# Patient Record
Sex: FEMALE | Race: WHITE | NOT HISPANIC OR LATINO | Employment: OTHER | URBAN - METROPOLITAN AREA
[De-identification: names, ages, dates, MRNs, and addresses within clinical notes are randomized per-mention and may not be internally consistent; named-entity substitution may affect disease eponyms.]

---

## 2018-02-05 ENCOUNTER — OFFICE VISIT (OUTPATIENT)
Dept: INTERNAL MEDICINE | Facility: CLINIC | Age: 68
End: 2018-02-05

## 2018-02-05 VITALS
OXYGEN SATURATION: 98 % | BODY MASS INDEX: 29.7 KG/M2 | RESPIRATION RATE: 16 BRPM | HEIGHT: 67 IN | WEIGHT: 189.2 LBS | SYSTOLIC BLOOD PRESSURE: 122 MMHG | TEMPERATURE: 97.7 F | HEART RATE: 68 BPM | DIASTOLIC BLOOD PRESSURE: 68 MMHG

## 2018-02-05 DIAGNOSIS — E03.8 HYPOTHYROIDISM DUE TO HASHIMOTO'S THYROIDITIS: ICD-10-CM

## 2018-02-05 DIAGNOSIS — Z71.3 WEIGHT LOSS COUNSELING, ENCOUNTER FOR: ICD-10-CM

## 2018-02-05 DIAGNOSIS — Z78.0 POST-MENOPAUSAL: ICD-10-CM

## 2018-02-05 DIAGNOSIS — Z12.11 COLON CANCER SCREENING: ICD-10-CM

## 2018-02-05 DIAGNOSIS — Z13.220 SCREENING FOR HYPERLIPIDEMIA: ICD-10-CM

## 2018-02-05 DIAGNOSIS — F32.A DEPRESSION, UNSPECIFIED DEPRESSION TYPE: ICD-10-CM

## 2018-02-05 DIAGNOSIS — Z12.31 ENCOUNTER FOR SCREENING MAMMOGRAM FOR MALIGNANT NEOPLASM OF BREAST: ICD-10-CM

## 2018-02-05 DIAGNOSIS — F41.9 ANXIETY: Primary | ICD-10-CM

## 2018-02-05 DIAGNOSIS — E66.3 OVERWEIGHT (BMI 25.0-29.9): ICD-10-CM

## 2018-02-05 DIAGNOSIS — C90.00 MULTIPLE MYELOMA, REMISSION STATUS UNSPECIFIED (HCC): ICD-10-CM

## 2018-02-05 DIAGNOSIS — E06.3 HYPOTHYROIDISM DUE TO HASHIMOTO'S THYROIDITIS: ICD-10-CM

## 2018-02-05 DIAGNOSIS — E55.9 VITAMIN D DEFICIENCY: ICD-10-CM

## 2018-02-05 DIAGNOSIS — D75.89 MACROCYTOSIS WITHOUT ANEMIA: ICD-10-CM

## 2018-02-05 PROCEDURE — 99215 OFFICE O/P EST HI 40 MIN: CPT | Performed by: INTERNAL MEDICINE

## 2018-02-05 RX ORDER — GABAPENTIN 100 MG/1
100 CAPSULE ORAL DAILY PRN
COMMUNITY
End: 2021-04-06 | Stop reason: SDUPTHER

## 2018-02-05 RX ORDER — ALPRAZOLAM 0.25 MG/1
0.25 TABLET ORAL 2 TIMES DAILY PRN
Refills: 2 | COMMUNITY
Start: 2018-01-19 | End: 2020-06-15 | Stop reason: SDUPTHER

## 2018-02-05 RX ORDER — OMEGA-3-ACID ETHYL ESTERS 1 G/1
2 CAPSULE, LIQUID FILLED ORAL 2 TIMES DAILY
COMMUNITY
End: 2019-09-19

## 2018-02-05 RX ORDER — OLANZAPINE 2.5 MG/1
2.5 TABLET ORAL NIGHTLY
COMMUNITY
End: 2018-02-05

## 2018-02-05 RX ORDER — FOLIC ACID 1 MG/1
1 TABLET ORAL DAILY
COMMUNITY
End: 2018-03-12

## 2018-02-05 RX ORDER — ACYCLOVIR 400 MG/1
400 TABLET ORAL 2 TIMES DAILY
COMMUNITY
Start: 2018-01-03 | End: 2021-04-06 | Stop reason: SDUPTHER

## 2018-02-05 RX ORDER — VITAMIN B COMPLEX
2 CAPSULE ORAL DAILY
COMMUNITY

## 2018-02-05 RX ORDER — BORTEZOMIB 3.5 MG/1
INJECTION, POWDER, LYOPHILIZED, FOR SOLUTION INTRAVENOUS; SUBCUTANEOUS ONCE
COMMUNITY
End: 2019-09-19

## 2018-02-05 RX ORDER — FLUTICASONE PROPIONATE 50 MCG
2 SPRAY, SUSPENSION (ML) NASAL DAILY
COMMUNITY

## 2018-02-05 RX ORDER — LENALIDOMIDE 10 MG/1
10 CAPSULE ORAL DAILY
COMMUNITY
End: 2018-03-12

## 2018-02-05 RX ORDER — CYCLOSPORINE 0.5 MG/ML
1 EMULSION OPHTHALMIC 2 TIMES DAILY
COMMUNITY

## 2018-02-05 RX ORDER — ERGOCALCIFEROL 1.25 MG/1
50000 CAPSULE ORAL WEEKLY
COMMUNITY

## 2018-02-05 RX ORDER — DULOXETIN HYDROCHLORIDE 60 MG/1
60 CAPSULE, DELAYED RELEASE ORAL DAILY
COMMUNITY
Start: 2017-12-28 | End: 2020-05-13 | Stop reason: SDUPTHER

## 2018-02-05 NOTE — PROGRESS NOTES
"Subjective     Jennifer Plascencia is a 67 y.o. female, who presents with a chief complaint of   Chief Complaint   Patient presents with   • Establish Care   • Anxiety   • Weight Gain   • Multiple Myeloma       HPI Comments: 68 yo F here to establish care and all of her problems are new to me. She is followed by Dr. Hewitt as well as Anny Bach in [place]. She is not in \"remission\" per her. She is not happy with her oncology provider's office because she is sometimes late getting her medication/infusions. She states that [facility] are the \"best\" and \"no one else can take care of multiple myeloma patients like them\". She was diagnosed with this in 2015 and is s/p SCT and currently getting infusions every 2 weeks in [place] and sees them every 3 months. She feels that her cancer is rare and \"many doctors don't know about this\".     She takes Cymbalta for neuropathy as well  acetyl carnitine, and Lovaza since 2016. 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 She does well on these now. Dr. Hewitt added Zyprexa for her sleep about one year ago and she isn't sure that it is helping.     She takes Flonase for allergies that are chronic and stable.       She has been on synthroid since she was 25 years. Dr. Todd Randall manages for her. She has been on 112mcg since her SCT. She feels well on this and doesn't have constipation, hair loss or dry skin.     She had a mammogram two years ago at [facility] that was normal. She has not had a bone scan in many years. She is unsure when her last c-scope was done and she doesn't want to have another . Prefers stool test.     She is not exercising regularly and worried about her weight. It \"just won't seem to come off since her SCT\".        The following portions of the patient's history were reviewed and updated as appropriate: allergies, current medications, past family history, past medical history, past social history, past surgical history and problem " list.    Allergies: Iodine    Current Outpatient Prescriptions:   •  acyclovir (ZOVIRAX) 400 MG tablet, Take 400 mg by mouth 3 (Three) Times a Day., Disp: , Rfl:   •  ALPRAZolam (XANAX) 0.25 MG tablet, Take 0.25 mg by mouth 2 (Two) Times a Day., Disp: , Rfl: 2  •  aspirin 81 MG chewable tablet, Chew 81 mg Daily., Disp: , Rfl:   •  B Complex Vitamins (VITAMIN B COMPLEX) capsule capsule, Take  by mouth Daily., Disp: , Rfl:   •  bortezomib (VELCADE) 3.5 MG chemo syringe, Infuse  into a venous catheter 1 (One) Time., Disp: , Rfl:   •  calcium carbonate-vitamin d 600-400 MG-UNIT per tablet, Take 1 tablet by mouth Daily., Disp: , Rfl:   •  cycloSPORINE (RESTASIS MULTIDOSE) 0.05 % ophthalmic emulsion, 1 drop 2 (Two) Times a Day., Disp: , Rfl:   •  DULoxetine (CYMBALTA) 60 MG capsule, Take 60 mg by mouth Daily., Disp: , Rfl:   •  estrogen, conjugated,-medroxyprogesterone (PREMPRO) 0.3-1.5 MG per tablet, Take 1 tablet by mouth Daily., Disp: , Rfl:   •  fluticasone (FLONASE) 50 MCG/ACT nasal spray, 2 sprays into each nostril Daily., Disp: , Rfl:   •  folic acid (FOLVITE) 1 MG tablet, Take 1 mg by mouth Daily., Disp: , Rfl:   •  gabapentin (NEURONTIN) 100 MG capsule, Take 100 mg by mouth 3 (Three) Times a Day., Disp: , Rfl:   •  HYDROcodone-acetaminophen (NORCO) 5-325 MG per tablet, Take 1 tablet by mouth Every 6 (Six) Hours As Needed., Disp: , Rfl:   •  lenalidomide (REVLIMID) 10 MG capsule, Take 10 mg by mouth Daily., Disp: , Rfl:   •  levothyroxine (SYNTHROID, LEVOTHROID) 100 MCG tablet, Take 100 mcg by mouth Daily., Disp: , Rfl:   •  omega-3 acid ethyl esters (LOVAZA) 1 g capsule, Take 2 g by mouth 2 (Two) Times a Day., Disp: , Rfl:   •  vitamin D (ERGOCALCIFEROL) 20980 units capsule capsule, Take 50,000 Units by mouth 1 (One) Time Per Week., Disp: , Rfl:   Medications Discontinued During This Encounter   Medication Reason   • acetaminophen (TYLENOL) 100 MG/ML solution *Therapy completed   • acyclovir (ZOVIRAX) 200 MG  "capsule Duplicate order   • azithromycin (ZITHROMAX Z-NO) 250 MG tablet *Therapy completed   • DULoxetine (CYMBALTA) 20 MG capsule *Therapy completed   • guaiFENesin-codeine (ROMILAR-AC) 100-10 MG/5ML syrup *Therapy completed   • albuterol (PROVENTIL HFA;VENTOLIN HFA) 108 (90 Base) MCG/ACT inhaler *Therapy completed   • Chlorcyclizine-Pseudoephed (STAHIST AD) 25-60 MG tablet *Therapy completed   • fluticasone (VERAMYST) 27.5 MCG/SPRAY nasal spray *Therapy completed   • OLANZapine (zyPREXA) 2.5 MG tablet *Therapy completed       Review of Systems   Constitutional: Positive for fatigue and unexpected weight change. Negative for chills and fever.   HENT: Negative for congestion.    Respiratory: Negative for cough and shortness of breath.    Cardiovascular: Negative for chest pain and palpitations.   Gastrointestinal: Negative for diarrhea and nausea.   Musculoskeletal: Negative for arthralgias.   Skin: Negative for rash.   Psychiatric/Behavioral: Positive for sleep disturbance. Negative for decreased concentration and dysphoric mood.       Objective     /68 (BP Location: Right arm, Patient Position: Sitting, Cuff Size: Adult)  Pulse 68  Temp 97.7 °F (36.5 °C) (Oral)   Resp 16  Ht 170.2 cm (67\")  Wt 85.8 kg (189 lb 3.2 oz)  SpO2 98%  BMI 29.63 kg/m2      Physical Exam   Constitutional: She is oriented to person, place, and time. She appears well-developed and well-nourished. No distress.   HENT:   Head: Normocephalic and atraumatic.   Right Ear: External ear normal.   Left Ear: External ear normal.   Mouth/Throat: Oropharynx is clear and moist. No oropharyngeal exudate.   Eyes: Conjunctivae are normal. Right eye exhibits no discharge. Left eye exhibits no discharge. No scleral icterus.   Neck: Neck supple.   Cardiovascular: Normal rate, regular rhythm and normal heart sounds.  Exam reveals no gallop and no friction rub.    No murmur heard.  Pulmonary/Chest: Effort normal and breath sounds normal. No " respiratory distress. She has no wheezes. She has no rales.   Abdominal: Soft. Bowel sounds are normal. She exhibits no distension and no mass. There is no tenderness. There is no guarding.   Lymphadenopathy:     She has no cervical adenopathy.   Neurological: She is alert and oriented to person, place, and time.   Skin: Skin is warm. No rash noted.   Psychiatric: She has a normal mood and affect. Her behavior is normal.   Nursing note and vitals reviewed.        No results found for this or any previous visit.    Assessment/Plan   Jennifer was seen today for establish care, anxiety, weight gain and multiple myeloma.    Diagnoses and all orders for this visit:    Anxiety    Depression, unspecified depression type    Encounter for screening mammogram for malignant neoplasm of breast  -     Mammo Screening Digital Tomosynthesis Bilateral With CAD    Colon cancer screening  -     Cologuard - Stool, Per Rectum    Post-menopausal  -     DEXA Bone Density Axial    Multiple myeloma, remission status unspecified    Vitamin D deficiency    Overweight (BMI 25.0-29.9)    Weight loss counseling, encounter for    Macrocytosis without anemia    Hypothyroidism due to Hashimoto's thyroiditis    She is doing well, but needs to get up to date on her health maintenance. I have ordered mammogram, DEXA as well as colo guard. She needs labs as well and we will order those. I reviewed her blood work in [facility] from Dr. Hewitt office and recent CMP was normal. CBC shows leukopenia with macrocytosis without anemia.     Will continue Cymbalta and supplements for her mood and neuropathy. Will have her wean off of Zyprexa as this is probably causing weight gain issues as well as some changes in her lipid panel and fasting BG's. Unless she absolutely needs this, I think we should stop and she is going to wean off over the next 3-4 weeks. We will see her back afterwards to see how she does.     Dr. Randall managing thyroid levels. She wants  him to continue to take care of this even though I discussed with her that I can.     She needs to get into a regular exercise routine. Encouraged her to get at least 150 minutes of cardiovascular exercise per week. Spent greater than 50% of 40 minutes in counseling regarding weigh loss, depression/anxiety, sleep and health maintenance.       Return in about 4 weeks (around 3/5/2018) for Medicare Wellness, Recheck.    Jade Carvajal MD  02/05/2018

## 2018-02-07 PROBLEM — C90.00 NEUROPATHY ASSOCIATED WITH MULTIPLE MYELOMA (HCC): Status: ACTIVE | Noted: 2018-02-07

## 2018-02-07 PROBLEM — G63 NEUROPATHY ASSOCIATED WITH MULTIPLE MYELOMA: Status: ACTIVE | Noted: 2018-02-07

## 2018-02-23 DIAGNOSIS — E55.9 VITAMIN D DEFICIENCY: Primary | ICD-10-CM

## 2018-02-23 DIAGNOSIS — C90.00 MULTIPLE MYELOMA, REMISSION STATUS UNSPECIFIED (HCC): ICD-10-CM

## 2018-02-23 DIAGNOSIS — E03.8 HYPOTHYROIDISM DUE TO HASHIMOTO'S THYROIDITIS: ICD-10-CM

## 2018-02-23 DIAGNOSIS — E06.3 HYPOTHYROIDISM DUE TO HASHIMOTO'S THYROIDITIS: ICD-10-CM

## 2018-02-23 DIAGNOSIS — Z13.220 SCREENING FOR HYPERLIPIDEMIA: ICD-10-CM

## 2018-02-28 ENCOUNTER — APPOINTMENT (OUTPATIENT)
Dept: LAB | Facility: HOSPITAL | Age: 68
End: 2018-02-28

## 2018-02-28 LAB
25(OH)D3 SERPL-MCNC: 34.2 NG/ML (ref 30–100)
ALBUMIN SERPL-MCNC: 4.3 G/DL (ref 3.5–5.2)
ALBUMIN/GLOB SERPL: 1.7 G/DL
ALP SERPL-CCNC: 55 U/L (ref 39–117)
ALT SERPL W P-5'-P-CCNC: 13 U/L (ref 1–33)
ANION GAP SERPL CALCULATED.3IONS-SCNC: 12.5 MMOL/L
AST SERPL-CCNC: 16 U/L (ref 1–32)
BASOPHILS # BLD AUTO: 0.03 10*3/MM3 (ref 0–0.2)
BASOPHILS NFR BLD AUTO: 0.9 % (ref 0–1.5)
BILIRUB SERPL-MCNC: 0.3 MG/DL (ref 0.1–1.2)
BUN BLD-MCNC: 6 MG/DL (ref 8–23)
BUN/CREAT SERPL: 8 (ref 7–25)
CALCIUM SPEC-SCNC: 8.8 MG/DL (ref 8.6–10.5)
CHLORIDE SERPL-SCNC: 98 MMOL/L (ref 98–107)
CHOLEST SERPL-MCNC: 267 MG/DL (ref 0–200)
CO2 SERPL-SCNC: 27.5 MMOL/L (ref 22–29)
CREAT BLD-MCNC: 0.75 MG/DL (ref 0.57–1)
DEPRECATED RDW RBC AUTO: 54.3 FL (ref 37–54)
EOSINOPHIL # BLD AUTO: 0.08 10*3/MM3 (ref 0–0.7)
EOSINOPHIL NFR BLD AUTO: 2.3 % (ref 0.3–6.2)
ERYTHROCYTE [DISTWIDTH] IN BLOOD BY AUTOMATED COUNT: 14.3 % (ref 11.7–13)
FOLATE SERPL-MCNC: >20 NG/ML (ref 4.78–24.2)
GFR SERPL CREATININE-BSD FRML MDRD: 77 ML/MIN/1.73
GLOBULIN UR ELPH-MCNC: 2.5 GM/DL
GLUCOSE BLD-MCNC: 111 MG/DL (ref 65–99)
HCT VFR BLD AUTO: 37.6 % (ref 35.6–45.5)
HDLC SERPL-MCNC: 76 MG/DL (ref 40–60)
HGB BLD-MCNC: 13.1 G/DL (ref 11.9–15.5)
IMM GRANULOCYTES # BLD: 0 10*3/MM3 (ref 0–0.03)
IMM GRANULOCYTES NFR BLD: 0 % (ref 0–0.5)
LDLC SERPL CALC-MCNC: 142 MG/DL (ref 0–100)
LDLC/HDLC SERPL: 1.87 {RATIO}
LYMPHOCYTES # BLD AUTO: 1.36 10*3/MM3 (ref 0.9–4.8)
LYMPHOCYTES NFR BLD AUTO: 39.8 % (ref 19.6–45.3)
MCH RBC QN AUTO: 36.2 PG (ref 26.9–32)
MCHC RBC AUTO-ENTMCNC: 34.8 G/DL (ref 32.4–36.3)
MCV RBC AUTO: 103.9 FL (ref 80.5–98.2)
MONOCYTES # BLD AUTO: 0.33 10*3/MM3 (ref 0.2–1.2)
MONOCYTES NFR BLD AUTO: 9.6 % (ref 5–12)
NEUTROPHILS # BLD AUTO: 1.62 10*3/MM3 (ref 1.9–8.1)
NEUTROPHILS NFR BLD AUTO: 47.4 % (ref 42.7–76)
NRBC BLD MANUAL-RTO: 0 /100 WBC (ref 0–0)
PLATELET # BLD AUTO: 192 10*3/MM3 (ref 140–500)
PMV BLD AUTO: 10.1 FL (ref 6–12)
POTASSIUM BLD-SCNC: 3.8 MMOL/L (ref 3.5–5.2)
PROT SERPL-MCNC: 6.8 G/DL (ref 6–8.5)
RBC # BLD AUTO: 3.62 10*6/MM3 (ref 3.9–5.2)
SODIUM BLD-SCNC: 138 MMOL/L (ref 136–145)
TRIGL SERPL-MCNC: 243 MG/DL (ref 0–150)
VIT B12 BLD-MCNC: 365 PG/ML (ref 211–946)
VLDLC SERPL-MCNC: 48.6 MG/DL (ref 5–40)
WBC NRBC COR # BLD: 3.42 10*3/MM3 (ref 4.5–10.7)

## 2018-02-28 PROCEDURE — 36415 COLL VENOUS BLD VENIPUNCTURE: CPT

## 2018-02-28 PROCEDURE — 80061 LIPID PANEL: CPT | Performed by: INTERNAL MEDICINE

## 2018-02-28 PROCEDURE — 82746 ASSAY OF FOLIC ACID SERUM: CPT | Performed by: INTERNAL MEDICINE

## 2018-02-28 PROCEDURE — 85025 COMPLETE CBC W/AUTO DIFF WBC: CPT | Performed by: INTERNAL MEDICINE

## 2018-02-28 PROCEDURE — 82306 VITAMIN D 25 HYDROXY: CPT | Performed by: INTERNAL MEDICINE

## 2018-02-28 PROCEDURE — 82607 VITAMIN B-12: CPT | Performed by: INTERNAL MEDICINE

## 2018-02-28 PROCEDURE — 80053 COMPREHEN METABOLIC PANEL: CPT | Performed by: INTERNAL MEDICINE

## 2018-03-07 ENCOUNTER — TELEPHONE (OUTPATIENT)
Dept: INTERNAL MEDICINE | Facility: CLINIC | Age: 68
End: 2018-03-07

## 2018-03-07 NOTE — TELEPHONE ENCOUNTER
Patient is aware    ----- Message from Jade Carvajal MD sent at 3/6/2018  2:05 PM EST -----  Mammogram was negative. Repeat in one year.

## 2018-03-07 NOTE — TELEPHONE ENCOUNTER
TRACYM on 3/1 to call back will review with dr. Carvajal in clinic.    ----- Message from Jade Carvajal MD sent at 3/1/2018  8:24 AM EST -----  Labs shows high cholesterol and slightly low B12. I want her to double on her B complex vitamin and we will recheck CBC in 3-4 months. If still no change in the size of her red blood cells, will consider B12 shots. Follow up as scheduled and we will discuss all results in detail in clinic.

## 2018-03-07 NOTE — TELEPHONE ENCOUNTER
Patient is aware      ----- Message from Jade Carvajal MD sent at 3/6/2018  2:07 PM EST -----  Bone density was normal. Repeat in 2-3 years.

## 2018-03-12 ENCOUNTER — OFFICE VISIT (OUTPATIENT)
Dept: INTERNAL MEDICINE | Facility: CLINIC | Age: 68
End: 2018-03-12

## 2018-03-12 VITALS
OXYGEN SATURATION: 98 % | BODY MASS INDEX: 28.72 KG/M2 | RESPIRATION RATE: 16 BRPM | HEART RATE: 81 BPM | HEIGHT: 67 IN | DIASTOLIC BLOOD PRESSURE: 78 MMHG | TEMPERATURE: 98.2 F | SYSTOLIC BLOOD PRESSURE: 132 MMHG | WEIGHT: 183 LBS

## 2018-03-12 DIAGNOSIS — R09.82 PND (POST-NASAL DRIP): ICD-10-CM

## 2018-03-12 DIAGNOSIS — Z00.00 MEDICARE ANNUAL WELLNESS VISIT, SUBSEQUENT: Primary | ICD-10-CM

## 2018-03-12 DIAGNOSIS — R73.01 IFG (IMPAIRED FASTING GLUCOSE): ICD-10-CM

## 2018-03-12 DIAGNOSIS — E78.2 MIXED HYPERLIPIDEMIA: ICD-10-CM

## 2018-03-12 DIAGNOSIS — F41.9 ANXIETY: ICD-10-CM

## 2018-03-12 DIAGNOSIS — F32.A DEPRESSION, UNSPECIFIED DEPRESSION TYPE: ICD-10-CM

## 2018-03-12 DIAGNOSIS — E53.8 B12 DEFICIENCY: ICD-10-CM

## 2018-03-12 DIAGNOSIS — E55.9 VITAMIN D DEFICIENCY: ICD-10-CM

## 2018-03-12 DIAGNOSIS — Z79.890 HORMONE REPLACEMENT THERAPY (HRT): ICD-10-CM

## 2018-03-12 DIAGNOSIS — H69.83 EUSTACHIAN TUBE DYSFUNCTION, BILATERAL: ICD-10-CM

## 2018-03-12 PROCEDURE — 99214 OFFICE O/P EST MOD 30 MIN: CPT | Performed by: INTERNAL MEDICINE

## 2018-03-12 PROCEDURE — G0439 PPPS, SUBSEQ VISIT: HCPCS | Performed by: INTERNAL MEDICINE

## 2018-03-12 RX ORDER — MULTIVITAMIN WITH FOLIC ACID 400 MCG
1 TABLET ORAL DAILY
Refills: 3 | COMMUNITY
Start: 2018-01-17 | End: 2019-03-21 | Stop reason: SDUPTHER

## 2018-03-12 RX ORDER — MEDROXYPROGESTERONE ACETATE 2.5 MG/1
TABLET ORAL
COMMUNITY
Start: 2018-01-02 | End: 2018-03-12

## 2018-03-12 RX ORDER — LEVOTHYROXINE SODIUM 112 MCG
TABLET ORAL
COMMUNITY
Start: 2018-02-22 | End: 2021-01-13 | Stop reason: ALTCHOICE

## 2018-03-12 RX ORDER — DESONIDE 0.5 MG/G
OINTMENT TOPICAL
COMMUNITY
Start: 2018-02-12 | End: 2018-09-18

## 2018-03-12 RX ORDER — LENALIDOMIDE 10 MG/1
10 CAPSULE ORAL
COMMUNITY
Start: 2018-03-05 | End: 2018-03-16 | Stop reason: SDUPTHER

## 2018-03-12 RX ORDER — CETIRIZINE HYDROCHLORIDE 10 MG/1
10 TABLET ORAL AS NEEDED
COMMUNITY
End: 2019-09-19

## 2018-03-12 NOTE — ASSESSMENT & PLAN NOTE
Lipid abnormalities are newly identified.  Nutritional counseling was provided.  Lipids will be reassessed in 6 months.

## 2018-03-12 NOTE — PATIENT INSTRUCTIONS
Medicare Wellness  Personal Prevention Plan of Service     Date of Office Visit:  2018  Encounter Provider:  Jade Carvajal MD  Place of Service:  [facility]  Patient Name: Jennifer Plascencia  :  1950    As part of the Medicare Wellness portion of your visit today, we are providing you with this personalized preventive plan of services (PPPS). This plan is based upon recommendations of the [place] Preventive Services Task Force (USPSTF) and the Advisory Committee on Immunization Practices (ACIP).    This lists the preventive care services that should be considered, and provides dates of when you are due. Items listed as completed are up-to-date and do not require any further intervention.    Health Maintenance   Topic Date Due   • COLONOSCOPY  2017   • ZOSTER VACCINE  2018   • TDAP/TD VACCINES (1 - Tdap) 2019 (Originally 1969)   • PAP SMEAR  2018   • PNEUMOCOCCAL VACCINES (65+ LOW/MEDIUM RISK) (2 of 2 - PPSV23) 10/03/2018   • LIPID PANEL  2019   • MEDICARE ANNUAL WELLNESS  2019   • MAMMOGRAM  2020   • DXA SCAN  2020   • HEPATITIS C SCREENING  Completed   • INFLUENZA VACCINE  Completed       Orders Placed This Encounter   Procedures   • Comprehensive Metabolic Panel     Standing Status:   Future   • Lipid Panel With LDL / HDL Ratio     Standing Status:   Future   • Hemoglobin A1c     Standing Status:   Future   • Vitamin B12     Standing Status:   Future       Return in about 6 months (around 2018) for Recheck.      Food Choices to Lower Your Triglycerides  Triglycerides are a type of fat in your blood. High levels of triglycerides can increase the risk of heart disease and stroke. If your triglyceride levels are high, the foods you eat and your eating habits are very important. Choosing the right foods can help lower your triglycerides.  What general guidelines do I need to follow?  · Lose weight if  "you are overweight.  · Limit or avoid alcohol.  · Fill one half of your plate with vegetables and green salads.  · Limit fruit to two servings a day. Choose fruit instead of juice.  · Make one fourth of your plate whole grains. Look for the word \"whole\" as the first word in the ingredient list.  · Fill one fourth of your plate with lean protein foods.  · Enjoy fatty fish (such as salmon, mackerel, sardines, and tuna) three times a week.  · Choose healthy fats.  · Limit foods high in starch and sugar.  · Eat more home-cooked food and less restaurant, buffet, and fast food.  · Limit fried foods.  · Cook foods using methods other than frying.  · Limit saturated fats.  · Check ingredient lists to avoid foods with partially hydrogenated oils (trans fats) in them.  What foods can I eat?  Grains   Whole grains, such as whole wheat or whole grain breads, crackers, cereals, and pasta. Unsweetened oatmeal, bulgur, barley, quinoa, or brown rice. Corn or whole wheat flour tortillas.  Vegetables   Fresh or frozen vegetables (raw, steamed, roasted, or grilled). Green salads.  Fruits   All fresh, canned (in natural juice), or frozen fruits.  Meat and Other Protein Products   Ground beef (85% or leaner), grass-fed beef, or beef trimmed of fat. Skinless chicken or turkey. Ground chicken or turkey. Pork trimmed of fat. All fish and seafood. Eggs. Dried beans, peas, or lentils. Unsalted nuts or seeds. Unsalted canned or dry beans.  Dairy   Low-fat dairy products, such as skim or 1% milk, 2% or reduced-fat cheeses, low-fat ricotta or cottage cheese, or plain low-fat yogurt.  Fats and Oils   Tub margarines without trans fats. Light or reduced-fat mayonnaise and salad dressings. Avocado. Safflower, olive, or canola oils. Natural peanut or almond butter.  The items listed above may not be a complete list of recommended foods or beverages. Contact your dietitian for more options.   What foods are not recommended?  Grains   White bread. " White pasta. White rice. Cornbread. Bagels, pastries, and croissants. Crackers that contain trans fat.  Vegetables   White potatoes. Corn. Creamed or fried vegetables. Vegetables in a cheese sauce.  Fruits   Dried fruits. Canned fruit in light or heavy syrup. Fruit juice.  Meat and Other Protein Products   Fatty cuts of meat. Ribs, chicken wings, neal, sausage, bologna, salami, chitterlings, fatback, hot dogs, bratwurst, and packaged luncheon meats.  Dairy   Whole or 2% milk, cream, half-and-half, and cream cheese. Whole-fat or sweetened yogurt. Full-fat cheeses. Nondairy creamers and whipped toppings. Processed cheese, cheese spreads, or cheese curds.  Sweets and Desserts   Corn syrup, sugars, honey, and molasses. Candy. Jam and jelly. Syrup. Sweetened cereals. Cookies, pies, cakes, donuts, muffins, and ice cream.  Fats and Oils   Butter, stick margarine, lard, shortening, ghee, or neal fat. Coconut, palm kernel, or palm oils.  Beverages   Alcohol. Sweetened drinks (such as sodas, lemonade, and fruit drinks or punches).  The items listed above may not be a complete list of foods and beverages to avoid. Contact your dietitian for more information.   This information is not intended to replace advice given to you by your health care provider. Make sure you discuss any questions you have with your health care provider.  Document Released: 10/05/2005 Document Revised: 05/25/2017 Document Reviewed: 10/22/2014  [employer] Interactive Patient Education © 2017 [employer]    Cholesterol  Cholesterol is a fat. Your body needs a small amount of cholesterol. Cholesterol (plaque) may build up in your blood vessels (arteries). That makes you more likely to have a heart attack or stroke.  You cannot feel your cholesterol level. Having a blood test is the only way to find out if your level is high. Keep your test results. Work with your doctor to keep your cholesterol at a good level.  What do the results mean?  · Total  cholesterol is how much cholesterol is in your blood.  · LDL is bad cholesterol. This is the type that can build up. Try to have low LDL.  · HDL is good cholesterol. It cleans your blood vessels and carries LDL away. Try to have high HDL.  · Triglycerides are fat that the body can store or burn for energy.  What are good levels of cholesterol?  · Total cholesterol below 200.  · LDL below 100 is good for people who have health risks. LDL below 70 is good for people who have very high risks.  · HDL above 40 is good. It is best to have HDL of 60 or higher.  · Triglycerides below 150.  How can I lower my cholesterol?  Diet   Follow your diet program as told by your doctor.  · Choose fish, white meat chicken, or turkey that is roasted or baked. Try not to eat red meat, fried foods, sausage, or lunch meats.  · Eat lots of fresh fruits and vegetables.  · Choose whole grains, beans, pasta, potatoes, and cereals.  · Choose olive oil, corn oil, or canola oil. Only use small amounts.  · Try not to eat butter, mayonnaise, shortening, or palm kernel oils.  · Try not to eat foods with trans fats.  · Choose low-fat or nonfat dairy foods.  ¨ Drink skim or nonfat milk.  ¨ Eat low-fat or nonfat yogurt and cheeses.  ¨ Try not to drink whole milk or cream.  ¨ Try not to eat ice cream, egg yolks, or full-fat cheeses.  · Healthy desserts include mari food cake, sharyn snaps, animal crackers, hard candy, popsicles, and low-fat or nonfat frozen yogurt. Try not to eat pastries, cakes, pies, and cookies.  Exercise   Follow your exercise program as told by your doctor.  · Be more active. Try gardening, walking, and taking the stairs.  · Ask your doctor about ways that you can be more active.  Medicine  · Take over-the-counter and prescription medicines only as told by your doctor.  This information is not intended to replace advice given to you by your health care provider. Make sure you discuss any questions you have with your health care  provider.  Document Released: 03/16/2010 Document Revised: 07/19/2017 Document Reviewed: 06/29/2017  [employer] Interactive Patient Education © 2017 [employer]

## 2018-03-12 NOTE — PROGRESS NOTES
QUICK REFERENCE INFORMATION:  The ABCs of the Annual Wellness Visit    Subsequent Medicare Wellness Visit    HEALTH RISK ASSESSMENT    1950    Recent Hospitalizations:  No hospitalization(s) within the last year..        Current Medical Providers:  Patient Care Team:  Jade Carvajal MD as PCP - General (Internal Medicine)  Arturo Joe MD as Consulting Physician (Ophthalmology)  Todd Randall MD as Consulting Physician (Endocrinology)        Smoking Status:  History   Smoking Status   • Former Smoker   Smokeless Tobacco   • Never Used       Alcohol Consumption:  History   Alcohol Use   • Yes     Comment: 2 per month        Depression Screen:   PHQ-2/PHQ-9 Depression Screening 3/12/2018   Little interest or pleasure in doing things 1   Feeling down, depressed, or hopeless 1   Trouble falling or staying asleep, or sleeping too much 0   Feeling tired or having little energy 1   Poor appetite or overeating 0   Feeling bad about yourself - or that you are a failure or have let yourself or your family down 0   Trouble concentrating on things, such as reading the newspaper or watching television 1   Moving or speaking so slowly that other people could have noticed. Or the opposite - being so fidgety or restless that you have been moving around a lot more than usual 0   Thoughts that you would be better off dead, or of hurting yourself in some way 0   Total Score 4   If you checked off any problems, how difficult have these problems made it for you to do your work, take care of things at home, or get along with other people? Not difficult at all       Health Habits and Functional and Cognitive Screening:  Functional & Cognitive Status 3/12/2018   Do you have difficulty preparing food and eating? No   Do you have difficulty bathing yourself, getting dressed or grooming yourself? No   Do you have difficulty using the toilet? No   Do you have difficulty moving around from place to place? No   Do you have trouble  with steps or getting out of a bed or a chair? No   In the past year have you fallen or experienced a near fall? No   Current Diet Well Balanced Diet   Dental Exam Up to date   Eye Exam Up to date   Exercise (times per week) 3 times per week   Current Exercise Activities Include Walking   Do you need help using the phone?  No   Are you deaf or do you have serious difficulty hearing?  No   Do you need help with transportation? No   Do you need help shopping? No   Do you need help preparing meals?  No   Do you need help with housework?  No   Do you need help with laundry? No   Do you need help taking your medications? No   Do you need help managing money? No   Have you felt unusual stress, anger or loneliness in the last month? No   Who do you live with? Alone   If you need help, do you have trouble finding someone available to you? No   Have you been bothered in the last four weeks by sexual problems? No   Do you have difficulty concentrating, remembering or making decisions? No           Does the patient have evidence of cognitive impairment? No    Aspirin use counseling: currently on ASA       Recent Lab Results:  CMP:  Lab Results   Component Value Date    BUN 6 (L) 02/28/2018    CREATININE 0.75 02/28/2018    EGFRIFNONA 77 02/28/2018    BCR 8.0 02/28/2018     02/28/2018    K 3.8 02/28/2018    CO2 27.5 02/28/2018    CALCIUM 8.8 02/28/2018    ALBUMIN 4.30 02/28/2018    LABIL2 1.7 02/28/2018    BILITOT 0.3 02/28/2018    ALKPHOS 55 02/28/2018    AST 16 02/28/2018    ALT 13 02/28/2018     Lipid Panel:  Lab Results   Component Value Date    CHOL 267 (H) 02/28/2018    TRIG 243 (H) 02/28/2018    HDL 76 (H) 02/28/2018    VLDL 48.6 (H) 02/28/2018    LDLHDL 1.87 02/28/2018     HbA1c:       Visual Acuity:  No exam data present    Age-appropriate Screening Schedule:  Refer to the list below for future screening recommendations based on patient's age, sex and/or medical conditions. Orders for these recommended tests are  "listed in the plan section. The patient has been provided with a written plan.    Health Maintenance   Topic Date Due   • COLONOSCOPY  11/12/2017   • ZOSTER VACCINE  02/05/2018   • TDAP/TD VACCINES (1 - Tdap) 02/01/2019 (Originally 2/19/1969)   • PAP SMEAR  07/01/2018   • PNEUMOCOCCAL VACCINES (65+ LOW/MEDIUM RISK) (2 of 2 - PPSV23) 10/03/2018   • LIPID PANEL  02/28/2019   • MAMMOGRAM  03/06/2020   • DXA SCAN  03/06/2020   • INFLUENZA VACCINE  Completed        Subjective   History of Present Illness    Jennifer Plascencia is a 68 y.o. female who presents for an Subsequent Wellness Visit.      She just went to see her doctor at [facility] for her MM. She is doing well and currently getting infusions here in [place]. That physician recommended that she stop her Zyprexa as well as her Folate and her HRT. He wants her to continue her B12 vitamin,      Yesterday morning she noted that her glands started getting swollen and feels that her throat is sore. Both sides of her throat are sore. Ears are \"stinging\" and feel full. She took Stahist that did not help. She does not have fever, but has had chills.     She has  IFG and HLD on her labs. I reviewed these with her and she is only taking Omega 3. She is obese and not eating well all the time. She is not exercising besides walking.     She is doing well off of her Zyprexa. Started weaning since last time that I saw her and she had a few issues, but that has all resolved. She feels well off of it and is sleeping well. She is taking Cymbalta daily for her neuropathy as well as mood and is doing well on this. She takes Xanax once per a month at the most.     The following portions of the patient's history were reviewed and updated as appropriate: allergies, current medications, past family history, past medical history, past social history, past surgical history and problem list.    Outpatient Medications Prior to Visit   Medication Sig Dispense Refill   • acyclovir (ZOVIRAX) " 400 MG tablet Take 400 mg by mouth 2 (Two) Times a Day.     • ALPRAZolam (XANAX) 0.25 MG tablet Take 0.25 mg by mouth 2 (Two) Times a Day As Needed.  2   • aspirin 81 MG chewable tablet Chew 81 mg Daily.     • B Complex Vitamins (VITAMIN B COMPLEX) capsule capsule Take 2 tablets by mouth Daily.     • bortezomib (VELCADE) 3.5 MG chemo syringe Infuse  into a venous catheter 1 (One) Time.     • calcium carbonate-vitamin d 600-400 MG-UNIT per tablet Take 1 tablet by mouth Daily.     • cycloSPORINE (RESTASIS MULTIDOSE) 0.05 % ophthalmic emulsion 1 drop 2 (Two) Times a Day.     • DULoxetine (CYMBALTA) 60 MG capsule Take 60 mg by mouth Daily.     • fluticasone (FLONASE) 50 MCG/ACT nasal spray 2 sprays into each nostril Daily.     • gabapentin (NEURONTIN) 100 MG capsule Take 100 mg by mouth 3 (Three) Times a Day As Needed.     • HYDROcodone-acetaminophen (NORCO) 5-325 MG per tablet Take 1 tablet by mouth Every 6 (Six) Hours As Needed.     • omega-3 acid ethyl esters (LOVAZA) 1 g capsule Take 2 g by mouth 2 (Two) Times a Day.     • vitamin D (ERGOCALCIFEROL) 69835 units capsule capsule Take 50,000 Units by mouth 1 (One) Time Per Week.     • ciprofloxacin (CIPRO) 500 MG tablet Take 1 tablet by mouth 2 (Two) Times a Day. 14 tablet 0   • estrogen, conjugated,-medroxyprogesterone (PREMPRO) 0.3-1.5 MG per tablet Take 1 tablet by mouth Daily.     • folic acid (FOLVITE) 1 MG tablet Take 1 mg by mouth Daily.     • lenalidomide (REVLIMID) 10 MG capsule Take 10 mg by mouth Daily.     • levothyroxine (SYNTHROID, LEVOTHROID) 100 MCG tablet Take 100 mcg by mouth Daily.       No facility-administered medications prior to visit.        Patient Active Problem List   Diagnosis   • Anxiety   • Depression   • Psychophysiological insomnia   • Family history of breast cancer   • History of auto stem cell transplant   • Hypothyroidism   • IBS (irritable bowel syndrome)   • Myeloma   • Osteopenia   • Stem cell transplant candidate   • Vitamin D  deficiency   • Overweight (BMI 25.0-29.9)   • Neuropathy associated with multiple myeloma   • Mixed hyperlipidemia       Advance Care Planning:  has an advance directive - a copy HAS NOT been provided. Have asked the patient to send this to us to add to record.    Identification of Risk Factors:  Risk factors include: weight , unhealthy diet, cardiovascular risk, depression and polypharmacy.    Review of Systems   Constitutional: Negative for fatigue.   HENT: Positive for ear pain and sore throat. Negative for congestion and ear discharge.    Respiratory: Negative for cough and shortness of breath.    Cardiovascular: Negative for chest pain and palpitations.   Gastrointestinal: Negative for diarrhea and nausea.   Genitourinary: Negative for difficulty urinating.   Skin: Negative for rash.   Neurological: Positive for headaches.   Hematological: Negative for adenopathy.       Compared to one year ago, the patient feels her physical health is the same.  Compared to one year ago, the patient feels her mental health is the same.    Objective     Physical Exam   Constitutional: She is oriented to person, place, and time. She appears well-developed and well-nourished. No distress.   HENT:   Head: Normocephalic and atraumatic.   Right Ear: External ear normal.   Left Ear: External ear normal.   Mouth/Throat: Oropharynx is clear and moist. No oropharyngeal exudate.   Eyes: Conjunctivae and EOM are normal. Right eye exhibits no discharge. Left eye exhibits no discharge. No scleral icterus.   Neck: Neck supple.   Cardiovascular: Normal rate, regular rhythm and normal heart sounds.  Exam reveals no gallop and no friction rub.    No murmur heard.  Pulmonary/Chest: Effort normal and breath sounds normal. No respiratory distress. She has no wheezes. She has no rales.   Abdominal: Soft. Bowel sounds are normal. She exhibits no distension and no mass. There is no tenderness. There is no guarding.   Lymphadenopathy:     She has no  "cervical adenopathy.   Neurological: She is alert and oriented to person, place, and time.   Skin: Skin is warm. No rash noted.   Psychiatric: She has a normal mood and affect. Her behavior is normal.   Nursing note and vitals reviewed.      Vitals:    03/12/18 1425   BP: 132/78   BP Location: Left arm   Patient Position: Sitting   Cuff Size: Adult   Pulse: 81   Resp: 16   Temp: 98.2 °F (36.8 °C)   TempSrc: Oral   SpO2: 98%   Weight: 83 kg (183 lb)   Height: 170.2 cm (67.01\")   PainSc:   5   PainLoc: Ear  Comment: Earache, sore throat       Body mass index is 28.66 kg/m².  Discussed the patient's BMI with her. BMI is above normal parameters. Follow-up plan includes:  exercise counseling and nutrition counseling.    Assessment/Plan   Patient Self-Management and Personalized Health Advice  The patient has been provided with information about: diet, exercise, weight management, prevention of cardiac or vascular disease, fall prevention and mental health concerns and preventive services including:   · Bone densitometry screening, Colorectal cancer screening, cologuard test ordered, Counseling for cardiovascular disease risk reduction, Diabetes screening, see lab orders, Exercise counseling provided, Fall Risk assessment done, Fall Risk plan of care done, Nutrition counseling provided.    Visit Diagnoses:    ICD-10-CM ICD-9-CM   1. Medicare annual wellness visit, subsequent Z00.00 V70.0   2. Vitamin D deficiency E55.9 268.9   3. Depression, unspecified depression type F32.9 311   4. Anxiety F41.9 300.00   5. Mixed hyperlipidemia E78.2 272.2   6. B12 deficiency E53.8 266.2   7. PND (post-nasal drip) R09.82 784.91   8. Eustachian tube dysfunction, bilateral H69.83 381.81   9. IFG (impaired fasting glucose) R73.01 790.21   10. Hormone replacement therapy (HRT) Z79.890 V07.4     Reviewed mammogram and DEXA that were ordered at [facility]. DEXA shows osteopenia and she is going to continue to work on diet and exercise and taking " her supplements    Will start to wean her estrogen and progesterone over the next 4 weeks. Knows to call me if she cannot tolerate.      Will continue Cymbalta and supplements for her mood and neuropathy.     Will work on diet and exercise for her IFG and HLD. Spent 50% of 25 minutes in counseling her on reducing TG's, fried foods, carbohydrates and saturated fats.  Follow up in 6 months with levels.      Dr. Randall managing thyroid levels. She wants him to continue to take care of this even though I discussed with her that I can.      She needs to get into a regular exercise routine. Encouraged her to get at least 150 minutes of cardiovascular exercise per week.     Flonase and Zyrtec for ETD. Call if not getting better with time.     Orders Placed This Encounter   Procedures   • Comprehensive Metabolic Panel     Standing Status:   Future   • Lipid Panel With LDL / HDL Ratio     Standing Status:   Future   • Hemoglobin A1c     Standing Status:   Future   • Vitamin B12     Standing Status:   Future       Outpatient Encounter Prescriptions as of 3/12/2018   Medication Sig Dispense Refill   • acyclovir (ZOVIRAX) 400 MG tablet Take 400 mg by mouth 2 (Two) Times a Day.     • ALPRAZolam (XANAX) 0.25 MG tablet Take 0.25 mg by mouth 2 (Two) Times a Day As Needed.  2   • aspirin 81 MG chewable tablet Chew 81 mg Daily.     • B Complex Vitamins (VITAMIN B COMPLEX) capsule capsule Take 2 tablets by mouth Daily.     • bortezomib (VELCADE) 3.5 MG chemo syringe Infuse  into a venous catheter 1 (One) Time.     • calcium carbonate-vitamin d 600-400 MG-UNIT per tablet Take 1 tablet by mouth Daily.     • cetirizine (zyrTEC) 10 MG tablet Take 10 mg by mouth Daily.     • cycloSPORINE (RESTASIS MULTIDOSE) 0.05 % ophthalmic emulsion 1 drop 2 (Two) Times a Day.     • desonide (DESOWEN) 0.05 % ointment      • DULoxetine (CYMBALTA) 60 MG capsule Take 60 mg by mouth Daily.     • fluticasone (FLONASE) 50 MCG/ACT nasal spray 2 sprays into  each nostril Daily.     • gabapentin (NEURONTIN) 100 MG capsule Take 100 mg by mouth 3 (Three) Times a Day As Needed.     • HYDROcodone-acetaminophen (NORCO) 5-325 MG per tablet Take 1 tablet by mouth Every 6 (Six) Hours As Needed.     • lenalidomide (REVLIMID) 10 MG capsule Take 10 mg by mouth.     • multivitamin (DAILY JONI) tablet tablet Take 1 tablet by mouth Daily.  3   • omega-3 acid ethyl esters (LOVAZA) 1 g capsule Take 2 g by mouth 2 (Two) Times a Day.     • SYNTHROID 112 MCG tablet      • vitamin D (ERGOCALCIFEROL) 63977 units capsule capsule Take 50,000 Units by mouth 1 (One) Time Per Week.     • [DISCONTINUED] medroxyPROGESTERone (PROVERA) 2.5 MG tablet      • [DISCONTINUED] ciprofloxacin (CIPRO) 500 MG tablet Take 1 tablet by mouth 2 (Two) Times a Day. 14 tablet 0   • [DISCONTINUED] estrogen, conjugated,-medroxyprogesterone (PREMPRO) 0.3-1.5 MG per tablet Take 1 tablet by mouth Daily.     • [DISCONTINUED] folic acid (FOLVITE) 1 MG tablet Take 1 mg by mouth Daily.     • [DISCONTINUED] lenalidomide (REVLIMID) 10 MG capsule Take 10 mg by mouth Daily.     • [DISCONTINUED] levothyroxine (SYNTHROID, LEVOTHROID) 100 MCG tablet Take 100 mcg by mouth Daily.       No facility-administered encounter medications on file as of 3/12/2018.        Reviewed use of high risk medication in the elderly: yes  Reviewed for potential of harmful drug interactions in the elderly: yes    Follow Up:  Return in about 6 months (around 9/12/2018) for Recheck.     An After Visit Summary and PPPS with all of these plans were given to the patient.

## 2018-03-16 ENCOUNTER — OFFICE VISIT (OUTPATIENT)
Dept: INTERNAL MEDICINE | Facility: CLINIC | Age: 68
End: 2018-03-16

## 2018-03-16 VITALS
BODY MASS INDEX: 28.56 KG/M2 | DIASTOLIC BLOOD PRESSURE: 74 MMHG | RESPIRATION RATE: 12 BRPM | HEART RATE: 73 BPM | OXYGEN SATURATION: 98 % | TEMPERATURE: 98.4 F | SYSTOLIC BLOOD PRESSURE: 122 MMHG | WEIGHT: 182 LBS | HEIGHT: 67 IN

## 2018-03-16 DIAGNOSIS — J02.9 SORE THROAT: ICD-10-CM

## 2018-03-16 DIAGNOSIS — J20.9 ACUTE BRONCHITIS, UNSPECIFIED ORGANISM: Primary | ICD-10-CM

## 2018-03-16 PROCEDURE — 99213 OFFICE O/P EST LOW 20 MIN: CPT | Performed by: INTERNAL MEDICINE

## 2018-03-16 RX ORDER — LENALIDOMIDE 10 MG/1
10 CAPSULE ORAL
COMMUNITY
Start: 2018-03-13 | End: 2020-04-01 | Stop reason: SDUPTHER

## 2018-03-16 RX ORDER — FLUCONAZOLE 200 MG/1
TABLET ORAL WEEKLY
COMMUNITY
Start: 2018-03-14 | End: 2019-09-19

## 2018-03-16 RX ORDER — DEXAMETHASONE 0.5 MG/5ML
ELIXIR ORAL
COMMUNITY
Start: 2018-03-14 | End: 2018-09-18

## 2018-03-16 RX ORDER — AZITHROMYCIN 250 MG/1
TABLET, FILM COATED ORAL
Qty: 6 TABLET | Refills: 0 | Status: SHIPPED | OUTPATIENT
Start: 2018-03-16 | End: 2018-09-18

## 2018-03-16 NOTE — PROGRESS NOTES
Jennifer Plascencia is a 68 y.o. female, who presents with a chief complaint of   Chief Complaint   Patient presents with   • Earache       67 yo F with MM here with 1.5 weeks of ear ache, sore throat, dry cough, and sinus pressure in the frontal. She has taken Zyrtec and Flonase and Ibuprofen that did not help. She was at  home  and had sick contacts there.          The following portions of the patient's history were reviewed and updated as appropriate: allergies, current medications, past family history, past medical history, past social history, past surgical history and problem list.    Allergies: Iodine    Current Outpatient Prescriptions:   •  acyclovir (ZOVIRAX) 400 MG tablet, Take 400 mg by mouth 2 (Two) Times a Day., Disp: , Rfl:   •  ALPRAZolam (XANAX) 0.25 MG tablet, Take 0.25 mg by mouth 2 (Two) Times a Day As Needed., Disp: , Rfl: 2  •  aspirin 81 MG chewable tablet, Chew 81 mg Daily., Disp: , Rfl:   •  B Complex Vitamins (VITAMIN B COMPLEX) capsule capsule, Take 2 tablets by mouth Daily., Disp: , Rfl:   •  bortezomib (VELCADE) 3.5 MG chemo syringe, Infuse  into a venous catheter 1 (One) Time., Disp: , Rfl:   •  calcium carbonate-vitamin d 600-400 MG-UNIT per tablet, Take 1 tablet by mouth Daily., Disp: , Rfl:   •  cetirizine (zyrTEC) 10 MG tablet, Take 10 mg by mouth Daily., Disp: , Rfl:   •  cycloSPORINE (RESTASIS MULTIDOSE) 0.05 % ophthalmic emulsion, 1 drop 2 (Two) Times a Day., Disp: , Rfl:   •  desonide (DESOWEN) 0.05 % ointment, , Disp: , Rfl:   •  dexamethasone 0.5 MG/5ML elixir, , Disp: , Rfl:   •  DULoxetine (CYMBALTA) 60 MG capsule, Take 60 mg by mouth Daily., Disp: , Rfl:   •  fluconazole (DIFLUCAN) 200 MG tablet, 1 (One) Time Per Week., Disp: , Rfl:   •  fluticasone (FLONASE) 50 MCG/ACT nasal spray, 2 sprays into each nostril Daily., Disp: , Rfl:   •  gabapentin (NEURONTIN) 100 MG capsule, Take 100 mg by mouth 3 (Three) Times a Day As Needed., Disp: , Rfl:   •   "HYDROcodone-acetaminophen (NORCO) 5-325 MG per tablet, Take 1 tablet by mouth Every 6 (Six) Hours As Needed., Disp: , Rfl:   •  lenalidomide (REVLIMID) 10 MG capsule, Take 10 mg by mouth., Disp: , Rfl:   •  multivitamin (DAILY JONI) tablet tablet, Take 1 tablet by mouth Daily., Disp: , Rfl: 3  •  omega-3 acid ethyl esters (LOVAZA) 1 g capsule, Take 2 g by mouth 2 (Two) Times a Day., Disp: , Rfl:   •  SYNTHROID 112 MCG tablet, , Disp: , Rfl:   •  vitamin D (ERGOCALCIFEROL) 87882 units capsule capsule, Take 50,000 Units by mouth 1 (One) Time Per Week., Disp: , Rfl:   •  azithromycin (ZITHROMAX Z-NO) 250 MG tablet, Take 2 tablets the first day, then 1 tablet daily for 4 days., Disp: 6 tablet, Rfl: 0  Medications Discontinued During This Encounter   Medication Reason   • lenalidomide (REVLIMID) 10 MG capsule Duplicate order       Review of Systems   Constitutional: Positive for chills and fatigue. Negative for fever.   HENT: Positive for sneezing and sore throat. Negative for congestion.    Respiratory: Positive for cough. Negative for shortness of breath and wheezing.    Cardiovascular: Negative for chest pain and palpitations.   Neurological: Positive for headaches.             /74 (BP Location: Left arm, Patient Position: Sitting, Cuff Size: Adult)   Pulse 73   Temp 98.4 °F (36.9 °C) (Oral)   Resp 12   Ht 170.2 cm (67.01\")   Wt 82.6 kg (182 lb)   SpO2 98%   BMI 28.50 kg/m²       Physical Exam   Constitutional: She is oriented to person, place, and time. She appears well-developed and well-nourished. No distress.   HENT:   Head: Normocephalic and atraumatic.   Right Ear: External ear normal. A middle ear effusion is present.   Left Ear: Hearing and external ear normal. Tympanic membrane is scarred.   Mouth/Throat: Posterior oropharyngeal edema and posterior oropharyngeal erythema present. No oropharyngeal exudate. Tonsils are 0 on the right. Tonsils are 0 on the left. No tonsillar exudate.   Eyes: " Conjunctivae are normal. Right eye exhibits no discharge. Left eye exhibits no discharge. No scleral icterus.   Neck: Neck supple.   Cardiovascular: Normal rate, regular rhythm and normal heart sounds.  Exam reveals no gallop and no friction rub.    No murmur heard.  Pulmonary/Chest: Effort normal and breath sounds normal. No respiratory distress. She has no wheezes. She has no rales.   Lymphadenopathy:     She has no cervical adenopathy.   Neurological: She is alert and oriented to person, place, and time.   Skin: Skin is warm. No rash noted.   Psychiatric: She has a normal mood and affect. Her behavior is normal.   Nursing note and vitals reviewed.        Results for orders placed or performed in visit on 02/23/18   Comprehensive metabolic panel   Result Value Ref Range    Glucose 111 (H) 65 - 99 mg/dL    BUN 6 (L) 8 - 23 mg/dL    Creatinine 0.75 0.57 - 1.00 mg/dL    Sodium 138 136 - 145 mmol/L    Potassium 3.8 3.5 - 5.2 mmol/L    Chloride 98 98 - 107 mmol/L    CO2 27.5 22.0 - 29.0 mmol/L    Calcium 8.8 8.6 - 10.5 mg/dL    Total Protein 6.8 6.0 - 8.5 g/dL    Albumin 4.30 3.50 - 5.20 g/dL    ALT (SGPT) 13 1 - 33 U/L    AST (SGOT) 16 1 - 32 U/L    Alkaline Phosphatase 55 39 - 117 U/L    Total Bilirubin 0.3 0.1 - 1.2 mg/dL    eGFR Non African Amer 77 >60 mL/min/1.73    Globulin 2.5 gm/dL    A/G Ratio 1.7 g/dL    BUN/Creatinine Ratio 8.0 7.0 - 25.0    Anion Gap 12.5 mmol/L   Vitamin D 25 hydroxy   Result Value Ref Range    25 Hydroxy, Vitamin D 34.2 30.0 - 100.0 ng/ml   Vitamin B12   Result Value Ref Range    Vitamin B-12 365 211 - 946 pg/mL   Folate   Result Value Ref Range    Folate >20.00 4.78 - 24.20 ng/mL   Lipid Panel With LDL / HDL Ratio   Result Value Ref Range    Total Cholesterol 267 (H) 0 - 200 mg/dL    Triglycerides 243 (H) 0 - 150 mg/dL    HDL Cholesterol 76 (H) 40 - 60 mg/dL    LDL Cholesterol  142 (H) 0 - 100 mg/dL    VLDL Cholesterol 48.6 (H) 5 - 40 mg/dL    LDL/HDL Ratio 1.87    CBC Auto Differential    Result Value Ref Range    WBC 3.42 (L) 4.50 - 10.70 10*3/mm3    RBC 3.62 (L) 3.90 - 5.20 10*6/mm3    Hemoglobin 13.1 11.9 - 15.5 g/dL    Hematocrit 37.6 35.6 - 45.5 %    .9 (H) 80.5 - 98.2 fL    MCH 36.2 (H) 26.9 - 32.0 pg    MCHC 34.8 32.4 - 36.3 g/dL    RDW 14.3 (H) 11.7 - 13.0 %    RDW-SD 54.3 (H) 37.0 - 54.0 fl    MPV 10.1 6.0 - 12.0 fL    Platelets 192 140 - 500 10*3/mm3    Neutrophil % 47.4 42.7 - 76.0 %    Lymphocyte % 39.8 19.6 - 45.3 %    Monocyte % 9.6 5.0 - 12.0 %    Eosinophil % 2.3 0.3 - 6.2 %    Basophil % 0.9 0.0 - 1.5 %    Immature Grans % 0.0 0.0 - 0.5 %    Neutrophils, Absolute 1.62 (L) 1.90 - 8.10 10*3/mm3    Lymphocytes, Absolute 1.36 0.90 - 4.80 10*3/mm3    Monocytes, Absolute 0.33 0.20 - 1.20 10*3/mm3    Eosinophils, Absolute 0.08 0.00 - 0.70 10*3/mm3    Basophils, Absolute 0.03 0.00 - 0.20 10*3/mm3    Immature Grans, Absolute 0.00 0.00 - 0.03 10*3/mm3    nRBC 0.0 0.0 - 0.0 /100 WBC           Jennifer was seen today for earache.    Diagnoses and all orders for this visit:    Acute bronchitis, unspecified organism    Sore throat    Other orders  -     azithromycin (ZITHROMAX Z-NO) 250 MG tablet; Take 2 tablets the first day, then 1 tablet daily for 4 days.      Treat with Z-pack and continue Zyrtec and Flonase for eustachian tube dysfunction   Magic mouthwash for her sore throat   Return if not better    Return if symptoms worsen or fail to improve.    Jade Carvajal MD  03/16/2018

## 2018-06-04 PROBLEM — Z94.84 IMMUNOCOMPROMISED STATE ASSOCIATED WITH STEM CELL TRANSPLANT: Status: ACTIVE | Noted: 2018-05-30

## 2018-06-04 PROBLEM — D84.822 IMMUNOCOMPROMISED STATE ASSOCIATED WITH STEM CELL TRANSPLANT: Status: ACTIVE | Noted: 2018-05-30

## 2018-08-12 ENCOUNTER — RESULTS ENCOUNTER (OUTPATIENT)
Dept: INTERNAL MEDICINE | Facility: CLINIC | Age: 68
End: 2018-08-12

## 2018-08-12 DIAGNOSIS — R73.01 IFG (IMPAIRED FASTING GLUCOSE): ICD-10-CM

## 2018-08-12 DIAGNOSIS — E53.8 B12 DEFICIENCY: ICD-10-CM

## 2018-08-12 DIAGNOSIS — E78.2 MIXED HYPERLIPIDEMIA: ICD-10-CM

## 2018-09-18 ENCOUNTER — OFFICE VISIT (OUTPATIENT)
Dept: INTERNAL MEDICINE | Facility: CLINIC | Age: 68
End: 2018-09-18

## 2018-09-18 VITALS
BODY MASS INDEX: 28 KG/M2 | RESPIRATION RATE: 14 BRPM | WEIGHT: 178.4 LBS | OXYGEN SATURATION: 98 % | SYSTOLIC BLOOD PRESSURE: 122 MMHG | DIASTOLIC BLOOD PRESSURE: 68 MMHG | HEART RATE: 88 BPM | TEMPERATURE: 98.9 F | HEIGHT: 67 IN

## 2018-09-18 DIAGNOSIS — Z12.11 COLON CANCER SCREENING: Primary | ICD-10-CM

## 2018-09-18 DIAGNOSIS — D53.9 ANEMIA, MACROCYTIC: ICD-10-CM

## 2018-09-18 DIAGNOSIS — E03.8 HYPOTHYROIDISM DUE TO HASHIMOTO'S THYROIDITIS: ICD-10-CM

## 2018-09-18 DIAGNOSIS — F41.9 ANXIETY: ICD-10-CM

## 2018-09-18 DIAGNOSIS — E06.3 HYPOTHYROIDISM DUE TO HASHIMOTO'S THYROIDITIS: ICD-10-CM

## 2018-09-18 DIAGNOSIS — E66.3 OVERWEIGHT (BMI 25.0-29.9): ICD-10-CM

## 2018-09-18 DIAGNOSIS — F32.A DEPRESSION, UNSPECIFIED DEPRESSION TYPE: ICD-10-CM

## 2018-09-18 DIAGNOSIS — E78.2 MIXED HYPERLIPIDEMIA: ICD-10-CM

## 2018-09-18 DIAGNOSIS — E55.9 VITAMIN D DEFICIENCY: ICD-10-CM

## 2018-09-18 PROCEDURE — 99214 OFFICE O/P EST MOD 30 MIN: CPT | Performed by: INTERNAL MEDICINE

## 2018-09-18 RX ORDER — MONTELUKAST SODIUM 4 MG/1
TABLET, CHEWABLE ORAL
COMMUNITY
Start: 2018-07-17 | End: 2018-09-18

## 2018-09-18 RX ORDER — DIPHENOXYLATE HYDROCHLORIDE AND ATROPINE SULFATE 2.5; .025 MG/1; MG/1
TABLET ORAL
COMMUNITY
Start: 2018-07-11 | End: 2018-09-18

## 2018-09-18 RX ORDER — MONTELUKAST SODIUM 10 MG/1
TABLET ORAL AS NEEDED
COMMUNITY
Start: 2018-07-14 | End: 2019-09-19

## 2018-09-18 RX ORDER — COLESEVELAM 180 1/1
TABLET ORAL AS NEEDED
COMMUNITY
Start: 2018-09-04 | End: 2021-04-06 | Stop reason: SDUPTHER

## 2018-09-18 NOTE — PROGRESS NOTES
Jennifer Plascencia is a 68 y.o. female, who presents with a chief complaint of   Chief Complaint   Patient presents with   • Follow-up     6 month f/u    • Hypothyroidism       69 yo F here for follow. She has been having some ear pain in the left since I saw her last in 3/2018. Saw ENT and dentist who did not feel that there was anything that they can do. She has had tube in that ear before and has scar tissue.     She has MM and is still having have to do infusions. Followed by [place]  as well as [facility]. She has macrocytosis with mild anemia on her last CBC on 8/22/2018. No blood in her stool, but has been having looser stools as well as neuropathy.     Her last pap was at 66 years old. Never had an abnormal. Dr. Joseph has been following her and she doesn't want to have another. Mammogram and DEXA are up to date.     She has chronic hypothyroidism and takes synthroid. Feels well on medication. Has not had TSH checked since 2/2018.     She has chronic HLD and is on Wellchol. Tolerates well. Has lost a little bit of weight since I saw her last.          The following portions of the patient's history were reviewed and updated as appropriate: allergies, current medications, past family history, past medical history, past social history, past surgical history and problem list.    Allergies: Iodine    Current Outpatient Prescriptions:   •  acyclovir (ZOVIRAX) 400 MG tablet, Take 400 mg by mouth 2 (Two) Times a Day., Disp: , Rfl:   •  ALPRAZolam (XANAX) 0.25 MG tablet, Take 0.25 mg by mouth 2 (Two) Times a Day As Needed., Disp: , Rfl: 2  •  aspirin 81 MG chewable tablet, Chew 81 mg Daily., Disp: , Rfl:   •  B Complex Vitamins (VITAMIN B COMPLEX) capsule capsule, Take 2 tablets by mouth Daily., Disp: , Rfl:   •  bortezomib (VELCADE) 3.5 MG chemo syringe, Infuse  into a venous catheter 1 (One) Time., Disp: , Rfl:   •  calcium carbonate-vitamin d 600-400 MG-UNIT per tablet, Take 1 tablet by mouth Daily., Disp: ,  Rfl:   •  cetirizine (zyrTEC) 10 MG tablet, Take 10 mg by mouth As Needed., Disp: , Rfl:   •  colesevelam (WELCHOL) 625 MG tablet, Take  by mouth As Needed., Disp: , Rfl:   •  cycloSPORINE (RESTASIS MULTIDOSE) 0.05 % ophthalmic emulsion, 1 drop 2 (Two) Times a Day., Disp: , Rfl:   •  DULoxetine (CYMBALTA) 60 MG capsule, Take 60 mg by mouth Daily., Disp: , Rfl:   •  fluticasone (FLONASE) 50 MCG/ACT nasal spray, 2 sprays into each nostril Daily., Disp: , Rfl:   •  gabapentin (NEURONTIN) 100 MG capsule, Take 100 mg by mouth 3 (Three) Times a Day As Needed., Disp: , Rfl:   •  HYDROcodone-acetaminophen (NORCO) 5-325 MG per tablet, Take 1 tablet by mouth Every 6 (Six) Hours As Needed., Disp: , Rfl:   •  lenalidomide (REVLIMID) 10 MG capsule, Take 10 mg by mouth., Disp: , Rfl:   •  montelukast (SINGULAIR) 10 MG tablet, As Needed., Disp: , Rfl:   •  multivitamin (DAILY JONI) tablet tablet, Take 1 tablet by mouth Daily., Disp: , Rfl: 3  •  omega-3 acid ethyl esters (LOVAZA) 1 g capsule, Take 2 g by mouth 2 (Two) Times a Day., Disp: , Rfl:   •  SYNTHROID 112 MCG tablet, , Disp: , Rfl:   •  vitamin D (ERGOCALCIFEROL) 40311 units capsule capsule, Take 50,000 Units by mouth 1 (One) Time Per Week., Disp: , Rfl:   •  fluconazole (DIFLUCAN) 200 MG tablet, 1 (One) Time Per Week., Disp: , Rfl:   Medications Discontinued During This Encounter   Medication Reason   • azithromycin (ZITHROMAX Z-NO) 250 MG tablet *Therapy completed   • budesonide (RINOCORT AQUA) 32 MCG/ACT nasal spray *Therapy completed   • colestipol (COLESTID) 1 g tablet *Therapy completed   • desonide (DESOWEN) 0.05 % ointment *Therapy completed   • dexamethasone 0.5 MG/5ML elixir *Therapy completed   • diphenoxylate-atropine (LOMOTIL) 2.5-0.025 MG per tablet *Therapy completed   • MethylPREDNISolone (MEDROL) 4 MG tablet *Therapy completed       Review of Systems   Constitutional: Positive for fatigue. Negative for appetite change, chills and fever.   HENT: Positive  "for ear pain. Negative for ear discharge.    Respiratory: Negative for cough and shortness of breath.    Cardiovascular: Negative for chest pain.   Gastrointestinal: Positive for diarrhea (bowel absorption problems due to infusion for MM ). Negative for abdominal pain.   Genitourinary: Negative for difficulty urinating and dysuria.   Skin: Negative for rash.   Neurological: Positive for numbness.             /68 (BP Location: Right arm, Patient Position: Sitting, Cuff Size: Adult)   Pulse 88   Temp 98.9 °F (37.2 °C) (Oral)   Resp 14   Ht 170.2 cm (67.01\")   Wt 80.9 kg (178 lb 6.4 oz)   SpO2 98%   BMI 27.93 kg/m²       Physical Exam   Constitutional: She is oriented to person, place, and time. She appears well-developed and well-nourished. No distress.   HENT:   Head: Normocephalic and atraumatic.   Right Ear: Hearing, tympanic membrane, external ear and ear canal normal.   Left Ear: Hearing, external ear and ear canal normal. Tympanic membrane is scarred. A middle ear effusion is present.   Mouth/Throat: Oropharynx is clear and moist. No oropharyngeal exudate.   Eyes: Conjunctivae are normal. Right eye exhibits no discharge. Left eye exhibits no discharge. No scleral icterus.   Neck: Neck supple.   Cardiovascular: Normal rate, regular rhythm and normal heart sounds.  Exam reveals no gallop and no friction rub.    No murmur heard.  Pulmonary/Chest: Effort normal and breath sounds normal. No respiratory distress. She has no wheezes. She has no rales.   Abdominal: Soft. Bowel sounds are normal. She exhibits no distension and no mass. There is no tenderness. There is no guarding.   Lymphadenopathy:     She has no cervical adenopathy.   Neurological: She is alert and oriented to person, place, and time.   Skin: Skin is warm. Capillary refill takes less than 2 seconds. No rash noted.   Psychiatric: She has a normal mood and affect. Her behavior is normal.   Nursing note and vitals reviewed.        Results for " orders placed or performed in visit on 02/23/18   Comprehensive metabolic panel   Result Value Ref Range    Glucose 111 (H) 65 - 99 mg/dL    BUN 6 (L) 8 - 23 mg/dL    Creatinine 0.75 0.57 - 1.00 mg/dL    Sodium 138 136 - 145 mmol/L    Potassium 3.8 3.5 - 5.2 mmol/L    Chloride 98 98 - 107 mmol/L    CO2 27.5 22.0 - 29.0 mmol/L    Calcium 8.8 8.6 - 10.5 mg/dL    Total Protein 6.8 6.0 - 8.5 g/dL    Albumin 4.30 3.50 - 5.20 g/dL    ALT (SGPT) 13 1 - 33 U/L    AST (SGOT) 16 1 - 32 U/L    Alkaline Phosphatase 55 39 - 117 U/L    Total Bilirubin 0.3 0.1 - 1.2 mg/dL    eGFR Non African Amer 77 >60 mL/min/1.73    Globulin 2.5 gm/dL    A/G Ratio 1.7 g/dL    BUN/Creatinine Ratio 8.0 7.0 - 25.0    Anion Gap 12.5 mmol/L   Vitamin D 25 hydroxy   Result Value Ref Range    25 Hydroxy, Vitamin D 34.2 30.0 - 100.0 ng/ml   Vitamin B12   Result Value Ref Range    Vitamin B-12 365 211 - 946 pg/mL   Folate   Result Value Ref Range    Folate >20.00 4.78 - 24.20 ng/mL   Lipid Panel With LDL / HDL Ratio   Result Value Ref Range    Total Cholesterol 267 (H) 0 - 200 mg/dL    Triglycerides 243 (H) 0 - 150 mg/dL    HDL Cholesterol 76 (H) 40 - 60 mg/dL    LDL Cholesterol  142 (H) 0 - 100 mg/dL    VLDL Cholesterol 48.6 (H) 5 - 40 mg/dL    LDL/HDL Ratio 1.87    CBC Auto Differential   Result Value Ref Range    WBC 3.42 (L) 4.50 - 10.70 10*3/mm3    RBC 3.62 (L) 3.90 - 5.20 10*6/mm3    Hemoglobin 13.1 11.9 - 15.5 g/dL    Hematocrit 37.6 35.6 - 45.5 %    .9 (H) 80.5 - 98.2 fL    MCH 36.2 (H) 26.9 - 32.0 pg    MCHC 34.8 32.4 - 36.3 g/dL    RDW 14.3 (H) 11.7 - 13.0 %    RDW-SD 54.3 (H) 37.0 - 54.0 fl    MPV 10.1 6.0 - 12.0 fL    Platelets 192 140 - 500 10*3/mm3    Neutrophil % 47.4 42.7 - 76.0 %    Lymphocyte % 39.8 19.6 - 45.3 %    Monocyte % 9.6 5.0 - 12.0 %    Eosinophil % 2.3 0.3 - 6.2 %    Basophil % 0.9 0.0 - 1.5 %    Immature Grans % 0.0 0.0 - 0.5 %    Neutrophils, Absolute 1.62 (L) 1.90 - 8.10 10*3/mm3    Lymphocytes, Absolute 1.36 0.90 -  4.80 10*3/mm3    Monocytes, Absolute 0.33 0.20 - 1.20 10*3/mm3    Eosinophils, Absolute 0.08 0.00 - 0.70 10*3/mm3    Basophils, Absolute 0.03 0.00 - 0.20 10*3/mm3    Immature Grans, Absolute 0.00 0.00 - 0.03 10*3/mm3    nRBC 0.0 0.0 - 0.0 /100 WBC           Jennifer was seen today for follow-up and hypothyroidism.    Diagnoses and all orders for this visit:    Colon cancer screening  -     Cologuard - Stool, Per Rectum    Mixed hyperlipidemia  -     Comprehensive Metabolic Panel  -     CBC & Differential  -     Lipid Panel With LDL / HDL Ratio    Vitamin D deficiency  -     Vitamin D 25 Hydroxy    Hypothyroidism due to Hashimoto's thyroiditis  -     TSH  -     T4, Free    Anxiety    Depression, unspecified depression type    Anemia, macrocytic  -     CBC & Differential  -     Urinalysis With Culture If Indicated - Urine, Clean Catch  -     Vitamin B12  -     Folate  -     Ferritin  -     Iron Profile    Overweight (BMI 25.0-29.9)      Cologuard was never received by company per them. Will resend and have Santa call in 72 hours to make sure that it was received. I have asked patient to call us if she has not received kit in 2 weeks.     Patient's cholesterol is not under good control. Will continue current regimen of Welchol and Omega 3. No side effects from medications reported. Will follow up in 6 months to monitor levels.    Check her vitamin levels since she has a new macrocytosis with minimal anemia (11.8) on last check.     Recheck her TSH and FT4. Continue synthroid 112mcg for now.     Her depression and anxiety are stable for now. Continue her Cymbalta.     No long wanting to get pap smears. Up to date on her DEXA and mammogram. Will get Shingrix and ask cancer doctor about getting flu shot which I think she should have.   Return for Recheck.    Jade Carvajal MD  09/18/2018

## 2018-09-19 LAB
25(OH)D3+25(OH)D2 SERPL-MCNC: 29 NG/ML
ALBUMIN SERPL-MCNC: 4 G/DL (ref 3.5–5.2)
ALBUMIN/GLOB SERPL: 1.8 G/DL
ALP SERPL-CCNC: 54 U/L (ref 40–129)
ALT SERPL-CCNC: 14 U/L (ref 5–33)
APPEARANCE UR: CLEAR
AST SERPL-CCNC: 14 U/L (ref 5–32)
BACTERIA #/AREA URNS HPF: ABNORMAL /HPF
BASOPHILS # BLD AUTO: 0.02 10*3/MM3 (ref 0–0.2)
BASOPHILS NFR BLD AUTO: 0.5 % (ref 0–2)
BILIRUB SERPL-MCNC: 0.2 MG/DL (ref 0.2–1.2)
BILIRUB UR QL STRIP: NEGATIVE
BUN SERPL-MCNC: 9 MG/DL (ref 8–23)
BUN/CREAT SERPL: 13.6 (ref 7–25)
CALCIUM SERPL-MCNC: 8.2 MG/DL (ref 8.8–10.5)
CASTS URNS MICRO: ABNORMAL
CASTS URNS QL MICRO: PRESENT /LPF
CHLORIDE SERPL-SCNC: 103 MMOL/L (ref 98–107)
CHOLEST SERPL-MCNC: 184 MG/DL (ref 0–200)
CO2 SERPL-SCNC: 28.1 MMOL/L (ref 22–29)
COLOR UR: YELLOW
CREAT SERPL-MCNC: 0.66 MG/DL (ref 0.57–1)
DIFFERENTIAL COMMENT: NORMAL
EOSINOPHIL # BLD AUTO: 0.13 10*3/MM3 (ref 0.1–0.3)
EOSINOPHIL NFR BLD AUTO: 3.2 % (ref 0–4)
EPI CELLS #/AREA URNS HPF: ABNORMAL /HPF
ERYTHROCYTE [DISTWIDTH] IN BLOOD BY AUTOMATED COUNT: 13.8 % (ref 11.5–14.5)
FERRITIN SERPL-MCNC: 51 NG/ML (ref 13–150)
FOLATE SERPL-MCNC: >20 NG/ML (ref 4.78–20)
GLOBULIN SER CALC-MCNC: 2.2 GM/DL
GLUCOSE SERPL-MCNC: 91 MG/DL (ref 65–99)
GLUCOSE UR QL: NEGATIVE
HCT VFR BLD AUTO: 35.6 % (ref 37–47)
HDLC SERPL-MCNC: 68 MG/DL (ref 40–60)
HGB BLD-MCNC: 12 G/DL (ref 12–16)
HGB UR QL STRIP: NEGATIVE
IMM GRANULOCYTES # BLD: 0.01 10*3/MM3 (ref 0–0.03)
IMM GRANULOCYTES NFR BLD: 0.2 % (ref 0–0.5)
IRON SATN MFR SERPL: 19 %
IRON SERPL-MCNC: 57 MCG/DL (ref 37–145)
KETONES UR QL STRIP: NEGATIVE
LDLC SERPL CALC-MCNC: 94 MG/DL (ref 0–100)
LDLC/HDLC SERPL: 1.38 {RATIO}
LEUKOCYTE ESTERASE UR QL STRIP: NEGATIVE
LYMPHOCYTES # BLD AUTO: 1.27 10*3/MM3 (ref 0.6–4.8)
LYMPHOCYTES NFR BLD AUTO: 31 % (ref 20–45)
MCH RBC QN AUTO: 35.7 PG (ref 27–31)
MCHC RBC AUTO-ENTMCNC: 33.7 G/DL (ref 31–37)
MCV RBC AUTO: 106 FL (ref 81–99)
MICRO URNS: NORMAL
MICRO URNS: NORMAL
MONOCYTES # BLD AUTO: 0.68 10*3/MM3 (ref 0–1)
MONOCYTES NFR BLD AUTO: 16.6 % (ref 3–8)
MUCOUS THREADS URNS QL MICRO: PRESENT /HPF
NEUTROPHILS # BLD AUTO: 1.99 10*3/MM3 (ref 1.5–8.3)
NEUTROPHILS NFR BLD AUTO: 48.5 % (ref 45–70)
NITRITE UR QL STRIP: NEGATIVE
NRBC BLD AUTO-RTO: 0 /100 WBC (ref 0–0)
PH UR STRIP: 5 [PH] (ref 5–7.5)
PLATELET # BLD AUTO: 184 10*3/MM3 (ref 140–500)
PLATELET BLD QL SMEAR: NORMAL
POTASSIUM SERPL-SCNC: 3.9 MMOL/L (ref 3.5–5.2)
PROT SERPL-MCNC: 6.2 G/DL (ref 6–8.5)
PROT UR QL STRIP: NEGATIVE
RBC # BLD AUTO: 3.36 10*6/MM3 (ref 4.2–5.4)
RBC #/AREA URNS HPF: ABNORMAL /HPF
RBC MORPH BLD: NORMAL
SODIUM SERPL-SCNC: 140 MMOL/L (ref 136–145)
SP GR UR: 1.02 (ref 1–1.03)
T4 FREE SERPL-MCNC: 1.58 NG/DL (ref 0.93–1.7)
TIBC SERPL-MCNC: 303 MCG/DL (ref 261–478)
TRIGL SERPL-MCNC: 111 MG/DL (ref 0–150)
TSH SERPL DL<=0.005 MIU/L-ACNC: 0.4 MIU/ML (ref 0.27–4.2)
UIBC SERPL-MCNC: 246 MCG/DL (ref 112–346)
URINALYSIS REFLEX: NORMAL
UROBILINOGEN UR STRIP-MCNC: 0.2 MG/DL (ref 0.2–1)
VIT B12 SERPL-MCNC: 416 PG/ML
VLDLC SERPL CALC-MCNC: 22.2 MG/DL (ref 7–27)
WBC # BLD AUTO: 4.1 10*3/MM3 (ref 4.8–10.8)
WBC #/AREA URNS HPF: ABNORMAL /HPF

## 2018-09-21 ENCOUNTER — TELEPHONE (OUTPATIENT)
Dept: INTERNAL MEDICINE | Facility: CLINIC | Age: 68
End: 2018-09-21

## 2018-09-21 NOTE — PROGRESS NOTES
Labs look very good with no major concerns. Her vitamin levels are all within normal limits. Her cholesterol is much better compared to 6 months ago.Slightly low calcium, encouraged her to get plenty of dairy in her diet like yogurt, cheese and milk. Return as scheduled. Continue all vitamin supplements as she already takes them.

## 2018-09-21 NOTE — TELEPHONE ENCOUNTER
Patient has been advised and voiced understanding.     ----- Message from Jade Carvajal MD sent at 9/21/2018  2:53 PM EDT -----  Labs look very good with no major concerns. Her vitamin levels are all within normal limits. Her cholesterol is much better compared to 6 months ago.Slightly low calcium, encouraged her to get plenty of dairy in her diet like yogurt, cheese and milk. Return as scheduled. Continue all vitamin supplements as she already takes them.

## 2019-03-21 ENCOUNTER — OFFICE VISIT (OUTPATIENT)
Dept: INTERNAL MEDICINE | Facility: CLINIC | Age: 69
End: 2019-03-21

## 2019-03-21 VITALS
DIASTOLIC BLOOD PRESSURE: 78 MMHG | HEART RATE: 68 BPM | OXYGEN SATURATION: 98 % | RESPIRATION RATE: 16 BRPM | WEIGHT: 170 LBS | BODY MASS INDEX: 26.68 KG/M2 | HEIGHT: 67 IN | TEMPERATURE: 98.6 F | SYSTOLIC BLOOD PRESSURE: 132 MMHG

## 2019-03-21 DIAGNOSIS — F41.9 ANXIETY: ICD-10-CM

## 2019-03-21 DIAGNOSIS — Z91.09 ENVIRONMENTAL ALLERGIES: ICD-10-CM

## 2019-03-21 DIAGNOSIS — E55.9 VITAMIN D DEFICIENCY: ICD-10-CM

## 2019-03-21 DIAGNOSIS — E06.3 HYPOTHYROIDISM DUE TO HASHIMOTO'S THYROIDITIS: ICD-10-CM

## 2019-03-21 DIAGNOSIS — G89.29 CHRONIC NONINTRACTABLE HEADACHE, UNSPECIFIED HEADACHE TYPE: ICD-10-CM

## 2019-03-21 DIAGNOSIS — Z12.31 ENCOUNTER FOR SCREENING MAMMOGRAM FOR MALIGNANT NEOPLASM OF BREAST: Primary | ICD-10-CM

## 2019-03-21 DIAGNOSIS — M85.80 OSTEOPENIA, UNSPECIFIED LOCATION: ICD-10-CM

## 2019-03-21 DIAGNOSIS — D75.89 MACROCYTOSIS WITHOUT ANEMIA: ICD-10-CM

## 2019-03-21 DIAGNOSIS — E78.2 MIXED HYPERLIPIDEMIA: Primary | ICD-10-CM

## 2019-03-21 DIAGNOSIS — E03.8 HYPOTHYROIDISM DUE TO HASHIMOTO'S THYROIDITIS: ICD-10-CM

## 2019-03-21 DIAGNOSIS — F32.A DEPRESSION, UNSPECIFIED DEPRESSION TYPE: ICD-10-CM

## 2019-03-21 DIAGNOSIS — R51.9 CHRONIC NONINTRACTABLE HEADACHE, UNSPECIFIED HEADACHE TYPE: ICD-10-CM

## 2019-03-21 DIAGNOSIS — E66.3 OVERWEIGHT (BMI 25.0-29.9): ICD-10-CM

## 2019-03-21 LAB
CHOLEST SERPL-MCNC: 210 MG/DL (ref 0–200)
HDLC SERPL-MCNC: 69 MG/DL (ref 40–60)
LDLC SERPL CALC-MCNC: 101 MG/DL (ref 0–100)
LDLC/HDLC SERPL: 1.47 {RATIO}
TRIGL SERPL-MCNC: 198 MG/DL (ref 0–150)
VLDLC SERPL CALC-MCNC: 39.6 MG/DL (ref 5–40)

## 2019-03-21 PROCEDURE — 99214 OFFICE O/P EST MOD 30 MIN: CPT | Performed by: INTERNAL MEDICINE

## 2019-03-21 RX ORDER — MULTIVITAMIN WITH IRON
1 TABLET ORAL DAILY
Refills: 2 | COMMUNITY
Start: 2019-02-05

## 2019-03-21 RX ORDER — FOLIC ACID 1 MG/1
1000 TABLET ORAL DAILY
Refills: 2 | COMMUNITY
Start: 2019-02-05

## 2019-03-21 RX ORDER — CEFDINIR 300 MG/1
300 CAPSULE ORAL 2 TIMES DAILY
Qty: 14 CAPSULE | Refills: 0 | Status: SHIPPED | OUTPATIENT
Start: 2019-03-21 | End: 2019-03-28

## 2019-03-21 RX ORDER — PROCHLORPERAZINE MALEATE 5 MG/1
5 TABLET ORAL
COMMUNITY
Start: 2018-10-04 | End: 2019-09-19

## 2019-03-21 NOTE — PROGRESS NOTES
Jennifer Plascencia is a 69 y.o. female, who presents with a chief complaint of   Chief Complaint   Patient presents with   • Follow-up     6 month f/u   • Hyperlipidemia   • Headache       68 yo F with MM here for follow up     She has had headache for about 6 weeks that started at the top of the head that would radiate to her head. She has TMJ as well that her PT is working with her on. He worked on her neck as well. Exercises have helped some, but now coming back. Flonase has helped as well when she started using it yesterday. The pain is worse throughout the day. Has taken Ibuprofen and Hydrocodone that has helped some. Pain is now in her eyes now. Headache is daily and can last all day. No n/v or light sensitivities. Her head feels heavy.     She has lost weight and her HLD is chronic. She tolerates Lovazo. On Wllchol for diarrhea, but is also helping her cholesterol.     She has not turned in your colorguard test yet for screening.         The following portions of the patient's history were reviewed and updated as appropriate: allergies, current medications, past family history, past medical history, past social history, past surgical history and problem list.    Allergies: Iodine    Current Outpatient Medications:   •  acyclovir (ZOVIRAX) 400 MG tablet, Take 400 mg by mouth 2 (Two) Times a Day., Disp: , Rfl:   •  ALPRAZolam (XANAX) 0.25 MG tablet, Take 0.25 mg by mouth 2 (Two) Times a Day As Needed., Disp: , Rfl: 2  •  aspirin 81 MG chewable tablet, Chew 81 mg Daily., Disp: , Rfl:   •  B Complex Vitamins (VITAMIN B COMPLEX) capsule capsule, Take 2 tablets by mouth Daily., Disp: , Rfl:   •  bortezomib (VELCADE) 3.5 MG chemo syringe, Infuse  into a venous catheter 1 (One) Time., Disp: , Rfl:   •  calcium carbonate-vitamin d 600-400 MG-UNIT per tablet, Take 1 tablet by mouth Daily., Disp: , Rfl:   •  cetirizine (zyrTEC) 10 MG tablet, Take 10 mg by mouth As Needed., Disp: , Rfl:   •  colesevelam (WELCHOL) 625 MG  tablet, Take  by mouth As Needed., Disp: , Rfl:   •  cycloSPORINE (RESTASIS MULTIDOSE) 0.05 % ophthalmic emulsion, 1 drop 2 (Two) Times a Day., Disp: , Rfl:   •  DULoxetine (CYMBALTA) 60 MG capsule, Take 60 mg by mouth Daily., Disp: , Rfl:   •  Ergocalciferol 400 units tablet, Take 800 Units by mouth Daily., Disp: , Rfl:   •  fluconazole (DIFLUCAN) 200 MG tablet, 1 (One) Time Per Week., Disp: , Rfl:   •  fluticasone (FLONASE) 50 MCG/ACT nasal spray, 2 sprays into each nostril Daily., Disp: , Rfl:   •  folic acid (FOLVITE) 1 MG tablet, Take 1,000 mcg by mouth Daily., Disp: , Rfl: 2  •  gabapentin (NEURONTIN) 100 MG capsule, Take 100 mg by mouth 3 (Three) Times a Day As Needed., Disp: , Rfl:   •  HYDROcodone-acetaminophen (NORCO) 5-325 MG per tablet, Take 1 tablet by mouth Every 6 (Six) Hours As Needed., Disp: , Rfl:   •  lenalidomide (REVLIMID) 10 MG capsule, Take 10 mg by mouth., Disp: , Rfl:   •  montelukast (SINGULAIR) 10 MG tablet, As Needed., Disp: , Rfl:   •  Multiple Vitamins-Iron (DAILY-JONI/IRON/BETA-CAROTENE) tablet, Take 1 tablet by mouth Daily., Disp: , Rfl: 2  •  omega-3 acid ethyl esters (LOVAZA) 1 g capsule, Take 2 g by mouth 2 (Two) Times a Day., Disp: , Rfl:   •  prochlorperazine (COMPAZINE) 5 MG tablet, Take 5 mg by mouth., Disp: , Rfl:   •  SYNTHROID 112 MCG tablet, , Disp: , Rfl:   •  vitamin D (ERGOCALCIFEROL) 24918 units capsule capsule, Take 50,000 Units by mouth 1 (One) Time Per Week., Disp: , Rfl:   •  cefdinir (OMNICEF) 300 MG capsule, Take 1 capsule by mouth 2 (Two) Times a Day for 7 days., Disp: 14 capsule, Rfl: 0  Medications Discontinued During This Encounter   Medication Reason   • multivitamin (DAILY JONI) tablet tablet Duplicate order       Review of Systems   Constitutional: Negative for chills, fatigue and fever.   HENT: Positive for postnasal drip, sinus pressure and sinus pain. Negative for congestion, rhinorrhea and sneezing.    Respiratory: Negative for cough and shortness of  "breath.    Genitourinary: Negative for difficulty urinating and dysuria.   Musculoskeletal: Positive for neck pain (TMJ pain ).   Allergic/Immunologic: Positive for environmental allergies.   Neurological: Positive for headaches. Negative for numbness.             /78 (BP Location: Right arm, Patient Position: Sitting, Cuff Size: Adult)   Pulse 68   Temp 98.6 °F (37 °C) (Oral)   Resp 16   Ht 170.2 cm (67\")   Wt 77.1 kg (170 lb)   SpO2 98%   BMI 26.63 kg/m²       Physical Exam   Constitutional: She is oriented to person, place, and time. She appears well-developed and well-nourished. No distress.   HENT:   Head: Normocephalic and atraumatic.   Right Ear: Hearing, tympanic membrane and external ear normal.   Left Ear: Hearing, tympanic membrane and external ear normal.   Nose: Nose normal.   Mouth/Throat: Mucous membranes are normal. Posterior oropharyngeal edema and posterior oropharyngeal erythema (cobblestoning in back of throat ) present. No oropharyngeal exudate. Tonsils are 0 on the right. Tonsils are 0 on the left. No tonsillar exudate.   Eyes: Conjunctivae are normal. Right eye exhibits no discharge. Left eye exhibits no discharge. No scleral icterus.   Neck: Neck supple. No thyroid mass and no thyromegaly present.   Cardiovascular: Normal rate, regular rhythm and normal heart sounds. Exam reveals no gallop and no friction rub.   No murmur heard.  Pulmonary/Chest: Effort normal and breath sounds normal. No respiratory distress. She has no wheezes. She has no rales.   Musculoskeletal:        Cervical back: She exhibits spasm (upper trapezius muscles ). She exhibits no tenderness.   Lymphadenopathy:     She has no cervical adenopathy.   Neurological: She is alert and oriented to person, place, and time. She has normal strength. No cranial nerve deficit or sensory deficit.   Skin: Skin is warm. No rash noted.   Psychiatric: She has a normal mood and affect. Her behavior is normal.   Nursing note and " vitals reviewed.        Results for orders placed or performed in visit on 09/18/18   Comprehensive Metabolic Panel   Result Value Ref Range    Glucose 91 65 - 99 mg/dL    BUN 9 8 - 23 mg/dL    Creatinine 0.66 0.57 - 1.00 mg/dL    eGFR Non African Am 89 >60 mL/min/1.73    eGFR African Am 108 >60 mL/min/1.73    BUN/Creatinine Ratio 13.6 7.0 - 25.0    Sodium 140 136 - 145 mmol/L    Potassium 3.9 3.5 - 5.2 mmol/L    Chloride 103 98 - 107 mmol/L    Total CO2 28.1 22.0 - 29.0 mmol/L    Calcium 8.2 (L) 8.8 - 10.5 mg/dL    Total Protein 6.2 6.0 - 8.5 g/dL    Albumin 4.00 3.50 - 5.20 g/dL    Globulin 2.2 gm/dL    A/G Ratio 1.8 g/dL    Total Bilirubin 0.2 0.2 - 1.2 mg/dL    Alkaline Phosphatase 54 40 - 129 U/L    AST (SGOT) 14 5 - 32 U/L    ALT (SGPT) 14 5 - 33 U/L   Lipid Panel With LDL / HDL Ratio   Result Value Ref Range    Total Cholesterol 184 0 - 200 mg/dL    Triglycerides 111 0 - 150 mg/dL    HDL Cholesterol 68 (H) 40 - 60 mg/dL    VLDL Cholesterol 22.2 7 - 27 mg/dL    LDL Cholesterol  94 0 - 100 mg/dL    LDL/HDL Ratio 1.38    Urinalysis With Culture If Indicated - Urine, Clean Catch   Result Value Ref Range    Specific Gravity, UA 1.018 1.005 - 1.030    pH, UA 5.0 5.0 - 7.5    Color, UA Yellow Yellow    Appearance, UA Clear Clear    Leukocytes, UA Negative Negative    Protein Negative Negative/Trace    Glucose, UA Negative Negative    Ketones Negative Negative    Blood, UA Negative Negative    Bilirubin, UA Negative Negative    Urobilinogen, UA 0.2 0.2 - 1.0 mg/dL    Nitrite, UA Negative Negative    Microscopic Examination Comment     Microscopic Examination See below:     Urinalysis Reflex Comment    Vitamin D 25 Hydroxy   Result Value Ref Range    25 Hydroxy, Vitamin D 29.0 ng/mL   Vitamin B12   Result Value Ref Range    Vitamin B-12 416 232-1,245 pg/mL   Folate   Result Value Ref Range    Folate >20.00 (H) 4.78 - 20.00 ng/mL   Ferritin   Result Value Ref Range    Ferritin 51.00 13.00 - 150.00 ng/mL   Iron Profile    Result Value Ref Range    TIBC 303 261 - 478 mcg/dL    UIBC 246 112 - 346 mcg/dL    Iron 57 37 - 145 mcg/dL    Iron Saturation 19 %   TSH   Result Value Ref Range    TSH 0.398 0.270 - 4.200 mIU/mL   T4, Free   Result Value Ref Range    Free T4 1.58 0.93 - 1.70 ng/dL   Microscopic Examination   Result Value Ref Range    WBC, UA 0-5 0 - 5 /hpf    RBC, UA 0-2 0 - 2 /hpf    Epithelial Cells (non renal) 0-10 0 - 10 /hpf    Casts Present (A) None seen /lpf    Cast Type Hyaline casts N/A    Mucus, UA Present Not Estab. /hpf    Bacteria, UA Few None seen/Few /hpf   CBC & Differential   Result Value Ref Range    WBC 4.10 (L) 4.80 - 10.80 10*3/mm3    RBC 3.36 (L) 4.20 - 5.40 10*6/mm3    Hemoglobin 12.0 12.0 - 16.0 g/dL    Hematocrit 35.6 (L) 37.0 - 47.0 %    .0 (H) 81.0 - 99.0 fL    MCH 35.7 (H) 27.0 - 31.0 pg    MCHC 33.7 31.0 - 37.0 g/dL    RDW 13.8 11.5 - 14.5 %    Platelets 184 140 - 500 10*3/mm3    Neutrophil Rel % 48.5 45.0 - 70.0 %    Lymphocyte Rel % 31.0 20.0 - 45.0 %    Monocyte Rel % 16.6 (H) 3.0 - 8.0 %    Eosinophil Rel % 3.2 0.0 - 4.0 %    Basophil Rel % 0.5 0.0 - 2.0 %    Neutrophils Absolute 1.99 1.50 - 8.30 10*3/mm3    Lymphocytes Absolute 1.27 0.60 - 4.80 10*3/mm3    Monocytes Absolute 0.68 0.00 - 1.00 10*3/mm3    Eosinophils Absolute 0.13 0.10 - 0.30 10*3/mm3    Basophils Absolute 0.02 0.00 - 0.20 10*3/mm3    Immature Granulocyte Rel % 0.2 0.0 - 0.5 %    Immature Grans Absolute 0.01 0.00 - 0.03 10*3/mm3    nRBC 0.0 0.0 - 0.0 /100 WBC   Manual Differential   Result Value Ref Range    Differential Comment Comment     Comment Comment     Plt Comment Comment            Jennifer was seen today for follow-up, hyperlipidemia and headache.    Diagnoses and all orders for this visit:    Mixed hyperlipidemia  -     Lipid Panel With LDL / HDL Ratio    Vitamin D deficiency    Hypothyroidism due to Hashimoto's thyroiditis    Osteopenia, unspecified location    Anxiety    Depression, unspecified depression  type    Overweight (BMI 25.0-29.9)    Macrocytosis without anemia    Chronic nonintractable headache, unspecified headache type    Environmental allergies    Other orders  -     cefdinir (OMNICEF) 300 MG capsule; Take 1 capsule by mouth 2 (Two) Times a Day for 7 days.      Will check cholesterol today. Running better on last check and will continue her Wellchol (although for diarrhea) as well as Omega 3.     I think that her headache is multifactorial including her TMJ, neck tension as well as possible allergies. Will restart her Flonase and Singulair and be more consistent with taking this. Treat with Cefdinir for sinus infection and will call in 7-10 days if not better. Will check CT scan of her head due to MM to rule out lytic lesions or other concern because it will have been going on for about 8 weeks at that point.     Starting on B12 at Dr. Hewitt office for B12 deficiency.     Increase vitamin D today to daily dose and will recheck in 6 months.     Return in about 6 months (around 9/21/2019) for Recheck, Medicare Wellness.    Jade Carvajal MD  03/21/2019

## 2019-03-26 NOTE — PROGRESS NOTES
Cholesterol is higher. Keep working on eating better reducing her carbs, sweets, potatoes and corn which will mainly help with her TG's that are up to almost 200. Repeat in 6 months.

## 2019-03-27 ENCOUNTER — TELEPHONE (OUTPATIENT)
Dept: INTERNAL MEDICINE | Facility: CLINIC | Age: 69
End: 2019-03-27

## 2019-03-27 NOTE — TELEPHONE ENCOUNTER
Patient has been advised and voiced understanding.       ----- Message from Jade Carvajal MD sent at 3/26/2019  2:22 PM EDT -----  Cholesterol is higher. Keep working on eating better reducing her carbs, sweets, potatoes and corn which will mainly help with her TG's that are up to almost 200. Repeat in 6 months.

## 2019-03-27 NOTE — TELEPHONE ENCOUNTER
Faxed.     ----- Message from Alondra Payan MA sent at 3/21/2019 10:07 AM EDT -----  Regarding: FW: MAMMO ORDER  Contact: [phone]      ----- Message -----  From: Jessica Morris  Sent: 3/21/2019   9:51 AM  To: [facility]  Subject: MAMMO ORDER                                      : 48  [facility] PT.    PATIENT ASKED THAT DR. CHRISTY SEND AN ORDER TO [facility] ON [employer] [address] FOR A MAMMOGRAM.

## 2019-09-19 ENCOUNTER — OFFICE VISIT (OUTPATIENT)
Dept: INTERNAL MEDICINE | Facility: CLINIC | Age: 69
End: 2019-09-19

## 2019-09-19 VITALS
BODY MASS INDEX: 26.68 KG/M2 | HEIGHT: 67 IN | DIASTOLIC BLOOD PRESSURE: 64 MMHG | HEART RATE: 69 BPM | TEMPERATURE: 98.8 F | RESPIRATION RATE: 14 BRPM | WEIGHT: 170 LBS | OXYGEN SATURATION: 98 % | SYSTOLIC BLOOD PRESSURE: 108 MMHG

## 2019-09-19 DIAGNOSIS — E53.8 B12 DEFICIENCY: ICD-10-CM

## 2019-09-19 DIAGNOSIS — Z00.00 MEDICARE ANNUAL WELLNESS VISIT, SUBSEQUENT: Primary | ICD-10-CM

## 2019-09-19 DIAGNOSIS — E55.9 VITAMIN D DEFICIENCY: ICD-10-CM

## 2019-09-19 DIAGNOSIS — E06.3 HYPOTHYROIDISM DUE TO HASHIMOTO'S THYROIDITIS: ICD-10-CM

## 2019-09-19 DIAGNOSIS — E78.2 MIXED HYPERLIPIDEMIA: ICD-10-CM

## 2019-09-19 DIAGNOSIS — E03.8 HYPOTHYROIDISM DUE TO HASHIMOTO'S THYROIDITIS: ICD-10-CM

## 2019-09-19 DIAGNOSIS — F32.A DEPRESSION, UNSPECIFIED DEPRESSION TYPE: ICD-10-CM

## 2019-09-19 DIAGNOSIS — M50.30 DDD (DEGENERATIVE DISC DISEASE), CERVICAL: ICD-10-CM

## 2019-09-19 DIAGNOSIS — G44.229 CHRONIC TENSION-TYPE HEADACHE, NOT INTRACTABLE: ICD-10-CM

## 2019-09-19 LAB
25(OH)D3+25(OH)D2 SERPL-MCNC: 23.6 NG/ML (ref 30–100)
CHOLEST SERPL-MCNC: 212 MG/DL (ref 0–200)
HDLC SERPL-MCNC: 78 MG/DL (ref 40–60)
LDLC SERPL CALC-MCNC: 103 MG/DL (ref 0–100)
LDLC/HDLC SERPL: 1.32 {RATIO}
TRIGL SERPL-MCNC: 155 MG/DL (ref 0–150)
VIT B12 SERPL-MCNC: 595 PG/ML (ref 211–946)
VLDLC SERPL CALC-MCNC: 31 MG/DL

## 2019-09-19 PROCEDURE — 99214 OFFICE O/P EST MOD 30 MIN: CPT | Performed by: INTERNAL MEDICINE

## 2019-09-19 PROCEDURE — G0439 PPPS, SUBSEQ VISIT: HCPCS | Performed by: INTERNAL MEDICINE

## 2019-09-19 RX ORDER — BACLOFEN 10 MG/1
10 TABLET ORAL 3 TIMES DAILY
Refills: 5 | COMMUNITY
Start: 2019-08-28

## 2019-09-19 RX ORDER — DULOXETIN HYDROCHLORIDE 30 MG/1
30 CAPSULE, DELAYED RELEASE ORAL DAILY
COMMUNITY
Start: 2019-05-02

## 2019-09-19 NOTE — PROGRESS NOTES
The ABCs of the Annual Wellness Visit  Subsequent Medicare Wellness Visit    Chief Complaint   Patient presents with   • Medicare Wellness-subsequent     AWV       Subjective   History of Present Illness:  Jennifer Plascencia is a 69 y.o. female who presents for a Subsequent Medicare Wellness Visit.    HEALTH RISK ASSESSMENT    Recent Hospitalizations:  No hospitalization(s) within the last year.    Current Medical Providers:  Patient Care Team:  Jade Carvajal MD as PCP - General (Internal Medicine)  Arturo Joe MD as Consulting Physician (Ophthalmology)  Todd Randall MD as Consulting Physician (Endocrinology)    Smoking Status:  Social History     Tobacco Use   Smoking Status Former Smoker   Smokeless Tobacco Never Used       Alcohol Consumption:  Social History     Substance and Sexual Activity   Alcohol Use Yes    Comment: 2 per month        Depression Screen:   PHQ-2/PHQ-9 Depression Screening 9/19/2019   Little interest or pleasure in doing things 0   Feeling down, depressed, or hopeless 1   Trouble falling or staying asleep, or sleeping too much -   Feeling tired or having little energy -   Poor appetite or overeating -   Feeling bad about yourself - or that you are a failure or have let yourself or your family down -   Trouble concentrating on things, such as reading the newspaper or watching television -   Moving or speaking so slowly that other people could have noticed. Or the opposite - being so fidgety or restless that you have been moving around a lot more than usual -   Thoughts that you would be better off dead, or of hurting yourself in some way -   Total Score 1   If you checked off any problems, how difficult have these problems made it for you to do your work, take care of things at home, or get along with other people? -       Fall Risk Screen:  STEADI Fall Risk Assessment has not been completed.    Health Habits and Functional and Cognitive Screening:  Functional & Cognitive Status  9/19/2019   Do you have difficulty preparing food and eating? No   Do you have difficulty bathing yourself, getting dressed or grooming yourself? No   Do you have difficulty using the toilet? No   Do you have difficulty moving around from place to place? No   Do you have trouble with steps or getting out of a bed or a chair? No   Current Diet Well Balanced Diet   Dental Exam Up to date   Eye Exam Up to date   Exercise (times per week) 4 times per week   Current Exercise Activities Include Walking    Do you need help using the phone?  No   Are you deaf or do you have serious difficulty hearing?  No   Do you need help with transportation? No   Do you need help shopping? No   Do you need help preparing meals?  No   Do you need help with housework?  No   Do you need help with laundry? No   Do you need help taking your medications? No   Do you need help managing money? No   Do you ever drive or ride in a car without wearing a seat belt? No   Have you felt unusual stress, anger or loneliness in the last month? Yes   Who do you live with? Alone   If you need help, do you have trouble finding someone available to you? No   Have you been bothered in the last four weeks by sexual problems? No   Do you have difficulty concentrating, remembering or making decisions? Yes         Does the patient have evidence of cognitive impairment? No    Asprin use counseling:Does not need ASA (and currently is not on it)    Age-appropriate Screening Schedule:  Refer to the list below for future screening recommendations based on patient's age, sex and/or medical conditions. Orders for these recommended tests are listed in the plan section. The patient has been provided with a written plan.    Health Maintenance   Topic Date Due   • TDAP/TD VACCINES (1 - Tdap) 02/19/1968   • COLONOSCOPY  02/05/2018   • INFLUENZA VACCINE  11/29/2019 (Originally 8/1/2019)   • MAMMOGRAM  03/06/2020   • DXA SCAN  03/06/2020   • LIPID PANEL  09/19/2020   •  PNEUMOCOCCAL VACCINES (65+ LOW/MEDIUM RISK)  Completed   • ZOSTER VACCINE  Completed   • PAP SMEAR  Discontinued            70 yo F with MM seeing [facility]. She has been doing well other than headaches. She was seen by Dr. Doll and increased her Cymbalta. Found to have canal stenosis of the C6-C7 area. She has seen Dr. Cortes Lacy who is a neurosurgeon and wants to order an MRI of her neck . Baclofen did not help her. PT has helped for few days after she does it.      She got a cologuard in the mail and is getting ready to send it in. Her mammogram is up to date.     She is feeling well otherwise. Taking Gabapentin at night for pain.     She has chronic HLD and is doing well Wellchol. Taking daily with no myalgia. Eating well and walking on most days.     The following portions of the patient's history were reviewed and updated as appropriate: allergies, current medications, past family history, past medical history, past social history, past surgical history and problem list.    Outpatient Medications Prior to Visit   Medication Sig Dispense Refill   • acyclovir (ZOVIRAX) 400 MG tablet Take 400 mg by mouth 2 (Two) Times a Day.     • ALPRAZolam (XANAX) 0.25 MG tablet Take 0.25 mg by mouth 2 (Two) Times a Day As Needed.  2   • aspirin 81 MG chewable tablet Chew 81 mg Daily.     • B Complex Vitamins (VITAMIN B COMPLEX) capsule capsule Take 2 tablets by mouth Daily.     • baclofen (LIORESAL) 10 MG tablet Take 10 mg by mouth 3 (Three) Times a Day.  5   • calcium carbonate-vitamin d 600-400 MG-UNIT per tablet Take 1 tablet by mouth Daily.     • colesevelam (WELCHOL) 625 MG tablet Take  by mouth As Needed.     • cycloSPORINE (RESTASIS MULTIDOSE) 0.05 % ophthalmic emulsion 1 drop 2 (Two) Times a Day.     • DULoxetine (CYMBALTA) 30 MG capsule Take 30 mg by mouth Daily.     • DULoxetine (CYMBALTA) 60 MG capsule Take 60 mg by mouth Daily.     • Ergocalciferol 400 units tablet Take 800 Units by mouth Daily.     •  fluticasone (FLONASE) 50 MCG/ACT nasal spray 2 sprays into each nostril Daily.     • folic acid (FOLVITE) 1 MG tablet Take 1,000 mcg by mouth Daily.  2   • gabapentin (NEURONTIN) 100 MG capsule Take 100 mg by mouth Daily As Needed.     • HYDROcodone-acetaminophen (NORCO) 5-325 MG per tablet Take 1 tablet by mouth Every 6 (Six) Hours As Needed (pt states she only takes this once a month).     • lenalidomide (REVLIMID) 10 MG capsule Take 10 mg by mouth.     • Multiple Vitamins-Iron (DAILY-JONI/IRON/BETA-CAROTENE) tablet Take 1 tablet by mouth Daily.  2   • SYNTHROID 112 MCG tablet      • vitamin D (ERGOCALCIFEROL) 88454 units capsule capsule Take 50,000 Units by mouth 1 (One) Time Per Week.     • bortezomib (VELCADE) 3.5 MG chemo syringe Infuse  into a venous catheter 1 (One) Time.     • cetirizine (zyrTEC) 10 MG tablet Take 10 mg by mouth As Needed.     • fluconazole (DIFLUCAN) 200 MG tablet 1 (One) Time Per Week.     • montelukast (SINGULAIR) 10 MG tablet As Needed.     • omega-3 acid ethyl esters (LOVAZA) 1 g capsule Take 2 g by mouth 2 (Two) Times a Day.     • prochlorperazine (COMPAZINE) 5 MG tablet Take 5 mg by mouth.       No facility-administered medications prior to visit.        Patient Active Problem List   Diagnosis   • Anxiety   • Depression   • Psychophysiological insomnia   • Family history of breast cancer   • History of auto stem cell transplant (CMS/HCC)   • Hypothyroidism   • IBS (irritable bowel syndrome)   • Multiple myeloma not having achieved remission (CMS/HCC)   • Osteopenia   • Vitamin D deficiency   • Overweight (BMI 25.0-29.9)   • Neuropathy associated with multiple myeloma (CMS/HCC)   • Mixed hyperlipidemia   • Immunocompromised state associated with stem cell transplant (CMS/HCC)   • Macrocytosis without anemia   • Chronic tension-type headache, not intractable   • DDD (degenerative disc disease), cervical       Advanced Care Planning:  Patient has an advance directive - a copy has not  "been provided. Have asked the patient to send this to us to add to record    Review of Systems   Constitutional: Negative for chills and fatigue.   HENT: Positive for ear pain (left).    Respiratory: Negative for cough and shortness of breath.    Gastrointestinal: Negative for abdominal pain, constipation and diarrhea.   Neurological: Positive for headaches.       Compared to one year ago, the patient feels her physical health is the same.  Compared to one year ago, the patient feels her mental health is the same.    Reviewed chart for potential of high risk medication in the elderly: yes  Reviewed chart for potential of harmful drug interactions in the elderly:yes    Objective         Vitals:    09/19/19 0848   BP: 108/64   BP Location: Right arm   Patient Position: Sitting   Cuff Size: Adult   Pulse: 69   Resp: 14   Temp: 98.8 °F (37.1 °C)   TempSrc: Oral   SpO2: 98%   Weight: 77.1 kg (170 lb)   Height: 170.2 cm (67\")   PainSc:   5   PainLoc: Generalized       Body mass index is 26.63 kg/m².  Discussed the patient's BMI with her. The BMI is above average; BMI management plan is completed.    Physical Exam   Constitutional: She is oriented to person, place, and time. She appears well-developed and well-nourished. No distress.   HENT:   Head: Normocephalic and atraumatic.   Right Ear: Hearing, tympanic membrane, external ear and ear canal normal.   Left Ear: Hearing, tympanic membrane, external ear and ear canal normal.   Nose: Nose normal.   Mouth/Throat: Uvula is midline, oropharynx is clear and moist and mucous membranes are normal. No oropharyngeal exudate, posterior oropharyngeal edema or posterior oropharyngeal erythema. Tonsils are 0 on the right. Tonsils are 0 on the left. No tonsillar exudate.   Eyes: Conjunctivae are normal. Right eye exhibits no discharge. Left eye exhibits no discharge. No scleral icterus.   Neck: Neck supple.   Cardiovascular: Normal rate, regular rhythm and normal heart sounds. Exam " reveals no gallop and no friction rub.   No murmur heard.  Pulmonary/Chest: Effort normal and breath sounds normal. No respiratory distress. She has no wheezes. She has no rales.   Abdominal: Soft. Bowel sounds are normal. She exhibits no distension and no mass. There is no tenderness. There is no guarding.   Lymphadenopathy:     She has no cervical adenopathy.   Neurological: She is alert and oriented to person, place, and time.   Skin: Skin is warm. No rash noted.   Psychiatric: She has a normal mood and affect. Her behavior is normal.   Nursing note and vitals reviewed.      Lab Results   Component Value Date    GLU 90 08/27/2019    CHLPL 212 (H) 09/19/2019    TRIG 155 (H) 09/19/2019    HDL 78 (H) 09/19/2019     (H) 09/19/2019    VLDL 31 09/19/2019        Assessment/Plan   Medicare Risks and Personalized Health Plan  CMS Preventative Services Quick Reference  Advance Directive Discussion  Breast Cancer/Mammogram Screening  Cardiovascular risk  Chronic Pain   Diabetic Lab Screening   Obesity/Overweight     The above risks/problems have been discussed with the patient.  Pertinent information has been shared with the patient in the After Visit Summary.  Follow up plans and orders are seen below in the Assessment/Plan Section.    Diagnoses and all orders for this visit:    1. Medicare annual wellness visit, subsequent (Primary)    2. Mixed hyperlipidemia  -     Lipid Panel With LDL / HDL Ratio    3. Hypothyroidism due to Hashimoto's thyroiditis    4. Depression, unspecified depression type    5. Chronic tension-type headache, not intractable    6. DDD (degenerative disc disease), cervical    7. Vitamin D deficiency  -     Vitamin D 25 Hydroxy    8. B12 deficiency  -     Vitamin B12      Patient seems to be having headache related to some neck arthritis as well as some muscle spasms causing tension headache. I want her to continue PT and will increase her Gabapentin to 200mg for a week and then 300mg if she  tolerates increase.     Will check lipid, B12 and vitamin D and reviewed her blood work done at [place] recently.     Continue on current dose of synthroid.     Continue current dose of Cymbalta. If she wants to go back to 60mg, I would advise this increase did not help pain.     Discuss with Anny Bach about Tdap.   Follow Up:  Return in about 6 months (around 3/19/2020) for Recheck.     An After Visit Summary and PPPS were given to the patient.

## 2019-09-19 NOTE — PATIENT INSTRUCTIONS
Medicare Wellness  Personal Prevention Plan of Service     Date of Office Visit:  2019  Encounter Provider:  Jade Carvajal MD  Place of Service:  [facility]  Patient Name: Jennifer Plascencia  :  1950    As part of the Medicare Wellness portion of your visit today, we are providing you with this personalized preventive plan of services (PPPS). This plan is based upon recommendations of the [place] Preventive Services Task Force (USPSTF) and the Advisory Committee on Immunization Practices (ACIP).    This lists the preventive care services that should be considered, and provides dates of when you are due. Items listed as completed are up-to-date and do not require any further intervention.    Health Maintenance   Topic Date Due   • TDAP/TD VACCINES (1 - Tdap) 1968   • COLONOSCOPY  2018   • INFLUENZA VACCINE  2019 (Originally 2019)   • MAMMOGRAM  2020   • DXA SCAN  2020   • LIPID PANEL  2020   • MEDICARE ANNUAL WELLNESS  2020   • HEPATITIS C SCREENING  Completed   • PNEUMOCOCCAL VACCINES (65+ LOW/MEDIUM RISK)  Completed   • ZOSTER VACCINE  Completed   • PAP SMEAR  Discontinued       Orders Placed This Encounter   Procedures   • Lipid Panel With LDL / HDL Ratio   • Vitamin B12   • Vitamin D 25 Hydroxy       No Follow-up on file.

## 2019-09-25 ENCOUNTER — TELEPHONE (OUTPATIENT)
Dept: INTERNAL MEDICINE | Facility: CLINIC | Age: 69
End: 2019-09-25

## 2019-09-30 ENCOUNTER — RESULTS ENCOUNTER (OUTPATIENT)
Dept: INTERNAL MEDICINE | Facility: CLINIC | Age: 69
End: 2019-09-30

## 2019-09-30 DIAGNOSIS — Z12.11 COLON CANCER SCREENING: Primary | ICD-10-CM

## 2019-09-30 DIAGNOSIS — Z12.11 COLON CANCER SCREENING: ICD-10-CM

## 2020-03-13 ENCOUNTER — TELEPHONE (OUTPATIENT)
Dept: ONCOLOGY | Facility: CLINIC | Age: 70
End: 2020-03-13

## 2020-03-13 DIAGNOSIS — C90.00 MULTIPLE MYELOMA NOT HAVING ACHIEVED REMISSION (HCC): Primary | ICD-10-CM

## 2020-03-23 NOTE — PROGRESS NOTES
Subjective     REASON FOR CONSULTATION: Transfer of care for IgA kappa multiple myeloma post stem cell transplant in March 2016 at [facility] currently on maintenance Revlimid 10 mg 3 out of 4 weeks    Provide an opinion on any further workup or treatment                             REQUESTING PHYSICIAN: MD Jessica Turpin MD  RECORDS OBTAINED:  Records of the patients history including those obtained from the referring provider were reviewed and summarized in detail.    HISTORY OF PRESENT ILLNESS:  The patient is a 70 y.o. year old female who is here for an opinion about the above issue.    History of Present Illness patient is a 70-year-old female with IgA kappa multiple myeloma diagnosed in mid 2015-high risk because of gain of 1 q. treatment with rev Dex Velcade initiated in 11/15-went on to stem cell transplant at [facility] in March 2016?  Melphalan.  She started post transplant therapy in May 2016 with Velcade rev Dex.  In September after 4 cycles Velcade was decreased to every other week with plans to continue rev Dex Velcade till progression because of high risk status.  In May 2019 her Velcade was discontinued because of worsening neuropathy.  Patient has remained on Revlimid 10 mg 21 out of 21 days since then with stable disease until November 2019 when a small IgG kappa paraprotein was noted on her blood tests which is distinct from her usual IgA kappa myeloma.  Restaging with PET scan was normal and since then the paraprotein as of 3/10/2020 has resolved    Patient has significant issues with anxiety and states that she had trouble getting her Revlimid in a timely fashion was very frustrated with this process at the [facility]-she states she was taken back when Dr. Hewitt suggested she transfer her care but is now happy to make a change    She is currently 2 days into her treatment cycle with Revlimid .she has no pain currently except  intermittently in her neck where she has had some degenerative disease.  She does have neuropathy for which she is on fairly high doses of Cymbalta.  She is not taking her gabapentin.    Continues on acyclovir for prevention and baby aspirin because of the Revlimid  She was having trouble with diarrhea from the Revlimid but this is controlled with WelChol.    She is  and lives by herself has no family nearby but good friends.  She does not smoke or drink.  She is up-to-date with mammography and does Cologuard instead of colonoscopy    Past Medical History:   Diagnosis Date   • Anxiety    • Depression    • Disease of thyroid gland    • Hashimoto's disease    • History of vitamin D deficiency    • Hypothyroidism    • IBS (irritable bowel syndrome)    • Mixed hyperlipidemia 3/12/2018   • Multiple myeloma (CMS/HCC)    • Myeloma (CMS/HCC)    • Osteopenia    • Overweight (BMI 25.0-29.9) 2/5/2018        Past Surgical History:   Procedure Laterality Date   • BREAST BIOPSY     • CATARACT EXTRACTION     • COLONOSCOPY     • TONSILLECTOMY          Current Outpatient Medications on File Prior to Visit   Medication Sig Dispense Refill   • Acetylcarn-Alpha Lipoic Acid 400-200 MG capsule Take  by mouth.     • acyclovir (ZOVIRAX) 400 MG tablet Take 400 mg by mouth 2 (Two) Times a Day.     • ALPRAZolam (XANAX) 0.25 MG tablet Take 0.25 mg by mouth 2 (Two) Times a Day As Needed.  2   • aspirin 81 MG chewable tablet Chew 81 mg Daily.     • B Complex Vitamins (VITAMIN B COMPLEX) capsule capsule Take 2 tablets by mouth Daily.     • baclofen (LIORESAL) 10 MG tablet Take 10 mg by mouth 3 (Three) Times a Day.  5   • calcium carbonate-vitamin d 600-400 MG-UNIT per tablet Take 1 tablet by mouth Daily.     • colesevelam (WELCHOL) 625 MG tablet Take  by mouth As Needed.     • cycloSPORINE (RESTASIS MULTIDOSE) 0.05 % ophthalmic emulsion 1 drop 2 (Two) Times a Day.     • diphenoxylate-atropine (LOMOTIL) 2.5-0.025 MG per tablet Take 1  tablet by mouth 4 (Four) Times a Day As Needed for Diarrhea.     • DULoxetine (CYMBALTA) 30 MG capsule Take 30 mg by mouth Daily.     • DULoxetine (CYMBALTA) 60 MG capsule Take 60 mg by mouth Daily.     • fluticasone (FLONASE) 50 MCG/ACT nasal spray 2 sprays into each nostril Daily.     • folic acid (FOLVITE) 1 MG tablet Take 1,000 mcg by mouth Daily.  2   • gabapentin (NEURONTIN) 100 MG capsule Take 100 mg by mouth Daily As Needed.     • lenalidomide (REVLIMID) 10 MG capsule Take 10 mg by mouth.     • Multiple Vitamins-Iron (DAILY-JONI/IRON/BETA-CAROTENE) tablet Take 1 tablet by mouth Daily.  2   • omega-3 acid ethyl esters (LOVAZA) 1 g capsule Take 2 g by mouth.     • ondansetron (ZOFRAN) 8 MG tablet Take 8 mg by mouth.     • promethazine (PHENERGAN) 25 MG tablet TAKE 0.5-1 TABLETS BY MOUTH EVERY 6 (SIX) HOURS AS NEEDED FOR NAUSEA.     • SYNTHROID 112 MCG tablet      • vitamin D (ERGOCALCIFEROL) 26019 units capsule capsule Take 50,000 Units by mouth 1 (One) Time Per Week.     • HYDROcodone-acetaminophen (NORCO) 5-325 MG per tablet Take 1 tablet by mouth Every 6 (Six) Hours As Needed (pt states she only takes this once a month).     • [DISCONTINUED] ciprofloxacin (CIPRO) 500 MG tablet 1 po q12 10 tablet 0   • [DISCONTINUED] Ergocalciferol 400 units tablet Take 800 Units by mouth Daily.       No current facility-administered medications on file prior to visit.         ALLERGIES:    No Known Allergies     Social History     Socioeconomic History   • Marital status:      Spouse name: Not on file   • Number of children: Not on file   • Years of education: Not on file   • Highest education level: Not on file   Occupational History     Employer: RETIRED   Tobacco Use   • Smoking status: Former Smoker     Types: Cigarettes   • Smokeless tobacco: Never Used   Substance and Sexual Activity   • Alcohol use: Yes     Comment: 2 per month    • Sexual activity: Defer        Family History   Problem Relation Age of Onset  "  • Cancer Mother    • Breast cancer Father    • Kidney cancer Paternal Grandmother         Review of Systems   Constitutional: Positive for fatigue.   HENT: Positive for mouth sores (dry mouth), nosebleeds (little blood when blowing nose during cold weather) and tinnitus.    Eyes: Positive for itching.   Respiratory: Negative for cough, choking, chest tightness and shortness of breath.    Cardiovascular: Negative for chest pain.   Gastrointestinal: Positive for diarrhea (under control 3/24/2020). Negative for abdominal pain, constipation, nausea and vomiting.   Musculoskeletal: Positive for arthralgias (3/24/2020).   Neurological: Positive for headaches (3/24/2020 rupture disc).   Psychiatric/Behavioral: Negative for dysphoric mood. The patient is nervous/anxious (3/24/2020).         Objective     Vitals:    03/24/20 1027   BP: 124/71   Pulse: 93   Resp: 16   Temp: 98.4 °F (36.9 °C)   SpO2: 95%   Weight: 75.8 kg (167 lb)   Height: 169.8 cm (66.85\")  Comment: new ht w/shoes   PainSc:   4   PainLoc: Generalized  Comment: bones     Current Status 3/24/2020   ECOG score 0       Physical Exam not done on this visit because of the covid 19 pandemic 2 decrease exposure to healthcare professionals    RECENT LABS:  Hematology WBC   Date Value Ref Range Status   03/24/2020 4.05 3.40 - 10.80 10*3/mm3 Final   03/10/2020 4.27 (L) 4.5 - 11.0 10*3/uL Final     RBC   Date Value Ref Range Status   03/24/2020 4.02 3.77 - 5.28 10*6/mm3 Final   03/10/2020 3.80 (L) 4.0 - 5.2 10*6/uL Final     Hemoglobin   Date Value Ref Range Status   03/24/2020 13.7 12.0 - 15.9 g/dL Final   03/10/2020 12.9 12.0 - 16.0 g/dL Final     Hematocrit   Date Value Ref Range Status   03/24/2020 40.0 34.0 - 46.6 % Final   03/10/2020 38.5 36.0 - 46.0 % Final     Platelets   Date Value Ref Range Status   03/24/2020 228 140 - 450 10*3/mm3 Final   03/10/2020 181 140 - 440 10*3/uL Final                  Assessment/Plan   1.  IgA kappa multiple myeloma diagnosed in " 2015 treated with RVD  · Stem cell transplant at [facility] in 3/2016  · Maintenance treatment with Velcade rev Dex started in 5/16  · Velcade discontinued because of neurotoxicity in 5/19  · Small IgG kappa paraprotein seen on blood work in 10/19-PET scan negative protein disappeared by 3/20  · Zometa given every 6 months last dose in October  · Acyclovir and aspirin prophylaxis    2.  Anxiety  3.  Hypothyroidism on treatment  4.  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  Diarrhea due to treatment improved with WelChol    Plan  1.  Lab appointment 2 weeks to recheck her paraprotein and beta-2 microglobulin  2.  See me in 6 weeks at which time we will do a complete physical and reevaluate the protein and decide about her Zometa  3.  I have asked Sujey Randall call the patient to coordinate the Revlimid mailing order which seems of been a major cause of her unhappiness and anxiety at her previous oncologist office.    I have reviewed her labs in detail but cannot find any documentation of her transplant conditioning regimen (I assume it was melphalan)   or the depth of her response prior to transplant .  She states that the doctors at [facility] recommended to check her labs every month but I suspect that this is excessive as she has been clinically BOZENA for 4 years and I suggested every other month testing and we will discuss this further at her next visit

## 2020-03-24 ENCOUNTER — DOCUMENTATION (OUTPATIENT)
Dept: ONCOLOGY | Facility: CLINIC | Age: 70
End: 2020-03-24

## 2020-03-24 ENCOUNTER — CONSULT (OUTPATIENT)
Dept: ONCOLOGY | Facility: CLINIC | Age: 70
End: 2020-03-24

## 2020-03-24 ENCOUNTER — LAB (OUTPATIENT)
Dept: LAB | Facility: HOSPITAL | Age: 70
End: 2020-03-24

## 2020-03-24 VITALS
TEMPERATURE: 98.4 F | HEART RATE: 93 BPM | BODY MASS INDEX: 26.21 KG/M2 | HEIGHT: 67 IN | SYSTOLIC BLOOD PRESSURE: 124 MMHG | WEIGHT: 167 LBS | RESPIRATION RATE: 16 BRPM | OXYGEN SATURATION: 95 % | DIASTOLIC BLOOD PRESSURE: 71 MMHG

## 2020-03-24 DIAGNOSIS — G63 NEUROPATHY ASSOCIATED WITH MULTIPLE MYELOMA (HCC): Primary | ICD-10-CM

## 2020-03-24 DIAGNOSIS — C90.00 MULTIPLE MYELOMA NOT HAVING ACHIEVED REMISSION (HCC): Primary | ICD-10-CM

## 2020-03-24 DIAGNOSIS — C90.00 NEUROPATHY ASSOCIATED WITH MULTIPLE MYELOMA (HCC): Primary | ICD-10-CM

## 2020-03-24 LAB
BASOPHILS # BLD AUTO: 0.05 10*3/MM3 (ref 0–0.2)
BASOPHILS NFR BLD AUTO: 1.2 % (ref 0–1.5)
DEPRECATED RDW RBC AUTO: 50.3 FL (ref 37–54)
EOSINOPHIL # BLD AUTO: 0.13 10*3/MM3 (ref 0–0.4)
EOSINOPHIL NFR BLD AUTO: 3.2 % (ref 0.3–6.2)
ERYTHROCYTE [DISTWIDTH] IN BLOOD BY AUTOMATED COUNT: 13.7 % (ref 12.3–15.4)
HCT VFR BLD AUTO: 40 % (ref 34–46.6)
HGB BLD-MCNC: 13.7 G/DL (ref 12–15.9)
IMM GRANULOCYTES # BLD AUTO: 0.03 10*3/MM3 (ref 0–0.05)
IMM GRANULOCYTES NFR BLD AUTO: 0.7 % (ref 0–0.5)
LYMPHOCYTES # BLD AUTO: 1.66 10*3/MM3 (ref 0.7–3.1)
LYMPHOCYTES NFR BLD AUTO: 41 % (ref 19.6–45.3)
MCH RBC QN AUTO: 34.1 PG (ref 26.6–33)
MCHC RBC AUTO-ENTMCNC: 34.3 G/DL (ref 31.5–35.7)
MCV RBC AUTO: 99.5 FL (ref 79–97)
MONOCYTES # BLD AUTO: 0.48 10*3/MM3 (ref 0.1–0.9)
MONOCYTES NFR BLD AUTO: 11.9 % (ref 5–12)
NEUTROPHILS # BLD AUTO: 1.7 10*3/MM3 (ref 1.7–7)
NEUTROPHILS NFR BLD AUTO: 42 % (ref 42.7–76)
NRBC BLD AUTO-RTO: 0 /100 WBC (ref 0–0.2)
PLATELET # BLD AUTO: 228 10*3/MM3 (ref 140–450)
PMV BLD AUTO: 9.9 FL (ref 6–12)
RBC # BLD AUTO: 4.02 10*6/MM3 (ref 3.77–5.28)
WBC NRBC COR # BLD: 4.05 10*3/MM3 (ref 3.4–10.8)

## 2020-03-24 PROCEDURE — 99215 OFFICE O/P EST HI 40 MIN: CPT | Performed by: INTERNAL MEDICINE

## 2020-03-24 PROCEDURE — 85025 COMPLETE CBC W/AUTO DIFF WBC: CPT

## 2020-03-24 PROCEDURE — 36415 COLL VENOUS BLD VENIPUNCTURE: CPT

## 2020-03-24 RX ORDER — DIPHENOXYLATE HYDROCHLORIDE AND ATROPINE SULFATE 2.5; .025 MG/1; MG/1
1 TABLET ORAL 4 TIMES DAILY PRN
COMMUNITY
End: 2020-06-15 | Stop reason: SDUPTHER

## 2020-03-24 RX ORDER — OMEGA-3-ACID ETHYL ESTERS 1 G/1
2 CAPSULE, LIQUID FILLED ORAL
COMMUNITY
Start: 2019-12-11

## 2020-03-24 RX ORDER — ACETYLCARNITIN/ALPHA LIPOIC AC 400-200 MG
CAPSULE ORAL
COMMUNITY

## 2020-03-24 RX ORDER — PROMETHAZINE HYDROCHLORIDE 25 MG/1
TABLET ORAL
COMMUNITY
Start: 2019-12-18 | End: 2022-04-05 | Stop reason: SDDI

## 2020-03-24 RX ORDER — ONDANSETRON HYDROCHLORIDE 8 MG/1
8 TABLET, FILM COATED ORAL
COMMUNITY
Start: 2020-02-11 | End: 2022-04-05 | Stop reason: SDDI

## 2020-03-24 NOTE — PROGRESS NOTES
Staff message rec from Dr Garcia about contacting pt for Revlimid. See below.    Glendy Garcia MD sent to Sujey Randall call pt to order her revlimid 10mg 21/28      I have contacted [facility] [phone] and had the [facility] enrollment changed to Dr Garcia as provider for pts Revlimid. Mark Bridges-pt had a dispense on 3/11/2020 for 10 mg Qty of 21. She states we can obtain a new rx on 4/2/2020. I will contact pt to verify if this is being dispensed through [facility] as indicated in her dispense history in Epic.

## 2020-03-30 ENCOUNTER — DOCUMENTATION (OUTPATIENT)
Dept: ONCOLOGY | Facility: CLINIC | Age: 70
End: 2020-03-30

## 2020-03-30 ENCOUNTER — PATIENT MESSAGE (OUTPATIENT)
Dept: ONCOLOGY | Facility: CLINIC | Age: 70
End: 2020-03-30

## 2020-03-30 NOTE — PROGRESS NOTES
I have spoken with pt about the process for her Revlimid. I provided [facility]ugio direct phone number and she will contact me when she is ready to order. She will be ready for a new rx to be sent to [facility] tomorrow or Wednesday. I have informed her that I will get this taken care of.

## 2020-04-01 RX ORDER — LENALIDOMIDE 10 MG/1
CAPSULE ORAL
Qty: 1 CAPSULE | Refills: 0 | Status: SHIPPED | OUTPATIENT
Start: 2020-04-01 | End: 2020-04-30 | Stop reason: SDUPTHER

## 2020-04-07 ENCOUNTER — LAB (OUTPATIENT)
Dept: LAB | Facility: HOSPITAL | Age: 70
End: 2020-04-07

## 2020-04-07 DIAGNOSIS — C90.00 MULTIPLE MYELOMA NOT HAVING ACHIEVED REMISSION (HCC): ICD-10-CM

## 2020-04-07 LAB
BASOPHILS # BLD AUTO: 0.02 10*3/MM3 (ref 0–0.2)
BASOPHILS NFR BLD AUTO: 0.6 % (ref 0–1.5)
DEPRECATED RDW RBC AUTO: 48.1 FL (ref 37–54)
EOSINOPHIL # BLD AUTO: 0.35 10*3/MM3 (ref 0–0.4)
EOSINOPHIL NFR BLD AUTO: 11.1 % (ref 0.3–6.2)
ERYTHROCYTE [DISTWIDTH] IN BLOOD BY AUTOMATED COUNT: 13.3 % (ref 12.3–15.4)
HCT VFR BLD AUTO: 37.2 % (ref 34–46.6)
HGB BLD-MCNC: 13 G/DL (ref 12–15.9)
IMM GRANULOCYTES # BLD AUTO: 0.05 10*3/MM3 (ref 0–0.05)
IMM GRANULOCYTES NFR BLD AUTO: 1.6 % (ref 0–0.5)
LYMPHOCYTES # BLD AUTO: 1.4 10*3/MM3 (ref 0.7–3.1)
LYMPHOCYTES NFR BLD AUTO: 44.4 % (ref 19.6–45.3)
MCH RBC QN AUTO: 34.7 PG (ref 26.6–33)
MCHC RBC AUTO-ENTMCNC: 34.9 G/DL (ref 31.5–35.7)
MCV RBC AUTO: 99.2 FL (ref 79–97)
MONOCYTES # BLD AUTO: 0.44 10*3/MM3 (ref 0.1–0.9)
MONOCYTES NFR BLD AUTO: 14 % (ref 5–12)
NEUTROPHILS # BLD AUTO: 0.89 10*3/MM3 (ref 1.7–7)
NEUTROPHILS NFR BLD AUTO: 28.3 % (ref 42.7–76)
NRBC BLD AUTO-RTO: 0 /100 WBC (ref 0–0.2)
PLATELET # BLD AUTO: 174 10*3/MM3 (ref 140–450)
PMV BLD AUTO: 9.9 FL (ref 6–12)
RBC # BLD AUTO: 3.75 10*6/MM3 (ref 3.77–5.28)
WBC NRBC COR # BLD: 3.15 10*3/MM3 (ref 3.4–10.8)

## 2020-04-07 PROCEDURE — 85025 COMPLETE CBC W/AUTO DIFF WBC: CPT

## 2020-04-07 PROCEDURE — 36415 COLL VENOUS BLD VENIPUNCTURE: CPT

## 2020-04-07 NOTE — PROGRESS NOTES
Left message because the patient did not  her phone today regarding ANC of 900 on Revlimid.  I am concerned that she does not get to neutropenic during the coronavirus epidemic and I am inclined to hold her treatment until her counts resolve and I have left her message to call me tomorrow especially since she is very stable on maintenance treatment

## 2020-04-09 ENCOUNTER — DOCUMENTATION (OUTPATIENT)
Dept: ONCOLOGY | Facility: CLINIC | Age: 70
End: 2020-04-09

## 2020-04-09 ENCOUNTER — TELEPHONE (OUTPATIENT)
Dept: ONCOLOGY | Facility: CLINIC | Age: 70
End: 2020-04-09

## 2020-04-09 NOTE — TELEPHONE ENCOUNTER
PT CALLING BACK AFTER MISSING CALL FROM DR. BURTON THIS PAST TUES. PT IS SUPPOSED TO BE DONE W/ HER REVLIMID CYCLE THIS COMING TUES NEXT WEK. THEN SHE WILL BE OFF FOR 1 WK. DR. BURTON'S NOTE MENTIONS PT STOPPING MED D/T LOW ANC. PT STATES THAT SHE IS GOING TO STOP MED NOW. MESSAGED DR. BURTON TO MAKE HER AWARE AND MAKE SURE THAT WAS RIGHT FOR PT TO DO.

## 2020-04-10 ENCOUNTER — DOCUMENTATION (OUTPATIENT)
Dept: ONCOLOGY | Facility: HOSPITAL | Age: 70
End: 2020-04-10

## 2020-04-10 ENCOUNTER — TELEPHONE (OUTPATIENT)
Dept: ONCOLOGY | Facility: CLINIC | Age: 70
End: 2020-04-10

## 2020-04-10 NOTE — PROGRESS NOTES
SEE PREVIOUS NOTE. PER DR. BURTON PT TO COME IN NEXT WK FOR CBC AND IT WAS OK FOR PT TO STOP REVLIMID YESTERDAY. APPT DESK MESSAGED TO SET UP APPT FOR NEXT WK.

## 2020-04-10 NOTE — TELEPHONE ENCOUNTER
----- Message from Mimi Paige RN sent at 4/10/2020 11:51 AM EDT -----  Regarding: APPT NEEDED NEXT WK   PER DR. GONSALEZ PT NEEDS APPT NEXT WK SOMETIME FOR CBC AND RN REV (RN CAN CALL PT W/ RESULTS). PT IS NOT AWARE OF THIS SO WHEN MAKING APPT PLEASE LET HER KNOW THAT DR. GONSALEZ ORDERED THIS BEC HER COUNTS HAVE BEEN LOW. THANKS!    ----- Message -----  From: Glendy Garcia MD  Sent: 4/10/2020  11:47 AM EDT  To: Mimi Paige RN  Subject: RE: PT STOPPING REVLIMID NOW                     Yes and please check her CBC next week to see if she is recovering  ----- Message -----  From: Mimi Paige RN  Sent: 4/9/2020   4:34 PM EDT  To: Glendy Garcia MD  Subject: PT STOPPING REVLIMID NOW                         PT IS DUE TO STOP REVLIMID NEXT TUES. SHE WILL GO AHEAD AND STOP NOW D/T LOW ANC. IF THIS IS NOT RIGHT LET ME KNOW.

## 2020-04-14 ENCOUNTER — CLINICAL SUPPORT (OUTPATIENT)
Dept: ONCOLOGY | Facility: HOSPITAL | Age: 70
End: 2020-04-14

## 2020-04-14 ENCOUNTER — LAB (OUTPATIENT)
Dept: LAB | Facility: HOSPITAL | Age: 70
End: 2020-04-14

## 2020-04-14 DIAGNOSIS — C90.00 MULTIPLE MYELOMA NOT HAVING ACHIEVED REMISSION (HCC): ICD-10-CM

## 2020-04-14 LAB
BASOPHILS # BLD AUTO: 0.05 10*3/MM3 (ref 0–0.2)
BASOPHILS NFR BLD AUTO: 1.4 % (ref 0–1.5)
DEPRECATED RDW RBC AUTO: 48.4 FL (ref 37–54)
EOSINOPHIL # BLD AUTO: 0.18 10*3/MM3 (ref 0–0.4)
EOSINOPHIL NFR BLD AUTO: 5.1 % (ref 0.3–6.2)
ERYTHROCYTE [DISTWIDTH] IN BLOOD BY AUTOMATED COUNT: 13.3 % (ref 12.3–15.4)
HCT VFR BLD AUTO: 37.2 % (ref 34–46.6)
HGB BLD-MCNC: 12.9 G/DL (ref 12–15.9)
IMM GRANULOCYTES # BLD AUTO: 0.01 10*3/MM3 (ref 0–0.05)
IMM GRANULOCYTES NFR BLD AUTO: 0.3 % (ref 0–0.5)
LYMPHOCYTES # BLD AUTO: 1.44 10*3/MM3 (ref 0.7–3.1)
LYMPHOCYTES NFR BLD AUTO: 40.9 % (ref 19.6–45.3)
MCH RBC QN AUTO: 34.4 PG (ref 26.6–33)
MCHC RBC AUTO-ENTMCNC: 34.7 G/DL (ref 31.5–35.7)
MCV RBC AUTO: 99.2 FL (ref 79–97)
MONOCYTES # BLD AUTO: 0.53 10*3/MM3 (ref 0.1–0.9)
MONOCYTES NFR BLD AUTO: 15.1 % (ref 5–12)
NEUTROPHILS # BLD AUTO: 1.31 10*3/MM3 (ref 1.7–7)
NEUTROPHILS NFR BLD AUTO: 37.2 % (ref 42.7–76)
NRBC BLD AUTO-RTO: 0 /100 WBC (ref 0–0.2)
PLATELET # BLD AUTO: 213 10*3/MM3 (ref 140–450)
PMV BLD AUTO: 9.8 FL (ref 6–12)
RBC # BLD AUTO: 3.75 10*6/MM3 (ref 3.77–5.28)
WBC NRBC COR # BLD: 3.52 10*3/MM3 (ref 3.4–10.8)

## 2020-04-14 PROCEDURE — 36415 COLL VENOUS BLD VENIPUNCTURE: CPT

## 2020-04-14 PROCEDURE — 85025 COMPLETE CBC W/AUTO DIFF WBC: CPT

## 2020-04-14 NOTE — PROGRESS NOTES
Pt here for CBC review. Pt called with results. ANC was 0.89 and is now 1.31 since stopping Revlimid. Per Dr. Gacria d/t covid-19 pt should not continue taking Revlimid until next appt in 3 weeks on 5/5.   Pt has no complaints at this time. Pt v/u and informed to call with any concerns.    Lab Results   Component Value Date    WBC 3.52 04/14/2020    HGB 12.9 04/14/2020    HCT 37.2 04/14/2020    MCV 99.2 (H) 04/14/2020     04/14/2020

## 2020-04-30 RX ORDER — LENALIDOMIDE 10 MG/1
CAPSULE ORAL
Qty: 1 CAPSULE | Refills: 0 | Status: SHIPPED | OUTPATIENT
Start: 2020-04-30 | End: 2020-05-26 | Stop reason: SDUPTHER

## 2020-04-30 NOTE — TELEPHONE ENCOUNTER
VM rec from pt asking me to place her next order of Revlimid. Per last note-pt is to restart on 5/5/2020. I have escribed a new rx to [facility]

## 2020-05-01 ENCOUNTER — TELEPHONE (OUTPATIENT)
Dept: ONCOLOGY | Facility: CLINIC | Age: 70
End: 2020-05-01

## 2020-05-01 ENCOUNTER — DOCUMENTATION (OUTPATIENT)
Dept: ONCOLOGY | Facility: CLINIC | Age: 70
End: 2020-05-01

## 2020-05-01 NOTE — TELEPHONE ENCOUNTER
Shekhar from [facility] called to clarify the Script for  lenalidomide (REVLIMID) 10 MG capsule. Please call back at your earliest convenience.       Best call back number: [phone]    REF#: [id number]

## 2020-05-01 NOTE — PROGRESS NOTES
Staff message rec from Kerry Theodore at [facility] needing to clarify the Revlimid rx.     I returned the call and clarified the rx. The Qty that came across to them was 1. It should be 21

## 2020-05-04 ENCOUNTER — DOCUMENTATION (OUTPATIENT)
Dept: ONCOLOGY | Facility: CLINIC | Age: 70
End: 2020-05-04

## 2020-05-04 NOTE — PROGRESS NOTES
Subjective     REASON FOR CONSULTATION: Transfer of care for IgA kappa multiple myeloma post stem cell transplant in March 2016 at [facility] currently on maintenance Revlimid 10 mg 3 out of 4 weeks                               REQUESTING PHYSICIAN: MD Brett Turpin MD    History of Present Illness patient is a 70-year-old female with an IgA kappa myeloma post stem cell transplant on maintenance Revlimid 10 mg 3 or 4 weeks    Comes in for follow-up after initial visit.  At her last blood count she was neutropenic and because of the coronavirus pandemic we asked her to hold her Revlimid until this visit to make sure she does not have a higher risk for infection.    She is back today feeling well she has her usual aches and pains.  She has a little jaw pain related to TMJ as she has been unable to wear her mouth guard because it is being redone    She has had no infections or issues and is self isolating at home and practicing social distancing    She is scheduled to have a video visit with [facility] because of the coronavirus pandemic    Labs have been drawn and are pending currently but her ANC today is 1.57 I told her she can resume her Revlimid with a blood count in 2 weeks to see how low it drops    She is also due for her 3-month Zometa today and her monthly B12 and we will schedule this for her      Past Medical History:   Diagnosis Date   • Anxiety    • Depression    • Disease of thyroid gland    • Hashimoto's disease    • History of vitamin D deficiency    • Hypothyroidism    • IBS (irritable bowel syndrome)    • Mixed hyperlipidemia 3/12/2018   • Multiple myeloma (CMS/HCC)    • Myeloma (CMS/HCC)    • Osteopenia    • Overweight (BMI 25.0-29.9) 2/5/2018        Past Surgical History:   Procedure Laterality Date   • BREAST BIOPSY     • CATARACT EXTRACTION     • COLONOSCOPY     • TONSILLECTOMY  1956   • VEIN SURGERY  1991      patient is a  70-year-old female with IgA kappa multiple myeloma diagnosed in mid 2015-high risk because of gain of 1 q- treatment with rev Dex Velcade initiated in 11/15-went on to stem cell transplant at [facility] in March 2016?  Melphalan.  She started post transplant therapy in May 2016 with Velcade rev Dex.  In September after 4 cycles Velcade was decreased to every other week with plans to continue rev Dex Velcade till progression because of high risk status.  In May 2019 her Velcade was discontinued because of worsening neuropathy.  Patient has remained on Revlimid 10 mg 21 out of 28 days since then with stable disease until November 2019 when a small IgG kappa paraprotein was noted on her blood tests which is distinct from her usual IgA kappa myeloma.  Restaging with PET scan was normal and since then the paraprotein as of 3/10/2020 has resolved    Patient has significant issues with anxiety and states that she had trouble getting her Revlimid in a timely fashion was very frustrated with this process at the [facility]-she states she was taken back when Dr. Hewitt suggested she transfer her care but is now happy to make a change    She is currently 2 days into her treatment cycle with Revlimid .she has no pain currently except intermittently in her neck where she has had some degenerative disease.  She does have neuropathy for which she is on fairly high doses of Cymbalta.  She is not taking her gabapentin.    Continues on acyclovir for prevention and baby aspirin because of the Revlimid  She was having trouble with diarrhea from the Revlimid but this is controlled with WelChol.    She is  and lives by herself has no family nearby but good friends.  She does not smoke or drink.  She is up-to-date with mammography and does Cologuard instead of colonoscopy     have reviewed her labs in detail but cannot find any documentation of her transplant conditioning regimen (I assume it was melphalan)  or the depth of  her response prior to transplant .  She states that the doctors at [facility] recommended to check her labs every month - she has been clinically BOZENA for 4 years and I suggested every other month testing but she is very adamant that she wants monthly testing      Current Outpatient Medications on File Prior to Visit   Medication Sig Dispense Refill   • Acetylcarn-Alpha Lipoic Acid 400-200 MG capsule Take  by mouth.     • acyclovir (ZOVIRAX) 400 MG tablet Take 400 mg by mouth 2 (Two) Times a Day.     • aspirin 81 MG chewable tablet Chew 81 mg Daily.     • B Complex Vitamins (VITAMIN B COMPLEX) capsule capsule Take 2 tablets by mouth Daily.     • baclofen (LIORESAL) 10 MG tablet Take 10 mg by mouth 3 (Three) Times a Day.  5   • calcium carbonate-vitamin d 600-400 MG-UNIT per tablet Take 1 tablet by mouth Daily.     • colesevelam (WELCHOL) 625 MG tablet Take  by mouth As Needed.     • cycloSPORINE (RESTASIS MULTIDOSE) 0.05 % ophthalmic emulsion 1 drop 2 (Two) Times a Day.     • diphenoxylate-atropine (LOMOTIL) 2.5-0.025 MG per tablet Take 1 tablet by mouth 4 (Four) Times a Day As Needed for Diarrhea.     • DULoxetine (CYMBALTA) 30 MG capsule Take 30 mg by mouth Daily.     • DULoxetine (CYMBALTA) 60 MG capsule Take 60 mg by mouth Daily.     • fluticasone (FLONASE) 50 MCG/ACT nasal spray 2 sprays into each nostril Daily.     • folic acid (FOLVITE) 1 MG tablet Take 1,000 mcg by mouth Daily.  2   • lenalidomide (REVLIMID) 10 MG capsule Take 1 cap po daily or 21 days on then 7 days off. 1 capsule 0   • Multiple Vitamins-Iron (DAILY-JONI/IRON/BETA-CAROTENE) tablet Take 1 tablet by mouth Daily.  2   • omega-3 acid ethyl esters (LOVAZA) 1 g capsule Take 2 g by mouth.     • ondansetron (ZOFRAN) 8 MG tablet Take 8 mg by mouth.     • promethazine (PHENERGAN) 25 MG tablet TAKE 0.5-1 TABLETS BY MOUTH EVERY 6 (SIX) HOURS AS NEEDED FOR NAUSEA.     • SYNTHROID 112 MCG tablet      • vitamin D (ERGOCALCIFEROL) 82824 units capsule  capsule Take 50,000 Units by mouth 1 (One) Time Per Week.     • ALPRAZolam (XANAX) 0.25 MG tablet Take 0.25 mg by mouth 2 (Two) Times a Day As Needed.  2   • gabapentin (NEURONTIN) 100 MG capsule Take 100 mg by mouth Daily As Needed.     • HYDROcodone-acetaminophen (NORCO) 5-325 MG per tablet Take 1 tablet by mouth Every 6 (Six) Hours As Needed (pt states she only takes this once a month).       No current facility-administered medications on file prior to visit.         ALLERGIES:    No Known Allergies     Social History     Socioeconomic History   • Marital status:      Spouse name: Not on file   • Number of children: 2   • Years of education: Not on file   • Highest education level: Not on file   Occupational History     Employer: RETIRED   Tobacco Use   • Smoking status: Former Smoker     Types: Cigarettes   • Smokeless tobacco: Never Used   Substance and Sexual Activity   • Alcohol use: Yes     Comment: 2 per month    • Drug use: Never   • Sexual activity: Defer        Family History   Problem Relation Age of Onset   • Breast cancer Mother    • Lymphoma Father    • Kidney cancer Paternal Grandmother         Review of Systems   Constitutional: Positive for fatigue (little better 5/5/2020).   HENT: Positive for mouth sores (dry mouth better 5/5/2020) and tinnitus (better 5/5/2020). Negative for nosebleeds (stable 5/5/2020).    Eyes: Positive for itching (same 5/5/2020).   Respiratory: Negative for cough, choking, chest tightness and shortness of breath.    Cardiovascular: Negative for chest pain.   Gastrointestinal: Positive for diarrhea (under control 5/5/2020). Negative for abdominal pain, constipation, nausea and vomiting.   Musculoskeletal: Positive for arthralgias (5/5/2020 jaw pain 5/5/2020) and neck pain (5/5/2020).   Neurological: Positive for headaches (same 5-5-2020 rupture disc).   Psychiatric/Behavioral: Negative for dysphoric mood. The patient is nervous/anxious (same 5/5/2020).      "    Objective     Vitals:    05/05/20 0825   BP: 116/73   Pulse: 79   Resp: 16   Temp: 98 °F (36.7 °C)   SpO2: 98%   Weight: 76.3 kg (168 lb 4.8 oz)   Height: 169.8 cm (66.85\")   PainSc:   3   PainLoc: Generalized  Comment: jaws from rutured disc in neck     Current Status 5/5/2020   ECOG score 0       Physical Exam     CONSTITUTIONAL:  Vital signs reviewed.  No distress, looks comfortable.  EYES:  Conjunctiva and lids unremarkable.  PERRLA  EARS,NOSE,MOUTH,THROAT:  Ears and nose appear unremarkable.  Lips, teeth, gums appear unremarkable.  RESPIRATORY:  Normal respiratory effort.  Lungs clear to auscultation bilaterally.  CARDIOVASCULAR:  Normal S1, S2.  No murmurs rubs or gallops.  No significant lower extremity edema.  GASTROINTESTINAL: Abdomen appears unremarkable.  Nontender.  No hepatomegaly.  No splenomegaly.  LYMPHATIC:  No cervical, supraclavicular, axillary lymphadenopathy.  SKIN:  Warm.  No rashes.  PSYCHIATRIC:  Normal judgment and insight.  Normal mood and affect.      RECENT LABS:  Hematology WBC   Date Value Ref Range Status   05/05/2020 4.25 3.40 - 10.80 10*3/mm3 Final   03/10/2020 4.27 (L) 4.5 - 11.0 10*3/uL Final     RBC   Date Value Ref Range Status   05/05/2020 3.91 3.77 - 5.28 10*6/mm3 Final   03/10/2020 3.80 (L) 4.0 - 5.2 10*6/uL Final     Hemoglobin   Date Value Ref Range Status   05/05/2020 13.0 12.0 - 15.9 g/dL Final   03/10/2020 12.9 12.0 - 16.0 g/dL Final     Hematocrit   Date Value Ref Range Status   05/05/2020 39.7 34.0 - 46.6 % Final   03/10/2020 38.5 36.0 - 46.0 % Final     Platelets   Date Value Ref Range Status   05/05/2020 204 140 - 450 10*3/mm3 Final   03/10/2020 181 140 - 440 10*3/uL Final                  Assessment/Plan   1.  IgA kappa multiple myeloma diagnosed in 2015 treated with RVD  · Stem cell transplant at [facility] in 3/2016  · Maintenance treatment with Velcade rev Dex started in 5/16  · Velcade discontinued because of neurotoxicity in 5/19  · Small IgG kappa " paraprotein seen on blood work in 10/19-PET scan negative protein disappeared by 3/20  · Zometa given every 3 months last dose in October  · Acyclovir and aspirin prophylaxis    2.  Anxiety  3.  Hypothyroidism on treatment  4.  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  Diarrhea due to treatment improved with WelChol    Plan  1.  Resume 10 mg of Revlimid   2.continue monthly B12 and myeloma labs  3.  Zometa every 3 months  4.  See me in 3 months for follow-up .    Standard precautions discussed regarding the Novel Coronavirus (COVID-19)  including patient to limit contact with others outside of home, frequent handwashing and using alcohol gel when unable to use soap and water, as well to monitoring for symptoms and notifying our office via telephone for any symptoms or exposures.

## 2020-05-05 ENCOUNTER — LAB (OUTPATIENT)
Dept: LAB | Facility: HOSPITAL | Age: 70
End: 2020-05-05

## 2020-05-05 ENCOUNTER — INFUSION (OUTPATIENT)
Dept: ONCOLOGY | Facility: HOSPITAL | Age: 70
End: 2020-05-05

## 2020-05-05 ENCOUNTER — OFFICE VISIT (OUTPATIENT)
Dept: ONCOLOGY | Facility: CLINIC | Age: 70
End: 2020-05-05

## 2020-05-05 VITALS
DIASTOLIC BLOOD PRESSURE: 73 MMHG | HEIGHT: 67 IN | WEIGHT: 168.3 LBS | BODY MASS INDEX: 26.42 KG/M2 | HEART RATE: 79 BPM | SYSTOLIC BLOOD PRESSURE: 116 MMHG | TEMPERATURE: 98 F | RESPIRATION RATE: 16 BRPM | OXYGEN SATURATION: 98 %

## 2020-05-05 DIAGNOSIS — G63 NEUROPATHY ASSOCIATED WITH MULTIPLE MYELOMA (HCC): ICD-10-CM

## 2020-05-05 DIAGNOSIS — E55.9 VITAMIN D DEFICIENCY: Primary | ICD-10-CM

## 2020-05-05 DIAGNOSIS — C90.00 NEUROPATHY ASSOCIATED WITH MULTIPLE MYELOMA (HCC): ICD-10-CM

## 2020-05-05 DIAGNOSIS — C90.00 MULTIPLE MYELOMA NOT HAVING ACHIEVED REMISSION (HCC): Primary | ICD-10-CM

## 2020-05-05 LAB
ALBUMIN SERPL-MCNC: 4.2 G/DL (ref 3.5–5.2)
ALBUMIN/GLOB SERPL: 1.5 G/DL (ref 1.1–2.4)
ALP SERPL-CCNC: 54 U/L (ref 38–116)
ALT SERPL W P-5'-P-CCNC: 14 U/L (ref 0–33)
ANION GAP SERPL CALCULATED.3IONS-SCNC: 12 MMOL/L (ref 5–15)
AST SERPL-CCNC: 18 U/L (ref 0–32)
B2 MICROGLOB SERPL-MCNC: 1.9 MG/L (ref 0.8–2.2)
BASOPHILS # BLD AUTO: 0.05 10*3/MM3 (ref 0–0.2)
BASOPHILS NFR BLD AUTO: 1.2 % (ref 0–1.5)
BILIRUB SERPL-MCNC: 0.2 MG/DL (ref 0.2–1.2)
BUN BLD-MCNC: 9 MG/DL (ref 6–20)
BUN/CREAT SERPL: 12.5 (ref 7.3–30)
CALCIUM SPEC-SCNC: 9.2 MG/DL (ref 8.5–10.2)
CHLORIDE SERPL-SCNC: 101 MMOL/L (ref 98–107)
CO2 SERPL-SCNC: 26 MMOL/L (ref 22–29)
CREAT BLD-MCNC: 0.72 MG/DL (ref 0.6–1.1)
DEPRECATED RDW RBC AUTO: 50.3 FL (ref 37–54)
EOSINOPHIL # BLD AUTO: 0.41 10*3/MM3 (ref 0–0.4)
EOSINOPHIL NFR BLD AUTO: 9.6 % (ref 0.3–6.2)
ERYTHROCYTE [DISTWIDTH] IN BLOOD BY AUTOMATED COUNT: 13.5 % (ref 12.3–15.4)
GFR SERPL CREATININE-BSD FRML MDRD: 80 ML/MIN/1.73
GLOBULIN UR ELPH-MCNC: 2.8 GM/DL (ref 1.8–3.5)
GLUCOSE BLD-MCNC: 103 MG/DL (ref 74–124)
HCT VFR BLD AUTO: 39.7 % (ref 34–46.6)
HGB BLD-MCNC: 13 G/DL (ref 12–15.9)
IMM GRANULOCYTES # BLD AUTO: 0.01 10*3/MM3 (ref 0–0.05)
IMM GRANULOCYTES NFR BLD AUTO: 0.2 % (ref 0–0.5)
LYMPHOCYTES # BLD AUTO: 1.52 10*3/MM3 (ref 0.7–3.1)
LYMPHOCYTES NFR BLD AUTO: 35.8 % (ref 19.6–45.3)
MAGNESIUM SERPL-MCNC: 1.8 MG/DL (ref 1.8–2.5)
MCH RBC QN AUTO: 33.2 PG (ref 26.6–33)
MCHC RBC AUTO-ENTMCNC: 32.7 G/DL (ref 31.5–35.7)
MCV RBC AUTO: 101.5 FL (ref 79–97)
MONOCYTES # BLD AUTO: 0.69 10*3/MM3 (ref 0.1–0.9)
MONOCYTES NFR BLD AUTO: 16.2 % (ref 5–12)
NEUTROPHILS # BLD AUTO: 1.57 10*3/MM3 (ref 1.7–7)
NEUTROPHILS NFR BLD AUTO: 37 % (ref 42.7–76)
NRBC BLD AUTO-RTO: 0 /100 WBC (ref 0–0.2)
PHOSPHATE SERPL-MCNC: 3.5 MG/DL (ref 2.5–4.5)
PLATELET # BLD AUTO: 204 10*3/MM3 (ref 140–450)
PMV BLD AUTO: 9.1 FL (ref 6–12)
POTASSIUM BLD-SCNC: 4.4 MMOL/L (ref 3.5–4.7)
PROT SERPL-MCNC: 7 G/DL (ref 6.3–8)
RBC # BLD AUTO: 3.91 10*6/MM3 (ref 3.77–5.28)
SODIUM BLD-SCNC: 139 MMOL/L (ref 134–145)
WBC NRBC COR # BLD: 4.25 10*3/MM3 (ref 3.4–10.8)

## 2020-05-05 PROCEDURE — 83735 ASSAY OF MAGNESIUM: CPT

## 2020-05-05 PROCEDURE — 96375 TX/PRO/DX INJ NEW DRUG ADDON: CPT

## 2020-05-05 PROCEDURE — 25010000002 CYANOCOBALAMIN PER 1000 MCG: Performed by: INTERNAL MEDICINE

## 2020-05-05 PROCEDURE — 25010000002 ZOLEDRONIC ACID 4 MG/100ML SOLUTION: Performed by: INTERNAL MEDICINE

## 2020-05-05 PROCEDURE — 96372 THER/PROPH/DIAG INJ SC/IM: CPT

## 2020-05-05 PROCEDURE — 85025 COMPLETE CBC W/AUTO DIFF WBC: CPT

## 2020-05-05 PROCEDURE — 80053 COMPREHEN METABOLIC PANEL: CPT

## 2020-05-05 PROCEDURE — 36415 COLL VENOUS BLD VENIPUNCTURE: CPT

## 2020-05-05 PROCEDURE — 99214 OFFICE O/P EST MOD 30 MIN: CPT | Performed by: INTERNAL MEDICINE

## 2020-05-05 PROCEDURE — 84100 ASSAY OF PHOSPHORUS: CPT

## 2020-05-05 PROCEDURE — 82232 ASSAY OF BETA-2 PROTEIN: CPT | Performed by: INTERNAL MEDICINE

## 2020-05-05 PROCEDURE — 96365 THER/PROPH/DIAG IV INF INIT: CPT

## 2020-05-05 RX ORDER — CYANOCOBALAMIN 1000 UG/ML
1000 INJECTION, SOLUTION INTRAMUSCULAR; SUBCUTANEOUS ONCE
Status: COMPLETED | OUTPATIENT
Start: 2020-05-05 | End: 2020-05-05

## 2020-05-05 RX ORDER — SODIUM CHLORIDE 9 MG/ML
250 INJECTION, SOLUTION INTRAVENOUS ONCE
Status: CANCELLED | OUTPATIENT
Start: 2020-05-05

## 2020-05-05 RX ORDER — SODIUM CHLORIDE 9 MG/ML
250 INJECTION, SOLUTION INTRAVENOUS ONCE
Status: COMPLETED | OUTPATIENT
Start: 2020-05-05 | End: 2020-05-05

## 2020-05-05 RX ORDER — ZOLEDRONIC ACID 0.04 MG/ML
4 INJECTION, SOLUTION INTRAVENOUS ONCE
Status: CANCELLED | OUTPATIENT
Start: 2020-05-05

## 2020-05-05 RX ORDER — ZOLEDRONIC ACID 0.04 MG/ML
4 INJECTION, SOLUTION INTRAVENOUS ONCE
Status: COMPLETED | OUTPATIENT
Start: 2020-05-05 | End: 2020-05-05

## 2020-05-05 RX ORDER — CYANOCOBALAMIN 1000 UG/ML
1000 INJECTION, SOLUTION INTRAMUSCULAR; SUBCUTANEOUS ONCE
Status: CANCELLED | OUTPATIENT
Start: 2020-05-05

## 2020-05-05 RX ADMIN — CYANOCOBALAMIN 1000 MCG: 1000 INJECTION, SOLUTION INTRAMUSCULAR at 10:25

## 2020-05-05 RX ADMIN — ZOLEDRONIC ACID 4 MG: 0.04 INJECTION, SOLUTION INTRAVENOUS at 10:22

## 2020-05-05 RX ADMIN — SODIUM CHLORIDE 100 ML: 9 INJECTION, SOLUTION INTRAVENOUS at 10:23

## 2020-05-06 LAB
ALBUMIN SERPL-MCNC: 3.8 G/DL (ref 2.9–4.4)
ALBUMIN/GLOB SERPL: 1.4 {RATIO} (ref 0.7–1.7)
ALPHA1 GLOB FLD ELPH-MCNC: 0.3 G/DL (ref 0–0.4)
ALPHA2 GLOB SERPL ELPH-MCNC: 0.7 G/DL (ref 0.4–1)
B-GLOBULIN SERPL ELPH-MCNC: 1 G/DL (ref 0.7–1.3)
GAMMA GLOB SERPL ELPH-MCNC: 0.8 G/DL (ref 0.4–1.8)
GLOBULIN SER CALC-MCNC: 2.8 G/DL (ref 2.2–3.9)
IGA SERPL-MCNC: 58 MG/DL (ref 64–422)
IGG SERPL-MCNC: 910 MG/DL (ref 586–1602)
IGM SERPL-MCNC: 20 MG/DL (ref 26–217)
INTERPRETATION SERPL IEP-IMP: ABNORMAL
KAPPA LC SERPL-MCNC: 15.8 MG/L (ref 3.3–19.4)
KAPPA LC/LAMBDA SER: 1.11 {RATIO} (ref 0.26–1.65)
LAMBDA LC FREE SERPL-MCNC: 14.2 MG/L (ref 5.7–26.3)
Lab: ABNORMAL
M-SPIKE: ABNORMAL G/DL
PROT SERPL-MCNC: 6.6 G/DL (ref 6–8.5)

## 2020-05-13 RX ORDER — DULOXETIN HYDROCHLORIDE 60 MG/1
60 CAPSULE, DELAYED RELEASE ORAL DAILY
Qty: 90 CAPSULE | Refills: 3 | Status: SHIPPED | OUTPATIENT
Start: 2020-05-13 | End: 2020-05-13 | Stop reason: SDUPTHER

## 2020-05-13 RX ORDER — DULOXETIN HYDROCHLORIDE 60 MG/1
60 CAPSULE, DELAYED RELEASE ORAL DAILY
Qty: 90 CAPSULE | Refills: 3 | Status: SHIPPED | OUTPATIENT
Start: 2020-05-13

## 2020-05-26 RX ORDER — LENALIDOMIDE 10 MG/1
CAPSULE ORAL
Qty: 21 CAPSULE | Refills: 0 | Status: SHIPPED | OUTPATIENT
Start: 2020-05-26 | End: 2020-06-23

## 2020-05-26 NOTE — TELEPHONE ENCOUNTER
Fax rec from [facility] requesting new Revlimid rx. Per last office note from dr Garcia-Pt is to restart. Her last delivery was on 5/2/2020. I have escribed a new rx to [facility]

## 2020-06-02 ENCOUNTER — DOCUMENTATION (OUTPATIENT)
Dept: ONCOLOGY | Facility: CLINIC | Age: 70
End: 2020-06-02

## 2020-06-02 ENCOUNTER — LAB (OUTPATIENT)
Dept: LAB | Facility: HOSPITAL | Age: 70
End: 2020-06-02

## 2020-06-02 ENCOUNTER — INFUSION (OUTPATIENT)
Dept: ONCOLOGY | Facility: HOSPITAL | Age: 70
End: 2020-06-02

## 2020-06-02 DIAGNOSIS — G63 NEUROPATHY ASSOCIATED WITH MULTIPLE MYELOMA (HCC): Primary | ICD-10-CM

## 2020-06-02 DIAGNOSIS — C90.00 NEUROPATHY ASSOCIATED WITH MULTIPLE MYELOMA (HCC): Primary | ICD-10-CM

## 2020-06-02 DIAGNOSIS — C90.00 MULTIPLE MYELOMA NOT HAVING ACHIEVED REMISSION (HCC): ICD-10-CM

## 2020-06-02 LAB
BASOPHILS # BLD AUTO: 0.03 10*3/MM3 (ref 0–0.2)
BASOPHILS NFR BLD AUTO: 0.6 % (ref 0–1.5)
DEPRECATED RDW RBC AUTO: 51.3 FL (ref 37–54)
EOSINOPHIL # BLD AUTO: 0.15 10*3/MM3 (ref 0–0.4)
EOSINOPHIL NFR BLD AUTO: 3.1 % (ref 0.3–6.2)
ERYTHROCYTE [DISTWIDTH] IN BLOOD BY AUTOMATED COUNT: 13.8 % (ref 12.3–15.4)
HCT VFR BLD AUTO: 40.5 % (ref 34–46.6)
HGB BLD-MCNC: 13.6 G/DL (ref 12–15.9)
IMM GRANULOCYTES # BLD AUTO: 0.01 10*3/MM3 (ref 0–0.05)
IMM GRANULOCYTES NFR BLD AUTO: 0.2 % (ref 0–0.5)
LYMPHOCYTES # BLD AUTO: 1.88 10*3/MM3 (ref 0.7–3.1)
LYMPHOCYTES NFR BLD AUTO: 39.4 % (ref 19.6–45.3)
MCH RBC QN AUTO: 33.9 PG (ref 26.6–33)
MCHC RBC AUTO-ENTMCNC: 33.6 G/DL (ref 31.5–35.7)
MCV RBC AUTO: 101 FL (ref 79–97)
MONOCYTES # BLD AUTO: 0.69 10*3/MM3 (ref 0.1–0.9)
MONOCYTES NFR BLD AUTO: 14.5 % (ref 5–12)
NEUTROPHILS # BLD AUTO: 2.01 10*3/MM3 (ref 1.7–7)
NEUTROPHILS NFR BLD AUTO: 42.2 % (ref 42.7–76)
NRBC BLD AUTO-RTO: 0 /100 WBC (ref 0–0.2)
PLATELET # BLD AUTO: 196 10*3/MM3 (ref 140–450)
PMV BLD AUTO: 9.2 FL (ref 6–12)
RBC # BLD AUTO: 4.01 10*6/MM3 (ref 3.77–5.28)
WBC NRBC COR # BLD: 4.77 10*3/MM3 (ref 3.4–10.8)

## 2020-06-02 PROCEDURE — 36415 COLL VENOUS BLD VENIPUNCTURE: CPT

## 2020-06-02 PROCEDURE — 96372 THER/PROPH/DIAG INJ SC/IM: CPT

## 2020-06-02 PROCEDURE — 25010000002 CYANOCOBALAMIN PER 1000 MCG: Performed by: INTERNAL MEDICINE

## 2020-06-02 PROCEDURE — 85025 COMPLETE CBC W/AUTO DIFF WBC: CPT

## 2020-06-02 RX ORDER — CYANOCOBALAMIN 1000 UG/ML
1000 INJECTION, SOLUTION INTRAMUSCULAR; SUBCUTANEOUS ONCE
Status: COMPLETED | OUTPATIENT
Start: 2020-06-02 | End: 2020-06-02

## 2020-06-02 RX ADMIN — CYANOCOBALAMIN 1000 MCG: 1000 INJECTION, SOLUTION INTRAMUSCULAR at 13:07

## 2020-06-03 LAB
ALBUMIN SERPL-MCNC: 3.8 G/DL (ref 2.9–4.4)
ALBUMIN/GLOB SERPL: 1.4 {RATIO} (ref 0.7–1.7)
ALPHA1 GLOB FLD ELPH-MCNC: 0.2 G/DL (ref 0–0.4)
ALPHA2 GLOB SERPL ELPH-MCNC: 0.8 G/DL (ref 0.4–1)
B-GLOBULIN SERPL ELPH-MCNC: 1 G/DL (ref 0.7–1.3)
GAMMA GLOB SERPL ELPH-MCNC: 0.8 G/DL (ref 0.4–1.8)
GLOBULIN SER CALC-MCNC: 2.8 G/DL (ref 2.2–3.9)
IGA SERPL-MCNC: 63 MG/DL (ref 64–422)
IGG SERPL-MCNC: 876 MG/DL (ref 586–1602)
IGM SERPL-MCNC: 21 MG/DL (ref 26–217)
INTERPRETATION SERPL IEP-IMP: ABNORMAL
KAPPA LC SERPL-MCNC: 16.3 MG/L (ref 3.3–19.4)
KAPPA LC/LAMBDA SER: 1.16 {RATIO} (ref 0.26–1.65)
LAMBDA LC FREE SERPL-MCNC: 14.1 MG/L (ref 5.7–26.3)
Lab: ABNORMAL
M-SPIKE: ABNORMAL G/DL
PROT SERPL-MCNC: 6.6 G/DL (ref 6–8.5)

## 2020-06-15 DIAGNOSIS — F41.9 ANXIETY: Primary | ICD-10-CM

## 2020-06-15 DIAGNOSIS — M50.30 DDD (DEGENERATIVE DISC DISEASE), CERVICAL: Primary | ICD-10-CM

## 2020-06-15 DIAGNOSIS — K52.1 DIARRHEA DUE TO DRUG: Primary | ICD-10-CM

## 2020-06-15 RX ORDER — HYDROCODONE BITARTRATE AND ACETAMINOPHEN 5; 325 MG/1; MG/1
1 TABLET ORAL EVERY 6 HOURS PRN
Qty: 60 TABLET | Refills: 0 | Status: SHIPPED | OUTPATIENT
Start: 2020-06-15 | End: 2020-10-26 | Stop reason: SDUPTHER

## 2020-06-15 RX ORDER — ALPRAZOLAM 0.25 MG/1
0.25 TABLET ORAL DAILY PRN
Qty: 30 TABLET | Refills: 2 | Status: SHIPPED | OUTPATIENT
Start: 2020-06-15 | End: 2020-09-28 | Stop reason: SDUPTHER

## 2020-06-15 RX ORDER — DIPHENOXYLATE HYDROCHLORIDE AND ATROPINE SULFATE 2.5; .025 MG/1; MG/1
1 TABLET ORAL 3 TIMES DAILY PRN
Qty: 90 TABLET | Refills: 3 | Status: SHIPPED | OUTPATIENT
Start: 2020-06-15 | End: 2021-04-06 | Stop reason: SDUPTHER

## 2020-06-23 ENCOUNTER — MEDICATION THERAPY MANAGEMENT (OUTPATIENT)
Dept: PHARMACY | Facility: HOSPITAL | Age: 70
End: 2020-06-23

## 2020-06-23 RX ORDER — LENALIDOMIDE 10 MG/1
CAPSULE ORAL
Qty: 21 CAPSULE | Refills: 0 | Status: SHIPPED | OUTPATIENT
Start: 2020-06-23 | End: 2020-07-15 | Stop reason: SDUPTHER

## 2020-06-23 NOTE — PROGRESS NOTES
MTM telephone encounter re adherence and side effects ( revlimid)    Ms Plascencia was introduced to the MTM program.  She stated she has tolerated Revlimid very well without any issues of side effects.  She did state that sometimes delivery of her medication was cut very short and came in on the day it was due to start the next cycle and made her feel anxious.  An in basket message will be sent to Sujey Prakash with this information.  She stated she does experience bone pain from her multiple myeloma, but that she is able to manage.  No other concerns expressed today.

## 2020-06-23 NOTE — TELEPHONE ENCOUNTER
Revlimid refill request rec electronically from [facility]. Per last office note from Dr Garcia-pt is to continue 10 mg 21 out of 28 days. Her last delivery was on 6/2/2020. Request approved.

## 2020-06-25 RX ORDER — LENALIDOMIDE 10 MG/1
CAPSULE ORAL
Refills: 0 | OUTPATIENT
Start: 2020-06-25

## 2020-06-25 NOTE — TELEPHONE ENCOUNTER
Revlimid refill request rec from [facility]. New rx already sent and confirmed with [facility] this was rec.Request denied.

## 2020-06-30 ENCOUNTER — LAB (OUTPATIENT)
Dept: LAB | Facility: HOSPITAL | Age: 70
End: 2020-06-30

## 2020-06-30 ENCOUNTER — INFUSION (OUTPATIENT)
Dept: ONCOLOGY | Facility: HOSPITAL | Age: 70
End: 2020-06-30

## 2020-06-30 DIAGNOSIS — G63 NEUROPATHY ASSOCIATED WITH MULTIPLE MYELOMA (HCC): Primary | ICD-10-CM

## 2020-06-30 DIAGNOSIS — C90.00 NEUROPATHY ASSOCIATED WITH MULTIPLE MYELOMA (HCC): Primary | ICD-10-CM

## 2020-06-30 DIAGNOSIS — C90.00 MULTIPLE MYELOMA NOT HAVING ACHIEVED REMISSION (HCC): ICD-10-CM

## 2020-06-30 LAB
BASOPHILS # BLD AUTO: 0.03 10*3/MM3 (ref 0–0.2)
BASOPHILS NFR BLD AUTO: 0.6 % (ref 0–1.5)
DEPRECATED RDW RBC AUTO: 53.2 FL (ref 37–54)
EOSINOPHIL # BLD AUTO: 0.16 10*3/MM3 (ref 0–0.4)
EOSINOPHIL NFR BLD AUTO: 3.3 % (ref 0.3–6.2)
ERYTHROCYTE [DISTWIDTH] IN BLOOD BY AUTOMATED COUNT: 14.4 % (ref 12.3–15.4)
HCT VFR BLD AUTO: 38.4 % (ref 34–46.6)
HGB BLD-MCNC: 12.8 G/DL (ref 12–15.9)
IMM GRANULOCYTES # BLD AUTO: 0.01 10*3/MM3 (ref 0–0.05)
IMM GRANULOCYTES NFR BLD AUTO: 0.2 % (ref 0–0.5)
LYMPHOCYTES # BLD AUTO: 1.6 10*3/MM3 (ref 0.7–3.1)
LYMPHOCYTES NFR BLD AUTO: 32.8 % (ref 19.6–45.3)
MCH RBC QN AUTO: 33.4 PG (ref 26.6–33)
MCHC RBC AUTO-ENTMCNC: 33.3 G/DL (ref 31.5–35.7)
MCV RBC AUTO: 100.3 FL (ref 79–97)
MONOCYTES # BLD AUTO: 0.7 10*3/MM3 (ref 0.1–0.9)
MONOCYTES NFR BLD AUTO: 14.3 % (ref 5–12)
NEUTROPHILS NFR BLD AUTO: 2.38 10*3/MM3 (ref 1.7–7)
NEUTROPHILS NFR BLD AUTO: 48.8 % (ref 42.7–76)
NRBC BLD AUTO-RTO: 0 /100 WBC (ref 0–0.2)
PLATELET # BLD AUTO: 185 10*3/MM3 (ref 140–450)
PMV BLD AUTO: 9 FL (ref 6–12)
RBC # BLD AUTO: 3.83 10*6/MM3 (ref 3.77–5.28)
WBC # BLD AUTO: 4.88 10*3/MM3 (ref 3.4–10.8)

## 2020-06-30 PROCEDURE — 25010000002 CYANOCOBALAMIN PER 1000 MCG: Performed by: INTERNAL MEDICINE

## 2020-06-30 PROCEDURE — 85025 COMPLETE CBC W/AUTO DIFF WBC: CPT

## 2020-06-30 PROCEDURE — 96372 THER/PROPH/DIAG INJ SC/IM: CPT

## 2020-06-30 PROCEDURE — 36415 COLL VENOUS BLD VENIPUNCTURE: CPT

## 2020-06-30 RX ORDER — CYANOCOBALAMIN 1000 UG/ML
1000 INJECTION, SOLUTION INTRAMUSCULAR; SUBCUTANEOUS ONCE
Status: COMPLETED | OUTPATIENT
Start: 2020-06-30 | End: 2020-06-30

## 2020-06-30 RX ADMIN — CYANOCOBALAMIN 1000 MCG: 1000 INJECTION, SOLUTION INTRAMUSCULAR at 13:04

## 2020-07-01 ENCOUNTER — MEDICATION THERAPY MANAGEMENT (OUTPATIENT)
Dept: PHARMACY | Facility: HOSPITAL | Age: 70
End: 2020-07-01

## 2020-07-01 LAB
ALBUMIN SERPL-MCNC: 3.6 G/DL (ref 2.9–4.4)
ALBUMIN/GLOB SERPL: 1.3 {RATIO} (ref 0.7–1.7)
ALPHA1 GLOB FLD ELPH-MCNC: 0.2 G/DL (ref 0–0.4)
ALPHA2 GLOB SERPL ELPH-MCNC: 0.8 G/DL (ref 0.4–1)
B-GLOBULIN SERPL ELPH-MCNC: 1 G/DL (ref 0.7–1.3)
GAMMA GLOB SERPL ELPH-MCNC: 0.8 G/DL (ref 0.4–1.8)
GLOBULIN SER CALC-MCNC: 2.9 G/DL (ref 2.2–3.9)
IGA SERPL-MCNC: 58 MG/DL (ref 64–422)
IGG SERPL-MCNC: 888 MG/DL (ref 586–1602)
IGM SERPL-MCNC: 19 MG/DL (ref 26–217)
INTERPRETATION SERPL IEP-IMP: ABNORMAL
KAPPA LC SERPL-MCNC: 18.4 MG/L (ref 3.3–19.4)
KAPPA LC/LAMBDA SER: 1.25 {RATIO} (ref 0.26–1.65)
LAMBDA LC FREE SERPL-MCNC: 14.7 MG/L (ref 5.7–26.3)
Lab: ABNORMAL
M-SPIKE: ABNORMAL G/DL
PROT SERPL-MCNC: 6.5 G/DL (ref 6–8.5)

## 2020-07-01 NOTE — PROGRESS NOTES
[facility] Lab Review- Revlimid      6/30/2020   WBC 3.40 - 10.80 10*3/mm3 4.88   Neutrophils Absolute 1.70 - 7.00 10*3/mm3 2.38   Hemoglobin 12.0 - 15.9 g/dL 12.8   Hematocrit 34.0 - 46.6 % 38.4   Platelets 140 - 450 10*3/mm3 185

## 2020-07-15 RX ORDER — LENALIDOMIDE 10 MG/1
CAPSULE ORAL
Qty: 21 CAPSULE | Refills: 0 | Status: SHIPPED | OUTPATIENT
Start: 2020-07-15 | End: 2020-08-04 | Stop reason: SDUPTHER

## 2020-07-15 NOTE — TELEPHONE ENCOUNTER
Pt on my calendar to refill Revlimid. Per last office note from DR Garcia-Pt is to continue. She has been taking 10 mg 21 out of 28 days. Her last delivery was on 6/30/2020. I have escribed a new rx to [facility]

## 2020-07-27 NOTE — PROGRESS NOTES
Subjective     REASON FOR CONSULTATION: Transfer of care for IgA kappa multiple myeloma post stem cell transplant in March 2016 at [facility] currently on maintenance Revlimid 10 mg 3 out of 4 weeks                               REQUESTING PHYSICIAN: MD Brett Turpin MD    History of Present Illness patient is a 70-year-old female with an IgA kappa high risk myeloma post stem cell transplant on maintenance Revlimid 10 mg 3 or 4 weeks alone after toxicity from Velcade and dexamethasone    Comes in for follow-up on her 3-month routine.  Her blood counts have been doing good on her current dose of Revlimid and she is at the completion of the third week with an adequate ANC    She is back today feeling well she has her usual aches and pains.  She has a little jaw pain related to TMJ as she has been unable to wear her mouth guard because it is being redone.  She has tried gabapentin takes it as needed which is not a good way to take gabapentin with that is what she wants to do    She has had no infections or issues and is self isolating at home and practicing social distancing    She is scheduled to have a video visit with [facility] because of the coronavirus pandemic    Labs have been drawn and are normal with no paraprotein detected on her immunofixation over the last 3 months    She is also due for her 3-month Zometa today and her monthly B12 and we will schedule this for her      Past Medical History:   Diagnosis Date   • Anxiety    • Depression    • Disease of thyroid gland    • Hashimoto's disease    • History of vitamin D deficiency    • Hypothyroidism    • IBS (irritable bowel syndrome)    • Mixed hyperlipidemia 3/12/2018   • Multiple myeloma (CMS/HCC)    • Myeloma (CMS/HCC)    • Osteopenia    • Overweight (BMI 25.0-29.9) 2/5/2018        Past Surgical History:   Procedure Laterality Date   • BREAST BIOPSY     • CATARACT EXTRACTION     • COLONOSCOPY      • TONSILLECTOMY  1956   • VEIN SURGERY  1991      patient is a 70-year-old female with IgA kappa multiple myeloma diagnosed in mid 2015-high risk because of gain of 1 q- treatment with rev Dex Velcade initiated in 11/15-went on to stem cell transplant at [facility] in March 2016?  Melphalan.  She started post transplant therapy in May 2016 with Velcade rev Dex.  In September after 4 cycles Velcade was decreased to every other week with plans to continue rev Dex Velcade till progression because of high risk status.  In May 2019 her Velcade was discontinued because of worsening neuropathy.  Patient has remained on Revlimid 10 mg 21 out of 28 days since then with stable disease until November 2019 when a small IgG kappa paraprotein was noted on her blood tests which is distinct from her usual IgA kappa myeloma.  Restaging with PET scan was normal and since then the paraprotein as of 3/10/2020 has resolved    Patient has significant issues with anxiety and states that she had trouble getting her Revlimid in a timely fashion was very frustrated with this process at the [facility]-she states she was taken back when Dr. Hewitt suggested she transfer her care but is now happy to make a change    She is currently 2 days into her treatment cycle with Revlimid .she has no pain currently except intermittently in her neck where she has had some degenerative disease.  She does have neuropathy for which she is on fairly high doses of Cymbalta.  She is not taking her gabapentin.    Continues on acyclovir for prevention and baby aspirin because of the Revlimid  She was having trouble with diarrhea from the Revlimid but this is controlled with WelChol.    She is  and lives by herself has no family nearby but good friends.  She does not smoke or drink.  She is up-to-date with mammography and does Cologuard instead of colonoscopy     have reviewed her labs in detail but cannot find any documentation of her transplant  conditioning regimen (I assume it was melphalan)  or the depth of her response prior to transplant .  She states that the doctors at [facility] recommended to check her labs every month - she has been clinically BOZENA for 4 years and I suggested every other month testing but she is very adamant that she wants monthly testing      Current Outpatient Medications on File Prior to Visit   Medication Sig Dispense Refill   • Acetylcarn-Alpha Lipoic Acid 400-200 MG capsule Take  by mouth.     • acyclovir (ZOVIRAX) 400 MG tablet Take 400 mg by mouth 2 (Two) Times a Day.     • ALPRAZolam (XANAX) 0.25 MG tablet Take 1 tablet by mouth Daily As Needed for Anxiety. 30 tablet 2   • aspirin 81 MG chewable tablet Chew 81 mg Daily.     • B Complex Vitamins (VITAMIN B COMPLEX) capsule capsule Take 2 tablets by mouth Daily.     • baclofen (LIORESAL) 10 MG tablet Take 10 mg by mouth 3 (Three) Times a Day.  5   • calcium carbonate-vitamin d 600-400 MG-UNIT per tablet Take 1 tablet by mouth Daily.     • colesevelam (WELCHOL) 625 MG tablet Take  by mouth As Needed.     • cycloSPORINE (RESTASIS MULTIDOSE) 0.05 % ophthalmic emulsion 1 drop 2 (Two) Times a Day.     • diphenoxylate-atropine (LOMOTIL) 2.5-0.025 MG per tablet Take 1 tablet by mouth 3 (Three) Times a Day As Needed for Diarrhea. 90 tablet 3   • DULoxetine (CYMBALTA) 30 MG capsule Take 30 mg by mouth Daily.     • DULoxetine (CYMBALTA) 60 MG capsule Take 1 capsule by mouth Daily. 90 capsule 3   • fluticasone (FLONASE) 50 MCG/ACT nasal spray 2 sprays into each nostril Daily.     • folic acid (FOLVITE) 1 MG tablet Take 1,000 mcg by mouth Daily.  2   • gabapentin (NEURONTIN) 100 MG capsule Take 100 mg by mouth Daily As Needed.     • HYDROcodone-acetaminophen (NORCO) 5-325 MG per tablet Take 1 tablet by mouth Every 6 (Six) Hours As Needed for Moderate Pain  or Severe Pain . 60 tablet 0   • lenalidomide (Revlimid) 10 MG capsule TAKE 1 CAPSULE DAILY FOR 21 DAYS ON THEN 7 DAYS OFF 21  capsule 0   • Multiple Vitamins-Iron (DAILY-JONI/IRON/BETA-CAROTENE) tablet Take 1 tablet by mouth Daily.  2   • omega-3 acid ethyl esters (LOVAZA) 1 g capsule Take 2 g by mouth.     • ondansetron (ZOFRAN) 8 MG tablet Take 8 mg by mouth.     • promethazine (PHENERGAN) 25 MG tablet TAKE 0.5-1 TABLETS BY MOUTH EVERY 6 (SIX) HOURS AS NEEDED FOR NAUSEA.     • SYNTHROID 112 MCG tablet      • vitamin D (ERGOCALCIFEROL) 07280 units capsule capsule Take 50,000 Units by mouth 1 (One) Time Per Week.       No current facility-administered medications on file prior to visit.         ALLERGIES:    No Known Allergies     Social History     Socioeconomic History   • Marital status:      Spouse name: Not on file   • Number of children: 2   • Years of education: Not on file   • Highest education level: Not on file   Occupational History     Employer: RETIRED   Tobacco Use   • Smoking status: Former Smoker     Types: Cigarettes   • Smokeless tobacco: Never Used   Substance and Sexual Activity   • Alcohol use: Yes     Comment: 2 per month    • Drug use: Never   • Sexual activity: Defer        Family History   Problem Relation Age of Onset   • Breast cancer Mother    • Lymphoma Father    • Kidney cancer Paternal Grandmother         Review of Systems   Constitutional: Positive for fatigue (little better 5/5/2020).   HENT: Positive for mouth sores (dry mouth better 5/5/2020) and tinnitus (better 5/5/2020). Negative for nosebleeds (stable 5/5/2020).    Eyes: Positive for itching (same 5/5/2020).   Respiratory: Negative for cough, choking, chest tightness and shortness of breath.    Cardiovascular: Negative for chest pain.   Gastrointestinal: Positive for diarrhea (under control 5/5/2020). Negative for abdominal pain, constipation, nausea and vomiting.   Musculoskeletal: Positive for arthralgias (5/5/2020 jaw pain 5/5/2020) and neck pain (5/5/2020).   Neurological: Positive for headaches (same 5-5-2020 rupture disc).  "  Psychiatric/Behavioral: Negative for dysphoric mood. The patient is nervous/anxious (same 5/5/2020).         Objective     Vitals:    07/28/20 0934   BP: 134/80   Pulse: 91   Resp: 18   Temp: 97.7 °F (36.5 °C)   SpO2: 97%   Weight: 78 kg (171 lb 14.4 oz)   Height: 169.8 cm (66.85\")   PainSc:   6   PainLoc: Generalized     Current Status 7/28/2020   ECOG score 0       Physical Exam     CONSTITUTIONAL:  Vital signs reviewed.  No distress, looks comfortable.  EYES:  Conjunctiva and lids unremarkable.  PERRLA  EARS,NOSE,MOUTH,THROAT:  Ears and nose appear unremarkable.  Lips, teeth, gums appear unremarkable.  RESPIRATORY:  Normal respiratory effort.  Lungs clear to auscultation bilaterally.  CARDIOVASCULAR:  Normal S1, S2.  No murmurs rubs or gallops.  No significant lower extremity edema.  GASTROINTESTINAL: Abdomen appears unremarkable.  Nontender.  No hepatomegaly.  No splenomegaly.  LYMPHATIC:  No cervical, supraclavicular, axillary lymphadenopathy.  SKIN:  Warm.  No rashes.  PSYCHIATRIC:  Normal judgment and insight.  Normal mood and affect.      RECENT LABS:  Hematology WBC   Date Value Ref Range Status   07/28/2020 5.73 3.40 - 10.80 10*3/mm3 Final   03/10/2020 4.27 (L) 4.5 - 11.0 10*3/uL Final     RBC   Date Value Ref Range Status   07/28/2020 3.85 3.77 - 5.28 10*6/mm3 Final   03/10/2020 3.80 (L) 4.0 - 5.2 10*6/uL Final     Hemoglobin   Date Value Ref Range Status   07/28/2020 12.9 12.0 - 15.9 g/dL Final   03/10/2020 12.9 12.0 - 16.0 g/dL Final     Hematocrit   Date Value Ref Range Status   07/28/2020 39.9 34.0 - 46.6 % Final   03/10/2020 38.5 36.0 - 46.0 % Final     Platelets   Date Value Ref Range Status   07/28/2020 186 140 - 450 10*3/mm3 Final   03/10/2020 181 140 - 440 10*3/uL Final                  Assessment/Plan   1.  IgA kappa multiple myeloma diagnosed in 2015 treated with RVD  · Stem cell transplant at [facility] in 3/2016  · Maintenance treatment with Velcade rev Dex started in 5/16  · Velcade " discontinued because of neurotoxicity in 5/19  · Small IgG kappa paraprotein seen on blood work in 10/19-PET scan negative protein disappeared by 3/20  · Zometa given every 3 months   · Acyclovir and aspirin prophylaxis    2.  Anxiety  3.  Hypothyroidism on treatment  4.  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  Diarrhea due to treatment improved with WelChol    Plan  1.  Continue 10 mg of Revlimid 3 out of 4 weeks  2.continue monthly B12 and myeloma labs  3.  Zometa every 3 months  4.  See me in 3 months for follow-up .    Standard precautions discussed regarding the Novel Coronavirus (COVID-19)  including patient to limit contact with others outside of home, frequent handwashing and using alcohol gel when unable to use soap and water, as well to monitoring for symptoms and notifying our office via telephone for any symptoms or exposures.

## 2020-07-28 ENCOUNTER — INFUSION (OUTPATIENT)
Dept: ONCOLOGY | Facility: HOSPITAL | Age: 70
End: 2020-07-28

## 2020-07-28 ENCOUNTER — OFFICE VISIT (OUTPATIENT)
Dept: ONCOLOGY | Facility: CLINIC | Age: 70
End: 2020-07-28

## 2020-07-28 ENCOUNTER — APPOINTMENT (OUTPATIENT)
Dept: LAB | Facility: HOSPITAL | Age: 70
End: 2020-07-28

## 2020-07-28 VITALS
SYSTOLIC BLOOD PRESSURE: 134 MMHG | DIASTOLIC BLOOD PRESSURE: 80 MMHG | TEMPERATURE: 97.7 F | HEIGHT: 67 IN | OXYGEN SATURATION: 97 % | RESPIRATION RATE: 18 BRPM | HEART RATE: 91 BPM | WEIGHT: 171.9 LBS | BODY MASS INDEX: 26.98 KG/M2

## 2020-07-28 DIAGNOSIS — C90.00 NEUROPATHY ASSOCIATED WITH MULTIPLE MYELOMA (HCC): ICD-10-CM

## 2020-07-28 DIAGNOSIS — C90.00 MULTIPLE MYELOMA NOT HAVING ACHIEVED REMISSION (HCC): Primary | ICD-10-CM

## 2020-07-28 DIAGNOSIS — G63 NEUROPATHY ASSOCIATED WITH MULTIPLE MYELOMA (HCC): ICD-10-CM

## 2020-07-28 DIAGNOSIS — C90.00 MULTIPLE MYELOMA NOT HAVING ACHIEVED REMISSION (HCC): ICD-10-CM

## 2020-07-28 LAB
ALBUMIN SERPL-MCNC: 4.2 G/DL (ref 3.5–5.2)
ALBUMIN/GLOB SERPL: 1.6 G/DL (ref 1.1–2.4)
ALP SERPL-CCNC: 56 U/L (ref 38–116)
ALT SERPL W P-5'-P-CCNC: 11 U/L (ref 0–33)
ANION GAP SERPL CALCULATED.3IONS-SCNC: 11.2 MMOL/L (ref 5–15)
AST SERPL-CCNC: 18 U/L (ref 0–32)
BASOPHILS # BLD AUTO: 0.03 10*3/MM3 (ref 0–0.2)
BASOPHILS NFR BLD AUTO: 0.5 % (ref 0–1.5)
BILIRUB SERPL-MCNC: 0.2 MG/DL (ref 0.2–1.2)
BUN SERPL-MCNC: 8 MG/DL (ref 6–20)
BUN/CREAT SERPL: 10.1 (ref 7.3–30)
CALCIUM SPEC-SCNC: 9.1 MG/DL (ref 8.5–10.2)
CHLORIDE SERPL-SCNC: 100 MMOL/L (ref 98–107)
CO2 SERPL-SCNC: 25.8 MMOL/L (ref 22–29)
CREAT SERPL-MCNC: 0.79 MG/DL (ref 0.6–1.1)
DEPRECATED RDW RBC AUTO: 54.7 FL (ref 37–54)
EOSINOPHIL # BLD AUTO: 0.19 10*3/MM3 (ref 0–0.4)
EOSINOPHIL NFR BLD AUTO: 3.3 % (ref 0.3–6.2)
ERYTHROCYTE [DISTWIDTH] IN BLOOD BY AUTOMATED COUNT: 14.6 % (ref 12.3–15.4)
GFR SERPL CREATININE-BSD FRML MDRD: 72 ML/MIN/1.73
GLOBULIN UR ELPH-MCNC: 2.7 GM/DL (ref 1.8–3.5)
GLUCOSE SERPL-MCNC: 90 MG/DL (ref 74–124)
HCT VFR BLD AUTO: 39.9 % (ref 34–46.6)
HGB BLD-MCNC: 12.9 G/DL (ref 12–15.9)
IMM GRANULOCYTES # BLD AUTO: 0.01 10*3/MM3 (ref 0–0.05)
IMM GRANULOCYTES NFR BLD AUTO: 0.2 % (ref 0–0.5)
LYMPHOCYTES # BLD AUTO: 1.53 10*3/MM3 (ref 0.7–3.1)
LYMPHOCYTES NFR BLD AUTO: 26.7 % (ref 19.6–45.3)
MAGNESIUM SERPL-MCNC: 1.8 MG/DL (ref 1.8–2.5)
MCH RBC QN AUTO: 33.5 PG (ref 26.6–33)
MCHC RBC AUTO-ENTMCNC: 32.3 G/DL (ref 31.5–35.7)
MCV RBC AUTO: 103.6 FL (ref 79–97)
MONOCYTES # BLD AUTO: 0.75 10*3/MM3 (ref 0.1–0.9)
MONOCYTES NFR BLD AUTO: 13.1 % (ref 5–12)
NEUTROPHILS NFR BLD AUTO: 3.22 10*3/MM3 (ref 1.7–7)
NEUTROPHILS NFR BLD AUTO: 56.2 % (ref 42.7–76)
NRBC BLD AUTO-RTO: 0 /100 WBC (ref 0–0.2)
PHOSPHATE SERPL-MCNC: 3.6 MG/DL (ref 2.5–4.5)
PLATELET # BLD AUTO: 186 10*3/MM3 (ref 140–450)
PMV BLD AUTO: 9.8 FL (ref 6–12)
POTASSIUM SERPL-SCNC: 4.3 MMOL/L (ref 3.5–4.7)
PROT SERPL-MCNC: 6.9 G/DL (ref 6.3–8)
RBC # BLD AUTO: 3.85 10*6/MM3 (ref 3.77–5.28)
SODIUM SERPL-SCNC: 137 MMOL/L (ref 134–145)
WBC # BLD AUTO: 5.73 10*3/MM3 (ref 3.4–10.8)

## 2020-07-28 PROCEDURE — 99214 OFFICE O/P EST MOD 30 MIN: CPT | Performed by: INTERNAL MEDICINE

## 2020-07-28 PROCEDURE — 80053 COMPREHEN METABOLIC PANEL: CPT

## 2020-07-28 PROCEDURE — 96372 THER/PROPH/DIAG INJ SC/IM: CPT

## 2020-07-28 PROCEDURE — 25010000002 CYANOCOBALAMIN PER 1000 MCG: Performed by: INTERNAL MEDICINE

## 2020-07-28 PROCEDURE — 96374 THER/PROPH/DIAG INJ IV PUSH: CPT

## 2020-07-28 PROCEDURE — 83735 ASSAY OF MAGNESIUM: CPT

## 2020-07-28 PROCEDURE — 25010000002 ZOLEDRONIC ACID 4 MG/100ML SOLUTION: Performed by: INTERNAL MEDICINE

## 2020-07-28 PROCEDURE — 84100 ASSAY OF PHOSPHORUS: CPT

## 2020-07-28 PROCEDURE — 85025 COMPLETE CBC W/AUTO DIFF WBC: CPT

## 2020-07-28 RX ORDER — ONDANSETRON HYDROCHLORIDE 8 MG/1
8 TABLET, FILM COATED ORAL 3 TIMES DAILY PRN
Qty: 30 TABLET | Refills: 5 | Status: CANCELLED | OUTPATIENT
Start: 2020-07-28

## 2020-07-28 RX ORDER — CYANOCOBALAMIN 1000 UG/ML
1000 INJECTION, SOLUTION INTRAMUSCULAR; SUBCUTANEOUS ONCE
Status: CANCELLED | OUTPATIENT
Start: 2020-07-28

## 2020-07-28 RX ORDER — CYANOCOBALAMIN 1000 UG/ML
1000 INJECTION, SOLUTION INTRAMUSCULAR; SUBCUTANEOUS ONCE
Status: COMPLETED | OUTPATIENT
Start: 2020-07-28 | End: 2020-07-28

## 2020-07-28 RX ORDER — SODIUM CHLORIDE 9 MG/ML
250 INJECTION, SOLUTION INTRAVENOUS ONCE
Status: COMPLETED | OUTPATIENT
Start: 2020-07-28 | End: 2020-07-28

## 2020-07-28 RX ORDER — ZOLEDRONIC ACID 0.04 MG/ML
4 INJECTION, SOLUTION INTRAVENOUS ONCE
Status: CANCELLED | OUTPATIENT
Start: 2020-07-28

## 2020-07-28 RX ORDER — ZOLEDRONIC ACID 0.04 MG/ML
4 INJECTION, SOLUTION INTRAVENOUS ONCE
Status: COMPLETED | OUTPATIENT
Start: 2020-07-28 | End: 2020-07-28

## 2020-07-28 RX ORDER — SODIUM CHLORIDE 9 MG/ML
250 INJECTION, SOLUTION INTRAVENOUS ONCE
Status: CANCELLED | OUTPATIENT
Start: 2020-07-28

## 2020-07-28 RX ADMIN — CYANOCOBALAMIN 1000 MCG: 1000 INJECTION, SOLUTION INTRAMUSCULAR at 10:54

## 2020-07-28 RX ADMIN — SODIUM CHLORIDE 250 ML: 9 INJECTION, SOLUTION INTRAVENOUS at 10:53

## 2020-07-28 RX ADMIN — ZOLEDRONIC ACID 4 MG: 0.04 INJECTION, SOLUTION INTRAVENOUS at 10:54

## 2020-08-04 RX ORDER — LENALIDOMIDE 10 MG/1
CAPSULE ORAL
Qty: 21 CAPSULE | Refills: 0 | Status: SHIPPED | OUTPATIENT
Start: 2020-08-04 | End: 2020-08-26 | Stop reason: SDUPTHER

## 2020-08-04 NOTE — TELEPHONE ENCOUNTER
Pt on my calendar to refill Revlimid. Per last office note from DR Garcia-Pt is to continue. She has been taking 10 mg 21 out of 28 days. Her last delivery was on 7/21/2020. I have escribed a new rx to [facility]

## 2020-08-25 ENCOUNTER — INFUSION (OUTPATIENT)
Dept: ONCOLOGY | Facility: HOSPITAL | Age: 70
End: 2020-08-25

## 2020-08-25 ENCOUNTER — LAB (OUTPATIENT)
Dept: LAB | Facility: HOSPITAL | Age: 70
End: 2020-08-25

## 2020-08-25 DIAGNOSIS — G63 NEUROPATHY ASSOCIATED WITH MULTIPLE MYELOMA (HCC): Primary | ICD-10-CM

## 2020-08-25 DIAGNOSIS — C90.00 NEUROPATHY ASSOCIATED WITH MULTIPLE MYELOMA (HCC): Primary | ICD-10-CM

## 2020-08-25 DIAGNOSIS — C90.00 MULTIPLE MYELOMA NOT HAVING ACHIEVED REMISSION (HCC): ICD-10-CM

## 2020-08-25 LAB
ALBUMIN SERPL-MCNC: 4.2 G/DL (ref 3.5–5.2)
ALBUMIN/GLOB SERPL: 1.8 G/DL (ref 1.1–2.4)
ALP SERPL-CCNC: 58 U/L (ref 38–116)
ALT SERPL W P-5'-P-CCNC: 13 U/L (ref 0–33)
ANION GAP SERPL CALCULATED.3IONS-SCNC: 13.1 MMOL/L (ref 5–15)
AST SERPL-CCNC: 16 U/L (ref 0–32)
BASOPHILS # BLD AUTO: 0.03 10*3/MM3 (ref 0–0.2)
BASOPHILS NFR BLD AUTO: 0.9 % (ref 0–1.5)
BILIRUB SERPL-MCNC: 0.4 MG/DL (ref 0.2–1.2)
BUN SERPL-MCNC: 12 MG/DL (ref 6–20)
BUN/CREAT SERPL: 14 (ref 7.3–30)
CALCIUM SPEC-SCNC: 8.9 MG/DL (ref 8.5–10.2)
CHLORIDE SERPL-SCNC: 99 MMOL/L (ref 98–107)
CO2 SERPL-SCNC: 26.9 MMOL/L (ref 22–29)
CREAT SERPL-MCNC: 0.86 MG/DL (ref 0.6–1.1)
DEPRECATED RDW RBC AUTO: 52.2 FL (ref 37–54)
EOSINOPHIL # BLD AUTO: 0.17 10*3/MM3 (ref 0–0.4)
EOSINOPHIL NFR BLD AUTO: 5.1 % (ref 0.3–6.2)
ERYTHROCYTE [DISTWIDTH] IN BLOOD BY AUTOMATED COUNT: 14 % (ref 12.3–15.4)
GFR SERPL CREATININE-BSD FRML MDRD: 65 ML/MIN/1.73
GLOBULIN UR ELPH-MCNC: 2.3 GM/DL (ref 1.8–3.5)
GLUCOSE SERPL-MCNC: 106 MG/DL (ref 74–124)
HCT VFR BLD AUTO: 39.9 % (ref 34–46.6)
HGB BLD-MCNC: 13.4 G/DL (ref 12–15.9)
IMM GRANULOCYTES # BLD AUTO: 0.01 10*3/MM3 (ref 0–0.05)
IMM GRANULOCYTES NFR BLD AUTO: 0.3 % (ref 0–0.5)
LYMPHOCYTES # BLD AUTO: 1.45 10*3/MM3 (ref 0.7–3.1)
LYMPHOCYTES NFR BLD AUTO: 43.5 % (ref 19.6–45.3)
MCH RBC QN AUTO: 33.8 PG (ref 26.6–33)
MCHC RBC AUTO-ENTMCNC: 33.6 G/DL (ref 31.5–35.7)
MCV RBC AUTO: 100.8 FL (ref 79–97)
MONOCYTES # BLD AUTO: 0.42 10*3/MM3 (ref 0.1–0.9)
MONOCYTES NFR BLD AUTO: 12.6 % (ref 5–12)
NEUTROPHILS NFR BLD AUTO: 1.25 10*3/MM3 (ref 1.7–7)
NEUTROPHILS NFR BLD AUTO: 37.6 % (ref 42.7–76)
NRBC BLD AUTO-RTO: 0 /100 WBC (ref 0–0.2)
PLATELET # BLD AUTO: 181 10*3/MM3 (ref 140–450)
PMV BLD AUTO: 9.5 FL (ref 6–12)
POTASSIUM SERPL-SCNC: 4.5 MMOL/L (ref 3.5–4.7)
PROT SERPL-MCNC: 6.5 G/DL (ref 6.3–8)
RBC # BLD AUTO: 3.96 10*6/MM3 (ref 3.77–5.28)
SODIUM SERPL-SCNC: 139 MMOL/L (ref 134–145)
WBC # BLD AUTO: 3.33 10*3/MM3 (ref 3.4–10.8)

## 2020-08-25 PROCEDURE — 85025 COMPLETE CBC W/AUTO DIFF WBC: CPT

## 2020-08-25 PROCEDURE — 25010000002 CYANOCOBALAMIN PER 1000 MCG: Performed by: INTERNAL MEDICINE

## 2020-08-25 PROCEDURE — 80053 COMPREHEN METABOLIC PANEL: CPT

## 2020-08-25 PROCEDURE — 36415 COLL VENOUS BLD VENIPUNCTURE: CPT

## 2020-08-25 PROCEDURE — 96372 THER/PROPH/DIAG INJ SC/IM: CPT

## 2020-08-25 RX ORDER — CYANOCOBALAMIN 1000 UG/ML
1000 INJECTION, SOLUTION INTRAMUSCULAR; SUBCUTANEOUS ONCE
Status: COMPLETED | OUTPATIENT
Start: 2020-08-25 | End: 2020-08-25

## 2020-08-25 RX ADMIN — CYANOCOBALAMIN 1000 MCG: 1000 INJECTION, SOLUTION INTRAMUSCULAR at 10:43

## 2020-08-26 LAB
ALBUMIN SERPL-MCNC: 3.6 G/DL (ref 2.9–4.4)
ALBUMIN/GLOB SERPL: 1.3 {RATIO} (ref 0.7–1.7)
ALPHA1 GLOB FLD ELPH-MCNC: 0.2 G/DL (ref 0–0.4)
ALPHA2 GLOB SERPL ELPH-MCNC: 0.8 G/DL (ref 0.4–1)
B-GLOBULIN SERPL ELPH-MCNC: 1.1 G/DL (ref 0.7–1.3)
GAMMA GLOB SERPL ELPH-MCNC: 0.9 G/DL (ref 0.4–1.8)
GLOBULIN SER CALC-MCNC: 3 G/DL (ref 2.2–3.9)
IGA SERPL-MCNC: 65 MG/DL (ref 64–422)
IGG SERPL-MCNC: 838 MG/DL (ref 586–1602)
IGM SERPL-MCNC: 18 MG/DL (ref 26–217)
INTERPRETATION SERPL IEP-IMP: ABNORMAL
KAPPA LC SERPL-MCNC: 24.2 MG/L (ref 3.3–19.4)
KAPPA LC/LAMBDA SER: 1.23 {RATIO} (ref 0.26–1.65)
LAMBDA LC FREE SERPL-MCNC: 19.7 MG/L (ref 5.7–26.3)
Lab: ABNORMAL
M-SPIKE: ABNORMAL G/DL
PROT SERPL-MCNC: 6.6 G/DL (ref 6–8.5)

## 2020-08-26 RX ORDER — LENALIDOMIDE 10 MG/1
CAPSULE ORAL
Qty: 21 CAPSULE | Refills: 0 | Status: SHIPPED | OUTPATIENT
Start: 2020-08-26 | End: 2020-09-15

## 2020-08-26 NOTE — TELEPHONE ENCOUNTER
Pt on my calendar to refill Revlimid. Per last office note from DR Garcia-Pt is to continue. She has been taking 10 mg 21 out of 28 days. Her last delivery was on 8/8/2020. I have escribed a new rx to [facility]

## 2020-09-01 RX ORDER — LENALIDOMIDE 10 MG/1
CAPSULE ORAL
Refills: 0 | OUTPATIENT
Start: 2020-09-01

## 2020-09-01 NOTE — TELEPHONE ENCOUNTER
Revlimid refill request rec electronically from [facility]. I sent them an rx last week. I have confirmed with Cristóbal at [facility] that this was rec and is currently under pharmacy review.    Request denied-responded to by other means.

## 2020-09-15 RX ORDER — LENALIDOMIDE 10 MG/1
CAPSULE ORAL
Qty: 21 CAPSULE | Refills: 0 | Status: SHIPPED | OUTPATIENT
Start: 2020-09-15 | End: 2020-09-30 | Stop reason: SDUPTHER

## 2020-09-15 NOTE — PROGRESS NOTES
"Email rec from Cristóbal at [facility]. Apparently-pt did not rec her her delivery of Revlimid due to carrier error and the package was sent back to [facility].    They are requesting a new rx. I have obtained a new [employer] auth # and sent a new rx.    More information requested from [facility] about the \"error\".    Good Morning Sujey,    Additional information: Ms. GRANT never received her REVLIMID due to a carrier error - the medication was returned to [facility]    In need of a new Rx and [employer] Auth at your earliest convenience    Pharmacy team can be reached at [phone]    Cristóbal Gleason   Analyst  - [facility]  [facility]    [phone]    [place], [id number]    "

## 2020-09-22 ENCOUNTER — INFUSION (OUTPATIENT)
Dept: ONCOLOGY | Facility: HOSPITAL | Age: 70
End: 2020-09-22

## 2020-09-22 ENCOUNTER — LAB (OUTPATIENT)
Dept: LAB | Facility: HOSPITAL | Age: 70
End: 2020-09-22

## 2020-09-22 DIAGNOSIS — G63 NEUROPATHY ASSOCIATED WITH MULTIPLE MYELOMA (HCC): Primary | ICD-10-CM

## 2020-09-22 DIAGNOSIS — C90.00 NEUROPATHY ASSOCIATED WITH MULTIPLE MYELOMA (HCC): Primary | ICD-10-CM

## 2020-09-22 DIAGNOSIS — C90.00 MULTIPLE MYELOMA NOT HAVING ACHIEVED REMISSION (HCC): ICD-10-CM

## 2020-09-22 LAB
ALBUMIN SERPL-MCNC: 4.2 G/DL (ref 3.5–5.2)
ALBUMIN/GLOB SERPL: 1.8 G/DL (ref 1.1–2.4)
ALP SERPL-CCNC: 50 U/L (ref 38–116)
ALT SERPL W P-5'-P-CCNC: 12 U/L (ref 0–33)
ANION GAP SERPL CALCULATED.3IONS-SCNC: 9.1 MMOL/L (ref 5–15)
AST SERPL-CCNC: 14 U/L (ref 0–32)
BASOPHILS # BLD AUTO: 0.05 10*3/MM3 (ref 0–0.2)
BASOPHILS NFR BLD AUTO: 1.4 % (ref 0–1.5)
BILIRUB SERPL-MCNC: 0.3 MG/DL (ref 0.2–1.2)
BUN SERPL-MCNC: 10 MG/DL (ref 6–20)
BUN/CREAT SERPL: 12.8 (ref 7.3–30)
CALCIUM SPEC-SCNC: 8.7 MG/DL (ref 8.5–10.2)
CHLORIDE SERPL-SCNC: 103 MMOL/L (ref 98–107)
CO2 SERPL-SCNC: 26.9 MMOL/L (ref 22–29)
CREAT SERPL-MCNC: 0.78 MG/DL (ref 0.6–1.1)
DEPRECATED RDW RBC AUTO: 52.6 FL (ref 37–54)
EOSINOPHIL # BLD AUTO: 0.24 10*3/MM3 (ref 0–0.4)
EOSINOPHIL NFR BLD AUTO: 6.7 % (ref 0.3–6.2)
ERYTHROCYTE [DISTWIDTH] IN BLOOD BY AUTOMATED COUNT: 14 % (ref 12.3–15.4)
GFR SERPL CREATININE-BSD FRML MDRD: 73 ML/MIN/1.73
GLOBULIN UR ELPH-MCNC: 2.4 GM/DL (ref 1.8–3.5)
GLUCOSE SERPL-MCNC: 97 MG/DL (ref 74–124)
HCT VFR BLD AUTO: 40.1 % (ref 34–46.6)
HGB BLD-MCNC: 13.2 G/DL (ref 12–15.9)
IMM GRANULOCYTES # BLD AUTO: 0.01 10*3/MM3 (ref 0–0.05)
IMM GRANULOCYTES NFR BLD AUTO: 0.3 % (ref 0–0.5)
LYMPHOCYTES # BLD AUTO: 1.44 10*3/MM3 (ref 0.7–3.1)
LYMPHOCYTES NFR BLD AUTO: 40.4 % (ref 19.6–45.3)
MCH RBC QN AUTO: 33.6 PG (ref 26.6–33)
MCHC RBC AUTO-ENTMCNC: 32.9 G/DL (ref 31.5–35.7)
MCV RBC AUTO: 102 FL (ref 79–97)
MONOCYTES # BLD AUTO: 0.46 10*3/MM3 (ref 0.1–0.9)
MONOCYTES NFR BLD AUTO: 12.9 % (ref 5–12)
NEUTROPHILS NFR BLD AUTO: 1.36 10*3/MM3 (ref 1.7–7)
NEUTROPHILS NFR BLD AUTO: 38.3 % (ref 42.7–76)
NRBC BLD AUTO-RTO: 0 /100 WBC (ref 0–0.2)
PLATELET # BLD AUTO: 175 10*3/MM3 (ref 140–450)
PMV BLD AUTO: 9.3 FL (ref 6–12)
POTASSIUM SERPL-SCNC: 4.3 MMOL/L (ref 3.5–4.7)
PROT SERPL-MCNC: 6.6 G/DL (ref 6.3–8)
RBC # BLD AUTO: 3.93 10*6/MM3 (ref 3.77–5.28)
SODIUM SERPL-SCNC: 139 MMOL/L (ref 134–145)
WBC # BLD AUTO: 3.56 10*3/MM3 (ref 3.4–10.8)

## 2020-09-22 PROCEDURE — 96372 THER/PROPH/DIAG INJ SC/IM: CPT

## 2020-09-22 PROCEDURE — 80053 COMPREHEN METABOLIC PANEL: CPT

## 2020-09-22 PROCEDURE — 25010000002 CYANOCOBALAMIN PER 1000 MCG: Performed by: INTERNAL MEDICINE

## 2020-09-22 PROCEDURE — 85025 COMPLETE CBC W/AUTO DIFF WBC: CPT

## 2020-09-22 PROCEDURE — 36415 COLL VENOUS BLD VENIPUNCTURE: CPT

## 2020-09-22 RX ORDER — CYANOCOBALAMIN 1000 UG/ML
1000 INJECTION, SOLUTION INTRAMUSCULAR; SUBCUTANEOUS ONCE
Status: COMPLETED | OUTPATIENT
Start: 2020-09-22 | End: 2020-09-22

## 2020-09-22 RX ADMIN — CYANOCOBALAMIN 1000 MCG: 1000 INJECTION, SOLUTION INTRAMUSCULAR; SUBCUTANEOUS at 09:42

## 2020-09-23 ENCOUNTER — MEDICATION THERAPY MANAGEMENT (OUTPATIENT)
Dept: PHARMACY | Facility: HOSPITAL | Age: 70
End: 2020-09-23

## 2020-09-23 LAB
ALBUMIN SERPL ELPH-MCNC: 3.5 G/DL (ref 2.9–4.4)
ALBUMIN/GLOB SERPL: 1.3 {RATIO} (ref 0.7–1.7)
ALPHA1 GLOB SERPL ELPH-MCNC: 0.2 G/DL (ref 0–0.4)
ALPHA2 GLOB SERPL ELPH-MCNC: 0.9 G/DL (ref 0.4–1)
B-GLOBULIN SERPL ELPH-MCNC: 0.9 G/DL (ref 0.7–1.3)
GAMMA GLOB SERPL ELPH-MCNC: 0.8 G/DL (ref 0.4–1.8)
GLOBULIN SER-MCNC: 2.8 G/DL (ref 2.2–3.9)
IGA SERPL-MCNC: 61 MG/DL (ref 64–422)
IGG SERPL-MCNC: 872 MG/DL (ref 586–1602)
IGM SERPL-MCNC: 17 MG/DL (ref 26–217)
INTERPRETATION SERPL IEP-IMP: ABNORMAL
KAPPA LC FREE SER-MCNC: 21.8 MG/L (ref 3.3–19.4)
KAPPA LC FREE/LAMBDA FREE SER: 1.25 {RATIO} (ref 0.26–1.65)
LABORATORY COMMENT REPORT: ABNORMAL
LAMBDA LC FREE SERPL-MCNC: 17.4 MG/L (ref 5.7–26.3)
M PROTEIN SERPL ELPH-MCNC: ABNORMAL G/DL
PROT SERPL-MCNC: 6.3 G/DL (ref 6–8.5)

## 2020-09-23 NOTE — PROGRESS NOTES
MTM telephone follow up- Revlimid    Jennifer is doing well today. She has no issues or concerns with her Revlimid. Medication administration and adherence seem appropriate; she reports no missed doses. She denies any recent medication changes. Pharmacy will continue to follow.       9/22/2020   WBC 3.40 - 10.80 10*3/mm3 3.56   Neutrophils Absolute 1.70 - 7.00 10*3/mm3 1.36 (A)   Hemoglobin 12.0 - 15.9 g/dL 13.2   Hematocrit 34.0 - 46.6 % 40.1   Platelets 140 - 450 10*3/mm3 175   Creatinine 0.60 - 1.10 mg/dL 0.78   eGFR Non African Am >60 mL/min/1.73 73   BUN 6 - 20 mg/dL 10   Sodium 134 - 145 mmol/L 139   Potassium 3.5 - 4.7 mmol/L 4.3   Glucose 74 - 124 mg/dL 97   Calcium 8.5 - 10.2 mg/dL 8.7   Albumin 3.50 - 5.20 g/dL 4.20   Total Protein 6.3 - 8.0 g/dL 6.6   AST (SGOT) 0 - 32 U/L 14   ALT (SGPT) 0 - 33 U/L 12   Alkaline Phosphatase 38 - 116 U/L 50   Total Bilirubin 0.2 - 1.2 mg/dL 0.3     Thanks,   Sandra Hernandez, PharmD

## 2020-09-28 DIAGNOSIS — F41.9 ANXIETY: ICD-10-CM

## 2020-09-28 RX ORDER — ERGOCALCIFEROL 1.25 MG/1
50000 CAPSULE ORAL WEEKLY
Qty: 12 CAPSULE | Refills: 3 | Status: SHIPPED | OUTPATIENT
Start: 2020-09-28 | End: 2021-04-06

## 2020-09-28 RX ORDER — ALPRAZOLAM 0.25 MG/1
0.25 TABLET ORAL DAILY PRN
Qty: 30 TABLET | Refills: 2 | Status: SHIPPED | OUTPATIENT
Start: 2020-09-28

## 2020-09-30 ENCOUNTER — TRANSCRIBE ORDERS (OUTPATIENT)
Dept: ADMINISTRATIVE | Facility: HOSPITAL | Age: 70
End: 2020-09-30

## 2020-09-30 DIAGNOSIS — H93.12 LEFT-SIDED TINNITUS: Primary | ICD-10-CM

## 2020-09-30 DIAGNOSIS — M26.69 TMJ DERANGEMENT: ICD-10-CM

## 2020-09-30 RX ORDER — LENALIDOMIDE 10 MG/1
CAPSULE ORAL
Qty: 21 CAPSULE | Refills: 0 | Status: SHIPPED | OUTPATIENT
Start: 2020-09-30 | End: 2020-10-19 | Stop reason: SDUPTHER

## 2020-09-30 NOTE — TELEPHONE ENCOUNTER
Pt on my calendar to refill Revlimid. Per last office note from DR Garcia-Pt is to continue. She has been taking 10 mg 21 out of 28 days. Her last delivery was on 9/16/2020. I have escribed a new rx to [facility]

## 2020-10-06 ENCOUNTER — TRANSCRIBE ORDERS (OUTPATIENT)
Dept: ADMINISTRATIVE | Facility: HOSPITAL | Age: 70
End: 2020-10-06

## 2020-10-06 DIAGNOSIS — H93.12 LEFT-SIDED TINNITUS: Primary | ICD-10-CM

## 2020-10-06 DIAGNOSIS — M26.69: ICD-10-CM

## 2020-10-07 ENCOUNTER — APPOINTMENT (OUTPATIENT)
Dept: MRI IMAGING | Facility: HOSPITAL | Age: 70
End: 2020-10-07

## 2020-10-19 RX ORDER — LENALIDOMIDE 10 MG/1
CAPSULE ORAL
Qty: 21 CAPSULE | Refills: 0 | Status: SHIPPED | OUTPATIENT
Start: 2020-10-19 | End: 2020-11-09 | Stop reason: SDUPTHER

## 2020-10-19 NOTE — TELEPHONE ENCOUNTER
Pt on my calendar to refill Revlimid. Per last office note-pt is to continue. Her last delivery was on 10/6/2020.    I have routed the rx to Dr Garcia for signature. Once signed it will be escribed to [facility]    Confirmation rec that Dr Garcia approved and signed the Revlimid rx. This was escribed to [facility]

## 2020-10-19 NOTE — PROGRESS NOTES
Subjective     REASON FOR CONSULTATION: Transfer of care for IgA kappa multiple myeloma post stem cell transplant in March 2016 at [facility] currently on maintenance Revlimid 10 mg 3 out of 4 weeks                               REQUESTING PHYSICIAN: MD Brett Turpin MD    History of Present Illness patient is a 70-year-old female with an IgA kappa high risk myeloma post stem cell transplant on maintenance Revlimid 10 mg 3 or 4 weeks alone after toxicity from Velcade and dexamethasone    Comes in for follow-up on her 3-month routine.  She has just completed 3 weeks of Revlimid and her counts are decent.  Her paraprotein is not detectable beta 2 microglobulin is pending but everything else is normal.  She has had no infections.  She continues with B12 injections monthly  Her blood counts have been doing good on her current dose of Revlimid      She is back today feeling well she has her usual aches and pains.  She has more pain after her Zometa injection for about a week but this is usual for her    She has had no infections or issues and is self isolating at home and practicing social distancing    She is scheduled to have a video visit with [facility] because of the coronavirus pandemic    Labs have been drawn and are normal with no paraprotein detected on her immunofixation over the last 3 months    She is also due for her 3-month Zometa today and her monthly B12 and we will schedule this for her      Past Medical History:   Diagnosis Date   • Anxiety    • Depression    • Disease of thyroid gland    • Hashimoto's disease    • History of vitamin D deficiency    • Hypothyroidism    • IBS (irritable bowel syndrome)    • Mixed hyperlipidemia 3/12/2018   • Multiple myeloma (CMS/HCC)    • Myeloma (CMS/HCC)    • Osteopenia    • Overweight (BMI 25.0-29.9) 2/5/2018        Past Surgical History:   Procedure Laterality Date   • BREAST BIOPSY     • CATARACT  EXTRACTION     • COLONOSCOPY     • TONSILLECTOMY  1956   • VEIN SURGERY  1991      patient is a 70-year-old female with IgA kappa multiple myeloma diagnosed in mid 2015-high risk because of gain of 1 q- treatment with rev Dex Velcade initiated in 11/15-went on to stem cell transplant at [facility] in March 2016?  Melphalan.  She started post transplant therapy in May 2016 with Velcade rev Dex.  In September after 4 cycles Velcade was decreased to every other week with plans to continue rev Dex Velcade till progression because of high risk status.  In May 2019 her Velcade was discontinued because of worsening neuropathy.  Patient has remained on Revlimid 10 mg 21 out of 28 days since then with stable disease until November 2019 when a small IgG kappa paraprotein was noted on her blood tests which is distinct from her usual IgA kappa myeloma.  Restaging with PET scan was normal and since then the paraprotein as of 3/10/2020 has resolved    Patient has significant issues with anxiety and states that she had trouble getting her Revlimid in a timely fashion was very frustrated with this process at the [facility] system-she states she was taken back when Dr. Hewitt suggested she transfer her care but is now happy to make a change    She is currently 2 days into her treatment cycle with Revlimid .she has no pain currently except intermittently in her neck where she has had some degenerative disease.  She does have neuropathy for which she is on fairly high doses of Cymbalta.  She is not taking her gabapentin.    Continues on acyclovir for prevention and baby aspirin because of the Revlimid  She was having trouble with diarrhea from the Revlimid but this is controlled with WelChol.    She is  and lives by herself has no family nearby but good friends.  She does not smoke or drink.  She is up-to-date with mammography and does Cologuard instead of colonoscopy     have reviewed her labs in detail but cannot find any  documentation of her transplant conditioning regimen (I assume it was melphalan)  or the depth of her response prior to transplant .  She states that the doctors at [facility] recommended to check her labs every month - she has been clinically BOZENA for 4 years and I suggested every other month testing but she is very adamant that she wants monthly testing      Current Outpatient Medications on File Prior to Visit   Medication Sig Dispense Refill   • Acetylcarn-Alpha Lipoic Acid 400-200 MG capsule Take  by mouth.     • acyclovir (ZOVIRAX) 400 MG tablet Take 400 mg by mouth 2 (Two) Times a Day.     • ALPRAZolam (XANAX) 0.25 MG tablet Take 1 tablet by mouth Daily As Needed for Anxiety. 30 tablet 2   • aspirin 81 MG chewable tablet Chew 81 mg Daily.     • B Complex Vitamins (VITAMIN B COMPLEX) capsule capsule Take 2 tablets by mouth Daily.     • baclofen (LIORESAL) 10 MG tablet Take 10 mg by mouth 3 (Three) Times a Day.  5   • calcium carbonate-vitamin d 600-400 MG-UNIT per tablet Take 1 tablet by mouth Daily.     • colesevelam (WELCHOL) 625 MG tablet Take  by mouth As Needed.     • cycloSPORINE (RESTASIS MULTIDOSE) 0.05 % ophthalmic emulsion 1 drop 2 (Two) Times a Day.     • diphenoxylate-atropine (LOMOTIL) 2.5-0.025 MG per tablet Take 1 tablet by mouth 3 (Three) Times a Day As Needed for Diarrhea. 90 tablet 3   • DULoxetine (CYMBALTA) 30 MG capsule Take 30 mg by mouth Daily.     • DULoxetine (CYMBALTA) 60 MG capsule Take 1 capsule by mouth Daily. 90 capsule 3   • fluticasone (FLONASE) 50 MCG/ACT nasal spray 2 sprays into each nostril Daily.     • folic acid (FOLVITE) 1 MG tablet Take 1,000 mcg by mouth Daily.  2   • gabapentin (NEURONTIN) 100 MG capsule Take 100 mg by mouth Daily As Needed.     • HYDROcodone-acetaminophen (NORCO) 5-325 MG per tablet Take 1 tablet by mouth Every 6 (Six) Hours As Needed for Moderate Pain  or Severe Pain . 60 tablet 0   • lenalidomide (Revlimid) 10 MG capsule TAKE 1 CAPSULE DAILY FOR 21  DAYS ON THEN 7 DAYS OFF 21 capsule 0   • Multiple Vitamins-Iron (DAILY-JONI/IRON/BETA-CAROTENE) tablet Take 1 tablet by mouth Daily.  2   • omega-3 acid ethyl esters (LOVAZA) 1 g capsule Take 2 g by mouth.     • ondansetron (ZOFRAN) 8 MG tablet Take 8 mg by mouth.     • promethazine (PHENERGAN) 25 MG tablet TAKE 0.5-1 TABLETS BY MOUTH EVERY 6 (SIX) HOURS AS NEEDED FOR NAUSEA.     • SYNTHROID 112 MCG tablet      • vitamin D (ERGOCALCIFEROL) 1.25 MG (28271 UT) capsule capsule Take 1 capsule by mouth 1 (One) Time Per Week. 12 capsule 3   • vitamin D (ERGOCALCIFEROL) 74669 units capsule capsule Take 50,000 Units by mouth 1 (One) Time Per Week.       No current facility-administered medications on file prior to visit.         ALLERGIES:    No Known Allergies     Social History     Socioeconomic History   • Marital status:      Spouse name: Not on file   • Number of children: 2   • Years of education: Not on file   • Highest education level: Not on file   Occupational History     Employer: RETIRED   Tobacco Use   • Smoking status: Former Smoker     Types: Cigarettes   • Smokeless tobacco: Never Used   Substance and Sexual Activity   • Alcohol use: Yes     Comment: 2 per month    • Drug use: Never   • Sexual activity: Defer        Family History   Problem Relation Age of Onset   • Breast cancer Mother    • Lymphoma Father    • Kidney cancer Paternal Grandmother         Review of Systems   Constitutional: Positive for fatigue (little better ).   Respiratory: Negative for cough, choking, chest tightness and shortness of breath.    Cardiovascular: Negative for chest pain.   Gastrointestinal: Negative for abdominal pain, constipation, nausea and vomiting.   Musculoskeletal: Positive for neck pain.   Neurological: Positive for numbness (Den,foot/hand tingling no numbness) and headaches (same 5-5-2020 rupture disc).   Psychiatric/Behavioral: Negative for dysphoric mood. The patient is nervous/anxious (same ).    All  "other systems reviewed and are negative.   I have reviewed and confirmed the accuracy of the ROS as documented by the MA/LPN/RN Yoni Garica MD        Objective     Vitals:    10/20/20 1134   Pulse: 80   Resp: 18   Temp: 97.5 °F (36.4 °C)   TempSrc: Skin   SpO2: 97%   Weight: 78.5 kg (173 lb 1.6 oz)   Height: 169.8 cm (66.85\")   PainSc:   6   PainLoc: Leg  Comment: Den/hips     Current Status 10/20/2020   ECOG score 0       Physical Exam     CONSTITUTIONAL:  Vital signs reviewed.  No distress, looks comfortable.  EYES:  Conjunctiva and lids unremarkable.    RESPIRATORY:  Normal respiratory effort.  Lungs clear to auscultation bilaterally.  CARDIOVASCULAR:  Normal S1, S2.  No murmurs rubs or gallops.  No significant lower extremity edema.  I have reexamined the patient and the results are consistent with the previously documented exam. Yoni Garcia MD         RECENT LABS:  Hematology WBC   Date Value Ref Range Status   10/20/2020 3.54 3.40 - 10.80 10*3/mm3 Final   03/10/2020 4.27 (L) 4.5 - 11.0 10*3/uL Final     RBC   Date Value Ref Range Status   10/20/2020 3.67 (L) 3.77 - 5.28 10*6/mm3 Final   03/10/2020 3.80 (L) 4.0 - 5.2 10*6/uL Final     Hemoglobin   Date Value Ref Range Status   10/20/2020 12.6 12.0 - 15.9 g/dL Final   03/10/2020 12.9 12.0 - 16.0 g/dL Final     Hematocrit   Date Value Ref Range Status   10/20/2020 37.5 34.0 - 46.6 % Final   03/10/2020 38.5 36.0 - 46.0 % Final     Platelets   Date Value Ref Range Status   10/20/2020 179 140 - 450 10*3/mm3 Final   03/10/2020 181 140 - 440 10*3/uL Final                  Assessment/Plan   1.  IgA kappa multiple myeloma diagnosed in 2015 treated with RVD  · Stem cell transplant at [facility] in 3/2016  · Maintenance treatment with Velcade rev Dex started in 5/16  · Velcade discontinued because of neurotoxicity in 5/19  · Small IgG kappa paraprotein seen on blood work in 10/19-PET scan negative protein disappeared by 3/20  · Zometa given every 3 " months   · Acyclovir and aspirin prophylaxis    2.  Anxiety  3.  Hypothyroidism on treatment  4.  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  Diarrhea due to treatment improved with WelChol    Plan  1.  Continue 10 mg of Revlimid 3 out of 4 weeks  2.continue monthly B12 and myeloma labs9 WILLIAM plus beta-2 microglobulin)  3.  Zometa every 3 months dose today  4.  See me in 3 months for follow-up .    She is asking about 8 PET scan and I told her at this point with no apparent detectable paraprotein I do not think this is indicated    Standard precautions discussed regarding the Novel Coronavirus (COVID-19)  including patient to limit contact with others outside of home, frequent handwashing and using alcohol gel when unable to use soap and water, as well to monitoring for symptoms and notifying our office via telephone for any symptoms or exposures.

## 2020-10-20 ENCOUNTER — INFUSION (OUTPATIENT)
Dept: ONCOLOGY | Facility: HOSPITAL | Age: 70
End: 2020-10-20

## 2020-10-20 ENCOUNTER — OFFICE VISIT (OUTPATIENT)
Dept: ONCOLOGY | Facility: CLINIC | Age: 70
End: 2020-10-20

## 2020-10-20 ENCOUNTER — LAB (OUTPATIENT)
Dept: LAB | Facility: HOSPITAL | Age: 70
End: 2020-10-20

## 2020-10-20 VITALS
WEIGHT: 173.1 LBS | DIASTOLIC BLOOD PRESSURE: 82 MMHG | BODY MASS INDEX: 27.17 KG/M2 | OXYGEN SATURATION: 97 % | SYSTOLIC BLOOD PRESSURE: 142 MMHG | RESPIRATION RATE: 18 BRPM | HEART RATE: 80 BPM | TEMPERATURE: 97.5 F | HEIGHT: 67 IN

## 2020-10-20 DIAGNOSIS — G63 NEUROPATHY ASSOCIATED WITH MULTIPLE MYELOMA (HCC): Primary | ICD-10-CM

## 2020-10-20 DIAGNOSIS — C90.00 MULTIPLE MYELOMA NOT HAVING ACHIEVED REMISSION (HCC): ICD-10-CM

## 2020-10-20 DIAGNOSIS — C90.00 MULTIPLE MYELOMA NOT HAVING ACHIEVED REMISSION (HCC): Primary | ICD-10-CM

## 2020-10-20 DIAGNOSIS — C90.00 NEUROPATHY ASSOCIATED WITH MULTIPLE MYELOMA (HCC): ICD-10-CM

## 2020-10-20 DIAGNOSIS — G63 NEUROPATHY ASSOCIATED WITH MULTIPLE MYELOMA (HCC): ICD-10-CM

## 2020-10-20 DIAGNOSIS — C90.00 NEUROPATHY ASSOCIATED WITH MULTIPLE MYELOMA (HCC): Primary | ICD-10-CM

## 2020-10-20 LAB
ALBUMIN SERPL-MCNC: 4 G/DL (ref 3.5–5.2)
ALBUMIN/GLOB SERPL: 1.7 G/DL (ref 1.1–2.4)
ALP SERPL-CCNC: 52 U/L (ref 38–116)
ALT SERPL W P-5'-P-CCNC: 15 U/L (ref 0–33)
ANION GAP SERPL CALCULATED.3IONS-SCNC: 9.1 MMOL/L (ref 5–15)
AST SERPL-CCNC: 17 U/L (ref 0–32)
B2 MICROGLOB SERPL-MCNC: 1.8 MG/L (ref 0.8–2.2)
BASOPHILS # BLD AUTO: 0.04 10*3/MM3 (ref 0–0.2)
BASOPHILS NFR BLD AUTO: 1.1 % (ref 0–1.5)
BILIRUB SERPL-MCNC: 0.3 MG/DL (ref 0.2–1.2)
BUN SERPL-MCNC: 8 MG/DL (ref 6–20)
BUN/CREAT SERPL: 9.5 (ref 7.3–30)
CALCIUM SPEC-SCNC: 8.7 MG/DL (ref 8.5–10.2)
CHLORIDE SERPL-SCNC: 99 MMOL/L (ref 98–107)
CO2 SERPL-SCNC: 27.9 MMOL/L (ref 22–29)
CREAT SERPL-MCNC: 0.84 MG/DL (ref 0.6–1.1)
DEPRECATED RDW RBC AUTO: 52.8 FL (ref 37–54)
EOSINOPHIL # BLD AUTO: 0.24 10*3/MM3 (ref 0–0.4)
EOSINOPHIL NFR BLD AUTO: 6.8 % (ref 0.3–6.2)
ERYTHROCYTE [DISTWIDTH] IN BLOOD BY AUTOMATED COUNT: 14 % (ref 12.3–15.4)
GFR SERPL CREATININE-BSD FRML MDRD: 67 ML/MIN/1.73
GLOBULIN UR ELPH-MCNC: 2.4 GM/DL (ref 1.8–3.5)
GLUCOSE SERPL-MCNC: 95 MG/DL (ref 74–124)
HCT VFR BLD AUTO: 37.5 % (ref 34–46.6)
HGB BLD-MCNC: 12.6 G/DL (ref 12–15.9)
IMM GRANULOCYTES # BLD AUTO: 0 10*3/MM3 (ref 0–0.05)
IMM GRANULOCYTES NFR BLD AUTO: 0 % (ref 0–0.5)
LYMPHOCYTES # BLD AUTO: 1.31 10*3/MM3 (ref 0.7–3.1)
LYMPHOCYTES NFR BLD AUTO: 37 % (ref 19.6–45.3)
MAGNESIUM SERPL-MCNC: 1.9 MG/DL (ref 1.8–2.5)
MCH RBC QN AUTO: 34.3 PG (ref 26.6–33)
MCHC RBC AUTO-ENTMCNC: 33.6 G/DL (ref 31.5–35.7)
MCV RBC AUTO: 102.2 FL (ref 79–97)
MONOCYTES # BLD AUTO: 0.54 10*3/MM3 (ref 0.1–0.9)
MONOCYTES NFR BLD AUTO: 15.3 % (ref 5–12)
NEUTROPHILS NFR BLD AUTO: 1.41 10*3/MM3 (ref 1.7–7)
NEUTROPHILS NFR BLD AUTO: 39.8 % (ref 42.7–76)
NRBC BLD AUTO-RTO: 0 /100 WBC (ref 0–0.2)
PHOSPHATE SERPL-MCNC: 3.3 MG/DL (ref 2.5–4.5)
PLATELET # BLD AUTO: 179 10*3/MM3 (ref 140–450)
PMV BLD AUTO: 9 FL (ref 6–12)
POTASSIUM SERPL-SCNC: 4 MMOL/L (ref 3.5–4.7)
PROT SERPL-MCNC: 6.4 G/DL (ref 6.3–8)
RBC # BLD AUTO: 3.67 10*6/MM3 (ref 3.77–5.28)
SODIUM SERPL-SCNC: 136 MMOL/L (ref 134–145)
WBC # BLD AUTO: 3.54 10*3/MM3 (ref 3.4–10.8)

## 2020-10-20 PROCEDURE — 25010000002 ZOLEDRONIC ACID 4 MG/100ML SOLUTION: Performed by: INTERNAL MEDICINE

## 2020-10-20 PROCEDURE — 25010000002 CYANOCOBALAMIN PER 1000 MCG: Performed by: INTERNAL MEDICINE

## 2020-10-20 PROCEDURE — 84100 ASSAY OF PHOSPHORUS: CPT

## 2020-10-20 PROCEDURE — 99214 OFFICE O/P EST MOD 30 MIN: CPT | Performed by: INTERNAL MEDICINE

## 2020-10-20 PROCEDURE — 96372 THER/PROPH/DIAG INJ SC/IM: CPT

## 2020-10-20 PROCEDURE — 82232 ASSAY OF BETA-2 PROTEIN: CPT | Performed by: INTERNAL MEDICINE

## 2020-10-20 PROCEDURE — 83735 ASSAY OF MAGNESIUM: CPT | Performed by: INTERNAL MEDICINE

## 2020-10-20 PROCEDURE — 80053 COMPREHEN METABOLIC PANEL: CPT

## 2020-10-20 PROCEDURE — 96374 THER/PROPH/DIAG INJ IV PUSH: CPT

## 2020-10-20 PROCEDURE — 85025 COMPLETE CBC W/AUTO DIFF WBC: CPT

## 2020-10-20 RX ORDER — CYANOCOBALAMIN 1000 UG/ML
1000 INJECTION, SOLUTION INTRAMUSCULAR; SUBCUTANEOUS ONCE
Status: CANCELLED | OUTPATIENT
Start: 2020-11-16

## 2020-10-20 RX ORDER — ZOLEDRONIC ACID 0.04 MG/ML
4 INJECTION, SOLUTION INTRAVENOUS ONCE
Status: COMPLETED | OUTPATIENT
Start: 2020-10-20 | End: 2020-10-20

## 2020-10-20 RX ORDER — CYANOCOBALAMIN 1000 UG/ML
1000 INJECTION, SOLUTION INTRAMUSCULAR; SUBCUTANEOUS ONCE
Status: CANCELLED | OUTPATIENT
Start: 2020-10-20

## 2020-10-20 RX ORDER — CYANOCOBALAMIN 1000 UG/ML
1000 INJECTION, SOLUTION INTRAMUSCULAR; SUBCUTANEOUS ONCE
Status: COMPLETED | OUTPATIENT
Start: 2020-10-20 | End: 2020-10-20

## 2020-10-20 RX ORDER — SODIUM CHLORIDE 9 MG/ML
250 INJECTION, SOLUTION INTRAVENOUS ONCE
Status: COMPLETED | OUTPATIENT
Start: 2020-10-20 | End: 2020-10-20

## 2020-10-20 RX ADMIN — CYANOCOBALAMIN 1000 MCG: 1000 INJECTION, SOLUTION INTRAMUSCULAR at 13:09

## 2020-10-20 RX ADMIN — ZOLEDRONIC ACID 4 MG: 0.04 INJECTION, SOLUTION INTRAVENOUS at 13:08

## 2020-10-20 RX ADMIN — SODIUM CHLORIDE 250 ML: 9 INJECTION, SOLUTION INTRAVENOUS at 13:08

## 2020-10-21 ENCOUNTER — MEDICATION THERAPY MANAGEMENT (OUTPATIENT)
Dept: PHARMACY | Facility: HOSPITAL | Age: 70
End: 2020-10-21

## 2020-10-21 LAB
ALBUMIN SERPL ELPH-MCNC: 3.3 G/DL (ref 2.9–4.4)
ALBUMIN/GLOB SERPL: 1.2 {RATIO} (ref 0.7–1.7)
ALPHA1 GLOB SERPL ELPH-MCNC: 0.2 G/DL (ref 0–0.4)
ALPHA2 GLOB SERPL ELPH-MCNC: 0.9 G/DL (ref 0.4–1)
B-GLOBULIN SERPL ELPH-MCNC: 0.8 G/DL (ref 0.7–1.3)
GAMMA GLOB SERPL ELPH-MCNC: 0.8 G/DL (ref 0.4–1.8)
GLOBULIN SER-MCNC: 2.8 G/DL (ref 2.2–3.9)
IGA SERPL-MCNC: 61 MG/DL (ref 64–422)
IGG SERPL-MCNC: 796 MG/DL (ref 586–1602)
IGM SERPL-MCNC: 18 MG/DL (ref 26–217)
INTERPRETATION SERPL IEP-IMP: ABNORMAL
KAPPA LC FREE SER-MCNC: 23.3 MG/L (ref 3.3–19.4)
KAPPA LC FREE/LAMBDA FREE SER: 1.37 {RATIO} (ref 0.26–1.65)
LABORATORY COMMENT REPORT: ABNORMAL
LAMBDA LC FREE SERPL-MCNC: 17 MG/L (ref 5.7–26.3)
M PROTEIN SERPL ELPH-MCNC: ABNORMAL G/DL
PROT SERPL-MCNC: 6.1 G/DL (ref 6–8.5)

## 2020-10-21 NOTE — PROGRESS NOTES
[facility] Lab Review- Revlimid      10/20/2020   WBC 3.40 - 10.80 10*3/mm3 3.54   Neutrophils Absolute 1.70 - 7.00 10*3/mm3 1.41 (A)   Hemoglobin 12.0 - 15.9 g/dL 12.6   Hematocrit 34.0 - 46.6 % 37.5   Platelets 140 - 450 10*3/mm3 179   Creatinine 0.60 - 1.10 mg/dL 0.84   eGFR Non African Am >60 mL/min/1.73 67   BUN 6 - 20 mg/dL 8   Sodium 134 - 145 mmol/L 136   Potassium 3.5 - 4.7 mmol/L 4.0   Glucose 74 - 124 mg/dL 95   Magnesium 1.8 - 2.5 mg/dL 1.9   Calcium 8.5 - 10.2 mg/dL 8.7   Albumin 3.50 - 5.20 g/dL 4.00   Total Protein 6.3 - 8.0 g/dL 6.4   AST (SGOT) 0 - 32 U/L 17   ALT (SGPT) 0 - 33 U/L 15   Alkaline Phosphatase 38 - 116 U/L 52   Total Bilirubin 0.2 - 1.2 mg/dL 0.3     MD dictation noted. Pharmacy will continue to follow.     Thanks,   Sandra Hernandez, PharmD

## 2020-10-23 ENCOUNTER — HOSPITAL ENCOUNTER (OUTPATIENT)
Dept: MRI IMAGING | Facility: HOSPITAL | Age: 70
Discharge: HOME | End: 2020-10-23
Admitting: OTOLARYNGOLOGY

## 2020-10-23 DIAGNOSIS — M26.69: ICD-10-CM

## 2020-10-23 DIAGNOSIS — H93.12 LEFT-SIDED TINNITUS: ICD-10-CM

## 2020-10-23 LAB — CREAT BLDA-MCNC: 0.8 MG/DL (ref 0.6–1.3)

## 2020-10-23 PROCEDURE — 0 GADOBENATE DIMEGLUMINE 529 MG/ML SOLUTION: Performed by: OTOLARYNGOLOGY

## 2020-10-23 PROCEDURE — 82565 ASSAY OF CREATININE: CPT

## 2020-10-23 PROCEDURE — A9577 INJ MULTIHANCE: HCPCS | Performed by: OTOLARYNGOLOGY

## 2020-10-23 PROCEDURE — 70553 MRI BRAIN STEM W/O & W/DYE: CPT

## 2020-10-23 RX ADMIN — GADOBENATE DIMEGLUMINE 15 ML: 529 INJECTION, SOLUTION INTRAVENOUS at 09:48

## 2020-10-26 DIAGNOSIS — M50.30 DDD (DEGENERATIVE DISC DISEASE), CERVICAL: ICD-10-CM

## 2020-10-26 RX ORDER — HYDROCODONE BITARTRATE AND ACETAMINOPHEN 5; 325 MG/1; MG/1
1 TABLET ORAL EVERY 6 HOURS PRN
Qty: 60 TABLET | Refills: 0 | Status: SHIPPED | OUTPATIENT
Start: 2020-10-26 | End: 2021-02-09 | Stop reason: SDUPTHER

## 2020-11-09 RX ORDER — LENALIDOMIDE 10 MG/1
CAPSULE ORAL
Qty: 21 CAPSULE | Refills: 0 | Status: SHIPPED | OUTPATIENT
Start: 2020-11-09 | End: 2020-12-01 | Stop reason: SDUPTHER

## 2020-11-09 NOTE — TELEPHONE ENCOUNTER
Pt on my calendar to refill Revlimid. Per last office note-pt is to continue. Her last delivery was on 10/24/2020.    I have routed the rx to Dr Garcia for signature. Once signed it will be escribed to [facility]    Confirmation rec that Dr Garcia has signed the Revlimid rx. This was escribed to [facility]

## 2020-11-17 ENCOUNTER — APPOINTMENT (OUTPATIENT)
Dept: ONCOLOGY | Facility: HOSPITAL | Age: 70
End: 2020-11-17

## 2020-11-18 ENCOUNTER — LAB (OUTPATIENT)
Dept: LAB | Facility: HOSPITAL | Age: 70
End: 2020-11-18

## 2020-11-18 ENCOUNTER — INFUSION (OUTPATIENT)
Dept: ONCOLOGY | Facility: HOSPITAL | Age: 70
End: 2020-11-18

## 2020-11-18 DIAGNOSIS — G63 NEUROPATHY ASSOCIATED WITH MULTIPLE MYELOMA (HCC): Primary | ICD-10-CM

## 2020-11-18 DIAGNOSIS — C90.00 NEUROPATHY ASSOCIATED WITH MULTIPLE MYELOMA (HCC): Primary | ICD-10-CM

## 2020-11-18 DIAGNOSIS — C90.00 MULTIPLE MYELOMA NOT HAVING ACHIEVED REMISSION (HCC): ICD-10-CM

## 2020-11-18 LAB
ALBUMIN SERPL-MCNC: 4.2 G/DL (ref 3.5–5.2)
ALBUMIN/GLOB SERPL: 1.8 G/DL (ref 1.1–2.4)
ALP SERPL-CCNC: 55 U/L (ref 38–116)
ALT SERPL W P-5'-P-CCNC: 15 U/L (ref 0–33)
ANION GAP SERPL CALCULATED.3IONS-SCNC: 8.1 MMOL/L (ref 5–15)
AST SERPL-CCNC: 15 U/L (ref 0–32)
B2 MICROGLOB SERPL-MCNC: 1.8 MG/L (ref 0.8–2.2)
BASOPHILS # BLD AUTO: 0.04 10*3/MM3 (ref 0–0.2)
BASOPHILS NFR BLD AUTO: 1.2 % (ref 0–1.5)
BILIRUB SERPL-MCNC: 0.2 MG/DL (ref 0.2–1.2)
BUN SERPL-MCNC: 11 MG/DL (ref 6–20)
BUN/CREAT SERPL: 14.5 (ref 7.3–30)
CALCIUM SPEC-SCNC: 9 MG/DL (ref 8.5–10.2)
CHLORIDE SERPL-SCNC: 99 MMOL/L (ref 98–107)
CO2 SERPL-SCNC: 27.9 MMOL/L (ref 22–29)
CREAT SERPL-MCNC: 0.76 MG/DL (ref 0.6–1.1)
DEPRECATED RDW RBC AUTO: 53.6 FL (ref 37–54)
EOSINOPHIL # BLD AUTO: 0.19 10*3/MM3 (ref 0–0.4)
EOSINOPHIL NFR BLD AUTO: 5.7 % (ref 0.3–6.2)
ERYTHROCYTE [DISTWIDTH] IN BLOOD BY AUTOMATED COUNT: 14.1 % (ref 12.3–15.4)
GFR SERPL CREATININE-BSD FRML MDRD: 75 ML/MIN/1.73
GLOBULIN UR ELPH-MCNC: 2.3 GM/DL (ref 1.8–3.5)
GLUCOSE SERPL-MCNC: 96 MG/DL (ref 74–124)
HCT VFR BLD AUTO: 39.7 % (ref 34–46.6)
HGB BLD-MCNC: 13.1 G/DL (ref 12–15.9)
IMM GRANULOCYTES # BLD AUTO: 0 10*3/MM3 (ref 0–0.05)
IMM GRANULOCYTES NFR BLD AUTO: 0 % (ref 0–0.5)
LYMPHOCYTES # BLD AUTO: 1.39 10*3/MM3 (ref 0.7–3.1)
LYMPHOCYTES NFR BLD AUTO: 42 % (ref 19.6–45.3)
MCH RBC QN AUTO: 33.9 PG (ref 26.6–33)
MCHC RBC AUTO-ENTMCNC: 33 G/DL (ref 31.5–35.7)
MCV RBC AUTO: 102.8 FL (ref 79–97)
MONOCYTES # BLD AUTO: 0.56 10*3/MM3 (ref 0.1–0.9)
MONOCYTES NFR BLD AUTO: 16.9 % (ref 5–12)
NEUTROPHILS NFR BLD AUTO: 1.13 10*3/MM3 (ref 1.7–7)
NEUTROPHILS NFR BLD AUTO: 34.2 % (ref 42.7–76)
NRBC BLD AUTO-RTO: 0 /100 WBC (ref 0–0.2)
PLATELET # BLD AUTO: 179 10*3/MM3 (ref 140–450)
PMV BLD AUTO: 9.3 FL (ref 6–12)
POTASSIUM SERPL-SCNC: 3.8 MMOL/L (ref 3.5–4.7)
PROT SERPL-MCNC: 6.5 G/DL (ref 6.3–8)
RBC # BLD AUTO: 3.86 10*6/MM3 (ref 3.77–5.28)
SODIUM SERPL-SCNC: 135 MMOL/L (ref 134–145)
VIT B12 BLD-MCNC: 338 PG/ML (ref 211–946)
WBC # BLD AUTO: 3.31 10*3/MM3 (ref 3.4–10.8)

## 2020-11-18 PROCEDURE — 82607 VITAMIN B-12: CPT | Performed by: INTERNAL MEDICINE

## 2020-11-18 PROCEDURE — 82232 ASSAY OF BETA-2 PROTEIN: CPT | Performed by: INTERNAL MEDICINE

## 2020-11-18 PROCEDURE — 85025 COMPLETE CBC W/AUTO DIFF WBC: CPT

## 2020-11-18 PROCEDURE — 25010000002 CYANOCOBALAMIN PER 1000 MCG: Performed by: INTERNAL MEDICINE

## 2020-11-18 PROCEDURE — 80053 COMPREHEN METABOLIC PANEL: CPT

## 2020-11-18 PROCEDURE — 36415 COLL VENOUS BLD VENIPUNCTURE: CPT

## 2020-11-18 PROCEDURE — 96372 THER/PROPH/DIAG INJ SC/IM: CPT

## 2020-11-18 RX ORDER — CYANOCOBALAMIN 1000 UG/ML
1000 INJECTION, SOLUTION INTRAMUSCULAR; SUBCUTANEOUS ONCE
Status: COMPLETED | OUTPATIENT
Start: 2020-11-18 | End: 2020-11-18

## 2020-11-18 RX ADMIN — CYANOCOBALAMIN 1000 MCG: 1000 INJECTION, SOLUTION INTRAMUSCULAR at 13:36

## 2020-11-19 LAB
ALBUMIN SERPL ELPH-MCNC: 3.6 G/DL (ref 2.9–4.4)
ALBUMIN/GLOB SERPL: 1.5 {RATIO} (ref 0.7–1.7)
ALPHA1 GLOB SERPL ELPH-MCNC: 0.2 G/DL (ref 0–0.4)
ALPHA2 GLOB SERPL ELPH-MCNC: 0.8 G/DL (ref 0.4–1)
B-GLOBULIN SERPL ELPH-MCNC: 0.8 G/DL (ref 0.7–1.3)
GAMMA GLOB SERPL ELPH-MCNC: 0.7 G/DL (ref 0.4–1.8)
GLOBULIN SER-MCNC: 2.5 G/DL (ref 2.2–3.9)
IGA SERPL-MCNC: 69 MG/DL (ref 64–422)
IGG SERPL-MCNC: 844 MG/DL (ref 586–1602)
IGM SERPL-MCNC: 17 MG/DL (ref 26–217)
INTERPRETATION SERPL IEP-IMP: ABNORMAL
KAPPA LC FREE SER-MCNC: 23.6 MG/L (ref 3.3–19.4)
KAPPA LC FREE/LAMBDA FREE SER: 1.27 {RATIO} (ref 0.26–1.65)
LABORATORY COMMENT REPORT: ABNORMAL
LAMBDA LC FREE SERPL-MCNC: 18.6 MG/L (ref 5.7–26.3)
M PROTEIN SERPL ELPH-MCNC: ABNORMAL G/DL
PROT SERPL-MCNC: 6.1 G/DL (ref 6–8.5)

## 2020-12-01 RX ORDER — LENALIDOMIDE 10 MG/1
CAPSULE ORAL
Qty: 21 CAPSULE | Refills: 0 | Status: SHIPPED | OUTPATIENT
Start: 2020-12-01 | End: 2020-12-30

## 2020-12-01 NOTE — TELEPHONE ENCOUNTER
Pt on my calendar to refill Revlimid. Per last office note-pt is to continue. Her last delivery was on 11/14/2020.    I have routed the rx to Dr Garcia for signature. Once signed it will be escribed to [facility]

## 2020-12-15 ENCOUNTER — LAB (OUTPATIENT)
Dept: LAB | Facility: HOSPITAL | Age: 70
End: 2020-12-15

## 2020-12-15 ENCOUNTER — INFUSION (OUTPATIENT)
Dept: ONCOLOGY | Facility: HOSPITAL | Age: 70
End: 2020-12-15

## 2020-12-15 DIAGNOSIS — C90.00 NEUROPATHY ASSOCIATED WITH MULTIPLE MYELOMA (HCC): Primary | ICD-10-CM

## 2020-12-15 DIAGNOSIS — G63 NEUROPATHY ASSOCIATED WITH MULTIPLE MYELOMA (HCC): Primary | ICD-10-CM

## 2020-12-15 DIAGNOSIS — C90.00 MULTIPLE MYELOMA NOT HAVING ACHIEVED REMISSION (HCC): ICD-10-CM

## 2020-12-15 LAB
ALBUMIN SERPL-MCNC: 3.9 G/DL (ref 3.5–5.2)
ALBUMIN/GLOB SERPL: 1.6 G/DL (ref 1.1–2.4)
ALP SERPL-CCNC: 54 U/L (ref 38–116)
ALT SERPL W P-5'-P-CCNC: 16 U/L (ref 0–33)
ANION GAP SERPL CALCULATED.3IONS-SCNC: 9.8 MMOL/L (ref 5–15)
AST SERPL-CCNC: 17 U/L (ref 0–32)
B2 MICROGLOB SERPL-MCNC: 1.8 MG/L (ref 0.8–2.2)
BASOPHILS # BLD AUTO: 0.05 10*3/MM3 (ref 0–0.2)
BASOPHILS NFR BLD AUTO: 1.2 % (ref 0–1.5)
BILIRUB SERPL-MCNC: 0.3 MG/DL (ref 0.2–1.2)
BUN SERPL-MCNC: 9 MG/DL (ref 6–20)
BUN/CREAT SERPL: 12 (ref 7.3–30)
CALCIUM SPEC-SCNC: 9.1 MG/DL (ref 8.5–10.2)
CHLORIDE SERPL-SCNC: 103 MMOL/L (ref 98–107)
CO2 SERPL-SCNC: 25.2 MMOL/L (ref 22–29)
CREAT SERPL-MCNC: 0.75 MG/DL (ref 0.6–1.1)
DEPRECATED RDW RBC AUTO: 50.7 FL (ref 37–54)
EOSINOPHIL # BLD AUTO: 0.12 10*3/MM3 (ref 0–0.4)
EOSINOPHIL NFR BLD AUTO: 2.8 % (ref 0.3–6.2)
ERYTHROCYTE [DISTWIDTH] IN BLOOD BY AUTOMATED COUNT: 13.8 % (ref 12.3–15.4)
GFR SERPL CREATININE-BSD FRML MDRD: 76 ML/MIN/1.73
GLOBULIN UR ELPH-MCNC: 2.4 GM/DL (ref 1.8–3.5)
GLUCOSE SERPL-MCNC: 98 MG/DL (ref 74–124)
HCT VFR BLD AUTO: 37.3 % (ref 34–46.6)
HGB BLD-MCNC: 12.4 G/DL (ref 12–15.9)
IMM GRANULOCYTES # BLD AUTO: 0 10*3/MM3 (ref 0–0.05)
IMM GRANULOCYTES NFR BLD AUTO: 0 % (ref 0–0.5)
LYMPHOCYTES # BLD AUTO: 2.04 10*3/MM3 (ref 0.7–3.1)
LYMPHOCYTES NFR BLD AUTO: 47.2 % (ref 19.6–45.3)
MCH RBC QN AUTO: 33.3 PG (ref 26.6–33)
MCHC RBC AUTO-ENTMCNC: 33.2 G/DL (ref 31.5–35.7)
MCV RBC AUTO: 100.3 FL (ref 79–97)
MONOCYTES # BLD AUTO: 0.74 10*3/MM3 (ref 0.1–0.9)
MONOCYTES NFR BLD AUTO: 17.1 % (ref 5–12)
NEUTROPHILS NFR BLD AUTO: 1.37 10*3/MM3 (ref 1.7–7)
NEUTROPHILS NFR BLD AUTO: 31.7 % (ref 42.7–76)
NRBC BLD AUTO-RTO: 0 /100 WBC (ref 0–0.2)
PLATELET # BLD AUTO: 184 10*3/MM3 (ref 140–450)
PMV BLD AUTO: 8.9 FL (ref 6–12)
POTASSIUM SERPL-SCNC: 4.1 MMOL/L (ref 3.5–4.7)
PROT SERPL-MCNC: 6.3 G/DL (ref 6.3–8)
RBC # BLD AUTO: 3.72 10*6/MM3 (ref 3.77–5.28)
SODIUM SERPL-SCNC: 138 MMOL/L (ref 134–145)
WBC # BLD AUTO: 4.32 10*3/MM3 (ref 3.4–10.8)

## 2020-12-15 PROCEDURE — 80053 COMPREHEN METABOLIC PANEL: CPT

## 2020-12-15 PROCEDURE — 36415 COLL VENOUS BLD VENIPUNCTURE: CPT

## 2020-12-15 PROCEDURE — 96372 THER/PROPH/DIAG INJ SC/IM: CPT

## 2020-12-15 PROCEDURE — 82232 ASSAY OF BETA-2 PROTEIN: CPT | Performed by: INTERNAL MEDICINE

## 2020-12-15 PROCEDURE — 25010000002 CYANOCOBALAMIN PER 1000 MCG: Performed by: INTERNAL MEDICINE

## 2020-12-15 PROCEDURE — 85025 COMPLETE CBC W/AUTO DIFF WBC: CPT

## 2020-12-15 RX ORDER — CYANOCOBALAMIN 1000 UG/ML
1000 INJECTION, SOLUTION INTRAMUSCULAR; SUBCUTANEOUS ONCE
Status: COMPLETED | OUTPATIENT
Start: 2020-12-15 | End: 2020-12-15

## 2020-12-15 RX ADMIN — CYANOCOBALAMIN 1000 MCG: 1000 INJECTION, SOLUTION INTRAMUSCULAR at 14:17

## 2020-12-16 LAB
ALBUMIN SERPL ELPH-MCNC: 3 G/DL (ref 2.9–4.4)
ALBUMIN/GLOB SERPL: 1.1 {RATIO} (ref 0.7–1.7)
ALPHA1 GLOB SERPL ELPH-MCNC: 0.2 G/DL (ref 0–0.4)
ALPHA2 GLOB SERPL ELPH-MCNC: 0.7 G/DL (ref 0.4–1)
B-GLOBULIN SERPL ELPH-MCNC: 1 G/DL (ref 0.7–1.3)
GAMMA GLOB SERPL ELPH-MCNC: 0.8 G/DL (ref 0.4–1.8)
GLOBULIN SER-MCNC: 2.9 G/DL (ref 2.2–3.9)
IGA SERPL-MCNC: 61 MG/DL (ref 64–422)
IGG SERPL-MCNC: 782 MG/DL (ref 586–1602)
IGM SERPL-MCNC: 16 MG/DL (ref 26–217)
INTERPRETATION SERPL IEP-IMP: ABNORMAL
KAPPA LC FREE SER-MCNC: 18.3 MG/L (ref 3.3–19.4)
KAPPA LC FREE/LAMBDA FREE SER: 1.11 {RATIO} (ref 0.26–1.65)
LABORATORY COMMENT REPORT: ABNORMAL
LAMBDA LC FREE SERPL-MCNC: 16.5 MG/L (ref 5.7–26.3)
M PROTEIN SERPL ELPH-MCNC: ABNORMAL G/DL
PROT SERPL-MCNC: 5.9 G/DL (ref 6–8.5)

## 2020-12-22 ENCOUNTER — TELEPHONE (OUTPATIENT)
Dept: GENERAL RADIOLOGY | Facility: HOSPITAL | Age: 70
End: 2020-12-22

## 2020-12-22 ENCOUNTER — MEDICATION THERAPY MANAGEMENT (OUTPATIENT)
Dept: PHARMACY | Facility: HOSPITAL | Age: 70
End: 2020-12-22

## 2020-12-22 NOTE — PROGRESS NOTES
[facility] telephone follow up- Revlimid    Jennifer is doing well today. She has no new issues or complaints with her Revlimid. Medication administration and adherence seem appropriate; she reports no missed doses this past month. Jennifer denies any changes to her medication regimen. Jennifer had a telemedicine visit with her MD from [facility] and she received a good report. She has no additional questions or concerns today. Pharmacy will continue to follow.     Thanks,   Sandra Hernandez, PharmD

## 2020-12-22 NOTE — TELEPHONE ENCOUNTER
----- Message from Sandra Hernandez [facility] sent at 12/22/2020 11:04 AM EST -----  Regarding: oral chemo education  Please schedule an in person, oral chemo education on 1/12/21  when Jennifer comes into the [facility] office.     Thanks,   Sandra

## 2020-12-30 RX ORDER — LENALIDOMIDE 10 MG/1
CAPSULE ORAL
Qty: 21 CAPSULE | Refills: 0 | Status: SHIPPED | OUTPATIENT
Start: 2020-12-30 | End: 2021-01-21 | Stop reason: SDUPTHER

## 2020-12-30 NOTE — TELEPHONE ENCOUNTER
Revlimid refill request rec electronically from [facility]. Per last office note from Dr Garcia-pt is to continue 10 mg 3 out of 4 weeks. Her last delivery was on 12/9/2020.    I have routed the rx to Dr Omer (#2) as Dr Garcia is on vacation. Once signed it will be escribed to [facility].    Confirmation rec that Dr Omer has signed the Revlimid rx. This was escribed to [facility]

## 2021-01-12 ENCOUNTER — APPOINTMENT (OUTPATIENT)
Dept: ONCOLOGY | Facility: HOSPITAL | Age: 71
End: 2021-01-12

## 2021-01-12 ENCOUNTER — TELEPHONE (OUTPATIENT)
Dept: ONCOLOGY | Facility: CLINIC | Age: 71
End: 2021-01-12

## 2021-01-12 ENCOUNTER — APPOINTMENT (OUTPATIENT)
Dept: LAB | Facility: HOSPITAL | Age: 71
End: 2021-01-12

## 2021-01-13 ENCOUNTER — LAB (OUTPATIENT)
Dept: ONCOLOGY | Facility: HOSPITAL | Age: 71
End: 2021-01-13

## 2021-01-13 ENCOUNTER — INFUSION (OUTPATIENT)
Dept: ONCOLOGY | Facility: HOSPITAL | Age: 71
End: 2021-01-13

## 2021-01-13 ENCOUNTER — OFFICE VISIT (OUTPATIENT)
Dept: ONCOLOGY | Facility: CLINIC | Age: 71
End: 2021-01-13

## 2021-01-13 VITALS — HEIGHT: 67 IN | BODY MASS INDEX: 27.23 KG/M2

## 2021-01-13 DIAGNOSIS — C90.00 MULTIPLE MYELOMA NOT HAVING ACHIEVED REMISSION (HCC): Primary | ICD-10-CM

## 2021-01-13 DIAGNOSIS — C90.00 NEUROPATHY ASSOCIATED WITH MULTIPLE MYELOMA (HCC): Primary | ICD-10-CM

## 2021-01-13 DIAGNOSIS — C90.00 MULTIPLE MYELOMA NOT HAVING ACHIEVED REMISSION (HCC): ICD-10-CM

## 2021-01-13 DIAGNOSIS — G63 NEUROPATHY ASSOCIATED WITH MULTIPLE MYELOMA (HCC): Primary | ICD-10-CM

## 2021-01-13 LAB
ALBUMIN SERPL-MCNC: 4 G/DL (ref 3.5–5.2)
ALBUMIN/GLOB SERPL: 1.6 G/DL (ref 1.1–2.4)
ALP SERPL-CCNC: 53 U/L (ref 38–116)
ALT SERPL W P-5'-P-CCNC: 14 U/L (ref 0–33)
ANION GAP SERPL CALCULATED.3IONS-SCNC: 12.7 MMOL/L (ref 5–15)
AST SERPL-CCNC: 15 U/L (ref 0–32)
B2 MICROGLOB SERPL-MCNC: 2 MG/L (ref 0.8–2.2)
BASOPHILS # BLD AUTO: 0.04 10*3/MM3 (ref 0–0.2)
BASOPHILS NFR BLD AUTO: 1.2 % (ref 0–1.5)
BILIRUB SERPL-MCNC: 0.3 MG/DL (ref 0.2–1.2)
BUN SERPL-MCNC: 15 MG/DL (ref 6–20)
BUN/CREAT SERPL: 17.4 (ref 7.3–30)
CALCIUM SPEC-SCNC: 8.8 MG/DL (ref 8.5–10.2)
CHLORIDE SERPL-SCNC: 102 MMOL/L (ref 98–107)
CO2 SERPL-SCNC: 23.3 MMOL/L (ref 22–29)
CREAT SERPL-MCNC: 0.86 MG/DL (ref 0.6–1.1)
DEPRECATED RDW RBC AUTO: 52.4 FL (ref 37–54)
EOSINOPHIL # BLD AUTO: 0.23 10*3/MM3 (ref 0–0.4)
EOSINOPHIL NFR BLD AUTO: 7 % (ref 0.3–6.2)
ERYTHROCYTE [DISTWIDTH] IN BLOOD BY AUTOMATED COUNT: 13.8 % (ref 12.3–15.4)
GFR SERPL CREATININE-BSD FRML MDRD: 65 ML/MIN/1.73
GLOBULIN UR ELPH-MCNC: 2.5 GM/DL (ref 1.8–3.5)
GLUCOSE SERPL-MCNC: 97 MG/DL (ref 74–124)
HCT VFR BLD AUTO: 38.7 % (ref 34–46.6)
HGB BLD-MCNC: 12.9 G/DL (ref 12–15.9)
IMM GRANULOCYTES # BLD AUTO: 0 10*3/MM3 (ref 0–0.05)
IMM GRANULOCYTES NFR BLD AUTO: 0 % (ref 0–0.5)
LYMPHOCYTES # BLD AUTO: 1.25 10*3/MM3 (ref 0.7–3.1)
LYMPHOCYTES NFR BLD AUTO: 37.9 % (ref 19.6–45.3)
MAGNESIUM SERPL-MCNC: 1.8 MG/DL (ref 1.8–2.5)
MCH RBC QN AUTO: 33.8 PG (ref 26.6–33)
MCHC RBC AUTO-ENTMCNC: 33.3 G/DL (ref 31.5–35.7)
MCV RBC AUTO: 101.3 FL (ref 79–97)
MONOCYTES # BLD AUTO: 0.45 10*3/MM3 (ref 0.1–0.9)
MONOCYTES NFR BLD AUTO: 13.6 % (ref 5–12)
NEUTROPHILS NFR BLD AUTO: 1.33 10*3/MM3 (ref 1.7–7)
NEUTROPHILS NFR BLD AUTO: 40.3 % (ref 42.7–76)
NRBC BLD AUTO-RTO: 0 /100 WBC (ref 0–0.2)
PHOSPHATE SERPL-MCNC: 4.6 MG/DL (ref 2.5–4.5)
PLATELET # BLD AUTO: 199 10*3/MM3 (ref 140–450)
PMV BLD AUTO: 9.6 FL (ref 6–12)
POTASSIUM SERPL-SCNC: 3.8 MMOL/L (ref 3.5–4.7)
PROT SERPL-MCNC: 6.5 G/DL (ref 6.3–8)
RBC # BLD AUTO: 3.82 10*6/MM3 (ref 3.77–5.28)
SODIUM SERPL-SCNC: 138 MMOL/L (ref 134–145)
WBC # BLD AUTO: 3.3 10*3/MM3 (ref 3.4–10.8)

## 2021-01-13 PROCEDURE — 25010000002 CYANOCOBALAMIN PER 1000 MCG: Performed by: INTERNAL MEDICINE

## 2021-01-13 PROCEDURE — 84100 ASSAY OF PHOSPHORUS: CPT

## 2021-01-13 PROCEDURE — 96374 THER/PROPH/DIAG INJ IV PUSH: CPT

## 2021-01-13 PROCEDURE — 85025 COMPLETE CBC W/AUTO DIFF WBC: CPT

## 2021-01-13 PROCEDURE — 80053 COMPREHEN METABOLIC PANEL: CPT

## 2021-01-13 PROCEDURE — 96372 THER/PROPH/DIAG INJ SC/IM: CPT

## 2021-01-13 PROCEDURE — 82232 ASSAY OF BETA-2 PROTEIN: CPT | Performed by: INTERNAL MEDICINE

## 2021-01-13 PROCEDURE — 25010000002 ZOLEDRONIC ACID 4 MG/100ML SOLUTION: Performed by: INTERNAL MEDICINE

## 2021-01-13 PROCEDURE — 99214 OFFICE O/P EST MOD 30 MIN: CPT | Performed by: INTERNAL MEDICINE

## 2021-01-13 PROCEDURE — 83735 ASSAY OF MAGNESIUM: CPT

## 2021-01-13 RX ORDER — ZOLEDRONIC ACID 0.04 MG/ML
4 INJECTION, SOLUTION INTRAVENOUS ONCE
Status: COMPLETED | OUTPATIENT
Start: 2021-01-13 | End: 2021-01-13

## 2021-01-13 RX ORDER — ZOLEDRONIC ACID 0.04 MG/ML
4 INJECTION, SOLUTION INTRAVENOUS ONCE
Status: CANCELLED | OUTPATIENT
Start: 2021-01-13

## 2021-01-13 RX ORDER — LEVOTHYROXINE SODIUM 100 MCG
88 TABLET ORAL DAILY
COMMUNITY
Start: 2021-01-07 | End: 2022-12-21

## 2021-01-13 RX ORDER — SODIUM CHLORIDE 9 MG/ML
250 INJECTION, SOLUTION INTRAVENOUS ONCE
Status: COMPLETED | OUTPATIENT
Start: 2021-01-13 | End: 2021-01-13

## 2021-01-13 RX ORDER — SODIUM CHLORIDE 9 MG/ML
250 INJECTION, SOLUTION INTRAVENOUS ONCE
Status: CANCELLED | OUTPATIENT
Start: 2021-01-13

## 2021-01-13 RX ORDER — CYANOCOBALAMIN 1000 UG/ML
1000 INJECTION, SOLUTION INTRAMUSCULAR; SUBCUTANEOUS ONCE
Status: COMPLETED | OUTPATIENT
Start: 2021-01-13 | End: 2021-01-13

## 2021-01-13 RX ADMIN — ZOLEDRONIC ACID 4 MG: 0.04 INJECTION, SOLUTION INTRAVENOUS at 09:32

## 2021-01-13 RX ADMIN — SODIUM CHLORIDE 250 ML: 9 INJECTION, SOLUTION INTRAVENOUS at 09:19

## 2021-01-13 RX ADMIN — CYANOCOBALAMIN 1000 MCG: 1000 INJECTION, SOLUTION INTRAMUSCULAR; SUBCUTANEOUS at 09:36

## 2021-01-13 NOTE — PROGRESS NOTES
Subjective     REASON FOR CONSULTATION: Transfer of care for IgA kappa multiple myeloma post stem cell transplant in March 2016 at [facility] currently on maintenance Revlimid 10 mg 3 out of 4 weeks                               REQUESTING PHYSICIAN: MD Brett Turpin MD    History of Present Illness patient is a 70-year-old female with an IgA kappa high risk myeloma post stem cell transplant on maintenance Revlimid 10 mg 3 or 4 weeks alone after toxicity from Velcade and dexamethasone    Comes in for follow-up on her 3-month routine.  She has just completed 3 weeks of Revlimid and her counts are decent.  Her paraprotein is not detectable beta 2 microglobulin is normal l.  She has had no infections.  She continues with B12 injections monthly  Her blood counts have been doing good on her current dose of Revlimid      She is back today feeling well she has her usual aches and pains.  She has more pain after her Zometa injection for about a week but this is usual for her    She has had no infections or issues and is self isolating at home and practicing social distancing    She is scheduled to have a video visit with [facility] because of the coronavirus pandemic    Labs have been drawn and are normal with no paraprotein detected on her immunofixation over the last 3 months    She is also due for her 3-month Zometa today and her monthly B12 and we will schedule this for her  I asked her to see if her aching is more when she is on the Revlimid and it improves when she is off the Revlimid because the aching is not consistent and I do not feel that she needs a PET scan at this point    She asked about the coronavirus vaccine I told her she could have it whenever but preferably on the week off from her Revlimid    Past Medical History:   Diagnosis Date   • Anxiety    • Depression    • Disease of thyroid gland    • Hashimoto's disease    • History of vitamin D  deficiency    • Hypothyroidism    • IBS (irritable bowel syndrome)    • Mixed hyperlipidemia 3/12/2018   • Multiple myeloma (CMS/HCC)    • Myeloma (CMS/HCC)    • Osteopenia    • Overweight (BMI 25.0-29.9) 2/5/2018        Past Surgical History:   Procedure Laterality Date   • BREAST BIOPSY     • CATARACT EXTRACTION     • COLONOSCOPY     • TONSILLECTOMY  1956   • VEIN SURGERY  1991      patient is a 70-year-old female with IgA kappa multiple myeloma diagnosed in mid 2015-high risk because of gain of 1 q- treatment with rev Dex Velcade initiated in 11/15-went on to stem cell transplant at [facility] in March 2016?  Melphalan.  She started post transplant therapy in May 2016 with Velcade rev Dex.  In September after 4 cycles Velcade was decreased to every other week with plans to continue rev Dex Velcade till progression because of high risk status.  In May 2019 her Velcade was discontinued because of worsening neuropathy.  Patient has remained on Revlimid 10 mg 21 out of 28 days since then with stable disease until November 2019 when a small IgG kappa paraprotein was noted on her blood tests which is distinct from her usual IgA kappa myeloma.  Restaging with PET scan was normal and since then the paraprotein as of 3/10/2020 has resolved    Patient has significant issues with anxiety and states that she had trouble getting her Revlimid in a timely fashion was very frustrated with this process at the [facility] system-she states she was taken back when Dr. Hewitt suggested she transfer her care but is now happy to make a change    She is currently 2 days into her treatment cycle with Revlimid .she has no pain currently except intermittently in her neck where she has had some degenerative disease.  She does have neuropathy for which she is on fairly high doses of Cymbalta.  She is not taking her gabapentin.    Continues on acyclovir for prevention and baby aspirin because of the Revlimid  She was having trouble with  diarrhea from the Revlimid but this is controlled with WelChol.    She is  and lives by herself has no family nearby but good friends.  She does not smoke or drink.  She is up-to-date with mammography and does Cologuard instead of colonoscopy     have reviewed her labs in detail but cannot find any documentation of her transplant conditioning regimen (I assume it was melphalan)  or the depth of her response prior to transplant .  She states that the doctors at [facility] recommended to check her labs every month - she has been clinically BOZENA for 4 years and I suggested every other month testing but she is very adamant that she wants monthly testing      Current Outpatient Medications on File Prior to Visit   Medication Sig Dispense Refill   • Acetylcarn-Alpha Lipoic Acid 400-200 MG capsule Take  by mouth.     • acyclovir (ZOVIRAX) 400 MG tablet Take 400 mg by mouth 2 (Two) Times a Day.     • ALPRAZolam (XANAX) 0.25 MG tablet Take 1 tablet by mouth Daily As Needed for Anxiety. 30 tablet 2   • aspirin 81 MG chewable tablet Chew 81 mg Daily.     • B Complex Vitamins (VITAMIN B COMPLEX) capsule capsule Take 2 tablets by mouth Daily.     • baclofen (LIORESAL) 10 MG tablet Take 10 mg by mouth 3 (Three) Times a Day.  5   • calcium carbonate-vitamin d 600-400 MG-UNIT per tablet Take 1 tablet by mouth Daily.     • colesevelam (WELCHOL) 625 MG tablet Take  by mouth As Needed.     • cycloSPORINE (RESTASIS MULTIDOSE) 0.05 % ophthalmic emulsion 1 drop 2 (Two) Times a Day.     • diphenoxylate-atropine (LOMOTIL) 2.5-0.025 MG per tablet Take 1 tablet by mouth 3 (Three) Times a Day As Needed for Diarrhea. 90 tablet 3   • DULoxetine (CYMBALTA) 30 MG capsule Take 30 mg by mouth Daily.     • DULoxetine (CYMBALTA) 60 MG capsule Take 1 capsule by mouth Daily. 90 capsule 3   • fluticasone (FLONASE) 50 MCG/ACT nasal spray 2 sprays into each nostril Daily.     • folic acid (FOLVITE) 1 MG tablet Take 1,000 mcg by mouth Daily.  2   •  gabapentin (NEURONTIN) 100 MG capsule Take 100 mg by mouth Daily As Needed.     • HYDROcodone-acetaminophen (NORCO) 5-325 MG per tablet Take 1 tablet by mouth Every 6 (Six) Hours As Needed for Moderate Pain  or Severe Pain . 60 tablet 0   • lenalidomide (Revlimid) 10 MG capsule TAKE 1 CAPSULE DAILY FOR 21 DAYS ON THEN 7 DAYS OFF 21 capsule 0   • Multiple Vitamins-Iron (DAILY-JONI/IRON/BETA-CAROTENE) tablet Take 1 tablet by mouth Daily.  2   • omega-3 acid ethyl esters (LOVAZA) 1 g capsule Take 2 g by mouth.     • ondansetron (ZOFRAN) 8 MG tablet Take 8 mg by mouth.     • promethazine (PHENERGAN) 25 MG tablet TAKE 0.5-1 TABLETS BY MOUTH EVERY 6 (SIX) HOURS AS NEEDED FOR NAUSEA.     • Synthroid 100 MCG tablet      • vitamin D (ERGOCALCIFEROL) 1.25 MG (98687 UT) capsule capsule Take 1 capsule by mouth 1 (One) Time Per Week. 12 capsule 3   • vitamin D (ERGOCALCIFEROL) 86315 units capsule capsule Take 50,000 Units by mouth 1 (One) Time Per Week.     • [DISCONTINUED] SYNTHROID 112 MCG tablet        No current facility-administered medications on file prior to visit.         ALLERGIES:    No Known Allergies     Social History     Socioeconomic History   • Marital status:      Spouse name: Not on file   • Number of children: 2   • Years of education: Not on file   • Highest education level: Not on file   Occupational History     Employer: RETIRED   Tobacco Use   • Smoking status: Former Smoker     Types: Cigarettes   • Smokeless tobacco: Never Used   Substance and Sexual Activity   • Alcohol use: Yes     Comment: 2 per month    • Drug use: Never   • Sexual activity: Defer        Family History   Problem Relation Age of Onset   • Breast cancer Mother    • Lymphoma Father    • Kidney cancer Paternal Grandmother         Review of Systems   Constitutional: Positive for fatigue (little better ).   Respiratory: Negative for cough, choking, chest tightness and shortness of breath.    Cardiovascular: Negative for chest  "pain.   Gastrointestinal: Negative for abdominal pain, constipation, nausea and vomiting.   Musculoskeletal: Positive for neck pain.   Neurological: Positive for numbness (Den,foot/hand tingling no numbness) and headaches (same 5-5-2020 rupture disc).   Psychiatric/Behavioral: Negative for dysphoric mood. The patient is nervous/anxious (same ).    All other systems reviewed and are negative.      Objective     Vitals:    01/13/21 0757   Height: 169.8 cm (66.85\")     Current Status 1/13/2021   ECOG score 0       Physical Exam     CONSTITUTIONAL:  Vital signs reviewed.  No distress, looks comfortable.  EYES:  Conjunctiva and lids unremarkable.    RESPIRATORY:  Normal respiratory effort.  Lungs clear to auscultation bilaterally.  CARDIOVASCULAR:  Normal S1, S2.  No murmurs rubs or gallops.  No significant lower extremity edema.  I have reexamined the patient and the results are consistent with the previously documented exam. Yoni Garcia MD         RECENT LABS:  Hematology WBC   Date Value Ref Range Status   01/13/2021 3.30 (L) 3.40 - 10.80 10*3/mm3 Final   03/10/2020 4.27 (L) 4.5 - 11.0 10*3/uL Final     RBC   Date Value Ref Range Status   01/13/2021 3.82 3.77 - 5.28 10*6/mm3 Final   03/10/2020 3.80 (L) 4.0 - 5.2 10*6/uL Final     Hemoglobin   Date Value Ref Range Status   01/13/2021 12.9 12.0 - 15.9 g/dL Final   03/10/2020 12.9 12.0 - 16.0 g/dL Final     Hematocrit   Date Value Ref Range Status   01/13/2021 38.7 34.0 - 46.6 % Final   03/10/2020 38.5 36.0 - 46.0 % Final     Platelets   Date Value Ref Range Status   01/13/2021 199 140 - 450 10*3/mm3 Final   03/10/2020 181 140 - 440 10*3/uL Final                  Assessment/Plan   1.  IgA kappa multiple myeloma diagnosed in 2015 treated with RVD  · Stem cell transplant at [facility] in 3/2016  · Maintenance treatment with Velcade rev Dex started in 5/16  · Velcade discontinued because of neurotoxicity in 5/19  · Small IgG kappa paraprotein seen on blood work in " 10/19-PET scan negative protein disappeared by 3/20  · Zometa given every 3 months   · Acyclovir and aspirin prophylaxis    2.  Anxiety  3.  Hypothyroidism on treatment  4.  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  Diarrhea due to treatment improved with WelChol    Plan  1.  Continue 10 mg of Revlimid 3 out of 4 weeks  2.continue monthly B12 and myeloma labs WILLIAM plus beta-2 microglobulin)  3.  Zometa every 3 months dose today  4.  See me in 3 months for follow-up .  With 24-hour urine done in 2 months    She is asking about 8 PET scan and I told her at this point with no apparent detectable paraprotein I do not think this is indicated    Standard precautions discussed regarding the Novel Coronavirus (COVID-19)  including patient to limit contact with others outside of home, frequent handwashing and using alcohol gel when unable to use soap and water, as well to monitoring for symptoms and notifying our office via telephone for any symptoms or exposures.   I told her she could have the vaccine whenever it was available for

## 2021-01-14 ENCOUNTER — MEDICATION THERAPY MANAGEMENT (OUTPATIENT)
Dept: PHARMACY | Facility: HOSPITAL | Age: 71
End: 2021-01-14

## 2021-01-14 LAB
ALBUMIN SERPL ELPH-MCNC: 3.5 G/DL (ref 2.9–4.4)
ALBUMIN/GLOB SERPL: 1.4 {RATIO} (ref 0.7–1.7)
ALPHA1 GLOB SERPL ELPH-MCNC: 0.2 G/DL (ref 0–0.4)
ALPHA2 GLOB SERPL ELPH-MCNC: 0.7 G/DL (ref 0.4–1)
B-GLOBULIN SERPL ELPH-MCNC: 0.9 G/DL (ref 0.7–1.3)
GAMMA GLOB SERPL ELPH-MCNC: 0.7 G/DL (ref 0.4–1.8)
GLOBULIN SER-MCNC: 2.6 G/DL (ref 2.2–3.9)
IGA SERPL-MCNC: 70 MG/DL (ref 64–422)
IGG SERPL-MCNC: 780 MG/DL (ref 586–1602)
IGM SERPL-MCNC: 17 MG/DL (ref 26–217)
INTERPRETATION SERPL IEP-IMP: ABNORMAL
KAPPA LC FREE SER-MCNC: 23.8 MG/L (ref 3.3–19.4)
KAPPA LC FREE/LAMBDA FREE SER: 1.25 {RATIO} (ref 0.26–1.65)
LABORATORY COMMENT REPORT: ABNORMAL
LAMBDA LC FREE SERPL-MCNC: 19.1 MG/L (ref 5.7–26.3)
M PROTEIN SERPL ELPH-MCNC: ABNORMAL G/DL
PROT SERPL-MCNC: 6.1 G/DL (ref 6–8.5)

## 2021-01-14 NOTE — PROGRESS NOTES
[facility] Lab Review- Revlimid      1/13/2021   WBC 3.40 - 10.80 10*3/mm3 3.30 (A)   Neutrophils Absolute 1.70 - 7.00 10*3/mm3 1.33 (A)   Hemoglobin 12.0 - 15.9 g/dL 12.9   Hematocrit 34.0 - 46.6 % 38.7   Platelets 140 - 450 10*3/mm3 199   Creatinine 0.60 - 1.10 mg/dL 0.86   eGFR Non African Am >60 mL/min/1.73 65   BUN 6 - 20 mg/dL 15   Sodium 134 - 145 mmol/L 138   Potassium 3.5 - 4.7 mmol/L 3.8   Glucose 74 - 124 mg/dL 97   Magnesium 1.8 - 2.5 mg/dL 1.8   Calcium 8.5 - 10.2 mg/dL 8.8   Albumin 3.50 - 5.20 g/dL 4.00   Total Protein 6.3 - 8.0 g/dL 6.5   AST (SGOT) 0 - 32 U/L 15   ALT (SGPT) 0 - 33 U/L 14   Alkaline Phosphatase 38 - 116 U/L 53   Total Bilirubin 0.2 - 1.2 mg/dL 0.3   Phosphorus 2.5 - 4.5 mg/dL 4.6 (A)     MD dictation noted. Pharmacy will continue to follow.     Thanks,   Sandra Hernandez, PharmD

## 2021-01-21 RX ORDER — LENALIDOMIDE 10 MG/1
CAPSULE ORAL
Qty: 21 CAPSULE | Refills: 0 | Status: SHIPPED | OUTPATIENT
Start: 2021-01-21 | End: 2021-02-11 | Stop reason: SDUPTHER

## 2021-01-21 NOTE — TELEPHONE ENCOUNTER
Pt is on my calendar to refill Revlimid. Per last office note from Dr Garcia-pt is to continue 10 mg 3 out of 4 weeks. Her last delivery was on 1/7/2021.    I have routed the rx to Dr Garcia. Once signed it will be escribed to [facility].

## 2021-02-09 ENCOUNTER — APPOINTMENT (OUTPATIENT)
Dept: ONCOLOGY | Facility: HOSPITAL | Age: 71
End: 2021-02-09

## 2021-02-09 ENCOUNTER — LAB (OUTPATIENT)
Dept: LAB | Facility: HOSPITAL | Age: 71
End: 2021-02-09

## 2021-02-09 ENCOUNTER — INFUSION (OUTPATIENT)
Dept: ONCOLOGY | Facility: HOSPITAL | Age: 71
End: 2021-02-09

## 2021-02-09 ENCOUNTER — APPOINTMENT (OUTPATIENT)
Dept: LAB | Facility: HOSPITAL | Age: 71
End: 2021-02-09

## 2021-02-09 DIAGNOSIS — C90.00 MULTIPLE MYELOMA NOT HAVING ACHIEVED REMISSION (HCC): ICD-10-CM

## 2021-02-09 DIAGNOSIS — C90.00 NEUROPATHY ASSOCIATED WITH MULTIPLE MYELOMA (HCC): Primary | ICD-10-CM

## 2021-02-09 DIAGNOSIS — G63 NEUROPATHY ASSOCIATED WITH MULTIPLE MYELOMA (HCC): Primary | ICD-10-CM

## 2021-02-09 DIAGNOSIS — M50.30 DDD (DEGENERATIVE DISC DISEASE), CERVICAL: ICD-10-CM

## 2021-02-09 LAB
ALBUMIN SERPL-MCNC: 3.9 G/DL (ref 3.5–5.2)
ALBUMIN/GLOB SERPL: 1.6 G/DL (ref 1.1–2.4)
ALP SERPL-CCNC: 59 U/L (ref 38–116)
ALT SERPL W P-5'-P-CCNC: 14 U/L (ref 0–33)
ANION GAP SERPL CALCULATED.3IONS-SCNC: 8.8 MMOL/L (ref 5–15)
AST SERPL-CCNC: 13 U/L (ref 0–32)
B2 MICROGLOB SERPL-MCNC: 1.9 MG/L (ref 0.8–2.2)
BASOPHILS # BLD AUTO: 0.04 10*3/MM3 (ref 0–0.2)
BASOPHILS NFR BLD AUTO: 1.1 % (ref 0–1.5)
BILIRUB SERPL-MCNC: 0.2 MG/DL (ref 0.2–1.2)
BUN SERPL-MCNC: 12 MG/DL (ref 6–20)
BUN/CREAT SERPL: 13.8 (ref 7.3–30)
CALCIUM SPEC-SCNC: 9.1 MG/DL (ref 8.5–10.2)
CHLORIDE SERPL-SCNC: 101 MMOL/L (ref 98–107)
CO2 SERPL-SCNC: 29.2 MMOL/L (ref 22–29)
CREAT SERPL-MCNC: 0.87 MG/DL (ref 0.6–1.1)
DEPRECATED RDW RBC AUTO: 51.6 FL (ref 37–54)
EOSINOPHIL # BLD AUTO: 0.22 10*3/MM3 (ref 0–0.4)
EOSINOPHIL NFR BLD AUTO: 5.9 % (ref 0.3–6.2)
ERYTHROCYTE [DISTWIDTH] IN BLOOD BY AUTOMATED COUNT: 13.9 % (ref 12.3–15.4)
GFR SERPL CREATININE-BSD FRML MDRD: 64 ML/MIN/1.73
GLOBULIN UR ELPH-MCNC: 2.5 GM/DL (ref 1.8–3.5)
GLUCOSE SERPL-MCNC: 105 MG/DL (ref 74–124)
HCT VFR BLD AUTO: 38.3 % (ref 34–46.6)
HGB BLD-MCNC: 12.7 G/DL (ref 12–15.9)
IMM GRANULOCYTES # BLD AUTO: 0.01 10*3/MM3 (ref 0–0.05)
IMM GRANULOCYTES NFR BLD AUTO: 0.3 % (ref 0–0.5)
LYMPHOCYTES # BLD AUTO: 1.72 10*3/MM3 (ref 0.7–3.1)
LYMPHOCYTES NFR BLD AUTO: 46.4 % (ref 19.6–45.3)
MAGNESIUM SERPL-MCNC: 1.9 MG/DL (ref 1.8–2.5)
MCH RBC QN AUTO: 33.7 PG (ref 26.6–33)
MCHC RBC AUTO-ENTMCNC: 33.2 G/DL (ref 31.5–35.7)
MCV RBC AUTO: 101.6 FL (ref 79–97)
MONOCYTES # BLD AUTO: 0.58 10*3/MM3 (ref 0.1–0.9)
MONOCYTES NFR BLD AUTO: 15.6 % (ref 5–12)
NEUTROPHILS NFR BLD AUTO: 1.14 10*3/MM3 (ref 1.7–7)
NEUTROPHILS NFR BLD AUTO: 30.7 % (ref 42.7–76)
NRBC BLD AUTO-RTO: 0 /100 WBC (ref 0–0.2)
PHOSPHATE SERPL-MCNC: 4.4 MG/DL (ref 2.5–4.5)
PLATELET # BLD AUTO: 180 10*3/MM3 (ref 140–450)
PMV BLD AUTO: 9 FL (ref 6–12)
POTASSIUM SERPL-SCNC: 4.3 MMOL/L (ref 3.5–4.7)
PROT SERPL-MCNC: 6.4 G/DL (ref 6.3–8)
RBC # BLD AUTO: 3.77 10*6/MM3 (ref 3.77–5.28)
SODIUM SERPL-SCNC: 139 MMOL/L (ref 134–145)
WBC # BLD AUTO: 3.71 10*3/MM3 (ref 3.4–10.8)

## 2021-02-09 PROCEDURE — 84100 ASSAY OF PHOSPHORUS: CPT

## 2021-02-09 PROCEDURE — 96372 THER/PROPH/DIAG INJ SC/IM: CPT

## 2021-02-09 PROCEDURE — 80053 COMPREHEN METABOLIC PANEL: CPT

## 2021-02-09 PROCEDURE — 36415 COLL VENOUS BLD VENIPUNCTURE: CPT

## 2021-02-09 PROCEDURE — 25010000002 CYANOCOBALAMIN PER 1000 MCG: Performed by: INTERNAL MEDICINE

## 2021-02-09 PROCEDURE — 83735 ASSAY OF MAGNESIUM: CPT

## 2021-02-09 PROCEDURE — 85025 COMPLETE CBC W/AUTO DIFF WBC: CPT

## 2021-02-09 PROCEDURE — 82232 ASSAY OF BETA-2 PROTEIN: CPT | Performed by: INTERNAL MEDICINE

## 2021-02-09 RX ORDER — HYDROCODONE BITARTRATE AND ACETAMINOPHEN 5; 325 MG/1; MG/1
1 TABLET ORAL EVERY 6 HOURS PRN
Qty: 60 TABLET | Refills: 0 | Status: SHIPPED | OUTPATIENT
Start: 2021-02-09

## 2021-02-09 RX ORDER — CYANOCOBALAMIN 1000 UG/ML
1000 INJECTION, SOLUTION INTRAMUSCULAR; SUBCUTANEOUS ONCE
Status: COMPLETED | OUTPATIENT
Start: 2021-02-09 | End: 2021-02-09

## 2021-02-09 RX ADMIN — CYANOCOBALAMIN 1000 MCG: 1000 INJECTION, SOLUTION INTRAMUSCULAR at 13:33

## 2021-02-10 ENCOUNTER — MEDICATION THERAPY MANAGEMENT (OUTPATIENT)
Dept: PHARMACY | Facility: HOSPITAL | Age: 71
End: 2021-02-10

## 2021-02-10 LAB
ALBUMIN SERPL ELPH-MCNC: 3.5 G/DL (ref 2.9–4.4)
ALBUMIN/GLOB SERPL: 1.3 {RATIO} (ref 0.7–1.7)
ALPHA1 GLOB SERPL ELPH-MCNC: 0.2 G/DL (ref 0–0.4)
ALPHA2 GLOB SERPL ELPH-MCNC: 0.8 G/DL (ref 0.4–1)
B-GLOBULIN SERPL ELPH-MCNC: 1.1 G/DL (ref 0.7–1.3)
GAMMA GLOB SERPL ELPH-MCNC: 0.9 G/DL (ref 0.4–1.8)
GLOBULIN SER-MCNC: 2.9 G/DL (ref 2.2–3.9)
IGA SERPL-MCNC: 70 MG/DL (ref 64–422)
IGG SERPL-MCNC: 750 MG/DL (ref 586–1602)
IGM SERPL-MCNC: 16 MG/DL (ref 26–217)
INTERPRETATION SERPL IEP-IMP: ABNORMAL
KAPPA LC FREE SER-MCNC: 25.1 MG/L (ref 3.3–19.4)
KAPPA LC FREE/LAMBDA FREE SER: 1.26 {RATIO} (ref 0.26–1.65)
LABORATORY COMMENT REPORT: ABNORMAL
LAMBDA LC FREE SERPL-MCNC: 20 MG/L (ref 5.7–26.3)
M PROTEIN SERPL ELPH-MCNC: ABNORMAL G/DL
PROT SERPL-MCNC: 6.4 G/DL (ref 6–8.5)

## 2021-02-10 NOTE — PROGRESS NOTES
[facility] Lab Review- Revlimid      2/9/2021   WBC 3.40 - 10.80 10*3/mm3 3.71   Neutrophils Absolute 1.70 - 7.00 10*3/mm3 1.14 (A)   Hemoglobin 12.0 - 15.9 g/dL 12.7   Hematocrit 34.0 - 46.6 % 38.3   Platelets 140 - 450 10*3/mm3 180   Creatinine 0.60 - 1.10 mg/dL 0.87   eGFR Non African Am >60 mL/min/1.73 64   BUN 6 - 20 mg/dL 12   Sodium 134 - 145 mmol/L 139   Potassium 3.5 - 4.7 mmol/L 4.3   Glucose 74 - 124 mg/dL 105   Magnesium 1.8 - 2.5 mg/dL 1.9   Calcium 8.5 - 10.2 mg/dL 9.1   Albumin 3.50 - 5.20 g/dL 3.90   Total Protein 6.3 - 8.0 g/dL 6.4   AST (SGOT) 0 - 32 U/L 13   ALT (SGPT) 0 - 33 U/L 14   Alkaline Phosphatase 38 - 116 U/L 59   Total Bilirubin 0.2 - 1.2 mg/dL 0.2   Phosphorus 2.5 - 4.5 mg/dL 4.4     Pharmacy will continue to follow.     Thanks,   Sandra Hernandez, PharmD

## 2021-02-11 RX ORDER — LENALIDOMIDE 10 MG/1
CAPSULE ORAL
Qty: 21 CAPSULE | Refills: 0 | Status: SHIPPED | OUTPATIENT
Start: 2021-02-11 | End: 2021-03-19 | Stop reason: SDUPTHER

## 2021-02-11 NOTE — TELEPHONE ENCOUNTER
Pt is on my calendar to refill Revlimid. Per last office note-pt is to continue 10 mg 3 out of 4 weeks. Her last delivery was on 1/29/2021.    I have routed the rx to Dr Garcia for signature. Once signed it will be escribed to [facility]

## 2021-02-24 RX ORDER — LENALIDOMIDE 10 MG/1
CAPSULE ORAL
Refills: 0 | OUTPATIENT
Start: 2021-02-24

## 2021-02-24 NOTE — TELEPHONE ENCOUNTER
Revlimid refill request rec electronically from [facility]. There was a new rx sent on 2/11/2021 and I confirmed with [facility] this was rec and it is ready to schedule with pt. Request refused-responded to by other means

## 2021-03-02 DIAGNOSIS — Z23 IMMUNIZATION DUE: ICD-10-CM

## 2021-03-09 ENCOUNTER — APPOINTMENT (OUTPATIENT)
Dept: ONCOLOGY | Facility: HOSPITAL | Age: 71
End: 2021-03-09

## 2021-03-09 ENCOUNTER — APPOINTMENT (OUTPATIENT)
Dept: LAB | Facility: HOSPITAL | Age: 71
End: 2021-03-09

## 2021-03-10 ENCOUNTER — LAB (OUTPATIENT)
Dept: LAB | Facility: HOSPITAL | Age: 71
End: 2021-03-10

## 2021-03-10 ENCOUNTER — INFUSION (OUTPATIENT)
Dept: ONCOLOGY | Facility: HOSPITAL | Age: 71
End: 2021-03-10

## 2021-03-10 ENCOUNTER — MEDICATION THERAPY MANAGEMENT (OUTPATIENT)
Dept: PHARMACY | Facility: HOSPITAL | Age: 71
End: 2021-03-10

## 2021-03-10 DIAGNOSIS — C90.00 NEUROPATHY ASSOCIATED WITH MULTIPLE MYELOMA (HCC): Primary | ICD-10-CM

## 2021-03-10 DIAGNOSIS — G63 NEUROPATHY ASSOCIATED WITH MULTIPLE MYELOMA (HCC): Primary | ICD-10-CM

## 2021-03-10 DIAGNOSIS — C90.00 MULTIPLE MYELOMA NOT HAVING ACHIEVED REMISSION (HCC): ICD-10-CM

## 2021-03-10 LAB
ALBUMIN SERPL-MCNC: 4.3 G/DL (ref 3.5–5.2)
ALBUMIN/GLOB SERPL: 1.7 G/DL (ref 1.1–2.4)
ALP SERPL-CCNC: 63 U/L (ref 38–116)
ALT SERPL W P-5'-P-CCNC: 15 U/L (ref 0–33)
ANION GAP SERPL CALCULATED.3IONS-SCNC: 10.5 MMOL/L (ref 5–15)
AST SERPL-CCNC: 18 U/L (ref 0–32)
B2 MICROGLOB SERPL-MCNC: 1.8 MG/L (ref 0.8–2.2)
BASOPHILS # BLD AUTO: 0.04 10*3/MM3 (ref 0–0.2)
BASOPHILS NFR BLD AUTO: 1.1 % (ref 0–1.5)
BILIRUB SERPL-MCNC: 0.4 MG/DL (ref 0.2–1.2)
BUN SERPL-MCNC: 10 MG/DL (ref 6–20)
BUN/CREAT SERPL: 12.7 (ref 7.3–30)
CALCIUM SPEC-SCNC: 8.9 MG/DL (ref 8.5–10.2)
CHLORIDE SERPL-SCNC: 99 MMOL/L (ref 98–107)
CO2 SERPL-SCNC: 26.5 MMOL/L (ref 22–29)
CREAT SERPL-MCNC: 0.79 MG/DL (ref 0.6–1.1)
DEPRECATED RDW RBC AUTO: 50.5 FL (ref 37–54)
EOSINOPHIL # BLD AUTO: 0.24 10*3/MM3 (ref 0–0.4)
EOSINOPHIL NFR BLD AUTO: 6.4 % (ref 0.3–6.2)
ERYTHROCYTE [DISTWIDTH] IN BLOOD BY AUTOMATED COUNT: 13.8 % (ref 12.3–15.4)
GFR SERPL CREATININE-BSD FRML MDRD: 72 ML/MIN/1.73
GLOBULIN UR ELPH-MCNC: 2.6 GM/DL (ref 1.8–3.5)
GLUCOSE SERPL-MCNC: 105 MG/DL (ref 74–124)
HCT VFR BLD AUTO: 41.1 % (ref 34–46.6)
HGB BLD-MCNC: 14 G/DL (ref 12–15.9)
IMM GRANULOCYTES # BLD AUTO: 0.01 10*3/MM3 (ref 0–0.05)
IMM GRANULOCYTES NFR BLD AUTO: 0.3 % (ref 0–0.5)
LYMPHOCYTES # BLD AUTO: 1.68 10*3/MM3 (ref 0.7–3.1)
LYMPHOCYTES NFR BLD AUTO: 44.7 % (ref 19.6–45.3)
MCH RBC QN AUTO: 33.7 PG (ref 26.6–33)
MCHC RBC AUTO-ENTMCNC: 34.1 G/DL (ref 31.5–35.7)
MCV RBC AUTO: 98.8 FL (ref 79–97)
MONOCYTES # BLD AUTO: 0.47 10*3/MM3 (ref 0.1–0.9)
MONOCYTES NFR BLD AUTO: 12.5 % (ref 5–12)
NEUTROPHILS NFR BLD AUTO: 1.32 10*3/MM3 (ref 1.7–7)
NEUTROPHILS NFR BLD AUTO: 35 % (ref 42.7–76)
NRBC BLD AUTO-RTO: 0 /100 WBC (ref 0–0.2)
PLATELET # BLD AUTO: 198 10*3/MM3 (ref 140–450)
PMV BLD AUTO: 9.2 FL (ref 6–12)
POTASSIUM SERPL-SCNC: 4.1 MMOL/L (ref 3.5–4.7)
PROT SERPL-MCNC: 6.9 G/DL (ref 6.3–8)
RBC # BLD AUTO: 4.16 10*6/MM3 (ref 3.77–5.28)
SODIUM SERPL-SCNC: 136 MMOL/L (ref 134–145)
WBC # BLD AUTO: 3.76 10*3/MM3 (ref 3.4–10.8)

## 2021-03-10 PROCEDURE — 25010000002 CYANOCOBALAMIN PER 1000 MCG: Performed by: INTERNAL MEDICINE

## 2021-03-10 PROCEDURE — 85025 COMPLETE CBC W/AUTO DIFF WBC: CPT

## 2021-03-10 PROCEDURE — 80053 COMPREHEN METABOLIC PANEL: CPT

## 2021-03-10 PROCEDURE — 82232 ASSAY OF BETA-2 PROTEIN: CPT | Performed by: INTERNAL MEDICINE

## 2021-03-10 PROCEDURE — 96372 THER/PROPH/DIAG INJ SC/IM: CPT

## 2021-03-10 PROCEDURE — 36415 COLL VENOUS BLD VENIPUNCTURE: CPT

## 2021-03-10 RX ORDER — CYANOCOBALAMIN 1000 UG/ML
1000 INJECTION, SOLUTION INTRAMUSCULAR; SUBCUTANEOUS ONCE
Status: COMPLETED | OUTPATIENT
Start: 2021-03-10 | End: 2021-03-10

## 2021-03-10 RX ADMIN — CYANOCOBALAMIN 1000 MCG: 1000 INJECTION, SOLUTION INTRAMUSCULAR at 10:16

## 2021-03-10 NOTE — PROGRESS NOTES
[facility] Lab Review- Revlimid      3/10/2021   WBC 3.40 - 10.80 10*3/mm3 3.76   Neutrophils Absolute 1.70 - 7.00 10*3/mm3 1.32 (A)   Hemoglobin 12.0 - 15.9 g/dL 14.0   Hematocrit 34.0 - 46.6 % 41.1   Platelets 140 - 450 10*3/mm3 198   Creatinine 0.60 - 1.10 mg/dL 0.79   eGFR Non African Am >60 mL/min/1.73 72   BUN 6 - 20 mg/dL 10   Sodium 134 - 145 mmol/L 136   Potassium 3.5 - 4.7 mmol/L 4.1   Glucose 74 - 124 mg/dL 105   Calcium 8.5 - 10.2 mg/dL 8.9   Albumin 3.50 - 5.20 g/dL 4.30   Total Protein 6.3 - 8.0 g/dL 6.9   AST (SGOT) 0 - 32 U/L 18   ALT (SGPT) 0 - 33 U/L 15   Alkaline Phosphatase 38 - 116 U/L 63   Total Bilirubin 0.2 - 1.2 mg/dL 0.4     Thanks,   Sandra Hernandez, PharmD

## 2021-03-11 LAB
ALBUMIN SERPL ELPH-MCNC: 3.7 G/DL (ref 2.9–4.4)
ALBUMIN/GLOB SERPL: 1.3 {RATIO} (ref 0.7–1.7)
ALPHA1 GLOB SERPL ELPH-MCNC: 0.2 G/DL (ref 0–0.4)
ALPHA2 GLOB SERPL ELPH-MCNC: 0.8 G/DL (ref 0.4–1)
B-GLOBULIN SERPL ELPH-MCNC: 1.1 G/DL (ref 0.7–1.3)
GAMMA GLOB SERPL ELPH-MCNC: 0.8 G/DL (ref 0.4–1.8)
GLOBULIN SER-MCNC: 2.9 G/DL (ref 2.2–3.9)
IGA SERPL-MCNC: 87 MG/DL (ref 64–422)
IGG SERPL-MCNC: 837 MG/DL (ref 586–1602)
IGM SERPL-MCNC: 17 MG/DL (ref 26–217)
INTERPRETATION SERPL IEP-IMP: ABNORMAL
KAPPA LC FREE SER-MCNC: 22.4 MG/L (ref 3.3–19.4)
KAPPA LC FREE/LAMBDA FREE SER: 1.24 {RATIO} (ref 0.26–1.65)
LABORATORY COMMENT REPORT: ABNORMAL
LAMBDA LC FREE SERPL-MCNC: 18.1 MG/L (ref 5.7–26.3)
M PROTEIN SERPL ELPH-MCNC: ABNORMAL G/DL
PROT SERPL-MCNC: 6.6 G/DL (ref 6–8.5)

## 2021-03-19 RX ORDER — LENALIDOMIDE 10 MG/1
CAPSULE ORAL
Qty: 21 CAPSULE | Refills: 0 | Status: SHIPPED | OUTPATIENT
Start: 2021-03-19 | End: 2021-04-15 | Stop reason: SDUPTHER

## 2021-03-23 RX ORDER — LENALIDOMIDE 10 MG/1
CAPSULE ORAL
Refills: 0 | OUTPATIENT
Start: 2021-03-23

## 2021-03-23 NOTE — TELEPHONE ENCOUNTER
Revlimid refill request rec electronically from [facility]. New rx was sent on 3/19/2021 and [facility] confirmed they have rec this request.     Request denied.

## 2021-04-06 ENCOUNTER — LAB (OUTPATIENT)
Dept: ONCOLOGY | Facility: HOSPITAL | Age: 71
End: 2021-04-06

## 2021-04-06 ENCOUNTER — OFFICE VISIT (OUTPATIENT)
Dept: ONCOLOGY | Facility: CLINIC | Age: 71
End: 2021-04-06

## 2021-04-06 ENCOUNTER — INFUSION (OUTPATIENT)
Dept: ONCOLOGY | Facility: HOSPITAL | Age: 71
End: 2021-04-06

## 2021-04-06 VITALS
BODY MASS INDEX: 27.4 KG/M2 | RESPIRATION RATE: 16 BRPM | OXYGEN SATURATION: 96 % | DIASTOLIC BLOOD PRESSURE: 76 MMHG | HEIGHT: 67 IN | SYSTOLIC BLOOD PRESSURE: 121 MMHG | HEART RATE: 72 BPM | TEMPERATURE: 97.3 F | WEIGHT: 174.6 LBS

## 2021-04-06 DIAGNOSIS — K52.1 DIARRHEA DUE TO DRUG: ICD-10-CM

## 2021-04-06 DIAGNOSIS — Z94.84 HISTORY OF AUTO STEM CELL TRANSPLANT (HCC): ICD-10-CM

## 2021-04-06 DIAGNOSIS — C90.00 NEUROPATHY ASSOCIATED WITH MULTIPLE MYELOMA (HCC): ICD-10-CM

## 2021-04-06 DIAGNOSIS — C90.00 MULTIPLE MYELOMA NOT HAVING ACHIEVED REMISSION (HCC): Primary | ICD-10-CM

## 2021-04-06 DIAGNOSIS — G63 NEUROPATHY ASSOCIATED WITH MULTIPLE MYELOMA (HCC): ICD-10-CM

## 2021-04-06 DIAGNOSIS — C90.00 MULTIPLE MYELOMA NOT HAVING ACHIEVED REMISSION (HCC): ICD-10-CM

## 2021-04-06 LAB
ALBUMIN SERPL-MCNC: 3.9 G/DL (ref 3.5–5.2)
ALBUMIN/GLOB SERPL: 1.5 G/DL (ref 1.1–2.4)
ALP SERPL-CCNC: 58 U/L (ref 38–116)
ALT SERPL W P-5'-P-CCNC: 18 U/L (ref 0–33)
ANION GAP SERPL CALCULATED.3IONS-SCNC: 9.2 MMOL/L (ref 5–15)
AST SERPL-CCNC: 20 U/L (ref 0–32)
BASOPHILS # BLD AUTO: 0.04 10*3/MM3 (ref 0–0.2)
BASOPHILS NFR BLD AUTO: 1 % (ref 0–1.5)
BILIRUB SERPL-MCNC: 0.3 MG/DL (ref 0.2–1.2)
BUN SERPL-MCNC: 9 MG/DL (ref 6–20)
BUN/CREAT SERPL: 11.8 (ref 7.3–30)
CALCIUM SPEC-SCNC: 9 MG/DL (ref 8.5–10.2)
CHLORIDE SERPL-SCNC: 100 MMOL/L (ref 98–107)
CO2 SERPL-SCNC: 27.8 MMOL/L (ref 22–29)
CREAT SERPL-MCNC: 0.76 MG/DL (ref 0.6–1.1)
DEPRECATED RDW RBC AUTO: 53.1 FL (ref 37–54)
EOSINOPHIL # BLD AUTO: 0.22 10*3/MM3 (ref 0–0.4)
EOSINOPHIL NFR BLD AUTO: 5.3 % (ref 0.3–6.2)
ERYTHROCYTE [DISTWIDTH] IN BLOOD BY AUTOMATED COUNT: 14.2 % (ref 12.3–15.4)
GFR SERPL CREATININE-BSD FRML MDRD: 75 ML/MIN/1.73
GLOBULIN UR ELPH-MCNC: 2.6 GM/DL (ref 1.8–3.5)
GLUCOSE SERPL-MCNC: 123 MG/DL (ref 74–124)
HCT VFR BLD AUTO: 38.9 % (ref 34–46.6)
HGB BLD-MCNC: 12.6 G/DL (ref 12–15.9)
IMM GRANULOCYTES # BLD AUTO: 0 10*3/MM3 (ref 0–0.05)
IMM GRANULOCYTES NFR BLD AUTO: 0 % (ref 0–0.5)
LYMPHOCYTES # BLD AUTO: 1.53 10*3/MM3 (ref 0.7–3.1)
LYMPHOCYTES NFR BLD AUTO: 37 % (ref 19.6–45.3)
MAGNESIUM SERPL-MCNC: 1.7 MG/DL (ref 1.8–2.5)
MCH RBC QN AUTO: 33.2 PG (ref 26.6–33)
MCHC RBC AUTO-ENTMCNC: 32.4 G/DL (ref 31.5–35.7)
MCV RBC AUTO: 102.4 FL (ref 79–97)
MONOCYTES # BLD AUTO: 0.68 10*3/MM3 (ref 0.1–0.9)
MONOCYTES NFR BLD AUTO: 16.5 % (ref 5–12)
NEUTROPHILS NFR BLD AUTO: 1.66 10*3/MM3 (ref 1.7–7)
NEUTROPHILS NFR BLD AUTO: 40.2 % (ref 42.7–76)
NRBC BLD AUTO-RTO: 0 /100 WBC (ref 0–0.2)
PHOSPHATE SERPL-MCNC: 3.4 MG/DL (ref 2.5–4.5)
PLATELET # BLD AUTO: 197 10*3/MM3 (ref 140–450)
PMV BLD AUTO: 9.1 FL (ref 6–12)
POTASSIUM SERPL-SCNC: 3.9 MMOL/L (ref 3.5–4.7)
PROT SERPL-MCNC: 6.5 G/DL (ref 6.3–8)
RBC # BLD AUTO: 3.8 10*6/MM3 (ref 3.77–5.28)
SODIUM SERPL-SCNC: 137 MMOL/L (ref 134–145)
WBC # BLD AUTO: 4.13 10*3/MM3 (ref 3.4–10.8)

## 2021-04-06 PROCEDURE — 25010000002 CYANOCOBALAMIN PER 1000 MCG: Performed by: INTERNAL MEDICINE

## 2021-04-06 PROCEDURE — 96365 THER/PROPH/DIAG IV INF INIT: CPT

## 2021-04-06 PROCEDURE — 96372 THER/PROPH/DIAG INJ SC/IM: CPT

## 2021-04-06 PROCEDURE — 25010000002 ZOLEDRONIC ACID 4 MG/100ML SOLUTION: Performed by: INTERNAL MEDICINE

## 2021-04-06 PROCEDURE — 85025 COMPLETE CBC W/AUTO DIFF WBC: CPT

## 2021-04-06 PROCEDURE — 96374 THER/PROPH/DIAG INJ IV PUSH: CPT

## 2021-04-06 PROCEDURE — 83735 ASSAY OF MAGNESIUM: CPT

## 2021-04-06 PROCEDURE — 80053 COMPREHEN METABOLIC PANEL: CPT

## 2021-04-06 PROCEDURE — 84100 ASSAY OF PHOSPHORUS: CPT

## 2021-04-06 PROCEDURE — 99214 OFFICE O/P EST MOD 30 MIN: CPT | Performed by: INTERNAL MEDICINE

## 2021-04-06 RX ORDER — GABAPENTIN 100 MG/1
100 CAPSULE ORAL DAILY PRN
Qty: 60 CAPSULE | Refills: 1 | Status: SHIPPED | OUTPATIENT
Start: 2021-04-06 | End: 2022-04-05 | Stop reason: SDUPTHER

## 2021-04-06 RX ORDER — ZOLEDRONIC ACID 0.04 MG/ML
4 INJECTION, SOLUTION INTRAVENOUS ONCE
Status: COMPLETED | OUTPATIENT
Start: 2021-04-06 | End: 2021-04-06

## 2021-04-06 RX ORDER — SODIUM CHLORIDE 9 MG/ML
250 INJECTION, SOLUTION INTRAVENOUS ONCE
Status: CANCELLED | OUTPATIENT
Start: 2021-04-06

## 2021-04-06 RX ORDER — COLESEVELAM 180 1/1
1250 TABLET ORAL 2 TIMES DAILY WITH MEALS
Qty: 180 TABLET | Refills: 2 | Status: SHIPPED | OUTPATIENT
Start: 2021-04-06 | End: 2022-04-05 | Stop reason: SDUPTHER

## 2021-04-06 RX ORDER — ACYCLOVIR 400 MG/1
400 TABLET ORAL 2 TIMES DAILY
Qty: 60 TABLET | Refills: 11 | Status: SHIPPED | OUTPATIENT
Start: 2021-04-06

## 2021-04-06 RX ORDER — CYANOCOBALAMIN 1000 UG/ML
1000 INJECTION, SOLUTION INTRAMUSCULAR; SUBCUTANEOUS ONCE
Status: CANCELLED | OUTPATIENT
Start: 2021-05-04

## 2021-04-06 RX ORDER — DIPHENOXYLATE HYDROCHLORIDE AND ATROPINE SULFATE 2.5; .025 MG/1; MG/1
1 TABLET ORAL 3 TIMES DAILY PRN
Qty: 90 TABLET | Refills: 3 | Status: SHIPPED | OUTPATIENT
Start: 2021-04-06 | End: 2022-04-05 | Stop reason: SDUPTHER

## 2021-04-06 RX ORDER — SODIUM CHLORIDE 9 MG/ML
250 INJECTION, SOLUTION INTRAVENOUS ONCE
Status: COMPLETED | OUTPATIENT
Start: 2021-04-06 | End: 2021-04-06

## 2021-04-06 RX ORDER — CYANOCOBALAMIN 1000 UG/ML
1000 INJECTION, SOLUTION INTRAMUSCULAR; SUBCUTANEOUS ONCE
Status: COMPLETED | OUTPATIENT
Start: 2021-04-06 | End: 2021-04-06

## 2021-04-06 RX ORDER — ZOLEDRONIC ACID 0.04 MG/ML
4 INJECTION, SOLUTION INTRAVENOUS ONCE
Status: CANCELLED | OUTPATIENT
Start: 2021-04-06

## 2021-04-06 RX ADMIN — ZOLEDRONIC ACID 4 MG: 0.04 INJECTION, SOLUTION INTRAVENOUS at 11:38

## 2021-04-06 RX ADMIN — SODIUM CHLORIDE 250 ML: 9 INJECTION, SOLUTION INTRAVENOUS at 11:38

## 2021-04-06 RX ADMIN — CYANOCOBALAMIN 1000 MCG: 1000 INJECTION, SOLUTION INTRAMUSCULAR at 11:41

## 2021-04-07 ENCOUNTER — MEDICATION THERAPY MANAGEMENT (OUTPATIENT)
Dept: PHARMACY | Facility: HOSPITAL | Age: 71
End: 2021-04-07

## 2021-04-07 LAB
ALBUMIN SERPL ELPH-MCNC: 3.6 G/DL (ref 2.9–4.4)
ALBUMIN/GLOB SERPL: 1.5 {RATIO} (ref 0.7–1.7)
ALPHA1 GLOB SERPL ELPH-MCNC: 0.2 G/DL (ref 0–0.4)
ALPHA2 GLOB SERPL ELPH-MCNC: 0.7 G/DL (ref 0.4–1)
B-GLOBULIN SERPL ELPH-MCNC: 0.9 G/DL (ref 0.7–1.3)
GAMMA GLOB SERPL ELPH-MCNC: 0.7 G/DL (ref 0.4–1.8)
GLOBULIN SER-MCNC: 2.5 G/DL (ref 2.2–3.9)
IGA SERPL-MCNC: 71 MG/DL (ref 64–422)
IGG SERPL-MCNC: 785 MG/DL (ref 586–1602)
IGM SERPL-MCNC: 17 MG/DL (ref 26–217)
INTERPRETATION SERPL IEP-IMP: ABNORMAL
KAPPA LC FREE SER-MCNC: 22 MG/L (ref 3.3–19.4)
KAPPA LC FREE/LAMBDA FREE SER: 1.24 {RATIO} (ref 0.26–1.65)
LABORATORY COMMENT REPORT: ABNORMAL
LAMBDA LC FREE SERPL-MCNC: 17.8 MG/L (ref 5.7–26.3)
M PROTEIN SERPL ELPH-MCNC: ABNORMAL G/DL
PROT SERPL-MCNC: 6.1 G/DL (ref 6–8.5)

## 2021-04-07 NOTE — PROGRESS NOTES
[facility] Lab Review- Revlimid      4/6/2021   WBC 3.40 - 10.80 10*3/mm3 4.13   Neutrophils Absolute 1.70 - 7.00 10*3/mm3 1.66 (A)   Hemoglobin 12.0 - 15.9 g/dL 12.6   Hematocrit 34.0 - 46.6 % 38.9   Platelets 140 - 450 10*3/mm3 197   Creatinine 0.60 - 1.10 mg/dL 0.76   eGFR Non African Am >60 mL/min/1.73 75   BUN 6 - 20 mg/dL 9   Sodium 134 - 145 mmol/L 137   Potassium 3.5 - 4.7 mmol/L 3.9   Glucose 74 - 124 mg/dL 123   Magnesium 1.8 - 2.5 mg/dL 1.7 (A)   Calcium 8.5 - 10.2 mg/dL 9.0   Albumin 3.50 - 5.20 g/dL 3.90   Total Protein 6.3 - 8.0 g/dL 6.5   AST (SGOT) 0 - 32 U/L 20   ALT (SGPT) 0 - 33 U/L 18   Alkaline Phosphatase 38 - 116 U/L 58   Total Bilirubin 0.2 - 1.2 mg/dL 0.3   Phosphorus 2.5 - 4.5 mg/dL 3.4     MD dictation noted. Pharmacy will continue to follow.     Thanks,   Sandra Hernandez, PharmD

## 2021-04-15 RX ORDER — LENALIDOMIDE 10 MG/1
CAPSULE ORAL
Qty: 21 CAPSULE | Refills: 0 | Status: SHIPPED | OUTPATIENT
Start: 2021-04-15 | End: 2021-05-04 | Stop reason: SDUPTHER

## 2021-04-15 NOTE — TELEPHONE ENCOUNTER
Pt on my calendar to refill Revlimid. Per last office note-pt is to continue 10 mg 21 out of 28 days. Her last delivery was on 3/26/2021.    I have routed the rx to Dr Garcia for signature. Once signed it will be escribed to [facility]

## 2021-05-04 ENCOUNTER — LAB (OUTPATIENT)
Dept: LAB | Facility: HOSPITAL | Age: 71
End: 2021-05-04

## 2021-05-04 ENCOUNTER — INFUSION (OUTPATIENT)
Dept: ONCOLOGY | Facility: HOSPITAL | Age: 71
End: 2021-05-04

## 2021-05-04 DIAGNOSIS — C90.00 NEUROPATHY ASSOCIATED WITH MULTIPLE MYELOMA (HCC): Primary | ICD-10-CM

## 2021-05-04 DIAGNOSIS — C90.00 MULTIPLE MYELOMA NOT HAVING ACHIEVED REMISSION (HCC): ICD-10-CM

## 2021-05-04 DIAGNOSIS — C90.00 MULTIPLE MYELOMA NOT HAVING ACHIEVED REMISSION (HCC): Primary | ICD-10-CM

## 2021-05-04 DIAGNOSIS — G63 NEUROPATHY ASSOCIATED WITH MULTIPLE MYELOMA (HCC): Primary | ICD-10-CM

## 2021-05-04 LAB
ALBUMIN SERPL-MCNC: 4 G/DL (ref 3.5–5.2)
ALBUMIN/GLOB SERPL: 1.6 G/DL (ref 1.1–2.4)
ALP SERPL-CCNC: 59 U/L (ref 38–116)
ALT SERPL W P-5'-P-CCNC: 17 U/L (ref 0–33)
ANION GAP SERPL CALCULATED.3IONS-SCNC: 15.2 MMOL/L (ref 5–15)
AST SERPL-CCNC: 17 U/L (ref 0–32)
B2 MICROGLOB SERPL-MCNC: 2.3 MG/L (ref 0.8–2.2)
BASOPHILS # BLD AUTO: 0.03 10*3/MM3 (ref 0–0.2)
BASOPHILS NFR BLD AUTO: 0.8 % (ref 0–1.5)
BILIRUB SERPL-MCNC: 0.2 MG/DL (ref 0.2–1.2)
BUN SERPL-MCNC: 12 MG/DL (ref 6–20)
BUN/CREAT SERPL: 16.7 (ref 7.3–30)
CALCIUM SPEC-SCNC: 8.6 MG/DL (ref 8.5–10.2)
CHLORIDE SERPL-SCNC: 102 MMOL/L (ref 98–107)
CO2 SERPL-SCNC: 22.8 MMOL/L (ref 22–29)
CREAT SERPL-MCNC: 0.72 MG/DL (ref 0.6–1.1)
DEPRECATED RDW RBC AUTO: 52.8 FL (ref 37–54)
EOSINOPHIL # BLD AUTO: 0.2 10*3/MM3 (ref 0–0.4)
EOSINOPHIL NFR BLD AUTO: 5.1 % (ref 0.3–6.2)
ERYTHROCYTE [DISTWIDTH] IN BLOOD BY AUTOMATED COUNT: 14.5 % (ref 12.3–15.4)
GFR SERPL CREATININE-BSD FRML MDRD: 80 ML/MIN/1.73
GLOBULIN UR ELPH-MCNC: 2.5 GM/DL (ref 1.8–3.5)
GLUCOSE SERPL-MCNC: 121 MG/DL (ref 74–124)
HCT VFR BLD AUTO: 38.6 % (ref 34–46.6)
HGB BLD-MCNC: 12.7 G/DL (ref 12–15.9)
IMM GRANULOCYTES # BLD AUTO: 0 10*3/MM3 (ref 0–0.05)
IMM GRANULOCYTES NFR BLD AUTO: 0 % (ref 0–0.5)
LYMPHOCYTES # BLD AUTO: 1.61 10*3/MM3 (ref 0.7–3.1)
LYMPHOCYTES NFR BLD AUTO: 40.7 % (ref 19.6–45.3)
MAGNESIUM SERPL-MCNC: 1.8 MG/DL (ref 1.8–2.5)
MCH RBC QN AUTO: 33.1 PG (ref 26.6–33)
MCHC RBC AUTO-ENTMCNC: 32.9 G/DL (ref 31.5–35.7)
MCV RBC AUTO: 100.5 FL (ref 79–97)
MONOCYTES # BLD AUTO: 0.59 10*3/MM3 (ref 0.1–0.9)
MONOCYTES NFR BLD AUTO: 14.9 % (ref 5–12)
NEUTROPHILS NFR BLD AUTO: 1.53 10*3/MM3 (ref 1.7–7)
NEUTROPHILS NFR BLD AUTO: 38.5 % (ref 42.7–76)
NRBC BLD AUTO-RTO: 0 /100 WBC (ref 0–0.2)
PHOSPHATE SERPL-MCNC: 3 MG/DL (ref 2.5–4.5)
PLATELET # BLD AUTO: 179 10*3/MM3 (ref 140–450)
PMV BLD AUTO: 9.3 FL (ref 6–12)
POTASSIUM SERPL-SCNC: 4 MMOL/L (ref 3.5–4.7)
PROT SERPL-MCNC: 6.5 G/DL (ref 6.3–8)
RBC # BLD AUTO: 3.84 10*6/MM3 (ref 3.77–5.28)
SODIUM SERPL-SCNC: 140 MMOL/L (ref 134–145)
WBC # BLD AUTO: 3.96 10*3/MM3 (ref 3.4–10.8)

## 2021-05-04 PROCEDURE — 85025 COMPLETE CBC W/AUTO DIFF WBC: CPT

## 2021-05-04 PROCEDURE — 80053 COMPREHEN METABOLIC PANEL: CPT

## 2021-05-04 PROCEDURE — 82232 ASSAY OF BETA-2 PROTEIN: CPT | Performed by: INTERNAL MEDICINE

## 2021-05-04 PROCEDURE — 84100 ASSAY OF PHOSPHORUS: CPT

## 2021-05-04 PROCEDURE — 96372 THER/PROPH/DIAG INJ SC/IM: CPT

## 2021-05-04 PROCEDURE — 83735 ASSAY OF MAGNESIUM: CPT

## 2021-05-04 PROCEDURE — 25010000002 CYANOCOBALAMIN PER 1000 MCG: Performed by: INTERNAL MEDICINE

## 2021-05-04 PROCEDURE — 36415 COLL VENOUS BLD VENIPUNCTURE: CPT

## 2021-05-04 RX ORDER — CYANOCOBALAMIN 1000 UG/ML
1000 INJECTION, SOLUTION INTRAMUSCULAR; SUBCUTANEOUS ONCE
Status: COMPLETED | OUTPATIENT
Start: 2021-05-04 | End: 2021-05-04

## 2021-05-04 RX ORDER — LENALIDOMIDE 10 MG/1
CAPSULE ORAL
Qty: 21 CAPSULE | Refills: 0 | Status: SHIPPED | OUTPATIENT
Start: 2021-05-04 | End: 2021-06-04 | Stop reason: SDUPTHER

## 2021-05-04 RX ADMIN — CYANOCOBALAMIN 1000 MCG: 1000 INJECTION, SOLUTION INTRAMUSCULAR; SUBCUTANEOUS at 10:37

## 2021-05-05 ENCOUNTER — MEDICATION THERAPY MANAGEMENT (OUTPATIENT)
Dept: PHARMACY | Facility: HOSPITAL | Age: 71
End: 2021-05-05

## 2021-05-05 LAB
ALBUMIN SERPL ELPH-MCNC: 3.4 G/DL (ref 2.9–4.4)
ALBUMIN/GLOB SERPL: 1.3 {RATIO} (ref 0.7–1.7)
ALPHA1 GLOB SERPL ELPH-MCNC: 0.3 G/DL (ref 0–0.4)
ALPHA2 GLOB SERPL ELPH-MCNC: 0.7 G/DL (ref 0.4–1)
B-GLOBULIN SERPL ELPH-MCNC: 1 G/DL (ref 0.7–1.3)
GAMMA GLOB SERPL ELPH-MCNC: 0.7 G/DL (ref 0.4–1.8)
GLOBULIN SER-MCNC: 2.7 G/DL (ref 2.2–3.9)
IGA SERPL-MCNC: 76 MG/DL (ref 64–422)
IGG SERPL-MCNC: 802 MG/DL (ref 586–1602)
IGM SERPL-MCNC: 16 MG/DL (ref 26–217)
INTERPRETATION SERPL IEP-IMP: ABNORMAL
KAPPA LC FREE SER-MCNC: 21.1 MG/L (ref 3.3–19.4)
KAPPA LC FREE/LAMBDA FREE SER: 1.15 {RATIO} (ref 0.26–1.65)
LABORATORY COMMENT REPORT: ABNORMAL
LAMBDA LC FREE SERPL-MCNC: 18.3 MG/L (ref 5.7–26.3)
M PROTEIN SERPL ELPH-MCNC: ABNORMAL G/DL
PROT SERPL-MCNC: 6.1 G/DL (ref 6–8.5)

## 2021-05-05 NOTE — PROGRESS NOTES
[facility] Lab Review- Revlimid      5/4/2021   WBC 3.40 - 10.80 10*3/mm3 3.96   Neutrophils Absolute 1.70 - 7.00 10*3/mm3 1.53 (A)   Hemoglobin 12.0 - 15.9 g/dL 12.7   Hematocrit 34.0 - 46.6 % 38.6   Platelets 140 - 450 10*3/mm3 179   Creatinine 0.60 - 1.10 mg/dL 0.72   eGFR Non African Am >60 mL/min/1.73 80   BUN 6 - 20 mg/dL 12   Sodium 134 - 145 mmol/L 140   Potassium 3.5 - 4.7 mmol/L 4.0   Glucose 74 - 124 mg/dL 121   Magnesium 1.8 - 2.5 mg/dL 1.8   Calcium 8.5 - 10.2 mg/dL 8.6   Albumin 3.50 - 5.20 g/dL 4.00   Total Protein 6.3 - 8.0 g/dL 6.5   AST (SGOT) 0 - 32 U/L 17   ALT (SGPT) 0 - 33 U/L 17   Alkaline Phosphatase 38 - 116 U/L 59   Total Bilirubin 0.2 - 1.2 mg/dL 0.2   Phosphorus 2.5 - 4.5 mg/dL 3.0     Pharmacy will continue to follow.     Thanks,   Sandra Hernandez, PharmD

## 2021-05-27 ENCOUNTER — OFFICE VISIT (OUTPATIENT)
Dept: SLEEP MEDICINE | Facility: HOSPITAL | Age: 71
End: 2021-05-27

## 2021-05-27 VITALS
SYSTOLIC BLOOD PRESSURE: 119 MMHG | DIASTOLIC BLOOD PRESSURE: 75 MMHG | WEIGHT: 170 LBS | HEART RATE: 77 BPM | BODY MASS INDEX: 26.68 KG/M2 | OXYGEN SATURATION: 95 % | HEIGHT: 67 IN

## 2021-05-27 DIAGNOSIS — G47.30 HYPERSOMNIA WITH SLEEP APNEA: Primary | ICD-10-CM

## 2021-05-27 DIAGNOSIS — G47.10 HYPERSOMNIA WITH SLEEP APNEA: Primary | ICD-10-CM

## 2021-05-27 PROCEDURE — 99204 OFFICE O/P NEW MOD 45 MIN: CPT | Performed by: INTERNAL MEDICINE

## 2021-05-27 PROCEDURE — G0463 HOSPITAL OUTPT CLINIC VISIT: HCPCS

## 2021-06-01 ENCOUNTER — LAB (OUTPATIENT)
Dept: LAB | Facility: HOSPITAL | Age: 71
End: 2021-06-01

## 2021-06-01 ENCOUNTER — INFUSION (OUTPATIENT)
Dept: ONCOLOGY | Facility: HOSPITAL | Age: 71
End: 2021-06-01

## 2021-06-01 DIAGNOSIS — C90.00 NEUROPATHY ASSOCIATED WITH MULTIPLE MYELOMA (HCC): Primary | ICD-10-CM

## 2021-06-01 DIAGNOSIS — G63 NEUROPATHY ASSOCIATED WITH MULTIPLE MYELOMA (HCC): Primary | ICD-10-CM

## 2021-06-01 DIAGNOSIS — C90.00 MULTIPLE MYELOMA NOT HAVING ACHIEVED REMISSION (HCC): Primary | ICD-10-CM

## 2021-06-01 LAB
ALBUMIN SERPL-MCNC: 3.9 G/DL (ref 3.5–5.2)
ALBUMIN/GLOB SERPL: 1.6 G/DL (ref 1.1–2.4)
ALP SERPL-CCNC: 58 U/L (ref 38–116)
ALT SERPL W P-5'-P-CCNC: 17 U/L (ref 0–33)
ANION GAP SERPL CALCULATED.3IONS-SCNC: 6.7 MMOL/L (ref 5–15)
AST SERPL-CCNC: 13 U/L (ref 0–32)
B2 MICROGLOB SERPL-MCNC: 1.9 MG/L (ref 0.8–2.2)
BASOPHILS # BLD AUTO: 0.03 10*3/MM3 (ref 0–0.2)
BASOPHILS NFR BLD AUTO: 0.8 % (ref 0–1.5)
BILIRUB SERPL-MCNC: 0.3 MG/DL (ref 0.2–1.2)
BUN SERPL-MCNC: 14 MG/DL (ref 6–20)
BUN/CREAT SERPL: 20 (ref 7.3–30)
CALCIUM SPEC-SCNC: 8.5 MG/DL (ref 8.5–10.2)
CHLORIDE SERPL-SCNC: 104 MMOL/L (ref 98–107)
CO2 SERPL-SCNC: 28.3 MMOL/L (ref 22–29)
CREAT SERPL-MCNC: 0.7 MG/DL (ref 0.6–1.1)
DEPRECATED RDW RBC AUTO: 50.9 FL (ref 37–54)
EOSINOPHIL # BLD AUTO: 0.26 10*3/MM3 (ref 0–0.4)
EOSINOPHIL NFR BLD AUTO: 7.4 % (ref 0.3–6.2)
ERYTHROCYTE [DISTWIDTH] IN BLOOD BY AUTOMATED COUNT: 13.8 % (ref 12.3–15.4)
GFR SERPL CREATININE-BSD FRML MDRD: 82 ML/MIN/1.73
GLOBULIN UR ELPH-MCNC: 2.4 GM/DL (ref 1.8–3.5)
GLUCOSE SERPL-MCNC: 102 MG/DL (ref 74–124)
HCT VFR BLD AUTO: 37.2 % (ref 34–46.6)
HGB BLD-MCNC: 12.4 G/DL (ref 12–15.9)
IMM GRANULOCYTES # BLD AUTO: 0.01 10*3/MM3 (ref 0–0.05)
IMM GRANULOCYTES NFR BLD AUTO: 0.3 % (ref 0–0.5)
LYMPHOCYTES # BLD AUTO: 1.38 10*3/MM3 (ref 0.7–3.1)
LYMPHOCYTES NFR BLD AUTO: 39.1 % (ref 19.6–45.3)
MAGNESIUM SERPL-MCNC: 1.9 MG/DL (ref 1.8–2.5)
MCH RBC QN AUTO: 33.4 PG (ref 26.6–33)
MCHC RBC AUTO-ENTMCNC: 33.3 G/DL (ref 31.5–35.7)
MCV RBC AUTO: 100.3 FL (ref 79–97)
MONOCYTES # BLD AUTO: 0.56 10*3/MM3 (ref 0.1–0.9)
MONOCYTES NFR BLD AUTO: 15.9 % (ref 5–12)
NEUTROPHILS NFR BLD AUTO: 1.29 10*3/MM3 (ref 1.7–7)
NEUTROPHILS NFR BLD AUTO: 36.5 % (ref 42.7–76)
NRBC BLD AUTO-RTO: 0 /100 WBC (ref 0–0.2)
PHOSPHATE SERPL-MCNC: 3.2 MG/DL (ref 2.5–4.5)
PLATELET # BLD AUTO: 169 10*3/MM3 (ref 140–450)
PMV BLD AUTO: 9.8 FL (ref 6–12)
POTASSIUM SERPL-SCNC: 4 MMOL/L (ref 3.5–4.7)
PROT SERPL-MCNC: 6.3 G/DL (ref 6.3–8)
RBC # BLD AUTO: 3.71 10*6/MM3 (ref 3.77–5.28)
SODIUM SERPL-SCNC: 139 MMOL/L (ref 134–145)
WBC # BLD AUTO: 3.53 10*3/MM3 (ref 3.4–10.8)

## 2021-06-01 PROCEDURE — 25010000002 CYANOCOBALAMIN PER 1000 MCG: Performed by: INTERNAL MEDICINE

## 2021-06-01 PROCEDURE — 36415 COLL VENOUS BLD VENIPUNCTURE: CPT

## 2021-06-01 PROCEDURE — 82232 ASSAY OF BETA-2 PROTEIN: CPT | Performed by: INTERNAL MEDICINE

## 2021-06-01 PROCEDURE — 80053 COMPREHEN METABOLIC PANEL: CPT

## 2021-06-01 PROCEDURE — 85025 COMPLETE CBC W/AUTO DIFF WBC: CPT

## 2021-06-01 PROCEDURE — 96372 THER/PROPH/DIAG INJ SC/IM: CPT

## 2021-06-01 PROCEDURE — 84100 ASSAY OF PHOSPHORUS: CPT

## 2021-06-01 PROCEDURE — 83735 ASSAY OF MAGNESIUM: CPT

## 2021-06-01 RX ORDER — CYANOCOBALAMIN 1000 UG/ML
1000 INJECTION, SOLUTION INTRAMUSCULAR; SUBCUTANEOUS ONCE
Status: COMPLETED | OUTPATIENT
Start: 2021-06-01 | End: 2021-06-01

## 2021-06-01 RX ADMIN — CYANOCOBALAMIN 1000 MCG: 1000 INJECTION, SOLUTION INTRAMUSCULAR at 08:34

## 2021-06-02 LAB
ALBUMIN SERPL ELPH-MCNC: 3.5 G/DL (ref 2.9–4.4)
ALBUMIN/GLOB SERPL: 1.3 {RATIO} (ref 0.7–1.7)
ALPHA1 GLOB SERPL ELPH-MCNC: 0.2 G/DL (ref 0–0.4)
ALPHA2 GLOB SERPL ELPH-MCNC: 0.7 G/DL (ref 0.4–1)
B-GLOBULIN SERPL ELPH-MCNC: 1 G/DL (ref 0.7–1.3)
GAMMA GLOB SERPL ELPH-MCNC: 0.7 G/DL (ref 0.4–1.8)
GLOBULIN SER-MCNC: 2.7 G/DL (ref 2.2–3.9)
IGA SERPL-MCNC: 69 MG/DL (ref 64–422)
IGG SERPL-MCNC: 774 MG/DL (ref 586–1602)
IGM SERPL-MCNC: 19 MG/DL (ref 26–217)
INTERPRETATION SERPL IEP-IMP: ABNORMAL
KAPPA LC FREE SER-MCNC: 23.6 MG/L (ref 3.3–19.4)
KAPPA LC FREE/LAMBDA FREE SER: 1.19 {RATIO} (ref 0.26–1.65)
LABORATORY COMMENT REPORT: ABNORMAL
LAMBDA LC FREE SERPL-MCNC: 19.8 MG/L (ref 5.7–26.3)
M PROTEIN SERPL ELPH-MCNC: ABNORMAL G/DL
PROT SERPL-MCNC: 6.2 G/DL (ref 6–8.5)

## 2021-06-04 DIAGNOSIS — C90.00 MULTIPLE MYELOMA NOT HAVING ACHIEVED REMISSION (HCC): ICD-10-CM

## 2021-06-04 PROBLEM — G47.30 HYPERSOMNIA WITH SLEEP APNEA: Status: ACTIVE | Noted: 2021-06-04

## 2021-06-04 PROBLEM — G47.10 HYPERSOMNIA WITH SLEEP APNEA: Status: ACTIVE | Noted: 2021-06-04

## 2021-06-04 RX ORDER — LENALIDOMIDE 10 MG/1
CAPSULE ORAL
Qty: 21 CAPSULE | Refills: 0 | Status: SHIPPED | OUTPATIENT
Start: 2021-06-04 | End: 2021-07-01 | Stop reason: SDUPTHER

## 2021-06-09 ENCOUNTER — TRANSCRIBE ORDERS (OUTPATIENT)
Dept: SLEEP MEDICINE | Facility: HOSPITAL | Age: 71
End: 2021-06-09

## 2021-06-09 DIAGNOSIS — Z01.818 OTHER SPECIFIED PRE-OPERATIVE EXAMINATION: Primary | ICD-10-CM

## 2021-06-28 NOTE — PROGRESS NOTES
Subjective     REASON FOR CONSULTATION: Transfer of care for IgA kappa multiple myeloma post stem cell transplant in March 2016 at [facility] currently on maintenance Revlimid 10 mg 3 out of 4 weeks                               REQUESTING PHYSICIAN: MD Brett Turpin MD    History of Present Illness patient is a 71-year-old female with an IgA kappa high risk myeloma post stem cell transplant on maintenance Revlimid 10 mg 3 or 4 weeks alone after toxicity from Velcade and dexamethasone-5 years from stem cell transplant    Comes in for follow-up on her 3-month routine.  She has just completed 2 weeks of Revlimid and her counts are decent.  Her paraprotein is not detectable beta 2 microglobulin is normal l.  She has had no infections.  She continues with B12 injections monthly  Her blood counts have been doing good on her current dose of Revlimid      She is back today feeling well she has her usual aches and pains.  She has more pain after her Zometa injection for about a week but this is usual for her-    She is scheduled to have her sleep study for sleep apnea in 2 weeks    She has had no infections or issues and is self isolating at home and practicing social distancing and has been vaccinated against coronavirus    Labs have been drawn and are normal with no paraprotein detected on her immunofixation over the last 3 months      I asked her to see if her aching is more when she is on the Revlimid and it improves when she is off the Revlimid because the aching is not consistent but it is no better off the Revlimid but still not significant at this point  and I do not feel that she needs a PET scan at this point    She consults  At [facility] via telephone    Past Medical History:   Diagnosis Date   • Anxiety    • Depression    • Disease of thyroid gland    • Hashimoto's disease    • History of vitamin D deficiency    • Hypothyroidism    • IBS  (irritable bowel syndrome)    • Mixed hyperlipidemia 3/12/2018   • Multiple myeloma (CMS/HCC) 2015    IgA kappa.  Stem cell transplant at [facility] 3/20/2016   • Myeloma (CMS/HCC)    • Osteopenia    • Overweight (BMI 25.0-29.9) 2/5/2018        Past Surgical History:   Procedure Laterality Date   • BREAST BIOPSY     • CATARACT EXTRACTION     • COLONOSCOPY     • TONSILLECTOMY  1956   • VEIN SURGERY  1991      patient is a 70-year-old female with IgA kappa multiple myeloma diagnosed in mid 2015-high risk because of gain of 1 q- treatment with rev Dex Velcade initiated in 11/15-went on to stem cell transplant at [facility] in March 2016?  Melphalan.  She started post transplant therapy in May 2016 with Velcade rev Dex.  In September after 4 cycles Velcade was decreased to every other week with plans to continue rev Dex Velcade till progression because of high risk status.  In May 2019 her Velcade was discontinued because of worsening neuropathy.  Patient has remained on Revlimid 10 mg 21 out of 28 days since then with stable disease until November 2019 when a small IgG kappa paraprotein was noted on her blood tests which is distinct from her usual IgA kappa myeloma.  Restaging with PET scan was normal and since then the paraprotein as of 3/10/2020 has resolved    Patient has significant issues with anxiety and states that she had trouble getting her Revlimid in a timely fashion was very frustrated with this process at the [place] system-she states she was taken back when Dr. Hewitt suggested she transfer her care but is now happy to make a change    She is currently 2 days into her treatment cycle with Revlimid .she has no pain currently except intermittently in her neck where she has had some degenerative disease.  She does have neuropathy for which she is on fairly high doses of Cymbalta.  She is not taking her gabapentin.    Continues on acyclovir for prevention and baby aspirin because of the Revlimid  She was  having trouble with diarrhea from the Revlimid but this is controlled with WelChol.    She is  and lives by herself has no family nearby but good friends.  She does not smoke or drink.  She is up-to-date with mammography and does Cologuard instead of colonoscopy     have reviewed her labs in detail but cannot find any documentation of her transplant conditioning regimen (I assume it was melphalan)  or the depth of her response prior to transplant .  She states that the doctors at [facility] recommended to check her labs every month - she has been clinically BOZENA for 4 years and I suggested every other month testing but she is very adamant that she wants monthly testing      Current Outpatient Medications on File Prior to Visit   Medication Sig Dispense Refill   • Acetylcarn-Alpha Lipoic Acid 400-200 MG capsule Take  by mouth.     • acyclovir (ZOVIRAX) 400 MG tablet Take 1 tablet by mouth 2 (Two) Times a Day. 60 tablet 11   • ALPRAZolam (XANAX) 0.25 MG tablet Take 1 tablet by mouth Daily As Needed for Anxiety. 30 tablet 2   • amoxicillin (AMOXIL) 500 MG tablet Take 500 mg by mouth 2 (Two) Times a Day.     • aspirin 81 MG chewable tablet Chew 81 mg Daily.     • B Complex Vitamins (VITAMIN B COMPLEX) capsule capsule Take 2 tablets by mouth Daily.     • baclofen (LIORESAL) 10 MG tablet Take 10 mg by mouth 3 (Three) Times a Day.  5   • calcium carbonate-vitamin d 600-400 MG-UNIT per tablet Take 1 tablet by mouth Daily.     • colesevelam (WELCHOL) 625 MG tablet Take 2 tablets by mouth 2 (Two) Times a Day With Meals. 180 tablet 2   • cycloSPORINE (RESTASIS MULTIDOSE) 0.05 % ophthalmic emulsion 1 drop 2 (Two) Times a Day.     • diphenoxylate-atropine (LOMOTIL) 2.5-0.025 MG per tablet Take 1 tablet by mouth 3 (Three) Times a Day As Needed for Diarrhea. 90 tablet 3   • DULoxetine (CYMBALTA) 30 MG capsule Take 30 mg by mouth Daily.     • DULoxetine (CYMBALTA) 60 MG capsule Take 1 capsule by mouth Daily. 90 capsule 3    • fluticasone (FLONASE) 50 MCG/ACT nasal spray 2 sprays into each nostril Daily.     • folic acid (FOLVITE) 1 MG tablet Take 1,000 mcg by mouth Daily.  2   • gabapentin (NEURONTIN) 100 MG capsule Take 1 capsule by mouth Daily As Needed (Pain). 60 capsule 1   • HYDROcodone-acetaminophen (NORCO) 5-325 MG per tablet Take 1 tablet by mouth Every 6 (Six) Hours As Needed for Moderate Pain  or Severe Pain . 60 tablet 0   • lenalidomide (Revlimid) 10 MG capsule TAKE 1 CAPSULE DAILY FOR 21 DAYS ON THEN 7 DAYS OFF 21 capsule 0   • meloxicam (MOBIC) 15 MG tablet Take 15 mg by mouth Daily.     • Multiple Vitamins-Iron (DAILY-JONI/IRON/BETA-CAROTENE) tablet Take 1 tablet by mouth Daily.  2   • omega-3 acid ethyl esters (LOVAZA) 1 g capsule Take 2 g by mouth.     • ondansetron (ZOFRAN) 8 MG tablet Take 8 mg by mouth.     • predniSONE (DELTASONE) 10 MG tablet Take 10 mg by mouth Daily.     • promethazine (PHENERGAN) 25 MG tablet TAKE 0.5-1 TABLETS BY MOUTH EVERY 6 (SIX) HOURS AS NEEDED FOR NAUSEA.     • Synthroid 100 MCG tablet      • vitamin D (ERGOCALCIFEROL) 50784 units capsule capsule Take 50,000 Units by mouth 1 (One) Time Per Week.       No current facility-administered medications on file prior to visit.        ALLERGIES:    No Known Allergies     Social History     Socioeconomic History   • Marital status:      Spouse name: Not on file   • Number of children: 2   • Years of education: Not on file   • Highest education level: Not on file   Tobacco Use   • Smoking status: Former Smoker     Types: Cigarettes     Start date:      Quit date:      Years since quittin.5   • Smokeless tobacco: Never Used   Substance and Sexual Activity   • Alcohol use: Yes     Comment: 2 per month; or none   • Drug use: Never   • Sexual activity: Defer        Family History   Problem Relation Age of Onset   • Breast cancer Mother    • Sleep apnea Mother    • Lymphoma Father    • Sleep apnea Father    • Kidney cancer Paternal  "Grandmother         Review of Systems   Constitutional: Positive for fatigue (Worse).   Respiratory: Negative for cough, choking, chest tightness and shortness of breath.    Cardiovascular: Negative for chest pain.   Gastrointestinal: Negative for abdominal pain, constipation, nausea and vomiting.   Musculoskeletal: Positive for neck pain.   Neurological: Positive for numbness (Den,foot/hand tingling no numbness). Negative for headaches.   Psychiatric/Behavioral: Negative for dysphoric mood. The patient is nervous/anxious (same ).    All other systems reviewed and are negative.      Objective     Vitals:    06/29/21 1131   BP: 123/73   Pulse: 81   Resp: 14   Temp: 97.3 °F (36.3 °C)   TempSrc: Infrared   SpO2: 98%   Weight: 78.8 kg (173 lb 11.2 oz)   Height: 170.2 cm (67.01\")   PainSc:   6   PainLoc: Comment: Pain all over body     Current Status 6/29/2021   ECOG score 0       Physical Exam     CONSTITUTIONAL:  Vital signs reviewed.  No distress, looks comfortable.  EYES:  Conjunctiva and lids unremarkable.    RESPIRATORY:  Normal respiratory effort.  Lungs clear to auscultation bilaterally.  CARDIOVASCULAR:  Normal S1, S2.  No murmurs rubs or gallops.  No significant lower extremity edema.  EXT-no edema    I have reexamined the patient and the results are consistent with the previously documented exam. Yoni Garcia MD       RECENT LABS:  Hematology WBC   Date Value Ref Range Status   06/29/2021 4.95 3.40 - 10.80 10*3/mm3 Final   03/10/2020 4.27 (L) 4.5 - 11.0 10*3/uL Final     RBC   Date Value Ref Range Status   06/29/2021 3.75 (L) 3.77 - 5.28 10*6/mm3 Final   03/10/2020 3.80 (L) 4.0 - 5.2 10*6/uL Final     Hemoglobin   Date Value Ref Range Status   06/29/2021 12.4 12.0 - 15.9 g/dL Final   03/10/2020 12.9 12.0 - 16.0 g/dL Final     Hematocrit   Date Value Ref Range Status   06/29/2021 37.5 34.0 - 46.6 % Final   03/10/2020 38.5 36.0 - 46.0 % Final     Platelets   Date Value Ref Range Status   06/29/2021 198 " 140 - 450 10*3/mm3 Final   03/10/2020 181 140 - 440 10*3/uL Final                  Assessment/Plan   1.  IgA kappa multiple myeloma diagnosed in 2015 treated with RVD  · Stem cell transplant at [facility] in 3/2016  · Maintenance treatment with Velcade rev Dex started in 5/16  · Velcade discontinued because of neurotoxicity in 5/19  · Small IgG kappa paraprotein seen on blood work in 10/19-PET scan negative protein disappeared by 3/20  · Zometa given every 3 months   · Acyclovir and aspirin prophylaxis    2.  Anxiety  3.  Hypothyroidism on treatment  4.  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  Diarrhea due to treatment improved with WelChol  6.  Snoring and fatigue probable sleep apnea  7.  Vaccinated against corona virus    Plan  1.  Continue 10 mg of Revlimid 3 out of 4 weeks  2.continue monthly B12 and myeloma labs WILLIAM plus beta-2 microglobulin)  3.  Zometa every 3 months dose today  4.  See me in 3 months for follow-up .  With 24-hour urine done in  1 months

## 2021-06-29 ENCOUNTER — INFUSION (OUTPATIENT)
Dept: ONCOLOGY | Facility: HOSPITAL | Age: 71
End: 2021-06-29

## 2021-06-29 ENCOUNTER — OFFICE VISIT (OUTPATIENT)
Dept: ONCOLOGY | Facility: CLINIC | Age: 71
End: 2021-06-29

## 2021-06-29 VITALS
RESPIRATION RATE: 14 BRPM | SYSTOLIC BLOOD PRESSURE: 123 MMHG | HEART RATE: 81 BPM | DIASTOLIC BLOOD PRESSURE: 73 MMHG | BODY MASS INDEX: 27.26 KG/M2 | TEMPERATURE: 97.3 F | HEIGHT: 67 IN | WEIGHT: 173.7 LBS | OXYGEN SATURATION: 98 %

## 2021-06-29 DIAGNOSIS — G63 NEUROPATHY ASSOCIATED WITH MULTIPLE MYELOMA (HCC): ICD-10-CM

## 2021-06-29 DIAGNOSIS — C90.00 MULTIPLE MYELOMA NOT HAVING ACHIEVED REMISSION (HCC): Primary | ICD-10-CM

## 2021-06-29 DIAGNOSIS — C90.00 NEUROPATHY ASSOCIATED WITH MULTIPLE MYELOMA (HCC): Primary | ICD-10-CM

## 2021-06-29 DIAGNOSIS — C90.00 MULTIPLE MYELOMA NOT HAVING ACHIEVED REMISSION (HCC): ICD-10-CM

## 2021-06-29 DIAGNOSIS — C90.00 NEUROPATHY ASSOCIATED WITH MULTIPLE MYELOMA (HCC): ICD-10-CM

## 2021-06-29 DIAGNOSIS — G63 NEUROPATHY ASSOCIATED WITH MULTIPLE MYELOMA (HCC): Primary | ICD-10-CM

## 2021-06-29 PROBLEM — M47.22 CERVICAL SPONDYLOSIS WITH RADICULOPATHY: Status: ACTIVE | Noted: 2019-09-26

## 2021-06-29 LAB
ALBUMIN SERPL-MCNC: 3.9 G/DL (ref 3.5–5.2)
ALBUMIN/GLOB SERPL: 1.6 G/DL (ref 1.1–2.4)
ALP SERPL-CCNC: 56 U/L (ref 38–116)
ALT SERPL W P-5'-P-CCNC: 16 U/L (ref 0–33)
ANION GAP SERPL CALCULATED.3IONS-SCNC: 9 MMOL/L (ref 5–15)
AST SERPL-CCNC: 15 U/L (ref 0–32)
BASOPHILS # BLD AUTO: 0.06 10*3/MM3 (ref 0–0.2)
BASOPHILS NFR BLD AUTO: 1.2 % (ref 0–1.5)
BILIRUB SERPL-MCNC: 0.2 MG/DL (ref 0.2–1.2)
BUN SERPL-MCNC: 12 MG/DL (ref 6–20)
BUN/CREAT SERPL: 15.8 (ref 7.3–30)
CALCIUM SPEC-SCNC: 8.4 MG/DL (ref 8.5–10.2)
CHLORIDE SERPL-SCNC: 102 MMOL/L (ref 98–107)
CO2 SERPL-SCNC: 25 MMOL/L (ref 22–29)
CREAT SERPL-MCNC: 0.76 MG/DL (ref 0.6–1.1)
DEPRECATED RDW RBC AUTO: 51.2 FL (ref 37–54)
EOSINOPHIL # BLD AUTO: 0.25 10*3/MM3 (ref 0–0.4)
EOSINOPHIL NFR BLD AUTO: 5.1 % (ref 0.3–6.2)
ERYTHROCYTE [DISTWIDTH] IN BLOOD BY AUTOMATED COUNT: 14.1 % (ref 12.3–15.4)
GFR SERPL CREATININE-BSD FRML MDRD: 75 ML/MIN/1.73
GLOBULIN UR ELPH-MCNC: 2.4 GM/DL (ref 1.8–3.5)
GLUCOSE SERPL-MCNC: 106 MG/DL (ref 74–124)
HCT VFR BLD AUTO: 37.5 % (ref 34–46.6)
HGB BLD-MCNC: 12.4 G/DL (ref 12–15.9)
IMM GRANULOCYTES # BLD AUTO: 0.01 10*3/MM3 (ref 0–0.05)
IMM GRANULOCYTES NFR BLD AUTO: 0.2 % (ref 0–0.5)
LYMPHOCYTES # BLD AUTO: 2.13 10*3/MM3 (ref 0.7–3.1)
LYMPHOCYTES NFR BLD AUTO: 43 % (ref 19.6–45.3)
MAGNESIUM SERPL-MCNC: 1.9 MG/DL (ref 1.8–2.5)
MCH RBC QN AUTO: 33.1 PG (ref 26.6–33)
MCHC RBC AUTO-ENTMCNC: 33.1 G/DL (ref 31.5–35.7)
MCV RBC AUTO: 100 FL (ref 79–97)
MONOCYTES # BLD AUTO: 0.8 10*3/MM3 (ref 0.1–0.9)
MONOCYTES NFR BLD AUTO: 16.2 % (ref 5–12)
NEUTROPHILS NFR BLD AUTO: 1.7 10*3/MM3 (ref 1.7–7)
NEUTROPHILS NFR BLD AUTO: 34.3 % (ref 42.7–76)
NRBC BLD AUTO-RTO: 0 /100 WBC (ref 0–0.2)
PHOSPHATE SERPL-MCNC: 3.3 MG/DL (ref 2.5–4.5)
PLATELET # BLD AUTO: 198 10*3/MM3 (ref 140–450)
PMV BLD AUTO: 9.3 FL (ref 6–12)
POTASSIUM SERPL-SCNC: 4.2 MMOL/L (ref 3.5–4.7)
PROT SERPL-MCNC: 6.3 G/DL (ref 6.3–8)
RBC # BLD AUTO: 3.75 10*6/MM3 (ref 3.77–5.28)
SODIUM SERPL-SCNC: 136 MMOL/L (ref 134–145)
WBC # BLD AUTO: 4.95 10*3/MM3 (ref 3.4–10.8)

## 2021-06-29 PROCEDURE — 25010000002 ZOLEDRONIC ACID 4 MG/100ML SOLUTION: Performed by: INTERNAL MEDICINE

## 2021-06-29 PROCEDURE — 96374 THER/PROPH/DIAG INJ IV PUSH: CPT

## 2021-06-29 PROCEDURE — 80053 COMPREHEN METABOLIC PANEL: CPT

## 2021-06-29 PROCEDURE — 99214 OFFICE O/P EST MOD 30 MIN: CPT | Performed by: INTERNAL MEDICINE

## 2021-06-29 PROCEDURE — 83735 ASSAY OF MAGNESIUM: CPT

## 2021-06-29 PROCEDURE — 85025 COMPLETE CBC W/AUTO DIFF WBC: CPT

## 2021-06-29 PROCEDURE — 25010000002 CYANOCOBALAMIN PER 1000 MCG: Performed by: INTERNAL MEDICINE

## 2021-06-29 PROCEDURE — 96372 THER/PROPH/DIAG INJ SC/IM: CPT

## 2021-06-29 PROCEDURE — 84100 ASSAY OF PHOSPHORUS: CPT

## 2021-06-29 RX ORDER — SODIUM CHLORIDE 9 MG/ML
250 INJECTION, SOLUTION INTRAVENOUS ONCE
Status: CANCELLED | OUTPATIENT
Start: 2021-06-29

## 2021-06-29 RX ORDER — SODIUM CHLORIDE 9 MG/ML
250 INJECTION, SOLUTION INTRAVENOUS ONCE
Status: COMPLETED | OUTPATIENT
Start: 2021-06-29 | End: 2021-06-29

## 2021-06-29 RX ORDER — MELOXICAM 15 MG/1
15 TABLET ORAL DAILY
COMMUNITY
Start: 2021-06-17

## 2021-06-29 RX ORDER — AMOXICILLIN 500 MG/1
500 TABLET, FILM COATED ORAL AS NEEDED
COMMUNITY
Start: 2021-04-21 | End: 2022-01-11

## 2021-06-29 RX ORDER — PREDNISONE 10 MG/1
10 TABLET ORAL DAILY
COMMUNITY
Start: 2021-06-17 | End: 2022-04-05 | Stop reason: SDDI

## 2021-06-29 RX ORDER — CYANOCOBALAMIN 1000 UG/ML
1000 INJECTION, SOLUTION INTRAMUSCULAR; SUBCUTANEOUS ONCE
Status: COMPLETED | OUTPATIENT
Start: 2021-06-29 | End: 2021-06-29

## 2021-06-29 RX ORDER — ZOLEDRONIC ACID 0.04 MG/ML
4 INJECTION, SOLUTION INTRAVENOUS ONCE
Status: COMPLETED | OUTPATIENT
Start: 2021-06-29 | End: 2021-06-29

## 2021-06-29 RX ORDER — ZOLEDRONIC ACID 0.04 MG/ML
4 INJECTION, SOLUTION INTRAVENOUS ONCE
Status: CANCELLED | OUTPATIENT
Start: 2021-06-29

## 2021-06-29 RX ORDER — CYANOCOBALAMIN 1000 UG/ML
1000 INJECTION, SOLUTION INTRAMUSCULAR; SUBCUTANEOUS ONCE
Status: CANCELLED | OUTPATIENT
Start: 2021-07-27

## 2021-06-29 RX ADMIN — ZOLEDRONIC ACID 4 MG: 0.04 INJECTION, SOLUTION INTRAVENOUS at 13:05

## 2021-06-29 RX ADMIN — CYANOCOBALAMIN 1000 MCG: 1000 INJECTION, SOLUTION INTRAMUSCULAR; SUBCUTANEOUS at 13:06

## 2021-06-29 RX ADMIN — SODIUM CHLORIDE 250 ML: 9 INJECTION, SOLUTION INTRAVENOUS at 13:06

## 2021-06-30 ENCOUNTER — MEDICATION THERAPY MANAGEMENT (OUTPATIENT)
Dept: PHARMACY | Facility: HOSPITAL | Age: 71
End: 2021-06-30

## 2021-06-30 NOTE — PROGRESS NOTES
[facility] encounter -- Revlimid     Ref. Range 6/29/2021 11:09   Glucose Latest Ref Range: 74 - 124 mg/dL 106   Sodium Latest Ref Range: 134 - 145 mmol/L 136   Potassium Latest Ref Range: 3.5 - 4.7 mmol/L 4.2   CO2 Latest Ref Range: 22.0 - 29.0 mmol/L 25.0   Chloride Latest Ref Range: 98 - 107 mmol/L 102   Anion Gap Latest Ref Range: 5.0 - 15.0 mmol/L 9.0   Creatinine Latest Ref Range: 0.60 - 1.10 mg/dL 0.76   BUN Latest Ref Range: 6 - 20 mg/dL 12   BUN/Creatinine Ratio Latest Ref Range: 7.3 - 30.0  15.8   Calcium Latest Ref Range: 8.5 - 10.2 mg/dL 8.4 (L)   eGFR Non  Am Latest Ref Range: >60 mL/min/1.73 75   Alkaline Phosphatase Latest Ref Range: 38 - 116 U/L 56   Total Protein Latest Ref Range: 6.3 - 8.0 g/dL 6.3   ALT (SGPT) Latest Ref Range: 0 - 33 U/L 16   AST (SGOT) Latest Ref Range: 0 - 32 U/L 15   Total Bilirubin Latest Ref Range: 0.2 - 1.2 mg/dL 0.2   Albumin Latest Ref Range: 3.50 - 5.20 g/dL 3.90   Globulin Latest Ref Range: 1.8 - 3.5 gm/dL 2.4   A/G Ratio Latest Ref Range: 1.1 - 2.4 g/dL 1.6   Phosphorus Latest Ref Range: 2.5 - 4.5 mg/dL 3.3   Magnesium Latest Ref Range: 1.8 - 2.5 mg/dL 1.9   WBC Latest Ref Range: 3.40 - 10.80 10*3/mm3 4.95   RBC Latest Ref Range: 3.77 - 5.28 10*6/mm3 3.75 (L)   Hemoglobin Latest Ref Range: 12.0 - 15.9 g/dL 12.4   Hematocrit Latest Ref Range: 34.0 - 46.6 % 37.5   RDW Latest Ref Range: 12.3 - 15.4 % 14.1   MCV Latest Ref Range: 79.0 - 97.0 fL 100.0 (H)   MCH Latest Ref Range: 26.6 - 33.0 pg 33.1 (H)   MCHC Latest Ref Range: 31.5 - 35.7 g/dL 33.1   MPV Latest Ref Range: 6.0 - 12.0 fL 9.3   Platelets Latest Ref Range: 140 - 450 10*3/mm3 198   RDW-SD Latest Ref Range: 37.0 - 54.0 fl 51.2   Neutrophil Rel % Latest Ref Range: 42.7 - 76.0 % 34.3 (L)   Lymphocyte Rel % Latest Ref Range: 19.6 - 45.3 % 43.0   Monocyte Rel % Latest Ref Range: 5.0 - 12.0 % 16.2 (H)   Eosinophil Rel % Latest Ref Range: 0.3 - 6.2 % 5.1   Basophil Rel % Latest Ref Range: 0.0 - 1.5 % 1.2   Immature  Granulocyte Rel % Latest Ref Range: 0.0 - 0.5 % 0.2   Neutrophils Absolute Latest Ref Range: 1.70 - 7.00 10*3/mm3 1.70   Lymphocytes Absolute Latest Ref Range: 0.70 - 3.10 10*3/mm3 2.13   Monocytes Absolute Latest Ref Range: 0.10 - 0.90 10*3/mm3 0.80   Eosinophils Absolute Latest Ref Range: 0.00 - 0.40 10*3/mm3 0.25   Basophils Absolute Latest Ref Range: 0.00 - 0.20 10*3/mm3 0.06   Immature Grans, Absolute Latest Ref Range: 0.00 - 0.05 10*3/mm3 0.01   nRBC Latest Ref Range: 0.0 - 0.2 /100 WBC 0.0     MD dictation noted. Patient tolerating Revlimid well. Will continue to monitor.    Thanks,   Arlene Alcala, Pharmacy Intern

## 2021-07-01 DIAGNOSIS — C90.00 MULTIPLE MYELOMA NOT HAVING ACHIEVED REMISSION (HCC): ICD-10-CM

## 2021-07-01 RX ORDER — LENALIDOMIDE 10 MG/1
CAPSULE ORAL
Qty: 21 CAPSULE | Refills: 0 | Status: SHIPPED | OUTPATIENT
Start: 2021-07-01 | End: 2021-07-29 | Stop reason: SDUPTHER

## 2021-07-02 ENCOUNTER — APPOINTMENT (OUTPATIENT)
Dept: LAB | Facility: HOSPITAL | Age: 71
End: 2021-07-02

## 2021-07-02 ENCOUNTER — TELEPHONE (OUTPATIENT)
Dept: ONCOLOGY | Facility: CLINIC | Age: 71
End: 2021-07-02

## 2021-07-02 DIAGNOSIS — C90.00 MULTIPLE MYELOMA NOT HAVING ACHIEVED REMISSION (HCC): Primary | ICD-10-CM

## 2021-07-02 NOTE — TELEPHONE ENCOUNTER
Pt said her myeloma labs weren't drawn when she was here on the 29th.  Said they are supposed to be done every month.  Said she has to come in today to have them drawn.  Wants a call back ASAP

## 2021-07-02 NOTE — TELEPHONE ENCOUNTER
PT CALLING ABOUT MISSED MYELOMA LABS FROM June. PT IS UPSET THAT HER MONTHLY LABS KEEP GETTING MISSED CAUSING HER TO HAVE TO RTN AGAIN LATER TO HAVE DRAWN. IT IS ALREADY NOTED ON LAB LINE FOR EACH OF HER APPTS THAT MYELOMA LABS ARE NEEDED. THERE IS A STANDING ORDER AS WELL IN PLACE. APPT DESK MESSAGED TO SET UP APPT FOR LATER TODAY FOR LAB. MESSAGED MELISSA TO MAKE HER AWARE AS REQUESTED PER PT.

## 2021-07-06 ENCOUNTER — LAB (OUTPATIENT)
Dept: LAB | Facility: HOSPITAL | Age: 71
End: 2021-07-06

## 2021-07-06 ENCOUNTER — APPOINTMENT (OUTPATIENT)
Dept: LAB | Facility: HOSPITAL | Age: 71
End: 2021-07-06

## 2021-07-06 DIAGNOSIS — Z01.818 OTHER SPECIFIED PRE-OPERATIVE EXAMINATION: ICD-10-CM

## 2021-07-06 DIAGNOSIS — C90.00 MULTIPLE MYELOMA NOT HAVING ACHIEVED REMISSION (HCC): ICD-10-CM

## 2021-07-06 PROCEDURE — C9803 HOPD COVID-19 SPEC COLLECT: HCPCS

## 2021-07-06 PROCEDURE — U0004 COV-19 TEST NON-CDC HGH THRU: HCPCS

## 2021-07-06 RX ORDER — LENALIDOMIDE 10 MG/1
CAPSULE ORAL
Refills: 0 | OUTPATIENT
Start: 2021-07-06

## 2021-07-07 ENCOUNTER — LAB (OUTPATIENT)
Dept: LAB | Facility: HOSPITAL | Age: 71
End: 2021-07-07

## 2021-07-07 DIAGNOSIS — C90.00 MULTIPLE MYELOMA NOT HAVING ACHIEVED REMISSION (HCC): ICD-10-CM

## 2021-07-07 LAB
B2 MICROGLOB SERPL-MCNC: 1.9 MG/L (ref 0.8–2.2)
SARS-COV-2 ORF1AB RESP QL NAA+PROBE: NOT DETECTED

## 2021-07-07 PROCEDURE — 36415 COLL VENOUS BLD VENIPUNCTURE: CPT

## 2021-07-07 PROCEDURE — 82232 ASSAY OF BETA-2 PROTEIN: CPT | Performed by: INTERNAL MEDICINE

## 2021-07-08 ENCOUNTER — HOSPITAL ENCOUNTER (OUTPATIENT)
Dept: SLEEP MEDICINE | Facility: HOSPITAL | Age: 71
Discharge: HOME | End: 2021-07-08
Admitting: INTERNAL MEDICINE

## 2021-07-08 DIAGNOSIS — G47.10 HYPERSOMNIA WITH SLEEP APNEA: ICD-10-CM

## 2021-07-08 DIAGNOSIS — G47.30 HYPERSOMNIA WITH SLEEP APNEA: ICD-10-CM

## 2021-07-08 LAB
ALBUMIN SERPL ELPH-MCNC: 3.8 G/DL (ref 2.9–4.4)
ALBUMIN/GLOB SERPL: 1.4 {RATIO} (ref 0.7–1.7)
ALPHA1 GLOB SERPL ELPH-MCNC: 0.2 G/DL (ref 0–0.4)
ALPHA2 GLOB SERPL ELPH-MCNC: 0.8 G/DL (ref 0.4–1)
B-GLOBULIN SERPL ELPH-MCNC: 1 G/DL (ref 0.7–1.3)
GAMMA GLOB SERPL ELPH-MCNC: 0.8 G/DL (ref 0.4–1.8)
GLOBULIN SER-MCNC: 2.8 G/DL (ref 2.2–3.9)
IGA SERPL-MCNC: 79 MG/DL (ref 64–422)
IGG SERPL-MCNC: 836 MG/DL (ref 586–1602)
IGM SERPL-MCNC: 26 MG/DL (ref 26–217)
INTERPRETATION SERPL IEP-IMP: ABNORMAL
KAPPA LC FREE SER-MCNC: 22.2 MG/L (ref 3.3–19.4)
KAPPA LC FREE/LAMBDA FREE SER: 1.17 {RATIO} (ref 0.26–1.65)
LABORATORY COMMENT REPORT: ABNORMAL
LAMBDA LC FREE SERPL-MCNC: 19 MG/L (ref 5.7–26.3)
M PROTEIN SERPL ELPH-MCNC: ABNORMAL G/DL
PROT SERPL-MCNC: 6.6 G/DL (ref 6–8.5)

## 2021-07-08 PROCEDURE — 95810 POLYSOM 6/> YRS 4/> PARAM: CPT

## 2021-07-08 PROCEDURE — 95810 POLYSOM 6/> YRS 4/> PARAM: CPT | Performed by: INTERNAL MEDICINE

## 2021-07-09 ENCOUNTER — DOCUMENTATION (OUTPATIENT)
Dept: ONCOLOGY | Facility: CLINIC | Age: 71
End: 2021-07-09

## 2021-07-12 ENCOUNTER — TELEPHONE (OUTPATIENT)
Dept: SLEEP MEDICINE | Facility: HOSPITAL | Age: 71
End: 2021-07-12

## 2021-07-12 NOTE — TELEPHONE ENCOUNTER
LV informing patient of doctors request to schedule follow up appointment to discuss results and treatment options

## 2021-07-14 ENCOUNTER — TELEPHONE (OUTPATIENT)
Dept: SLEEP MEDICINE | Facility: HOSPITAL | Age: 71
End: 2021-07-14

## 2021-07-15 ENCOUNTER — OFFICE VISIT (OUTPATIENT)
Dept: SLEEP MEDICINE | Facility: HOSPITAL | Age: 71
End: 2021-07-15

## 2021-07-15 VITALS — HEIGHT: 67 IN | HEART RATE: 91 BPM | WEIGHT: 170 LBS | BODY MASS INDEX: 26.68 KG/M2 | OXYGEN SATURATION: 97 %

## 2021-07-15 DIAGNOSIS — G47.10 HYPERSOMNIA WITH SLEEP APNEA: ICD-10-CM

## 2021-07-15 DIAGNOSIS — G47.33 OSA (OBSTRUCTIVE SLEEP APNEA): Primary | ICD-10-CM

## 2021-07-15 DIAGNOSIS — G47.30 HYPERSOMNIA WITH SLEEP APNEA: ICD-10-CM

## 2021-07-15 PROCEDURE — G0463 HOSPITAL OUTPT CLINIC VISIT: HCPCS

## 2021-07-15 PROCEDURE — 99213 OFFICE O/P EST LOW 20 MIN: CPT | Performed by: INTERNAL MEDICINE

## 2021-07-15 NOTE — PROGRESS NOTES
"Follow Up [facility] Disorders Center Note     Chief Complaint:  KWAME     Primary Care Physician: Jade Carvajal MD    Interval History:   The patient is a 71 y.o. female  who I last saw 2021 and that note was reviewed.  Overnight polysomnogram performed 2021.  Mild KWAME for total sleep time noted.  Moderate severity during REM.  No sleep-related hypoxia.  The patient is here today for follow-up.  The patient is unchanged because no therapy has been initiated.    Self-administered [place] Sleepiness Scale test results: 16, previously 13  0-5 Lower normal daytime sleepiness  6-10 Higher normal daytime sleepiness  11-12 Mild, 13-15 Moderate, & 16-24 Severe excessive daytime sleepiness    Review of Systems:    A complete review of systems was done and all were negative with the exception of postnasal drip, shortness of breath with exertion, and some depression    Social History:    Social History     Socioeconomic History   • Marital status:      Spouse name: Not on file   • Number of children: 2   • Years of education: Not on file   • Highest education level: Not on file   Tobacco Use   • Smoking status: Former Smoker     Types: Cigarettes     Start date:      Quit date:      Years since quittin.5   • Smokeless tobacco: Never Used   Substance and Sexual Activity   • Alcohol use: Yes     Comment: 2 per month; or none   • Drug use: Never   • Sexual activity: Defer       Allergies:  Patient has no known allergies.     Medication Review:  Reviewed.      Vital Signs:    Vitals:    07/15/21 1100   Pulse: 91   SpO2: 97%   Weight: 77.1 kg (170 lb)   Height: 170.2 cm (67\")     Body mass index is 26.63 kg/m².    Physical Exam:    Constitutional:  Well developed 71 y.o. female that appears in no apparent distress.  Awake & oriented times 3.  Normal mood with normal recent and remote memory and normal judgement.  Eyes:  Conjunctivae normal.  Oropharynx: Previously, moist mucous membranes without exudate " and a large tongue and normal uvula and moderate-severe narrowing of the posterior pharyngeal opening and class II Mallampati airway, patient is wearing a facemask.     I have reviewed the results of the overnight polysomnogram with her     Impression:   Mild obstructive sleep apnea for total sleep time, moderate severity during REM, by overnight polysomnogram 7/8/2021.  The patient has complaints of hypersomnolence.    Plan:  Good sleep hygiene measures should be maintained.  Some weight loss would be beneficial in this patient who is overweight by BMI.      As stated, reviewed all with the patient.  After reviewing all with the patient, I would recommend and she is agreeable to proceed with auto CPAP between 6 and 16 cm water pressure.  An appropriate fullface mask type interface will be selected.  Set up will be arranged and I will see the patient back in 2 months to review download and to assure compliance.      I answered all of the patient's questions.      Arturo Holland MD  Sleep Medicine  07/18/21  10:39 EDT

## 2021-07-18 PROBLEM — G47.33 OSA (OBSTRUCTIVE SLEEP APNEA): Status: ACTIVE | Noted: 2021-07-08

## 2021-07-22 ENCOUNTER — APPOINTMENT (OUTPATIENT)
Dept: CT IMAGING | Facility: HOSPITAL | Age: 71
End: 2021-07-22

## 2021-07-23 ENCOUNTER — TELEPHONE (OUTPATIENT)
Dept: SLEEP MEDICINE | Facility: HOSPITAL | Age: 71
End: 2021-07-23

## 2021-07-27 ENCOUNTER — APPOINTMENT (OUTPATIENT)
Dept: CT IMAGING | Facility: HOSPITAL | Age: 71
End: 2021-07-27

## 2021-07-27 ENCOUNTER — INFUSION (OUTPATIENT)
Dept: ONCOLOGY | Facility: HOSPITAL | Age: 71
End: 2021-07-27

## 2021-07-27 ENCOUNTER — LAB (OUTPATIENT)
Dept: LAB | Facility: HOSPITAL | Age: 71
End: 2021-07-27

## 2021-07-27 DIAGNOSIS — C90.00 NEUROPATHY ASSOCIATED WITH MULTIPLE MYELOMA (HCC): ICD-10-CM

## 2021-07-27 DIAGNOSIS — C90.00 MULTIPLE MYELOMA NOT HAVING ACHIEVED REMISSION (HCC): ICD-10-CM

## 2021-07-27 DIAGNOSIS — C90.00 NEUROPATHY ASSOCIATED WITH MULTIPLE MYELOMA (HCC): Primary | ICD-10-CM

## 2021-07-27 DIAGNOSIS — G63 NEUROPATHY ASSOCIATED WITH MULTIPLE MYELOMA (HCC): ICD-10-CM

## 2021-07-27 DIAGNOSIS — G63 NEUROPATHY ASSOCIATED WITH MULTIPLE MYELOMA (HCC): Primary | ICD-10-CM

## 2021-07-27 LAB
B2 MICROGLOB SERPL-MCNC: 1.8 MG/L (ref 0.8–2.2)
BASOPHILS # BLD AUTO: 0.03 10*3/MM3 (ref 0–0.2)
BASOPHILS NFR BLD AUTO: 1 % (ref 0–1.5)
DEPRECATED RDW RBC AUTO: 53.2 FL (ref 37–54)
EOSINOPHIL # BLD AUTO: 0.19 10*3/MM3 (ref 0–0.4)
EOSINOPHIL NFR BLD AUTO: 6.3 % (ref 0.3–6.2)
ERYTHROCYTE [DISTWIDTH] IN BLOOD BY AUTOMATED COUNT: 14 % (ref 12.3–15.4)
HCT VFR BLD AUTO: 36.8 % (ref 34–46.6)
HGB BLD-MCNC: 11.9 G/DL (ref 12–15.9)
IMM GRANULOCYTES # BLD AUTO: 0 10*3/MM3 (ref 0–0.05)
IMM GRANULOCYTES NFR BLD AUTO: 0 % (ref 0–0.5)
LYMPHOCYTES # BLD AUTO: 1.21 10*3/MM3 (ref 0.7–3.1)
LYMPHOCYTES NFR BLD AUTO: 40.1 % (ref 19.6–45.3)
MAGNESIUM SERPL-MCNC: 1.6 MG/DL (ref 1.8–2.5)
MCH RBC QN AUTO: 33.1 PG (ref 26.6–33)
MCHC RBC AUTO-ENTMCNC: 32.3 G/DL (ref 31.5–35.7)
MCV RBC AUTO: 102.2 FL (ref 79–97)
MONOCYTES # BLD AUTO: 0.52 10*3/MM3 (ref 0.1–0.9)
MONOCYTES NFR BLD AUTO: 17.2 % (ref 5–12)
NEUTROPHILS NFR BLD AUTO: 1.07 10*3/MM3 (ref 1.7–7)
NEUTROPHILS NFR BLD AUTO: 35.4 % (ref 42.7–76)
NRBC BLD AUTO-RTO: 0 /100 WBC (ref 0–0.2)
PHOSPHATE SERPL-MCNC: 2.6 MG/DL (ref 2.5–4.5)
PLATELET # BLD AUTO: 169 10*3/MM3 (ref 140–450)
PMV BLD AUTO: 8.9 FL (ref 6–12)
RBC # BLD AUTO: 3.6 10*6/MM3 (ref 3.77–5.28)
WBC # BLD AUTO: 3.02 10*3/MM3 (ref 3.4–10.8)

## 2021-07-27 PROCEDURE — 84100 ASSAY OF PHOSPHORUS: CPT

## 2021-07-27 PROCEDURE — 82232 ASSAY OF BETA-2 PROTEIN: CPT | Performed by: INTERNAL MEDICINE

## 2021-07-27 PROCEDURE — 36415 COLL VENOUS BLD VENIPUNCTURE: CPT

## 2021-07-27 PROCEDURE — 96372 THER/PROPH/DIAG INJ SC/IM: CPT

## 2021-07-27 PROCEDURE — 85025 COMPLETE CBC W/AUTO DIFF WBC: CPT

## 2021-07-27 PROCEDURE — 83735 ASSAY OF MAGNESIUM: CPT

## 2021-07-27 PROCEDURE — 25010000002 CYANOCOBALAMIN PER 1000 MCG: Performed by: INTERNAL MEDICINE

## 2021-07-27 RX ORDER — CYANOCOBALAMIN 1000 UG/ML
1000 INJECTION, SOLUTION INTRAMUSCULAR; SUBCUTANEOUS ONCE
Status: COMPLETED | OUTPATIENT
Start: 2021-07-27 | End: 2021-07-27

## 2021-07-27 RX ADMIN — CYANOCOBALAMIN 1000 MCG: 1000 INJECTION, SOLUTION INTRAMUSCULAR; SUBCUTANEOUS at 11:56

## 2021-07-28 LAB
ALBUMIN SERPL ELPH-MCNC: 3.2 G/DL (ref 2.9–4.4)
ALBUMIN/GLOB SERPL: 1.3 {RATIO} (ref 0.7–1.7)
ALPHA1 GLOB SERPL ELPH-MCNC: 0.2 G/DL (ref 0–0.4)
ALPHA2 GLOB SERPL ELPH-MCNC: 0.7 G/DL (ref 0.4–1)
B-GLOBULIN SERPL ELPH-MCNC: 0.9 G/DL (ref 0.7–1.3)
GAMMA GLOB SERPL ELPH-MCNC: 0.7 G/DL (ref 0.4–1.8)
GLOBULIN SER-MCNC: 2.6 G/DL (ref 2.2–3.9)
IGA SERPL-MCNC: 67 MG/DL (ref 64–422)
IGG SERPL-MCNC: 731 MG/DL (ref 586–1602)
IGM SERPL-MCNC: 22 MG/DL (ref 26–217)
INTERPRETATION SERPL IEP-IMP: ABNORMAL
KAPPA LC FREE SER-MCNC: 24.4 MG/L (ref 3.3–19.4)
KAPPA LC FREE/LAMBDA FREE SER: 1.13 {RATIO} (ref 0.26–1.65)
LABORATORY COMMENT REPORT: ABNORMAL
LAMBDA LC FREE SERPL-MCNC: 21.5 MG/L (ref 5.7–26.3)
M PROTEIN SERPL ELPH-MCNC: ABNORMAL G/DL
PROT SERPL-MCNC: 5.8 G/DL (ref 6–8.5)

## 2021-07-29 ENCOUNTER — MEDICATION THERAPY MANAGEMENT (OUTPATIENT)
Dept: PHARMACY | Facility: HOSPITAL | Age: 71
End: 2021-07-29

## 2021-07-29 DIAGNOSIS — C90.00 MULTIPLE MYELOMA NOT HAVING ACHIEVED REMISSION (HCC): ICD-10-CM

## 2021-07-29 RX ORDER — LENALIDOMIDE 10 MG/1
CAPSULE ORAL
Qty: 21 CAPSULE | Refills: 0 | Status: SHIPPED | OUTPATIENT
Start: 2021-07-29 | End: 2021-08-30 | Stop reason: SDUPTHER

## 2021-08-02 ENCOUNTER — APPOINTMENT (OUTPATIENT)
Dept: CT IMAGING | Facility: HOSPITAL | Age: 71
End: 2021-08-02

## 2021-08-04 ENCOUNTER — OFFICE VISIT (OUTPATIENT)
Dept: ORTHOPEDIC SURGERY | Facility: CLINIC | Age: 71
End: 2021-08-04

## 2021-08-04 VITALS — HEIGHT: 67 IN | TEMPERATURE: 98 F | WEIGHT: 174.4 LBS | BODY MASS INDEX: 27.37 KG/M2

## 2021-08-04 DIAGNOSIS — M54.50 LOW BACK PAIN, UNSPECIFIED BACK PAIN LATERALITY, UNSPECIFIED CHRONICITY, UNSPECIFIED WHETHER SCIATICA PRESENT: ICD-10-CM

## 2021-08-04 DIAGNOSIS — R52 PAIN: ICD-10-CM

## 2021-08-04 DIAGNOSIS — M47.22 CERVICAL SPONDYLOSIS WITH RADICULOPATHY: Primary | ICD-10-CM

## 2021-08-04 PROCEDURE — 72040 X-RAY EXAM NECK SPINE 2-3 VW: CPT | Performed by: ORTHOPAEDIC SURGERY

## 2021-08-04 PROCEDURE — 99204 OFFICE O/P NEW MOD 45 MIN: CPT | Performed by: ORTHOPAEDIC SURGERY

## 2021-08-05 ENCOUNTER — APPOINTMENT (OUTPATIENT)
Dept: CT IMAGING | Facility: HOSPITAL | Age: 71
End: 2021-08-05

## 2021-08-06 NOTE — PROGRESS NOTES
"New patient or new problem visit    CC: Neck pain    HPI: She has a history of multiple myeloma for 5 years for which she is treated in [place], and history of neck pain which is failing to improve with conservative care and she wanted to get this checked out.  Has frontal headaches mostly occasional occipital headaches occasional numbness radiating to the arms.  She is tried baclofen and Flexeril which do not help along with physical therapy.    PFSH: See attached    ROS: No balance difficulties, bowel or bladder complaints    PE: Neurologically intact in the upper extremities.  Mild cervical tenderness to palpation.    XRAY: I have reports of MRI scan of the cervical spine performed at [facility] were not sure which 1 nor is it label.  It suggests \"diffuse congenital and multilevel superimposed acquired spinal stenosis, C4-C7.  Although focal areas of anterior cord flattening are demonstrated, no abnormal cord signal, either edema or myelomalacia \"plain film x-rays of the cervical spine show fairly well-maintained lordosis in an asthenic appearing neck, some mid cervical spondylosis and some calcified granulomas in the lung.  I do not see anything else and I do not have any comparison views    Other: n/a    Impression: Cervical spondylosis with radiculopathy    Plan: For now physical therapy.  She wants to go slowly with this and that is fine as I do not see any neurologic issues.  I will see her back as needed.  I encouraged her to  the CD of the cervical MRI and bring it to me  "

## 2021-08-11 ENCOUNTER — TELEPHONE (OUTPATIENT)
Dept: ORTHOPEDIC SURGERY | Facility: CLINIC | Age: 71
End: 2021-08-11

## 2021-08-13 ENCOUNTER — TELEPHONE (OUTPATIENT)
Dept: ORTHOPEDIC SURGERY | Facility: CLINIC | Age: 71
End: 2021-08-13

## 2021-08-13 DIAGNOSIS — M47.22 CERVICAL SPONDYLOSIS WITH RADICULOPATHY: Primary | ICD-10-CM

## 2021-08-13 NOTE — TELEPHONE ENCOUNTER
When [facility] called to schedule physical therapy, patient stated the pain cervical not lumbar. [facility] is requesting new physical therapy order with cervical diagnosis.     Thank you

## 2021-08-24 ENCOUNTER — INFUSION (OUTPATIENT)
Dept: ONCOLOGY | Facility: HOSPITAL | Age: 71
End: 2021-08-24

## 2021-08-24 ENCOUNTER — LAB (OUTPATIENT)
Dept: LAB | Facility: HOSPITAL | Age: 71
End: 2021-08-24

## 2021-08-24 DIAGNOSIS — C90.00 NEUROPATHY ASSOCIATED WITH MULTIPLE MYELOMA (HCC): ICD-10-CM

## 2021-08-24 DIAGNOSIS — C90.00 MULTIPLE MYELOMA NOT HAVING ACHIEVED REMISSION (HCC): Primary | ICD-10-CM

## 2021-08-24 DIAGNOSIS — G63 NEUROPATHY ASSOCIATED WITH MULTIPLE MYELOMA (HCC): ICD-10-CM

## 2021-08-24 DIAGNOSIS — C90.00 MULTIPLE MYELOMA NOT HAVING ACHIEVED REMISSION (HCC): ICD-10-CM

## 2021-08-24 LAB
ALBUMIN SERPL-MCNC: 4.2 G/DL (ref 3.5–5.2)
ALBUMIN/GLOB SERPL: 1.9 G/DL (ref 1.1–2.4)
ALP SERPL-CCNC: 58 U/L (ref 38–116)
ALT SERPL W P-5'-P-CCNC: 16 U/L (ref 0–33)
ANION GAP SERPL CALCULATED.3IONS-SCNC: 9.9 MMOL/L (ref 5–15)
AST SERPL-CCNC: 17 U/L (ref 0–32)
B2 MICROGLOB SERPL-MCNC: 1.8 MG/L (ref 0.8–2.2)
BASOPHILS # BLD AUTO: 0.02 10*3/MM3 (ref 0–0.2)
BASOPHILS NFR BLD AUTO: 0.6 % (ref 0–1.5)
BILIRUB SERPL-MCNC: 0.3 MG/DL (ref 0.2–1.2)
BUN SERPL-MCNC: 12 MG/DL (ref 6–20)
BUN/CREAT SERPL: 14.6 (ref 7.3–30)
CALCIUM SPEC-SCNC: 8.8 MG/DL (ref 8.5–10.2)
CHLORIDE SERPL-SCNC: 101 MMOL/L (ref 98–107)
CO2 SERPL-SCNC: 27.1 MMOL/L (ref 22–29)
CREAT SERPL-MCNC: 0.82 MG/DL (ref 0.6–1.1)
DEPRECATED RDW RBC AUTO: 51.8 FL (ref 37–54)
EOSINOPHIL # BLD AUTO: 0.18 10*3/MM3 (ref 0–0.4)
EOSINOPHIL NFR BLD AUTO: 5.5 % (ref 0.3–6.2)
ERYTHROCYTE [DISTWIDTH] IN BLOOD BY AUTOMATED COUNT: 14.1 % (ref 12.3–15.4)
GFR SERPL CREATININE-BSD FRML MDRD: 69 ML/MIN/1.73
GLOBULIN UR ELPH-MCNC: 2.2 GM/DL (ref 1.8–3.5)
GLUCOSE SERPL-MCNC: 127 MG/DL (ref 74–124)
HCT VFR BLD AUTO: 39.9 % (ref 34–46.6)
HGB BLD-MCNC: 13 G/DL (ref 12–15.9)
IMM GRANULOCYTES # BLD AUTO: 0 10*3/MM3 (ref 0–0.05)
IMM GRANULOCYTES NFR BLD AUTO: 0 % (ref 0–0.5)
LYMPHOCYTES # BLD AUTO: 1.57 10*3/MM3 (ref 0.7–3.1)
LYMPHOCYTES NFR BLD AUTO: 47.6 % (ref 19.6–45.3)
MAGNESIUM SERPL-MCNC: 1.9 MG/DL (ref 1.8–2.5)
MCH RBC QN AUTO: 32.7 PG (ref 26.6–33)
MCHC RBC AUTO-ENTMCNC: 32.6 G/DL (ref 31.5–35.7)
MCV RBC AUTO: 100.3 FL (ref 79–97)
MONOCYTES # BLD AUTO: 0.52 10*3/MM3 (ref 0.1–0.9)
MONOCYTES NFR BLD AUTO: 15.8 % (ref 5–12)
NEUTROPHILS NFR BLD AUTO: 1.01 10*3/MM3 (ref 1.7–7)
NEUTROPHILS NFR BLD AUTO: 30.5 % (ref 42.7–76)
NRBC BLD AUTO-RTO: 0 /100 WBC (ref 0–0.2)
PHOSPHATE SERPL-MCNC: 3.6 MG/DL (ref 2.5–4.5)
PLATELET # BLD AUTO: 198 10*3/MM3 (ref 140–450)
PMV BLD AUTO: 9 FL (ref 6–12)
POTASSIUM SERPL-SCNC: 3.9 MMOL/L (ref 3.5–4.7)
PROT SERPL-MCNC: 6.4 G/DL (ref 6.3–8)
RBC # BLD AUTO: 3.98 10*6/MM3 (ref 3.77–5.28)
SODIUM SERPL-SCNC: 138 MMOL/L (ref 134–145)
WBC # BLD AUTO: 3.3 10*3/MM3 (ref 3.4–10.8)

## 2021-08-24 PROCEDURE — 96372 THER/PROPH/DIAG INJ SC/IM: CPT

## 2021-08-24 PROCEDURE — 25010000002 CYANOCOBALAMIN PER 1000 MCG: Performed by: INTERNAL MEDICINE

## 2021-08-24 PROCEDURE — 85025 COMPLETE CBC W/AUTO DIFF WBC: CPT

## 2021-08-24 PROCEDURE — 84100 ASSAY OF PHOSPHORUS: CPT

## 2021-08-24 PROCEDURE — 36415 COLL VENOUS BLD VENIPUNCTURE: CPT

## 2021-08-24 PROCEDURE — 82232 ASSAY OF BETA-2 PROTEIN: CPT | Performed by: INTERNAL MEDICINE

## 2021-08-24 PROCEDURE — 83735 ASSAY OF MAGNESIUM: CPT

## 2021-08-24 PROCEDURE — 80053 COMPREHEN METABOLIC PANEL: CPT

## 2021-08-24 RX ORDER — CYANOCOBALAMIN 1000 UG/ML
1000 INJECTION, SOLUTION INTRAMUSCULAR; SUBCUTANEOUS ONCE
Status: COMPLETED | OUTPATIENT
Start: 2021-08-24 | End: 2021-08-24

## 2021-08-24 RX ADMIN — CYANOCOBALAMIN 1000 MCG: 1000 INJECTION, SOLUTION INTRAMUSCULAR at 12:01

## 2021-08-25 ENCOUNTER — MEDICATION THERAPY MANAGEMENT (OUTPATIENT)
Dept: PHARMACY | Facility: HOSPITAL | Age: 71
End: 2021-08-25

## 2021-08-25 NOTE — PROGRESS NOTES
[facility] Lab Review - Revlimid      8/24/2021   WBC 3.40 - 10.80 10*3/mm3 3.30 (A)   Neutrophils Absolute 1.70 - 7.00 10*3/mm3 1.01 (A)   Hemoglobin 12.0 - 15.9 g/dL 13.0   Hematocrit 34.0 - 46.6 % 39.9   Platelets 140 - 450 10*3/mm3 198   Creatinine 0.60 - 1.10 mg/dL 0.82   Sodium 134 - 145 mmol/L 138   Potassium 3.5 - 4.7 mmol/L 3.9   Glucose 74 - 124 mg/dL 127 (A)   Magnesium 1.8 - 2.5 mg/dL 1.9   Calcium 8.5 - 10.2 mg/dL 8.8   Albumin 3.50 - 5.20 g/dL 4.20   Total Protein 6.3 - 8.0 g/dL 6.4   AST (SGOT) 0 - 32 U/L 17   ALT (SGPT) 0 - 33 U/L 16   Alkaline Phosphatase 38 - 116 U/L 58   Total Bilirubin 0.2 - 1.2 mg/dL 0.3   Phosphorus 2.5 - 4.5 mg/dL 3.6     ANC slightly lower this month compared to 1.07 on 7/27. I have messaged Shima Mckeon to see if Dr. Garcia would like to make any adjustments. Pharmacy will continue to follow.     Bijan Tobar

## 2021-08-26 LAB
ALBUMIN SERPL ELPH-MCNC: 3.8 G/DL (ref 2.9–4.4)
ALBUMIN/GLOB SERPL: 1.5 {RATIO} (ref 0.7–1.7)
ALPHA1 GLOB SERPL ELPH-MCNC: 0.2 G/DL (ref 0–0.4)
ALPHA2 GLOB SERPL ELPH-MCNC: 0.7 G/DL (ref 0.4–1)
B-GLOBULIN SERPL ELPH-MCNC: 1 G/DL (ref 0.7–1.3)
GAMMA GLOB SERPL ELPH-MCNC: 0.8 G/DL (ref 0.4–1.8)
GLOBULIN SER-MCNC: 2.7 G/DL (ref 2.2–3.9)
IGA SERPL-MCNC: 82 MG/DL (ref 64–422)
IGG SERPL-MCNC: 847 MG/DL (ref 586–1602)
IGM SERPL-MCNC: 21 MG/DL (ref 26–217)
INTERPRETATION SERPL IEP-IMP: ABNORMAL
KAPPA LC FREE SER-MCNC: 23.2 MG/L (ref 3.3–19.4)
KAPPA LC FREE/LAMBDA FREE SER: 1.28 {RATIO} (ref 0.26–1.65)
LABORATORY COMMENT REPORT: ABNORMAL
LAMBDA LC FREE SERPL-MCNC: 18.1 MG/L (ref 5.7–26.3)
M PROTEIN SERPL ELPH-MCNC: ABNORMAL G/DL
PROT SERPL-MCNC: 6.5 G/DL (ref 6–8.5)

## 2021-08-27 ENCOUNTER — APPOINTMENT (OUTPATIENT)
Dept: CT IMAGING | Facility: HOSPITAL | Age: 71
End: 2021-08-27

## 2021-08-27 DIAGNOSIS — C90.00 MULTIPLE MYELOMA NOT HAVING ACHIEVED REMISSION (HCC): Primary | ICD-10-CM

## 2021-08-30 DIAGNOSIS — C90.00 MULTIPLE MYELOMA NOT HAVING ACHIEVED REMISSION (HCC): ICD-10-CM

## 2021-08-30 RX ORDER — LENALIDOMIDE 10 MG/1
CAPSULE ORAL
Qty: 21 CAPSULE | Refills: 0 | Status: SHIPPED | OUTPATIENT
Start: 2021-08-30 | End: 2021-09-28

## 2021-08-31 ENCOUNTER — APPOINTMENT (OUTPATIENT)
Dept: ONCOLOGY | Facility: HOSPITAL | Age: 71
End: 2021-08-31

## 2021-08-31 ENCOUNTER — APPOINTMENT (OUTPATIENT)
Dept: CT IMAGING | Facility: HOSPITAL | Age: 71
End: 2021-08-31

## 2021-08-31 ENCOUNTER — APPOINTMENT (OUTPATIENT)
Dept: LAB | Facility: HOSPITAL | Age: 71
End: 2021-08-31

## 2021-08-31 DIAGNOSIS — C90.00 MULTIPLE MYELOMA NOT HAVING ACHIEVED REMISSION (HCC): ICD-10-CM

## 2021-08-31 RX ORDER — LENALIDOMIDE 10 MG/1
CAPSULE ORAL
Refills: 0 | OUTPATIENT
Start: 2021-08-31

## 2021-09-01 ENCOUNTER — LAB (OUTPATIENT)
Dept: LAB | Facility: HOSPITAL | Age: 71
End: 2021-09-01

## 2021-09-01 ENCOUNTER — CLINICAL SUPPORT (OUTPATIENT)
Dept: ONCOLOGY | Facility: HOSPITAL | Age: 71
End: 2021-09-01

## 2021-09-01 DIAGNOSIS — C90.00 MULTIPLE MYELOMA NOT HAVING ACHIEVED REMISSION (HCC): ICD-10-CM

## 2021-09-01 LAB
BASOPHILS # BLD AUTO: 0.03 10*3/MM3 (ref 0–0.2)
BASOPHILS NFR BLD AUTO: 0.8 % (ref 0–1.5)
DEPRECATED RDW RBC AUTO: 51.4 FL (ref 37–54)
EOSINOPHIL # BLD AUTO: 0.25 10*3/MM3 (ref 0–0.4)
EOSINOPHIL NFR BLD AUTO: 6.7 % (ref 0.3–6.2)
ERYTHROCYTE [DISTWIDTH] IN BLOOD BY AUTOMATED COUNT: 14.2 % (ref 12.3–15.4)
HCT VFR BLD AUTO: 38.6 % (ref 34–46.6)
HGB BLD-MCNC: 12.9 G/DL (ref 12–15.9)
IMM GRANULOCYTES # BLD AUTO: 0.03 10*3/MM3 (ref 0–0.05)
IMM GRANULOCYTES NFR BLD AUTO: 0.8 % (ref 0–0.5)
LYMPHOCYTES # BLD AUTO: 1.78 10*3/MM3 (ref 0.7–3.1)
LYMPHOCYTES NFR BLD AUTO: 47.8 % (ref 19.6–45.3)
MCH RBC QN AUTO: 32.9 PG (ref 26.6–33)
MCHC RBC AUTO-ENTMCNC: 33.4 G/DL (ref 31.5–35.7)
MCV RBC AUTO: 98.5 FL (ref 79–97)
MONOCYTES # BLD AUTO: 0.67 10*3/MM3 (ref 0.1–0.9)
MONOCYTES NFR BLD AUTO: 18 % (ref 5–12)
NEUTROPHILS NFR BLD AUTO: 0.96 10*3/MM3 (ref 1.7–7)
NEUTROPHILS NFR BLD AUTO: 25.9 % (ref 42.7–76)
NRBC BLD AUTO-RTO: 0 /100 WBC (ref 0–0.2)
PLATELET # BLD AUTO: 172 10*3/MM3 (ref 140–450)
PMV BLD AUTO: 9.8 FL (ref 6–12)
RBC # BLD AUTO: 3.92 10*6/MM3 (ref 3.77–5.28)
WBC # BLD AUTO: 3.72 10*3/MM3 (ref 3.4–10.8)

## 2021-09-01 PROCEDURE — G0463 HOSPITAL OUTPT CLINIC VISIT: HCPCS

## 2021-09-01 PROCEDURE — 85025 COMPLETE CBC W/AUTO DIFF WBC: CPT

## 2021-09-01 PROCEDURE — 36415 COLL VENOUS BLD VENIPUNCTURE: CPT

## 2021-09-01 RX ORDER — LENALIDOMIDE 10 MG/1
CAPSULE ORAL
Refills: 0 | OUTPATIENT
Start: 2021-09-01

## 2021-09-01 NOTE — NURSING NOTE
Pt is here for lab with RN review. Pt reports finishing 2 weeks on Revlimid and having just started her 2 weeks off. CBC reviewed with pt, counts are stable for this pt at this time. Pt has no complaints and denies fevers or s/s of infection. Copy of labs given to pt and f/u appt reviewed. Pt is instructed to call the office with any concerns or new symptoms prior to next visit. Pt v/u.    Lab Results   Component Value Date    WBC 3.72 09/01/2021    HGB 12.9 09/01/2021    HCT 38.6 09/01/2021    MCV 98.5 (H) 09/01/2021     09/01/2021

## 2021-09-01 NOTE — TELEPHONE ENCOUNTER
Revlimid refill request rec electronically from [facility]. Request denied-new rx sent on 8/30/2021

## 2021-09-02 ENCOUNTER — DOCUMENTATION (OUTPATIENT)
Dept: ONCOLOGY | Facility: CLINIC | Age: 71
End: 2021-09-02

## 2021-09-02 DIAGNOSIS — C90.00 MULTIPLE MYELOMA NOT HAVING ACHIEVED REMISSION (HCC): ICD-10-CM

## 2021-09-02 NOTE — PROGRESS NOTES
Per Dr. Garcia, pt to return in 1 week for CBC with RN review to ensure counts have improved to restart next cycle of revlimid. CBC order in place and message routed to scheduling to arrange appointment

## 2021-09-03 ENCOUNTER — APPOINTMENT (OUTPATIENT)
Dept: CT IMAGING | Facility: HOSPITAL | Age: 71
End: 2021-09-03

## 2021-09-08 ENCOUNTER — LAB (OUTPATIENT)
Dept: LAB | Facility: HOSPITAL | Age: 71
End: 2021-09-08

## 2021-09-08 ENCOUNTER — INFUSION (OUTPATIENT)
Dept: ONCOLOGY | Facility: HOSPITAL | Age: 71
End: 2021-09-08

## 2021-09-08 VITALS
BODY MASS INDEX: 26.97 KG/M2 | WEIGHT: 172.2 LBS | DIASTOLIC BLOOD PRESSURE: 84 MMHG | TEMPERATURE: 97 F | OXYGEN SATURATION: 94 % | HEART RATE: 84 BPM | SYSTOLIC BLOOD PRESSURE: 148 MMHG

## 2021-09-08 LAB
BASOPHILS # BLD AUTO: 0.07 10*3/MM3 (ref 0–0.2)
BASOPHILS NFR BLD AUTO: 1.5 % (ref 0–1.5)
DEPRECATED RDW RBC AUTO: 52 FL (ref 37–54)
EOSINOPHIL # BLD AUTO: 0.11 10*3/MM3 (ref 0–0.4)
EOSINOPHIL NFR BLD AUTO: 2.4 % (ref 0.3–6.2)
ERYTHROCYTE [DISTWIDTH] IN BLOOD BY AUTOMATED COUNT: 14.3 % (ref 12.3–15.4)
HCT VFR BLD AUTO: 39.1 % (ref 34–46.6)
HGB BLD-MCNC: 13 G/DL (ref 12–15.9)
IMM GRANULOCYTES # BLD AUTO: 0.09 10*3/MM3 (ref 0–0.05)
IMM GRANULOCYTES NFR BLD AUTO: 2 % (ref 0–0.5)
LYMPHOCYTES # BLD AUTO: 2.09 10*3/MM3 (ref 0.7–3.1)
LYMPHOCYTES NFR BLD AUTO: 45.5 % (ref 19.6–45.3)
MCH RBC QN AUTO: 32.9 PG (ref 26.6–33)
MCHC RBC AUTO-ENTMCNC: 33.2 G/DL (ref 31.5–35.7)
MCV RBC AUTO: 99 FL (ref 79–97)
MONOCYTES # BLD AUTO: 0.88 10*3/MM3 (ref 0.1–0.9)
MONOCYTES NFR BLD AUTO: 19.2 % (ref 5–12)
NEUTROPHILS NFR BLD AUTO: 1.35 10*3/MM3 (ref 1.7–7)
NEUTROPHILS NFR BLD AUTO: 29.4 % (ref 42.7–76)
NRBC BLD AUTO-RTO: 0 /100 WBC (ref 0–0.2)
PLATELET # BLD AUTO: 236 10*3/MM3 (ref 140–450)
PMV BLD AUTO: 9.7 FL (ref 6–12)
RBC # BLD AUTO: 3.95 10*6/MM3 (ref 3.77–5.28)
WBC # BLD AUTO: 4.59 10*3/MM3 (ref 3.4–10.8)

## 2021-09-08 PROCEDURE — 85025 COMPLETE CBC W/AUTO DIFF WBC: CPT

## 2021-09-08 PROCEDURE — 36415 COLL VENOUS BLD VENIPUNCTURE: CPT

## 2021-09-08 PROCEDURE — G0463 HOSPITAL OUTPT CLINIC VISIT: HCPCS

## 2021-09-08 NOTE — NURSING NOTE
Pt here for RN review.  Pt is not due to restart revlimid until next week 9/15/21. She was told by her MD at [facility] to do 2 weeks on and 2 weeks off for this cycle due to lower ANC.      VALERIA Ronquillo is going to talk to Dr. Garcia in the morning to see if pt is to come back in 1 week for cbc and RN review and if she should resume revlimid at that point. She will also clarify if pt will stay on 2 weeks on/2 weeks off or if she will go back to the original schedule of 3 weeks on and 1 week off.    Copy of labs given and pt v/u. Shima to call pt tomorrow after clarifying with Dr. Garcia

## 2021-09-09 ENCOUNTER — TELEPHONE (OUTPATIENT)
Dept: ONCOLOGY | Facility: CLINIC | Age: 71
End: 2021-09-09

## 2021-09-09 NOTE — TELEPHONE ENCOUNTER
Provider: CHINO    Caller:PACO    Relationship to Patient: SELF    Phone Number: [phone]    Reason for Call: PT REQUEST APPT CHANGE FROM 9/16 TO 9/15 LAB 3:00PM AND nURSE REVIEW AT 3:30

## 2021-09-09 NOTE — TELEPHONE ENCOUNTER
Lab Results   Component Value Date    WBC 4.59 09/08/2021    HGB 13.0 09/08/2021    HCT 39.1 09/08/2021    MCV 99.0 (H) 09/08/2021     09/08/2021     ANC: 1.35    Pt states she was told by her myeloma specialist to do a cycle of revlimid 2 weeks on 2 weeks off this cycle, and she still needs one more week off according to this schedule. Reviewed with Dr. Garcia and this will be fine. Pt will need to come in next week for repeat CBC prior to starting next cycle. Pt will need to communicate with her physician at [facility] with her results from her labs next week re: which cycle to proceed with: the 3 weeks on 1 off or the 2 on 2 off. Pt v/u.

## 2021-09-10 LAB
ALBUMIN 24H MFR UR ELPH: 31.7 %
ALPHA1 GLOB 24H MFR UR ELPH: 10.1 %
ALPHA2 GLOB 24H MFR UR ELPH: 21 %
B-GLOBULIN MFR UR ELPH: 21.1 %
GAMMA GLOB 24H MFR UR ELPH: 16 %
HIV 1 & 2 AB SER-IMP: NORMAL
INTERPRETATION UR IFE-IMP: NORMAL
KAPPA LC FREE 24H UR-MRATE: 22.43 MG/24 HR
KAPPA LC UR-MCNC: 12.46 MG/L (ref 0.63–113.79)
KAPPA LC/LAMBDA UR: 7.55 {RATIO} (ref 1.03–31.76)
LAMBDA LC FREE 24H UR-MRATE: 2.97 MG/24 HR
LAMBDA LC UR-MCNC: 1.65 MG/L (ref 0.47–11.77)
M PROTEIN 24H MFR UR ELPH: NORMAL %
PROT 24H UR-MRATE: 72 MG/24 HR (ref 30–150)
PROT UR-MCNC: 4 MG/DL

## 2021-09-10 RX ORDER — MELOXICAM 15 MG/1
TABLET ORAL
Qty: 30 TABLET | Refills: 1 | OUTPATIENT
Start: 2021-09-10

## 2021-09-15 ENCOUNTER — INFUSION (OUTPATIENT)
Dept: ONCOLOGY | Facility: HOSPITAL | Age: 71
End: 2021-09-15

## 2021-09-15 ENCOUNTER — LAB (OUTPATIENT)
Dept: LAB | Facility: HOSPITAL | Age: 71
End: 2021-09-15

## 2021-09-15 VITALS
OXYGEN SATURATION: 97 % | HEART RATE: 74 BPM | BODY MASS INDEX: 27.06 KG/M2 | WEIGHT: 172.8 LBS | SYSTOLIC BLOOD PRESSURE: 147 MMHG | DIASTOLIC BLOOD PRESSURE: 74 MMHG | TEMPERATURE: 97.5 F

## 2021-09-15 DIAGNOSIS — C90.00 MULTIPLE MYELOMA NOT HAVING ACHIEVED REMISSION (HCC): ICD-10-CM

## 2021-09-15 LAB
BASOPHILS # BLD AUTO: 0.06 10*3/MM3 (ref 0–0.2)
BASOPHILS NFR BLD AUTO: 1.5 % (ref 0–1.5)
DEPRECATED RDW RBC AUTO: 51 FL (ref 37–54)
EOSINOPHIL # BLD AUTO: 0.13 10*3/MM3 (ref 0–0.4)
EOSINOPHIL NFR BLD AUTO: 3.2 % (ref 0.3–6.2)
ERYTHROCYTE [DISTWIDTH] IN BLOOD BY AUTOMATED COUNT: 14.2 % (ref 12.3–15.4)
HCT VFR BLD AUTO: 37.7 % (ref 34–46.6)
HGB BLD-MCNC: 12.7 G/DL (ref 12–15.9)
IMM GRANULOCYTES # BLD AUTO: 0.03 10*3/MM3 (ref 0–0.05)
IMM GRANULOCYTES NFR BLD AUTO: 0.7 % (ref 0–0.5)
LYMPHOCYTES # BLD AUTO: 1.53 10*3/MM3 (ref 0.7–3.1)
LYMPHOCYTES NFR BLD AUTO: 37.2 % (ref 19.6–45.3)
MCH RBC QN AUTO: 33.1 PG (ref 26.6–33)
MCHC RBC AUTO-ENTMCNC: 33.7 G/DL (ref 31.5–35.7)
MCV RBC AUTO: 98.2 FL (ref 79–97)
MONOCYTES # BLD AUTO: 0.71 10*3/MM3 (ref 0.1–0.9)
MONOCYTES NFR BLD AUTO: 17.3 % (ref 5–12)
NEUTROPHILS NFR BLD AUTO: 1.65 10*3/MM3 (ref 1.7–7)
NEUTROPHILS NFR BLD AUTO: 40.1 % (ref 42.7–76)
NRBC BLD AUTO-RTO: 0 /100 WBC (ref 0–0.2)
PLATELET # BLD AUTO: 183 10*3/MM3 (ref 140–450)
PMV BLD AUTO: 10.7 FL (ref 6–12)
RBC # BLD AUTO: 3.84 10*6/MM3 (ref 3.77–5.28)
WBC # BLD AUTO: 4.11 10*3/MM3 (ref 3.4–10.8)

## 2021-09-15 PROCEDURE — 85025 COMPLETE CBC W/AUTO DIFF WBC: CPT

## 2021-09-15 PROCEDURE — G0463 HOSPITAL OUTPT CLINIC VISIT: HCPCS

## 2021-09-15 PROCEDURE — 36415 COLL VENOUS BLD VENIPUNCTURE: CPT

## 2021-09-15 NOTE — NURSING NOTE
Lab Results   Component Value Date    WBC 4.11 09/15/2021    HGB 12.7 09/15/2021    HCT 37.7 09/15/2021    MCV 98.2 (H) 09/15/2021     09/15/2021   ANC 1.65  Patient denies fever or signs of infection. She states that she resumed her Revlimid last evening. Instructed her to call MD at [facility] to find out if she is to resume her 3 weeks on and 1 week off, or 2 weeks on and 2 weeks off. Patient verbalized understanding.

## 2021-09-16 ENCOUNTER — APPOINTMENT (OUTPATIENT)
Dept: LAB | Facility: HOSPITAL | Age: 71
End: 2021-09-16

## 2021-09-16 ENCOUNTER — APPOINTMENT (OUTPATIENT)
Dept: ONCOLOGY | Facility: HOSPITAL | Age: 71
End: 2021-09-16

## 2021-09-17 ENCOUNTER — TELEPHONE (OUTPATIENT)
Dept: ONCOLOGY | Facility: CLINIC | Age: 71
End: 2021-09-17

## 2021-09-17 NOTE — TELEPHONE ENCOUNTER
Caller: PACO    Relationship to patient: PATIENT    Best call back number: [phone]     Type of visit: LAB, FOLLOW UP, AND INFUSION    Requested date: 09/21     If rescheduling, when is the original appointment: 09/22     Additional notes: SHE SAYS THIS APPT MUST BE MADE ON 09/21 SO THAT HER INFUSION SCHEDULE ISN'T AFFECTED.  SHE STATES SHE DOESN'T HAVE TRANSPORTATION ON Wednesday AND HAS PREVIOUSLY ALWAYS HAD APPTS ON TUESDAYS

## 2021-09-21 ENCOUNTER — INFUSION (OUTPATIENT)
Dept: ONCOLOGY | Facility: HOSPITAL | Age: 71
End: 2021-09-21

## 2021-09-21 ENCOUNTER — OFFICE VISIT (OUTPATIENT)
Dept: ONCOLOGY | Facility: CLINIC | Age: 71
End: 2021-09-21

## 2021-09-21 VITALS
HEART RATE: 90 BPM | TEMPERATURE: 98.2 F | BODY MASS INDEX: 27 KG/M2 | OXYGEN SATURATION: 96 % | HEIGHT: 67 IN | WEIGHT: 172 LBS | RESPIRATION RATE: 16 BRPM | DIASTOLIC BLOOD PRESSURE: 81 MMHG | SYSTOLIC BLOOD PRESSURE: 133 MMHG

## 2021-09-21 DIAGNOSIS — G63 NEUROPATHY ASSOCIATED WITH MULTIPLE MYELOMA (HCC): ICD-10-CM

## 2021-09-21 DIAGNOSIS — C90.00 MULTIPLE MYELOMA NOT HAVING ACHIEVED REMISSION (HCC): ICD-10-CM

## 2021-09-21 DIAGNOSIS — C90.00 NEUROPATHY ASSOCIATED WITH MULTIPLE MYELOMA (HCC): ICD-10-CM

## 2021-09-21 DIAGNOSIS — C90.00 MULTIPLE MYELOMA NOT HAVING ACHIEVED REMISSION (HCC): Primary | ICD-10-CM

## 2021-09-21 LAB
ALBUMIN SERPL-MCNC: 4 G/DL (ref 3.5–5.2)
ALBUMIN/GLOB SERPL: 1.7 G/DL (ref 1.1–2.4)
ALP SERPL-CCNC: 63 U/L (ref 38–116)
ALT SERPL W P-5'-P-CCNC: 10 U/L (ref 0–33)
ANION GAP SERPL CALCULATED.3IONS-SCNC: 10.7 MMOL/L (ref 5–15)
AST SERPL-CCNC: 16 U/L (ref 0–32)
B2 MICROGLOB SERPL-MCNC: 2 MG/L (ref 0.8–2.2)
BASOPHILS # BLD AUTO: 0.03 10*3/MM3 (ref 0–0.2)
BASOPHILS NFR BLD AUTO: 0.8 % (ref 0–1.5)
BILIRUB SERPL-MCNC: 0.3 MG/DL (ref 0.2–1.2)
BUN SERPL-MCNC: 8 MG/DL (ref 6–20)
BUN/CREAT SERPL: 10.8 (ref 7.3–30)
CALCIUM SPEC-SCNC: 8.6 MG/DL (ref 8.5–10.2)
CHLORIDE SERPL-SCNC: 103 MMOL/L (ref 98–107)
CO2 SERPL-SCNC: 26.3 MMOL/L (ref 22–29)
CREAT SERPL-MCNC: 0.74 MG/DL (ref 0.6–1.1)
DEPRECATED RDW RBC AUTO: 52.5 FL (ref 37–54)
EOSINOPHIL # BLD AUTO: 0.22 10*3/MM3 (ref 0–0.4)
EOSINOPHIL NFR BLD AUTO: 5.9 % (ref 0.3–6.2)
ERYTHROCYTE [DISTWIDTH] IN BLOOD BY AUTOMATED COUNT: 14.3 % (ref 12.3–15.4)
GFR SERPL CREATININE-BSD FRML MDRD: 77 ML/MIN/1.73
GLOBULIN UR ELPH-MCNC: 2.4 GM/DL (ref 1.8–3.5)
GLUCOSE SERPL-MCNC: 118 MG/DL (ref 74–124)
HCT VFR BLD AUTO: 39 % (ref 34–46.6)
HGB BLD-MCNC: 12.8 G/DL (ref 12–15.9)
IMM GRANULOCYTES # BLD AUTO: 0.01 10*3/MM3 (ref 0–0.05)
IMM GRANULOCYTES NFR BLD AUTO: 0.3 % (ref 0–0.5)
LYMPHOCYTES # BLD AUTO: 1.44 10*3/MM3 (ref 0.7–3.1)
LYMPHOCYTES NFR BLD AUTO: 38.7 % (ref 19.6–45.3)
MAGNESIUM SERPL-MCNC: 1.7 MG/DL (ref 1.8–2.5)
MCH RBC QN AUTO: 32.8 PG (ref 26.6–33)
MCHC RBC AUTO-ENTMCNC: 32.8 G/DL (ref 31.5–35.7)
MCV RBC AUTO: 100 FL (ref 79–97)
MONOCYTES # BLD AUTO: 0.35 10*3/MM3 (ref 0.1–0.9)
MONOCYTES NFR BLD AUTO: 9.4 % (ref 5–12)
NEUTROPHILS NFR BLD AUTO: 1.67 10*3/MM3 (ref 1.7–7)
NEUTROPHILS NFR BLD AUTO: 44.9 % (ref 42.7–76)
NRBC BLD AUTO-RTO: 0 /100 WBC (ref 0–0.2)
PHOSPHATE SERPL-MCNC: 3.1 MG/DL (ref 2.5–4.5)
PLATELET # BLD AUTO: 218 10*3/MM3 (ref 140–450)
PMV BLD AUTO: 9.2 FL (ref 6–12)
POTASSIUM SERPL-SCNC: 3.5 MMOL/L (ref 3.5–4.7)
PROT SERPL-MCNC: 6.4 G/DL (ref 6.3–8)
RBC # BLD AUTO: 3.9 10*6/MM3 (ref 3.77–5.28)
SODIUM SERPL-SCNC: 140 MMOL/L (ref 134–145)
WBC # BLD AUTO: 3.72 10*3/MM3 (ref 3.4–10.8)

## 2021-09-21 PROCEDURE — 80053 COMPREHEN METABOLIC PANEL: CPT

## 2021-09-21 PROCEDURE — 25010000002 ZOLEDRONIC ACID 4 MG/100ML SOLUTION: Performed by: INTERNAL MEDICINE

## 2021-09-21 PROCEDURE — 83735 ASSAY OF MAGNESIUM: CPT

## 2021-09-21 PROCEDURE — 96372 THER/PROPH/DIAG INJ SC/IM: CPT

## 2021-09-21 PROCEDURE — 99214 OFFICE O/P EST MOD 30 MIN: CPT | Performed by: INTERNAL MEDICINE

## 2021-09-21 PROCEDURE — 85025 COMPLETE CBC W/AUTO DIFF WBC: CPT

## 2021-09-21 PROCEDURE — 96374 THER/PROPH/DIAG INJ IV PUSH: CPT

## 2021-09-21 PROCEDURE — 25010000002 CYANOCOBALAMIN PER 1000 MCG: Performed by: INTERNAL MEDICINE

## 2021-09-21 PROCEDURE — 82232 ASSAY OF BETA-2 PROTEIN: CPT | Performed by: INTERNAL MEDICINE

## 2021-09-21 PROCEDURE — 84100 ASSAY OF PHOSPHORUS: CPT

## 2021-09-21 RX ORDER — ZOLEDRONIC ACID 0.04 MG/ML
4 INJECTION, SOLUTION INTRAVENOUS ONCE
Status: CANCELLED | OUTPATIENT
Start: 2021-09-21

## 2021-09-21 RX ORDER — SODIUM CHLORIDE 9 MG/ML
250 INJECTION, SOLUTION INTRAVENOUS ONCE
Status: CANCELLED | OUTPATIENT
Start: 2021-09-21

## 2021-09-21 RX ORDER — CYANOCOBALAMIN 1000 UG/ML
1000 INJECTION, SOLUTION INTRAMUSCULAR; SUBCUTANEOUS ONCE
Status: COMPLETED | OUTPATIENT
Start: 2021-09-21 | End: 2021-09-21

## 2021-09-21 RX ORDER — ZOLEDRONIC ACID 0.04 MG/ML
4 INJECTION, SOLUTION INTRAVENOUS ONCE
Status: COMPLETED | OUTPATIENT
Start: 2021-09-21 | End: 2021-09-21

## 2021-09-21 RX ORDER — SODIUM CHLORIDE 9 MG/ML
250 INJECTION, SOLUTION INTRAVENOUS ONCE
Status: COMPLETED | OUTPATIENT
Start: 2021-09-21 | End: 2021-09-21

## 2021-09-21 RX ADMIN — ZOLEDRONIC ACID 4 MG: 0.04 INJECTION, SOLUTION INTRAVENOUS at 12:48

## 2021-09-21 RX ADMIN — CYANOCOBALAMIN 1000 MCG: 1000 INJECTION, SOLUTION INTRAMUSCULAR at 12:36

## 2021-09-21 RX ADMIN — SODIUM CHLORIDE 250 ML: 9 INJECTION, SOLUTION INTRAVENOUS at 12:32

## 2021-09-21 NOTE — PROGRESS NOTES
Subjective     REASON FOR CONSULTATION: Transfer of care for IgA kappa multiple myeloma post stem cell transplant in March 2016 at [facility] currently on maintenance Revlimid 10 mg 3 out of 4 weeks                               REQUESTING PHYSICIAN: MD Brett Turpin MD    History of Present Illness patient is a 71-year-old female with an IgA kappa high risk myeloma post stem cell transplant on maintenance Revlimid 10 mg 3 of 4 weeks alone after toxicity from Velcade and dexamethasone-5 +years from stem cell transplant    Comes in for follow-up on her 3-month routine.  She has just completed 2 weeks of Revlimid and her counts are decent.  Her paraprotein is not detectable beta 2 microglobulin is normal l.  She has had no infections.  She continues with B12 injections monthly  Her blood counts have been doing good on her current dose of Revlimid which has been adjusted to 2 weeks on and 2 weeks off because of persistent leukopenia at the end of the cycle.    She is scheduled to go back to [facility] for checkup in November and we will see if they have any other recommendations      She is back today feeling well she has her usual aches and pains.  She has more pain after her Zometa injection for about a week but this is usual for her-    She had her sleep study for sleep apnea and just about qualified for CPAP but she is started using it has seen no change in her fatigue    She has had no infections or issues and is self isolating at home and practicing social distancing and has been vaccinated against coronavirus    Labs have been drawn and are normal with no paraprotein detected on her immunofixation over the last 3 months    She is 1 week into her Revlimid and her ANC is stable at 1.7    Past Medical History:   Diagnosis Date   • Anxiety    • Depression    • Disease of thyroid gland    • Hashimoto's disease    • History of vitamin D deficiency    •  Hypothyroidism    • IBS (irritable bowel syndrome)    • Mixed hyperlipidemia 3/12/2018   • Multiple myeloma (CMS/HCC) 2015    IgA kappa.  Stem cell transplant at [facility] 3/20/2016   • Myeloma (CMS/HCC)    • KWAME (obstructive sleep apnea) 07/08/2021    Overnight polysomnogram.  Weight 173 pounds.  Mild KWAME with AHI 5.6 events per hour.  When supine, 9.4 events per hour.  During REM, moderate severity at 26 events per hour.  No sleep-related hypoxia.  The patient snored 20.5% of total sleep time.   • Osteopenia    • Overweight (BMI 25.0-29.9) 2/5/2018        Past Surgical History:   Procedure Laterality Date   • BREAST BIOPSY     • CATARACT EXTRACTION     • COLONOSCOPY     • TONSILLECTOMY  1956   • VEIN SURGERY  1991      patient is a 70-year-old female with IgA kappa multiple myeloma diagnosed in mid 2015-high risk because of gain of 1 q- treatment with rev Dex Velcade initiated in 11/15-went on to stem cell transplant at [facility] in March 2016?  Melphalan.  She started post transplant therapy in May 2016 with Velcade rev Dex.  In September after 4 cycles Velcade was decreased to every other week with plans to continue rev Dex Velcade till progression because of high risk status.  In May 2019 her Velcade was discontinued because of worsening neuropathy.  Patient has remained on Revlimid 10 mg 21 out of 28 days since then with stable disease until November 2019 when a small IgG kappa paraprotein was noted on her blood tests which is distinct from her usual IgA kappa myeloma.  Restaging with PET scan was normal and since then the paraprotein as of 3/10/2020 has resolved    Patient has significant issues with anxiety and states that she had trouble getting her Revlimid in a timely fashion was very frustrated with this process at the [facility]-she states she was taken back when Dr. Hewitt suggested she transfer her care but is now happy to make a change    She is currently 2 days into her treatment cycle with  Revlimid .she has no pain currently except intermittently in her neck where she has had some degenerative disease.  She does have neuropathy for which she is on fairly high doses of Cymbalta.  She is not taking her gabapentin.    Continues on acyclovir for prevention and baby aspirin because of the Revlimid  She was having trouble with diarrhea from the Revlimid but this is controlled with WelChol.    She is  and lives by herself has no family nearby but good friends.  She does not smoke or drink.  She is up-to-date with mammography and does Cologuard instead of colonoscopy     have reviewed her labs in detail but cannot find any documentation of her transplant conditioning regimen (I assume it was melphalan)  or the depth of her response prior to transplant .  She states that the doctors at [facility] recommended to check her labs every month - she has been clinically BOZENA for 4 years and I suggested every other month testing but she is very adamant that she wants monthly testing      Current Outpatient Medications on File Prior to Visit   Medication Sig Dispense Refill   • Acetylcarn-Alpha Lipoic Acid 400-200 MG capsule Take  by mouth.     • acyclovir (ZOVIRAX) 400 MG tablet Take 1 tablet by mouth 2 (Two) Times a Day. 60 tablet 11   • ALPRAZolam (XANAX) 0.25 MG tablet Take 1 tablet by mouth Daily As Needed for Anxiety. 30 tablet 2   • amoxicillin (AMOXIL) 500 MG tablet Take 500 mg by mouth 2 (Two) Times a Day.     • aspirin 81 MG chewable tablet Chew 81 mg Daily.     • B Complex Vitamins (VITAMIN B COMPLEX) capsule capsule Take 2 tablets by mouth Daily.     • baclofen (LIORESAL) 10 MG tablet Take 10 mg by mouth 3 (Three) Times a Day.  5   • calcium carbonate-vitamin d 600-400 MG-UNIT per tablet Take 1 tablet by mouth Daily.     • colesevelam (WELCHOL) 625 MG tablet Take 2 tablets by mouth 2 (Two) Times a Day With Meals. 180 tablet 2   • cycloSPORINE (RESTASIS MULTIDOSE) 0.05 % ophthalmic emulsion 1 drop 2  (Two) Times a Day.     • diphenoxylate-atropine (LOMOTIL) 2.5-0.025 MG per tablet Take 1 tablet by mouth 3 (Three) Times a Day As Needed for Diarrhea. 90 tablet 3   • DULoxetine (CYMBALTA) 30 MG capsule Take 30 mg by mouth Daily.     • DULoxetine (CYMBALTA) 60 MG capsule Take 1 capsule by mouth Daily. 90 capsule 3   • fluticasone (FLONASE) 50 MCG/ACT nasal spray 2 sprays into each nostril Daily.     • folic acid (FOLVITE) 1 MG tablet Take 1,000 mcg by mouth Daily.  2   • gabapentin (NEURONTIN) 100 MG capsule Take 1 capsule by mouth Daily As Needed (Pain). 60 capsule 1   • HYDROcodone-acetaminophen (NORCO) 5-325 MG per tablet Take 1 tablet by mouth Every 6 (Six) Hours As Needed for Moderate Pain  or Severe Pain . 60 tablet 0   • lenalidomide (Revlimid) 10 MG capsule TAKE 1 CAPSULE DAILY FOR 21 DAYS ON THEN 7 DAYS OFF 21 capsule 0   • meloxicam (MOBIC) 15 MG tablet Take 15 mg by mouth Daily.     • Multiple Vitamins-Iron (DAILY-JONI/IRON/BETA-CAROTENE) tablet Take 1 tablet by mouth Daily.  2   • omega-3 acid ethyl esters (LOVAZA) 1 g capsule Take 2 g by mouth.     • ondansetron (ZOFRAN) 8 MG tablet Take 8 mg by mouth.     • predniSONE (DELTASONE) 10 MG tablet Take 10 mg by mouth Daily.     • promethazine (PHENERGAN) 25 MG tablet TAKE 0.5-1 TABLETS BY MOUTH EVERY 6 (SIX) HOURS AS NEEDED FOR NAUSEA.     • Synthroid 100 MCG tablet      • vitamin D (ERGOCALCIFEROL) 67649 units capsule capsule Take 50,000 Units by mouth 1 (One) Time Per Week.       No current facility-administered medications on file prior to visit.        ALLERGIES:    No Known Allergies     Social History     Socioeconomic History   • Marital status:      Spouse name: Not on file   • Number of children: 2   • Years of education: Not on file   • Highest education level: Not on file   Tobacco Use   • Smoking status: Former Smoker     Types: Cigarettes     Start date:      Quit date:      Years since quittin.7   • Smokeless tobacco:  "Never Used   Substance and Sexual Activity   • Alcohol use: Yes     Comment: 2 per month; or none   • Drug use: Never   • Sexual activity: Defer        Family History   Problem Relation Age of Onset   • Breast cancer Mother    • Sleep apnea Mother    • Lymphoma Father    • Sleep apnea Father    • Kidney cancer Paternal Grandmother         Review of Systems   Constitutional: Positive for fatigue (Worse).   Respiratory: Negative for cough, choking, chest tightness and shortness of breath.    Cardiovascular: Negative for chest pain.   Gastrointestinal: Negative for abdominal pain, constipation, nausea and vomiting.   Musculoskeletal: Positive for neck pain.   Neurological: Positive for numbness (Den,foot/hand tingling no numbness). Negative for headaches.   Psychiatric/Behavioral: Negative for dysphoric mood. The patient is nervous/anxious (same ).    All other systems reviewed and are negative.      Objective     Vitals:    09/21/21 1137   BP: 133/81   Pulse: 90   Resp: 16   Temp: 98.2 °F (36.8 °C)   TempSrc: Temporal   SpO2: 96%   Weight: 78 kg (172 lb)   Height: 170.2 cm (67.01\")   PainSc:   6     Current Status 6/29/2021   ECOG score 0       Physical Exam     CONSTITUTIONAL:  Vital signs reviewed.  No distress, looks comfortable.  EYES:  Conjunctiva and lids unremarkable.    RESPIRATORY:  Normal respiratory effort.  Lungs clear to auscultation bilaterally.  CARDIOVASCULAR:  Normal S1, S2.  No murmurs rubs or gallops.  No significant lower extremity edema.  EXT-no edema    I have reexamined the patient and the results are consistent with the previously documented exam. Yoni Garcia MD       RECENT LABS:  Hematology WBC   Date Value Ref Range Status   09/21/2021 3.72 3.40 - 10.80 10*3/mm3 Final   03/10/2020 4.27 (L) 4.5 - 11.0 10*3/uL Final     RBC   Date Value Ref Range Status   09/21/2021 3.90 3.77 - 5.28 10*6/mm3 Final   03/10/2020 3.80 (L) 4.0 - 5.2 10*6/uL Final     Hemoglobin   Date Value Ref Range " Status   09/21/2021 12.8 12.0 - 15.9 g/dL Final   03/10/2020 12.9 12.0 - 16.0 g/dL Final     Hematocrit   Date Value Ref Range Status   09/21/2021 39.0 34.0 - 46.6 % Final   03/10/2020 38.5 36.0 - 46.0 % Final     Platelets   Date Value Ref Range Status   09/21/2021 218 140 - 450 10*3/mm3 Final   03/10/2020 181 140 - 440 10*3/uL Final                  Assessment/Plan   1.  IgA kappa multiple myeloma diagnosed in 2015 treated with RVD  · Stem cell transplant at [facility] in 3/2016  · Maintenance treatment with Velcade rev Dex started in 5/16  · Velcade discontinued because of neurotoxicity in 5/19  · Small IgG kappa paraprotein seen on blood work in 10/19-PET scan negative protein disappeared by 3/20  · Zometa given every 3 months   · Acyclovir and aspirin prophylaxis  · Decrease Revlimid to 10 mg 2 out of 4 weeks in 8/21    2.  Anxiety  3.  Hypothyroidism on treatment  4.  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  Diarrhea due to treatment improved with WelChol  6.  Snoring and fatigue probable sleep apnea-CPAP instituted  7.  Vaccinated against corona virus    Plan  1.  Continue 10 mg of Revlimid 2 out of 4 weeks  2.continue monthly B12 and myeloma labs WILLIAM plus beta-2 microglobulin)  3.  Zometa every 3 months dose today  4.  See me in 4 months for follow-up .  She will be seen in November in [place] they will check her urine at that time

## 2021-09-22 ENCOUNTER — APPOINTMENT (OUTPATIENT)
Dept: LAB | Facility: HOSPITAL | Age: 71
End: 2021-09-22

## 2021-09-22 ENCOUNTER — APPOINTMENT (OUTPATIENT)
Dept: ONCOLOGY | Facility: HOSPITAL | Age: 71
End: 2021-09-22

## 2021-09-22 LAB
ALBUMIN SERPL ELPH-MCNC: 3.5 G/DL (ref 2.9–4.4)
ALBUMIN/GLOB SERPL: 1.3 {RATIO} (ref 0.7–1.7)
ALPHA1 GLOB SERPL ELPH-MCNC: 0.2 G/DL (ref 0–0.4)
ALPHA2 GLOB SERPL ELPH-MCNC: 0.8 G/DL (ref 0.4–1)
B-GLOBULIN SERPL ELPH-MCNC: 1 G/DL (ref 0.7–1.3)
GAMMA GLOB SERPL ELPH-MCNC: 0.8 G/DL (ref 0.4–1.8)
GLOBULIN SER-MCNC: 2.8 G/DL (ref 2.2–3.9)
IGA SERPL-MCNC: 77 MG/DL (ref 64–422)
IGG SERPL-MCNC: 778 MG/DL (ref 586–1602)
IGM SERPL-MCNC: 19 MG/DL (ref 26–217)
INTERPRETATION SERPL IEP-IMP: ABNORMAL
KAPPA LC FREE SER-MCNC: 20.9 MG/L (ref 3.3–19.4)
KAPPA LC FREE/LAMBDA FREE SER: 1.27 {RATIO} (ref 0.26–1.65)
LABORATORY COMMENT REPORT: ABNORMAL
LAMBDA LC FREE SERPL-MCNC: 16.5 MG/L (ref 5.7–26.3)
M PROTEIN SERPL ELPH-MCNC: 0.1 G/DL
PROT SERPL-MCNC: 6.3 G/DL (ref 6–8.5)

## 2021-09-27 ENCOUNTER — MEDICATION THERAPY MANAGEMENT (OUTPATIENT)
Dept: PHARMACY | Facility: HOSPITAL | Age: 71
End: 2021-09-27

## 2021-09-27 NOTE — PROGRESS NOTES
[facility] Lab Review- Revlimid      9/21/2021   WBC 3.40 - 10.80 10*3/mm3 3.72   Neutrophils Absolute 1.70 - 7.00 10*3/mm3 1.67 (A)   Hemoglobin 12.0 - 15.9 g/dL 12.8   Hematocrit 34.0 - 46.6 % 39.0   Platelets 140 - 450 10*3/mm3 218   Creatinine 0.60 - 1.10 mg/dL 0.74   eGFR Non African Am >60 mL/min/1.73 77   BUN 6 - 20 mg/dL 8   Sodium 134 - 145 mmol/L 140   Potassium 3.5 - 4.7 mmol/L 3.5   Glucose 74 - 124 mg/dL 118   Magnesium 1.8 - 2.5 mg/dL 1.7 (A)   Calcium 8.5 - 10.2 mg/dL 8.6   Albumin 2.9 - 4.4 g/dL 3.5   Total Protein 6.0 - 8.5 g/dL 6.3   AST (SGOT) 0 - 32 U/L 16   ALT (SGPT) 0 - 33 U/L 10   Alkaline Phosphatase 38 - 116 U/L 63   Total Bilirubin 0.2 - 1.2 mg/dL 0.3   IgA 64 - 422 mg/dL 77   IgG 586 - 1602 mg/dL 778   IgM 26 - 217 mg/dL 19 (A)  Result confirmed on concentration.   Kappa/Lambda Ratio 0.26 - 1.65 1.27   Phosphorus 2.5 - 4.5 mg/dL 3.1     MD dictation noted. Pharmacy will continue to follow.     Thanks,   Sandra Hernandez, PharmD

## 2021-09-28 DIAGNOSIS — C90.00 MULTIPLE MYELOMA NOT HAVING ACHIEVED REMISSION (HCC): ICD-10-CM

## 2021-09-28 RX ORDER — LENALIDOMIDE 10 MG/1
CAPSULE ORAL
Qty: 14 CAPSULE | Refills: 0 | Status: SHIPPED | OUTPATIENT
Start: 2021-09-28 | End: 2021-09-29

## 2021-09-29 ENCOUNTER — MEDICATION THERAPY MANAGEMENT (OUTPATIENT)
Dept: PHARMACY | Facility: HOSPITAL | Age: 71
End: 2021-09-29

## 2021-09-29 ENCOUNTER — TELEPHONE (OUTPATIENT)
Dept: PHARMACY | Facility: HOSPITAL | Age: 71
End: 2021-09-29

## 2021-09-29 DIAGNOSIS — C90.00 MULTIPLE MYELOMA NOT HAVING ACHIEVED REMISSION (HCC): ICD-10-CM

## 2021-09-29 RX ORDER — LENALIDOMIDE 10 MG/1
CAPSULE ORAL
Qty: 21 CAPSULE | Refills: 0 | Status: SHIPPED | OUTPATIENT
Start: 2021-09-29 | End: 2021-10-28

## 2021-09-29 RX ORDER — LENALIDOMIDE 10 MG/1
CAPSULE ORAL
Refills: 0 | OUTPATIENT
Start: 2021-09-29

## 2021-09-29 RX ORDER — LENALIDOMIDE 10 MG/1
CAPSULE ORAL
Qty: 14 CAPSULE | Refills: 0 | Status: SHIPPED | OUTPATIENT
Start: 2021-09-29 | End: 2021-09-29 | Stop reason: SDUPTHER

## 2021-09-29 NOTE — PROGRESS NOTES
MTM encounter- Revlimid dose    Call received from Shima Mckeon, clinic RN, regarding Revlimid dose schedule change to 10 mg daily for 3 weeks on then 1 week off per MD at [facility]. A new Rx has been sent to [facility] and I have asked Erin BRIDGES, pharmacy tech, to follow up with her pharmacy to make sure the other prescription that was sent yesterday gets canceled.     Thanks,   Sandra Hernandez, PharmD

## 2021-09-30 ENCOUNTER — APPOINTMENT (OUTPATIENT)
Dept: SLEEP MEDICINE | Facility: HOSPITAL | Age: 71
End: 2021-09-30

## 2021-10-13 ENCOUNTER — APPOINTMENT (OUTPATIENT)
Dept: CT IMAGING | Facility: HOSPITAL | Age: 71
End: 2021-10-13

## 2021-10-19 ENCOUNTER — LAB (OUTPATIENT)
Dept: LAB | Facility: HOSPITAL | Age: 71
End: 2021-10-19

## 2021-10-19 ENCOUNTER — INFUSION (OUTPATIENT)
Dept: ONCOLOGY | Facility: HOSPITAL | Age: 71
End: 2021-10-19

## 2021-10-19 DIAGNOSIS — C90.00 MULTIPLE MYELOMA NOT HAVING ACHIEVED REMISSION (HCC): ICD-10-CM

## 2021-10-19 DIAGNOSIS — G63 NEUROPATHY ASSOCIATED WITH MULTIPLE MYELOMA (HCC): Primary | ICD-10-CM

## 2021-10-19 DIAGNOSIS — G63 NEUROPATHY ASSOCIATED WITH MULTIPLE MYELOMA (HCC): ICD-10-CM

## 2021-10-19 DIAGNOSIS — C90.00 NEUROPATHY ASSOCIATED WITH MULTIPLE MYELOMA (HCC): ICD-10-CM

## 2021-10-19 DIAGNOSIS — C90.00 NEUROPATHY ASSOCIATED WITH MULTIPLE MYELOMA (HCC): Primary | ICD-10-CM

## 2021-10-19 LAB
ALBUMIN SERPL-MCNC: 4 G/DL (ref 3.5–5.2)
ALBUMIN/GLOB SERPL: 1.7 G/DL (ref 1.1–2.4)
ALP SERPL-CCNC: 67 U/L (ref 38–116)
ALT SERPL W P-5'-P-CCNC: 22 U/L (ref 0–33)
ANION GAP SERPL CALCULATED.3IONS-SCNC: 11.9 MMOL/L (ref 5–15)
AST SERPL-CCNC: 16 U/L (ref 0–32)
B2 MICROGLOB SERPL-MCNC: 1.9 MG/L (ref 0.8–2.2)
BASOPHILS # BLD AUTO: 0.06 10*3/MM3 (ref 0–0.2)
BASOPHILS NFR BLD AUTO: 1.6 % (ref 0–1.5)
BILIRUB SERPL-MCNC: <0.2 MG/DL (ref 0.2–1.2)
BUN SERPL-MCNC: 12 MG/DL (ref 6–20)
BUN/CREAT SERPL: 15.2 (ref 7.3–30)
CALCIUM SPEC-SCNC: 8.9 MG/DL (ref 8.5–10.2)
CHLORIDE SERPL-SCNC: 99 MMOL/L (ref 98–107)
CO2 SERPL-SCNC: 25.1 MMOL/L (ref 22–29)
CREAT SERPL-MCNC: 0.79 MG/DL (ref 0.6–1.1)
DEPRECATED RDW RBC AUTO: 51.9 FL (ref 37–54)
EOSINOPHIL # BLD AUTO: 0.13 10*3/MM3 (ref 0–0.4)
EOSINOPHIL NFR BLD AUTO: 3.4 % (ref 0.3–6.2)
ERYTHROCYTE [DISTWIDTH] IN BLOOD BY AUTOMATED COUNT: 14.1 % (ref 12.3–15.4)
GFR SERPL CREATININE-BSD FRML MDRD: 72 ML/MIN/1.73
GLOBULIN UR ELPH-MCNC: 2.3 GM/DL (ref 1.8–3.5)
GLUCOSE SERPL-MCNC: 114 MG/DL (ref 74–124)
HCT VFR BLD AUTO: 37.8 % (ref 34–46.6)
HGB BLD-MCNC: 12.5 G/DL (ref 12–15.9)
IMM GRANULOCYTES # BLD AUTO: 0 10*3/MM3 (ref 0–0.05)
IMM GRANULOCYTES NFR BLD AUTO: 0 % (ref 0–0.5)
LYMPHOCYTES # BLD AUTO: 1.25 10*3/MM3 (ref 0.7–3.1)
LYMPHOCYTES NFR BLD AUTO: 33 % (ref 19.6–45.3)
MCH RBC QN AUTO: 33.5 PG (ref 26.6–33)
MCHC RBC AUTO-ENTMCNC: 33.1 G/DL (ref 31.5–35.7)
MCV RBC AUTO: 101.3 FL (ref 79–97)
MONOCYTES # BLD AUTO: 0.61 10*3/MM3 (ref 0.1–0.9)
MONOCYTES NFR BLD AUTO: 16.1 % (ref 5–12)
NEUTROPHILS NFR BLD AUTO: 1.74 10*3/MM3 (ref 1.7–7)
NEUTROPHILS NFR BLD AUTO: 45.9 % (ref 42.7–76)
NRBC BLD AUTO-RTO: 0 /100 WBC (ref 0–0.2)
PLATELET # BLD AUTO: 190 10*3/MM3 (ref 140–450)
PMV BLD AUTO: 9.5 FL (ref 6–12)
POTASSIUM SERPL-SCNC: 3.8 MMOL/L (ref 3.5–4.7)
PROT SERPL-MCNC: 6.3 G/DL (ref 6.3–8)
RBC # BLD AUTO: 3.73 10*6/MM3 (ref 3.77–5.28)
SODIUM SERPL-SCNC: 136 MMOL/L (ref 134–145)
WBC # BLD AUTO: 3.79 10*3/MM3 (ref 3.4–10.8)

## 2021-10-19 PROCEDURE — 36415 COLL VENOUS BLD VENIPUNCTURE: CPT | Performed by: INTERNAL MEDICINE

## 2021-10-19 PROCEDURE — 96372 THER/PROPH/DIAG INJ SC/IM: CPT

## 2021-10-19 PROCEDURE — 80053 COMPREHEN METABOLIC PANEL: CPT | Performed by: INTERNAL MEDICINE

## 2021-10-19 PROCEDURE — 82232 ASSAY OF BETA-2 PROTEIN: CPT | Performed by: INTERNAL MEDICINE

## 2021-10-19 PROCEDURE — 25010000002 CYANOCOBALAMIN PER 1000 MCG: Performed by: INTERNAL MEDICINE

## 2021-10-19 PROCEDURE — 85025 COMPLETE CBC W/AUTO DIFF WBC: CPT

## 2021-10-19 RX ORDER — CYANOCOBALAMIN 1000 UG/ML
1000 INJECTION, SOLUTION INTRAMUSCULAR; SUBCUTANEOUS ONCE
Status: COMPLETED | OUTPATIENT
Start: 2021-10-19 | End: 2021-10-19

## 2021-10-19 RX ADMIN — CYANOCOBALAMIN 1000 MCG: 1000 INJECTION, SOLUTION INTRAMUSCULAR at 15:47

## 2021-10-20 LAB
ALBUMIN SERPL ELPH-MCNC: 3.5 G/DL (ref 2.9–4.4)
ALBUMIN/GLOB SERPL: 1.3 {RATIO} (ref 0.7–1.7)
ALPHA1 GLOB SERPL ELPH-MCNC: 0.2 G/DL (ref 0–0.4)
ALPHA2 GLOB SERPL ELPH-MCNC: 0.8 G/DL (ref 0.4–1)
B-GLOBULIN SERPL ELPH-MCNC: 0.9 G/DL (ref 0.7–1.3)
GAMMA GLOB SERPL ELPH-MCNC: 0.8 G/DL (ref 0.4–1.8)
GLOBULIN SER-MCNC: 2.8 G/DL (ref 2.2–3.9)
IGA SERPL-MCNC: 74 MG/DL (ref 64–422)
IGG SERPL-MCNC: 765 MG/DL (ref 586–1602)
IGM SERPL-MCNC: 23 MG/DL (ref 26–217)
INTERPRETATION SERPL IEP-IMP: ABNORMAL
KAPPA LC FREE SER-MCNC: 25.8 MG/L (ref 3.3–19.4)
KAPPA LC FREE/LAMBDA FREE SER: 1.17 {RATIO} (ref 0.26–1.65)
LABORATORY COMMENT REPORT: ABNORMAL
LAMBDA LC FREE SERPL-MCNC: 22.1 MG/L (ref 5.7–26.3)
M PROTEIN SERPL ELPH-MCNC: ABNORMAL G/DL
PROT SERPL-MCNC: 6.3 G/DL (ref 6–8.5)

## 2021-10-21 ENCOUNTER — APPOINTMENT (OUTPATIENT)
Dept: OTHER | Facility: HOSPITAL | Age: 71
End: 2021-10-21

## 2021-10-21 ENCOUNTER — HOSPITAL ENCOUNTER (OUTPATIENT)
Dept: CT IMAGING | Facility: HOSPITAL | Age: 71
Discharge: HOME | End: 2021-10-21

## 2021-10-21 DIAGNOSIS — Z09 FOLLOW UP: ICD-10-CM

## 2021-10-21 PROCEDURE — 72125 CT NECK SPINE W/O DYE: CPT

## 2021-10-25 DIAGNOSIS — K42.9 UMBILICAL HERNIA WITHOUT OBSTRUCTION AND WITHOUT GANGRENE: Primary | ICD-10-CM

## 2021-10-28 DIAGNOSIS — C90.00 MULTIPLE MYELOMA NOT HAVING ACHIEVED REMISSION (HCC): ICD-10-CM

## 2021-10-28 RX ORDER — LENALIDOMIDE 10 MG/1
CAPSULE ORAL
Qty: 21 CAPSULE | Refills: 0 | Status: SHIPPED | OUTPATIENT
Start: 2021-10-28 | End: 2021-10-29 | Stop reason: SDUPTHER

## 2021-10-29 DIAGNOSIS — C90.00 MULTIPLE MYELOMA NOT HAVING ACHIEVED REMISSION (HCC): ICD-10-CM

## 2021-10-29 RX ORDER — LENALIDOMIDE 10 MG/1
CAPSULE ORAL
Qty: 21 CAPSULE | Refills: 0 | Status: SHIPPED | OUTPATIENT
Start: 2021-10-29 | End: 2021-11-29 | Stop reason: SDUPTHER

## 2021-10-29 RX ORDER — LENALIDOMIDE 10 MG/1
CAPSULE ORAL
Refills: 0 | OUTPATIENT
Start: 2021-10-29

## 2021-11-04 ENCOUNTER — TELEPHONE (OUTPATIENT)
Dept: SLEEP MEDICINE | Facility: HOSPITAL | Age: 71
End: 2021-11-04

## 2021-11-04 ENCOUNTER — OFFICE VISIT (OUTPATIENT)
Dept: SLEEP MEDICINE | Facility: HOSPITAL | Age: 71
End: 2021-11-04

## 2021-11-04 ENCOUNTER — OFFICE VISIT (OUTPATIENT)
Dept: SURGERY | Facility: CLINIC | Age: 71
End: 2021-11-04

## 2021-11-04 VITALS — BODY MASS INDEX: 26.68 KG/M2 | HEIGHT: 67 IN | WEIGHT: 170 LBS

## 2021-11-04 VITALS — HEIGHT: 67 IN | WEIGHT: 169 LBS | BODY MASS INDEX: 26.53 KG/M2

## 2021-11-04 DIAGNOSIS — K42.9 UMBILICAL HERNIA WITHOUT OBSTRUCTION AND WITHOUT GANGRENE: Primary | ICD-10-CM

## 2021-11-04 DIAGNOSIS — G47.33 OSA (OBSTRUCTIVE SLEEP APNEA): Primary | ICD-10-CM

## 2021-11-04 PROCEDURE — 99213 OFFICE O/P EST LOW 20 MIN: CPT | Performed by: INTERNAL MEDICINE

## 2021-11-04 PROCEDURE — 99204 OFFICE O/P NEW MOD 45 MIN: CPT | Performed by: SURGERY

## 2021-11-04 PROCEDURE — G0463 HOSPITAL OUTPT CLINIC VISIT: HCPCS

## 2021-11-04 NOTE — PROGRESS NOTES
General Surgery  Initial Office Visit    CC: Umbilical hernia    HPI: The patient is a pleasant 71 y.o. year-old lady who presents today for evaluation of a chronic umbilical hernia that was initially detected about 2 years ago on a PET/CT performed for her chronic multiple myeloma.  At that time, umbilical hernia was asymptomatic and was noted to only contain fat on the CT scan.  Over the last few months, the hernia has become progressively more symptomatic with periumbilical pain that has become constant and feels like a dull ache.  Nothing seems to make it better or worse.  She denies any nausea or vomiting.  She mentioned it to her PCP, Jade Carvajal MD, who then referred her to see me.    Past Medical History:   Hyperlipidemia  Hypothyroidism due to Hashimoto's thyroiditis  Chronic dry  Irritable bowel syndrome  Multiple myeloma  Anxiety with depression  Degenerative disc disease of the cervical spine  Obstructive sleep apnea    Past Surgical History:   Breast biopsy  Tonsillectomy  Cataract extraction  Colonoscopy  Vein surgery    Medications:   Acetylcarn-Alpha lipoic acid 400-200 mg daily  Acyclovir 400 mg twice daily  Alprazolam 0.25 mg daily as needed for anxiety  Amoxicillin 500 mg twice daily  Aspirin 81 mg daily  Vitamin B complex 2 tablets daily  Baclofen 10 mg 3 times daily  Calcium carbonate with vitamin D 600-100 mg daily  WelChol 1300 mg twice daily with meals  Cyclosporine eyedrops twice daily  Lomotil 3 times daily as needed for diarrhea  Duloxetine 90 mg daily  Fluticasone nasal spray  Folic acid 1 mg daily  Gabapentin 100 mg daily as needed for pain  Norco 5-325 mg every 6 hours as needed for pain  Lenalidomide 1 capsule daily for 21 days then 1 week off  Meloxicam 15 mg daily  Multivitamin with iron and beta-carotene daily  Lovaza 2 g daily  Zofran 8 mg as needed for nausea  Prednisone 10 mg daily  Phenergan 25 mg every 6 hours as needed for nausea  Levothyroxine 100 mcg daily  Vitamin D  50,000 units weekly    Allergies: No known drug allergies    Family History: Mother  at age 91 from breast cancer, father  at age 76 from lymphoma, paternal grandmother  at age 45 from multiple myeloma    Social History: , former smoker but quit in , no regular alcohol use    ROS:   Constitutional: Negative for fevers or chills  HENT: Positive for lump or mass in the neck; negative for hearing loss or runny nose  Eyes: Negative for vision changes or scleral icterus  Respiratory: Positive for sleep apnea; negative for cough or shortness of breath  Cardiovascular: Negative for chest pain or heart palpitations  Gastrointestinal: Positive for abdominal pain around umbilicus; negative for abdominal distension, nausea, vomiting, constipation, melena, or hematochezia  Genitourinary: Negative for hematuria or dysuria  Musculoskeletal: Negative for joint swelling or gait instability  Neurologic: Negative for tremors or seizures  Psychiatric: Negative for suicidal ideations or agitation  All other systems reviewed and negative    Physical Exam:  Height: 170 cm  Weight: 76 kg  BMI: 26.5  General: No acute distress, well-nourished & well-developed  HEAD: normocephalic, atraumatic  EYES: normal conjunctiva, sclera anicteric  EARS: grossly normal hearing  NECK: supple, no thyromegaly  CARDIOVASCULAR: regular rate and rhythm  RESPIRATORY: clear to auscultation bilaterally  GASTROINTESTINAL: soft, nontender, non-distended, small incarcerated umbilical hernia containing omentum  MUSCULOSKELETAL: normal gait and station. No gross extremity abnormalities  PSYCHIATRIC: oriented x3, normal mood and affect    IMAGING:  PET/CT (2019):  IMPRESSION:   1. No significant abnormalities. No evidence of malignancy identified.     CT ABD/PELVIS (2019):  Heart size is normal. No pericardial effusion. Calcified right hilar and right peritracheal predominant lymph nodes are present indicative of old healed  granulomatous disease. No adenopathy. There is no pneumonic consolidation, pleural effusion, or pneumothorax. There are several calcified granulomas. There is mild scarring predominantly at the lung bases. There is also mild dependent atelectasis. No suspicious pulmonary nodule. The liver is noncirrhotic in morphology. The gallbladder is not distended. The spleen is normal in size. There are calcified hepatic and splenic granulomas. The pancreatic contour is normal. There is mild adenomatous hypertrophy of the adrenals. No urinary tract calculus or hydronephrosis. No aortic aneurysm. There is moderate atherosclerosis. There is a small umbilical hernia which contains fat. No bowel obstruction. No evidence for diverticulitis. There is moderate diverticulosis. No adnexal mass or free fluid. No urinary bladder calculus. Mild to moderate degenerative changes involve the thoracic and lumbar spine.     IMPRESSION:   1. No inflammatory process is identified.   2. Diverticulosis. Negative for acute diverticulitis.   3. Old healed granulomatous disease       ASSESSMENT & PLAN  Ms. Rod is a 71-year-old lady with a chronic small incarcerated umbilical hernia containing only omentum.  I reviewed reports from her previous PET/CT and CT chest/abdomen/pelvis done within the [facility] in 2019.  There is only mention of a small fat-containing umbilical hernia back then and this is consistent with her physical exam findings today.  Given the umbilical hernia has now become more symptomatic I would recommend we proceed with open umbilical hernia repair at her earliest convenience.  The fascial defect feels quite small and would be amenable to a primary repair, with no mesh needed.  She understands she would need to avoid lifting anything heavier than 15 pounds for 6 weeks postop.  She also understands the risks of the surgery after discussion today to include bleeding, possible hernia recurrence, and possible wound infection.   Despite these risks, she has consented to proceed and would like to schedule the surgery sometime after the first of the year.  She will call back to schedule when she is ready.    Shari Dumont MD  General, Robotic, and Endoscopic Surgery  [facility]    [address], [address]  [place] [address]  P: [phone]  F: [phone]

## 2021-11-04 NOTE — PROGRESS NOTES
"Follow Up Sleep Disorders Center Note     Chief Complaint:  KWAME     Primary Care Physician: Jade Carvajal MD    Interval History:   The patient is a 71 y.o. female  who I last saw 7/15/2021 and that note was reviewed.  The patient reports she is doing well.  She has improved since starting auto CPAP.  She goes to bed at 10 PM and awakens at 6:30 AM.    Self-administered [place] Sleepiness Scale test results: 7, previously 13  0-5 Lower normal daytime sleepiness  6-10 Higher normal daytime sleepiness  11-12 Mild, 13-15 Moderate, & 16-24 Severe excessive daytime sleepiness    Review of Systems:    A complete review of systems was done and all were negative with the exception of some nasal congestion and occasional cough    Social History:    Social History     Socioeconomic History   • Marital status:    • Number of children: 2   Tobacco Use   • Smoking status: Former Smoker     Types: Cigarettes     Start date:      Quit date:      Years since quittin.8   • Smokeless tobacco: Never Used   Substance and Sexual Activity   • Alcohol use: Yes     Comment: 2 per month; or none   • Drug use: Never   • Sexual activity: Defer       Allergies:  Patient has no known allergies.     Medication Review:  Reviewed.      Vital Signs:    Vitals:    21 0845   Weight: 76.7 kg (169 lb)   Height: 170.2 cm (67\")     Body mass index is 26.47 kg/m².    Physical Exam:    Constitutional:  Well developed 71 y.o. female that appears in no apparent distress.  Awake & oriented times 3.  Normal mood with normal recent and remote memory and normal judgement.  Eyes:  Conjunctivae normal.  Oropharynx: Previously, moist mucous membranes without exudate and a large tongue and normal uvula and moderate-severe narrowing of posterior pharyngeal opening and class II Mallampati airway, patient is wearing a facemask.     Downloaded PAP Data Reviewed For Compliance:  DME is [facility] and she uses a fullface mask.  Downloads " between 10 1 and 11-21 compliance greater than 85%.  Average usage is 5 hours 11 minutes.  Average AHI is mildly abnormal at 6 but all subsets are normal and she has no significant leak.  Average auto CPAP pressure is 13 and her auto CPAP is 6-16.    I have reviewed the above results and compared them with the patient's last downloads and reviewed with the patient.    Impression:   Mild obstructive sleep apnea for total sleep time, moderate severity during REM, by overnight polysomnogram 7/8/2021 adequately treated with ResMed auto CPAP. The patient appears to be at goal with good compliance and usage. The patient has no complaints of hypersomnolence.    Plan:  Good sleep hygiene measures should be maintained.        After evaluating the patient and assessing results available, the patient is benefiting from the treatment being provided.     The patient will continue ResMed auto CPAP.  After clinical evaluation and review of downloads, I recommend changes to the patient's pressures.  Auto CPAP will be changed to 8-14.  A new prescription will be sent to the patient's DME.    I answered all of the patient's questions.  The patient will call for any problems and will follow up in 6-8 months.      Arturo Holland MD  Sleep Medicine  11/04/21  08:55 EDT

## 2021-11-22 ENCOUNTER — SPECIALTY PHARMACY (OUTPATIENT)
Dept: PHARMACY | Facility: HOSPITAL | Age: 71
End: 2021-11-22

## 2021-11-29 ENCOUNTER — SPECIALTY PHARMACY (OUTPATIENT)
Dept: PHARMACY | Facility: HOSPITAL | Age: 71
End: 2021-11-29

## 2021-11-29 RX ORDER — LENALIDOMIDE 10 MG/1
10 CAPSULE ORAL DAILY
Qty: 21 CAPSULE | Refills: 0 | Status: SHIPPED | OUTPATIENT
Start: 2021-11-29 | End: 2021-12-27 | Stop reason: SDUPTHER

## 2021-11-30 RX ORDER — LENALIDOMIDE 10 MG/1
CAPSULE ORAL
Refills: 0 | OUTPATIENT
Start: 2021-11-30

## 2021-12-01 ENCOUNTER — SPECIALTY PHARMACY (OUTPATIENT)
Dept: PHARMACY | Facility: HOSPITAL | Age: 71
End: 2021-12-01

## 2021-12-01 RX ORDER — LENALIDOMIDE 10 MG/1
CAPSULE ORAL
Refills: 0 | OUTPATIENT
Start: 2021-12-01

## 2021-12-01 NOTE — PROGRESS NOTES
[facility] received refill request for Revlimid. It had already been submitted to pharmacy on 11/29/21. I was told to call [facility] and find out what was going on.   Called and talked to Cristóbal at [facility]. He said that the prescription was received and went thru for $1.96. He would push the prescription thru to be processed and cancel the request for a refill in the system.

## 2021-12-01 NOTE — TELEPHONE ENCOUNTER
Other Procedure: with Dr Chaudhari Refill requested from pharmacy, again. This is duplicate request- was sent on 11/29. Tasked Erin Donahue, Care Coordinator, to follow-up with pharmacy to ensure they received initial RX.    Introduction Text (Please End With A Colon): The following procedure was deferred: Detail Level: Detailed Procedure To Be Performed At Next Visit: Excision Instructions (Optional): S/p excisional biopsy at [facility] 7/18 — results showed invasive SCC with positive deep margins (see attachments) . Recommend excision- scheduled pt with Dr Chaudhari today 330PM

## 2021-12-02 ENCOUNTER — TRANSCRIBE ORDERS (OUTPATIENT)
Dept: ADMINISTRATIVE | Facility: HOSPITAL | Age: 71
End: 2021-12-02

## 2021-12-02 DIAGNOSIS — M47.812 CERVICAL SPONDYLOSIS: Primary | ICD-10-CM

## 2021-12-14 ENCOUNTER — INFUSION (OUTPATIENT)
Dept: ONCOLOGY | Facility: HOSPITAL | Age: 71
End: 2021-12-14

## 2021-12-14 ENCOUNTER — LAB (OUTPATIENT)
Dept: LAB | Facility: HOSPITAL | Age: 71
End: 2021-12-14

## 2021-12-14 DIAGNOSIS — C90.00 MULTIPLE MYELOMA NOT HAVING ACHIEVED REMISSION (HCC): ICD-10-CM

## 2021-12-14 DIAGNOSIS — G63 NEUROPATHY ASSOCIATED WITH MULTIPLE MYELOMA (HCC): ICD-10-CM

## 2021-12-14 DIAGNOSIS — C90.00 NEUROPATHY ASSOCIATED WITH MULTIPLE MYELOMA (HCC): ICD-10-CM

## 2021-12-14 DIAGNOSIS — G63 NEUROPATHY ASSOCIATED WITH MULTIPLE MYELOMA (HCC): Primary | ICD-10-CM

## 2021-12-14 DIAGNOSIS — C90.00 NEUROPATHY ASSOCIATED WITH MULTIPLE MYELOMA (HCC): Primary | ICD-10-CM

## 2021-12-14 LAB
ALBUMIN SERPL-MCNC: 4 G/DL (ref 3.5–5.2)
ALBUMIN/GLOB SERPL: 1.6 G/DL
ALP SERPL-CCNC: 63 U/L (ref 39–117)
ALT SERPL W P-5'-P-CCNC: 12 U/L (ref 1–33)
ANION GAP SERPL CALCULATED.3IONS-SCNC: 9.6 MMOL/L (ref 5–15)
AST SERPL-CCNC: 12 U/L (ref 1–32)
B2 MICROGLOB SERPL-MCNC: 1.9 MG/L (ref 0.8–2.2)
BASOPHILS # BLD AUTO: 0.04 10*3/MM3 (ref 0–0.2)
BASOPHILS NFR BLD AUTO: 0.9 % (ref 0–1.5)
BILIRUB SERPL-MCNC: 0.2 MG/DL (ref 0–1.2)
BUN SERPL-MCNC: 10 MG/DL (ref 8–23)
BUN/CREAT SERPL: 20 (ref 7–25)
CALCIUM SPEC-SCNC: 8.4 MG/DL (ref 8.6–10.5)
CHLORIDE SERPL-SCNC: 102 MMOL/L (ref 98–107)
CO2 SERPL-SCNC: 26.4 MMOL/L (ref 22–29)
CREAT SERPL-MCNC: 0.5 MG/DL (ref 0.57–1)
DEPRECATED RDW RBC AUTO: 52.7 FL (ref 37–54)
EOSINOPHIL # BLD AUTO: 0.25 10*3/MM3 (ref 0–0.4)
EOSINOPHIL NFR BLD AUTO: 5.4 % (ref 0.3–6.2)
ERYTHROCYTE [DISTWIDTH] IN BLOOD BY AUTOMATED COUNT: 14 % (ref 12.3–15.4)
GFR SERPL CREATININE-BSD FRML MDRD: 121 ML/MIN/1.73
GLOBULIN UR ELPH-MCNC: 2.5 GM/DL
GLUCOSE SERPL-MCNC: 102 MG/DL (ref 65–99)
HCT VFR BLD AUTO: 39.2 % (ref 34–46.6)
HGB BLD-MCNC: 12.8 G/DL (ref 12–15.9)
IMM GRANULOCYTES # BLD AUTO: 0.01 10*3/MM3 (ref 0–0.05)
IMM GRANULOCYTES NFR BLD AUTO: 0.2 % (ref 0–0.5)
LYMPHOCYTES # BLD AUTO: 1.81 10*3/MM3 (ref 0.7–3.1)
LYMPHOCYTES NFR BLD AUTO: 39.1 % (ref 19.6–45.3)
MAGNESIUM SERPL-MCNC: 2.3 MG/DL (ref 1.6–2.4)
MCH RBC QN AUTO: 33.1 PG (ref 26.6–33)
MCHC RBC AUTO-ENTMCNC: 32.7 G/DL (ref 31.5–35.7)
MCV RBC AUTO: 101.3 FL (ref 79–97)
MONOCYTES # BLD AUTO: 0.47 10*3/MM3 (ref 0.1–0.9)
MONOCYTES NFR BLD AUTO: 10.2 % (ref 5–12)
NEUTROPHILS NFR BLD AUTO: 2.05 10*3/MM3 (ref 1.7–7)
NEUTROPHILS NFR BLD AUTO: 44.2 % (ref 42.7–76)
NRBC BLD AUTO-RTO: 0 /100 WBC (ref 0–0.2)
PLATELET # BLD AUTO: 222 10*3/MM3 (ref 140–450)
PMV BLD AUTO: 9.1 FL (ref 6–12)
POTASSIUM SERPL-SCNC: 4.3 MMOL/L (ref 3.5–5.2)
PROT SERPL-MCNC: 6.5 G/DL (ref 6–8.5)
RBC # BLD AUTO: 3.87 10*6/MM3 (ref 3.77–5.28)
SODIUM SERPL-SCNC: 138 MMOL/L (ref 136–145)
WBC NRBC COR # BLD: 4.63 10*3/MM3 (ref 3.4–10.8)

## 2021-12-14 PROCEDURE — 25010000002 CYANOCOBALAMIN PER 1000 MCG: Performed by: INTERNAL MEDICINE

## 2021-12-14 PROCEDURE — 80053 COMPREHEN METABOLIC PANEL: CPT | Performed by: INTERNAL MEDICINE

## 2021-12-14 PROCEDURE — 85025 COMPLETE CBC W/AUTO DIFF WBC: CPT

## 2021-12-14 PROCEDURE — 82232 ASSAY OF BETA-2 PROTEIN: CPT | Performed by: INTERNAL MEDICINE

## 2021-12-14 PROCEDURE — 83735 ASSAY OF MAGNESIUM: CPT | Performed by: INTERNAL MEDICINE

## 2021-12-14 PROCEDURE — 36415 COLL VENOUS BLD VENIPUNCTURE: CPT

## 2021-12-14 PROCEDURE — 96372 THER/PROPH/DIAG INJ SC/IM: CPT

## 2021-12-14 RX ORDER — CYANOCOBALAMIN 1000 UG/ML
1000 INJECTION, SOLUTION INTRAMUSCULAR; SUBCUTANEOUS ONCE
Status: COMPLETED | OUTPATIENT
Start: 2021-12-14 | End: 2021-12-14

## 2021-12-14 RX ADMIN — CYANOCOBALAMIN 1000 MCG: 1000 INJECTION, SOLUTION INTRAMUSCULAR at 15:34

## 2021-12-15 ENCOUNTER — SPECIALTY PHARMACY (OUTPATIENT)
Dept: PHARMACY | Facility: HOSPITAL | Age: 71
End: 2021-12-15

## 2021-12-15 LAB
ALBUMIN SERPL ELPH-MCNC: 3.4 G/DL (ref 2.9–4.4)
ALBUMIN/GLOB SERPL: 1.3 {RATIO} (ref 0.7–1.7)
ALPHA1 GLOB SERPL ELPH-MCNC: 0.2 G/DL (ref 0–0.4)
ALPHA2 GLOB SERPL ELPH-MCNC: 0.8 G/DL (ref 0.4–1)
B-GLOBULIN SERPL ELPH-MCNC: 0.9 G/DL (ref 0.7–1.3)
GAMMA GLOB SERPL ELPH-MCNC: 0.8 G/DL (ref 0.4–1.8)
GLOBULIN SER-MCNC: 2.8 G/DL (ref 2.2–3.9)
IGA SERPL-MCNC: 92 MG/DL (ref 64–422)
IGG SERPL-MCNC: 844 MG/DL (ref 586–1602)
IGM SERPL-MCNC: 26 MG/DL (ref 26–217)
INTERPRETATION SERPL IEP-IMP: ABNORMAL
KAPPA LC FREE SER-MCNC: 23.2 MG/L (ref 3.3–19.4)
KAPPA LC FREE/LAMBDA FREE SER: 1.05 {RATIO} (ref 0.26–1.65)
LABORATORY COMMENT REPORT: ABNORMAL
LAMBDA LC FREE SERPL-MCNC: 22.2 MG/L (ref 5.7–26.3)
M PROTEIN SERPL ELPH-MCNC: ABNORMAL G/DL
PROT SERPL-MCNC: 6.2 G/DL (ref 6–8.5)

## 2021-12-26 RX ORDER — BACLOFEN 10 MG/1
TABLET ORAL
Qty: 60 TABLET | Refills: 1 | OUTPATIENT
Start: 2021-12-26

## 2021-12-27 ENCOUNTER — SPECIALTY PHARMACY (OUTPATIENT)
Dept: PHARMACY | Facility: HOSPITAL | Age: 71
End: 2021-12-27

## 2021-12-27 ENCOUNTER — HOSPITAL ENCOUNTER (OUTPATIENT)
Dept: MRI IMAGING | Facility: HOSPITAL | Age: 71
Discharge: HOME | End: 2021-12-27
Admitting: NEUROLOGICAL SURGERY

## 2021-12-27 DIAGNOSIS — M47.812 CERVICAL SPONDYLOSIS: ICD-10-CM

## 2021-12-27 PROCEDURE — 72156 MRI NECK SPINE W/O & W/DYE: CPT

## 2021-12-27 PROCEDURE — A9577 INJ MULTIHANCE: HCPCS | Performed by: NEUROLOGICAL SURGERY

## 2021-12-27 PROCEDURE — 0 GADOBENATE DIMEGLUMINE 529 MG/ML SOLUTION: Performed by: NEUROLOGICAL SURGERY

## 2021-12-27 RX ORDER — LENALIDOMIDE 10 MG/1
10 CAPSULE ORAL DAILY
Qty: 21 CAPSULE | Refills: 0 | Status: SHIPPED | OUTPATIENT
Start: 2021-12-27 | End: 2022-01-25

## 2021-12-27 RX ADMIN — GADOBENATE DIMEGLUMINE 15 ML: 529 INJECTION, SOLUTION INTRAVENOUS at 14:46

## 2021-12-27 NOTE — PROGRESS NOTES
Refill requested from pharmacy. Per last chart note, patient to continue revlimid 10 mg daily for 3 weeks on then 1 week off . Will route to MD for cosignature.

## 2021-12-28 RX ORDER — LENALIDOMIDE 10 MG/1
CAPSULE ORAL
Refills: 0 | OUTPATIENT
Start: 2021-12-28

## 2022-01-10 DIAGNOSIS — C90.00 MULTIPLE MYELOMA NOT HAVING ACHIEVED REMISSION: Primary | ICD-10-CM

## 2022-01-11 ENCOUNTER — INFUSION (OUTPATIENT)
Dept: ONCOLOGY | Facility: HOSPITAL | Age: 72
End: 2022-01-11

## 2022-01-11 ENCOUNTER — OFFICE VISIT (OUTPATIENT)
Dept: ONCOLOGY | Facility: CLINIC | Age: 72
End: 2022-01-11

## 2022-01-11 VITALS
SYSTOLIC BLOOD PRESSURE: 140 MMHG | TEMPERATURE: 97.8 F | HEART RATE: 74 BPM | BODY MASS INDEX: 26.53 KG/M2 | WEIGHT: 169 LBS | RESPIRATION RATE: 16 BRPM | DIASTOLIC BLOOD PRESSURE: 83 MMHG | HEIGHT: 67 IN | OXYGEN SATURATION: 96 %

## 2022-01-11 DIAGNOSIS — G63 NEUROPATHY ASSOCIATED WITH MULTIPLE MYELOMA: Primary | ICD-10-CM

## 2022-01-11 DIAGNOSIS — C90.00 MULTIPLE MYELOMA NOT HAVING ACHIEVED REMISSION: ICD-10-CM

## 2022-01-11 DIAGNOSIS — C90.00 MULTIPLE MYELOMA NOT HAVING ACHIEVED REMISSION: Primary | ICD-10-CM

## 2022-01-11 DIAGNOSIS — C90.00 NEUROPATHY ASSOCIATED WITH MULTIPLE MYELOMA: Primary | ICD-10-CM

## 2022-01-11 LAB
ALBUMIN SERPL-MCNC: 4 G/DL (ref 3.5–5.2)
ALBUMIN/GLOB SERPL: 1.5 G/DL (ref 1.1–2.4)
ALP SERPL-CCNC: 69 U/L (ref 38–116)
ALT SERPL W P-5'-P-CCNC: 13 U/L (ref 0–33)
ANION GAP SERPL CALCULATED.3IONS-SCNC: 10.7 MMOL/L (ref 5–15)
AST SERPL-CCNC: 16 U/L (ref 0–32)
B2 MICROGLOB SERPL-MCNC: 1.8 MG/L (ref 0.8–2.2)
BASOPHILS # BLD AUTO: 0.06 10*3/MM3 (ref 0–0.2)
BASOPHILS NFR BLD AUTO: 1.4 % (ref 0–1.5)
BILIRUB SERPL-MCNC: 0.3 MG/DL (ref 0.2–1.2)
BUN SERPL-MCNC: 8 MG/DL (ref 6–20)
BUN/CREAT SERPL: 10.8 (ref 7.3–30)
CALCIUM SPEC-SCNC: 8.7 MG/DL (ref 8.5–10.2)
CHLORIDE SERPL-SCNC: 103 MMOL/L (ref 98–107)
CO2 SERPL-SCNC: 24.3 MMOL/L (ref 22–29)
CREAT SERPL-MCNC: 0.74 MG/DL (ref 0.6–1.1)
DEPRECATED RDW RBC AUTO: 52.9 FL (ref 37–54)
EOSINOPHIL # BLD AUTO: 0.15 10*3/MM3 (ref 0–0.4)
EOSINOPHIL NFR BLD AUTO: 3.4 % (ref 0.3–6.2)
ERYTHROCYTE [DISTWIDTH] IN BLOOD BY AUTOMATED COUNT: 14.2 % (ref 12.3–15.4)
GFR SERPL CREATININE-BSD FRML MDRD: 77 ML/MIN/1.73
GLOBULIN UR ELPH-MCNC: 2.6 GM/DL (ref 1.8–3.5)
GLUCOSE SERPL-MCNC: 95 MG/DL (ref 74–124)
HCT VFR BLD AUTO: 41.4 % (ref 34–46.6)
HGB BLD-MCNC: 13.4 G/DL (ref 12–15.9)
IMM GRANULOCYTES # BLD AUTO: 0.02 10*3/MM3 (ref 0–0.05)
IMM GRANULOCYTES NFR BLD AUTO: 0.5 % (ref 0–0.5)
LYMPHOCYTES # BLD AUTO: 1.91 10*3/MM3 (ref 0.7–3.1)
LYMPHOCYTES NFR BLD AUTO: 43.4 % (ref 19.6–45.3)
MAGNESIUM SERPL-MCNC: 2 MG/DL (ref 1.8–2.5)
MCH RBC QN AUTO: 32.6 PG (ref 26.6–33)
MCHC RBC AUTO-ENTMCNC: 32.4 G/DL (ref 31.5–35.7)
MCV RBC AUTO: 100.7 FL (ref 79–97)
MONOCYTES # BLD AUTO: 0.57 10*3/MM3 (ref 0.1–0.9)
MONOCYTES NFR BLD AUTO: 13 % (ref 5–12)
NEUTROPHILS NFR BLD AUTO: 1.69 10*3/MM3 (ref 1.7–7)
NEUTROPHILS NFR BLD AUTO: 38.3 % (ref 42.7–76)
NRBC BLD AUTO-RTO: 0 /100 WBC (ref 0–0.2)
PHOSPHATE SERPL-MCNC: 3.3 MG/DL (ref 2.5–4.5)
PLATELET # BLD AUTO: 217 10*3/MM3 (ref 140–450)
PMV BLD AUTO: 9.2 FL (ref 6–12)
POTASSIUM SERPL-SCNC: 4.4 MMOL/L (ref 3.5–4.7)
PROT SERPL-MCNC: 6.6 G/DL (ref 6.3–8)
RBC # BLD AUTO: 4.11 10*6/MM3 (ref 3.77–5.28)
SODIUM SERPL-SCNC: 138 MMOL/L (ref 134–145)
WBC NRBC COR # BLD: 4.4 10*3/MM3 (ref 3.4–10.8)

## 2022-01-11 PROCEDURE — 25010000002 ZOLEDRONIC ACID PER 1 MG: Performed by: INTERNAL MEDICINE

## 2022-01-11 PROCEDURE — 99214 OFFICE O/P EST MOD 30 MIN: CPT | Performed by: INTERNAL MEDICINE

## 2022-01-11 PROCEDURE — 83735 ASSAY OF MAGNESIUM: CPT

## 2022-01-11 PROCEDURE — 96374 THER/PROPH/DIAG INJ IV PUSH: CPT

## 2022-01-11 PROCEDURE — 84100 ASSAY OF PHOSPHORUS: CPT

## 2022-01-11 PROCEDURE — 85025 COMPLETE CBC W/AUTO DIFF WBC: CPT

## 2022-01-11 PROCEDURE — 82232 ASSAY OF BETA-2 PROTEIN: CPT | Performed by: INTERNAL MEDICINE

## 2022-01-11 PROCEDURE — 80053 COMPREHEN METABOLIC PANEL: CPT

## 2022-01-11 RX ORDER — SODIUM CHLORIDE 9 MG/ML
250 INJECTION, SOLUTION INTRAVENOUS ONCE
Status: COMPLETED | OUTPATIENT
Start: 2022-01-11 | End: 2022-01-11

## 2022-01-11 RX ORDER — SODIUM CHLORIDE 9 MG/ML
250 INJECTION, SOLUTION INTRAVENOUS ONCE
Status: CANCELLED | OUTPATIENT
Start: 2022-01-11

## 2022-01-11 RX ORDER — AZITHROMYCIN 250 MG/1
TABLET, FILM COATED ORAL
COMMUNITY
Start: 2021-12-02 | End: 2022-01-11

## 2022-01-11 RX ORDER — UBROGEPANT 100 MG/1
TABLET ORAL AS NEEDED
COMMUNITY
Start: 2021-12-27

## 2022-01-11 RX ORDER — DEXTROMETHORPHAN HYDROBROMIDE AND PROMETHAZINE HYDROCHLORIDE 15; 6.25 MG/5ML; MG/5ML
SYRUP ORAL
COMMUNITY
Start: 2021-11-29 | End: 2022-04-05 | Stop reason: SDDI

## 2022-01-11 RX ORDER — ZOLEDRONIC ACID 0.04 MG/ML
4 INJECTION, SOLUTION INTRAVENOUS ONCE
Status: CANCELLED | OUTPATIENT
Start: 2022-01-11

## 2022-01-11 RX ADMIN — ZOLEDRONIC ACID 4 MG: 0.8 INJECTION, SOLUTION, CONCENTRATE INTRAVENOUS at 12:09

## 2022-01-11 RX ADMIN — SODIUM CHLORIDE 250 ML: 9 INJECTION, SOLUTION INTRAVENOUS at 12:08

## 2022-01-11 NOTE — PROGRESS NOTES
Subjective     REASON FOR CONSULTATION: Transfer of care for IgA kappa multiple myeloma post stem cell transplant in March 2016 at [facility] currently on maintenance Revlimid 10 mg 3 out of 4 weeks                               REQUESTING PHYSICIAN: MD Brett Turpin MD    History of Present Illness patient is a 71-year-old female with an IgA kappa high risk myeloma post stem cell transplant on maintenance Revlimid 10 mg 3 of 4 weeks alone after toxicity from Velcade and dexamethasone-5 +years from stem cell transplant    Comes in for follow-up on her 3-month routine.  She has just completed 3 weeks of Revlimid and is ready to start back on treatment after her week off and her counts are decent.  Her paraprotein is not detectable beta 2 microglobulin is normal l.  She has had no infections.  She continues with B12 injections monthly    Patient went back to 3 weeks on and 1 week off and her counts are doing okay but she went to see her doctor and [place] he agreed that if she has persistent leukopenia and a 1 to 2-week on 2-week off regimen may be reasonable    She is back today feeling well she has her usual aches and pains.  She has more pain after her Zometa injection for about a week but this is usual for her-    She had her sleep study for sleep apnea and just about qualified for CPAP but she is started using it has seen no change in her fatigue    She has had no infections or issues and is self isolating at home and practicing social distancing and has been vaccinated against coronavirus and had her booster    Labs have been drawn and are normal with no paraprotein detected on her immunofixation over the last 3 months  I think it is fairly reasonable to cut back her labs to every 2 months but she insists that the doctor in [place] wants it checked every month which I doubt very much    Past Medical History:   Diagnosis Date   • Anxiety    •  Depression    • Disease of thyroid gland    • Hashimoto's disease    • History of vitamin D deficiency    • Hypothyroidism    • IBS (irritable bowel syndrome)    • Mixed hyperlipidemia 3/12/2018   • Multiple myeloma (HCC) 2015    IgA kappa.  Stem cell transplant at [facility] 3/20/2016   • Myeloma (HCC)    • KWAME (obstructive sleep apnea) 07/08/2021    Overnight polysomnogram.  Weight 173 pounds.  Mild KWAME with AHI 5.6 events per hour.  When supine, 9.4 events per hour.  During REM, moderate severity at 26 events per hour.  No sleep-related hypoxia.  The patient snored 20.5% of total sleep time.   • Osteopenia    • Overweight (BMI 25.0-29.9) 2/5/2018        Past Surgical History:   Procedure Laterality Date   • BREAST BIOPSY     • CATARACT EXTRACTION     • COLONOSCOPY     • TONSILLECTOMY  1956   • VEIN SURGERY  1991      patient is a 70-year-old female with IgA kappa multiple myeloma diagnosed in mid 2015-high risk because of gain of 1 q- treatment with rev Dex Velcade initiated in 11/15-went on to stem cell transplant at [facility] in March 2016?  Melphalan.  She started post transplant therapy in May 2016 with Velcade rev Dex.  In September after 4 cycles Velcade was decreased to every other week with plans to continue rev Dex Velcade till progression because of high risk status.  In May 2019 her Velcade was discontinued because of worsening neuropathy.  Patient has remained on Revlimid 10 mg 21 out of 28 days since then with stable disease until November 2019 when a small IgG kappa paraprotein was noted on her blood tests which is distinct from her usual IgA kappa myeloma.  Restaging with PET scan was normal and since then the paraprotein as of 3/10/2020 has resolved    Patient has significant issues with anxiety and states that she had trouble getting her Revlimid in a timely fashion was very frustrated with this process at the [place] system-she states she was taken back when Dr. Hewitt suggested she transfer  her care but is now happy to make a change    She is currently 2 days into her treatment cycle with Revlimid .she has no pain currently except intermittently in her neck where she has had some degenerative disease.  She does have neuropathy for which she is on fairly high doses of Cymbalta.  She is not taking her gabapentin.    Continues on acyclovir for prevention and baby aspirin because of the Revlimid  She was having trouble with diarrhea from the Revlimid but this is controlled with WelChol.    She is  and lives by herself has no family nearby but good friends.  She does not smoke or drink.  She is up-to-date with mammography and does Cologuard instead of colonoscopy     have reviewed her labs in detail but cannot find any documentation of her transplant conditioning regimen (I assume it was melphalan)  or the depth of her response prior to transplant .  She states that the doctors at [facility] recommended to check her labs every month - she has been clinically BOZENA for 4 years and I suggested every other month testing but she is very adamant that she wants monthly testing      Current Outpatient Medications on File Prior to Visit   Medication Sig Dispense Refill   • Acetylcarn-Alpha Lipoic Acid 400-200 MG capsule Take  by mouth.     • acyclovir (ZOVIRAX) 400 MG tablet Take 1 tablet by mouth 2 (Two) Times a Day. 60 tablet 11   • ALPRAZolam (XANAX) 0.25 MG tablet Take 1 tablet by mouth Daily As Needed for Anxiety. 30 tablet 2   • aspirin 81 MG chewable tablet Chew 81 mg Daily.     • B Complex Vitamins (VITAMIN B COMPLEX) capsule capsule Take 2 tablets by mouth Daily.     • baclofen (LIORESAL) 10 MG tablet Take 10 mg by mouth 3 (Three) Times a Day.  5   • calcium carbonate-vitamin d 600-400 MG-UNIT per tablet Take 1 tablet by mouth Daily.     • colesevelam (WELCHOL) 625 MG tablet Take 2 tablets by mouth 2 (Two) Times a Day With Meals. 180 tablet 2   • cycloSPORINE (RESTASIS MULTIDOSE) 0.05 % ophthalmic  emulsion 1 drop 2 (Two) Times a Day.     • diphenoxylate-atropine (LOMOTIL) 2.5-0.025 MG per tablet Take 1 tablet by mouth 3 (Three) Times a Day As Needed for Diarrhea. 90 tablet 3   • DULoxetine (CYMBALTA) 30 MG capsule Take 30 mg by mouth Daily.     • DULoxetine (CYMBALTA) 60 MG capsule Take 1 capsule by mouth Daily. 90 capsule 3   • fluticasone (FLONASE) 50 MCG/ACT nasal spray 2 sprays into each nostril Daily.     • folic acid (FOLVITE) 1 MG tablet Take 1,000 mcg by mouth Daily.  2   • gabapentin (NEURONTIN) 100 MG capsule Take 1 capsule by mouth Daily As Needed (Pain). 60 capsule 1   • HYDROcodone-acetaminophen (NORCO) 5-325 MG per tablet Take 1 tablet by mouth Every 6 (Six) Hours As Needed for Moderate Pain  or Severe Pain . 60 tablet 0   • lenalidomide (REVLIMID) 10 MG capsule Take 1 capsule by mouth Daily. Take for 21 days on, then 7 days off 21 capsule 0   • meloxicam (MOBIC) 15 MG tablet Take 15 mg by mouth Daily.     • Multiple Vitamins-Iron (DAILY-JONI/IRON/BETA-CAROTENE) tablet Take 1 tablet by mouth Daily.  2   • omega-3 acid ethyl esters (LOVAZA) 1 g capsule Take 2 g by mouth.     • ondansetron (ZOFRAN) 8 MG tablet Take 8 mg by mouth.     • predniSONE (DELTASONE) 10 MG tablet Take 10 mg by mouth Daily.     • promethazine (PHENERGAN) 25 MG tablet TAKE 0.5-1 TABLETS BY MOUTH EVERY 6 (SIX) HOURS AS NEEDED FOR NAUSEA.     • promethazine-dextromethorphan (PROMETHAZINE-DM) 6.25-15 MG/5ML syrup      • Synthroid 100 MCG tablet      • ubrogepant 100 MG tablet      • vitamin D (ERGOCALCIFEROL) 01470 units capsule capsule Take 50,000 Units by mouth 1 (One) Time Per Week.     • [DISCONTINUED] amoxicillin (AMOXIL) 500 MG tablet Take 500 mg by mouth As Needed.     • [DISCONTINUED] azithromycin (ZITHROMAX) 250 MG tablet        No current facility-administered medications on file prior to visit.        ALLERGIES:    No Known Allergies     Social History     Socioeconomic History   • Marital status:    • Number  "of children: 2   Tobacco Use   • Smoking status: Former Smoker     Packs/day: 0.25     Years: 2.00     Pack years: 0.50     Types: Cigarettes     Start date:      Quit date:      Years since quittin.0   • Smokeless tobacco: Never Used   • Tobacco comment: Only lightly for 2 years   Vaping Use   • Vaping Use: Never used   Substance and Sexual Activity   • Alcohol use: Not Currently     Alcohol/week: 0.0 standard drinks     Comment: Na   • Drug use: Never   • Sexual activity: Not Currently     Partners: Male     Birth control/protection: None     Comment: Na        Family History   Problem Relation Age of Onset   • Breast cancer Mother    • Sleep apnea Mother    • Lymphoma Father    • Sleep apnea Father    • Cancer Father    • Kidney cancer Paternal Grandmother    • Cancer Maternal Aunt                 Review of Systems   Constitutional: Positive for fatigue (Worse).   Respiratory: Negative for cough, choking, chest tightness and shortness of breath.    Cardiovascular: Negative for chest pain.   Gastrointestinal: Negative for abdominal pain, constipation, nausea and vomiting.   Musculoskeletal: Positive for neck pain.   Neurological: Positive for numbness (Den,foot/hand tingling no numbness). Negative for headaches.   Psychiatric/Behavioral: Negative for dysphoric mood. The patient is nervous/anxious (same ).    All other systems reviewed and are negative.      Objective     Vitals:    22 1128   BP: 140/83   Pulse: 74   Resp: 16   Temp: 97.8 °F (36.6 °C)   TempSrc: Temporal   SpO2: 96%   Weight: 76.7 kg (169 lb)   Height: 170.2 cm (67.01\")   PainSc: 0-No pain     Current Status 2022   ECOG score 1       Physical Exam     CONSTITUTIONAL:  Vital signs reviewed.  No distress, looks comfortable.  EYES:  Conjunctiva and lids unremarkable.    RESPIRATORY:  Normal respiratory effort.  Lungs clear to auscultation bilaterally.  CARDIOVASCULAR:  Normal S1, S2.  No murmurs rubs or gallops.  No " significant lower extremity edema.  EXT-no edema    I have reexamined the patient and the results are consistent with the previously documented exam. Yoni Garcia MD       RECENT LABS:  Hematology WBC   Date Value Ref Range Status   01/11/2022 4.40 3.40 - 10.80 10*3/mm3 Final   03/10/2020 4.27 (L) 4.5 - 11.0 10*3/uL Final     RBC   Date Value Ref Range Status   01/11/2022 4.11 3.77 - 5.28 10*6/mm3 Final   03/10/2020 3.80 (L) 4.0 - 5.2 10*6/uL Final     Hemoglobin   Date Value Ref Range Status   01/11/2022 13.4 12.0 - 15.9 g/dL Final   03/10/2020 12.9 12.0 - 16.0 g/dL Final     Hematocrit   Date Value Ref Range Status   01/11/2022 41.4 34.0 - 46.6 % Final   03/10/2020 38.5 36.0 - 46.0 % Final     Platelets   Date Value Ref Range Status   01/11/2022 217 140 - 450 10*3/mm3 Final   03/10/2020 181 140 - 440 10*3/uL Final                  Assessment/Plan   1.  IgA kappa multiple myeloma diagnosed in 2015 treated with RVD  · Stem cell transplant at [facility] in 3/2016  · Maintenance treatment with Velcade rev Dex started in 5/16  · Velcade discontinued because of neurotoxicity in 5/19  · Small IgG kappa paraprotein seen on blood work in 10/19-PET scan negative protein disappeared by 3/20  · Zometa given every 3 months   · Acyclovir and aspirin prophylaxis  · Decrease Revlimid to 10 mg 2 out of 4 weeks in 8/21  · Patient increased back to 3 out of 4 weeks with stable counts for now    2.  Anxiety  3.  Hypothyroidism on treatment  4.  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  Diarrhea due to treatment improved with WelChol  6.  Snoring and fatigue probable sleep apnea-CPAP instituted  7.  Vaccinated against corona virus    Plan  1.  Continue 10 mg of Revlimid 3 out of 4 weeks  2.continue monthly B12 and myeloma labs WILLIAM plus beta-2 microglobulin)  3.  Zometa every 3 months dose today  4.  See me in 3 months for follow-up .  She will be seen in November in [place] they will check her urine at that  time

## 2022-01-12 ENCOUNTER — TELEPHONE (OUTPATIENT)
Dept: ONCOLOGY | Facility: CLINIC | Age: 72
End: 2022-01-12

## 2022-01-12 LAB
ALBUMIN SERPL ELPH-MCNC: 3.7 G/DL (ref 2.9–4.4)
ALBUMIN/GLOB SERPL: 1.3 {RATIO} (ref 0.7–1.7)
ALPHA1 GLOB SERPL ELPH-MCNC: 0.2 G/DL (ref 0–0.4)
ALPHA2 GLOB SERPL ELPH-MCNC: 0.8 G/DL (ref 0.4–1)
B-GLOBULIN SERPL ELPH-MCNC: 1 G/DL (ref 0.7–1.3)
GAMMA GLOB SERPL ELPH-MCNC: 0.8 G/DL (ref 0.4–1.8)
GLOBULIN SER-MCNC: 2.9 G/DL (ref 2.2–3.9)
IGA SERPL-MCNC: 84 MG/DL (ref 64–422)
IGG SERPL-MCNC: 809 MG/DL (ref 586–1602)
IGM SERPL-MCNC: 26 MG/DL (ref 26–217)
INTERPRETATION SERPL IEP-IMP: NORMAL
KAPPA LC FREE SER-MCNC: 18.2 MG/L (ref 3.3–19.4)
KAPPA LC FREE/LAMBDA FREE SER: 0.99 {RATIO} (ref 0.26–1.65)
LABORATORY COMMENT REPORT: NORMAL
LAMBDA LC FREE SERPL-MCNC: 18.3 MG/L (ref 5.7–26.3)
M PROTEIN SERPL ELPH-MCNC: NORMAL G/DL
PROT SERPL-MCNC: 6.6 G/DL (ref 6–8.5)

## 2022-01-12 NOTE — TELEPHONE ENCOUNTER
Caller: Jennifer Plascencia    Relationship: Self    Best call back number: [phone]    What is the best time to reach you: ANY      What was the call regarding: PATIENT DIDN'T GET HER B 12 SHOT YESTERDAY AND NEEDS TO SCHEDULE FOR ONE    Do you require a callback: YES

## 2022-01-13 NOTE — TELEPHONE ENCOUNTER
PATIENT CALLED, SHE WAS STILL WAITING ON HER RIDE, SHE WANTED TO KNOW IF SHE CAN COME TOMORROW AROUND 11:00

## 2022-01-14 ENCOUNTER — TELEPHONE (OUTPATIENT)
Dept: ONCOLOGY | Facility: CLINIC | Age: 72
End: 2022-01-14

## 2022-01-14 NOTE — TELEPHONE ENCOUNTER
PACO CALLED IN REGARDING HER MISSED B12 INJECTION APPT FROM YESTERDAY 01/13. SHE SAYS SHE RECEIVED A NO SHOW LETTER AND IT REALLY UPSET HER. SHE SAYS SHE MISSED THE APPT BECAUSE HER CAB NEVER CAME AND PICKED HER UP. SHE WAS VERY UPSET AND AFRAID THAT SHE WAS GOING TO BE KICKED OUT OF THE PRACTICE. I EXPLAINED THAT THE NO SHOW LETTER IS MAINLY JUST WARNING TO LET HER KNOW THAT SHE HAD MISSED AN APPT. AFTER SEVERAL MINUTES OF TALKING WITH HER, SHE CALMED DOWN AND JUST WANTS TO MAKE SURE THAT HER B12 ORDERS ARE IN THE COMPUTER FROM NOW ON, SO THIS SITUATION DOESN'T HAPPEN AGAIN. SHE WAS ALSO ADVISED THAT IF SOMETHING LIKE THAT SHOULD HAPPEN AGAIN, THAT SHE CALL BEFORE HER APPT STARTS TO LET US KNOW THAT SHE WON'T BE ABLE TO MAKE IT.

## 2022-01-21 DIAGNOSIS — F41.9 ANXIETY: ICD-10-CM

## 2022-01-21 RX ORDER — ALPRAZOLAM 0.25 MG/1
TABLET ORAL
Qty: 30 TABLET | Refills: 3 | OUTPATIENT
Start: 2022-01-21

## 2022-01-25 RX ORDER — LENALIDOMIDE 10 MG/1
CAPSULE ORAL
Qty: 21 CAPSULE | Refills: 0 | Status: SHIPPED | OUTPATIENT
Start: 2022-01-25 | End: 2022-02-21 | Stop reason: SDUPTHER

## 2022-01-26 RX ORDER — LENALIDOMIDE 10 MG/1
CAPSULE ORAL
Refills: 0 | OUTPATIENT
Start: 2022-01-26

## 2022-02-08 ENCOUNTER — INFUSION (OUTPATIENT)
Dept: ONCOLOGY | Facility: HOSPITAL | Age: 72
End: 2022-02-08

## 2022-02-08 ENCOUNTER — LAB (OUTPATIENT)
Dept: LAB | Facility: HOSPITAL | Age: 72
End: 2022-02-08

## 2022-02-08 DIAGNOSIS — C90.00 NEUROPATHY ASSOCIATED WITH MULTIPLE MYELOMA: ICD-10-CM

## 2022-02-08 DIAGNOSIS — G63 NEUROPATHY ASSOCIATED WITH MULTIPLE MYELOMA: Primary | ICD-10-CM

## 2022-02-08 DIAGNOSIS — G63 NEUROPATHY ASSOCIATED WITH MULTIPLE MYELOMA: ICD-10-CM

## 2022-02-08 DIAGNOSIS — C90.00 NEUROPATHY ASSOCIATED WITH MULTIPLE MYELOMA: Primary | ICD-10-CM

## 2022-02-08 DIAGNOSIS — C90.00 MULTIPLE MYELOMA NOT HAVING ACHIEVED REMISSION: ICD-10-CM

## 2022-02-08 LAB
ALBUMIN SERPL-MCNC: 4 G/DL (ref 3.5–5.2)
ALBUMIN/GLOB SERPL: 1.6 G/DL (ref 1.1–2.4)
ALP SERPL-CCNC: 68 U/L (ref 38–116)
ALT SERPL W P-5'-P-CCNC: 15 U/L (ref 0–33)
ANION GAP SERPL CALCULATED.3IONS-SCNC: 10.7 MMOL/L (ref 5–15)
AST SERPL-CCNC: 14 U/L (ref 0–32)
B2 MICROGLOB SERPL-MCNC: 1.5 MG/L (ref 0.8–2.2)
BASOPHILS # BLD AUTO: 0.06 10*3/MM3 (ref 0–0.2)
BASOPHILS NFR BLD AUTO: 1.5 % (ref 0–1.5)
BILIRUB SERPL-MCNC: 0.2 MG/DL (ref 0.2–1.2)
BUN SERPL-MCNC: 11 MG/DL (ref 6–20)
BUN/CREAT SERPL: 12.1 (ref 7.3–30)
CALCIUM SPEC-SCNC: 8.5 MG/DL (ref 8.5–10.2)
CHLORIDE SERPL-SCNC: 104 MMOL/L (ref 98–107)
CO2 SERPL-SCNC: 24.3 MMOL/L (ref 22–29)
CREAT SERPL-MCNC: 0.91 MG/DL (ref 0.6–1.1)
DEPRECATED RDW RBC AUTO: 51.7 FL (ref 37–54)
EOSINOPHIL # BLD AUTO: 0.06 10*3/MM3 (ref 0–0.4)
EOSINOPHIL NFR BLD AUTO: 1.5 % (ref 0.3–6.2)
ERYTHROCYTE [DISTWIDTH] IN BLOOD BY AUTOMATED COUNT: 14.1 % (ref 12.3–15.4)
GFR SERPL CREATININE-BSD FRML MDRD: 61 ML/MIN/1.73
GLOBULIN UR ELPH-MCNC: 2.5 GM/DL (ref 1.8–3.5)
GLUCOSE SERPL-MCNC: 95 MG/DL (ref 74–124)
HCT VFR BLD AUTO: 39 % (ref 34–46.6)
HGB BLD-MCNC: 13.3 G/DL (ref 12–15.9)
IMM GRANULOCYTES # BLD AUTO: 0 10*3/MM3 (ref 0–0.05)
IMM GRANULOCYTES NFR BLD AUTO: 0 % (ref 0–0.5)
LYMPHOCYTES # BLD AUTO: 1.86 10*3/MM3 (ref 0.7–3.1)
LYMPHOCYTES NFR BLD AUTO: 46.9 % (ref 19.6–45.3)
MAGNESIUM SERPL-MCNC: 1.9 MG/DL (ref 1.8–2.5)
MCH RBC QN AUTO: 33.8 PG (ref 26.6–33)
MCHC RBC AUTO-ENTMCNC: 34.1 G/DL (ref 31.5–35.7)
MCV RBC AUTO: 99.2 FL (ref 79–97)
MONOCYTES # BLD AUTO: 0.64 10*3/MM3 (ref 0.1–0.9)
MONOCYTES NFR BLD AUTO: 16.1 % (ref 5–12)
NEUTROPHILS NFR BLD AUTO: 1.35 10*3/MM3 (ref 1.7–7)
NEUTROPHILS NFR BLD AUTO: 34 % (ref 42.7–76)
NRBC BLD AUTO-RTO: 0 /100 WBC (ref 0–0.2)
PLATELET # BLD AUTO: 218 10*3/MM3 (ref 140–450)
PMV BLD AUTO: 9.2 FL (ref 6–12)
POTASSIUM SERPL-SCNC: 4.8 MMOL/L (ref 3.5–4.7)
PROT SERPL-MCNC: 6.5 G/DL (ref 6.3–8)
RBC # BLD AUTO: 3.93 10*6/MM3 (ref 3.77–5.28)
SODIUM SERPL-SCNC: 139 MMOL/L (ref 134–145)
WBC NRBC COR # BLD: 3.97 10*3/MM3 (ref 3.4–10.8)

## 2022-02-08 PROCEDURE — 82232 ASSAY OF BETA-2 PROTEIN: CPT | Performed by: INTERNAL MEDICINE

## 2022-02-08 PROCEDURE — 83735 ASSAY OF MAGNESIUM: CPT

## 2022-02-08 PROCEDURE — 25010000002 CYANOCOBALAMIN PER 1000 MCG: Performed by: INTERNAL MEDICINE

## 2022-02-08 PROCEDURE — 96372 THER/PROPH/DIAG INJ SC/IM: CPT

## 2022-02-08 PROCEDURE — 36415 COLL VENOUS BLD VENIPUNCTURE: CPT

## 2022-02-08 PROCEDURE — 85025 COMPLETE CBC W/AUTO DIFF WBC: CPT

## 2022-02-08 PROCEDURE — 80053 COMPREHEN METABOLIC PANEL: CPT

## 2022-02-08 RX ORDER — CYANOCOBALAMIN 1000 UG/ML
1000 INJECTION, SOLUTION INTRAMUSCULAR; SUBCUTANEOUS ONCE
Status: COMPLETED | OUTPATIENT
Start: 2022-02-08 | End: 2022-02-08

## 2022-02-08 RX ORDER — CYANOCOBALAMIN 1000 UG/ML
1000 INJECTION, SOLUTION INTRAMUSCULAR; SUBCUTANEOUS ONCE
Status: CANCELLED | OUTPATIENT
Start: 2022-02-08

## 2022-02-08 RX ADMIN — CYANOCOBALAMIN 1000 MCG: 1000 INJECTION, SOLUTION INTRAMUSCULAR at 14:12

## 2022-02-09 LAB
ALBUMIN SERPL ELPH-MCNC: 3.4 G/DL (ref 2.9–4.4)
ALBUMIN/GLOB SERPL: 1.2 {RATIO} (ref 0.7–1.7)
ALPHA1 GLOB SERPL ELPH-MCNC: 0.2 G/DL (ref 0–0.4)
ALPHA2 GLOB SERPL ELPH-MCNC: 0.8 G/DL (ref 0.4–1)
B-GLOBULIN SERPL ELPH-MCNC: 1 G/DL (ref 0.7–1.3)
GAMMA GLOB SERPL ELPH-MCNC: 0.9 G/DL (ref 0.4–1.8)
GLOBULIN SER-MCNC: 2.9 G/DL (ref 2.2–3.9)
IGA SERPL-MCNC: 85 MG/DL (ref 64–422)
IGG SERPL-MCNC: 809 MG/DL (ref 586–1602)
IGM SERPL-MCNC: 19 MG/DL (ref 26–217)
INTERPRETATION SERPL IEP-IMP: ABNORMAL
KAPPA LC FREE SER-MCNC: 18.2 MG/L (ref 3.3–19.4)
KAPPA LC FREE/LAMBDA FREE SER: 0.91 {RATIO} (ref 0.26–1.65)
LABORATORY COMMENT REPORT: ABNORMAL
LAMBDA LC FREE SERPL-MCNC: 20 MG/L (ref 5.7–26.3)
M PROTEIN SERPL ELPH-MCNC: ABNORMAL G/DL
PROT SERPL-MCNC: 6.3 G/DL (ref 6–8.5)

## 2022-02-21 ENCOUNTER — SPECIALTY PHARMACY (OUTPATIENT)
Dept: PHARMACY | Facility: HOSPITAL | Age: 72
End: 2022-02-21

## 2022-02-21 RX ORDER — LENALIDOMIDE 10 MG/1
10 CAPSULE ORAL DAILY
Qty: 21 CAPSULE | Refills: 0 | Status: SHIPPED | OUTPATIENT
Start: 2022-02-21 | End: 2022-04-05 | Stop reason: SDUPTHER

## 2022-02-21 NOTE — PROGRESS NOTES
Refill requested from pharmacy. Per last chart note, patient to continue revlimid 10 mg po daily for 21/28. Will route to MD for cosignature.

## 2022-02-22 RX ORDER — LENALIDOMIDE 10 MG/1
CAPSULE ORAL
Refills: 0 | OUTPATIENT
Start: 2022-02-22

## 2022-02-23 RX ORDER — LENALIDOMIDE 10 MG/1
CAPSULE ORAL
Refills: 0 | OUTPATIENT
Start: 2022-02-23

## 2022-03-08 ENCOUNTER — INFUSION (OUTPATIENT)
Dept: ONCOLOGY | Facility: HOSPITAL | Age: 72
End: 2022-03-08

## 2022-03-08 ENCOUNTER — LAB (OUTPATIENT)
Dept: LAB | Facility: HOSPITAL | Age: 72
End: 2022-03-08

## 2022-03-08 DIAGNOSIS — C90.00 MULTIPLE MYELOMA NOT HAVING ACHIEVED REMISSION: ICD-10-CM

## 2022-03-08 DIAGNOSIS — C90.00 NEUROPATHY ASSOCIATED WITH MULTIPLE MYELOMA: Primary | ICD-10-CM

## 2022-03-08 DIAGNOSIS — G63 NEUROPATHY ASSOCIATED WITH MULTIPLE MYELOMA: Primary | ICD-10-CM

## 2022-03-08 DIAGNOSIS — C90.00 NEUROPATHY ASSOCIATED WITH MULTIPLE MYELOMA: ICD-10-CM

## 2022-03-08 DIAGNOSIS — G63 NEUROPATHY ASSOCIATED WITH MULTIPLE MYELOMA: ICD-10-CM

## 2022-03-08 LAB
ALBUMIN SERPL-MCNC: 4.1 G/DL (ref 3.5–5.2)
ALBUMIN/GLOB SERPL: 1.8 G/DL
ALP SERPL-CCNC: 59 U/L (ref 39–117)
ALT SERPL W P-5'-P-CCNC: 10 U/L (ref 1–33)
ANION GAP SERPL CALCULATED.3IONS-SCNC: 8 MMOL/L (ref 5–15)
AST SERPL-CCNC: 12 U/L (ref 1–32)
B2 MICROGLOB SERPL-MCNC: 1.7 MG/L (ref 0.8–2.2)
BASOPHILS # BLD AUTO: 0.06 10*3/MM3 (ref 0–0.2)
BASOPHILS NFR BLD AUTO: 1.5 % (ref 0–1.5)
BILIRUB SERPL-MCNC: 0.2 MG/DL (ref 0–1.2)
BUN SERPL-MCNC: 11 MG/DL (ref 8–23)
BUN/CREAT SERPL: 14.3 (ref 7–25)
CALCIUM SPEC-SCNC: 8.9 MG/DL (ref 8.6–10.5)
CHLORIDE SERPL-SCNC: 103 MMOL/L (ref 98–107)
CO2 SERPL-SCNC: 27 MMOL/L (ref 22–29)
CREAT SERPL-MCNC: 0.77 MG/DL (ref 0.57–1)
DEPRECATED RDW RBC AUTO: 53 FL (ref 37–54)
EGFRCR SERPLBLD CKD-EPI 2021: 81.6 ML/MIN/1.73
EOSINOPHIL # BLD AUTO: 0.1 10*3/MM3 (ref 0–0.4)
EOSINOPHIL NFR BLD AUTO: 2.5 % (ref 0.3–6.2)
ERYTHROCYTE [DISTWIDTH] IN BLOOD BY AUTOMATED COUNT: 14.3 % (ref 12.3–15.4)
GLOBULIN UR ELPH-MCNC: 2.3 GM/DL
GLUCOSE SERPL-MCNC: 85 MG/DL (ref 65–99)
HCT VFR BLD AUTO: 38.8 % (ref 34–46.6)
HGB BLD-MCNC: 12.9 G/DL (ref 12–15.9)
IMM GRANULOCYTES # BLD AUTO: 0.01 10*3/MM3 (ref 0–0.05)
IMM GRANULOCYTES NFR BLD AUTO: 0.2 % (ref 0–0.5)
LYMPHOCYTES # BLD AUTO: 1.85 10*3/MM3 (ref 0.7–3.1)
LYMPHOCYTES NFR BLD AUTO: 46 % (ref 19.6–45.3)
MAGNESIUM SERPL-MCNC: 2 MG/DL (ref 1.6–2.4)
MCH RBC QN AUTO: 33.4 PG (ref 26.6–33)
MCHC RBC AUTO-ENTMCNC: 33.2 G/DL (ref 31.5–35.7)
MCV RBC AUTO: 100.5 FL (ref 79–97)
MONOCYTES # BLD AUTO: 0.66 10*3/MM3 (ref 0.1–0.9)
MONOCYTES NFR BLD AUTO: 16.4 % (ref 5–12)
NEUTROPHILS NFR BLD AUTO: 1.34 10*3/MM3 (ref 1.7–7)
NEUTROPHILS NFR BLD AUTO: 33.4 % (ref 42.7–76)
NRBC BLD AUTO-RTO: 0 /100 WBC (ref 0–0.2)
PLATELET # BLD AUTO: 240 10*3/MM3 (ref 140–450)
PMV BLD AUTO: 9.1 FL (ref 6–12)
POTASSIUM SERPL-SCNC: 4.8 MMOL/L (ref 3.5–5.2)
PROT SERPL-MCNC: 6.4 G/DL (ref 6–8.5)
RBC # BLD AUTO: 3.86 10*6/MM3 (ref 3.77–5.28)
SODIUM SERPL-SCNC: 138 MMOL/L (ref 136–145)
WBC NRBC COR # BLD: 4.02 10*3/MM3 (ref 3.4–10.8)

## 2022-03-08 PROCEDURE — 85025 COMPLETE CBC W/AUTO DIFF WBC: CPT

## 2022-03-08 PROCEDURE — 80053 COMPREHEN METABOLIC PANEL: CPT | Performed by: INTERNAL MEDICINE

## 2022-03-08 PROCEDURE — 96372 THER/PROPH/DIAG INJ SC/IM: CPT

## 2022-03-08 PROCEDURE — 82232 ASSAY OF BETA-2 PROTEIN: CPT | Performed by: INTERNAL MEDICINE

## 2022-03-08 PROCEDURE — 25010000002 CYANOCOBALAMIN PER 1000 MCG: Performed by: INTERNAL MEDICINE

## 2022-03-08 PROCEDURE — 83735 ASSAY OF MAGNESIUM: CPT | Performed by: INTERNAL MEDICINE

## 2022-03-08 PROCEDURE — 36415 COLL VENOUS BLD VENIPUNCTURE: CPT

## 2022-03-08 RX ORDER — CYANOCOBALAMIN 1000 UG/ML
1000 INJECTION, SOLUTION INTRAMUSCULAR; SUBCUTANEOUS ONCE
Status: COMPLETED | OUTPATIENT
Start: 2022-03-08 | End: 2022-03-08

## 2022-03-08 RX ADMIN — CYANOCOBALAMIN 1000 MCG: 1000 INJECTION, SOLUTION INTRAMUSCULAR at 13:59

## 2022-03-10 LAB
ALBUMIN SERPL ELPH-MCNC: 3.9 G/DL (ref 2.9–4.4)
ALBUMIN/GLOB SERPL: 1.6 {RATIO} (ref 0.7–1.7)
ALPHA1 GLOB SERPL ELPH-MCNC: 0.2 G/DL (ref 0–0.4)
ALPHA2 GLOB SERPL ELPH-MCNC: 0.7 G/DL (ref 0.4–1)
B-GLOBULIN SERPL ELPH-MCNC: 0.9 G/DL (ref 0.7–1.3)
GAMMA GLOB SERPL ELPH-MCNC: 0.7 G/DL (ref 0.4–1.8)
GLOBULIN SER-MCNC: 2.5 G/DL (ref 2.2–3.9)
IGA SERPL-MCNC: 90 MG/DL (ref 64–422)
IGG SERPL-MCNC: 783 MG/DL (ref 586–1602)
IGM SERPL-MCNC: 16 MG/DL (ref 26–217)
INTERPRETATION SERPL IEP-IMP: ABNORMAL
KAPPA LC FREE SER-MCNC: 18.9 MG/L (ref 3.3–19.4)
KAPPA LC FREE/LAMBDA FREE SER: 1.07 {RATIO} (ref 0.26–1.65)
LABORATORY COMMENT REPORT: ABNORMAL
LAMBDA LC FREE SERPL-MCNC: 17.7 MG/L (ref 5.7–26.3)
M PROTEIN SERPL ELPH-MCNC: ABNORMAL G/DL
PROT SERPL-MCNC: 6.4 G/DL (ref 6–8.5)

## 2022-03-17 ENCOUNTER — DOCUMENTATION (OUTPATIENT)
Dept: PHARMACY | Facility: HOSPITAL | Age: 72
End: 2022-03-17

## 2022-03-17 NOTE — PROGRESS NOTES
I called [facility] to check on the status of the replacement Revlimid. I was told by Cristóbal that he couldn't help me. He gave me the telephone # [phone] to call to find out more information. I called that # and spoke with Evelia. She told me that the script was still being processed with an expedited service. She told me to call [phone] later in the day to check the status again.

## 2022-03-17 NOTE — PROGRESS NOTES
"Sandra HART, [facility] Pharmacist took a call from pt yesterday afternoon-pt reported that she has not rec her Revlimid from [facility] and is due to start her cycle next week.    The rx was sent on 2/21/22 and dispensed on 2/28/22. I tracked the [facility] tracking number [id number] and a signature was obtained on 3/1/22 ut then was processed at the [facility] Facility on 3/15/22-it also shows delivered on this day to \"[place], [place]\".  Another entry shows the package is being returned to sender on 3/15/22 and returned to  on 3/16/22 ([place], [place]).    I contacted [email or web address] [facility] and spoke to Amanda-she states they are currently working on a replacement order for pt and I have requested this be marked expedite. There currently was no delivery date as they will recontact pt when ready to schedule.    I have contacted pt to let her know the above and also notified Dr Garcia's [facility] pharmacy team.      Sujey Randall  Specialty Pharmacy Technician      "

## 2022-03-18 ENCOUNTER — DOCUMENTATION (OUTPATIENT)
Dept: PHARMACY | Facility: HOSPITAL | Age: 72
End: 2022-03-18

## 2022-03-18 NOTE — PROGRESS NOTES
I received a note from Sujey stating that pt didn't receive her Revlimid from [facility]. Sujey had called to set up the rescheduling of the replacement fill. I called to follow up on the delivery of the replacement. Pt will get the Revlimid delivered tomorrow after being charged a $7 co-pay, since the charges were reversed when the package was returned to [facility].  I called pt to tell her that the package will be delivered to the [employer] store tomorrow and she could  after that.

## 2022-03-21 ENCOUNTER — SPECIALTY PHARMACY (OUTPATIENT)
Dept: PHARMACY | Facility: HOSPITAL | Age: 72
End: 2022-03-21

## 2022-03-21 NOTE — PROGRESS NOTES
MTM Encounter-Re: Adherence and side effects (Revlimid maintenance)    Today's encounter was conducted via telephone.    Medication:  Revlimid 10 mg daily for 21 days on, 7 days off   - Reason for outreach: Routine medication check-in .  - Administration: Patient is taking every day at the same time  and as prescribed .  - Missed doses: Patient reports missing 0 doses in the last 30 days.  - Self-administration: Patient demonstrates ability to self-administer medication. No barriers to adherence identified.   - Diagnosis/Indication: multiple myeloma. Progress toward achieving therapeutic goals reviewed.   - Patient denies side effects.    - Medication availability/affordability: Patient has had no issues obtaining medication from pharmacy, aside from recent notes & confirmed pt was able to  after our team assisted. .   - Questions/concerns about medications: n/a       Completed medication reconciliation today to assess for drug interactions.   Reviewed allergies, medical history, labs, and medication history with patient.   Patient denies starting or stopping any medications.  Assessed medication list for interactions, no significant drug interactions noted.   Advised pt to call the clinic if any new medications are started so we can assess for drug-drug interactions.     All questions addressed. Patient had no additional concerns for MTM office.     Astrid Tejeda, Pharmacist  3/21/2022  11:13 EDT

## 2022-03-31 ENCOUNTER — TELEPHONE (OUTPATIENT)
Dept: ONCOLOGY | Facility: CLINIC | Age: 72
End: 2022-03-31

## 2022-03-31 NOTE — TELEPHONE ENCOUNTER
Caller:     Relationship to patient:     Best call back number: [phone]    Patient is needing: NEED TO R/S 4-5-2022 PORT FLUSH, F/U AND INFUSION TO LATER IN THE DAY AFTER 12:30 PM. PT IS HAVING PRE-OP FOR SURGERY DONE THAT MORNING. PLEASE CALL PT ASAP.

## 2022-04-05 ENCOUNTER — SPECIALTY PHARMACY (OUTPATIENT)
Dept: PHARMACY | Facility: HOSPITAL | Age: 72
End: 2022-04-05

## 2022-04-05 ENCOUNTER — INFUSION (OUTPATIENT)
Dept: ONCOLOGY | Facility: HOSPITAL | Age: 72
End: 2022-04-05

## 2022-04-05 ENCOUNTER — APPOINTMENT (OUTPATIENT)
Dept: ONCOLOGY | Facility: HOSPITAL | Age: 72
End: 2022-04-05

## 2022-04-05 ENCOUNTER — OFFICE VISIT (OUTPATIENT)
Dept: ONCOLOGY | Facility: CLINIC | Age: 72
End: 2022-04-05

## 2022-04-05 VITALS
WEIGHT: 170.1 LBS | HEART RATE: 85 BPM | RESPIRATION RATE: 16 BRPM | TEMPERATURE: 97.3 F | HEIGHT: 67 IN | OXYGEN SATURATION: 95 % | BODY MASS INDEX: 26.7 KG/M2

## 2022-04-05 DIAGNOSIS — G63 NEUROPATHY ASSOCIATED WITH MULTIPLE MYELOMA: ICD-10-CM

## 2022-04-05 DIAGNOSIS — K52.1 DIARRHEA DUE TO DRUG: ICD-10-CM

## 2022-04-05 DIAGNOSIS — C90.00 NEUROPATHY ASSOCIATED WITH MULTIPLE MYELOMA: ICD-10-CM

## 2022-04-05 DIAGNOSIS — C90.00 MULTIPLE MYELOMA NOT HAVING ACHIEVED REMISSION: Primary | ICD-10-CM

## 2022-04-05 DIAGNOSIS — K52.1 CHEMOTHERAPY INDUCED DIARRHEA: ICD-10-CM

## 2022-04-05 DIAGNOSIS — T45.1X5A CHEMOTHERAPY INDUCED DIARRHEA: ICD-10-CM

## 2022-04-05 DIAGNOSIS — Z79.899 HIGH RISK MEDICATION USE: ICD-10-CM

## 2022-04-05 DIAGNOSIS — C90.00 MULTIPLE MYELOMA NOT HAVING ACHIEVED REMISSION: ICD-10-CM

## 2022-04-05 LAB
ALBUMIN SERPL-MCNC: 4 G/DL (ref 3.5–5.2)
ALBUMIN/GLOB SERPL: 1.4 G/DL (ref 1.1–2.4)
ALP SERPL-CCNC: 60 U/L (ref 38–116)
ALT SERPL W P-5'-P-CCNC: 15 U/L (ref 0–33)
ANION GAP SERPL CALCULATED.3IONS-SCNC: 12 MMOL/L (ref 5–15)
AST SERPL-CCNC: 14 U/L (ref 0–32)
B2 MICROGLOB SERPL-MCNC: 1.5 MG/L (ref 0.8–2.2)
BASOPHILS # BLD AUTO: 0.03 10*3/MM3 (ref 0–0.2)
BASOPHILS NFR BLD AUTO: 0.5 % (ref 0–1.5)
BILIRUB SERPL-MCNC: <0.2 MG/DL (ref 0.2–1.2)
BUN SERPL-MCNC: 16 MG/DL (ref 6–20)
BUN/CREAT SERPL: 21.9 (ref 7.3–30)
CALCIUM SPEC-SCNC: 9.3 MG/DL (ref 8.5–10.2)
CHLORIDE SERPL-SCNC: 103 MMOL/L (ref 98–107)
CO2 SERPL-SCNC: 25 MMOL/L (ref 22–29)
CREAT SERPL-MCNC: 0.73 MG/DL (ref 0.6–1.1)
DEPRECATED RDW RBC AUTO: 51.1 FL (ref 37–54)
EGFRCR SERPLBLD CKD-EPI 2021: 87 ML/MIN/1.73
EOSINOPHIL # BLD AUTO: 0 10*3/MM3 (ref 0–0.4)
EOSINOPHIL NFR BLD AUTO: 0 % (ref 0.3–6.2)
ERYTHROCYTE [DISTWIDTH] IN BLOOD BY AUTOMATED COUNT: 14.1 % (ref 12.3–15.4)
GLOBULIN UR ELPH-MCNC: 2.8 GM/DL (ref 1.8–3.5)
GLUCOSE SERPL-MCNC: 163 MG/DL (ref 74–124)
HCT VFR BLD AUTO: 36.3 % (ref 34–46.6)
HGB BLD-MCNC: 12.3 G/DL (ref 12–15.9)
IMM GRANULOCYTES # BLD AUTO: 0.02 10*3/MM3 (ref 0–0.05)
IMM GRANULOCYTES NFR BLD AUTO: 0.3 % (ref 0–0.5)
LYMPHOCYTES # BLD AUTO: 1.22 10*3/MM3 (ref 0.7–3.1)
LYMPHOCYTES NFR BLD AUTO: 20.4 % (ref 19.6–45.3)
MAGNESIUM SERPL-MCNC: 1.5 MG/DL (ref 1.8–2.5)
MCH RBC QN AUTO: 33.5 PG (ref 26.6–33)
MCHC RBC AUTO-ENTMCNC: 33.9 G/DL (ref 31.5–35.7)
MCV RBC AUTO: 98.9 FL (ref 79–97)
MONOCYTES # BLD AUTO: 0.7 10*3/MM3 (ref 0.1–0.9)
MONOCYTES NFR BLD AUTO: 11.7 % (ref 5–12)
NEUTROPHILS NFR BLD AUTO: 4.01 10*3/MM3 (ref 1.7–7)
NEUTROPHILS NFR BLD AUTO: 67.1 % (ref 42.7–76)
NRBC BLD AUTO-RTO: 0 /100 WBC (ref 0–0.2)
PHOSPHATE SERPL-MCNC: 2.7 MG/DL (ref 2.5–4.5)
PLATELET # BLD AUTO: 284 10*3/MM3 (ref 140–450)
PMV BLD AUTO: 9 FL (ref 6–12)
POTASSIUM SERPL-SCNC: 4 MMOL/L (ref 3.5–4.7)
PROT SERPL-MCNC: 6.8 G/DL (ref 6.3–8)
RBC # BLD AUTO: 3.67 10*6/MM3 (ref 3.77–5.28)
SODIUM SERPL-SCNC: 140 MMOL/L (ref 134–145)
WBC NRBC COR # BLD: 5.98 10*3/MM3 (ref 3.4–10.8)

## 2022-04-05 PROCEDURE — 83735 ASSAY OF MAGNESIUM: CPT

## 2022-04-05 PROCEDURE — 96374 THER/PROPH/DIAG INJ IV PUSH: CPT

## 2022-04-05 PROCEDURE — 25010000002 ZOLEDRONIC ACID PER 1 MG: Performed by: NURSE PRACTITIONER

## 2022-04-05 PROCEDURE — 84100 ASSAY OF PHOSPHORUS: CPT

## 2022-04-05 PROCEDURE — 82232 ASSAY OF BETA-2 PROTEIN: CPT | Performed by: INTERNAL MEDICINE

## 2022-04-05 PROCEDURE — 25010000002 CYANOCOBALAMIN PER 1000 MCG: Performed by: NURSE PRACTITIONER

## 2022-04-05 PROCEDURE — 80053 COMPREHEN METABOLIC PANEL: CPT

## 2022-04-05 PROCEDURE — 85025 COMPLETE CBC W/AUTO DIFF WBC: CPT

## 2022-04-05 PROCEDURE — 99215 OFFICE O/P EST HI 40 MIN: CPT | Performed by: NURSE PRACTITIONER

## 2022-04-05 PROCEDURE — 96372 THER/PROPH/DIAG INJ SC/IM: CPT

## 2022-04-05 RX ORDER — ZOLEDRONIC ACID 0.04 MG/ML
4 INJECTION, SOLUTION INTRAVENOUS ONCE
Status: CANCELLED | OUTPATIENT
Start: 2022-04-05

## 2022-04-05 RX ORDER — SODIUM CHLORIDE 9 MG/ML
250 INJECTION, SOLUTION INTRAVENOUS ONCE
Status: CANCELLED | OUTPATIENT
Start: 2022-04-05

## 2022-04-05 RX ORDER — COLESEVELAM 180 1/1
1250 TABLET ORAL 2 TIMES DAILY WITH MEALS
Qty: 90 TABLET | Refills: 2 | Status: SHIPPED | OUTPATIENT
Start: 2022-04-05

## 2022-04-05 RX ORDER — SODIUM CHLORIDE 9 MG/ML
250 INJECTION, SOLUTION INTRAVENOUS ONCE
Status: COMPLETED | OUTPATIENT
Start: 2022-04-05 | End: 2022-04-05

## 2022-04-05 RX ORDER — MAGNESIUM OXIDE 400 MG/1
400 TABLET ORAL DAILY
Qty: 30 TABLET | Refills: 3 | Status: SHIPPED | OUTPATIENT
Start: 2022-04-05

## 2022-04-05 RX ORDER — MAGNESIUM SULFATE HEPTAHYDRATE 40 MG/ML
2 INJECTION, SOLUTION INTRAVENOUS ONCE
Status: DISCONTINUED | OUTPATIENT
Start: 2022-04-05 | End: 2022-04-05 | Stop reason: HOSPADM

## 2022-04-05 RX ORDER — CYANOCOBALAMIN 1000 UG/ML
1000 INJECTION, SOLUTION INTRAMUSCULAR; SUBCUTANEOUS ONCE
Status: COMPLETED | OUTPATIENT
Start: 2022-04-05 | End: 2022-04-05

## 2022-04-05 RX ORDER — LENALIDOMIDE 10 MG/1
10 CAPSULE ORAL DAILY
Qty: 21 CAPSULE | Refills: 0 | Status: SHIPPED | OUTPATIENT
Start: 2022-04-05 | End: 2022-04-28 | Stop reason: SDUPTHER

## 2022-04-05 RX ADMIN — CYANOCOBALAMIN 1000 MCG: 1000 INJECTION, SOLUTION INTRAMUSCULAR at 16:07

## 2022-04-05 RX ADMIN — SODIUM CHLORIDE 250 ML: 9 INJECTION, SOLUTION INTRAVENOUS at 16:09

## 2022-04-05 RX ADMIN — ZOLEDRONIC ACID 4 MG: 4 INJECTION, SOLUTION, CONCENTRATE INTRAVENOUS at 16:08

## 2022-04-05 NOTE — PROGRESS NOTES
Pt here today stating she is down to only 3 pills remaining of revlimid and due to start her cycle tonight. Confirmed patient is taking Revlimid 10 mg po daily (21/28). Unclear what happened - perhaps shipment lost? Either way, contacted [employer] to obtain new  REMS auth [id number]. RX sent to pharmacy with MD to cosign.   Astrid Tejeda, Pharmacist  4/5/2022  16:22 EDT

## 2022-04-06 ENCOUNTER — DOCUMENTATION (OUTPATIENT)
Dept: PHARMACY | Facility: HOSPITAL | Age: 72
End: 2022-04-06

## 2022-04-06 RX ORDER — DIPHENOXYLATE HYDROCHLORIDE AND ATROPINE SULFATE 2.5; .025 MG/1; MG/1
1 TABLET ORAL 3 TIMES DAILY PRN
Qty: 90 TABLET | Refills: 3 | Status: SHIPPED | OUTPATIENT
Start: 2022-04-06

## 2022-04-06 RX ORDER — GABAPENTIN 100 MG/1
100 CAPSULE ORAL DAILY PRN
Qty: 60 CAPSULE | Refills: 1 | Status: SHIPPED | OUTPATIENT
Start: 2022-04-06 | End: 2022-09-27 | Stop reason: SDUPTHER

## 2022-04-06 NOTE — PROGRESS NOTES
I called [facility] to expedite the delivery of pt's Revlimid. She stated yesterday afternoon that she only had 3 tablets left. Which would only get her thru until Thursday. She had a mix-up on shipping over a month ago. So, this might be a remnant of that fiasco.  (she might never have gotten one month when [facility] thinks she did) so Astrid got an override with [facility], sent in a new script, and I am keeping track of the delivery.

## 2022-04-07 ENCOUNTER — DOCUMENTATION (OUTPATIENT)
Dept: PHARMACY | Facility: HOSPITAL | Age: 72
End: 2022-04-07

## 2022-04-07 ENCOUNTER — SPECIALTY PHARMACY (OUTPATIENT)
Dept: PHARMACY | Facility: HOSPITAL | Age: 72
End: 2022-04-07

## 2022-04-07 LAB
ALBUMIN SERPL ELPH-MCNC: 3.6 G/DL (ref 2.9–4.4)
ALBUMIN/GLOB SERPL: 1.4 {RATIO} (ref 0.7–1.7)
ALPHA1 GLOB SERPL ELPH-MCNC: 0.2 G/DL (ref 0–0.4)
ALPHA2 GLOB SERPL ELPH-MCNC: 0.8 G/DL (ref 0.4–1)
B-GLOBULIN SERPL ELPH-MCNC: 0.9 G/DL (ref 0.7–1.3)
GAMMA GLOB SERPL ELPH-MCNC: 0.7 G/DL (ref 0.4–1.8)
GLOBULIN SER-MCNC: 2.7 G/DL (ref 2.2–3.9)
IGA SERPL-MCNC: 91 MG/DL (ref 64–422)
IGG SERPL-MCNC: 813 MG/DL (ref 586–1602)
IGM SERPL-MCNC: 21 MG/DL (ref 26–217)
INTERPRETATION SERPL IEP-IMP: ABNORMAL
KAPPA LC FREE SER-MCNC: 16.5 MG/L (ref 3.3–19.4)
KAPPA LC FREE/LAMBDA FREE SER: 1.06 {RATIO} (ref 0.26–1.65)
LABORATORY COMMENT REPORT: ABNORMAL
LAMBDA LC FREE SERPL-MCNC: 15.6 MG/L (ref 5.7–26.3)
M PROTEIN SERPL ELPH-MCNC: ABNORMAL G/DL
PROT SERPL-MCNC: 6.3 G/DL (ref 6–8.5)

## 2022-04-07 NOTE — PROGRESS NOTES
I called [facility] to determine the status of the Revlimid. It was ready for shipment. I called pt to have her call and schedule delivery for tomorrow (Friday) so she will get the medication in time for the weekend.

## 2022-04-28 ENCOUNTER — SPECIALTY PHARMACY (OUTPATIENT)
Dept: PHARMACY | Facility: HOSPITAL | Age: 72
End: 2022-04-28

## 2022-04-28 RX ORDER — LENALIDOMIDE 10 MG/1
10 CAPSULE ORAL DAILY
Qty: 21 CAPSULE | Refills: 0 | Status: SHIPPED | OUTPATIENT
Start: 2022-04-28 | End: 2022-05-27 | Stop reason: SDUPTHER

## 2022-04-28 NOTE — PROGRESS NOTES
Refill requested from pharmacy. Per last chart note, patient to continue 10 mg of Revlimid 3 out of 4 weeks. Will route to MD for cosignature.

## 2022-05-03 ENCOUNTER — TELEPHONE (OUTPATIENT)
Dept: ONCOLOGY | Facility: OTHER | Age: 72
End: 2022-05-03

## 2022-05-03 ENCOUNTER — INFUSION (OUTPATIENT)
Dept: ONCOLOGY | Facility: HOSPITAL | Age: 72
End: 2022-05-03

## 2022-05-03 ENCOUNTER — LAB (OUTPATIENT)
Dept: LAB | Facility: HOSPITAL | Age: 72
End: 2022-05-03

## 2022-05-03 DIAGNOSIS — C90.00 NEUROPATHY ASSOCIATED WITH MULTIPLE MYELOMA: ICD-10-CM

## 2022-05-03 DIAGNOSIS — C90.00 MULTIPLE MYELOMA NOT HAVING ACHIEVED REMISSION: ICD-10-CM

## 2022-05-03 DIAGNOSIS — G63 NEUROPATHY ASSOCIATED WITH MULTIPLE MYELOMA: ICD-10-CM

## 2022-05-03 DIAGNOSIS — C90.00 NEUROPATHY ASSOCIATED WITH MULTIPLE MYELOMA: Primary | ICD-10-CM

## 2022-05-03 DIAGNOSIS — G63 NEUROPATHY ASSOCIATED WITH MULTIPLE MYELOMA: Primary | ICD-10-CM

## 2022-05-03 LAB
ALBUMIN SERPL-MCNC: 4.4 G/DL (ref 3.5–5.2)
ALBUMIN/GLOB SERPL: 2.1 G/DL (ref 1.1–2.4)
ALP SERPL-CCNC: 67 U/L (ref 38–116)
ALT SERPL W P-5'-P-CCNC: 14 U/L (ref 0–33)
ANION GAP SERPL CALCULATED.3IONS-SCNC: 10.6 MMOL/L (ref 5–15)
AST SERPL-CCNC: 15 U/L (ref 0–32)
B2 MICROGLOB SERPL-MCNC: 1.8 MG/L (ref 0.8–2.2)
BASOPHILS # BLD AUTO: 0.05 10*3/MM3 (ref 0–0.2)
BASOPHILS NFR BLD AUTO: 0.9 % (ref 0–1.5)
BILIRUB SERPL-MCNC: 0.2 MG/DL (ref 0.2–1.2)
BUN SERPL-MCNC: 13 MG/DL (ref 6–20)
BUN/CREAT SERPL: 16 (ref 7.3–30)
CALCIUM SPEC-SCNC: 9.3 MG/DL (ref 8.5–10.2)
CHLORIDE SERPL-SCNC: 102 MMOL/L (ref 98–107)
CO2 SERPL-SCNC: 27.4 MMOL/L (ref 22–29)
CREAT SERPL-MCNC: 0.81 MG/DL (ref 0.6–1.1)
DEPRECATED RDW RBC AUTO: 52.9 FL (ref 37–54)
EGFRCR SERPLBLD CKD-EPI 2021: 76.8 ML/MIN/1.73
EOSINOPHIL # BLD AUTO: 0.09 10*3/MM3 (ref 0–0.4)
EOSINOPHIL NFR BLD AUTO: 1.5 % (ref 0.3–6.2)
ERYTHROCYTE [DISTWIDTH] IN BLOOD BY AUTOMATED COUNT: 14.5 % (ref 12.3–15.4)
GLOBULIN UR ELPH-MCNC: 2.1 GM/DL (ref 1.8–3.5)
GLUCOSE SERPL-MCNC: 90 MG/DL (ref 74–124)
HCT VFR BLD AUTO: 38.9 % (ref 34–46.6)
HGB BLD-MCNC: 12.8 G/DL (ref 12–15.9)
IMM GRANULOCYTES # BLD AUTO: 0.01 10*3/MM3 (ref 0–0.05)
IMM GRANULOCYTES NFR BLD AUTO: 0.2 % (ref 0–0.5)
LYMPHOCYTES # BLD AUTO: 1.86 10*3/MM3 (ref 0.7–3.1)
LYMPHOCYTES NFR BLD AUTO: 31.8 % (ref 19.6–45.3)
MAGNESIUM SERPL-MCNC: 1.9 MG/DL (ref 1.8–2.5)
MCH RBC QN AUTO: 32.7 PG (ref 26.6–33)
MCHC RBC AUTO-ENTMCNC: 32.9 G/DL (ref 31.5–35.7)
MCV RBC AUTO: 99.5 FL (ref 79–97)
MONOCYTES # BLD AUTO: 0.9 10*3/MM3 (ref 0.1–0.9)
MONOCYTES NFR BLD AUTO: 15.4 % (ref 5–12)
NEUTROPHILS NFR BLD AUTO: 2.94 10*3/MM3 (ref 1.7–7)
NEUTROPHILS NFR BLD AUTO: 50.2 % (ref 42.7–76)
NRBC BLD AUTO-RTO: 0 /100 WBC (ref 0–0.2)
PLATELET # BLD AUTO: 235 10*3/MM3 (ref 140–450)
PMV BLD AUTO: 8.8 FL (ref 6–12)
POTASSIUM SERPL-SCNC: 4 MMOL/L (ref 3.5–4.7)
PROT SERPL-MCNC: 6.5 G/DL (ref 6.3–8)
RBC # BLD AUTO: 3.91 10*6/MM3 (ref 3.77–5.28)
SODIUM SERPL-SCNC: 140 MMOL/L (ref 134–145)
WBC NRBC COR # BLD: 5.85 10*3/MM3 (ref 3.4–10.8)

## 2022-05-03 PROCEDURE — 82232 ASSAY OF BETA-2 PROTEIN: CPT | Performed by: INTERNAL MEDICINE

## 2022-05-03 PROCEDURE — 36415 COLL VENOUS BLD VENIPUNCTURE: CPT

## 2022-05-03 PROCEDURE — 85025 COMPLETE CBC W/AUTO DIFF WBC: CPT

## 2022-05-03 PROCEDURE — 80053 COMPREHEN METABOLIC PANEL: CPT

## 2022-05-03 PROCEDURE — 83735 ASSAY OF MAGNESIUM: CPT

## 2022-05-03 PROCEDURE — 25010000002 CYANOCOBALAMIN PER 1000 MCG: Performed by: INTERNAL MEDICINE

## 2022-05-03 PROCEDURE — 96372 THER/PROPH/DIAG INJ SC/IM: CPT

## 2022-05-03 RX ORDER — CYANOCOBALAMIN 1000 UG/ML
1000 INJECTION, SOLUTION INTRAMUSCULAR; SUBCUTANEOUS ONCE
Status: COMPLETED | OUTPATIENT
Start: 2022-05-03 | End: 2022-05-03

## 2022-05-03 RX ADMIN — CYANOCOBALAMIN 1000 MCG: 1000 INJECTION, SOLUTION INTRAMUSCULAR at 14:09

## 2022-05-04 LAB
ALBUMIN SERPL ELPH-MCNC: 3.7 G/DL (ref 2.9–4.4)
ALBUMIN/GLOB SERPL: 1.5 {RATIO} (ref 0.7–1.7)
ALPHA1 GLOB SERPL ELPH-MCNC: 0.2 G/DL (ref 0–0.4)
ALPHA2 GLOB SERPL ELPH-MCNC: 0.7 G/DL (ref 0.4–1)
B-GLOBULIN SERPL ELPH-MCNC: 0.9 G/DL (ref 0.7–1.3)
GAMMA GLOB SERPL ELPH-MCNC: 0.7 G/DL (ref 0.4–1.8)
GLOBULIN SER-MCNC: 2.5 G/DL (ref 2.2–3.9)
IGA SERPL-MCNC: 80 MG/DL (ref 64–422)
IGG SERPL-MCNC: 731 MG/DL (ref 586–1602)
IGM SERPL-MCNC: 29 MG/DL (ref 26–217)
INTERPRETATION SERPL IEP-IMP: ABNORMAL
KAPPA LC FREE SER-MCNC: 17.6 MG/L (ref 3.3–19.4)
KAPPA LC FREE/LAMBDA FREE SER: 1.07 {RATIO} (ref 0.26–1.65)
LABORATORY COMMENT REPORT: ABNORMAL
LAMBDA LC FREE SERPL-MCNC: 16.5 MG/L (ref 5.7–26.3)
M PROTEIN SERPL ELPH-MCNC: ABNORMAL G/DL
PROT SERPL-MCNC: 6.2 G/DL (ref 6–8.5)

## 2022-05-27 ENCOUNTER — SPECIALTY PHARMACY (OUTPATIENT)
Dept: PHARMACY | Facility: HOSPITAL | Age: 72
End: 2022-05-27

## 2022-05-27 RX ORDER — LENALIDOMIDE 10 MG/1
10 CAPSULE ORAL DAILY
Qty: 21 CAPSULE | Refills: 0 | Status: SHIPPED | OUTPATIENT
Start: 2022-05-27 | End: 2022-06-24

## 2022-05-27 NOTE — PROGRESS NOTES
Refill requested from pharmacy. Per last chart note, patient to continue 10 mg of Revlimid 3 out of 4 weeks.. Will route to MD for cosignature.

## 2022-05-31 ENCOUNTER — LAB (OUTPATIENT)
Dept: LAB | Facility: HOSPITAL | Age: 72
End: 2022-05-31

## 2022-05-31 ENCOUNTER — INFUSION (OUTPATIENT)
Dept: ONCOLOGY | Facility: HOSPITAL | Age: 72
End: 2022-05-31

## 2022-05-31 DIAGNOSIS — C90.00 MULTIPLE MYELOMA NOT HAVING ACHIEVED REMISSION: ICD-10-CM

## 2022-05-31 DIAGNOSIS — G63 NEUROPATHY ASSOCIATED WITH MULTIPLE MYELOMA: Primary | ICD-10-CM

## 2022-05-31 DIAGNOSIS — G63 NEUROPATHY ASSOCIATED WITH MULTIPLE MYELOMA: ICD-10-CM

## 2022-05-31 DIAGNOSIS — C90.00 NEUROPATHY ASSOCIATED WITH MULTIPLE MYELOMA: Primary | ICD-10-CM

## 2022-05-31 DIAGNOSIS — C90.00 NEUROPATHY ASSOCIATED WITH MULTIPLE MYELOMA: ICD-10-CM

## 2022-05-31 LAB
ALBUMIN SERPL-MCNC: 4 G/DL (ref 3.5–5.2)
ALBUMIN/GLOB SERPL: 1.4 G/DL (ref 1.1–2.4)
ALP SERPL-CCNC: 81 U/L (ref 38–116)
ALT SERPL W P-5'-P-CCNC: 8 U/L (ref 0–33)
ANION GAP SERPL CALCULATED.3IONS-SCNC: 8.9 MMOL/L (ref 5–15)
AST SERPL-CCNC: 14 U/L (ref 0–32)
B2 MICROGLOB SERPL-MCNC: 2 MG/L (ref 0.8–2.2)
BASOPHILS # BLD AUTO: 0.05 10*3/MM3 (ref 0–0.2)
BASOPHILS NFR BLD AUTO: 1.3 % (ref 0–1.5)
BILIRUB SERPL-MCNC: <0.2 MG/DL (ref 0.2–1.2)
BUN SERPL-MCNC: 10 MG/DL (ref 6–20)
BUN/CREAT SERPL: 13.2 (ref 7.3–30)
CALCIUM SPEC-SCNC: 8.9 MG/DL (ref 8.5–10.2)
CHLORIDE SERPL-SCNC: 102 MMOL/L (ref 98–107)
CO2 SERPL-SCNC: 27.1 MMOL/L (ref 22–29)
CREAT SERPL-MCNC: 0.76 MG/DL (ref 0.6–1.1)
DEPRECATED RDW RBC AUTO: 51.6 FL (ref 37–54)
EGFRCR SERPLBLD CKD-EPI 2021: 82.9 ML/MIN/1.73
EOSINOPHIL # BLD AUTO: 0.1 10*3/MM3 (ref 0–0.4)
EOSINOPHIL NFR BLD AUTO: 2.6 % (ref 0.3–6.2)
ERYTHROCYTE [DISTWIDTH] IN BLOOD BY AUTOMATED COUNT: 13.9 % (ref 12.3–15.4)
GLOBULIN UR ELPH-MCNC: 2.9 GM/DL (ref 1.8–3.5)
GLUCOSE SERPL-MCNC: 91 MG/DL (ref 74–124)
HCT VFR BLD AUTO: 38.5 % (ref 34–46.6)
HGB BLD-MCNC: 12.4 G/DL (ref 12–15.9)
IMM GRANULOCYTES # BLD AUTO: 0 10*3/MM3 (ref 0–0.05)
IMM GRANULOCYTES NFR BLD AUTO: 0 % (ref 0–0.5)
LYMPHOCYTES # BLD AUTO: 1.92 10*3/MM3 (ref 0.7–3.1)
LYMPHOCYTES NFR BLD AUTO: 49.9 % (ref 19.6–45.3)
MAGNESIUM SERPL-MCNC: 1.8 MG/DL (ref 1.8–2.5)
MCH RBC QN AUTO: 32.6 PG (ref 26.6–33)
MCHC RBC AUTO-ENTMCNC: 32.2 G/DL (ref 31.5–35.7)
MCV RBC AUTO: 101.3 FL (ref 79–97)
MONOCYTES # BLD AUTO: 0.52 10*3/MM3 (ref 0.1–0.9)
MONOCYTES NFR BLD AUTO: 13.5 % (ref 5–12)
NEUTROPHILS NFR BLD AUTO: 1.26 10*3/MM3 (ref 1.7–7)
NEUTROPHILS NFR BLD AUTO: 32.7 % (ref 42.7–76)
NRBC BLD AUTO-RTO: 0 /100 WBC (ref 0–0.2)
PLATELET # BLD AUTO: 292 10*3/MM3 (ref 140–450)
PMV BLD AUTO: 8.7 FL (ref 6–12)
POTASSIUM SERPL-SCNC: 4 MMOL/L (ref 3.5–4.7)
PROT SERPL-MCNC: 6.9 G/DL (ref 6.3–8)
RBC # BLD AUTO: 3.8 10*6/MM3 (ref 3.77–5.28)
SODIUM SERPL-SCNC: 138 MMOL/L (ref 134–145)
WBC NRBC COR # BLD: 3.85 10*3/MM3 (ref 3.4–10.8)

## 2022-05-31 PROCEDURE — 80053 COMPREHEN METABOLIC PANEL: CPT

## 2022-05-31 PROCEDURE — 85025 COMPLETE CBC W/AUTO DIFF WBC: CPT

## 2022-05-31 PROCEDURE — 36415 COLL VENOUS BLD VENIPUNCTURE: CPT

## 2022-05-31 PROCEDURE — 82232 ASSAY OF BETA-2 PROTEIN: CPT | Performed by: INTERNAL MEDICINE

## 2022-05-31 PROCEDURE — 83735 ASSAY OF MAGNESIUM: CPT

## 2022-05-31 PROCEDURE — 96372 THER/PROPH/DIAG INJ SC/IM: CPT

## 2022-05-31 PROCEDURE — 25010000002 CYANOCOBALAMIN PER 1000 MCG: Performed by: INTERNAL MEDICINE

## 2022-05-31 RX ORDER — CYANOCOBALAMIN 1000 UG/ML
1000 INJECTION, SOLUTION INTRAMUSCULAR; SUBCUTANEOUS ONCE
Status: COMPLETED | OUTPATIENT
Start: 2022-05-31 | End: 2022-05-31

## 2022-05-31 RX ADMIN — CYANOCOBALAMIN 1000 MCG: 1000 INJECTION, SOLUTION INTRAMUSCULAR at 15:08

## 2022-06-02 LAB
ALBUMIN SERPL ELPH-MCNC: 3.4 G/DL (ref 2.9–4.4)
ALBUMIN/GLOB SERPL: 1.2 {RATIO} (ref 0.7–1.7)
ALPHA1 GLOB SERPL ELPH-MCNC: 0.3 G/DL (ref 0–0.4)
ALPHA2 GLOB SERPL ELPH-MCNC: 0.9 G/DL (ref 0.4–1)
B-GLOBULIN SERPL ELPH-MCNC: 1 G/DL (ref 0.7–1.3)
GAMMA GLOB SERPL ELPH-MCNC: 0.8 G/DL (ref 0.4–1.8)
GLOBULIN SER-MCNC: 3 G/DL (ref 2.2–3.9)
IGA SERPL-MCNC: 62 MG/DL (ref 64–422)
IGG SERPL-MCNC: 776 MG/DL (ref 586–1602)
IGM SERPL-MCNC: 33 MG/DL (ref 26–217)
INTERPRETATION SERPL IEP-IMP: ABNORMAL
KAPPA LC FREE SER-MCNC: 18.1 MG/L (ref 3.3–19.4)
KAPPA LC FREE/LAMBDA FREE SER: 0.69 {RATIO} (ref 0.26–1.65)
LABORATORY COMMENT REPORT: ABNORMAL
LAMBDA LC FREE SERPL-MCNC: 26.3 MG/L (ref 5.7–26.3)
M PROTEIN SERPL ELPH-MCNC: ABNORMAL G/DL
PROT SERPL-MCNC: 6.4 G/DL (ref 6–8.5)

## 2022-06-24 RX ORDER — LENALIDOMIDE 10 MG/1
CAPSULE ORAL
Qty: 21 CAPSULE | Refills: 0 | Status: SHIPPED | OUTPATIENT
Start: 2022-06-24 | End: 2022-07-22 | Stop reason: SDUPTHER

## 2022-06-24 NOTE — TELEPHONE ENCOUNTER
Refill requested from pharmacy. Per last chart note, patient to continue revlimid 10 mg daily for 21 days on, 7 days off. Will route to MD for cosignature.

## 2022-06-29 ENCOUNTER — TELEPHONE (OUTPATIENT)
Dept: ONCOLOGY | Facility: CLINIC | Age: 72
End: 2022-06-29

## 2022-06-29 NOTE — TELEPHONE ENCOUNTER
Caller: PACO    Relationship to patient: SELF    Best call back number: [phone]    Patient is needing: TO R/S 7-6-2022 PORT FLUSH, F/U AND INFUSION TO LATER IN AFTERNOON, IF POSSIBLE.

## 2022-07-06 ENCOUNTER — INFUSION (OUTPATIENT)
Dept: ONCOLOGY | Facility: HOSPITAL | Age: 72
End: 2022-07-06

## 2022-07-06 ENCOUNTER — OFFICE VISIT (OUTPATIENT)
Dept: ONCOLOGY | Facility: CLINIC | Age: 72
End: 2022-07-06

## 2022-07-06 VITALS
OXYGEN SATURATION: 97 % | HEIGHT: 67 IN | HEART RATE: 83 BPM | DIASTOLIC BLOOD PRESSURE: 72 MMHG | SYSTOLIC BLOOD PRESSURE: 113 MMHG | RESPIRATION RATE: 16 BRPM | BODY MASS INDEX: 25.57 KG/M2 | TEMPERATURE: 97.1 F | WEIGHT: 162.9 LBS

## 2022-07-06 DIAGNOSIS — C90.00 NEUROPATHY ASSOCIATED WITH MULTIPLE MYELOMA: Primary | ICD-10-CM

## 2022-07-06 DIAGNOSIS — G63 NEUROPATHY ASSOCIATED WITH MULTIPLE MYELOMA: Primary | ICD-10-CM

## 2022-07-06 DIAGNOSIS — G63 NEUROPATHY ASSOCIATED WITH MULTIPLE MYELOMA: ICD-10-CM

## 2022-07-06 DIAGNOSIS — C90.00 MULTIPLE MYELOMA NOT HAVING ACHIEVED REMISSION: ICD-10-CM

## 2022-07-06 DIAGNOSIS — C90.00 NEUROPATHY ASSOCIATED WITH MULTIPLE MYELOMA: ICD-10-CM

## 2022-07-06 DIAGNOSIS — C90.00 MULTIPLE MYELOMA NOT HAVING ACHIEVED REMISSION: Primary | ICD-10-CM

## 2022-07-06 DIAGNOSIS — Z79.899 HIGH RISK MEDICATION USE: ICD-10-CM

## 2022-07-06 LAB
ALBUMIN SERPL-MCNC: 4.2 G/DL (ref 3.5–5.2)
ALBUMIN/GLOB SERPL: 1.8 G/DL (ref 1.1–2.4)
ALP SERPL-CCNC: 78 U/L (ref 38–116)
ALT SERPL W P-5'-P-CCNC: 17 U/L (ref 0–33)
ANION GAP SERPL CALCULATED.3IONS-SCNC: 14.4 MMOL/L (ref 5–15)
AST SERPL-CCNC: 16 U/L (ref 0–32)
B2 MICROGLOB SERPL-MCNC: 1.7 MG/L (ref 0.8–2.2)
BASOPHILS # BLD AUTO: 0.05 10*3/MM3 (ref 0–0.2)
BASOPHILS NFR BLD AUTO: 1.1 % (ref 0–1.5)
BILIRUB SERPL-MCNC: 0.2 MG/DL (ref 0.2–1.2)
BUN SERPL-MCNC: 13 MG/DL (ref 6–20)
BUN/CREAT SERPL: 17.8 (ref 7.3–30)
CALCIUM SPEC-SCNC: 8.4 MG/DL (ref 8.5–10.2)
CHLORIDE SERPL-SCNC: 101 MMOL/L (ref 98–107)
CO2 SERPL-SCNC: 23.6 MMOL/L (ref 22–29)
CREAT SERPL-MCNC: 0.73 MG/DL (ref 0.6–1.1)
DEPRECATED RDW RBC AUTO: 50.9 FL (ref 37–54)
EGFRCR SERPLBLD CKD-EPI 2021: 87 ML/MIN/1.73
EOSINOPHIL # BLD AUTO: 0.23 10*3/MM3 (ref 0–0.4)
EOSINOPHIL NFR BLD AUTO: 4.9 % (ref 0.3–6.2)
ERYTHROCYTE [DISTWIDTH] IN BLOOD BY AUTOMATED COUNT: 14 % (ref 12.3–15.4)
GLOBULIN UR ELPH-MCNC: 2.3 GM/DL (ref 1.8–3.5)
GLUCOSE SERPL-MCNC: 102 MG/DL (ref 74–124)
HCT VFR BLD AUTO: 38.7 % (ref 34–46.6)
HGB BLD-MCNC: 12.9 G/DL (ref 12–15.9)
IMM GRANULOCYTES # BLD AUTO: 0 10*3/MM3 (ref 0–0.05)
IMM GRANULOCYTES NFR BLD AUTO: 0 % (ref 0–0.5)
LYMPHOCYTES # BLD AUTO: 2.3 10*3/MM3 (ref 0.7–3.1)
LYMPHOCYTES NFR BLD AUTO: 49.5 % (ref 19.6–45.3)
MAGNESIUM SERPL-MCNC: 1.8 MG/DL (ref 1.8–2.5)
MCH RBC QN AUTO: 33.1 PG (ref 26.6–33)
MCHC RBC AUTO-ENTMCNC: 33.3 G/DL (ref 31.5–35.7)
MCV RBC AUTO: 99.2 FL (ref 79–97)
MONOCYTES # BLD AUTO: 0.68 10*3/MM3 (ref 0.1–0.9)
MONOCYTES NFR BLD AUTO: 14.6 % (ref 5–12)
NEUTROPHILS NFR BLD AUTO: 1.39 10*3/MM3 (ref 1.7–7)
NEUTROPHILS NFR BLD AUTO: 29.9 % (ref 42.7–76)
NRBC BLD AUTO-RTO: 0 /100 WBC (ref 0–0.2)
PHOSPHATE SERPL-MCNC: 3.5 MG/DL (ref 2.5–4.5)
PLATELET # BLD AUTO: 198 10*3/MM3 (ref 140–450)
PMV BLD AUTO: 9.8 FL (ref 6–12)
POTASSIUM SERPL-SCNC: 4.5 MMOL/L (ref 3.5–4.7)
PROT SERPL-MCNC: 6.5 G/DL (ref 6.3–8)
RBC # BLD AUTO: 3.9 10*6/MM3 (ref 3.77–5.28)
SODIUM SERPL-SCNC: 139 MMOL/L (ref 134–145)
WBC NRBC COR # BLD: 4.65 10*3/MM3 (ref 3.4–10.8)

## 2022-07-06 PROCEDURE — 84100 ASSAY OF PHOSPHORUS: CPT

## 2022-07-06 PROCEDURE — 85025 COMPLETE CBC W/AUTO DIFF WBC: CPT

## 2022-07-06 PROCEDURE — 99214 OFFICE O/P EST MOD 30 MIN: CPT | Performed by: INTERNAL MEDICINE

## 2022-07-06 PROCEDURE — 25010000002 ZOLEDRONIC ACID 4 MG/100ML SOLUTION: Performed by: INTERNAL MEDICINE

## 2022-07-06 PROCEDURE — 80053 COMPREHEN METABOLIC PANEL: CPT

## 2022-07-06 PROCEDURE — 96374 THER/PROPH/DIAG INJ IV PUSH: CPT

## 2022-07-06 PROCEDURE — 25010000002 CYANOCOBALAMIN PER 1000 MCG: Performed by: INTERNAL MEDICINE

## 2022-07-06 PROCEDURE — 83735 ASSAY OF MAGNESIUM: CPT

## 2022-07-06 PROCEDURE — 96372 THER/PROPH/DIAG INJ SC/IM: CPT

## 2022-07-06 PROCEDURE — 82232 ASSAY OF BETA-2 PROTEIN: CPT | Performed by: INTERNAL MEDICINE

## 2022-07-06 RX ORDER — ZOLEDRONIC ACID 0.04 MG/ML
4 INJECTION, SOLUTION INTRAVENOUS ONCE
Status: COMPLETED | OUTPATIENT
Start: 2022-07-06 | End: 2022-07-06

## 2022-07-06 RX ORDER — SODIUM CHLORIDE 9 MG/ML
250 INJECTION, SOLUTION INTRAVENOUS ONCE
Status: CANCELLED | OUTPATIENT
Start: 2022-07-06

## 2022-07-06 RX ORDER — ZOLEDRONIC ACID 0.04 MG/ML
4 INJECTION, SOLUTION INTRAVENOUS ONCE
Status: CANCELLED | OUTPATIENT
Start: 2022-07-06

## 2022-07-06 RX ORDER — SODIUM CHLORIDE 9 MG/ML
250 INJECTION, SOLUTION INTRAVENOUS ONCE
Status: COMPLETED | OUTPATIENT
Start: 2022-07-06 | End: 2022-07-06

## 2022-07-06 RX ORDER — CYANOCOBALAMIN 1000 UG/ML
1000 INJECTION, SOLUTION INTRAMUSCULAR; SUBCUTANEOUS ONCE
Status: COMPLETED | OUTPATIENT
Start: 2022-07-06 | End: 2022-07-06

## 2022-07-06 RX ADMIN — CYANOCOBALAMIN 1000 MCG: 1000 INJECTION, SOLUTION INTRAMUSCULAR at 10:03

## 2022-07-06 RX ADMIN — ZOLEDRONIC ACID 4 MG: 0.04 INJECTION, SOLUTION INTRAVENOUS at 09:59

## 2022-07-06 RX ADMIN — SODIUM CHLORIDE 250 ML: 9 INJECTION, SOLUTION INTRAVENOUS at 09:59

## 2022-07-06 NOTE — PROGRESS NOTES
Subjective     REASON FOR CONSULTATION: Transfer of care for IgA kappa multiple myeloma post stem cell transplant in March 2016 at [facility] currently on maintenance Revlimid 10 mg 3 out of 4 weeks                               REQUESTING PHYSICIAN: MD Brett Turpin MD    History of Present Illness patient is a 71-year-old female with an IgA kappa high risk myeloma post stem cell transplant on maintenance Revlimid 10 mg 3 of 4 weeks alone after toxicity from Velcade and dexamethasone-5 +years from stem cell transplant    Comes in for follow-up on her 3-month routine.  She has completed 2 weeks of Revlimid and her counts are decent.  Her paraprotein is not detectable beta 2 microglobulin is normal l.  She has had no infections.  She continues with B12 injections monthly.  She was in [place] recently to see Dr. Jacob reynoso and he continues the same regimen currently    Patient went back to 3 weeks on and 1 week off and her counts are doing okay but  her doctor in [place]  agreed that if she has persistent leukopenia  2-week on 2-week off regimen may be reasonable    She is back today feeling well she has her usual aches and pains.  She has more pain after her Zometa injection for about a week but this is usual for her-she states the pain is not related to the Zometa and I told her to see if it does better when she was off her Revlimid.  I suggested some tart cherry extract to see if this would help as she is already on maximum doses of Cymbalta    She had next fusion C4-5 6 in [place] 8 weeks ago and is slowly recovering.  She had cervicogenic headaches and this is not yet gone away after surgery and she is hoping in another couple of months the headache will resolve     she has had no infections or issues and is self isolating at home and practicing social distancing and has been vaccinated against coronavirus and had her booster    Labs have been drawn and  are normal with no paraprotein detected on her immunofixation over the last 3 months  I think it is fairly reasonable to cut back her labs to every 2 months but she insists that the doctor in [place] wants it checked every month which I doubt very much    Past Medical History:   Diagnosis Date   • Anxiety    • Depression    • Disease of thyroid gland    • Hashimoto's disease    • History of vitamin D deficiency    • Hypothyroidism    • IBS (irritable bowel syndrome)    • Mixed hyperlipidemia 3/12/2018   • Multiple myeloma (HCC) 2015    IgA kappa.  Stem cell transplant at [facility] 3/20/2016   • Myeloma (HCC)    • KWAME (obstructive sleep apnea) 07/08/2021    Overnight polysomnogram.  Weight 173 pounds.  Mild KWAME with AHI 5.6 events per hour.  When supine, 9.4 events per hour.  During REM, moderate severity at 26 events per hour.  No sleep-related hypoxia.  The patient snored 20.5% of total sleep time.   • Osteopenia    • Overweight (BMI 25.0-29.9) 2/5/2018        Past Surgical History:   Procedure Laterality Date   • BREAST BIOPSY     • CATARACT EXTRACTION     • COLONOSCOPY     • TONSILLECTOMY  1956   • VEIN SURGERY  1991      patient is a 70-year-old female with IgA kappa multiple myeloma diagnosed in mid 2015-high risk because of gain of 1 q- treatment with rev Dex Velcade initiated in 11/15-went on to stem cell transplant at [facility] in March 2016?  Melphalan.  She started post transplant therapy in May 2016 with Velcade rev Dex.  In September after 4 cycles Velcade was decreased to every other week with plans to continue rev Dex Velcade till progression because of high risk status.  In May 2019 her Velcade was discontinued because of worsening neuropathy.  Patient has remained on Revlimid 10 mg 21 out of 28 days since then with stable disease until November 2019 when a small IgG kappa paraprotein was noted on her blood tests which is distinct from her usual IgA kappa myeloma.  Restaging with PET scan was  normal and since then the paraprotein as of 3/10/2020 has resolved    Patient has significant issues with anxiety and states that she had trouble getting her Revlimid in a timely fashion was very frustrated with this process at the [facility]-she states she was taken back when Dr. Hewitt suggested she transfer her care but is now happy to make a change    She is currently 2 days into her treatment cycle with Revlimid .she has no pain currently except intermittently in her neck where she has had some degenerative disease.  She does have neuropathy for which she is on fairly high doses of Cymbalta.  She is not taking her gabapentin.    Continues on acyclovir for prevention and baby aspirin because of the Revlimid  She was having trouble with diarrhea from the Revlimid but this is controlled with WelChol.    She is  and lives by herself has no family nearby but good friends.  She does not smoke or drink.  She is up-to-date with mammography and does Cologuard instead of colonoscopy     have reviewed her labs in detail but cannot find any documentation of her transplant conditioning regimen (I assume it was melphalan)  or the depth of her response prior to transplant .  She states that the doctors at [facility] recommended to check her labs every month - she has been clinically BOZENA for 4 years and I suggested every other month testing but she is very adamant that she wants monthly testing      Current Outpatient Medications on File Prior to Visit   Medication Sig Dispense Refill   • Acetylcarn-Alpha Lipoic Acid 400-200 MG capsule Take  by mouth.     • acyclovir (ZOVIRAX) 400 MG tablet Take 1 tablet by mouth 2 (Two) Times a Day. 60 tablet 11   • ALPRAZolam (XANAX) 0.25 MG tablet Take 1 tablet by mouth Daily As Needed for Anxiety. 30 tablet 2   • aspirin 81 MG chewable tablet Chew 81 mg Daily.     • B Complex Vitamins (VITAMIN B COMPLEX) capsule capsule Take 2 tablets by mouth Daily.     • baclofen (LIORESAL)  10 MG tablet Take 10 mg by mouth 3 (Three) Times a Day.  5   • calcium carbonate-vitamin d 600-400 MG-UNIT per tablet Take 1 tablet by mouth Daily.     • colesevelam (WELCHOL) 625 MG tablet Take 2 tablets by mouth 2 (Two) Times a Day With Meals. 90 tablet 2   • cycloSPORINE (RESTASIS) 0.05 % ophthalmic emulsion 1 drop 2 (Two) Times a Day.     • diphenoxylate-atropine (LOMOTIL) 2.5-0.025 MG per tablet Take 1 tablet by mouth 3 (Three) Times a Day As Needed for Diarrhea. 90 tablet 3   • DULoxetine (CYMBALTA) 30 MG capsule Take 30 mg by mouth Daily.     • DULoxetine (CYMBALTA) 60 MG capsule Take 1 capsule by mouth Daily. 90 capsule 3   • fluticasone (FLONASE) 50 MCG/ACT nasal spray 2 sprays into each nostril Daily.     • folic acid (FOLVITE) 1 MG tablet Take 1,000 mcg by mouth Daily.  2   • gabapentin (NEURONTIN) 100 MG capsule Take 1 capsule by mouth Daily As Needed (Pain). 60 capsule 1   • HYDROcodone-acetaminophen (NORCO) 5-325 MG per tablet Take 1 tablet by mouth Every 6 (Six) Hours As Needed for Moderate Pain  or Severe Pain . 60 tablet 0   • lenalidomide (Revlimid) 10 MG capsule TAKE 1 CAPSULE DAILY FOR 21 DAYS ON, THEN 7 DAYS OFF, THEN REPEAT 21 capsule 0   • magnesium oxide (MAG-OX) 400 MG tablet Take 1 tablet by mouth Daily. 30 tablet 3   • meloxicam (MOBIC) 15 MG tablet Take 15 mg by mouth Daily.     • Multiple Vitamins-Iron (DAILY-JONI/IRON/BETA-CAROTENE) tablet Take 1 tablet by mouth Daily.  2   • omega-3 acid ethyl esters (LOVAZA) 1 g capsule Take 2 g by mouth.     • Synthroid 100 MCG tablet 100 mcg 6 days week 1/2 tablet on sunday     • vitamin D (ERGOCALCIFEROL) 17350 units capsule capsule Take 50,000 Units by mouth 1 (One) Time Per Week. Twice a week     • ubrogepant 100 MG tablet        No current facility-administered medications on file prior to visit.        ALLERGIES:    No Known Allergies     Social History     Socioeconomic History   • Marital status:    • Number of children: 2   Tobacco  "Use   • Smoking status: Former Smoker     Packs/day: 0.25     Years: 2.00     Pack years: 0.50     Types: Cigarettes     Start date:      Quit date:      Years since quittin.5   • Smokeless tobacco: Never Used   • Tobacco comment: Only lightly for 2 years   Vaping Use   • Vaping Use: Never used   Substance and Sexual Activity   • Alcohol use: Not Currently     Alcohol/week: 0.0 standard drinks     Comment: Na   • Drug use: Never   • Sexual activity: Not Currently     Partners: Male     Birth control/protection: None     Comment: Na        Family History   Problem Relation Age of Onset   • Breast cancer Mother    • Sleep apnea Mother    • Lymphoma Father    • Sleep apnea Father    • Cancer Father    • Kidney cancer Paternal Grandmother    • Cancer Maternal Aunt                 Review of Systems   Constitutional: Positive for fatigue (Worse).   Respiratory: Negative for cough, choking, chest tightness and shortness of breath.    Cardiovascular: Negative for chest pain.   Gastrointestinal: Negative for abdominal pain, constipation, nausea and vomiting.   Musculoskeletal: Positive for neck pain.   Neurological: Positive for numbness (Den,foot/hand tingling no numbness). Negative for headaches.   Psychiatric/Behavioral: Negative for dysphoric mood. The patient is nervous/anxious (same ).    All other systems reviewed and are negative.      Objective     Vitals:    22 0859   BP: 113/72   Pulse: 83   Resp: 16   Temp: 97.1 °F (36.2 °C)   TempSrc: Temporal   SpO2: 97%   Weight: 73.9 kg (162 lb 14.4 oz)   Height: 170.2 cm (67.01\")   PainSc:   6     Current Status 2022   ECOG score 0       Physical Exam     CONSTITUTIONAL:  Vital signs reviewed.  No distress, looks comfortable.  EYES:  Conjunctiva and lids unremarkable.    RESPIRATORY:  Normal respiratory effort.  Lungs clear to auscultation bilaterally.  CARDIOVASCULAR:  Normal S1, S2.  No murmurs rubs or gallops.  No significant lower extremity " edema.  EXT-no edema    I have reexamined the patient and the results are consistent with the previously documented exam. Yoni Garcia MD       RECENT LABS:  Hematology WBC   Date Value Ref Range Status   07/06/2022 4.65 3.40 - 10.80 10*3/mm3 Final   03/10/2020 4.27 (L) 4.5 - 11.0 10*3/uL Final     RBC   Date Value Ref Range Status   07/06/2022 3.90 3.77 - 5.28 10*6/mm3 Final   03/10/2020 3.80 (L) 4.0 - 5.2 10*6/uL Final     Hemoglobin   Date Value Ref Range Status   07/06/2022 12.9 12.0 - 15.9 g/dL Final   03/10/2020 12.9 12.0 - 16.0 g/dL Final     Hematocrit   Date Value Ref Range Status   07/06/2022 38.7 34.0 - 46.6 % Final   03/10/2020 38.5 36.0 - 46.0 % Final     Platelets   Date Value Ref Range Status   07/06/2022 198 140 - 450 10*3/mm3 Final   03/10/2020 181 140 - 440 10*3/uL Final                  Assessment & Plan   1.  IgA kappa multiple myeloma diagnosed in 2015 treated with RVD  · Stem cell transplant at [facility] in 3/2016  · Maintenance treatment with Velcade rev Dex started in 5/16  · Velcade discontinued because of neurotoxicity in 5/19  · Small IgG kappa paraprotein seen on blood work in 10/19-PET scan negative protein disappeared by 3/20  · Zometa given every 3 months   · Acyclovir and aspirin prophylaxis  · Decrease Revlimid to 10 mg 2 out of 4 weeks in 8/21  · Patient increased back to 3 out of 4 weeks with stable counts for now    2.  Anxiety  3.  Hypothyroidism on treatment  4.  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  Diarrhea due to treatment improved with WelChol  6.  Snoring and fatigue probable sleep apnea-CPAP instituted  7.  Vaccinated against corona virus    Plan  1.  Continue 10 mg of Revlimid 3 out of 4 weeks  2.continue monthly B12 and myeloma labs WILLIAM plus beta-2 microglobulin)  3.  Zometa every 3 months dose today  4.  See NP in 3 months for follow-up and see me in 6 months with monthly myeloma labs-I have asked the patient to see if we can do this every other  month and move her Zometa to every 4 months and she will discuss this with Dr. Coronado-she has been on maintenance for 5 years plus with no detectable paraprotein

## 2022-07-08 LAB
ALBUMIN SERPL ELPH-MCNC: 3.6 G/DL (ref 2.9–4.4)
ALBUMIN/GLOB SERPL: 1.3 {RATIO} (ref 0.7–1.7)
ALPHA1 GLOB SERPL ELPH-MCNC: 0.2 G/DL (ref 0–0.4)
ALPHA2 GLOB SERPL ELPH-MCNC: 0.8 G/DL (ref 0.4–1)
B-GLOBULIN SERPL ELPH-MCNC: 1 G/DL (ref 0.7–1.3)
GAMMA GLOB SERPL ELPH-MCNC: 0.8 G/DL (ref 0.4–1.8)
GLOBULIN SER-MCNC: 2.8 G/DL (ref 2.2–3.9)
IGA SERPL-MCNC: 72 MG/DL (ref 64–422)
IGG SERPL-MCNC: 838 MG/DL (ref 586–1602)
IGM SERPL-MCNC: 49 MG/DL (ref 26–217)
INTERPRETATION SERPL IEP-IMP: ABNORMAL
KAPPA LC FREE SER-MCNC: 24 MG/L (ref 3.3–19.4)
KAPPA LC FREE/LAMBDA FREE SER: 0.6 {RATIO} (ref 0.26–1.65)
LABORATORY COMMENT REPORT: ABNORMAL
LAMBDA LC FREE SERPL-MCNC: 40.2 MG/L (ref 5.7–26.3)
M PROTEIN SERPL ELPH-MCNC: ABNORMAL G/DL
PROT SERPL-MCNC: 6.4 G/DL (ref 6–8.5)

## 2022-07-22 RX ORDER — LENALIDOMIDE 10 MG/1
10 CAPSULE ORAL DAILY
Qty: 21 CAPSULE | Refills: 0 | Status: SHIPPED | OUTPATIENT
Start: 2022-07-22 | End: 2022-08-16 | Stop reason: SDUPTHER

## 2022-07-26 DIAGNOSIS — G44.229 CHRONIC TENSION-TYPE HEADACHE, NOT INTRACTABLE: Primary | ICD-10-CM

## 2022-08-02 ENCOUNTER — APPOINTMENT (OUTPATIENT)
Dept: LAB | Facility: HOSPITAL | Age: 72
End: 2022-08-02

## 2022-08-03 ENCOUNTER — INFUSION (OUTPATIENT)
Dept: ONCOLOGY | Facility: HOSPITAL | Age: 72
End: 2022-08-03

## 2022-08-03 ENCOUNTER — LAB (OUTPATIENT)
Dept: LAB | Facility: HOSPITAL | Age: 72
End: 2022-08-03

## 2022-08-03 DIAGNOSIS — C90.00 NEUROPATHY ASSOCIATED WITH MULTIPLE MYELOMA: Primary | ICD-10-CM

## 2022-08-03 DIAGNOSIS — G63 NEUROPATHY ASSOCIATED WITH MULTIPLE MYELOMA: Primary | ICD-10-CM

## 2022-08-03 DIAGNOSIS — G63 NEUROPATHY ASSOCIATED WITH MULTIPLE MYELOMA: ICD-10-CM

## 2022-08-03 DIAGNOSIS — Z01.818 OTHER SPECIFIED PRE-OPERATIVE EXAMINATION: ICD-10-CM

## 2022-08-03 DIAGNOSIS — C90.00 MULTIPLE MYELOMA NOT HAVING ACHIEVED REMISSION: Primary | ICD-10-CM

## 2022-08-03 DIAGNOSIS — C90.00 NEUROPATHY ASSOCIATED WITH MULTIPLE MYELOMA: ICD-10-CM

## 2022-08-03 LAB
ALBUMIN SERPL-MCNC: 4 G/DL (ref 3.5–5.2)
ALBUMIN/GLOB SERPL: 1.6 G/DL (ref 1.1–2.4)
ALP SERPL-CCNC: 66 U/L (ref 38–116)
ALT SERPL W P-5'-P-CCNC: 14 U/L (ref 0–33)
ANION GAP SERPL CALCULATED.3IONS-SCNC: 13.9 MMOL/L (ref 5–15)
AST SERPL-CCNC: 15 U/L (ref 0–32)
B2 MICROGLOB SERPL-MCNC: 1.8 MG/L (ref 0.8–2.2)
BASOPHILS # BLD AUTO: 0.03 10*3/MM3 (ref 0–0.2)
BASOPHILS NFR BLD AUTO: 0.7 % (ref 0–1.5)
BILIRUB SERPL-MCNC: 0.2 MG/DL (ref 0.2–1.2)
BUN SERPL-MCNC: 8 MG/DL (ref 6–20)
BUN/CREAT SERPL: 10.5 (ref 7.3–30)
CALCIUM SPEC-SCNC: 8.3 MG/DL (ref 8.5–10.2)
CHLORIDE SERPL-SCNC: 104 MMOL/L (ref 98–107)
CO2 SERPL-SCNC: 21.1 MMOL/L (ref 22–29)
CREAT SERPL-MCNC: 0.76 MG/DL (ref 0.6–1.1)
DEPRECATED RDW RBC AUTO: 50.9 FL (ref 37–54)
EGFRCR SERPLBLD CKD-EPI 2021: 82.9 ML/MIN/1.73
EOSINOPHIL # BLD AUTO: 0.28 10*3/MM3 (ref 0–0.4)
EOSINOPHIL NFR BLD AUTO: 6.5 % (ref 0.3–6.2)
ERYTHROCYTE [DISTWIDTH] IN BLOOD BY AUTOMATED COUNT: 13.9 % (ref 12.3–15.4)
GLOBULIN UR ELPH-MCNC: 2.5 GM/DL (ref 1.8–3.5)
GLUCOSE SERPL-MCNC: 121 MG/DL (ref 74–124)
HCT VFR BLD AUTO: 38.5 % (ref 34–46.6)
HGB BLD-MCNC: 12.5 G/DL (ref 12–15.9)
IMM GRANULOCYTES # BLD AUTO: 0 10*3/MM3 (ref 0–0.05)
IMM GRANULOCYTES NFR BLD AUTO: 0 % (ref 0–0.5)
LYMPHOCYTES # BLD AUTO: 1.85 10*3/MM3 (ref 0.7–3.1)
LYMPHOCYTES NFR BLD AUTO: 42.8 % (ref 19.6–45.3)
MAGNESIUM SERPL-MCNC: 2 MG/DL (ref 1.8–2.5)
MCH RBC QN AUTO: 32.8 PG (ref 26.6–33)
MCHC RBC AUTO-ENTMCNC: 32.5 G/DL (ref 31.5–35.7)
MCV RBC AUTO: 101 FL (ref 79–97)
MONOCYTES # BLD AUTO: 0.54 10*3/MM3 (ref 0.1–0.9)
MONOCYTES NFR BLD AUTO: 12.5 % (ref 5–12)
NEUTROPHILS NFR BLD AUTO: 1.62 10*3/MM3 (ref 1.7–7)
NEUTROPHILS NFR BLD AUTO: 37.5 % (ref 42.7–76)
NRBC BLD AUTO-RTO: 0 /100 WBC (ref 0–0.2)
PLATELET # BLD AUTO: 191 10*3/MM3 (ref 140–450)
PMV BLD AUTO: 9.7 FL (ref 6–12)
POTASSIUM SERPL-SCNC: 3.9 MMOL/L (ref 3.5–4.7)
PROT SERPL-MCNC: 6.5 G/DL (ref 6.3–8)
RBC # BLD AUTO: 3.81 10*6/MM3 (ref 3.77–5.28)
SODIUM SERPL-SCNC: 139 MMOL/L (ref 134–145)
WBC NRBC COR # BLD: 4.32 10*3/MM3 (ref 3.4–10.8)

## 2022-08-03 PROCEDURE — 25010000002 CYANOCOBALAMIN PER 1000 MCG: Performed by: INTERNAL MEDICINE

## 2022-08-03 PROCEDURE — 36415 COLL VENOUS BLD VENIPUNCTURE: CPT

## 2022-08-03 PROCEDURE — 85025 COMPLETE CBC W/AUTO DIFF WBC: CPT

## 2022-08-03 PROCEDURE — 82232 ASSAY OF BETA-2 PROTEIN: CPT | Performed by: INTERNAL MEDICINE

## 2022-08-03 PROCEDURE — 83735 ASSAY OF MAGNESIUM: CPT

## 2022-08-03 PROCEDURE — 96372 THER/PROPH/DIAG INJ SC/IM: CPT

## 2022-08-03 PROCEDURE — 80053 COMPREHEN METABOLIC PANEL: CPT

## 2022-08-03 RX ORDER — CYANOCOBALAMIN 1000 UG/ML
1000 INJECTION, SOLUTION INTRAMUSCULAR; SUBCUTANEOUS ONCE
Status: COMPLETED | OUTPATIENT
Start: 2022-08-03 | End: 2022-08-03

## 2022-08-03 RX ADMIN — CYANOCOBALAMIN 1000 MCG: 1000 INJECTION, SOLUTION INTRAMUSCULAR at 09:05

## 2022-08-05 LAB
ALBUMIN SERPL ELPH-MCNC: 3.4 G/DL (ref 2.9–4.4)
ALBUMIN/GLOB SERPL: 1.3 {RATIO} (ref 0.7–1.7)
ALPHA1 GLOB SERPL ELPH-MCNC: 0.2 G/DL (ref 0–0.4)
ALPHA2 GLOB SERPL ELPH-MCNC: 0.7 G/DL (ref 0.4–1)
B-GLOBULIN SERPL ELPH-MCNC: 0.9 G/DL (ref 0.7–1.3)
GAMMA GLOB SERPL ELPH-MCNC: 0.8 G/DL (ref 0.4–1.8)
GLOBULIN SER-MCNC: 2.7 G/DL (ref 2.2–3.9)
IGA SERPL-MCNC: 69 MG/DL (ref 64–422)
IGG SERPL-MCNC: 829 MG/DL (ref 586–1602)
IGM SERPL-MCNC: 39 MG/DL (ref 26–217)
INTERPRETATION SERPL IEP-IMP: ABNORMAL
KAPPA LC FREE SER-MCNC: 22.9 MG/L (ref 3.3–19.4)
KAPPA LC FREE/LAMBDA FREE SER: 0.65 {RATIO} (ref 0.26–1.65)
LABORATORY COMMENT REPORT: ABNORMAL
LAMBDA LC FREE SERPL-MCNC: 35.4 MG/L (ref 5.7–26.3)
M PROTEIN SERPL ELPH-MCNC: ABNORMAL G/DL
PROT SERPL-MCNC: 6.1 G/DL (ref 6–8.5)

## 2022-08-16 ENCOUNTER — SPECIALTY PHARMACY (OUTPATIENT)
Dept: PHARMACY | Facility: HOSPITAL | Age: 72
End: 2022-08-16

## 2022-08-16 DIAGNOSIS — J01.90 ACUTE SINUSITIS, RECURRENCE NOT SPECIFIED, UNSPECIFIED LOCATION: Primary | ICD-10-CM

## 2022-08-16 RX ORDER — LENALIDOMIDE 10 MG/1
10 CAPSULE ORAL DAILY
Qty: 21 CAPSULE | Refills: 0 | Status: SHIPPED | OUTPATIENT
Start: 2022-08-16 | End: 2022-09-09 | Stop reason: SDUPTHER

## 2022-08-16 NOTE — PROGRESS NOTES
Patient due for refill per [facility]. Per last chart note, patient to continue Revlimid. Will route to MD for cosignature.    Felix Gilliam, PharmD, BCOP  8/16/2022 16:06 EDT

## 2022-08-23 ENCOUNTER — HOSPITAL ENCOUNTER (OUTPATIENT)
Dept: CT IMAGING | Facility: HOSPITAL | Age: 72
Discharge: HOME | End: 2022-08-23
Admitting: INTERNAL MEDICINE

## 2022-08-23 DIAGNOSIS — J01.90 ACUTE SINUSITIS, RECURRENCE NOT SPECIFIED, UNSPECIFIED LOCATION: ICD-10-CM

## 2022-08-23 PROCEDURE — 70486 CT MAXILLOFACIAL W/O DYE: CPT

## 2022-08-30 ENCOUNTER — LAB (OUTPATIENT)
Dept: LAB | Facility: HOSPITAL | Age: 72
End: 2022-08-30

## 2022-08-30 ENCOUNTER — INFUSION (OUTPATIENT)
Dept: ONCOLOGY | Facility: HOSPITAL | Age: 72
End: 2022-08-30

## 2022-08-30 DIAGNOSIS — Z01.818 OTHER SPECIFIED PRE-OPERATIVE EXAMINATION: ICD-10-CM

## 2022-08-30 DIAGNOSIS — G63 NEUROPATHY ASSOCIATED WITH MULTIPLE MYELOMA: Primary | ICD-10-CM

## 2022-08-30 DIAGNOSIS — C90.00 MULTIPLE MYELOMA NOT HAVING ACHIEVED REMISSION: Primary | ICD-10-CM

## 2022-08-30 DIAGNOSIS — C90.00 NEUROPATHY ASSOCIATED WITH MULTIPLE MYELOMA: Primary | ICD-10-CM

## 2022-08-30 DIAGNOSIS — G63 NEUROPATHY ASSOCIATED WITH MULTIPLE MYELOMA: ICD-10-CM

## 2022-08-30 DIAGNOSIS — C90.00 NEUROPATHY ASSOCIATED WITH MULTIPLE MYELOMA: ICD-10-CM

## 2022-08-30 LAB
ALBUMIN SERPL-MCNC: 4.3 G/DL (ref 3.5–5.2)
ALBUMIN/GLOB SERPL: 1.7 G/DL (ref 1.1–2.4)
ALP SERPL-CCNC: 65 U/L (ref 38–116)
ALT SERPL W P-5'-P-CCNC: 12 U/L (ref 0–33)
ANION GAP SERPL CALCULATED.3IONS-SCNC: 14.1 MMOL/L (ref 5–15)
AST SERPL-CCNC: 14 U/L (ref 0–32)
B2 MICROGLOB SERPL-MCNC: 1.9 MG/L (ref 0.8–2.2)
BASOPHILS # BLD AUTO: 0.03 10*3/MM3 (ref 0–0.2)
BASOPHILS NFR BLD AUTO: 0.7 % (ref 0–1.5)
BILIRUB SERPL-MCNC: 0.3 MG/DL (ref 0.2–1.2)
BUN SERPL-MCNC: 10 MG/DL (ref 6–20)
BUN/CREAT SERPL: 12 (ref 7.3–30)
CALCIUM SPEC-SCNC: 9.3 MG/DL (ref 8.5–10.2)
CHLORIDE SERPL-SCNC: 102 MMOL/L (ref 98–107)
CO2 SERPL-SCNC: 24.9 MMOL/L (ref 22–29)
CREAT SERPL-MCNC: 0.83 MG/DL (ref 0.6–1.1)
DEPRECATED RDW RBC AUTO: 51.4 FL (ref 37–54)
EGFRCR SERPLBLD CKD-EPI 2021: 74.5 ML/MIN/1.73
EOSINOPHIL # BLD AUTO: 0.17 10*3/MM3 (ref 0–0.4)
EOSINOPHIL NFR BLD AUTO: 3.9 % (ref 0.3–6.2)
ERYTHROCYTE [DISTWIDTH] IN BLOOD BY AUTOMATED COUNT: 14.2 % (ref 12.3–15.4)
GLOBULIN UR ELPH-MCNC: 2.6 GM/DL (ref 1.8–3.5)
GLUCOSE SERPL-MCNC: 108 MG/DL (ref 74–124)
HCT VFR BLD AUTO: 41.3 % (ref 34–46.6)
HGB BLD-MCNC: 13.8 G/DL (ref 12–15.9)
IMM GRANULOCYTES # BLD AUTO: 0.01 10*3/MM3 (ref 0–0.05)
IMM GRANULOCYTES NFR BLD AUTO: 0.2 % (ref 0–0.5)
LYMPHOCYTES # BLD AUTO: 1.8 10*3/MM3 (ref 0.7–3.1)
LYMPHOCYTES NFR BLD AUTO: 41.8 % (ref 19.6–45.3)
MCH RBC QN AUTO: 32.7 PG (ref 26.6–33)
MCHC RBC AUTO-ENTMCNC: 33.4 G/DL (ref 31.5–35.7)
MCV RBC AUTO: 97.9 FL (ref 79–97)
MONOCYTES # BLD AUTO: 0.49 10*3/MM3 (ref 0.1–0.9)
MONOCYTES NFR BLD AUTO: 11.4 % (ref 5–12)
NEUTROPHILS NFR BLD AUTO: 1.81 10*3/MM3 (ref 1.7–7)
NEUTROPHILS NFR BLD AUTO: 42 % (ref 42.7–76)
NRBC BLD AUTO-RTO: 0 /100 WBC (ref 0–0.2)
PLATELET # BLD AUTO: 199 10*3/MM3 (ref 140–450)
PMV BLD AUTO: 9.5 FL (ref 6–12)
POTASSIUM SERPL-SCNC: 3.9 MMOL/L (ref 3.5–4.7)
PROT SERPL-MCNC: 6.9 G/DL (ref 6.3–8)
RBC # BLD AUTO: 4.22 10*6/MM3 (ref 3.77–5.28)
SODIUM SERPL-SCNC: 141 MMOL/L (ref 134–145)
WBC NRBC COR # BLD: 4.31 10*3/MM3 (ref 3.4–10.8)

## 2022-08-30 PROCEDURE — 82232 ASSAY OF BETA-2 PROTEIN: CPT | Performed by: INTERNAL MEDICINE

## 2022-08-30 PROCEDURE — 85025 COMPLETE CBC W/AUTO DIFF WBC: CPT

## 2022-08-30 PROCEDURE — 25010000002 CYANOCOBALAMIN PER 1000 MCG: Performed by: INTERNAL MEDICINE

## 2022-08-30 PROCEDURE — 80053 COMPREHEN METABOLIC PANEL: CPT

## 2022-08-30 PROCEDURE — 96372 THER/PROPH/DIAG INJ SC/IM: CPT

## 2022-08-30 PROCEDURE — 36415 COLL VENOUS BLD VENIPUNCTURE: CPT

## 2022-08-30 RX ORDER — CYANOCOBALAMIN 1000 UG/ML
1000 INJECTION, SOLUTION INTRAMUSCULAR; SUBCUTANEOUS ONCE
Status: COMPLETED | OUTPATIENT
Start: 2022-08-30 | End: 2022-08-30

## 2022-08-30 RX ADMIN — CYANOCOBALAMIN 1000 MCG: 1000 INJECTION, SOLUTION INTRAMUSCULAR at 08:53

## 2022-09-01 LAB
ALBUMIN SERPL ELPH-MCNC: 3.7 G/DL (ref 2.9–4.4)
ALBUMIN/GLOB SERPL: 1.3 {RATIO} (ref 0.7–1.7)
ALPHA1 GLOB SERPL ELPH-MCNC: 0.2 G/DL (ref 0–0.4)
ALPHA2 GLOB SERPL ELPH-MCNC: 0.8 G/DL (ref 0.4–1)
B-GLOBULIN SERPL ELPH-MCNC: 1 G/DL (ref 0.7–1.3)
GAMMA GLOB SERPL ELPH-MCNC: 0.9 G/DL (ref 0.4–1.8)
GLOBULIN SER-MCNC: 2.9 G/DL (ref 2.2–3.9)
IGA SERPL-MCNC: 81 MG/DL (ref 64–422)
IGG SERPL-MCNC: 860 MG/DL (ref 586–1602)
IGM SERPL-MCNC: 31 MG/DL (ref 26–217)
INTERPRETATION SERPL IEP-IMP: ABNORMAL
KAPPA LC FREE SER-MCNC: 24.3 MG/L (ref 3.3–19.4)
KAPPA LC FREE/LAMBDA FREE SER: 0.78 {RATIO} (ref 0.26–1.65)
LABORATORY COMMENT REPORT: ABNORMAL
LAMBDA LC FREE SERPL-MCNC: 31.1 MG/L (ref 5.7–26.3)
M PROTEIN SERPL ELPH-MCNC: ABNORMAL G/DL
PROT SERPL-MCNC: 6.6 G/DL (ref 6–8.5)

## 2022-09-06 DIAGNOSIS — C90.00 MULTIPLE MYELOMA NOT HAVING ACHIEVED REMISSION: Primary | ICD-10-CM

## 2022-09-09 ENCOUNTER — SPECIALTY PHARMACY (OUTPATIENT)
Dept: PHARMACY | Facility: HOSPITAL | Age: 72
End: 2022-09-09

## 2022-09-09 RX ORDER — LENALIDOMIDE 10 MG/1
10 CAPSULE ORAL DAILY
Qty: 21 CAPSULE | Refills: 0 | Status: SHIPPED | OUTPATIENT
Start: 2022-09-09 | End: 2022-09-12 | Stop reason: SDUPTHER

## 2022-09-09 NOTE — PROGRESS NOTES
Refill requested per [facility] program. Per last chart note, patient to continue Revlimid 10 mg for 3 weeks on, 1 week off. Will route to MD for cosignature.    Felix Gilliam, PharmD, BCOP  9/9/2022 11:50 EDT         No

## 2022-09-12 ENCOUNTER — DOCUMENTATION (OUTPATIENT)
Dept: ONCOLOGY | Facility: CLINIC | Age: 72
End: 2022-09-12

## 2022-09-12 RX ORDER — LENALIDOMIDE 10 MG/1
10 CAPSULE ORAL DAILY
Qty: 21 CAPSULE | Refills: 0 | Status: SHIPPED | OUTPATIENT
Start: 2022-09-12 | End: 2022-10-10

## 2022-09-12 NOTE — PROGRESS NOTES
Staff message rec from [facility] Pharmacist asking for follow up with [facility] on pts Revlimid rx. The rx sent on Friday was not rec-resent on 9/12.    I contacted P[facility] FLOWERS [phone] and spoke to Fiordaliza-She confirmed the rx was rec and is ready to schedule. Pt is being contacted.     Will follow up 9/13/22 for delivery date.    Sujey Randall  Specialty Pharmacy Technician

## 2022-09-14 ENCOUNTER — TELEPHONE (OUTPATIENT)
Dept: ONCOLOGY | Facility: CLINIC | Age: 72
End: 2022-09-14

## 2022-09-14 ENCOUNTER — DOCUMENTATION (OUTPATIENT)
Dept: ONCOLOGY | Facility: CLINIC | Age: 72
End: 2022-09-14

## 2022-09-14 NOTE — TELEPHONE ENCOUNTER
Pharmacy Name: [facility]     Pharmacy representative name: JOI    Pharmacy representative phone number: [phone] REF# [id number]    What medication are you calling in regards to: REVLIMID 10 MG    What question does the pharmacy have: REQUESTING CELL GENE AUTHORIZATION #. MAY BE

## 2022-09-14 NOTE — PROGRESS NOTES
Revlimid to be delivered to pt on 22 per [facility] portal. Will check again for tracking number once shipped.    I noticed a HUB note from [facility]-They were calling the office about the [employer] Auth # being .    I contacted the number provided in the message and spoke to Luis A Pharmacist. He states he does not see any issue with the rx-it has been scheduled with pt for delivery-Ship  for delivery .    Pharmacy Name: [facility]      Pharmacy representative name: JOI     Pharmacy representative phone number: [phone] REF# [id number]     What medication are you calling in regards to: REVLIMID 10 MG     What question does the pharmacy have: REQUESTING CELL GENE AUTHORIZATION #. MAY BE        Sujey Randall  Specialty Pharmacy Technician

## 2022-09-27 ENCOUNTER — INFUSION (OUTPATIENT)
Dept: ONCOLOGY | Facility: HOSPITAL | Age: 72
End: 2022-09-27

## 2022-09-27 ENCOUNTER — APPOINTMENT (OUTPATIENT)
Dept: LAB | Facility: HOSPITAL | Age: 72
End: 2022-09-27

## 2022-09-27 ENCOUNTER — OFFICE VISIT (OUTPATIENT)
Dept: ONCOLOGY | Facility: CLINIC | Age: 72
End: 2022-09-27

## 2022-09-27 ENCOUNTER — APPOINTMENT (OUTPATIENT)
Dept: ONCOLOGY | Facility: HOSPITAL | Age: 72
End: 2022-09-27

## 2022-09-27 VITALS
HEIGHT: 67 IN | TEMPERATURE: 96.4 F | DIASTOLIC BLOOD PRESSURE: 80 MMHG | SYSTOLIC BLOOD PRESSURE: 136 MMHG | HEART RATE: 84 BPM | BODY MASS INDEX: 25.1 KG/M2 | WEIGHT: 159.9 LBS | RESPIRATION RATE: 18 BRPM | OXYGEN SATURATION: 97 %

## 2022-09-27 DIAGNOSIS — E53.8 B12 DEFICIENCY: ICD-10-CM

## 2022-09-27 DIAGNOSIS — C90.00 NEUROPATHY ASSOCIATED WITH MULTIPLE MYELOMA: ICD-10-CM

## 2022-09-27 DIAGNOSIS — G63 NEUROPATHY ASSOCIATED WITH MULTIPLE MYELOMA: ICD-10-CM

## 2022-09-27 DIAGNOSIS — C90.00 MULTIPLE MYELOMA NOT HAVING ACHIEVED REMISSION: ICD-10-CM

## 2022-09-27 DIAGNOSIS — Z79.899 HIGH RISK MEDICATION USE: ICD-10-CM

## 2022-09-27 DIAGNOSIS — C90.00 MULTIPLE MYELOMA NOT HAVING ACHIEVED REMISSION: Primary | ICD-10-CM

## 2022-09-27 LAB
ALBUMIN SERPL-MCNC: 4.2 G/DL (ref 3.5–5.2)
ALBUMIN/GLOB SERPL: 1.6 G/DL (ref 1.1–2.4)
ALP SERPL-CCNC: 66 U/L (ref 38–116)
ALT SERPL W P-5'-P-CCNC: 12 U/L (ref 0–33)
ANION GAP SERPL CALCULATED.3IONS-SCNC: 11.9 MMOL/L (ref 5–15)
AST SERPL-CCNC: 14 U/L (ref 0–32)
B2 MICROGLOB SERPL-MCNC: 2 MG/L (ref 0.8–2.2)
BASOPHILS # BLD AUTO: 0.03 10*3/MM3 (ref 0–0.2)
BASOPHILS NFR BLD AUTO: 0.7 % (ref 0–1.5)
BILIRUB SERPL-MCNC: 0.3 MG/DL (ref 0.2–1.2)
BUN SERPL-MCNC: 10 MG/DL (ref 6–20)
BUN/CREAT SERPL: 13.3 (ref 7.3–30)
CALCIUM SPEC-SCNC: 8.8 MG/DL (ref 8.5–10.2)
CHLORIDE SERPL-SCNC: 102 MMOL/L (ref 98–107)
CO2 SERPL-SCNC: 25.1 MMOL/L (ref 22–29)
CREAT SERPL-MCNC: 0.75 MG/DL (ref 0.6–1.1)
DEPRECATED RDW RBC AUTO: 50.7 FL (ref 37–54)
EGFRCR SERPLBLD CKD-EPI 2021: 84.2 ML/MIN/1.73
EOSINOPHIL # BLD AUTO: 0.1 10*3/MM3 (ref 0–0.4)
EOSINOPHIL NFR BLD AUTO: 2.3 % (ref 0.3–6.2)
ERYTHROCYTE [DISTWIDTH] IN BLOOD BY AUTOMATED COUNT: 14.2 % (ref 12.3–15.4)
GLOBULIN UR ELPH-MCNC: 2.6 GM/DL (ref 1.8–3.5)
GLUCOSE SERPL-MCNC: 105 MG/DL (ref 74–124)
HCT VFR BLD AUTO: 41.2 % (ref 34–46.6)
HGB BLD-MCNC: 13.8 G/DL (ref 12–15.9)
IMM GRANULOCYTES # BLD AUTO: 0.01 10*3/MM3 (ref 0–0.05)
IMM GRANULOCYTES NFR BLD AUTO: 0.2 % (ref 0–0.5)
LYMPHOCYTES # BLD AUTO: 2.01 10*3/MM3 (ref 0.7–3.1)
LYMPHOCYTES NFR BLD AUTO: 47.1 % (ref 19.6–45.3)
MAGNESIUM SERPL-MCNC: 1.7 MG/DL (ref 1.8–2.5)
MCH RBC QN AUTO: 32.8 PG (ref 26.6–33)
MCHC RBC AUTO-ENTMCNC: 33.5 G/DL (ref 31.5–35.7)
MCV RBC AUTO: 97.9 FL (ref 79–97)
MONOCYTES # BLD AUTO: 0.63 10*3/MM3 (ref 0.1–0.9)
MONOCYTES NFR BLD AUTO: 14.8 % (ref 5–12)
NEUTROPHILS NFR BLD AUTO: 1.49 10*3/MM3 (ref 1.7–7)
NEUTROPHILS NFR BLD AUTO: 34.9 % (ref 42.7–76)
NRBC BLD AUTO-RTO: 0 /100 WBC (ref 0–0.2)
PHOSPHATE SERPL-MCNC: 3.7 MG/DL (ref 2.5–4.5)
PLATELET # BLD AUTO: 201 10*3/MM3 (ref 140–450)
PMV BLD AUTO: 9.3 FL (ref 6–12)
POTASSIUM SERPL-SCNC: 3.7 MMOL/L (ref 3.5–4.7)
PROT SERPL-MCNC: 6.8 G/DL (ref 6.3–8)
RBC # BLD AUTO: 4.21 10*6/MM3 (ref 3.77–5.28)
SODIUM SERPL-SCNC: 139 MMOL/L (ref 134–145)
WBC NRBC COR # BLD: 4.27 10*3/MM3 (ref 3.4–10.8)

## 2022-09-27 PROCEDURE — 82232 ASSAY OF BETA-2 PROTEIN: CPT | Performed by: INTERNAL MEDICINE

## 2022-09-27 PROCEDURE — 25010000002 ZOLEDRONIC ACID 4 MG/100ML SOLUTION: Performed by: NURSE PRACTITIONER

## 2022-09-27 PROCEDURE — 84100 ASSAY OF PHOSPHORUS: CPT

## 2022-09-27 PROCEDURE — 83735 ASSAY OF MAGNESIUM: CPT

## 2022-09-27 PROCEDURE — 96374 THER/PROPH/DIAG INJ IV PUSH: CPT

## 2022-09-27 PROCEDURE — 25010000002 CYANOCOBALAMIN PER 1000 MCG: Performed by: NURSE PRACTITIONER

## 2022-09-27 PROCEDURE — 80053 COMPREHEN METABOLIC PANEL: CPT

## 2022-09-27 PROCEDURE — 99214 OFFICE O/P EST MOD 30 MIN: CPT | Performed by: NURSE PRACTITIONER

## 2022-09-27 PROCEDURE — 96375 TX/PRO/DX INJ NEW DRUG ADDON: CPT

## 2022-09-27 PROCEDURE — 96372 THER/PROPH/DIAG INJ SC/IM: CPT

## 2022-09-27 PROCEDURE — 85025 COMPLETE CBC W/AUTO DIFF WBC: CPT

## 2022-09-27 RX ORDER — ZOLEDRONIC ACID 0.04 MG/ML
4 INJECTION, SOLUTION INTRAVENOUS ONCE
Status: CANCELLED | OUTPATIENT
Start: 2022-09-27

## 2022-09-27 RX ORDER — CYANOCOBALAMIN 1000 UG/ML
1000 INJECTION, SOLUTION INTRAMUSCULAR; SUBCUTANEOUS ONCE
Status: CANCELLED | OUTPATIENT
Start: 2022-09-28

## 2022-09-27 RX ORDER — SODIUM CHLORIDE 9 MG/ML
250 INJECTION, SOLUTION INTRAVENOUS ONCE
Status: CANCELLED | OUTPATIENT
Start: 2022-09-27

## 2022-09-27 RX ORDER — CYANOCOBALAMIN 1000 UG/ML
1000 INJECTION, SOLUTION INTRAMUSCULAR; SUBCUTANEOUS ONCE
Status: COMPLETED | OUTPATIENT
Start: 2022-09-27 | End: 2022-09-27

## 2022-09-27 RX ORDER — SODIUM CHLORIDE 9 MG/ML
250 INJECTION, SOLUTION INTRAVENOUS ONCE
Status: COMPLETED | OUTPATIENT
Start: 2022-09-27 | End: 2022-09-27

## 2022-09-27 RX ORDER — ZOLEDRONIC ACID 0.04 MG/ML
4 INJECTION, SOLUTION INTRAVENOUS ONCE
Status: COMPLETED | OUTPATIENT
Start: 2022-09-27 | End: 2022-09-27

## 2022-09-27 RX ADMIN — ZOLEDRONIC ACID 4 MG: 0.04 INJECTION, SOLUTION INTRAVENOUS at 10:17

## 2022-09-27 RX ADMIN — SODIUM CHLORIDE 250 ML: 9 INJECTION, SOLUTION INTRAVENOUS at 10:16

## 2022-09-27 RX ADMIN — CYANOCOBALAMIN 1000 MCG: 1000 INJECTION, SOLUTION INTRAMUSCULAR at 10:18

## 2022-09-27 NOTE — PROGRESS NOTES
Subjective     REASON FOR FOLLOW-UP: Transfer of care for IgA kappa multiple myeloma post stem cell transplant in March 2016 at [facility] currently on maintenance Revlimid 10 mg 3 out of 4 weeks                             REQUESTING PHYSICIAN: MD Brett Turpin MD    History of Present Illness patient is a 72 y.o. female with an IgA kappa high risk myeloma post stem cell transplant on maintenance Revlimid 10 mg every 3 of 4 weeks alone after toxicity from Velcade and dexamethasone 6+ years out from stem cell transplant.  She is seen back today in 3-month follow-up, due for Zometa.  At the request of her physician, Dr. Coronado in [place] with [facility], she is continuing to have monthly paraprotein studies performed with M spike that has been undetectable and beta-2 microglobulin normal.    As she is seen back today in follow-up she states that at least for now Dr. Coronado wants her to continue monthly paraprotein studies and she believes Zometa every 3 months that we discussed she could at least go to every 4 months and she is willing to call and discuss this with him.  She has follow-up with him in May 2023.    She otherwise denies new concerns at this time.    Past Medical History:   Diagnosis Date   • Anxiety    • Depression    • Disease of thyroid gland    • Hashimoto's disease    • History of vitamin D deficiency    • Hypothyroidism    • IBS (irritable bowel syndrome)    • Mixed hyperlipidemia 3/12/2018   • Multiple myeloma (HCC) 2015    IgA kappa.  Stem cell transplant at [facility] 3/20/2016   • Myeloma (HCC)    • KWAME (obstructive sleep apnea) 07/08/2021    Overnight polysomnogram.  Weight 173 pounds.  Mild KWAME with AHI 5.6 events per hour.  When supine, 9.4 events per hour.  During REM, moderate severity at 26 events per hour.  No sleep-related hypoxia.  The patient snored 20.5% of total sleep time.   • Osteopenia    • Overweight (BMI  25.0-29.9) 2/5/2018        Past Surgical History:   Procedure Laterality Date   • BREAST BIOPSY     • CATARACT EXTRACTION     • COLONOSCOPY     • TONSILLECTOMY  1956   • VEIN SURGERY  1991      patient is a 70-year-old female with IgA kappa multiple myeloma diagnosed in mid 2015-high risk because of gain of 1 q- treatment with rev Dex Velcade initiated in 11/15-went on to stem cell transplant at [facility] in March 2016?  Melphalan.  She started post transplant therapy in May 2016 with Velcade rev Dex.  In September after 4 cycles Velcade was decreased to every other week with plans to continue rev Dex Velcade till progression because of high risk status.  In May 2019 her Velcade was discontinued because of worsening neuropathy.  Patient has remained on Revlimid 10 mg 21 out of 28 days since then with stable disease until November 2019 when a small IgG kappa paraprotein was noted on her blood tests which is distinct from her usual IgA kappa myeloma.  Restaging with PET scan was normal and since then the paraprotein as of 3/10/2020 has resolved    Patient has significant issues with anxiety and states that she had trouble getting her Revlimid in a timely fashion was very frustrated with this process at the [facility]-she states she was taken back when Dr. Hewitt suggested she transfer her care but is now happy to make a change    She is currently 2 days into her treatment cycle with Revlimid .she has no pain currently except intermittently in her neck where she has had some degenerative disease.  She does have neuropathy for which she is on fairly high doses of Cymbalta.  She is not taking her gabapentin.    Continues on acyclovir for prevention and baby aspirin because of the Revlimid  She was having trouble with diarrhea from the Revlimid but this is controlled with WelChol.    She is  and lives by herself has no family nearby but good friends.  She does not smoke or drink.  She is up-to-date with  mammography and does Cologuard instead of colonoscopy     have reviewed her labs in detail but cannot find any documentation of her transplant conditioning regimen (I assume it was melphalan)  or the depth of her response prior to transplant .  She states that the doctors at [facility] recommended to check her labs every month - she has been clinically BOZENA for 4 years and I suggested every other month testing but she is very adamant that she wants monthly testing      Current Outpatient Medications on File Prior to Visit   Medication Sig Dispense Refill   • Acetylcarn-Alpha Lipoic Acid 400-200 MG capsule Take  by mouth.     • acyclovir (ZOVIRAX) 400 MG tablet Take 1 tablet by mouth 2 (Two) Times a Day. 60 tablet 11   • ALPRAZolam (XANAX) 0.25 MG tablet Take 1 tablet by mouth Daily As Needed for Anxiety. 30 tablet 2   • aspirin 81 MG chewable tablet Chew 81 mg Daily.     • B Complex Vitamins (VITAMIN B COMPLEX) capsule capsule Take 2 tablets by mouth Daily.     • baclofen (LIORESAL) 10 MG tablet Take 10 mg by mouth 3 (Three) Times a Day.  5   • calcium carbonate-vitamin d 600-400 MG-UNIT per tablet Take 1 tablet by mouth Daily.     • colesevelam (WELCHOL) 625 MG tablet Take 2 tablets by mouth 2 (Two) Times a Day With Meals. 90 tablet 2   • cycloSPORINE (RESTASIS) 0.05 % ophthalmic emulsion 1 drop 2 (Two) Times a Day.     • diphenoxylate-atropine (LOMOTIL) 2.5-0.025 MG per tablet Take 1 tablet by mouth 3 (Three) Times a Day As Needed for Diarrhea. 90 tablet 3   • DULoxetine (CYMBALTA) 30 MG capsule Take 30 mg by mouth Daily.     • DULoxetine (CYMBALTA) 60 MG capsule Take 1 capsule by mouth Daily. 90 capsule 3   • fluticasone (FLONASE) 50 MCG/ACT nasal spray 2 sprays into each nostril Daily.     • folic acid (FOLVITE) 1 MG tablet Take 1,000 mcg by mouth Daily.  2   • gabapentin (NEURONTIN) 100 MG capsule Take 1 capsule by mouth Daily As Needed (Pain). 60 capsule 1   • HYDROcodone-acetaminophen (NORCO) 5-325 MG per  tablet Take 1 tablet by mouth Every 6 (Six) Hours As Needed for Moderate Pain  or Severe Pain . 60 tablet 0   • lenalidomide (REVLIMID) 10 MG capsule Take 1 capsule by mouth Daily. Take for 21 days on, then 7 days off, then repeat 21 capsule 0   • magnesium oxide (MAG-OX) 400 MG tablet Take 1 tablet by mouth Daily. 30 tablet 3   • meloxicam (MOBIC) 15 MG tablet Take 15 mg by mouth Daily.     • Multiple Vitamins-Iron (DAILY-JONI/IRON/BETA-CAROTENE) tablet Take 1 tablet by mouth Daily.  2   • omega-3 acid ethyl esters (LOVAZA) 1 g capsule Take 2 g by mouth.     • Synthroid 100 MCG tablet 88 mcg Daily. 100 mcg 6 days week 1/2 tablet on      • ubrogepant 100 MG tablet      • vitamin D (ERGOCALCIFEROL) 12360 units capsule capsule Take 50,000 Units by mouth 1 (One) Time Per Week. Twice a week       No current facility-administered medications on file prior to visit.        ALLERGIES:    No Known Allergies     Social History     Socioeconomic History   • Marital status:    • Number of children: 2   Tobacco Use   • Smoking status: Former Smoker     Packs/day: 0.25     Years: 2.00     Pack years: 0.50     Types: Cigarettes     Start date:      Quit date:      Years since quittin.7   • Smokeless tobacco: Never Used   • Tobacco comment: Only lightly for 2 years   Vaping Use   • Vaping Use: Never used   Substance and Sexual Activity   • Alcohol use: Not Currently     Alcohol/week: 0.0 standard drinks     Comment: Na   • Drug use: Never   • Sexual activity: Not Currently     Partners: Male     Birth control/protection: None     Comment: Na        Family History   Problem Relation Age of Onset   • Breast cancer Mother    • Sleep apnea Mother    • Lymphoma Father    • Sleep apnea Father    • Cancer Father    • Kidney cancer Paternal Grandmother    • Cancer Maternal Aunt                 Review of Systems   Constitutional: Positive for fatigue (stable).   Respiratory: Negative for cough, choking,  "chest tightness and shortness of breath.    Cardiovascular: Negative for chest pain.   Gastrointestinal: Negative for abdominal pain, constipation, nausea and vomiting.   Neurological: Positive for numbness (Den,foot/hand tingling no numbness). Negative for headaches.   Psychiatric/Behavioral: Negative for dysphoric mood. The patient is nervous/anxious (same).    All other systems reviewed and are negative.    ROS reviewed and updated 09/27/22    Objective     Vitals:    09/27/22 0921   BP: 136/80   Pulse: 84   Resp: 18   Temp: 96.4 °F (35.8 °C)   TempSrc: Temporal   SpO2: 97%   Weight: 72.5 kg (159 lb 14.4 oz)   Height: 170.2 cm (67.01\")   PainSc:   5     Current Status 9/27/2022   ECOG score 0       Physical Exam     CONSTITUTIONAL:  Vital signs reviewed.  No distress, looks comfortable.  EYES:  Conjunctiva and lids unremarkable.    RESPIRATORY:  Normal respiratory effort.  Lungs clear to auscultation bilaterally.  CARDIOVASCULAR:  Normal S1, S2.  No murmurs rubs or gallops.  No significant lower extremity edema.  EXT-no edema    I have reexamined the patient and the results are consistent with the previously documented exam. Zhane Pagan, REGINALD       RECENT LABS:  Results from last 7 days   Lab Units 09/27/22  0848   WBC 10*3/mm3 4.27   NEUTROS ABS 10*3/mm3 1.49*   HEMOGLOBIN g/dL 13.8   HEMATOCRIT % 41.2   PLATELETS 10*3/mm3 201     Results from last 7 days   Lab Units 09/27/22  0848   SODIUM mmol/L 139   POTASSIUM mmol/L 3.7   CHLORIDE mmol/L 102   CO2 mmol/L 25.1   BUN mg/dL 10   CREATININE mg/dL 0.75   CALCIUM mg/dL 8.8   ALBUMIN g/dL 4.20   BILIRUBIN mg/dL 0.3   ALK PHOS U/L 66   ALT (SGPT) U/L 12   AST (SGOT) U/L 14   GLUCOSE mg/dL 105   MAGNESIUM mg/dL 1.7*                       Assessment & Plan      1.  IgA kappa multiple myeloma diagnosed in 2015 treated with RVD  · Stem cell transplant at [facility] in 3/2016  · Maintenance treatment with Velcade rev Dex started in 5/16  · Velcade discontinued " because of neurotoxicity in 5/19  · Small IgG kappa paraprotein seen on blood work in 10/19-PET scan negative protein disappeared by 3/20  · Zometa given every 3 months   · Acyclovir and aspirin prophylaxis  · Decrease Revlimid to 10 mg 2 out of 4 weeks in 8/21  · Patient increased back to 3 out of 4 weeks with stable counts for now.    2.  Anxiety  3.  Hypothyroidism on treatment  4.  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  Diarrhea due to treatment improved with WelChol  6.  Snoring and fatigue probable sleep apnea-CPAP instituted  7.  Vaccinated against coronavirus    Plan:  1.  Continue 10 mg of Revlimid 3 out of 4 weeks  2.  Continue monthly B12 and myeloma labs WILLIAM plus beta-2 microglobulin  3.  Zometa every 3 months, due today (today is being given 1 day shy of 3 months as it was given in July 1 day late due to the holiday and we now will get her back on preferred Tuesday scheduling).  4.  We have discussed again today the idea of going to every 2-month lab work for paraprotein studies and pushing Zometa every 4 months.  She was encouraged at the last visit to discuss this with Dr. Coronado but she has not yet done this.  She states he wants at least 2 monthly checks until he sees her in May of next year.  She is willing to reach out to him regarding the interval of Zometa delivery however and will let us know at her next follow-up.    This patient is on high risk drug therapy requiring intensive monitoring for toxicity.

## 2022-09-28 ENCOUNTER — SPECIALTY PHARMACY (OUTPATIENT)
Dept: PHARMACY | Facility: HOSPITAL | Age: 72
End: 2022-09-28

## 2022-09-28 NOTE — PROGRESS NOTES
MTM Encounter-Re: Adherence and side effects (Revlimid)    Today's encounter was conducted via telephone.    Medication:  Revlimid 10 mg daily for 21 days on, then 7 days off    - Reason for outreach: Routine medication check-in .  - Administration: Patient is taking every day at the same time  and as prescribed .  - Missed doses: Patient reports missing 0 doses in the last 30 days.  - Self-administration: Patient demonstrates ability to self-administer medication. No barriers to adherence identified.   - Diagnosis/Indication: Multiple Myeloma. Progress toward achieving therapeutic goals reviewed.   - Patient denies side effects, aside from fatigue and arthralgias, which is manageable and not bothersome to patient. Advised patient to alert office if this changes.    - Medication availability/affordability: Patient has had no issues obtaining medication from pharmacy.   - Questions/concerns about medications: none       Completed medication reconciliation today to assess for drug interactions.   Reviewed allergies, medical history, labs, quality of life, and medication history with patient.   Patient denies starting or stopping any medications.  Assessed medication list for interactions, no significant drug interactions noted.   Advised pt to call the clinic if any new medications are started so we can assess for drug-drug interactions.     All questions addressed. Patient had no additional concerns for MTM office.     Brandi Gilliam RPH  9/28/2022  15:53 EDT

## 2022-09-29 LAB
ALBUMIN SERPL ELPH-MCNC: 3.8 G/DL (ref 2.9–4.4)
ALBUMIN/GLOB SERPL: 1.4 {RATIO} (ref 0.7–1.7)
ALPHA1 GLOB SERPL ELPH-MCNC: 0.2 G/DL (ref 0–0.4)
ALPHA2 GLOB SERPL ELPH-MCNC: 0.7 G/DL (ref 0.4–1)
B-GLOBULIN SERPL ELPH-MCNC: 1 G/DL (ref 0.7–1.3)
GAMMA GLOB SERPL ELPH-MCNC: 0.9 G/DL (ref 0.4–1.8)
GLOBULIN SER-MCNC: 2.8 G/DL (ref 2.2–3.9)
IGA SERPL-MCNC: 86 MG/DL (ref 64–422)
IGG SERPL-MCNC: 893 MG/DL (ref 586–1602)
IGM SERPL-MCNC: 33 MG/DL (ref 26–217)
INTERPRETATION SERPL IEP-IMP: ABNORMAL
KAPPA LC FREE SER-MCNC: 28.5 MG/L (ref 3.3–19.4)
KAPPA LC FREE/LAMBDA FREE SER: 0.9 {RATIO} (ref 0.26–1.65)
LABORATORY COMMENT REPORT: ABNORMAL
LAMBDA LC FREE SERPL-MCNC: 31.5 MG/L (ref 5.7–26.3)
M PROTEIN SERPL ELPH-MCNC: ABNORMAL G/DL
PROT SERPL-MCNC: 6.6 G/DL (ref 6–8.5)

## 2022-09-29 RX ORDER — GABAPENTIN 100 MG/1
100 CAPSULE ORAL 2 TIMES DAILY PRN
Qty: 60 CAPSULE | Refills: 0 | Status: SHIPPED | OUTPATIENT
Start: 2022-09-29

## 2022-10-06 ENCOUNTER — SPECIALTY PHARMACY (OUTPATIENT)
Dept: PHARMACY | Facility: HOSPITAL | Age: 72
End: 2022-10-06

## 2022-10-06 NOTE — PROGRESS NOTES
Received an email from [facility] stating that a prescription for Revlimid that was received on 9/12/22 wasn't dispensed.  I called to confirm this information.  Pt actually had two prescriptions called in to [facility]. One of 9/9/22 and one on 9/12/22 per representative.  The one on 9/9/22 was discontinued and not sent out.  The one received on 9/12/22 was the one actually sent out to pt.

## 2022-10-10 RX ORDER — LENALIDOMIDE 10 MG/1
CAPSULE ORAL
Qty: 21 CAPSULE | Refills: 0 | Status: SHIPPED | OUTPATIENT
Start: 2022-10-10 | End: 2022-11-15

## 2022-10-10 NOTE — TELEPHONE ENCOUNTER
Refill requested from pharmacy. Per last chart note, patient to continue 10 mg of Revlimid 3 out of 4 weeks. Will route to MD for cosignature.    Felix Gilliam, PharmD, [facility]  10/10/2022 10:11 EDT

## 2022-10-25 ENCOUNTER — INFUSION (OUTPATIENT)
Dept: ONCOLOGY | Facility: HOSPITAL | Age: 72
End: 2022-10-25

## 2022-10-25 ENCOUNTER — LAB (OUTPATIENT)
Dept: LAB | Facility: HOSPITAL | Age: 72
End: 2022-10-25

## 2022-10-25 DIAGNOSIS — G63 NEUROPATHY ASSOCIATED WITH MULTIPLE MYELOMA: ICD-10-CM

## 2022-10-25 DIAGNOSIS — C90.00 MULTIPLE MYELOMA NOT HAVING ACHIEVED REMISSION: ICD-10-CM

## 2022-10-25 DIAGNOSIS — G63 NEUROPATHY ASSOCIATED WITH MULTIPLE MYELOMA: Primary | ICD-10-CM

## 2022-10-25 DIAGNOSIS — C90.00 NEUROPATHY ASSOCIATED WITH MULTIPLE MYELOMA: Primary | ICD-10-CM

## 2022-10-25 DIAGNOSIS — C90.00 NEUROPATHY ASSOCIATED WITH MULTIPLE MYELOMA: ICD-10-CM

## 2022-10-25 LAB
ALBUMIN SERPL-MCNC: 4.1 G/DL (ref 3.5–5.2)
ALBUMIN/GLOB SERPL: 1.6 G/DL (ref 1.1–2.4)
ALP SERPL-CCNC: 73 U/L (ref 38–116)
ALT SERPL W P-5'-P-CCNC: 8 U/L (ref 0–33)
ANION GAP SERPL CALCULATED.3IONS-SCNC: 11.3 MMOL/L (ref 5–15)
AST SERPL-CCNC: 14 U/L (ref 0–32)
B2 MICROGLOB SERPL-MCNC: 2.3 MG/L (ref 0.8–2.2)
BASOPHILS # BLD AUTO: 0.05 10*3/MM3 (ref 0–0.2)
BASOPHILS NFR BLD AUTO: 0.9 % (ref 0–1.5)
BILIRUB SERPL-MCNC: 0.3 MG/DL (ref 0.2–1.2)
BUN SERPL-MCNC: 8 MG/DL (ref 6–20)
BUN/CREAT SERPL: 10.1 (ref 7.3–30)
CALCIUM SPEC-SCNC: 9 MG/DL (ref 8.5–10.2)
CHLORIDE SERPL-SCNC: 102 MMOL/L (ref 98–107)
CO2 SERPL-SCNC: 24.7 MMOL/L (ref 22–29)
CREAT SERPL-MCNC: 0.79 MG/DL (ref 0.6–1.1)
DEPRECATED RDW RBC AUTO: 51.3 FL (ref 37–54)
EGFRCR SERPLBLD CKD-EPI 2021: 79.1 ML/MIN/1.73
EOSINOPHIL # BLD AUTO: 0.11 10*3/MM3 (ref 0–0.4)
EOSINOPHIL NFR BLD AUTO: 2 % (ref 0.3–6.2)
ERYTHROCYTE [DISTWIDTH] IN BLOOD BY AUTOMATED COUNT: 14.2 % (ref 12.3–15.4)
GLOBULIN UR ELPH-MCNC: 2.6 GM/DL (ref 1.8–3.5)
GLUCOSE SERPL-MCNC: 99 MG/DL (ref 74–124)
HCT VFR BLD AUTO: 38.3 % (ref 34–46.6)
HGB BLD-MCNC: 12.9 G/DL (ref 12–15.9)
IMM GRANULOCYTES # BLD AUTO: 0.01 10*3/MM3 (ref 0–0.05)
IMM GRANULOCYTES NFR BLD AUTO: 0.2 % (ref 0–0.5)
LYMPHOCYTES # BLD AUTO: 1.95 10*3/MM3 (ref 0.7–3.1)
LYMPHOCYTES NFR BLD AUTO: 34.7 % (ref 19.6–45.3)
MAGNESIUM SERPL-MCNC: 1.7 MG/DL (ref 1.8–2.5)
MCH RBC QN AUTO: 32.8 PG (ref 26.6–33)
MCHC RBC AUTO-ENTMCNC: 33.7 G/DL (ref 31.5–35.7)
MCV RBC AUTO: 97.5 FL (ref 79–97)
MONOCYTES # BLD AUTO: 0.86 10*3/MM3 (ref 0.1–0.9)
MONOCYTES NFR BLD AUTO: 15.3 % (ref 5–12)
NEUTROPHILS NFR BLD AUTO: 2.64 10*3/MM3 (ref 1.7–7)
NEUTROPHILS NFR BLD AUTO: 46.9 % (ref 42.7–76)
NRBC BLD AUTO-RTO: 0 /100 WBC (ref 0–0.2)
PLATELET # BLD AUTO: 161 10*3/MM3 (ref 140–450)
PMV BLD AUTO: 8.8 FL (ref 6–12)
POTASSIUM SERPL-SCNC: 4.4 MMOL/L (ref 3.5–4.7)
PROT SERPL-MCNC: 6.7 G/DL (ref 6.3–8)
RBC # BLD AUTO: 3.93 10*6/MM3 (ref 3.77–5.28)
SODIUM SERPL-SCNC: 138 MMOL/L (ref 134–145)
WBC NRBC COR # BLD: 5.62 10*3/MM3 (ref 3.4–10.8)

## 2022-10-25 PROCEDURE — 83735 ASSAY OF MAGNESIUM: CPT

## 2022-10-25 PROCEDURE — 96372 THER/PROPH/DIAG INJ SC/IM: CPT

## 2022-10-25 PROCEDURE — 85025 COMPLETE CBC W/AUTO DIFF WBC: CPT

## 2022-10-25 PROCEDURE — 80053 COMPREHEN METABOLIC PANEL: CPT

## 2022-10-25 PROCEDURE — 82232 ASSAY OF BETA-2 PROTEIN: CPT | Performed by: INTERNAL MEDICINE

## 2022-10-25 PROCEDURE — 36415 COLL VENOUS BLD VENIPUNCTURE: CPT

## 2022-10-25 PROCEDURE — 25010000002 CYANOCOBALAMIN PER 1000 MCG: Performed by: NURSE PRACTITIONER

## 2022-10-25 RX ORDER — CYANOCOBALAMIN 1000 UG/ML
1000 INJECTION, SOLUTION INTRAMUSCULAR; SUBCUTANEOUS ONCE
Status: COMPLETED | OUTPATIENT
Start: 2022-10-25 | End: 2022-10-25

## 2022-10-25 RX ADMIN — CYANOCOBALAMIN 1000 MCG: 1000 INJECTION, SOLUTION INTRAMUSCULAR at 09:08

## 2022-10-26 LAB
ALBUMIN SERPL ELPH-MCNC: 3.4 G/DL (ref 2.9–4.4)
ALBUMIN/GLOB SERPL: 1.2 {RATIO} (ref 0.7–1.7)
ALPHA1 GLOB SERPL ELPH-MCNC: 0.3 G/DL (ref 0–0.4)
ALPHA2 GLOB SERPL ELPH-MCNC: 0.8 G/DL (ref 0.4–1)
B-GLOBULIN SERPL ELPH-MCNC: 1 G/DL (ref 0.7–1.3)
GAMMA GLOB SERPL ELPH-MCNC: 0.8 G/DL (ref 0.4–1.8)
GLOBULIN SER-MCNC: 3 G/DL (ref 2.2–3.9)
IGA SERPL-MCNC: 82 MG/DL (ref 64–422)
IGG SERPL-MCNC: 805 MG/DL (ref 586–1602)
IGM SERPL-MCNC: 47 MG/DL (ref 26–217)
INTERPRETATION SERPL IEP-IMP: ABNORMAL
KAPPA LC FREE SER-MCNC: 24.3 MG/L (ref 3.3–19.4)
KAPPA LC FREE/LAMBDA FREE SER: 0.91 {RATIO} (ref 0.26–1.65)
LABORATORY COMMENT REPORT: ABNORMAL
LAMBDA LC FREE SERPL-MCNC: 26.7 MG/L (ref 5.7–26.3)
M PROTEIN SERPL ELPH-MCNC: ABNORMAL G/DL
PROT SERPL-MCNC: 6.4 G/DL (ref 6–8.5)

## 2022-11-15 RX ORDER — LENALIDOMIDE 10 MG/1
CAPSULE ORAL
Qty: 21 CAPSULE | Refills: 0 | Status: SHIPPED | OUTPATIENT
Start: 2022-11-15 | End: 2022-12-12

## 2022-11-15 NOTE — TELEPHONE ENCOUNTER
Refill requested from pharmacy. Per last chart note, patient to continue 10 mg of Revlimid 3 out of 4 weeks. Will route to MD for cosignature.    Felix Gilliam, PharmD, [facility]  11/15/2022 10:28 EST

## 2022-11-22 ENCOUNTER — INFUSION (OUTPATIENT)
Dept: ONCOLOGY | Facility: HOSPITAL | Age: 72
End: 2022-11-22

## 2022-11-22 ENCOUNTER — LAB (OUTPATIENT)
Dept: LAB | Facility: HOSPITAL | Age: 72
End: 2022-11-22

## 2022-11-22 DIAGNOSIS — G63 NEUROPATHY ASSOCIATED WITH MULTIPLE MYELOMA: ICD-10-CM

## 2022-11-22 DIAGNOSIS — G63 NEUROPATHY ASSOCIATED WITH MULTIPLE MYELOMA: Primary | ICD-10-CM

## 2022-11-22 DIAGNOSIS — C90.00 MULTIPLE MYELOMA NOT HAVING ACHIEVED REMISSION: ICD-10-CM

## 2022-11-22 DIAGNOSIS — C90.00 NEUROPATHY ASSOCIATED WITH MULTIPLE MYELOMA: Primary | ICD-10-CM

## 2022-11-22 DIAGNOSIS — C90.00 NEUROPATHY ASSOCIATED WITH MULTIPLE MYELOMA: ICD-10-CM

## 2022-11-22 DIAGNOSIS — Z79.899 HIGH RISK MEDICATION USE: ICD-10-CM

## 2022-11-22 LAB
ALBUMIN SERPL-MCNC: 4.2 G/DL (ref 3.5–5.2)
ALBUMIN/GLOB SERPL: 1.7 G/DL (ref 1.1–2.4)
ALP SERPL-CCNC: 64 U/L (ref 38–116)
ALT SERPL W P-5'-P-CCNC: 12 U/L (ref 0–33)
ANION GAP SERPL CALCULATED.3IONS-SCNC: 9.8 MMOL/L (ref 5–15)
AST SERPL-CCNC: 17 U/L (ref 0–32)
B2 MICROGLOB SERPL-MCNC: 1.8 MG/L (ref 0.8–2.2)
BASOPHILS # BLD AUTO: 0.05 10*3/MM3 (ref 0–0.2)
BASOPHILS NFR BLD AUTO: 1.3 % (ref 0–1.5)
BILIRUB SERPL-MCNC: 0.3 MG/DL (ref 0.2–1.2)
BUN SERPL-MCNC: 10 MG/DL (ref 6–20)
BUN/CREAT SERPL: 11.9 (ref 7.3–30)
CALCIUM SPEC-SCNC: 9.3 MG/DL (ref 8.5–10.2)
CHLORIDE SERPL-SCNC: 102 MMOL/L (ref 98–107)
CO2 SERPL-SCNC: 26.2 MMOL/L (ref 22–29)
CREAT SERPL-MCNC: 0.84 MG/DL (ref 0.6–1.1)
DEPRECATED RDW RBC AUTO: 52.1 FL (ref 37–54)
EGFRCR SERPLBLD CKD-EPI 2021: 73.5 ML/MIN/1.73
EOSINOPHIL # BLD AUTO: 0.11 10*3/MM3 (ref 0–0.4)
EOSINOPHIL NFR BLD AUTO: 2.8 % (ref 0.3–6.2)
ERYTHROCYTE [DISTWIDTH] IN BLOOD BY AUTOMATED COUNT: 14.4 % (ref 12.3–15.4)
GLOBULIN UR ELPH-MCNC: 2.5 GM/DL (ref 1.8–3.5)
GLUCOSE SERPL-MCNC: 102 MG/DL (ref 74–124)
HCT VFR BLD AUTO: 38.5 % (ref 34–46.6)
HGB BLD-MCNC: 12.9 G/DL (ref 12–15.9)
IMM GRANULOCYTES # BLD AUTO: 0 10*3/MM3 (ref 0–0.05)
IMM GRANULOCYTES NFR BLD AUTO: 0 % (ref 0–0.5)
LYMPHOCYTES # BLD AUTO: 1.79 10*3/MM3 (ref 0.7–3.1)
LYMPHOCYTES NFR BLD AUTO: 45.5 % (ref 19.6–45.3)
MAGNESIUM SERPL-MCNC: 2 MG/DL (ref 1.8–2.5)
MCH RBC QN AUTO: 32.9 PG (ref 26.6–33)
MCHC RBC AUTO-ENTMCNC: 33.5 G/DL (ref 31.5–35.7)
MCV RBC AUTO: 98.2 FL (ref 79–97)
MONOCYTES # BLD AUTO: 0.59 10*3/MM3 (ref 0.1–0.9)
MONOCYTES NFR BLD AUTO: 15 % (ref 5–12)
NEUTROPHILS NFR BLD AUTO: 1.39 10*3/MM3 (ref 1.7–7)
NEUTROPHILS NFR BLD AUTO: 35.4 % (ref 42.7–76)
NRBC BLD AUTO-RTO: 0 /100 WBC (ref 0–0.2)
PLATELET # BLD AUTO: 248 10*3/MM3 (ref 140–450)
PMV BLD AUTO: 8.9 FL (ref 6–12)
POTASSIUM SERPL-SCNC: 3.9 MMOL/L (ref 3.5–4.7)
PROT SERPL-MCNC: 6.7 G/DL (ref 6.3–8)
RBC # BLD AUTO: 3.92 10*6/MM3 (ref 3.77–5.28)
SODIUM SERPL-SCNC: 138 MMOL/L (ref 134–145)
WBC NRBC COR # BLD: 3.93 10*3/MM3 (ref 3.4–10.8)

## 2022-11-22 PROCEDURE — 82232 ASSAY OF BETA-2 PROTEIN: CPT | Performed by: INTERNAL MEDICINE

## 2022-11-22 PROCEDURE — 96372 THER/PROPH/DIAG INJ SC/IM: CPT

## 2022-11-22 PROCEDURE — 83735 ASSAY OF MAGNESIUM: CPT

## 2022-11-22 PROCEDURE — 36415 COLL VENOUS BLD VENIPUNCTURE: CPT

## 2022-11-22 PROCEDURE — 80053 COMPREHEN METABOLIC PANEL: CPT

## 2022-11-22 PROCEDURE — 85025 COMPLETE CBC W/AUTO DIFF WBC: CPT

## 2022-11-22 PROCEDURE — 25010000002 CYANOCOBALAMIN PER 1000 MCG: Performed by: INTERNAL MEDICINE

## 2022-11-22 RX ORDER — CYANOCOBALAMIN 1000 UG/ML
1000 INJECTION, SOLUTION INTRAMUSCULAR; SUBCUTANEOUS ONCE
Status: COMPLETED | OUTPATIENT
Start: 2022-11-22 | End: 2022-11-22

## 2022-11-22 RX ADMIN — CYANOCOBALAMIN 1000 MCG: 1000 INJECTION, SOLUTION INTRAMUSCULAR at 08:53

## 2022-11-23 LAB
ALBUMIN SERPL ELPH-MCNC: 3.5 G/DL (ref 2.9–4.4)
ALBUMIN/GLOB SERPL: 1.4 {RATIO} (ref 0.7–1.7)
ALPHA1 GLOB SERPL ELPH-MCNC: 0.2 G/DL (ref 0–0.4)
ALPHA2 GLOB SERPL ELPH-MCNC: 0.7 G/DL (ref 0.4–1)
B-GLOBULIN SERPL ELPH-MCNC: 0.9 G/DL (ref 0.7–1.3)
GAMMA GLOB SERPL ELPH-MCNC: 0.8 G/DL (ref 0.4–1.8)
GLOBULIN SER-MCNC: 2.6 G/DL (ref 2.2–3.9)
IGA SERPL-MCNC: 74 MG/DL (ref 64–422)
IGG SERPL-MCNC: 824 MG/DL (ref 586–1602)
IGM SERPL-MCNC: 44 MG/DL (ref 26–217)
INTERPRETATION SERPL IEP-IMP: ABNORMAL
KAPPA LC FREE SER-MCNC: 24.3 MG/L (ref 3.3–19.4)
KAPPA LC FREE/LAMBDA FREE SER: 1.02 {RATIO} (ref 0.26–1.65)
LABORATORY COMMENT REPORT: ABNORMAL
LAMBDA LC FREE SERPL-MCNC: 23.8 MG/L (ref 5.7–26.3)
M PROTEIN SERPL ELPH-MCNC: ABNORMAL G/DL
PROT SERPL-MCNC: 6.1 G/DL (ref 6–8.5)

## 2022-12-12 RX ORDER — LENALIDOMIDE 10 MG/1
CAPSULE ORAL
Qty: 21 CAPSULE | Refills: 0 | Status: SHIPPED | OUTPATIENT
Start: 2022-12-12 | End: 2023-01-09

## 2022-12-12 NOTE — TELEPHONE ENCOUNTER
Refill requested from pharmacy. Per last chart note, patient to continue 10 mg of Revlimid 3 out of 4 weeks. Will route to MD for cosignature.    Felix Gilliam, PharmD, [facility]  12/12/2022 12:10 EST

## 2022-12-21 ENCOUNTER — INFUSION (OUTPATIENT)
Dept: ONCOLOGY | Facility: HOSPITAL | Age: 72
End: 2022-12-21
Payer: MEDICARE

## 2022-12-21 ENCOUNTER — OFFICE VISIT (OUTPATIENT)
Dept: ONCOLOGY | Facility: CLINIC | Age: 72
End: 2022-12-21

## 2022-12-21 ENCOUNTER — APPOINTMENT (OUTPATIENT)
Dept: LAB | Facility: HOSPITAL | Age: 72
End: 2022-12-21
Payer: MEDICARE

## 2022-12-21 VITALS
DIASTOLIC BLOOD PRESSURE: 70 MMHG | TEMPERATURE: 97.3 F | SYSTOLIC BLOOD PRESSURE: 102 MMHG | HEIGHT: 67 IN | HEART RATE: 90 BPM | BODY MASS INDEX: 24.88 KG/M2 | WEIGHT: 158.5 LBS | RESPIRATION RATE: 16 BRPM | OXYGEN SATURATION: 96 %

## 2022-12-21 DIAGNOSIS — G63 NEUROPATHY ASSOCIATED WITH MULTIPLE MYELOMA: ICD-10-CM

## 2022-12-21 DIAGNOSIS — C90.00 NEUROPATHY ASSOCIATED WITH MULTIPLE MYELOMA: ICD-10-CM

## 2022-12-21 DIAGNOSIS — Z79.899 HIGH RISK MEDICATION USE: ICD-10-CM

## 2022-12-21 DIAGNOSIS — C90.00 NEUROPATHY ASSOCIATED WITH MULTIPLE MYELOMA: Primary | ICD-10-CM

## 2022-12-21 DIAGNOSIS — G63 NEUROPATHY ASSOCIATED WITH MULTIPLE MYELOMA: Primary | ICD-10-CM

## 2022-12-21 DIAGNOSIS — C90.00 MULTIPLE MYELOMA NOT HAVING ACHIEVED REMISSION: ICD-10-CM

## 2022-12-21 DIAGNOSIS — C90.00 MULTIPLE MYELOMA NOT HAVING ACHIEVED REMISSION: Primary | ICD-10-CM

## 2022-12-21 LAB
ALBUMIN SERPL-MCNC: 3.9 G/DL (ref 3.5–5.2)
ALBUMIN/GLOB SERPL: 1.4 G/DL (ref 1.1–2.4)
ALP SERPL-CCNC: 65 U/L (ref 38–116)
ALT SERPL W P-5'-P-CCNC: 11 U/L (ref 0–33)
ANION GAP SERPL CALCULATED.3IONS-SCNC: 12.5 MMOL/L (ref 5–15)
AST SERPL-CCNC: 15 U/L (ref 0–32)
B2 MICROGLOB SERPL-MCNC: 1.8 MG/L (ref 0.8–2.2)
BASOPHILS # BLD AUTO: 0.05 10*3/MM3 (ref 0–0.2)
BASOPHILS NFR BLD AUTO: 1.1 % (ref 0–1.5)
BILIRUB SERPL-MCNC: <0.2 MG/DL (ref 0.2–1.2)
BUN SERPL-MCNC: 10 MG/DL (ref 6–20)
BUN/CREAT SERPL: 11.6 (ref 7.3–30)
CALCIUM SPEC-SCNC: 9.2 MG/DL (ref 8.5–10.2)
CHLORIDE SERPL-SCNC: 103 MMOL/L (ref 98–107)
CO2 SERPL-SCNC: 24.5 MMOL/L (ref 22–29)
CREAT SERPL-MCNC: 0.86 MG/DL (ref 0.6–1.1)
DEPRECATED RDW RBC AUTO: 52.1 FL (ref 37–54)
EGFRCR SERPLBLD CKD-EPI 2021: 71.4 ML/MIN/1.73
EOSINOPHIL # BLD AUTO: 0.12 10*3/MM3 (ref 0–0.4)
EOSINOPHIL NFR BLD AUTO: 2.7 % (ref 0.3–6.2)
ERYTHROCYTE [DISTWIDTH] IN BLOOD BY AUTOMATED COUNT: 14.3 % (ref 12.3–15.4)
GLOBULIN UR ELPH-MCNC: 2.7 GM/DL (ref 1.8–3.5)
GLUCOSE SERPL-MCNC: 122 MG/DL (ref 74–124)
HCT VFR BLD AUTO: 38.4 % (ref 34–46.6)
HGB BLD-MCNC: 12.5 G/DL (ref 12–15.9)
IMM GRANULOCYTES # BLD AUTO: 0 10*3/MM3 (ref 0–0.05)
IMM GRANULOCYTES NFR BLD AUTO: 0 % (ref 0–0.5)
LYMPHOCYTES # BLD AUTO: 1.83 10*3/MM3 (ref 0.7–3.1)
LYMPHOCYTES NFR BLD AUTO: 40.9 % (ref 19.6–45.3)
MAGNESIUM SERPL-MCNC: 1.8 MG/DL (ref 1.8–2.5)
MCH RBC QN AUTO: 32.4 PG (ref 26.6–33)
MCHC RBC AUTO-ENTMCNC: 32.6 G/DL (ref 31.5–35.7)
MCV RBC AUTO: 99.5 FL (ref 79–97)
MONOCYTES # BLD AUTO: 0.72 10*3/MM3 (ref 0.1–0.9)
MONOCYTES NFR BLD AUTO: 16.1 % (ref 5–12)
NEUTROPHILS NFR BLD AUTO: 1.75 10*3/MM3 (ref 1.7–7)
NEUTROPHILS NFR BLD AUTO: 39.2 % (ref 42.7–76)
NRBC BLD AUTO-RTO: 0 /100 WBC (ref 0–0.2)
PHOSPHATE SERPL-MCNC: 3.5 MG/DL (ref 2.5–4.5)
PLATELET # BLD AUTO: 259 10*3/MM3 (ref 140–450)
PMV BLD AUTO: 9.1 FL (ref 6–12)
POTASSIUM SERPL-SCNC: 4.2 MMOL/L (ref 3.5–4.7)
PROT SERPL-MCNC: 6.6 G/DL (ref 6.3–8)
RBC # BLD AUTO: 3.86 10*6/MM3 (ref 3.77–5.28)
SODIUM SERPL-SCNC: 140 MMOL/L (ref 134–145)
WBC NRBC COR # BLD: 4.47 10*3/MM3 (ref 3.4–10.8)

## 2022-12-21 PROCEDURE — 85025 COMPLETE CBC W/AUTO DIFF WBC: CPT

## 2022-12-21 PROCEDURE — 80053 COMPREHEN METABOLIC PANEL: CPT

## 2022-12-21 PROCEDURE — 25010000002 ZOLEDRONIC ACID 4 MG/100ML SOLUTION: Performed by: INTERNAL MEDICINE

## 2022-12-21 PROCEDURE — 83735 ASSAY OF MAGNESIUM: CPT

## 2022-12-21 PROCEDURE — 25010000002 CYANOCOBALAMIN PER 1000 MCG: Performed by: INTERNAL MEDICINE

## 2022-12-21 PROCEDURE — 96372 THER/PROPH/DIAG INJ SC/IM: CPT

## 2022-12-21 PROCEDURE — 99214 OFFICE O/P EST MOD 30 MIN: CPT | Performed by: INTERNAL MEDICINE

## 2022-12-21 PROCEDURE — 82232 ASSAY OF BETA-2 PROTEIN: CPT | Performed by: INTERNAL MEDICINE

## 2022-12-21 PROCEDURE — 84100 ASSAY OF PHOSPHORUS: CPT

## 2022-12-21 PROCEDURE — 96374 THER/PROPH/DIAG INJ IV PUSH: CPT

## 2022-12-21 RX ORDER — ZOLEDRONIC ACID 0.04 MG/ML
4 INJECTION, SOLUTION INTRAVENOUS ONCE
Status: COMPLETED | OUTPATIENT
Start: 2022-12-21 | End: 2022-12-21

## 2022-12-21 RX ORDER — LEVOTHYROXINE SODIUM 88 MCG
TABLET ORAL
COMMUNITY
Start: 2022-10-31

## 2022-12-21 RX ORDER — FEXOFENADINE HCL 180 MG/1
180 TABLET ORAL AS NEEDED
COMMUNITY
Start: 2022-10-01

## 2022-12-21 RX ORDER — VALACYCLOVIR HYDROCHLORIDE 1 G/1
TABLET, FILM COATED ORAL AS NEEDED
COMMUNITY
Start: 2022-12-05

## 2022-12-21 RX ORDER — AMITRIPTYLINE HYDROCHLORIDE 25 MG/1
TABLET, FILM COATED ORAL
COMMUNITY
Start: 2022-10-01

## 2022-12-21 RX ORDER — CYANOCOBALAMIN 1000 UG/ML
1000 INJECTION, SOLUTION INTRAMUSCULAR; SUBCUTANEOUS ONCE
Status: COMPLETED | OUTPATIENT
Start: 2022-12-21 | End: 2022-12-21

## 2022-12-21 RX ORDER — SODIUM CHLORIDE 9 MG/ML
250 INJECTION, SOLUTION INTRAVENOUS ONCE
Status: COMPLETED | OUTPATIENT
Start: 2022-12-21 | End: 2022-12-21

## 2022-12-21 RX ORDER — DEXTROMETHORPHAN HYDROBROMIDE AND PROMETHAZINE HYDROCHLORIDE 15; 6.25 MG/5ML; MG/5ML
SYRUP ORAL
COMMUNITY
Start: 2022-10-25

## 2022-12-21 RX ADMIN — CYANOCOBALAMIN 1000 MCG: 1000 INJECTION, SOLUTION INTRAMUSCULAR at 16:22

## 2022-12-21 RX ADMIN — ZOLEDRONIC ACID 4 MG: 0.04 INJECTION, SOLUTION INTRAVENOUS at 16:17

## 2022-12-21 RX ADMIN — SODIUM CHLORIDE 250 ML: 9 INJECTION, SOLUTION INTRAVENOUS at 16:10

## 2022-12-21 NOTE — PROGRESS NOTES
Subjective     REASON FOR FOLLOW-UP: Transfer of care for IgA kappa multiple myeloma post stem cell transplant in March 2016 at [facility] currently on maintenance Revlimid 10 mg 3 out of 4 weeks                             REQUESTING PHYSICIAN: MD Brett Turpin MD    History of Present Illness patient is a 72 y.o. female with an IgA kappa high risk myeloma post stem cell transplant on maintenance Revlimid 10 mg every 3 of 4 weeks  after toxicity from Velcade and dexamethasone 6+ years out from stem cell transplant.  She is seen back today in 3-month follow-up, due for Zometa.  At the request of her physician, Dr. Coronado in [place] with [facility], she is continuing to have monthly paraprotein studies performed with M spike that has been undetectable and beta-2 microglobulin normal.    As she is seen back today in follow-up she states that at least for now Dr. Coronado wants her to continue monthly paraprotein studies and she believes Zometa every 3 months that we discussed she could at least go to every 4 months and she is willing to call and discuss this with him.  She has follow-up with him in May 2023.    She otherwise denies new concerns at this time.    She has been a little bit difficult to deal with for our staff and I encouraged her to move her lab work to less often    Past Medical History:   Diagnosis Date   • Anxiety    • Depression    • Disease of thyroid gland    • Hashimoto's disease    • History of vitamin D deficiency    • Hypothyroidism    • IBS (irritable bowel syndrome)    • Mixed hyperlipidemia 3/12/2018   • Multiple myeloma (HCC) 2015    IgA kappa.  Stem cell transplant at [facility] 3/20/2016   • Myeloma (HCC)    • KWAME (obstructive sleep apnea) 07/08/2021    Overnight polysomnogram.  Weight 173 pounds.  Mild KWAME with AHI 5.6 events per hour.  When supine, 9.4 events per hour.  During REM, moderate severity at 26 events per hour.   No sleep-related hypoxia.  The patient snored 20.5% of total sleep time.   • Osteopenia    • Overweight (BMI 25.0-29.9) 2/5/2018        Past Surgical History:   Procedure Laterality Date   • BREAST BIOPSY     • CATARACT EXTRACTION     • COLONOSCOPY     • TONSILLECTOMY  1956   • VEIN SURGERY  1991      patient is a 70-year-old female with IgA kappa multiple myeloma diagnosed in mid 2015-high risk because of gain of 1 q- treatment with rev Dex Velcade initiated in 11/15-went on to stem cell transplant at [facility] in March 2016?  Melphalan.  She started post transplant therapy in May 2016 with Velcade rev Dex.  In September after 4 cycles Velcade was decreased to every other week with plans to continue rev Dex Velcade till progression because of high risk status.  In May 2019 her Velcade was discontinued because of worsening neuropathy.  Patient has remained on Revlimid 10 mg 21 out of 28 days since then with stable disease until November 2019 when a small IgG kappa paraprotein was noted on her blood tests which is distinct from her usual IgA kappa myeloma.  Restaging with PET scan was normal and since then the paraprotein as of 3/10/2020 has resolved    Patient has significant issues with anxiety and states that she had trouble getting her Revlimid in a timely fashion was very frustrated with this process at the [place] system-she states she was taken back when Dr. Hewitt suggested she transfer her care but is now happy to make a change    She is currently 2 days into her treatment cycle with Revlimid .she has no pain currently except intermittently in her neck where she has had some degenerative disease.  She does have neuropathy for which she is on fairly high doses of Cymbalta.  She is not taking her gabapentin.    Continues on acyclovir for prevention and baby aspirin because of the Revlimid  She was having trouble with diarrhea from the Revlimid but this is controlled with WelChol.    She is  and lives  by herself has no family nearby but good friends.  She does not smoke or drink.  She is up-to-date with mammography and does Cologuard instead of colonoscopy     have reviewed her labs in detail but cannot find any documentation of her transplant conditioning regimen (I assume it was melphalan)  or the depth of her response prior to transplant .  She states that the doctors at [facility] recommended to check her labs every month - she has been clinically BOZENA for 4 years and I suggested every other month testing but she is very adamant that she wants monthly testing      Current Outpatient Medications on File Prior to Visit   Medication Sig Dispense Refill   • Acetylcarn-Alpha Lipoic Acid 400-200 MG capsule Take  by mouth.     • ALPRAZolam (XANAX) 0.25 MG tablet Take 1 tablet by mouth Daily As Needed for Anxiety. 30 tablet 2   • amitriptyline (ELAVIL) 25 MG tablet TAKE 1-2 TABLET BY MOUTH EVERY NIGHT AS NEEDED FOR HEADACHES     • aspirin 81 MG chewable tablet Chew 81 mg Daily.     • B Complex Vitamins (VITAMIN B COMPLEX) capsule capsule Take 2 tablets by mouth Daily.     • baclofen (LIORESAL) 10 MG tablet Take 10 mg by mouth 3 (Three) Times a Day.  5   • calcium carbonate-vitamin d 600-400 MG-UNIT per tablet Take 1 tablet by mouth Daily.     • colesevelam (WELCHOL) 625 MG tablet Take 2 tablets by mouth 2 (Two) Times a Day With Meals. 90 tablet 2   • cycloSPORINE (RESTASIS) 0.05 % ophthalmic emulsion 1 drop 2 (Two) Times a Day.     • diphenoxylate-atropine (LOMOTIL) 2.5-0.025 MG per tablet Take 1 tablet by mouth 3 (Three) Times a Day As Needed for Diarrhea. 90 tablet 3   • DULoxetine (CYMBALTA) 30 MG capsule Take 30 mg by mouth Daily.     • DULoxetine (CYMBALTA) 60 MG capsule Take 1 capsule by mouth Daily. 90 capsule 3   • fexofenadine (ALLEGRA) 180 MG tablet Take 180 mg by mouth As Needed.     • fluticasone (FLONASE) 50 MCG/ACT nasal spray 2 sprays into each nostril Daily.     • folic acid (FOLVITE) 1 MG tablet Take  1,000 mcg by mouth Daily.  2   • gabapentin (NEURONTIN) 100 MG capsule Take 1 capsule by mouth 2 (Two) Times a Day As Needed (Pain). 60 capsule 0   • HYDROcodone-acetaminophen (NORCO) 5-325 MG per tablet Take 1 tablet by mouth Every 6 (Six) Hours As Needed for Moderate Pain  or Severe Pain . 60 tablet 0   • lenalidomide (Revlimid) 10 MG capsule TAKE 1 CAPSULE DAILY FOR 21 DAYS ON, THEN 7 DAYS OFF THEN REPEAT 21 capsule 0   • magnesium oxide (MAG-OX) 400 MG tablet Take 1 tablet by mouth Daily. 30 tablet 3   • meloxicam (MOBIC) 15 MG tablet Take 15 mg by mouth Daily.     • Multiple Vitamins-Iron (DAILY-JONI/IRON/BETA-CAROTENE) tablet Take 1 tablet by mouth Daily.  2   • omega-3 acid ethyl esters (LOVAZA) 1 g capsule Take 2 g by mouth.     • promethazine-dextromethorphan (PROMETHAZINE-DM) 6.25-15 MG/5ML syrup TAKE 10 ML BY MOUTH TWICE A DAY AS NEEDED FOR COUGH     • Synthroid 88 MCG tablet      • ubrogepant 100 MG tablet As Needed.     • valACYclovir (VALTREX) 1000 MG tablet As Needed.     • vitamin D (ERGOCALCIFEROL) 27958 units capsule capsule Take 50,000 Units by mouth 1 (One) Time Per Week. Twice a week     • acyclovir (ZOVIRAX) 400 MG tablet Take 1 tablet by mouth 2 (Two) Times a Day. 60 tablet 11   • [DISCONTINUED] Synthroid 100 MCG tablet 88 mcg Daily. 100 mcg 6 days week 1/2 tablet on        No current facility-administered medications on file prior to visit.        ALLERGIES:    No Known Allergies     Social History     Socioeconomic History   • Marital status:    • Number of children: 2   Tobacco Use   • Smoking status: Former     Packs/day: 0.25     Years: 2.00     Pack years: 0.50     Types: Cigarettes     Start date:      Quit date:      Years since quittin.0   • Smokeless tobacco: Never   • Tobacco comments:     Only lightly for 2 years   Vaping Use   • Vaping Use: Never used   Substance and Sexual Activity   • Alcohol use: Not Currently     Alcohol/week: 0.0 standard drinks      "Comment: Na   • Drug use: Never   • Sexual activity: Not Currently     Partners: Male     Birth control/protection: None     Comment: Na        Family History   Problem Relation Age of Onset   • Breast cancer Mother    • Sleep apnea Mother    • Lymphoma Father    • Sleep apnea Father    • Cancer Father    • Kidney cancer Paternal Grandmother    • Cancer Maternal Aunt                 Review of Systems   Constitutional: Positive for fatigue (stable).   Respiratory: Negative for cough, choking, chest tightness and shortness of breath.    Cardiovascular: Negative for chest pain.   Gastrointestinal: Negative for abdominal pain, constipation, nausea and vomiting.   Neurological: Positive for numbness (Den,foot/hand tingling no numbness). Negative for headaches (Migraine headaches worse since C-spine surgery).   Psychiatric/Behavioral: Negative for dysphoric mood. The patient is nervous/anxious (same).    All other systems reviewed and are negative.    ROS reviewed and updated 22    Objective     Vitals:    22 1518   BP: 102/70   Pulse: 90   Resp: 16   Temp: 97.3 °F (36.3 °C)   TempSrc: Temporal   SpO2: 96%   Weight: 71.9 kg (158 lb 8 oz)   Height: 170.2 cm (67.01\")   PainSc:   7   PainLoc: Generalized     Current Status 2022   ECOG score 0       Physical Exam     CONSTITUTIONAL:  Vital signs reviewed.  No distress, looks comfortable.  EYES:  Conjunctiva and lids unremarkable.    RESPIRATORY:  Normal respiratory effort.  Lungs clear to auscultation bilaterally.  CARDIOVASCULAR:  Normal S1, S2.  No murmurs rubs or gallops.  No significant lower extremity edema.  EXT-no edema    I have reexamined the patient and the results are consistent with the previously documented exam. Yoni Garcia MD       RECENT LABS:  Results from last 7 days   Lab Units 22  1442   WBC 10*3/mm3 4.47   NEUTROS ABS 10*3/mm3 1.75   HEMOGLOBIN g/dL 12.5   HEMATOCRIT % 38.4   PLATELETS 10*3/mm3 259     Results from " last 7 days   Lab Units 12/21/22  1442   SODIUM mmol/L 140   POTASSIUM mmol/L 4.2   CHLORIDE mmol/L 103   CO2 mmol/L 24.5   BUN mg/dL 10   CREATININE mg/dL 0.86   CALCIUM mg/dL 9.2   ALBUMIN g/dL 3.90   BILIRUBIN mg/dL <0.2*   ALK PHOS U/L 65   ALT (SGPT) U/L 11   AST (SGOT) U/L 15   GLUCOSE mg/dL 122   MAGNESIUM mg/dL 1.8                       Assessment & Plan      1.  IgA kappa multiple myeloma diagnosed in 2015 treated with RVD  · Stem cell transplant at [facility] in 3/2016  · Maintenance treatment with Velcade rev Dex started in 5/16  · Velcade discontinued because of neurotoxicity in 5/19  · Small IgG kappa paraprotein seen on blood work in 10/19-PET scan negative protein disappeared by 3/20  · Zometa given every 3 months   · Acyclovir and aspirin prophylaxis  · Decrease Revlimid to 10 mg 2 out of 4 weeks in 8/21  · Patient increased back to 3 out of 4 weeks with stable counts for now.  · Myeloma parameters stable as of 11/22    2.  Anxiety  3.  Hypothyroidism on treatment  4.  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  Diarrhea due to treatment improved with WelChol  6.  Snoring and fatigue probable sleep apnea-CPAP instituted  7.  Vaccinated against coronavirus    Plan:  1.  Continue 10 mg of Revlimid 3 out of 4 weeks  2.  Continue monthly B12 and myeloma labs WILLIAM plus beta-2 microglobulin  3.  Zometa every 3 months, due today  4.  We have discussed again today the idea of going to every 2-month lab work for paraprotein studies and pushing Zometa every 4 months.  She was encouraged at the last visit to discuss this with Dr. Coronado but she has not yet done this.  She states he wants at least 2 monthly checks until he sees her in May of next year.  She is willing to reach out to him regarding the interval of Zometa delivery however and will let us know at her next follow-up.    This patient is on high risk drug therapy requiring intensive monitoring for toxicity.

## 2022-12-23 LAB
ALBUMIN SERPL ELPH-MCNC: 3.5 G/DL (ref 2.9–4.4)
ALBUMIN/GLOB SERPL: 1.3 {RATIO} (ref 0.7–1.7)
ALPHA1 GLOB SERPL ELPH-MCNC: 0.2 G/DL (ref 0–0.4)
ALPHA2 GLOB SERPL ELPH-MCNC: 0.8 G/DL (ref 0.4–1)
B-GLOBULIN SERPL ELPH-MCNC: 0.9 G/DL (ref 0.7–1.3)
GAMMA GLOB SERPL ELPH-MCNC: 0.8 G/DL (ref 0.4–1.8)
GLOBULIN SER-MCNC: 2.8 G/DL (ref 2.2–3.9)
IGA SERPL-MCNC: 79 MG/DL (ref 64–422)
IGG SERPL-MCNC: 829 MG/DL (ref 586–1602)
IGM SERPL-MCNC: 35 MG/DL (ref 26–217)
INTERPRETATION SERPL IEP-IMP: ABNORMAL
KAPPA LC FREE SER-MCNC: 20.7 MG/L (ref 3.3–19.4)
KAPPA LC FREE/LAMBDA FREE SER: 0.86 {RATIO} (ref 0.26–1.65)
LABORATORY COMMENT REPORT: ABNORMAL
LAMBDA LC FREE SERPL-MCNC: 24.1 MG/L (ref 5.7–26.3)
M PROTEIN SERPL ELPH-MCNC: ABNORMAL G/DL
PROT SERPL-MCNC: 6.3 G/DL (ref 6–8.5)

## 2023-01-09 RX ORDER — LENALIDOMIDE 10 MG/1
CAPSULE ORAL
Qty: 21 CAPSULE | Refills: 0 | Status: SHIPPED | OUTPATIENT
Start: 2023-01-09 | End: 2023-02-16 | Stop reason: SDUPTHER

## 2023-01-09 NOTE — TELEPHONE ENCOUNTER
Refill requested from pharmacy. Per last chart note, patient to continue 10 mg of Revlimid 3 out of 4 weeks. Will route to MD for cosignature.    Felix Gilliam, PharmD, [facility]  1/9/2023 09:13 EST

## 2023-01-17 ENCOUNTER — LAB (OUTPATIENT)
Dept: LAB | Facility: HOSPITAL | Age: 73
End: 2023-01-17
Payer: MEDICARE

## 2023-01-17 ENCOUNTER — INFUSION (OUTPATIENT)
Dept: ONCOLOGY | Facility: HOSPITAL | Age: 73
End: 2023-01-17
Payer: MEDICARE

## 2023-01-17 DIAGNOSIS — C90.00 MULTIPLE MYELOMA NOT HAVING ACHIEVED REMISSION: ICD-10-CM

## 2023-01-17 DIAGNOSIS — C90.00 NEUROPATHY ASSOCIATED WITH MULTIPLE MYELOMA: ICD-10-CM

## 2023-01-17 DIAGNOSIS — G63 NEUROPATHY ASSOCIATED WITH MULTIPLE MYELOMA: ICD-10-CM

## 2023-01-17 DIAGNOSIS — C90.00 NEUROPATHY ASSOCIATED WITH MULTIPLE MYELOMA: Primary | ICD-10-CM

## 2023-01-17 DIAGNOSIS — G63 NEUROPATHY ASSOCIATED WITH MULTIPLE MYELOMA: Primary | ICD-10-CM

## 2023-01-17 LAB
ALBUMIN SERPL-MCNC: 3.9 G/DL (ref 3.5–5.2)
ALBUMIN/GLOB SERPL: 1.4 G/DL (ref 1.1–2.4)
ALP SERPL-CCNC: 63 U/L (ref 38–116)
ALT SERPL W P-5'-P-CCNC: 7 U/L (ref 0–33)
ANION GAP SERPL CALCULATED.3IONS-SCNC: 11.2 MMOL/L (ref 5–15)
AST SERPL-CCNC: 15 U/L (ref 0–32)
B2 MICROGLOB SERPL-MCNC: 1.9 MG/L (ref 0.8–2.2)
BASOPHILS # BLD AUTO: 0.04 10*3/MM3 (ref 0–0.2)
BASOPHILS NFR BLD AUTO: 1.1 % (ref 0–1.5)
BILIRUB SERPL-MCNC: 0.3 MG/DL (ref 0.2–1.2)
BUN SERPL-MCNC: 8 MG/DL (ref 6–20)
BUN/CREAT SERPL: 10.8 (ref 7.3–30)
CALCIUM SPEC-SCNC: 9 MG/DL (ref 8.5–10.2)
CHLORIDE SERPL-SCNC: 101 MMOL/L (ref 98–107)
CO2 SERPL-SCNC: 26.8 MMOL/L (ref 22–29)
CREAT SERPL-MCNC: 0.74 MG/DL (ref 0.6–1.1)
DEPRECATED RDW RBC AUTO: 51.9 FL (ref 37–54)
EGFRCR SERPLBLD CKD-EPI 2021: 85.6 ML/MIN/1.73
EOSINOPHIL # BLD AUTO: 0.08 10*3/MM3 (ref 0–0.4)
EOSINOPHIL NFR BLD AUTO: 2.2 % (ref 0.3–6.2)
ERYTHROCYTE [DISTWIDTH] IN BLOOD BY AUTOMATED COUNT: 14.2 % (ref 12.3–15.4)
GLOBULIN UR ELPH-MCNC: 2.7 GM/DL (ref 1.8–3.5)
GLUCOSE SERPL-MCNC: 113 MG/DL (ref 74–124)
HCT VFR BLD AUTO: 36.2 % (ref 34–46.6)
HGB BLD-MCNC: 12.3 G/DL (ref 12–15.9)
IMM GRANULOCYTES # BLD AUTO: 0.01 10*3/MM3 (ref 0–0.05)
IMM GRANULOCYTES NFR BLD AUTO: 0.3 % (ref 0–0.5)
LYMPHOCYTES # BLD AUTO: 1.65 10*3/MM3 (ref 0.7–3.1)
LYMPHOCYTES NFR BLD AUTO: 45.2 % (ref 19.6–45.3)
MAGNESIUM SERPL-MCNC: 1.9 MG/DL (ref 1.8–2.5)
MCH RBC QN AUTO: 33.7 PG (ref 26.6–33)
MCHC RBC AUTO-ENTMCNC: 34 G/DL (ref 31.5–35.7)
MCV RBC AUTO: 99.2 FL (ref 79–97)
MONOCYTES # BLD AUTO: 0.46 10*3/MM3 (ref 0.1–0.9)
MONOCYTES NFR BLD AUTO: 12.6 % (ref 5–12)
NEUTROPHILS NFR BLD AUTO: 1.41 10*3/MM3 (ref 1.7–7)
NEUTROPHILS NFR BLD AUTO: 38.6 % (ref 42.7–76)
NRBC BLD AUTO-RTO: 0 /100 WBC (ref 0–0.2)
PLATELET # BLD AUTO: 248 10*3/MM3 (ref 140–450)
PMV BLD AUTO: 8.8 FL (ref 6–12)
POTASSIUM SERPL-SCNC: 3.9 MMOL/L (ref 3.5–4.7)
PROT SERPL-MCNC: 6.6 G/DL (ref 6.3–8)
RBC # BLD AUTO: 3.65 10*6/MM3 (ref 3.77–5.28)
SODIUM SERPL-SCNC: 139 MMOL/L (ref 134–145)
WBC NRBC COR # BLD: 3.65 10*3/MM3 (ref 3.4–10.8)

## 2023-01-17 PROCEDURE — 36415 COLL VENOUS BLD VENIPUNCTURE: CPT

## 2023-01-17 PROCEDURE — 25010000002 CYANOCOBALAMIN PER 1000 MCG: Performed by: INTERNAL MEDICINE

## 2023-01-17 PROCEDURE — 85025 COMPLETE CBC W/AUTO DIFF WBC: CPT

## 2023-01-17 PROCEDURE — 83735 ASSAY OF MAGNESIUM: CPT

## 2023-01-17 PROCEDURE — 96372 THER/PROPH/DIAG INJ SC/IM: CPT

## 2023-01-17 PROCEDURE — 82232 ASSAY OF BETA-2 PROTEIN: CPT | Performed by: INTERNAL MEDICINE

## 2023-01-17 PROCEDURE — 80053 COMPREHEN METABOLIC PANEL: CPT

## 2023-01-17 RX ORDER — CYANOCOBALAMIN 1000 UG/ML
1000 INJECTION, SOLUTION INTRAMUSCULAR; SUBCUTANEOUS ONCE
Status: COMPLETED | OUTPATIENT
Start: 2023-01-17 | End: 2023-01-17

## 2023-01-17 RX ADMIN — CYANOCOBALAMIN 1000 MCG: 1000 INJECTION, SOLUTION INTRAMUSCULAR at 09:21

## 2023-01-18 LAB
ALBUMIN SERPL ELPH-MCNC: 3.4 G/DL (ref 2.9–4.4)
ALBUMIN/GLOB SERPL: 1.3 {RATIO} (ref 0.7–1.7)
ALPHA1 GLOB SERPL ELPH-MCNC: 0.2 G/DL (ref 0–0.4)
ALPHA2 GLOB SERPL ELPH-MCNC: 0.8 G/DL (ref 0.4–1)
B-GLOBULIN SERPL ELPH-MCNC: 0.9 G/DL (ref 0.7–1.3)
GAMMA GLOB SERPL ELPH-MCNC: 0.7 G/DL (ref 0.4–1.8)
GLOBULIN SER-MCNC: 2.7 G/DL (ref 2.2–3.9)
IGA SERPL-MCNC: 76 MG/DL (ref 64–422)
IGG SERPL-MCNC: 802 MG/DL (ref 586–1602)
IGM SERPL-MCNC: 29 MG/DL (ref 26–217)
INTERPRETATION SERPL IEP-IMP: ABNORMAL
KAPPA LC FREE SER-MCNC: 22.1 MG/L (ref 3.3–19.4)
KAPPA LC FREE/LAMBDA FREE SER: 0.98 {RATIO} (ref 0.26–1.65)
LABORATORY COMMENT REPORT: ABNORMAL
LAMBDA LC FREE SERPL-MCNC: 22.6 MG/L (ref 5.7–26.3)
M PROTEIN SERPL ELPH-MCNC: ABNORMAL G/DL
PROT SERPL-MCNC: 6.1 G/DL (ref 6–8.5)

## 2023-01-19 ENCOUNTER — APPOINTMENT (OUTPATIENT)
Dept: ONCOLOGY | Facility: HOSPITAL | Age: 73
End: 2023-01-19
Payer: MEDICARE

## 2023-01-19 ENCOUNTER — APPOINTMENT (OUTPATIENT)
Dept: LAB | Facility: HOSPITAL | Age: 73
End: 2023-01-19
Payer: MEDICARE

## 2023-02-15 DIAGNOSIS — C90.00 MULTIPLE MYELOMA NOT HAVING ACHIEVED REMISSION: Primary | ICD-10-CM

## 2023-02-16 ENCOUNTER — INFUSION (OUTPATIENT)
Dept: ONCOLOGY | Facility: HOSPITAL | Age: 73
End: 2023-02-16
Payer: MEDICARE

## 2023-02-16 ENCOUNTER — SPECIALTY PHARMACY (OUTPATIENT)
Dept: PHARMACY | Facility: HOSPITAL | Age: 73
End: 2023-02-16
Payer: MEDICARE

## 2023-02-16 ENCOUNTER — LAB (OUTPATIENT)
Dept: LAB | Facility: HOSPITAL | Age: 73
End: 2023-02-16
Payer: MEDICARE

## 2023-02-16 DIAGNOSIS — G63 NEUROPATHY ASSOCIATED WITH MULTIPLE MYELOMA: Primary | ICD-10-CM

## 2023-02-16 DIAGNOSIS — C90.00 NEUROPATHY ASSOCIATED WITH MULTIPLE MYELOMA: Primary | ICD-10-CM

## 2023-02-16 DIAGNOSIS — C90.00 MULTIPLE MYELOMA NOT HAVING ACHIEVED REMISSION: ICD-10-CM

## 2023-02-16 LAB
ALBUMIN SERPL-MCNC: 4.1 G/DL (ref 3.5–5.2)
ALBUMIN/GLOB SERPL: 1.7 G/DL (ref 1.1–2.4)
ALP SERPL-CCNC: 60 U/L (ref 38–116)
ALT SERPL W P-5'-P-CCNC: 9 U/L (ref 0–33)
ANION GAP SERPL CALCULATED.3IONS-SCNC: 12.1 MMOL/L (ref 5–15)
AST SERPL-CCNC: 15 U/L (ref 0–32)
B2 MICROGLOB SERPL-MCNC: 1.9 MG/L (ref 0.8–2.2)
BASOPHILS # BLD AUTO: 0.07 10*3/MM3 (ref 0–0.2)
BASOPHILS NFR BLD AUTO: 1.9 % (ref 0–1.5)
BILIRUB SERPL-MCNC: 0.3 MG/DL (ref 0.2–1.2)
BUN SERPL-MCNC: 10 MG/DL (ref 6–20)
BUN/CREAT SERPL: 12.7 (ref 7.3–30)
CALCIUM SPEC-SCNC: 8.7 MG/DL (ref 8.5–10.2)
CHLORIDE SERPL-SCNC: 104 MMOL/L (ref 98–107)
CO2 SERPL-SCNC: 23.9 MMOL/L (ref 22–29)
CREAT SERPL-MCNC: 0.79 MG/DL (ref 0.6–1.1)
DEPRECATED RDW RBC AUTO: 52.3 FL (ref 37–54)
EGFRCR SERPLBLD CKD-EPI 2021: 79.1 ML/MIN/1.73
EOSINOPHIL # BLD AUTO: 0.18 10*3/MM3 (ref 0–0.4)
EOSINOPHIL NFR BLD AUTO: 4.9 % (ref 0.3–6.2)
ERYTHROCYTE [DISTWIDTH] IN BLOOD BY AUTOMATED COUNT: 14.3 % (ref 12.3–15.4)
GLOBULIN UR ELPH-MCNC: 2.4 GM/DL (ref 1.8–3.5)
GLUCOSE SERPL-MCNC: 101 MG/DL (ref 74–124)
HCT VFR BLD AUTO: 38.8 % (ref 34–46.6)
HGB BLD-MCNC: 12.4 G/DL (ref 12–15.9)
IMM GRANULOCYTES # BLD AUTO: 0 10*3/MM3 (ref 0–0.05)
IMM GRANULOCYTES NFR BLD AUTO: 0 % (ref 0–0.5)
LYMPHOCYTES # BLD AUTO: 1.64 10*3/MM3 (ref 0.7–3.1)
LYMPHOCYTES NFR BLD AUTO: 44.8 % (ref 19.6–45.3)
MAGNESIUM SERPL-MCNC: 1.9 MG/DL (ref 1.8–2.5)
MCH RBC QN AUTO: 32 PG (ref 26.6–33)
MCHC RBC AUTO-ENTMCNC: 32 G/DL (ref 31.5–35.7)
MCV RBC AUTO: 100.3 FL (ref 79–97)
MONOCYTES # BLD AUTO: 0.59 10*3/MM3 (ref 0.1–0.9)
MONOCYTES NFR BLD AUTO: 16.1 % (ref 5–12)
NEUTROPHILS NFR BLD AUTO: 1.18 10*3/MM3 (ref 1.7–7)
NEUTROPHILS NFR BLD AUTO: 32.3 % (ref 42.7–76)
NRBC BLD AUTO-RTO: 0 /100 WBC (ref 0–0.2)
PLATELET # BLD AUTO: 233 10*3/MM3 (ref 140–450)
PMV BLD AUTO: 8.5 FL (ref 6–12)
POTASSIUM SERPL-SCNC: 4.1 MMOL/L (ref 3.5–4.7)
PROT SERPL-MCNC: 6.5 G/DL (ref 6.3–8)
RBC # BLD AUTO: 3.87 10*6/MM3 (ref 3.77–5.28)
SODIUM SERPL-SCNC: 140 MMOL/L (ref 134–145)
WBC NRBC COR # BLD: 3.66 10*3/MM3 (ref 3.4–10.8)

## 2023-02-16 PROCEDURE — 82232 ASSAY OF BETA-2 PROTEIN: CPT | Performed by: INTERNAL MEDICINE

## 2023-02-16 PROCEDURE — 85025 COMPLETE CBC W/AUTO DIFF WBC: CPT

## 2023-02-16 PROCEDURE — 96372 THER/PROPH/DIAG INJ SC/IM: CPT

## 2023-02-16 PROCEDURE — 25010000002 CYANOCOBALAMIN PER 1000 MCG: Performed by: INTERNAL MEDICINE

## 2023-02-16 PROCEDURE — 80053 COMPREHEN METABOLIC PANEL: CPT

## 2023-02-16 PROCEDURE — 36415 COLL VENOUS BLD VENIPUNCTURE: CPT

## 2023-02-16 PROCEDURE — 83735 ASSAY OF MAGNESIUM: CPT

## 2023-02-16 RX ORDER — CYANOCOBALAMIN 1000 UG/ML
1000 INJECTION, SOLUTION INTRAMUSCULAR; SUBCUTANEOUS ONCE
Status: COMPLETED | OUTPATIENT
Start: 2023-02-16 | End: 2023-02-16

## 2023-02-16 RX ORDER — LENALIDOMIDE 10 MG/1
CAPSULE ORAL
Qty: 21 CAPSULE | Refills: 0 | Status: SHIPPED | OUTPATIENT
Start: 2023-02-16 | End: 2023-03-16

## 2023-02-16 RX ADMIN — CYANOCOBALAMIN 1000 MCG: 1000 INJECTION, SOLUTION INTRAMUSCULAR at 08:55

## 2023-02-16 NOTE — PROGRESS NOTES
Refill requested from pharmacy. Per last chart note, patient to continue 10 mg of Revlimid 3 out of 4 weeks. Will route to MD for cosignature.    Felix Gilliam, PharmD, [facility]  2/16/2023 13:04 EST

## 2023-02-17 LAB
ALBUMIN SERPL ELPH-MCNC: 3.3 G/DL (ref 2.9–4.4)
ALBUMIN/GLOB SERPL: 1.2 {RATIO} (ref 0.7–1.7)
ALPHA1 GLOB SERPL ELPH-MCNC: 0.2 G/DL (ref 0–0.4)
ALPHA2 GLOB SERPL ELPH-MCNC: 0.8 G/DL (ref 0.4–1)
B-GLOBULIN SERPL ELPH-MCNC: 0.9 G/DL (ref 0.7–1.3)
GAMMA GLOB SERPL ELPH-MCNC: 0.8 G/DL (ref 0.4–1.8)
GLOBULIN SER-MCNC: 2.8 G/DL (ref 2.2–3.9)
IGA SERPL-MCNC: 85 MG/DL (ref 64–422)
IGG SERPL-MCNC: 851 MG/DL (ref 586–1602)
IGM SERPL-MCNC: 26 MG/DL (ref 26–217)
INTERPRETATION SERPL IEP-IMP: ABNORMAL
KAPPA LC FREE SER-MCNC: 27 MG/L (ref 3.3–19.4)
KAPPA LC FREE/LAMBDA FREE SER: 1.07 {RATIO} (ref 0.26–1.65)
LABORATORY COMMENT REPORT: ABNORMAL
LAMBDA LC FREE SERPL-MCNC: 25.2 MG/L (ref 5.7–26.3)
M PROTEIN SERPL ELPH-MCNC: ABNORMAL G/DL
PROT SERPL-MCNC: 6.1 G/DL (ref 6–8.5)

## 2023-03-16 ENCOUNTER — SPECIALTY PHARMACY (OUTPATIENT)
Dept: PHARMACY | Facility: HOSPITAL | Age: 73
End: 2023-03-16
Payer: MEDICARE

## 2023-03-16 ENCOUNTER — INFUSION (OUTPATIENT)
Dept: ONCOLOGY | Facility: HOSPITAL | Age: 73
End: 2023-03-16
Payer: MEDICARE

## 2023-03-16 ENCOUNTER — APPOINTMENT (OUTPATIENT)
Dept: ONCOLOGY | Facility: HOSPITAL | Age: 73
End: 2023-03-16
Payer: MEDICARE

## 2023-03-16 VITALS
DIASTOLIC BLOOD PRESSURE: 82 MMHG | RESPIRATION RATE: 16 BRPM | TEMPERATURE: 97.1 F | BODY MASS INDEX: 24.18 KG/M2 | SYSTOLIC BLOOD PRESSURE: 130 MMHG | WEIGHT: 154.4 LBS | HEART RATE: 80 BPM | OXYGEN SATURATION: 97 %

## 2023-03-16 DIAGNOSIS — C90.00 MULTIPLE MYELOMA NOT HAVING ACHIEVED REMISSION: Primary | ICD-10-CM

## 2023-03-16 DIAGNOSIS — G63 NEUROPATHY ASSOCIATED WITH MULTIPLE MYELOMA: ICD-10-CM

## 2023-03-16 DIAGNOSIS — C90.00 NEUROPATHY ASSOCIATED WITH MULTIPLE MYELOMA: ICD-10-CM

## 2023-03-16 LAB
ALBUMIN SERPL-MCNC: 4.1 G/DL (ref 3.5–5.2)
ALBUMIN/GLOB SERPL: 1.6 G/DL (ref 1.1–2.4)
ALP SERPL-CCNC: 62 U/L (ref 38–116)
ALT SERPL W P-5'-P-CCNC: 13 U/L (ref 0–33)
ANION GAP SERPL CALCULATED.3IONS-SCNC: 10.8 MMOL/L (ref 5–15)
AST SERPL-CCNC: 14 U/L (ref 0–32)
B2 MICROGLOB SERPL-MCNC: 1.9 MG/L (ref 0.8–2.2)
BASOPHILS # BLD AUTO: 0.03 10*3/MM3 (ref 0–0.2)
BASOPHILS NFR BLD AUTO: 0.7 % (ref 0–1.5)
BILIRUB SERPL-MCNC: 0.2 MG/DL (ref 0.2–1.2)
BUN SERPL-MCNC: 15 MG/DL (ref 6–20)
BUN/CREAT SERPL: 18.3 (ref 7.3–30)
CALCIUM SPEC-SCNC: 8.7 MG/DL (ref 8.5–10.2)
CHLORIDE SERPL-SCNC: 100 MMOL/L (ref 98–107)
CO2 SERPL-SCNC: 27.2 MMOL/L (ref 22–29)
CREAT SERPL-MCNC: 0.82 MG/DL (ref 0.6–1.1)
DEPRECATED RDW RBC AUTO: 50.2 FL (ref 37–54)
EGFRCR SERPLBLD CKD-EPI 2021: 75.2 ML/MIN/1.73
EOSINOPHIL # BLD AUTO: 0.19 10*3/MM3 (ref 0–0.4)
EOSINOPHIL NFR BLD AUTO: 4.4 % (ref 0.3–6.2)
ERYTHROCYTE [DISTWIDTH] IN BLOOD BY AUTOMATED COUNT: 13.9 % (ref 12.3–15.4)
GLOBULIN UR ELPH-MCNC: 2.5 GM/DL (ref 1.8–3.5)
GLUCOSE SERPL-MCNC: 121 MG/DL (ref 74–124)
HCT VFR BLD AUTO: 36.6 % (ref 34–46.6)
HGB BLD-MCNC: 12.3 G/DL (ref 12–15.9)
IMM GRANULOCYTES # BLD AUTO: 0 10*3/MM3 (ref 0–0.05)
IMM GRANULOCYTES NFR BLD AUTO: 0 % (ref 0–0.5)
LYMPHOCYTES # BLD AUTO: 1.59 10*3/MM3 (ref 0.7–3.1)
LYMPHOCYTES NFR BLD AUTO: 36.9 % (ref 19.6–45.3)
MAGNESIUM SERPL-MCNC: 2.1 MG/DL (ref 1.8–2.5)
MCH RBC QN AUTO: 33.1 PG (ref 26.6–33)
MCHC RBC AUTO-ENTMCNC: 33.6 G/DL (ref 31.5–35.7)
MCV RBC AUTO: 98.4 FL (ref 79–97)
MONOCYTES # BLD AUTO: 0.38 10*3/MM3 (ref 0.1–0.9)
MONOCYTES NFR BLD AUTO: 8.8 % (ref 5–12)
NEUTROPHILS NFR BLD AUTO: 2.12 10*3/MM3 (ref 1.7–7)
NEUTROPHILS NFR BLD AUTO: 49.2 % (ref 42.7–76)
NRBC BLD AUTO-RTO: 0 /100 WBC (ref 0–0.2)
PHOSPHATE SERPL-MCNC: 3.5 MG/DL (ref 2.5–4.5)
PLATELET # BLD AUTO: 222 10*3/MM3 (ref 140–450)
PMV BLD AUTO: 9.3 FL (ref 6–12)
POTASSIUM SERPL-SCNC: 4.3 MMOL/L (ref 3.5–4.7)
PROT SERPL-MCNC: 6.6 G/DL (ref 6.3–8)
RBC # BLD AUTO: 3.72 10*6/MM3 (ref 3.77–5.28)
SODIUM SERPL-SCNC: 138 MMOL/L (ref 134–145)
WBC NRBC COR # BLD: 4.31 10*3/MM3 (ref 3.4–10.8)

## 2023-03-16 PROCEDURE — 25010000002 ZOLEDRONIC ACID 4 MG/100ML SOLUTION: Performed by: INTERNAL MEDICINE

## 2023-03-16 PROCEDURE — 96372 THER/PROPH/DIAG INJ SC/IM: CPT

## 2023-03-16 PROCEDURE — 96374 THER/PROPH/DIAG INJ IV PUSH: CPT

## 2023-03-16 PROCEDURE — 84100 ASSAY OF PHOSPHORUS: CPT

## 2023-03-16 PROCEDURE — 25010000002 CYANOCOBALAMIN PER 1000 MCG: Performed by: INTERNAL MEDICINE

## 2023-03-16 PROCEDURE — 83735 ASSAY OF MAGNESIUM: CPT

## 2023-03-16 PROCEDURE — 85025 COMPLETE CBC W/AUTO DIFF WBC: CPT

## 2023-03-16 PROCEDURE — 82232 ASSAY OF BETA-2 PROTEIN: CPT | Performed by: INTERNAL MEDICINE

## 2023-03-16 PROCEDURE — 80053 COMPREHEN METABOLIC PANEL: CPT

## 2023-03-16 RX ORDER — LENALIDOMIDE 10 MG/1
CAPSULE ORAL
Qty: 21 CAPSULE | Refills: 0 | Status: SHIPPED | OUTPATIENT
Start: 2023-03-16

## 2023-03-16 RX ORDER — SODIUM CHLORIDE 9 MG/ML
250 INJECTION, SOLUTION INTRAVENOUS ONCE
Status: COMPLETED | OUTPATIENT
Start: 2023-03-16 | End: 2023-03-16

## 2023-03-16 RX ORDER — CYANOCOBALAMIN 1000 UG/ML
1000 INJECTION, SOLUTION INTRAMUSCULAR; SUBCUTANEOUS ONCE
Status: COMPLETED | OUTPATIENT
Start: 2023-03-16 | End: 2023-03-16

## 2023-03-16 RX ORDER — ZOLEDRONIC ACID 0.04 MG/ML
4 INJECTION, SOLUTION INTRAVENOUS ONCE
Status: COMPLETED | OUTPATIENT
Start: 2023-03-16 | End: 2023-03-16

## 2023-03-16 RX ADMIN — SODIUM CHLORIDE 250 ML: 9 INJECTION, SOLUTION INTRAVENOUS at 14:10

## 2023-03-16 RX ADMIN — ZOLEDRONIC ACID 4 MG: 0.04 INJECTION, SOLUTION INTRAVENOUS at 14:10

## 2023-03-16 RX ADMIN — CYANOCOBALAMIN 1000 MCG: 1000 INJECTION, SOLUTION INTRAMUSCULAR at 14:12

## 2023-03-16 NOTE — TELEPHONE ENCOUNTER
Refill requested from pharmacy. Per last chart note, patient to continue 10 mg of Revlimid 3 out of 4 weeks. Will route to MD for cosignature.    Felix Gilliam, PharmD, BCOP

## 2023-03-16 NOTE — PROGRESS NOTES
Re: Refills of Oral Specialty Medication - Revlimid (lenalidomide)    • Drug-Drug Interactions: The current medication list was reviewed and there are no relevant drug-drug interactions.  • Medication Allergies: The patient has NKDA  • Review of Labs/Dose Adjustments: NO DOSE CHANGE - I reviewed the most recent note and labs and the patient will continue without any dose changes.  I sent refills as described below.    Drug: Revlimid (lenalidomide)  Strength: 10 mg  Directions: Take 1 capsule by mouth daily on days 1-21 of each 28 day cycle  Quantity: 21  Refills: 0  Pharmacy prescription sent to: [facility]    Name/Credentials: Felix Gilliam, PharmD, BCOP        Completed independent double check on medication order/RX. Qiana Ellis, RPtay, BCOP

## 2023-03-17 LAB
ALBUMIN SERPL ELPH-MCNC: 3.5 G/DL (ref 2.9–4.4)
ALBUMIN/GLOB SERPL: 1.3 {RATIO} (ref 0.7–1.7)
ALPHA1 GLOB SERPL ELPH-MCNC: 0.2 G/DL (ref 0–0.4)
ALPHA2 GLOB SERPL ELPH-MCNC: 0.8 G/DL (ref 0.4–1)
B-GLOBULIN SERPL ELPH-MCNC: 1 G/DL (ref 0.7–1.3)
GAMMA GLOB SERPL ELPH-MCNC: 0.8 G/DL (ref 0.4–1.8)
GLOBULIN SER-MCNC: 2.8 G/DL (ref 2.2–3.9)
IGA SERPL-MCNC: 81 MG/DL (ref 64–422)
IGG SERPL-MCNC: 858 MG/DL (ref 586–1602)
IGM SERPL-MCNC: 26 MG/DL (ref 26–217)
INTERPRETATION SERPL IEP-IMP: ABNORMAL
KAPPA LC FREE SER-MCNC: 29.5 MG/L (ref 3.3–19.4)
KAPPA LC FREE/LAMBDA FREE SER: 1.22 {RATIO} (ref 0.26–1.65)
LABORATORY COMMENT REPORT: ABNORMAL
LAMBDA LC FREE SERPL-MCNC: 24.1 MG/L (ref 5.7–26.3)
M PROTEIN SERPL ELPH-MCNC: ABNORMAL G/DL
PROT SERPL-MCNC: 6.3 G/DL (ref 6–8.5)

## 2023-03-20 ENCOUNTER — SPECIALTY PHARMACY (OUTPATIENT)
Dept: PHARMACY | Facility: HOSPITAL | Age: 73
End: 2023-03-20
Payer: MEDICARE

## 2023-03-20 NOTE — PROGRESS NOTES
Specialty Pharmacy Patient Management Program  Oncology 6-Month Clinical Assessment       Jennifer Plascencia is a 73 y.o. female with multiple myeloma called today to assess adherence and side effects.    Regimen: Revlimid 10 mg once daily for 21 days on, then 7 days off    Reason for Outreach: Routine medication check-in .       Problem list reviewed by Brandi Gilliam RPH on 3/20/2023 at  3:04 PM  Medicines reviewed by Brandi Gilliam RPH on 3/20/2023 at  3:04 PM  Allergies reviewed by Brandi Gilliam RPH on 3/20/2023 at  1:34 PM     Goals     • Specialty Pharmacy General Goal      Progression free survival           Medication Assessment:  • Medication Assessment  o Follow Up Clinical Assessment  o Medication(s) assessed: Revlimid  o Therapeutic appropriateness: Appropriate  o Medication tolerability: Tolerating with no to minimal ADRs  o Medication plan: Continue therapy with normal follow-up  o Quality of Life Improvement Scale: No change  o Administration: Patient is taking every day at the same time  and as prescribed .   o Patient can self administer medications: yes  o Medication Follow-Up Plan: Next clinical assessment  • Lab Review: The labs listed below have been reviewed. No dose adjustments are needed for the oral specialty medication(s) based on the labs.    Lab Results   Component Value Date    GLUCOSE 121 03/16/2023    CALCIUM 8.7 03/16/2023     03/16/2023    K 4.3 03/16/2023    CO2 27.2 03/16/2023     03/16/2023    BUN 15 03/16/2023    CREATININE 0.82 03/16/2023    EGFRIFAFRI 108 09/18/2018    EGFRIFNONA 61 02/08/2022    BCR 18.3 03/16/2023    ANIONGAP 10.8 03/16/2023     Lab Results   Component Value Date    WBC 4.31 03/16/2023    RBC 3.72 (L) 03/16/2023    HGB 12.3 03/16/2023    HCT 36.6 03/16/2023    MCV 98.4 (H) 03/16/2023    MCH 33.1 (H) 03/16/2023    MCHC 33.6 03/16/2023    RDW 13.9 03/16/2023    RDWSD 50.2 03/16/2023    MPV 9.3 03/16/2023     03/16/2023     NEUTRORELPCT 49.2 03/16/2023    LYMPHORELPCT 36.9 03/16/2023    MONORELPCT 8.8 03/16/2023    EOSRELPCT 4.4 03/16/2023    BASORELPCT 0.7 03/16/2023    AUTOIGPER 0.0 03/16/2023    NEUTROABS 2.12 03/16/2023    LYMPHSABS 1.59 03/16/2023    MONOSABS 0.38 03/16/2023    EOSABS 0.19 03/16/2023    BASOSABS 0.03 03/16/2023    AUTOIGNUM 0.00 03/16/2023    NRBC 0.0 03/16/2023     • Drug-drug interactions  o Completed medication reconciliation today to assess for drug interactions. Patient denies starting or stopping any medications.    o Assessed medication list for interactions, no significant drug interactions noted.   o Advised patient to call the clinic if any new medications are started so we can assess for drug-drug interactions.  • Drug-food interactions discussed: none  • Vaccines are coordinated by the patient's oncologist and primary care provider.    Allergies  Known allergies and reactions were discussed with the patient. The patient's chart has been reviewed for allergy information and updated as necessary.   No Known Allergies     Hospitalizations and Urgent Care Visits Since Last Assessment:  • Unplanned hospitalizations or inpatient admissions: no  • ED Visits: no  • Urgent Office Visits: no    Adherence Assessment:  Adherence Questions  Medication(s) assessed: Revlimid  On average, how many doses/injections does the patient miss per month?: 0  What are the identified reasons for non-adherence or missed doses? : no problems identfied  What is the estimated medication adherence level?: %  Based on the patient/caregiver response and refill history, does this patient require an MTP to track adherence improvements?: no    Quality of Life Assessment:  Quality of Life Assessment  Quality of Life Improvement Scale: No change  -- Quality of Life: 5/10    Financial Assessment:  Medication availability/affordability: Patient has had no issues obtaining medication from pharmacy. Patient requests to receive BRAND NAME  ONLY with future fills.       All questions addressed and patient had no additional concerns. Patient has pharmacy contact information.    Name/Credentials: Felix Gilliam PharmD, [facility]      3/20/2023  15:06 EDT

## 2023-04-07 NOTE — PROGRESS NOTES
Subjective     REASON FOR FOLLOW-UP: Transfer of care for IgA kappa multiple myeloma post stem cell transplant in March 2016 at [facility] currently on maintenance Revlimid 10 mg 3 out of 4 weeks                             REQUESTING PHYSICIAN: MD Brett Turpin MD    History of Present Illness patient is a 73 y.o. female with an IgA kappa high risk myeloma post stem cell transplant on maintenance Revlimid 10 mg every 3 of 4 weeks  after toxicity from Velcade and dexamethasone 6+ years out from stem cell transplant.  She is seen back today in 3-month follow-up, due for Zometa.  At the request of her physician, Dr. Coronado in [place] with [facility], she is continuing to have monthly paraprotein studies performed with M spike that has been undetectable and beta-2 microglobulin normal.    As she is seen back today in follow-up she states that at least for now Dr. Coronado wants her to continue monthly paraprotein studies and she believes Zometa every 3 months that we discussed she could at least go to every 4 months and she is willing to call and discuss this with him.  She has follow-up with him in May 2023.    She otherwise denies new concerns at this time.    She has been a little bit difficult to deal with for our staff and I encouraged her to move her lab work to less often    Past Medical History:   Diagnosis Date   • Anxiety    • Depression    • Disease of thyroid gland    • Hashimoto's disease    • History of vitamin D deficiency    • Hypothyroidism    • IBS (irritable bowel syndrome)    • Mixed hyperlipidemia 3/12/2018   • Multiple myeloma 2015    IgA kappa.  Stem cell transplant at [facility] 3/20/2016   • Myeloma    • KWAME (obstructive sleep apnea) 07/08/2021    Overnight polysomnogram.  Weight 173 pounds.  Mild KWAME with AHI 5.6 events per hour.  When supine, 9.4 events per hour.  During REM, moderate severity at 26 events per hour.  No  sleep-related hypoxia.  The patient snored 20.5% of total sleep time.   • Osteopenia    • Overweight (BMI 25.0-29.9) 2/5/2018        Past Surgical History:   Procedure Laterality Date   • BREAST BIOPSY     • CATARACT EXTRACTION     • COLONOSCOPY     • TONSILLECTOMY  1956   • VEIN SURGERY  1991      patient is a 70-year-old female with IgA kappa multiple myeloma diagnosed in mid 2015-high risk because of gain of 1 q- treatment with rev Dex Velcade initiated in 11/15-went on to stem cell transplant at [facility] in March 2016?  Melphalan.  She started post transplant therapy in May 2016 with Velcade rev Dex.  In September after 4 cycles Velcade was decreased to every other week with plans to continue rev Dex Velcade till progression because of high risk status.  In May 2019 her Velcade was discontinued because of worsening neuropathy.  Patient has remained on Revlimid 10 mg 21 out of 28 days since then with stable disease until November 2019 when a small IgG kappa paraprotein was noted on her blood tests which is distinct from her usual IgA kappa myeloma.  Restaging with PET scan was normal and since then the paraprotein as of 3/10/2020 has resolved    Patient has significant issues with anxiety and states that she had trouble getting her Revlimid in a timely fashion was very frustrated with this process at the [place] system-she states she was taken back when Dr. Hewitt suggested she transfer her care but is now happy to make a change    She is currently 2 days into her treatment cycle with Revlimid .she has no pain currently except intermittently in her neck where she has had some degenerative disease.  She does have neuropathy for which she is on fairly high doses of Cymbalta.  She is not taking her gabapentin.    Continues on acyclovir for prevention and baby aspirin because of the Revlimid  She was having trouble with diarrhea from the Revlimid but this is controlled with WelChol.    She is  and lives by  herself has no family nearby but good friends.  She does not smoke or drink.  She is up-to-date with mammography and does Cologuard instead of colonoscopy     have reviewed her labs in detail but cannot find any documentation of her transplant conditioning regimen (I assume it was melphalan)  or the depth of her response prior to transplant .  She states that the doctors at [facility] recommended to check her labs every month - she has been clinically BOZENA for 4 years and I suggested every other month testing but she is very adamant that she wants monthly testing      Current Outpatient Medications on File Prior to Visit   Medication Sig Dispense Refill   • Acetylcarn-Alpha Lipoic Acid 400-200 MG capsule Take  by mouth.     • acyclovir (ZOVIRAX) 400 MG tablet Take 1 tablet by mouth 2 (Two) Times a Day. 60 tablet 11   • ALPRAZolam (XANAX) 0.25 MG tablet Take 1 tablet by mouth Daily As Needed for Anxiety. 30 tablet 2   • amitriptyline (ELAVIL) 25 MG tablet TAKE 1-2 TABLET BY MOUTH EVERY NIGHT AS NEEDED FOR HEADACHES     • aspirin 81 MG chewable tablet Chew 81 mg Daily.     • B Complex Vitamins (VITAMIN B COMPLEX) capsule capsule Take 2 tablets by mouth Daily.     • baclofen (LIORESAL) 10 MG tablet Take 10 mg by mouth 3 (Three) Times a Day.  5   • calcium carbonate-vitamin d 600-400 MG-UNIT per tablet Take 1 tablet by mouth Daily.     • colesevelam (WELCHOL) 625 MG tablet Take 2 tablets by mouth 2 (Two) Times a Day With Meals. 90 tablet 2   • cycloSPORINE (RESTASIS) 0.05 % ophthalmic emulsion 1 drop 2 (Two) Times a Day.     • diphenoxylate-atropine (LOMOTIL) 2.5-0.025 MG per tablet Take 1 tablet by mouth 3 (Three) Times a Day As Needed for Diarrhea. 90 tablet 3   • DULoxetine (CYMBALTA) 30 MG capsule Take 30 mg by mouth Daily.     • DULoxetine (CYMBALTA) 60 MG capsule Take 1 capsule by mouth Daily. 90 capsule 3   • fexofenadine (ALLEGRA) 180 MG tablet Take 180 mg by mouth As Needed.     • fluticasone (FLONASE) 50  MCG/ACT nasal spray 2 sprays into each nostril Daily.     • folic acid (FOLVITE) 1 MG tablet Take 1,000 mcg by mouth Daily.  2   • gabapentin (NEURONTIN) 100 MG capsule Take 1 capsule by mouth 2 (Two) Times a Day As Needed (Pain). 60 capsule 0   • HYDROcodone-acetaminophen (NORCO) 5-325 MG per tablet Take 1 tablet by mouth Every 6 (Six) Hours As Needed for Moderate Pain  or Severe Pain . 60 tablet 0   • lenalidomide (Revlimid) 10 MG capsule TAKE 1 CAPSULE DAILY FOR 21 DAYS ON, THEN 7 DAYS OFF THEN REPEAT 21 capsule 0   • magnesium oxide (MAG-OX) 400 MG tablet Take 1 tablet by mouth Daily. 30 tablet 3   • meloxicam (MOBIC) 15 MG tablet Take 15 mg by mouth Daily.     • Multiple Vitamins-Iron (DAILY-JONI/IRON/BETA-CAROTENE) tablet Take 1 tablet by mouth Daily.  2   • omega-3 acid ethyl esters (LOVAZA) 1 g capsule Take 2 g by mouth.     • promethazine-dextromethorphan (PROMETHAZINE-DM) 6.25-15 MG/5ML syrup TAKE 10 ML BY MOUTH TWICE A DAY AS NEEDED FOR COUGH     • Synthroid 88 MCG tablet      • ubrogepant 100 MG tablet As Needed.     • valACYclovir (VALTREX) 1000 MG tablet As Needed.     • vitamin D (ERGOCALCIFEROL) 66810 units capsule capsule Take 50,000 Units by mouth 1 (One) Time Per Week. Twice a week       No current facility-administered medications on file prior to visit.        ALLERGIES:    No Known Allergies     Social History     Socioeconomic History   • Marital status:    • Number of children: 2   Tobacco Use   • Smoking status: Former     Packs/day: 0.25     Years: 2.00     Pack years: 0.50     Types: Cigarettes     Start date:      Quit date:      Years since quittin.2   • Smokeless tobacco: Never   • Tobacco comments:     Only lightly for 2 years   Vaping Use   • Vaping Use: Never used   Substance and Sexual Activity   • Alcohol use: Not Currently     Alcohol/week: 0.0 standard drinks     Comment: Na   • Drug use: Never   • Sexual activity: Not Currently     Partners: Male     Birth  control/protection: None     Comment: Na        Family History   Problem Relation Age of Onset   • Breast cancer Mother    • Sleep apnea Mother    • Lymphoma Father    • Sleep apnea Father    • Cancer Father    • Kidney cancer Paternal Grandmother    • Cancer Maternal Aunt                 Review of Systems   Constitutional: Positive for fatigue (stable).   Respiratory: Negative for cough, choking, chest tightness and shortness of breath.    Cardiovascular: Negative for chest pain.   Gastrointestinal: Negative for abdominal pain, constipation, nausea and vomiting.   Neurological: Positive for numbness (Den,foot/hand tingling no numbness). Negative for headaches (Migraine headaches worse since C-spine surgery).   Psychiatric/Behavioral: Negative for dysphoric mood. The patient is nervous/anxious (same).    All other systems reviewed and are negative.    ROS reviewed and updated 23    Objective     There were no vitals filed for this visit.  Current Status 3/16/2023   ECOG score 0       Physical Exam     CONSTITUTIONAL:  Vital signs reviewed.  No distress, looks comfortable.  EYES:  Conjunctiva and lids unremarkable.    RESPIRATORY:  Normal respiratory effort.  Lungs clear to auscultation bilaterally.  CARDIOVASCULAR:  Normal S1, S2.  No murmurs rubs or gallops.  No significant lower extremity edema.  EXT-no edema    I have reexamined the patient and the results are consistent with the previously documented exam. nallely RN       RECENT LABS:                            Assessment & Plan      1.  IgA kappa multiple myeloma diagnosed in  treated with RVD  · Stem cell transplant at [facility] in 3/2016  · Maintenance treatment with Velcade rev Dex started in   · Velcade discontinued because of neurotoxicity in   · Small IgG kappa paraprotein seen on blood work in 10/19-PET scan negative protein disappeared by 3/20  · Zometa given every 3 months   · Acyclovir and aspirin prophylaxis  · Decrease  Revlimid to 10 mg 2 out of 4 weeks in 8/21  · Patient increased back to 3 out of 4 weeks with stable counts for now.  · Myeloma parameters stable as of 11/22    2.  Anxiety  3.  Hypothyroidism on treatment  4.  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  Diarrhea due to treatment improved with WelChol  6.  Snoring and fatigue probable sleep apnea-CPAP instituted  7.  Vaccinated against coronavirus    Plan:  1.  Continue 10 mg of Revlimid 3 out of 4 weeks  2.  Continue monthly B12 and myeloma labs WILLIAM plus beta-2 microglobulin  3.  Zometa every 3 months, due today  4.  We have discussed again today the idea of going to every 2-month lab work for paraprotein studies and pushing Zometa every 4 months.  She was encouraged at the last visit to discuss this with Dr. Coronado but she has not yet done this.  She states he wants at least 2 monthly checks until he sees her in May of next year.  She is willing to reach out to him regarding the interval of Zometa delivery however and will let us know at her next follow-up.    This patient is on high risk drug therapy requiring intensive monitoring for toxicity.

## 2023-04-10 ENCOUNTER — SPECIALTY PHARMACY (OUTPATIENT)
Dept: PHARMACY | Facility: HOSPITAL | Age: 73
End: 2023-04-10
Payer: MEDICARE

## 2023-04-10 RX ORDER — LENALIDOMIDE 10 MG/1
CAPSULE ORAL
Qty: 21 CAPSULE | Refills: 0 | Status: SHIPPED | OUTPATIENT
Start: 2023-04-10

## 2023-04-10 NOTE — PROGRESS NOTES
Re: Refills of Oral Specialty Medication - Revlimid (lenalidomide)    • Drug-Drug Interactions: The current medication list was reviewed and there are no relevant drug-drug interactions.  • Medication Allergies: The patient has NKDA  • Review of Labs/Dose Adjustments: NO DOSE CHANGE - I reviewed the most recent note and labs and the patient will continue without any dose changes.  I sent refills as described below.    Drug: Revlimid  Strength: 10 mg  Directions: Take 1 capsule by mouth daily for 21 days on, then 7 days off  Quantity: 21  Refills: 0  Pharmacy prescription sent to: [facility]    Name/Credentials Qiana Ellis RPh, BCOP    4/10/2023  09:29 EDT

## 2023-04-13 ENCOUNTER — LAB (OUTPATIENT)
Dept: LAB | Facility: HOSPITAL | Age: 73
End: 2023-04-13
Payer: MEDICARE

## 2023-04-13 ENCOUNTER — OFFICE VISIT (OUTPATIENT)
Dept: ONCOLOGY | Facility: CLINIC | Age: 73
End: 2023-04-13
Payer: MEDICARE

## 2023-04-13 ENCOUNTER — INFUSION (OUTPATIENT)
Dept: ONCOLOGY | Facility: HOSPITAL | Age: 73
End: 2023-04-13
Payer: MEDICARE

## 2023-04-13 VITALS
HEART RATE: 77 BPM | OXYGEN SATURATION: 97 % | WEIGHT: 154.4 LBS | SYSTOLIC BLOOD PRESSURE: 123 MMHG | DIASTOLIC BLOOD PRESSURE: 71 MMHG | TEMPERATURE: 97.7 F | RESPIRATION RATE: 16 BRPM | HEIGHT: 67 IN | BODY MASS INDEX: 24.23 KG/M2

## 2023-04-13 DIAGNOSIS — C90.00 MULTIPLE MYELOMA NOT HAVING ACHIEVED REMISSION: Primary | ICD-10-CM

## 2023-04-13 DIAGNOSIS — C90.00 MULTIPLE MYELOMA NOT HAVING ACHIEVED REMISSION: ICD-10-CM

## 2023-04-13 DIAGNOSIS — E53.8 B12 DEFICIENCY: ICD-10-CM

## 2023-04-13 DIAGNOSIS — G63 NEUROPATHY ASSOCIATED WITH MULTIPLE MYELOMA: Primary | ICD-10-CM

## 2023-04-13 DIAGNOSIS — C90.00 NEUROPATHY ASSOCIATED WITH MULTIPLE MYELOMA: Primary | ICD-10-CM

## 2023-04-13 LAB
ALBUMIN SERPL-MCNC: 3.7 G/DL (ref 3.5–5.2)
ALBUMIN/GLOB SERPL: 1.5 G/DL (ref 1.1–2.4)
ALP SERPL-CCNC: 60 U/L (ref 38–116)
ALT SERPL W P-5'-P-CCNC: 10 U/L (ref 0–33)
ANION GAP SERPL CALCULATED.3IONS-SCNC: 10.8 MMOL/L (ref 5–15)
AST SERPL-CCNC: 15 U/L (ref 0–32)
B2 MICROGLOB SERPL-MCNC: 1.9 MG/L (ref 0.8–2.2)
BASOPHILS # BLD AUTO: 0.04 10*3/MM3 (ref 0–0.2)
BASOPHILS NFR BLD AUTO: 1.1 % (ref 0–1.5)
BILIRUB SERPL-MCNC: 0.2 MG/DL (ref 0.2–1.2)
BUN SERPL-MCNC: 6 MG/DL (ref 6–20)
BUN/CREAT SERPL: 7.4 (ref 7.3–30)
CALCIUM SPEC-SCNC: 8.8 MG/DL (ref 8.5–10.2)
CHLORIDE SERPL-SCNC: 103 MMOL/L (ref 98–107)
CO2 SERPL-SCNC: 27.2 MMOL/L (ref 22–29)
CREAT SERPL-MCNC: 0.81 MG/DL (ref 0.6–1.1)
DEPRECATED RDW RBC AUTO: 53.6 FL (ref 37–54)
EGFRCR SERPLBLD CKD-EPI 2021: 76.3 ML/MIN/1.73
EOSINOPHIL # BLD AUTO: 0.16 10*3/MM3 (ref 0–0.4)
EOSINOPHIL NFR BLD AUTO: 4.5 % (ref 0.3–6.2)
ERYTHROCYTE [DISTWIDTH] IN BLOOD BY AUTOMATED COUNT: 14.6 % (ref 12.3–15.4)
GLOBULIN UR ELPH-MCNC: 2.4 GM/DL (ref 1.8–3.5)
GLUCOSE SERPL-MCNC: 107 MG/DL (ref 74–124)
HCT VFR BLD AUTO: 35.7 % (ref 34–46.6)
HGB BLD-MCNC: 11.7 G/DL (ref 12–15.9)
IMM GRANULOCYTES # BLD AUTO: 0.01 10*3/MM3 (ref 0–0.05)
IMM GRANULOCYTES NFR BLD AUTO: 0.3 % (ref 0–0.5)
LYMPHOCYTES # BLD AUTO: 1.61 10*3/MM3 (ref 0.7–3.1)
LYMPHOCYTES NFR BLD AUTO: 45 % (ref 19.6–45.3)
MAGNESIUM SERPL-MCNC: 1.9 MG/DL (ref 1.8–2.5)
MCH RBC QN AUTO: 32.9 PG (ref 26.6–33)
MCHC RBC AUTO-ENTMCNC: 32.8 G/DL (ref 31.5–35.7)
MCV RBC AUTO: 100.3 FL (ref 79–97)
MONOCYTES # BLD AUTO: 0.52 10*3/MM3 (ref 0.1–0.9)
MONOCYTES NFR BLD AUTO: 14.5 % (ref 5–12)
NEUTROPHILS NFR BLD AUTO: 1.24 10*3/MM3 (ref 1.7–7)
NEUTROPHILS NFR BLD AUTO: 34.6 % (ref 42.7–76)
NRBC BLD AUTO-RTO: 0 /100 WBC (ref 0–0.2)
PLATELET # BLD AUTO: 183 10*3/MM3 (ref 140–450)
PMV BLD AUTO: 9.3 FL (ref 6–12)
POTASSIUM SERPL-SCNC: 3.4 MMOL/L (ref 3.5–4.7)
PROT SERPL-MCNC: 6.1 G/DL (ref 6.3–8)
RBC # BLD AUTO: 3.56 10*6/MM3 (ref 3.77–5.28)
SODIUM SERPL-SCNC: 141 MMOL/L (ref 134–145)
WBC NRBC COR # BLD: 3.58 10*3/MM3 (ref 3.4–10.8)

## 2023-04-13 PROCEDURE — 25010000002 CYANOCOBALAMIN PER 1000 MCG: Performed by: INTERNAL MEDICINE

## 2023-04-13 PROCEDURE — 80053 COMPREHEN METABOLIC PANEL: CPT

## 2023-04-13 PROCEDURE — 83735 ASSAY OF MAGNESIUM: CPT

## 2023-04-13 PROCEDURE — 96372 THER/PROPH/DIAG INJ SC/IM: CPT

## 2023-04-13 PROCEDURE — 82232 ASSAY OF BETA-2 PROTEIN: CPT | Performed by: INTERNAL MEDICINE

## 2023-04-13 PROCEDURE — 36415 COLL VENOUS BLD VENIPUNCTURE: CPT

## 2023-04-13 PROCEDURE — 85025 COMPLETE CBC W/AUTO DIFF WBC: CPT

## 2023-04-13 RX ORDER — AZELASTINE HYDROCHLORIDE 137 UG/1
SPRAY, METERED NASAL
COMMUNITY
Start: 2023-03-23

## 2023-04-13 RX ORDER — CYANOCOBALAMIN 1000 UG/ML
1000 INJECTION, SOLUTION INTRAMUSCULAR; SUBCUTANEOUS ONCE
Status: COMPLETED | OUTPATIENT
Start: 2023-04-13 | End: 2023-04-13

## 2023-04-13 RX ADMIN — CYANOCOBALAMIN 1000 MCG: 1000 INJECTION, SOLUTION INTRAMUSCULAR at 09:52

## 2023-04-13 NOTE — PROGRESS NOTES
Subjective     REASON FOR FOLLOW-UP: Transfer of care for IgA kappa multiple myeloma post stem cell transplant in March 2016 at [facility] currently on maintenance Revlimid 10 mg 3 out of 4 weeks                             REQUESTING PHYSICIAN: MD Brett Turpin MD    History of Present Illness patient is a 73 y.o. female with an IgA kappa high risk myeloma post stem cell transplant on maintenance Revlimid 10 mg every 3 of 4 weeks after toxicity from Velcade and dexamethasone 6+ years out from stem cell transplant.  She is seen back today in 4-month follow-up.  She received Zometa last month which she is still continuing on at an every 3-month interval.  She has tentative plans to follow-up with Dr. Coronado in [place] with [facility] in May. She continues to have monthly paraprotein studies performed with M spike that has been undetectable and beta-2 microglobulin normal.  She is also receiving monthly B12, due today.    She otherwise denies new concerns at this time.    Past Medical History:   Diagnosis Date   • Anxiety    • Depression    • Disease of thyroid gland    • Hashimoto's disease    • History of vitamin D deficiency    • Hypothyroidism    • IBS (irritable bowel syndrome)    • Mixed hyperlipidemia 3/12/2018   • Multiple myeloma 2015    IgA kappa.  Stem cell transplant at [facility] 3/20/2016   • Myeloma    • KWAME (obstructive sleep apnea) 07/08/2021    Overnight polysomnogram.  Weight 173 pounds.  Mild KWAME with AHI 5.6 events per hour.  When supine, 9.4 events per hour.  During REM, moderate severity at 26 events per hour.  No sleep-related hypoxia.  The patient snored 20.5% of total sleep time.   • Osteopenia    • Overweight (BMI 25.0-29.9) 2/5/2018        Past Surgical History:   Procedure Laterality Date   • BREAST BIOPSY     • CATARACT EXTRACTION     • COLONOSCOPY     • TONSILLECTOMY  1956   • VEIN SURGERY  1991      patient is a  70-year-old female with IgA kappa multiple myeloma diagnosed in mid 2015-high risk because of gain of 1 q- treatment with rev Dex Velcade initiated in 11/15-went on to stem cell transplant at [facility] in March 2016?  Melphalan.  She started post transplant therapy in May 2016 with Velcade rev Dex.  In September after 4 cycles Velcade was decreased to every other week with plans to continue rev Dex Velcade till progression because of high risk status.  In May 2019 her Velcade was discontinued because of worsening neuropathy.  Patient has remained on Revlimid 10 mg 21 out of 28 days since then with stable disease until November 2019 when a small IgG kappa paraprotein was noted on her blood tests which is distinct from her usual IgA kappa myeloma.  Restaging with PET scan was normal and since then the paraprotein as of 3/10/2020 has resolved    Patient has significant issues with anxiety and states that she had trouble getting her Revlimid in a timely fashion was very frustrated with this process at the [place] system-she states she was taken back when Dr. Hewitt suggested she transfer her care but is now happy to make a change    She is currently 2 days into her treatment cycle with Revlimid .she has no pain currently except intermittently in her neck where she has had some degenerative disease.  She does have neuropathy for which she is on fairly high doses of Cymbalta.  She is not taking her gabapentin.    Continues on acyclovir for prevention and baby aspirin because of the Revlimid  She was having trouble with diarrhea from the Revlimid but this is controlled with WelChol.    She is  and lives by herself has no family nearby but good friends.  She does not smoke or drink.  She is up-to-date with mammography and does Cologuard instead of colonoscopy     have reviewed her labs in detail but cannot find any documentation of her transplant conditioning regimen (I assume it was melphalan)  or the depth of  her response prior to transplant .  She states that the doctors at [facility] recommended to check her labs every month - she has been clinically BOZENA for 4 years and I suggested every other month testing but she is very adamant that she wants monthly testing      Current Outpatient Medications on File Prior to Visit   Medication Sig Dispense Refill   • Acetylcarn-Alpha Lipoic Acid 400-200 MG capsule Take  by mouth.     • acyclovir (ZOVIRAX) 400 MG tablet Take 1 tablet by mouth 2 (Two) Times a Day. 60 tablet 11   • ALPRAZolam (XANAX) 0.25 MG tablet Take 1 tablet by mouth Daily As Needed for Anxiety. 30 tablet 2   • amitriptyline (ELAVIL) 25 MG tablet TAKE 1-2 TABLET BY MOUTH EVERY NIGHT AS NEEDED FOR HEADACHES     • aspirin 81 MG chewable tablet Chew 1 tablet Daily.     • Azelastine HCl 137 MCG/SPRAY solution      • B Complex Vitamins (VITAMIN B COMPLEX) capsule capsule Take 2 tablets by mouth Daily.     • baclofen (LIORESAL) 10 MG tablet Take 1 tablet by mouth 3 (Three) Times a Day.  5   • calcium carbonate-vitamin d 600-400 MG-UNIT per tablet Take 1 tablet by mouth Daily.     • colesevelam (WELCHOL) 625 MG tablet Take 2 tablets by mouth 2 (Two) Times a Day With Meals. 90 tablet 2   • cycloSPORINE (RESTASIS) 0.05 % ophthalmic emulsion 1 drop 2 (Two) Times a Day.     • diphenoxylate-atropine (LOMOTIL) 2.5-0.025 MG per tablet Take 1 tablet by mouth 3 (Three) Times a Day As Needed for Diarrhea. 90 tablet 3   • DULoxetine (CYMBALTA) 30 MG capsule Take 1 capsule by mouth Daily.     • DULoxetine (CYMBALTA) 60 MG capsule Take 1 capsule by mouth Daily. 90 capsule 3   • fexofenadine (ALLEGRA) 180 MG tablet Take 1 tablet by mouth As Needed.     • fluticasone (FLONASE) 50 MCG/ACT nasal spray 2 sprays into the nostril(s) as directed by provider Daily.     • folic acid (FOLVITE) 1 MG tablet Take 1 tablet by mouth Daily.  2   • gabapentin (NEURONTIN) 100 MG capsule Take 1 capsule by mouth 2 (Two) Times a Day As Needed (Pain). 60  capsule 0   • HYDROcodone-acetaminophen (NORCO) 5-325 MG per tablet Take 1 tablet by mouth Every 6 (Six) Hours As Needed for Moderate Pain  or Severe Pain . 60 tablet 0   • lenalidomide (Revlimid) 10 MG capsule TAKE 1 CAPSULE DAILY FOR 21 DAYS ON, THEN 7 DAYS OFF THEN REPEAT 21 capsule 0   • magnesium oxide (MAG-OX) 400 MG tablet Take 1 tablet by mouth Daily. 30 tablet 3   • meloxicam (MOBIC) 15 MG tablet Take 1 tablet by mouth Daily.     • Multiple Vitamins-Iron (DAILY-JONI/IRON/BETA-CAROTENE) tablet Take 1 tablet by mouth Daily.  2   • omega-3 acid ethyl esters (LOVAZA) 1 g capsule Take 2 capsules by mouth.     • promethazine-dextromethorphan (PROMETHAZINE-DM) 6.25-15 MG/5ML syrup TAKE 10 ML BY MOUTH TWICE A DAY AS NEEDED FOR COUGH     • Synthroid 88 MCG tablet      • ubrogepant 100 MG tablet As Needed.     • valACYclovir (VALTREX) 1000 MG tablet As Needed.     • vitamin D (ERGOCALCIFEROL) 93869 units capsule capsule Take 1 capsule by mouth 1 (One) Time Per Week. Twice a week       No current facility-administered medications on file prior to visit.        ALLERGIES:    No Known Allergies     Social History     Socioeconomic History   • Marital status:    • Number of children: 2   Tobacco Use   • Smoking status: Former     Packs/day: 0.25     Years: 2.00     Pack years: 0.50     Types: Cigarettes     Start date:      Quit date:      Years since quittin.3   • Smokeless tobacco: Never   • Tobacco comments:     Only lightly for 2 years   Vaping Use   • Vaping Use: Never used   Substance and Sexual Activity   • Alcohol use: Not Currently     Alcohol/week: 0.0 standard drinks     Comment: Na   • Drug use: Never   • Sexual activity: Not Currently     Partners: Male     Birth control/protection: None     Comment: Na        Family History   Problem Relation Age of Onset   • Breast cancer Mother    • Sleep apnea Mother    • Lymphoma Father    • Sleep apnea Father    • Cancer Father    • Kidney cancer  "Paternal Grandmother    • Cancer Maternal Aunt                 Review of Systems   Constitutional: Positive for fatigue (stable).   Respiratory: Negative for cough, choking, chest tightness and shortness of breath.    Cardiovascular: Negative for chest pain.   Gastrointestinal: Negative for abdominal pain, constipation, nausea and vomiting.   Neurological: Positive for numbness (Den,foot/hand tingling no numbness). Negative for headaches (Migraine headaches worse since C-spine surgery).   Psychiatric/Behavioral: Negative for dysphoric mood. The patient is nervous/anxious (same).    All other systems reviewed and are negative.    ROS reviewed and updated 23    Objective     Vitals:    23 0953   BP: 123/71   Pulse: 77   Resp: 16   Temp: 97.7 °F (36.5 °C)   TempSrc: Temporal   SpO2: 97%   Weight: 70 kg (154 lb 6.4 oz)   Height: 170.2 cm (67.01\")   PainSc:   4   PainLoc: Generalized         2023     9:56 AM   Current Status   ECOG score 0       Physical Exam     CONSTITUTIONAL:  Vital signs reviewed.  No distress, looks comfortable.  EYES:  Conjunctiva and lids unremarkable.    RESPIRATORY:  Normal respiratory effort.  Lungs clear to auscultation bilaterally.  CARDIOVASCULAR:  Normal S1, S2.  No murmurs rubs or gallops.  No significant lower extremity edema.  EXT-no edema    I have reexamined the patient and the results are consistent with the previously documented exam. Zhane Pagan, REGINALD       RECENT LABS:  Results from last 7 days   Lab Units 23  0941   WBC 10*3/mm3 3.58   NEUTROS ABS 10*3/mm3 1.24*   HEMOGLOBIN g/dL 11.7*   HEMATOCRIT % 35.7   PLATELETS 10*3/mm3 183     Results from last 7 days   Lab Units 23  0941   SODIUM mmol/L 141   POTASSIUM mmol/L 3.4*   CHLORIDE mmol/L 103   CO2 mmol/L 27.2   BUN mg/dL 6   CREATININE mg/dL 0.81   CALCIUM mg/dL 8.8   ALBUMIN g/dL 3.7   BILIRUBIN mg/dL 0.2   ALK PHOS U/L 60   ALT (SGPT) U/L 10   AST (SGOT) U/L 15   GLUCOSE mg/dL 107 "   MAGNESIUM mg/dL 1.9                       Assessment & Plan      1.  IgA kappa multiple myeloma diagnosed in 2015 treated with RVD  · Stem cell transplant at [facility] in 3/2016  · Maintenance treatment with Velcade rev Dex started in 5/16  · Velcade discontinued because of neurotoxicity in 5/19  · Small IgG kappa paraprotein seen on blood work in 10/19-PET scan negative protein disappeared by 3/20  · Zometa given every 3 months   · Acyclovir and aspirin prophylaxis  · Decrease Revlimid to 10 mg 2 out of 4 weeks in 8/21  · Patient increased back to 3 out of 4 weeks with stable counts for now.  · Myeloma parameters stable as of 03/2023    2.  Anxiety  3.  Hypothyroidism on treatment  4.  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  Diarrhea due to treatment improved with WelChol  6.  Snoring and fatigue probable sleep apnea-CPAP instituted  7.  Vaccinated against coronavirus    Plan:  1.  Continue 10 mg of Revlimid 3 out of 4 weeks  2.  Continue monthly B12 and myeloma labs WILLIAM plus beta-2 microglobulin  3.  Zometa every 3 months, due again in June.  4.  She has tentative plans to follow-up with [facility], Dr. Coronado, in May.  She expects that he will want to continue to check labs monthly that we have repeatedly discussed with her potentially moving to an every 2-month interval.    I spent 35 minutes caring for Jennifer on this date of service. This time includes time spent by me in the following activities: preparing for the visit, reviewing tests, obtaining and/or reviewing a separately obtained history, performing a medically appropriate examination and/or evaluation, counseling and educating the patient/family/caregiver, documenting information in the medical record and care coordination

## 2023-04-14 LAB
ALBUMIN SERPL ELPH-MCNC: 3.3 G/DL (ref 2.9–4.4)
ALBUMIN/GLOB SERPL: 1.4 {RATIO} (ref 0.7–1.7)
ALPHA1 GLOB SERPL ELPH-MCNC: 0.2 G/DL (ref 0–0.4)
ALPHA2 GLOB SERPL ELPH-MCNC: 0.7 G/DL (ref 0.4–1)
B-GLOBULIN SERPL ELPH-MCNC: 0.8 G/DL (ref 0.7–1.3)
GAMMA GLOB SERPL ELPH-MCNC: 0.7 G/DL (ref 0.4–1.8)
GLOBULIN SER-MCNC: 2.4 G/DL (ref 2.2–3.9)
IGA SERPL-MCNC: 68 MG/DL (ref 64–422)
IGG SERPL-MCNC: 789 MG/DL (ref 586–1602)
IGM SERPL-MCNC: 29 MG/DL (ref 26–217)
INTERPRETATION SERPL IEP-IMP: ABNORMAL
KAPPA LC FREE SER-MCNC: 28.2 MG/L (ref 3.3–19.4)
KAPPA LC FREE/LAMBDA FREE SER: 1.3 {RATIO} (ref 0.26–1.65)
LABORATORY COMMENT REPORT: ABNORMAL
LAMBDA LC FREE SERPL-MCNC: 21.7 MG/L (ref 5.7–26.3)
M PROTEIN SERPL ELPH-MCNC: ABNORMAL G/DL
PROT SERPL-MCNC: 5.7 G/DL (ref 6–8.5)

## 2023-05-08 ENCOUNTER — SPECIALTY PHARMACY (OUTPATIENT)
Dept: PHARMACY | Facility: HOSPITAL | Age: 73
End: 2023-05-08
Payer: MEDICARE

## 2023-05-08 RX ORDER — LENALIDOMIDE 10 MG/1
CAPSULE ORAL
Qty: 21 CAPSULE | Refills: 0 | Status: SHIPPED | OUTPATIENT
Start: 2023-05-08

## 2023-05-08 NOTE — PROGRESS NOTES
Re: Refills of Oral Specialty Medication - Revlimid (lenalidomide)    • Drug-Drug Interactions: The current medication list was reviewed and there are no relevant drug-drug interactions.  • Medication Allergies: The patient has NKDA  • Review of Labs/Dose Adjustments: NO DOSE CHANGE - I reviewed the most recent note and labs and the patient will continue without any dose changes.  I sent refills as described below.    Drug: Revlimid (lenalidomide)  Strength: 10 mg  Directions: Take 1 capsule by mouth daily on days 1-21 of each 28 day cycle  Quantity: 21  Refills: 0  Pharmacy prescription sent to: [facility]    Name/Credentials: Felix Gilliam PharmD, ADELAIDA    5/8/2023  14:07 EDT       Completed independent double check on medication order/RX. Qiana Ellis RPh, BCOP

## 2023-05-11 ENCOUNTER — LAB (OUTPATIENT)
Dept: LAB | Facility: HOSPITAL | Age: 73
End: 2023-05-11
Payer: MEDICARE

## 2023-05-11 ENCOUNTER — INFUSION (OUTPATIENT)
Dept: ONCOLOGY | Facility: HOSPITAL | Age: 73
End: 2023-05-11
Payer: MEDICARE

## 2023-05-11 DIAGNOSIS — C90.00 MULTIPLE MYELOMA NOT HAVING ACHIEVED REMISSION: ICD-10-CM

## 2023-05-11 DIAGNOSIS — G63 NEUROPATHY ASSOCIATED WITH MULTIPLE MYELOMA: Primary | ICD-10-CM

## 2023-05-11 DIAGNOSIS — C90.00 NEUROPATHY ASSOCIATED WITH MULTIPLE MYELOMA: Primary | ICD-10-CM

## 2023-05-11 LAB
ALBUMIN SERPL-MCNC: 3.8 G/DL (ref 3.5–5.2)
ALBUMIN/GLOB SERPL: 1.5 G/DL (ref 1.1–2.4)
ALP SERPL-CCNC: 60 U/L (ref 38–116)
ALT SERPL W P-5'-P-CCNC: 11 U/L (ref 0–33)
ANION GAP SERPL CALCULATED.3IONS-SCNC: 9.2 MMOL/L (ref 5–15)
AST SERPL-CCNC: 14 U/L (ref 0–32)
B2 MICROGLOB SERPL-MCNC: 2.1 MG/L (ref 0.8–2.2)
BASOPHILS # BLD AUTO: 0.05 10*3/MM3 (ref 0–0.2)
BASOPHILS NFR BLD AUTO: 1.5 % (ref 0–1.5)
BILIRUB SERPL-MCNC: 0.3 MG/DL (ref 0.2–1.2)
BUN SERPL-MCNC: 12 MG/DL (ref 6–20)
BUN/CREAT SERPL: 13.8 (ref 7.3–30)
CALCIUM SPEC-SCNC: 8.8 MG/DL (ref 8.5–10.2)
CHLORIDE SERPL-SCNC: 103 MMOL/L (ref 98–107)
CO2 SERPL-SCNC: 27.8 MMOL/L (ref 22–29)
CREAT SERPL-MCNC: 0.87 MG/DL (ref 0.6–1.1)
DEPRECATED RDW RBC AUTO: 54.5 FL (ref 37–54)
EGFRCR SERPLBLD CKD-EPI 2021: 70 ML/MIN/1.73
EOSINOPHIL # BLD AUTO: 0.27 10*3/MM3 (ref 0–0.4)
EOSINOPHIL NFR BLD AUTO: 8.3 % (ref 0.3–6.2)
ERYTHROCYTE [DISTWIDTH] IN BLOOD BY AUTOMATED COUNT: 14.6 % (ref 12.3–15.4)
GLOBULIN UR ELPH-MCNC: 2.5 GM/DL (ref 1.8–3.5)
GLUCOSE SERPL-MCNC: 89 MG/DL (ref 74–124)
HCT VFR BLD AUTO: 36.6 % (ref 34–46.6)
HGB BLD-MCNC: 11.7 G/DL (ref 12–15.9)
IMM GRANULOCYTES # BLD AUTO: 0.01 10*3/MM3 (ref 0–0.05)
IMM GRANULOCYTES NFR BLD AUTO: 0.3 % (ref 0–0.5)
LYMPHOCYTES # BLD AUTO: 1.33 10*3/MM3 (ref 0.7–3.1)
LYMPHOCYTES NFR BLD AUTO: 40.8 % (ref 19.6–45.3)
MAGNESIUM SERPL-MCNC: 1.8 MG/DL (ref 1.8–2.5)
MCH RBC QN AUTO: 32.2 PG (ref 26.6–33)
MCHC RBC AUTO-ENTMCNC: 32 G/DL (ref 31.5–35.7)
MCV RBC AUTO: 100.8 FL (ref 79–97)
MONOCYTES # BLD AUTO: 0.45 10*3/MM3 (ref 0.1–0.9)
MONOCYTES NFR BLD AUTO: 13.8 % (ref 5–12)
NEUTROPHILS NFR BLD AUTO: 1.15 10*3/MM3 (ref 1.7–7)
NEUTROPHILS NFR BLD AUTO: 35.3 % (ref 42.7–76)
NRBC BLD AUTO-RTO: 0 /100 WBC (ref 0–0.2)
PLATELET # BLD AUTO: 193 10*3/MM3 (ref 140–450)
PMV BLD AUTO: 9.9 FL (ref 6–12)
POTASSIUM SERPL-SCNC: 3.7 MMOL/L (ref 3.5–4.7)
PROT SERPL-MCNC: 6.3 G/DL (ref 6.3–8)
RBC # BLD AUTO: 3.63 10*6/MM3 (ref 3.77–5.28)
SODIUM SERPL-SCNC: 140 MMOL/L (ref 134–145)
WBC NRBC COR # BLD: 3.26 10*3/MM3 (ref 3.4–10.8)

## 2023-05-11 PROCEDURE — 96372 THER/PROPH/DIAG INJ SC/IM: CPT

## 2023-05-11 PROCEDURE — 85025 COMPLETE CBC W/AUTO DIFF WBC: CPT

## 2023-05-11 PROCEDURE — 83735 ASSAY OF MAGNESIUM: CPT

## 2023-05-11 PROCEDURE — 82232 ASSAY OF BETA-2 PROTEIN: CPT | Performed by: INTERNAL MEDICINE

## 2023-05-11 PROCEDURE — 25010000002 CYANOCOBALAMIN PER 1000 MCG: Performed by: INTERNAL MEDICINE

## 2023-05-11 PROCEDURE — 36415 COLL VENOUS BLD VENIPUNCTURE: CPT

## 2023-05-11 PROCEDURE — 80053 COMPREHEN METABOLIC PANEL: CPT

## 2023-05-11 RX ORDER — CYANOCOBALAMIN 1000 UG/ML
1000 INJECTION, SOLUTION INTRAMUSCULAR; SUBCUTANEOUS ONCE
Status: COMPLETED | OUTPATIENT
Start: 2023-05-11 | End: 2023-05-11

## 2023-05-11 RX ADMIN — CYANOCOBALAMIN 1000 MCG: 1000 INJECTION, SOLUTION INTRAMUSCULAR at 08:47

## 2023-05-15 LAB
ALBUMIN SERPL ELPH-MCNC: 3.4 G/DL (ref 2.9–4.4)
ALBUMIN/GLOB SERPL: 1.4 {RATIO} (ref 0.7–1.7)
ALPHA1 GLOB SERPL ELPH-MCNC: 0.2 G/DL (ref 0–0.4)
ALPHA2 GLOB SERPL ELPH-MCNC: 0.6 G/DL (ref 0.4–1)
B-GLOBULIN SERPL ELPH-MCNC: 0.9 G/DL (ref 0.7–1.3)
GAMMA GLOB SERPL ELPH-MCNC: 0.8 G/DL (ref 0.4–1.8)
GLOBULIN SER-MCNC: 2.5 G/DL (ref 2.2–3.9)
IGA SERPL-MCNC: 84 MG/DL (ref 64–422)
IGG SERPL-MCNC: 810 MG/DL (ref 586–1602)
IGM SERPL-MCNC: 41 MG/DL (ref 26–217)
INTERPRETATION SERPL IEP-IMP: ABNORMAL
KAPPA LC FREE SER-MCNC: 36.1 MG/L (ref 3.3–19.4)
KAPPA LC FREE/LAMBDA FREE SER: 1.46 {RATIO} (ref 0.26–1.65)
LABORATORY COMMENT REPORT: ABNORMAL
LAMBDA LC FREE SERPL-MCNC: 24.8 MG/L (ref 5.7–26.3)
M PROTEIN SERPL ELPH-MCNC: ABNORMAL G/DL
PROT SERPL-MCNC: 5.9 G/DL (ref 6–8.5)

## 2023-06-06 ENCOUNTER — SPECIALTY PHARMACY (OUTPATIENT)
Dept: PHARMACY | Facility: HOSPITAL | Age: 73
End: 2023-06-06
Payer: MEDICARE

## 2023-06-06 RX ORDER — LENALIDOMIDE 10 MG/1
CAPSULE ORAL
Qty: 21 CAPSULE | Refills: 0 | Status: SHIPPED | OUTPATIENT
Start: 2023-06-06

## 2023-06-06 NOTE — PROGRESS NOTES
Re: Refills of Oral Specialty Medication - Revlimid (lenalidomide)    Drug-Drug Interactions: The current medication list was reviewed and there are no relevant drug-drug interactions.  Medication Allergies: The patient has NKDA  Review of Labs/Dose Adjustments: NO DOSE CHANGE - I reviewed the most recent note and labs and the patient will continue without any dose changes.  I sent refills as described below.    Drug: Revlimid (lenalidomide)  Strength: 10 mg  Directions: Take 1 capsule by mouth daily on days 1-21 of each 28 day cycle  Quantity: 21  Refills: 0  Pharmacy prescription sent to: [facility]    Name/Credentials: Felix Gilliam PharmD, BCOP    6/6/2023  09:05 EDT    Completed independent double check on medication order/RX.   Sandra Hernandez, CelestinaD, BCPS

## 2023-06-06 NOTE — TELEPHONE ENCOUNTER
Refill requested from pharmacy. Per last chart note, patient to continue 10 mg of Revlimid 3 out of 4 weeks . Will route to MD for cosignature.    Felix Gilliam, PharmD, BCOP

## 2023-06-08 ENCOUNTER — INFUSION (OUTPATIENT)
Dept: ONCOLOGY | Facility: HOSPITAL | Age: 73
End: 2023-06-08
Payer: MEDICARE

## 2023-06-08 ENCOUNTER — APPOINTMENT (OUTPATIENT)
Dept: LAB | Facility: HOSPITAL | Age: 73
End: 2023-06-08
Payer: MEDICARE

## 2023-06-08 VITALS
BODY MASS INDEX: 23.8 KG/M2 | OXYGEN SATURATION: 98 % | RESPIRATION RATE: 16 BRPM | WEIGHT: 152 LBS | DIASTOLIC BLOOD PRESSURE: 81 MMHG | SYSTOLIC BLOOD PRESSURE: 134 MMHG | TEMPERATURE: 98 F | HEART RATE: 76 BPM

## 2023-06-08 DIAGNOSIS — C90.00 MULTIPLE MYELOMA NOT HAVING ACHIEVED REMISSION: ICD-10-CM

## 2023-06-08 DIAGNOSIS — C90.00 NEUROPATHY ASSOCIATED WITH MULTIPLE MYELOMA: Primary | ICD-10-CM

## 2023-06-08 DIAGNOSIS — G63 NEUROPATHY ASSOCIATED WITH MULTIPLE MYELOMA: Primary | ICD-10-CM

## 2023-06-08 DIAGNOSIS — C90.00 MULTIPLE MYELOMA NOT HAVING ACHIEVED REMISSION: Primary | ICD-10-CM

## 2023-06-08 LAB
ALBUMIN SERPL-MCNC: 4.1 G/DL (ref 3.5–5.2)
ALBUMIN/GLOB SERPL: 1.5 G/DL (ref 1.1–2.4)
ALP SERPL-CCNC: 62 U/L (ref 38–116)
ALT SERPL W P-5'-P-CCNC: 12 U/L (ref 0–33)
ANION GAP SERPL CALCULATED.3IONS-SCNC: 11.3 MMOL/L (ref 5–15)
AST SERPL-CCNC: 14 U/L (ref 0–32)
B2 MICROGLOB SERPL-MCNC: 2.1 MG/L (ref 0.8–2.2)
BASOPHILS # BLD AUTO: 0.06 10*3/MM3 (ref 0–0.2)
BASOPHILS NFR BLD AUTO: 1.8 % (ref 0–1.5)
BILIRUB SERPL-MCNC: 0.3 MG/DL (ref 0.2–1.2)
BUN SERPL-MCNC: 10 MG/DL (ref 6–20)
BUN/CREAT SERPL: 12.7 (ref 7.3–30)
CALCIUM SPEC-SCNC: 9 MG/DL (ref 8.5–10.2)
CHLORIDE SERPL-SCNC: 101 MMOL/L (ref 98–107)
CO2 SERPL-SCNC: 25.7 MMOL/L (ref 22–29)
CREAT SERPL-MCNC: 0.79 MG/DL (ref 0.6–1.1)
DEPRECATED RDW RBC AUTO: 51.9 FL (ref 37–54)
EGFRCR SERPLBLD CKD-EPI 2021: 78.6 ML/MIN/1.73
EOSINOPHIL # BLD AUTO: 0.19 10*3/MM3 (ref 0–0.4)
EOSINOPHIL NFR BLD AUTO: 5.6 % (ref 0.3–6.2)
ERYTHROCYTE [DISTWIDTH] IN BLOOD BY AUTOMATED COUNT: 14.3 % (ref 12.3–15.4)
GLOBULIN UR ELPH-MCNC: 2.8 GM/DL (ref 1.8–3.5)
GLUCOSE SERPL-MCNC: 97 MG/DL (ref 74–124)
HCT VFR BLD AUTO: 37.8 % (ref 34–46.6)
HGB BLD-MCNC: 12.3 G/DL (ref 12–15.9)
IMM GRANULOCYTES # BLD AUTO: 0.01 10*3/MM3 (ref 0–0.05)
IMM GRANULOCYTES NFR BLD AUTO: 0.3 % (ref 0–0.5)
LYMPHOCYTES # BLD AUTO: 1.42 10*3/MM3 (ref 0.7–3.1)
LYMPHOCYTES NFR BLD AUTO: 41.5 % (ref 19.6–45.3)
MAGNESIUM SERPL-MCNC: 2.1 MG/DL (ref 1.8–2.5)
MCH RBC QN AUTO: 32.5 PG (ref 26.6–33)
MCHC RBC AUTO-ENTMCNC: 32.5 G/DL (ref 31.5–35.7)
MCV RBC AUTO: 99.7 FL (ref 79–97)
MONOCYTES # BLD AUTO: 0.43 10*3/MM3 (ref 0.1–0.9)
MONOCYTES NFR BLD AUTO: 12.6 % (ref 5–12)
NEUTROPHILS NFR BLD AUTO: 1.31 10*3/MM3 (ref 1.7–7)
NEUTROPHILS NFR BLD AUTO: 38.2 % (ref 42.7–76)
NRBC BLD AUTO-RTO: 0 /100 WBC (ref 0–0.2)
PHOSPHATE SERPL-MCNC: 3.3 MG/DL (ref 2.5–4.5)
PLATELET # BLD AUTO: 242 10*3/MM3 (ref 140–450)
PMV BLD AUTO: 9.2 FL (ref 6–12)
POTASSIUM SERPL-SCNC: 4 MMOL/L (ref 3.5–4.7)
PROT SERPL-MCNC: 6.9 G/DL (ref 6.3–8)
RBC # BLD AUTO: 3.79 10*6/MM3 (ref 3.77–5.28)
SODIUM SERPL-SCNC: 138 MMOL/L (ref 134–145)
WBC NRBC COR # BLD: 3.42 10*3/MM3 (ref 3.4–10.8)

## 2023-06-08 PROCEDURE — 85025 COMPLETE CBC W/AUTO DIFF WBC: CPT

## 2023-06-08 PROCEDURE — 83735 ASSAY OF MAGNESIUM: CPT

## 2023-06-08 PROCEDURE — 25010000002 CYANOCOBALAMIN PER 1000 MCG: Performed by: INTERNAL MEDICINE

## 2023-06-08 PROCEDURE — A9270 NON-COVERED ITEM OR SERVICE: HCPCS | Performed by: INTERNAL MEDICINE

## 2023-06-08 PROCEDURE — 82232 ASSAY OF BETA-2 PROTEIN: CPT | Performed by: INTERNAL MEDICINE

## 2023-06-08 PROCEDURE — 96372 THER/PROPH/DIAG INJ SC/IM: CPT

## 2023-06-08 PROCEDURE — 25010000002 ZOLEDRONIC ACID 4 MG/100ML SOLUTION: Performed by: INTERNAL MEDICINE

## 2023-06-08 PROCEDURE — 63710000001 ACETAMINOPHEN 325 MG TABLET: Performed by: INTERNAL MEDICINE

## 2023-06-08 PROCEDURE — 84100 ASSAY OF PHOSPHORUS: CPT

## 2023-06-08 PROCEDURE — 80053 COMPREHEN METABOLIC PANEL: CPT

## 2023-06-08 PROCEDURE — 96374 THER/PROPH/DIAG INJ IV PUSH: CPT

## 2023-06-08 RX ORDER — ACETAMINOPHEN 325 MG/1
650 TABLET ORAL ONCE
Status: COMPLETED | OUTPATIENT
Start: 2023-06-08 | End: 2023-06-08

## 2023-06-08 RX ORDER — CYANOCOBALAMIN 1000 UG/ML
1000 INJECTION, SOLUTION INTRAMUSCULAR; SUBCUTANEOUS ONCE
Status: COMPLETED | OUTPATIENT
Start: 2023-06-08 | End: 2023-06-08

## 2023-06-08 RX ORDER — ZOLEDRONIC ACID 0.04 MG/ML
4 INJECTION, SOLUTION INTRAVENOUS ONCE
Status: COMPLETED | OUTPATIENT
Start: 2023-06-08 | End: 2023-06-08

## 2023-06-08 RX ORDER — SODIUM CHLORIDE 9 MG/ML
250 INJECTION, SOLUTION INTRAVENOUS ONCE
Status: COMPLETED | OUTPATIENT
Start: 2023-06-08 | End: 2023-06-08

## 2023-06-08 RX ADMIN — ZOLEDRONIC ACID 4 MG: 0.04 INJECTION, SOLUTION INTRAVENOUS at 11:04

## 2023-06-08 RX ADMIN — SODIUM CHLORIDE 250 ML: 9 INJECTION, SOLUTION INTRAVENOUS at 11:04

## 2023-06-08 RX ADMIN — CYANOCOBALAMIN 1000 MCG: 1000 INJECTION, SOLUTION INTRAMUSCULAR at 11:05

## 2023-06-08 RX ADMIN — ACETAMINOPHEN 650 MG: 325 TABLET ORAL at 10:41

## 2023-06-09 LAB
ALBUMIN SERPL ELPH-MCNC: 3.6 G/DL (ref 2.9–4.4)
ALBUMIN/GLOB SERPL: 1.3 {RATIO} (ref 0.7–1.7)
ALPHA1 GLOB SERPL ELPH-MCNC: 0.2 G/DL (ref 0–0.4)
ALPHA2 GLOB SERPL ELPH-MCNC: 0.9 G/DL (ref 0.4–1)
B-GLOBULIN SERPL ELPH-MCNC: 1 G/DL (ref 0.7–1.3)
GAMMA GLOB SERPL ELPH-MCNC: 0.9 G/DL (ref 0.4–1.8)
GLOBULIN SER-MCNC: 3 G/DL (ref 2.2–3.9)
IGA SERPL-MCNC: 101 MG/DL (ref 64–422)
IGG SERPL-MCNC: 902 MG/DL (ref 586–1602)
IGM SERPL-MCNC: 27 MG/DL (ref 26–217)
INTERPRETATION SERPL IEP-IMP: ABNORMAL
KAPPA LC FREE SER-MCNC: 28.6 MG/L (ref 3.3–19.4)
KAPPA LC FREE/LAMBDA FREE SER: 1.37 {RATIO} (ref 0.26–1.65)
LABORATORY COMMENT REPORT: ABNORMAL
LAMBDA LC FREE SERPL-MCNC: 20.9 MG/L (ref 5.7–26.3)
M PROTEIN SERPL ELPH-MCNC: 0.2 G/DL
PROT SERPL-MCNC: 6.6 G/DL (ref 6–8.5)

## 2023-08-01 ENCOUNTER — SPECIALTY PHARMACY (OUTPATIENT)
Dept: PHARMACY | Facility: HOSPITAL | Age: 73
End: 2023-08-01
Payer: MEDICARE

## 2023-08-01 ENCOUNTER — TELEPHONE (OUTPATIENT)
Dept: ONCOLOGY | Facility: CLINIC | Age: 73
End: 2023-08-01
Payer: MEDICARE

## 2023-08-03 ENCOUNTER — SPECIALTY PHARMACY (OUTPATIENT)
Dept: PHARMACY | Facility: HOSPITAL | Age: 73
End: 2023-08-03
Payer: MEDICARE

## 2023-08-03 ENCOUNTER — INFUSION (OUTPATIENT)
Dept: ONCOLOGY | Facility: HOSPITAL | Age: 73
End: 2023-08-03
Payer: MEDICARE

## 2023-08-03 DIAGNOSIS — G63 NEUROPATHY ASSOCIATED WITH MULTIPLE MYELOMA: Primary | ICD-10-CM

## 2023-08-03 DIAGNOSIS — C90.00 NEUROPATHY ASSOCIATED WITH MULTIPLE MYELOMA: Primary | ICD-10-CM

## 2023-08-03 RX ORDER — CYANOCOBALAMIN 1000 UG/ML
1000 INJECTION, SOLUTION INTRAMUSCULAR; SUBCUTANEOUS ONCE
Status: DISCONTINUED | OUTPATIENT
Start: 2023-08-03 | End: 2023-08-04 | Stop reason: HOSPADM

## 2023-08-03 RX ORDER — LENALIDOMIDE 10 MG/1
CAPSULE ORAL
Qty: 21 CAPSULE | Refills: 0 | Status: SHIPPED | OUTPATIENT
Start: 2023-08-03

## 2023-08-23 ENCOUNTER — TRANSCRIBE ORDERS (OUTPATIENT)
Dept: NUCLEAR MEDICINE | Facility: HOSPITAL | Age: 73
End: 2023-08-23
Payer: MEDICARE

## 2023-08-23 ENCOUNTER — TRANSCRIBE ORDERS (OUTPATIENT)
Dept: CT IMAGING | Facility: HOSPITAL | Age: 73
End: 2023-08-23
Payer: MEDICARE

## 2023-08-23 DIAGNOSIS — M47.812 CERVICAL SPONDYLOSIS: Primary | ICD-10-CM

## 2023-08-28 ENCOUNTER — SPECIALTY PHARMACY (OUTPATIENT)
Dept: PHARMACY | Facility: HOSPITAL | Age: 73
End: 2023-08-28
Payer: MEDICARE

## 2023-08-28 RX ORDER — LENALIDOMIDE 10 MG/1
CAPSULE ORAL
Qty: 21 CAPSULE | Refills: 0 | Status: SHIPPED | OUTPATIENT
Start: 2023-08-28

## 2023-08-28 NOTE — TELEPHONE ENCOUNTER
Refill requested from pharmacy. Per last chart note, patient to continue 10 mg of Revlimid 21 days on and 7 off. Will route to MD for cosignature.    Felix Gilliam, PharmD, BCOP

## 2023-08-28 NOTE — PROGRESS NOTES
Re: Refills of Oral Specialty Medication - Revlimid (lenalidomide)    Drug-Drug Interactions: The current medication list was reviewed and there are no relevant drug-drug interactions.  Medication Allergies: The patient has NKDA  Review of Labs/Dose Adjustments: NO DOSE CHANGE - I reviewed the most recent note and labs and the patient will continue without any dose changes.  I sent refills as described below.    Drug: Revlimid (lenalidomide)  Strength: 10 mg  Directions: Take 1 capsule by mouth daily on days 1-21 of each 28 day cycle  Quantity: 21  Refills: 0  Pharmacy prescription sent to: [facility]    Name/Credentials: Felix Gilliam, Angle, BCOP    8/28/2023  11:40 EDT

## 2023-08-28 NOTE — PROGRESS NOTES
Drug: Revlimid (lenalidomide)  Strength: 10 mg  Directions: Take 1 capsule by mouth daily on days 1-21 of each 28 day cycle  Quantity: 21  Refills: 0  Pharmacy prescription sent to: [facility]  Completed independent double check on medication order/RX.

## 2023-08-31 ENCOUNTER — TELEPHONE (OUTPATIENT)
Dept: ONCOLOGY | Facility: CLINIC | Age: 73
End: 2023-08-31

## 2023-08-31 ENCOUNTER — APPOINTMENT (OUTPATIENT)
Dept: CT IMAGING | Facility: HOSPITAL | Age: 73
End: 2023-08-31
Payer: MEDICARE

## 2023-08-31 ENCOUNTER — INFUSION (OUTPATIENT)
Dept: ONCOLOGY | Facility: HOSPITAL | Age: 73
End: 2023-08-31
Payer: MEDICARE

## 2023-08-31 VITALS
WEIGHT: 154.2 LBS | SYSTOLIC BLOOD PRESSURE: 130 MMHG | RESPIRATION RATE: 16 BRPM | BODY MASS INDEX: 24.15 KG/M2 | DIASTOLIC BLOOD PRESSURE: 69 MMHG | OXYGEN SATURATION: 97 % | HEART RATE: 81 BPM | TEMPERATURE: 97.8 F

## 2023-08-31 DIAGNOSIS — M85.80 OSTEOPENIA, UNSPECIFIED LOCATION: ICD-10-CM

## 2023-08-31 DIAGNOSIS — G63 NEUROPATHY ASSOCIATED WITH MULTIPLE MYELOMA: ICD-10-CM

## 2023-08-31 DIAGNOSIS — C90.00 NEUROPATHY ASSOCIATED WITH MULTIPLE MYELOMA: ICD-10-CM

## 2023-08-31 DIAGNOSIS — C90.00 MULTIPLE MYELOMA NOT HAVING ACHIEVED REMISSION: Primary | ICD-10-CM

## 2023-08-31 LAB
ALBUMIN SERPL-MCNC: 3.7 G/DL (ref 3.5–5.2)
ALBUMIN/GLOB SERPL: 1.5 G/DL
ALP SERPL-CCNC: 59 U/L (ref 39–117)
ALT SERPL W P-5'-P-CCNC: 12 U/L (ref 1–33)
ANION GAP SERPL CALCULATED.3IONS-SCNC: 15.1 MMOL/L (ref 5–15)
AST SERPL-CCNC: 15 U/L (ref 1–32)
B2 MICROGLOB SERPL-MCNC: 2.3 MG/L (ref 0.8–2.2)
BASOPHILS # BLD AUTO: 0.05 10*3/MM3 (ref 0–0.2)
BASOPHILS NFR BLD AUTO: 1.3 % (ref 0–1.5)
BILIRUB SERPL-MCNC: 0.2 MG/DL (ref 0–1.2)
BUN SERPL-MCNC: 9 MG/DL (ref 8–23)
BUN/CREAT SERPL: 10.7 (ref 7–25)
CALCIUM SPEC-SCNC: 8.6 MG/DL (ref 8.6–10.5)
CHLORIDE SERPL-SCNC: 100 MMOL/L (ref 98–107)
CO2 SERPL-SCNC: 24.9 MMOL/L (ref 22–29)
CREAT SERPL-MCNC: 0.84 MG/DL (ref 0.6–1.1)
DEPRECATED RDW RBC AUTO: 57.3 FL (ref 37–54)
EGFRCR SERPLBLD CKD-EPI 2021: 73 ML/MIN/1.73
EOSINOPHIL # BLD AUTO: 0.25 10*3/MM3 (ref 0–0.4)
EOSINOPHIL NFR BLD AUTO: 6.6 % (ref 0.3–6.2)
ERYTHROCYTE [DISTWIDTH] IN BLOOD BY AUTOMATED COUNT: 15.3 % (ref 12.3–15.4)
GLOBULIN UR ELPH-MCNC: 2.4 GM/DL
GLUCOSE SERPL-MCNC: 92 MG/DL (ref 65–99)
HCT VFR BLD AUTO: 36.1 % (ref 34–46.6)
HGB BLD-MCNC: 11.9 G/DL (ref 12–15.9)
IMM GRANULOCYTES # BLD AUTO: 0 10*3/MM3 (ref 0–0.05)
IMM GRANULOCYTES NFR BLD AUTO: 0 % (ref 0–0.5)
LYMPHOCYTES # BLD AUTO: 1.7 10*3/MM3 (ref 0.7–3.1)
LYMPHOCYTES NFR BLD AUTO: 44.6 % (ref 19.6–45.3)
MAGNESIUM SERPL-MCNC: 2 MG/DL (ref 1.6–2.4)
MCH RBC QN AUTO: 33.6 PG (ref 26.6–33)
MCHC RBC AUTO-ENTMCNC: 33 G/DL (ref 31.5–35.7)
MCV RBC AUTO: 102 FL (ref 79–97)
MONOCYTES # BLD AUTO: 0.38 10*3/MM3 (ref 0.1–0.9)
MONOCYTES NFR BLD AUTO: 10 % (ref 5–12)
NEUTROPHILS NFR BLD AUTO: 1.43 10*3/MM3 (ref 1.7–7)
NEUTROPHILS NFR BLD AUTO: 37.5 % (ref 42.7–76)
NRBC BLD AUTO-RTO: 0 /100 WBC (ref 0–0.2)
PHOSPHATE SERPL-MCNC: 3.4 MG/DL (ref 2.5–4.5)
PLATELET # BLD AUTO: 236 10*3/MM3 (ref 140–450)
PMV BLD AUTO: 10 FL (ref 6–12)
POTASSIUM SERPL-SCNC: 4.4 MMOL/L (ref 3.5–5.2)
PROT SERPL-MCNC: 6.1 G/DL (ref 6–8.5)
RBC # BLD AUTO: 3.54 10*6/MM3 (ref 3.77–5.28)
SODIUM SERPL-SCNC: 140 MMOL/L (ref 136–145)
WBC NRBC COR # BLD: 3.81 10*3/MM3 (ref 3.4–10.8)

## 2023-08-31 PROCEDURE — 80053 COMPREHEN METABOLIC PANEL: CPT

## 2023-08-31 PROCEDURE — 83735 ASSAY OF MAGNESIUM: CPT

## 2023-08-31 PROCEDURE — 25010000002 ZOLEDRONIC ACID 4 MG/100ML SOLUTION: Performed by: INTERNAL MEDICINE

## 2023-08-31 PROCEDURE — 96374 THER/PROPH/DIAG INJ IV PUSH: CPT

## 2023-08-31 PROCEDURE — 96372 THER/PROPH/DIAG INJ SC/IM: CPT

## 2023-08-31 PROCEDURE — 85025 COMPLETE CBC W/AUTO DIFF WBC: CPT

## 2023-08-31 PROCEDURE — 25010000002 CYANOCOBALAMIN PER 1000 MCG: Performed by: INTERNAL MEDICINE

## 2023-08-31 PROCEDURE — 84100 ASSAY OF PHOSPHORUS: CPT

## 2023-08-31 PROCEDURE — 82232 ASSAY OF BETA-2 PROTEIN: CPT | Performed by: INTERNAL MEDICINE

## 2023-08-31 RX ORDER — ZOLEDRONIC ACID 0.04 MG/ML
4 INJECTION, SOLUTION INTRAVENOUS ONCE
Status: COMPLETED | OUTPATIENT
Start: 2023-08-31 | End: 2023-08-31

## 2023-08-31 RX ORDER — CYANOCOBALAMIN 1000 UG/ML
1000 INJECTION, SOLUTION INTRAMUSCULAR; SUBCUTANEOUS ONCE
Status: COMPLETED | OUTPATIENT
Start: 2023-08-31 | End: 2023-08-31

## 2023-08-31 RX ORDER — SODIUM CHLORIDE 9 MG/ML
250 INJECTION, SOLUTION INTRAVENOUS ONCE
Status: COMPLETED | OUTPATIENT
Start: 2023-08-31 | End: 2023-08-31

## 2023-08-31 RX ADMIN — CYANOCOBALAMIN 1000 MCG: 1000 INJECTION, SOLUTION INTRAMUSCULAR at 15:45

## 2023-08-31 RX ADMIN — ZOLEDRONIC ACID 4 MG: 0.04 INJECTION, SOLUTION INTRAVENOUS at 15:41

## 2023-08-31 RX ADMIN — SODIUM CHLORIDE 250 ML: 9 INJECTION, SOLUTION INTRAVENOUS at 15:41

## 2023-08-31 NOTE — TELEPHONE ENCOUNTER
"  Caller: Jennifer Plascencia \"Jennifer Plascencia\"    Relationship: Self    Best call back number: [phone]    What was the call regarding: JENNIFER CALLED REGARDING HER APPOINTMENT FOR THIS AFTERNOON. SHE IS SCHEDULED FOR HER B12 INJECTION NEXT THURSDAY. SHE IS NOT SURE WHY THAT IS SINCE SHE ALWAYS HAS THEM DONE THE SAME DAY. SHE IS NEEDING THAT CHANGED TO BE ABLE TO HAVE HER INJECTION TODAY AS WELL AS HER ZOMETA INFUSION.    "

## 2023-09-01 LAB
ALBUMIN SERPL ELPH-MCNC: 3.3 G/DL (ref 2.9–4.4)
ALBUMIN/GLOB SERPL: 1.3 {RATIO} (ref 0.7–1.7)
ALPHA1 GLOB SERPL ELPH-MCNC: 0.2 G/DL (ref 0–0.4)
ALPHA2 GLOB SERPL ELPH-MCNC: 0.8 G/DL (ref 0.4–1)
B-GLOBULIN SERPL ELPH-MCNC: 0.8 G/DL (ref 0.7–1.3)
GAMMA GLOB SERPL ELPH-MCNC: 0.9 G/DL (ref 0.4–1.8)
GLOBULIN SER-MCNC: 2.7 G/DL (ref 2.2–3.9)
IGA SERPL-MCNC: 104 MG/DL (ref 64–422)
IGG SERPL-MCNC: 842 MG/DL (ref 586–1602)
IGM SERPL-MCNC: 37 MG/DL (ref 26–217)
INTERPRETATION SERPL IEP-IMP: ABNORMAL
KAPPA LC FREE SER-MCNC: 36.1 MG/L (ref 3.3–19.4)
KAPPA LC FREE/LAMBDA FREE SER: 1.36 {RATIO} (ref 0.26–1.65)
LABORATORY COMMENT REPORT: ABNORMAL
LAMBDA LC FREE SERPL-MCNC: 26.6 MG/L (ref 5.7–26.3)
M PROTEIN SERPL ELPH-MCNC: 0.2 G/DL
PROT SERPL-MCNC: 6 G/DL (ref 6–8.5)

## 2023-09-08 ENCOUNTER — HOSPITAL ENCOUNTER (OUTPATIENT)
Dept: CT IMAGING | Facility: HOSPITAL | Age: 73
Discharge: HOME | End: 2023-09-08
Admitting: NEUROLOGICAL SURGERY
Payer: MEDICARE

## 2023-09-08 ENCOUNTER — HOSPITAL ENCOUNTER (OUTPATIENT)
Dept: NUCLEAR MEDICINE | Facility: HOSPITAL | Age: 73
Discharge: HOME | End: 2023-09-08
Payer: MEDICARE

## 2023-09-08 DIAGNOSIS — M47.812 CERVICAL SPONDYLOSIS: ICD-10-CM

## 2023-09-08 PROCEDURE — A9537 TC99M MEBROFENIN: HCPCS | Performed by: NEUROLOGICAL SURGERY

## 2023-09-08 PROCEDURE — 0 TECHNETIUM TC 99M MEBROFENIN KIT: Performed by: NEUROLOGICAL SURGERY

## 2023-09-08 PROCEDURE — 78803 RP LOCLZJ TUM SPECT 1 AREA: CPT

## 2023-09-08 PROCEDURE — 72125 CT NECK SPINE W/O DYE: CPT

## 2023-09-08 RX ORDER — KIT FOR THE PREPARATION OF TECHNETIUM TC 99M MEBROFENIN 45 MG/10ML
1 INJECTION, POWDER, LYOPHILIZED, FOR SOLUTION INTRAVENOUS
Status: COMPLETED | OUTPATIENT
Start: 2023-09-08 | End: 2023-09-08

## 2023-09-08 RX ADMIN — MEBROFENIN 1 DOSE: 45 INJECTION, POWDER, LYOPHILIZED, FOR SOLUTION INTRAVENOUS at 09:12

## 2023-09-26 ENCOUNTER — SPECIALTY PHARMACY (OUTPATIENT)
Dept: PHARMACY | Facility: HOSPITAL | Age: 73
End: 2023-09-26
Payer: MEDICARE

## 2023-09-26 RX ORDER — LENALIDOMIDE 10 MG/1
CAPSULE ORAL
Qty: 21 CAPSULE | Refills: 0 | Status: SHIPPED | OUTPATIENT
Start: 2023-09-26

## 2023-09-26 NOTE — PROGRESS NOTES
Re: Refills of Oral Specialty Medication - Revlimid (lenalidomide)    Drug-Drug Interactions: The current medication list was reviewed and there are no relevant drug-drug interactions with the specialty medication.  Medication Allergies: The patient has NKDA  Review of Labs/Dose Adjustments: NO DOSE CHANGE - I reviewed the most recent note and labs and the patient will continue without any dose changes.  I sent refills as described below.    Drug: Revlimid (lenalidomide)  Strength: 10 mg  Directions: Take 1 capsule by mouth daily on days 1-21 of each 28 day cycle  Quantity: 21  Refills: 0  Pharmacy prescription sent to: [facility]    Name/Credentials: Felix Gilliam PharmD, ADELAIDA    9/26/2023  11:07 EDT

## 2023-09-26 NOTE — PROGRESS NOTES
Drug: Revlimid (lenalidomide)  Strength: 10 mg  Directions: Take 1 capsule by mouth daily on days 1-21 of each 28 day cycle  Quantity: 21  Refills: 0  Pharmacy prescription sent to: [facility]    Completed independent double check on medication order/RX.  Sandra Hernandez, CeletsinaD, BCPS

## 2023-10-05 ENCOUNTER — INFUSION (OUTPATIENT)
Dept: ONCOLOGY | Facility: HOSPITAL | Age: 73
End: 2023-10-05
Payer: MEDICARE

## 2023-10-05 DIAGNOSIS — C90.00 NEUROPATHY ASSOCIATED WITH MULTIPLE MYELOMA: Primary | ICD-10-CM

## 2023-10-05 DIAGNOSIS — G63 NEUROPATHY ASSOCIATED WITH MULTIPLE MYELOMA: Primary | ICD-10-CM

## 2023-10-05 RX ORDER — CYANOCOBALAMIN 1000 UG/ML
1000 INJECTION, SOLUTION INTRAMUSCULAR; SUBCUTANEOUS ONCE
Status: CANCELLED | OUTPATIENT
Start: 2023-10-06

## 2023-10-05 RX ORDER — CYANOCOBALAMIN 1000 UG/ML
1000 INJECTION, SOLUTION INTRAMUSCULAR; SUBCUTANEOUS ONCE
Status: DISCONTINUED | OUTPATIENT
Start: 2023-10-05 | End: 2023-10-06 | Stop reason: HOSPADM

## 2023-10-06 ENCOUNTER — INFUSION (OUTPATIENT)
Dept: ONCOLOGY | Facility: HOSPITAL | Age: 73
End: 2023-10-06
Payer: MEDICARE

## 2023-10-06 ENCOUNTER — SPECIALTY PHARMACY (OUTPATIENT)
Dept: ONCOLOGY | Facility: HOSPITAL | Age: 73
End: 2023-10-06
Payer: MEDICARE

## 2023-10-06 ENCOUNTER — LAB (OUTPATIENT)
Dept: LAB | Facility: HOSPITAL | Age: 73
End: 2023-10-06
Payer: MEDICARE

## 2023-10-06 DIAGNOSIS — C90.00 NEUROPATHY ASSOCIATED WITH MULTIPLE MYELOMA: Primary | ICD-10-CM

## 2023-10-06 DIAGNOSIS — G63 NEUROPATHY ASSOCIATED WITH MULTIPLE MYELOMA: Primary | ICD-10-CM

## 2023-10-06 DIAGNOSIS — C90.00 MULTIPLE MYELOMA NOT HAVING ACHIEVED REMISSION: ICD-10-CM

## 2023-10-06 LAB
ALBUMIN SERPL-MCNC: 3.7 G/DL (ref 3.5–5.2)
ALBUMIN/GLOB SERPL: 1.7 G/DL
ALP SERPL-CCNC: 59 U/L (ref 39–117)
ALT SERPL W P-5'-P-CCNC: 10 U/L (ref 1–33)
ANION GAP SERPL CALCULATED.3IONS-SCNC: 11.3 MMOL/L (ref 5–15)
AST SERPL-CCNC: 15 U/L (ref 1–32)
B2 MICROGLOB SERPL-MCNC: 2.1 MG/L (ref 0.8–2.2)
BASOPHILS # BLD AUTO: 0.03 10*3/MM3 (ref 0–0.2)
BASOPHILS NFR BLD AUTO: 0.7 % (ref 0–1.5)
BILIRUB SERPL-MCNC: 0.3 MG/DL (ref 0–1.2)
BUN SERPL-MCNC: 10 MG/DL (ref 8–23)
BUN/CREAT SERPL: 13 (ref 7–25)
CALCIUM SPEC-SCNC: 7.9 MG/DL (ref 8.6–10.5)
CHLORIDE SERPL-SCNC: 102 MMOL/L (ref 98–107)
CO2 SERPL-SCNC: 24.7 MMOL/L (ref 22–29)
CREAT SERPL-MCNC: 0.77 MG/DL (ref 0.6–1.1)
DEPRECATED RDW RBC AUTO: 55.9 FL (ref 37–54)
EGFRCR SERPLBLD CKD-EPI 2021: 81.1 ML/MIN/1.73
EOSINOPHIL # BLD AUTO: 0.15 10*3/MM3 (ref 0–0.4)
EOSINOPHIL NFR BLD AUTO: 3.7 % (ref 0.3–6.2)
ERYTHROCYTE [DISTWIDTH] IN BLOOD BY AUTOMATED COUNT: 15 % (ref 12.3–15.4)
GLOBULIN UR ELPH-MCNC: 2.2 GM/DL
GLUCOSE SERPL-MCNC: 93 MG/DL (ref 65–99)
HCT VFR BLD AUTO: 35 % (ref 34–46.6)
HGB BLD-MCNC: 11.7 G/DL (ref 12–15.9)
IMM GRANULOCYTES # BLD AUTO: 0.01 10*3/MM3 (ref 0–0.05)
IMM GRANULOCYTES NFR BLD AUTO: 0.2 % (ref 0–0.5)
LYMPHOCYTES # BLD AUTO: 1.77 10*3/MM3 (ref 0.7–3.1)
LYMPHOCYTES NFR BLD AUTO: 43.2 % (ref 19.6–45.3)
MAGNESIUM SERPL-MCNC: 1.9 MG/DL (ref 1.6–2.4)
MCH RBC QN AUTO: 33.9 PG (ref 26.6–33)
MCHC RBC AUTO-ENTMCNC: 33.4 G/DL (ref 31.5–35.7)
MCV RBC AUTO: 101.4 FL (ref 79–97)
MONOCYTES # BLD AUTO: 0.55 10*3/MM3 (ref 0.1–0.9)
MONOCYTES NFR BLD AUTO: 13.4 % (ref 5–12)
NEUTROPHILS NFR BLD AUTO: 1.59 10*3/MM3 (ref 1.7–7)
NEUTROPHILS NFR BLD AUTO: 38.8 % (ref 42.7–76)
NRBC BLD AUTO-RTO: 0 /100 WBC (ref 0–0.2)
PLATELET # BLD AUTO: 199 10*3/MM3 (ref 140–450)
PMV BLD AUTO: 9.7 FL (ref 6–12)
POTASSIUM SERPL-SCNC: 3.7 MMOL/L (ref 3.5–5.2)
PROT SERPL-MCNC: 5.9 G/DL (ref 6–8.5)
RBC # BLD AUTO: 3.45 10*6/MM3 (ref 3.77–5.28)
SODIUM SERPL-SCNC: 138 MMOL/L (ref 136–145)
WBC NRBC COR # BLD: 4.1 10*3/MM3 (ref 3.4–10.8)

## 2023-10-06 PROCEDURE — 25010000002 CYANOCOBALAMIN PER 1000 MCG: Performed by: INTERNAL MEDICINE

## 2023-10-06 PROCEDURE — 80053 COMPREHEN METABOLIC PANEL: CPT

## 2023-10-06 PROCEDURE — 82232 ASSAY OF BETA-2 PROTEIN: CPT | Performed by: INTERNAL MEDICINE

## 2023-10-06 PROCEDURE — 96372 THER/PROPH/DIAG INJ SC/IM: CPT

## 2023-10-06 PROCEDURE — 83735 ASSAY OF MAGNESIUM: CPT

## 2023-10-06 PROCEDURE — 85025 COMPLETE CBC W/AUTO DIFF WBC: CPT

## 2023-10-06 PROCEDURE — 36415 COLL VENOUS BLD VENIPUNCTURE: CPT

## 2023-10-06 RX ORDER — CYANOCOBALAMIN 1000 UG/ML
1000 INJECTION, SOLUTION INTRAMUSCULAR; SUBCUTANEOUS ONCE
Status: COMPLETED | OUTPATIENT
Start: 2023-10-06 | End: 2023-10-06

## 2023-10-06 RX ADMIN — CYANOCOBALAMIN 1000 MCG: 1000 INJECTION, SOLUTION INTRAMUSCULAR at 09:21

## 2023-10-06 NOTE — PROGRESS NOTES
Specialty Pharmacy Patient Management Program  Oncology 6-Month Clinical Assessment       Jennifer Plascencia is a 73 y.o. female with multiple myeloma seen today to assess adherence and side effects.    Regimen: Revlimid 10mg PO daily (21 days on/7 days off)    Reason for Outreach: Routine medication check-in .         Goals Addressed Today    None       Medication Assessment:  Medication Assessment   Side effects: Patient reports side effects of diarrhea and neuropathy with her Revlimid. She is mostly able to control her diarrhea at home with colesevelam. She is on both gabapentin and duloxetine for neuropathy. Patient states that the gabapentin does not help much, but that the duloxetine works very well.  Administration: Patient is taking every day at the same time  and as prescribed .   Patient can self administer medications: yes  Medication Follow-Up Plan: Next clinical assessment  Lab Review: The labs listed below have been reviewed. No dose adjustments are needed for the oral specialty medication(s) based on the labs.    Lab Results   Component Value Date    GLUCOSE 92 08/31/2023    CALCIUM 8.6 08/31/2023     08/31/2023    K 4.4 08/31/2023    CO2 24.9 08/31/2023     08/31/2023    BUN 9 08/31/2023    CREATININE 0.84 08/31/2023    EGFRIFAFRI 108 09/18/2018    EGFRIFNONA 61 02/08/2022    BCR 10.7 08/31/2023    ANIONGAP 15.1 (H) 08/31/2023     Lab Results   Component Value Date    WBC 3.81 08/31/2023    RBC 3.54 (L) 08/31/2023    HGB 11.9 (L) 08/31/2023    HCT 36.1 08/31/2023    .0 (H) 08/31/2023    MCH 33.6 (H) 08/31/2023    MCHC 33.0 08/31/2023    RDW 15.3 08/31/2023    RDWSD 57.3 (H) 08/31/2023    MPV 10.0 08/31/2023     08/31/2023    NEUTRORELPCT 37.5 (L) 08/31/2023    LYMPHORELPCT 44.6 08/31/2023    MONORELPCT 10.0 08/31/2023    EOSRELPCT 6.6 (H) 08/31/2023    BASORELPCT 1.3 08/31/2023    AUTOIGPER 0.0 08/31/2023    NEUTROABS 1.43 (L) 08/31/2023    LYMPHSABS 1.70 08/31/2023    MONOSABS  0.38 08/31/2023    EOSABS 0.25 08/31/2023    BASOSABS 0.05 08/31/2023    AUTOIGNUM 0.00 08/31/2023    NRBC 0.0 08/31/2023     Drug-drug interactions  Completed medication reconciliation today to assess for drug interactions. Patient denies starting or stopping any medications.    Assessed medication list for interactions, no significant drug interactions noted.   Advised patient to call the clinic if any new medications are started so we can assess for drug-drug interactions.  Drug-food interactions discussed:  none  Vaccines are coordinated by the patient's oncologist and primary care provider.    Allergies  Known allergies and reactions were discussed with the patient. The patient's chart has been reviewed for allergy information and updated as necessary.   No Known Allergies     Hospitalizations and Urgent Care Visits Since Last Assessment:  Unplanned hospitalizations or inpatient admissions: none  ED Visits: none  Urgent Office Visits: none    Adherence Assessment:   Patient reports % adherence to her medications. She admits to forgetting her medication fairly often but is good about taking the missed dose within a few hours.    Quality of Life Assessment:     -- Quality of Life: 6/10    Financial Assessment:  Medication availability/affordability: Patient has had no issues obtaining medication from pharmacy.    Attestation     I attest the patient was actively involved in and has agreed to the above plan of care.  If the prescribed therapy is at any point deemed not appropriate based on the current or future assessments, a consultation will be initiated with the patient's specialty care provider to determine the best course of action. The revised plan of therapy will be documented along with any required assessments and/or additional patient education provided.       All questions addressed and patient had no additional concerns. Patient has pharmacy contact information.    Karen Parmar, Pharmacy  Student    10/6/2023  09:34 EDT    Felix Gilliam, PharmD, BCOP

## 2023-10-09 ENCOUNTER — SPECIALTY PHARMACY (OUTPATIENT)
Dept: PHARMACY | Facility: HOSPITAL | Age: 73
End: 2023-10-09
Payer: MEDICARE

## 2023-10-09 LAB
ALBUMIN SERPL ELPH-MCNC: 3.2 G/DL (ref 2.9–4.4)
ALBUMIN/GLOB SERPL: 1.3 {RATIO} (ref 0.7–1.7)
ALPHA1 GLOB SERPL ELPH-MCNC: 0.2 G/DL (ref 0–0.4)
ALPHA2 GLOB SERPL ELPH-MCNC: 0.7 G/DL (ref 0.4–1)
B-GLOBULIN SERPL ELPH-MCNC: 0.9 G/DL (ref 0.7–1.3)
GAMMA GLOB SERPL ELPH-MCNC: 0.8 G/DL (ref 0.4–1.8)
GLOBULIN SER-MCNC: 2.6 G/DL (ref 2.2–3.9)
IGA SERPL-MCNC: 100 MG/DL (ref 64–422)
IGG SERPL-MCNC: 825 MG/DL (ref 586–1602)
IGM SERPL-MCNC: 34 MG/DL (ref 26–217)
INTERPRETATION SERPL IEP-IMP: ABNORMAL
KAPPA LC FREE SER-MCNC: 34.3 MG/L (ref 3.3–19.4)
KAPPA LC FREE/LAMBDA FREE SER: 1.39 {RATIO} (ref 0.26–1.65)
LABORATORY COMMENT REPORT: ABNORMAL
LAMBDA LC FREE SERPL-MCNC: 24.6 MG/L (ref 5.7–26.3)
M PROTEIN SERPL ELPH-MCNC: 0.2 G/DL
PROT SERPL-MCNC: 5.8 G/DL (ref 6–8.5)

## 2023-10-23 ENCOUNTER — SPECIALTY PHARMACY (OUTPATIENT)
Dept: PHARMACY | Facility: HOSPITAL | Age: 73
End: 2023-10-23
Payer: MEDICARE

## 2023-10-23 RX ORDER — LENALIDOMIDE 10 MG/1
CAPSULE ORAL
Qty: 21 CAPSULE | Refills: 0 | Status: SHIPPED | OUTPATIENT
Start: 2023-10-23

## 2023-10-23 NOTE — PROGRESS NOTES
Re: Refills of Oral Specialty Medication - Revlimid (lenalidomide)    Drug-Drug Interactions: The current medication list was reviewed and there are no relevant drug-drug interactions with the specialty medication.  Medication Allergies: The patient has NKDA  Review of Labs/Dose Adjustments: NO DOSE CHANGE - I reviewed the most recent note and labs and the patient will continue without any dose changes.  I sent refills as described below.    Drug: Revlimid (lenalidomide)  Strength: 10 mg  Directions: Take 1 capsule by mouth daily and daily on days 1-21 of each 28 day cycle  Quantity: 21  Refills: 0  Pharmacy prescription sent to: [facility]    Name/Credentials: Felix Gilliam PharmD, ADELAIDA    10/23/2023  12:25 EDT

## 2023-10-23 NOTE — PROGRESS NOTES
Drug: Revlimid (lenalidomide)  Strength: 10 mg  Directions: Take 1 capsule by mouth daily and daily on days 1-21 of each 28 day cycle  Quantity: 21  Refills: 0  Pharm[facility] prescription sent to: [facility]  Completed independent double check on medication order/RX.   Qiana Ellis RPH, BCOP

## 2023-11-02 ENCOUNTER — INFUSION (OUTPATIENT)
Dept: ONCOLOGY | Facility: HOSPITAL | Age: 73
End: 2023-11-02
Payer: MEDICARE

## 2023-11-02 ENCOUNTER — LAB (OUTPATIENT)
Dept: LAB | Facility: HOSPITAL | Age: 73
End: 2023-11-02
Payer: MEDICARE

## 2023-11-02 DIAGNOSIS — C90.00 MULTIPLE MYELOMA NOT HAVING ACHIEVED REMISSION: ICD-10-CM

## 2023-11-02 DIAGNOSIS — C90.00 NEUROPATHY ASSOCIATED WITH MULTIPLE MYELOMA: Primary | ICD-10-CM

## 2023-11-02 DIAGNOSIS — G63 NEUROPATHY ASSOCIATED WITH MULTIPLE MYELOMA: Primary | ICD-10-CM

## 2023-11-02 LAB
ALBUMIN SERPL-MCNC: 3.5 G/DL (ref 3.5–5.2)
ALBUMIN/GLOB SERPL: 1.8 G/DL
ALP SERPL-CCNC: 53 U/L (ref 39–117)
ALT SERPL W P-5'-P-CCNC: 9 U/L (ref 1–33)
ANION GAP SERPL CALCULATED.3IONS-SCNC: 9.5 MMOL/L (ref 5–15)
AST SERPL-CCNC: 15 U/L (ref 1–32)
B2 MICROGLOB SERPL-MCNC: 2.1 MG/L (ref 0.8–2.2)
BASOPHILS # BLD AUTO: 0.07 10*3/MM3 (ref 0–0.2)
BASOPHILS NFR BLD AUTO: 2.1 % (ref 0–1.5)
BILIRUB SERPL-MCNC: 0.2 MG/DL (ref 0–1.2)
BUN SERPL-MCNC: 14 MG/DL (ref 8–23)
BUN/CREAT SERPL: 18.2 (ref 7–25)
CALCIUM SPEC-SCNC: 8.3 MG/DL (ref 8.6–10.5)
CHLORIDE SERPL-SCNC: 102 MMOL/L (ref 98–107)
CO2 SERPL-SCNC: 27.5 MMOL/L (ref 22–29)
CREAT SERPL-MCNC: 0.77 MG/DL (ref 0.6–1.1)
DEPRECATED RDW RBC AUTO: 53.5 FL (ref 37–54)
EGFRCR SERPLBLD CKD-EPI 2021: 81.1 ML/MIN/1.73
EOSINOPHIL # BLD AUTO: 0.26 10*3/MM3 (ref 0–0.4)
EOSINOPHIL NFR BLD AUTO: 7.7 % (ref 0.3–6.2)
ERYTHROCYTE [DISTWIDTH] IN BLOOD BY AUTOMATED COUNT: 14.3 % (ref 12.3–15.4)
GLOBULIN UR ELPH-MCNC: 2 GM/DL
GLUCOSE SERPL-MCNC: 89 MG/DL (ref 65–99)
HCT VFR BLD AUTO: 35.4 % (ref 34–46.6)
HGB BLD-MCNC: 11.5 G/DL (ref 12–15.9)
IMM GRANULOCYTES # BLD AUTO: 0.01 10*3/MM3 (ref 0–0.05)
IMM GRANULOCYTES NFR BLD AUTO: 0.3 % (ref 0–0.5)
LYMPHOCYTES # BLD AUTO: 1.28 10*3/MM3 (ref 0.7–3.1)
LYMPHOCYTES NFR BLD AUTO: 38 % (ref 19.6–45.3)
MAGNESIUM SERPL-MCNC: 2 MG/DL (ref 1.6–2.4)
MCH RBC QN AUTO: 33.2 PG (ref 26.6–33)
MCHC RBC AUTO-ENTMCNC: 32.5 G/DL (ref 31.5–35.7)
MCV RBC AUTO: 102.3 FL (ref 79–97)
MONOCYTES # BLD AUTO: 0.7 10*3/MM3 (ref 0.1–0.9)
MONOCYTES NFR BLD AUTO: 20.8 % (ref 5–12)
NEUTROPHILS NFR BLD AUTO: 1.05 10*3/MM3 (ref 1.7–7)
NEUTROPHILS NFR BLD AUTO: 31.1 % (ref 42.7–76)
NRBC BLD AUTO-RTO: 0.6 /100 WBC (ref 0–0.2)
PLATELET # BLD AUTO: 195 10*3/MM3 (ref 140–450)
PMV BLD AUTO: 9.6 FL (ref 6–12)
POTASSIUM SERPL-SCNC: 4.3 MMOL/L (ref 3.5–5.2)
PROT SERPL-MCNC: 5.5 G/DL (ref 6–8.5)
RBC # BLD AUTO: 3.46 10*6/MM3 (ref 3.77–5.28)
SODIUM SERPL-SCNC: 139 MMOL/L (ref 136–145)
WBC NRBC COR # BLD: 3.37 10*3/MM3 (ref 3.4–10.8)

## 2023-11-02 PROCEDURE — 80053 COMPREHEN METABOLIC PANEL: CPT

## 2023-11-02 PROCEDURE — 85025 COMPLETE CBC W/AUTO DIFF WBC: CPT

## 2023-11-02 PROCEDURE — 83735 ASSAY OF MAGNESIUM: CPT

## 2023-11-02 PROCEDURE — 82232 ASSAY OF BETA-2 PROTEIN: CPT | Performed by: INTERNAL MEDICINE

## 2023-11-02 PROCEDURE — 96372 THER/PROPH/DIAG INJ SC/IM: CPT

## 2023-11-02 PROCEDURE — 36415 COLL VENOUS BLD VENIPUNCTURE: CPT

## 2023-11-02 PROCEDURE — 25010000002 CYANOCOBALAMIN PER 1000 MCG: Performed by: INTERNAL MEDICINE

## 2023-11-02 RX ORDER — CYANOCOBALAMIN 1000 UG/ML
1000 INJECTION, SOLUTION INTRAMUSCULAR; SUBCUTANEOUS ONCE
Status: COMPLETED | OUTPATIENT
Start: 2023-11-02 | End: 2023-11-02

## 2023-11-02 RX ADMIN — CYANOCOBALAMIN 1000 MCG: 1000 INJECTION, SOLUTION INTRAMUSCULAR at 09:32

## 2023-11-03 LAB
ALBUMIN SERPL ELPH-MCNC: 3.1 G/DL (ref 2.9–4.4)
ALBUMIN/GLOB SERPL: 1.2 {RATIO} (ref 0.7–1.7)
ALPHA1 GLOB SERPL ELPH-MCNC: 0.2 G/DL (ref 0–0.4)
ALPHA2 GLOB SERPL ELPH-MCNC: 0.7 G/DL (ref 0.4–1)
B-GLOBULIN SERPL ELPH-MCNC: 0.8 G/DL (ref 0.7–1.3)
GAMMA GLOB SERPL ELPH-MCNC: 0.8 G/DL (ref 0.4–1.8)
GLOBULIN SER-MCNC: 2.6 G/DL (ref 2.2–3.9)
IGA SERPL-MCNC: 105 MG/DL (ref 64–422)
IGG SERPL-MCNC: 817 MG/DL (ref 586–1602)
IGM SERPL-MCNC: 35 MG/DL (ref 26–217)
INTERPRETATION SERPL IEP-IMP: ABNORMAL
KAPPA LC FREE SER-MCNC: 29.5 MG/L (ref 3.3–19.4)
KAPPA LC FREE/LAMBDA FREE SER: 1.38 {RATIO} (ref 0.26–1.65)
LABORATORY COMMENT REPORT: ABNORMAL
LAMBDA LC FREE SERPL-MCNC: 21.3 MG/L (ref 5.7–26.3)
M PROTEIN SERPL ELPH-MCNC: 0.2 G/DL
PROT SERPL-MCNC: 5.7 G/DL (ref 6–8.5)

## 2023-11-16 ENCOUNTER — TELEPHONE (OUTPATIENT)
Dept: ONCOLOGY | Facility: CLINIC | Age: 73
End: 2023-11-16
Payer: MEDICARE

## 2023-11-16 ENCOUNTER — SPECIALTY PHARMACY (OUTPATIENT)
Dept: PHARMACY | Facility: HOSPITAL | Age: 73
End: 2023-11-16
Payer: MEDICARE

## 2023-11-16 RX ORDER — LENALIDOMIDE 5 MG/1
CAPSULE ORAL
Qty: 21 CAPSULE | Refills: 0 | Status: SHIPPED | OUTPATIENT
Start: 2023-11-16

## 2023-11-16 NOTE — TELEPHONE ENCOUNTER
Provider: Dr. Garcia   Caller: Malena Clinical Rn for Dr Coronado at [facility]  Relationship to Patient: Provider's office  Call Back Phone Number: [phone](direct phone)  Reason for Call: Medical office has some clinical questions for Shima

## 2023-11-16 NOTE — PROGRESS NOTES
Re: Refills of Oral Specialty Medication - Revlimid (lenalidomide)    Drug-Drug Interactions: The current medication list was reviewed and there are no relevant drug-drug interactions with the specialty medication.  Medication Allergies: The patient has NKDA  Review of Labs/Dose Adjustments: DOSE CHANGE - I reviewed the most recent note and labs. Due to neuropathy the dose is being reduced. I sent refills as described below.    Drug: Revlimid (lenalidomide)  Strength: 5 mg  Directions: Take 1 capsule by mouth daily on days 1-21 of each 28 day cycle  Quantity: 21  Refills: 0  Pharmacy prescription sent to: [facility]    Name/Credentials: Felix Gilliam, PharmD, BCOP  Clinical Oncology Pharmacist      11/16/2023  15:55 EST       Drug: Revlimid (lenalidomide)  Strength: 5 mg  Directions: Take 1 capsule by mouth daily on days 1-21 of each 28 day cycle  Quantity: 21  Refills: 0  Pharmacy prescription sent to: [facility]  Completed independent double check on medication order/RX.   Qiana Ellis, Juliana, BCOP

## 2023-11-16 NOTE — TELEPHONE ENCOUNTER
Spoke with Malena RN at [facility]. States pt reached out to Dr. Coronado reporting increasing peripheral neuropathy. Dr. Coronado advised pt to hold revlimid for 2-3 weeks and then resume at a dose of Revlimid 5mg (is currenlty taking 10mg dose). Gabapentin dose will remain the same.  Reviewed pt's scheduled appts and she is due to see Dr. Garcia on 11/29 and we will evaluate her s/e at that time as well

## 2023-11-21 ENCOUNTER — TELEPHONE (OUTPATIENT)
Dept: ONCOLOGY | Facility: CLINIC | Age: 73
End: 2023-11-21
Payer: MEDICARE

## 2023-11-21 NOTE — TELEPHONE ENCOUNTER
"  Caller: Jennifer Plascencia \"Jennifer Plascencia\"    Relationship: Self    Best call back number: [phone]    What is the best time to reach you: ANYTIME TODAY  OR TOMORROW     Who are you requesting to speak with (clinical staff, provider,  specific staff member): MELISSA SHAW       What was the call regarding: ASK TO PLEASE ALL BACK AS SOON AS CAN, NEEDS TO TALK TO MELISSA SHAW  ABOUT REGARDING MEDICATION SCHEDULE    Is it okay if the provider responds through MyChart: NO     "

## 2023-11-21 NOTE — TELEPHONE ENCOUNTER
Called the patient back and she wanted to make sure that she had the correct revlimid ordered for the 5mg take day day 1-21 and off 7 days. Patient stated she was  happy about this but confused since it was sent in last week that way when she was told by Dr. Coronado to have the revlimid decreased from 10mg to 5mg. Patient read back everything I let her know and she was confused why they had not called her from [facility] and she stated she would call them. Patient v/u.

## 2023-11-29 ENCOUNTER — OFFICE VISIT (OUTPATIENT)
Dept: ONCOLOGY | Facility: CLINIC | Age: 73
End: 2023-11-29
Payer: MEDICARE

## 2023-11-29 ENCOUNTER — INFUSION (OUTPATIENT)
Dept: ONCOLOGY | Facility: HOSPITAL | Age: 73
End: 2023-11-29
Payer: MEDICARE

## 2023-11-29 VITALS
HEIGHT: 67 IN | SYSTOLIC BLOOD PRESSURE: 131 MMHG | DIASTOLIC BLOOD PRESSURE: 77 MMHG | TEMPERATURE: 97.7 F | BODY MASS INDEX: 23.89 KG/M2 | HEART RATE: 85 BPM | WEIGHT: 152.2 LBS | OXYGEN SATURATION: 97 % | RESPIRATION RATE: 16 BRPM

## 2023-11-29 DIAGNOSIS — C90.00 MULTIPLE MYELOMA NOT HAVING ACHIEVED REMISSION: ICD-10-CM

## 2023-11-29 DIAGNOSIS — N39.45 CONTINUOUS LEAKAGE OF URINE: ICD-10-CM

## 2023-11-29 DIAGNOSIS — C90.00 NEUROPATHY ASSOCIATED WITH MULTIPLE MYELOMA: ICD-10-CM

## 2023-11-29 DIAGNOSIS — C90.00 MULTIPLE MYELOMA NOT HAVING ACHIEVED REMISSION: Primary | ICD-10-CM

## 2023-11-29 DIAGNOSIS — G63 NEUROPATHY ASSOCIATED WITH MULTIPLE MYELOMA: ICD-10-CM

## 2023-11-29 LAB
ALBUMIN SERPL-MCNC: 4 G/DL (ref 3.5–5.2)
ALBUMIN/GLOB SERPL: 1.4 G/DL
ALP SERPL-CCNC: 64 U/L (ref 39–117)
ALT SERPL W P-5'-P-CCNC: 10 U/L (ref 1–33)
ANION GAP SERPL CALCULATED.3IONS-SCNC: 12.5 MMOL/L (ref 5–15)
AST SERPL-CCNC: 18 U/L (ref 1–32)
B2 MICROGLOB SERPL-MCNC: 2 MG/L (ref 0.8–2.2)
BASOPHILS # BLD AUTO: 0.05 10*3/MM3 (ref 0–0.2)
BASOPHILS NFR BLD AUTO: 1.3 % (ref 0–1.5)
BILIRUB SERPL-MCNC: 0.3 MG/DL (ref 0–1.2)
BUN SERPL-MCNC: 12 MG/DL (ref 8–23)
BUN/CREAT SERPL: 15 (ref 7–25)
CALCIUM SPEC-SCNC: 8.9 MG/DL (ref 8.6–10.5)
CHLORIDE SERPL-SCNC: 102 MMOL/L (ref 98–107)
CO2 SERPL-SCNC: 27.5 MMOL/L (ref 22–29)
CREAT SERPL-MCNC: 0.8 MG/DL (ref 0.6–1.1)
DEPRECATED RDW RBC AUTO: 50 FL (ref 37–54)
EGFRCR SERPLBLD CKD-EPI 2021: 77.4 ML/MIN/1.73
EOSINOPHIL # BLD AUTO: 0.09 10*3/MM3 (ref 0–0.4)
EOSINOPHIL NFR BLD AUTO: 2.4 % (ref 0.3–6.2)
ERYTHROCYTE [DISTWIDTH] IN BLOOD BY AUTOMATED COUNT: 14 % (ref 12.3–15.4)
GLOBULIN UR ELPH-MCNC: 2.8 GM/DL
GLUCOSE SERPL-MCNC: 102 MG/DL (ref 65–99)
HCT VFR BLD AUTO: 37.3 % (ref 34–46.6)
HGB BLD-MCNC: 12.7 G/DL (ref 12–15.9)
IMM GRANULOCYTES # BLD AUTO: 0.01 10*3/MM3 (ref 0–0.05)
IMM GRANULOCYTES NFR BLD AUTO: 0.3 % (ref 0–0.5)
LYMPHOCYTES # BLD AUTO: 1.99 10*3/MM3 (ref 0.7–3.1)
LYMPHOCYTES NFR BLD AUTO: 52.1 % (ref 19.6–45.3)
MAGNESIUM SERPL-MCNC: 1.9 MG/DL (ref 1.6–2.4)
MCH RBC QN AUTO: 33.5 PG (ref 26.6–33)
MCHC RBC AUTO-ENTMCNC: 34 G/DL (ref 31.5–35.7)
MCV RBC AUTO: 98.4 FL (ref 79–97)
MONOCYTES # BLD AUTO: 0.44 10*3/MM3 (ref 0.1–0.9)
MONOCYTES NFR BLD AUTO: 11.5 % (ref 5–12)
NEUTROPHILS NFR BLD AUTO: 1.24 10*3/MM3 (ref 1.7–7)
NEUTROPHILS NFR BLD AUTO: 32.4 % (ref 42.7–76)
NRBC BLD AUTO-RTO: 0 /100 WBC (ref 0–0.2)
PHOSPHATE SERPL-MCNC: 3.5 MG/DL (ref 2.5–4.5)
PLATELET # BLD AUTO: 244 10*3/MM3 (ref 140–450)
PMV BLD AUTO: 8.9 FL (ref 6–12)
POTASSIUM SERPL-SCNC: 3.7 MMOL/L (ref 3.5–5.2)
PROT SERPL-MCNC: 6.8 G/DL (ref 6–8.5)
RBC # BLD AUTO: 3.79 10*6/MM3 (ref 3.77–5.28)
SODIUM SERPL-SCNC: 142 MMOL/L (ref 136–145)
WBC NRBC COR # BLD AUTO: 3.82 10*3/MM3 (ref 3.4–10.8)

## 2023-11-29 PROCEDURE — 96372 THER/PROPH/DIAG INJ SC/IM: CPT

## 2023-11-29 PROCEDURE — 83735 ASSAY OF MAGNESIUM: CPT

## 2023-11-29 PROCEDURE — 96374 THER/PROPH/DIAG INJ IV PUSH: CPT

## 2023-11-29 PROCEDURE — 25010000002 ZOLEDRONIC ACID 4 MG/100ML SOLUTION: Performed by: INTERNAL MEDICINE

## 2023-11-29 PROCEDURE — 80053 COMPREHEN METABOLIC PANEL: CPT

## 2023-11-29 PROCEDURE — 25010000002 CYANOCOBALAMIN PER 1000 MCG: Performed by: INTERNAL MEDICINE

## 2023-11-29 PROCEDURE — 84100 ASSAY OF PHOSPHORUS: CPT

## 2023-11-29 PROCEDURE — 82232 ASSAY OF BETA-2 PROTEIN: CPT | Performed by: INTERNAL MEDICINE

## 2023-11-29 PROCEDURE — 25810000003 SODIUM CHLORIDE 0.9 % SOLUTION: Performed by: INTERNAL MEDICINE

## 2023-11-29 PROCEDURE — 85025 COMPLETE CBC W/AUTO DIFF WBC: CPT

## 2023-11-29 RX ORDER — SODIUM CHLORIDE 9 MG/ML
250 INJECTION, SOLUTION INTRAVENOUS ONCE
Status: CANCELLED | OUTPATIENT
Start: 2023-11-29

## 2023-11-29 RX ORDER — CYANOCOBALAMIN 1000 UG/ML
1000 INJECTION, SOLUTION INTRAMUSCULAR; SUBCUTANEOUS ONCE
Status: COMPLETED | OUTPATIENT
Start: 2023-11-29 | End: 2023-11-29

## 2023-11-29 RX ORDER — SODIUM CHLORIDE 9 MG/ML
250 INJECTION, SOLUTION INTRAVENOUS ONCE
Status: COMPLETED | OUTPATIENT
Start: 2023-11-29 | End: 2023-11-29

## 2023-11-29 RX ORDER — ZOLEDRONIC ACID 0.04 MG/ML
4 INJECTION, SOLUTION INTRAVENOUS ONCE
Status: COMPLETED | OUTPATIENT
Start: 2023-11-29 | End: 2023-11-29

## 2023-11-29 RX ORDER — ZOLEDRONIC ACID 0.04 MG/ML
4 INJECTION, SOLUTION INTRAVENOUS ONCE
Status: CANCELLED | OUTPATIENT
Start: 2023-11-29

## 2023-11-29 RX ADMIN — ZOLEDRONIC ACID 4 MG: 0.04 INJECTION, SOLUTION INTRAVENOUS at 10:19

## 2023-11-29 RX ADMIN — SODIUM CHLORIDE 250 ML: 9 INJECTION, SOLUTION INTRAVENOUS at 10:16

## 2023-11-29 RX ADMIN — CYANOCOBALAMIN 1000 MCG: 1000 INJECTION, SOLUTION INTRAMUSCULAR at 10:16

## 2023-11-29 NOTE — PROGRESS NOTES
Subjective     REASON FOR FOLLOW-UP: Transfer of care for IgA kappa multiple myeloma post stem cell transplant in March 2016 at [facility] currently on maintenance Revlimid 10 mg 3 out of 4 weeks                             REQUESTING PHYSICIAN: MD Brett Turpin MD    History of Present Illness patient is a 73 y.o. female with an IgA kappa high risk myeloma post stem cell transplant on maintenance Revlimid 10 mg every 3 of 4 weeks after toxicity from Velcade and dexamethasone 6+ years out from stem cell transplant.      She is seen back today in 4-month follow-up.  She received Zometa 3 months ago  which she is still continuing on at an every 3-month interval.  she continues to have monthly paraprotein studies performed with M spike that has been undetectable and beta-2 microglobulin normal until last month when she had 0.2 g/dL of her M protein with a normal free light chain ratio    She did a video visit with Dr. Jacob reynoso and told him that she was having more pain in her legs which is likely progression of her peripheral neuropathy and need decrease her Revlimid to 5 mg 3 out of 4 weeks and her neuropathy is better    She has significant neuropathy in her neck and is being evaluated for this with an MRI and seeing the neurosurgeons    She is up-to-date with mammograms    She is back here today very anxious and hyper and manic almost.  She clearly needs to see somebody to adjust her medications she is on maximum dose Cymbalta and is still very anxious and agitated     she is also receiving monthly B12, due today.    Past Medical History:   Diagnosis Date    Anxiety     Depression     Disease of thyroid gland     Hashimoto's disease     History of vitamin D deficiency     Hypothyroidism     IBS (irritable bowel syndrome)     Mixed hyperlipidemia 3/12/2018    Multiple myeloma 2015    IgA kappa.  Stem cell transplant at [facility] 3/20/2016    Myeloma      KWAME (obstructive sleep apnea) 07/08/2021    Overnight polysomnogram.  Weight 173 pounds.  Mild KWAME with AHI 5.6 events per hour.  When supine, 9.4 events per hour.  During REM, moderate severity at 26 events per hour.  No sleep-related hypoxia.  The patient snored 20.5% of total sleep time.    Osteopenia     Overweight (BMI 25.0-29.9) 2/5/2018        Past Surgical History:   Procedure Laterality Date    BREAST BIOPSY      CATARACT EXTRACTION      COLONOSCOPY      TONSILLECTOMY  1956    VEIN SURGERY  1991      patient is a 70-year-old female with IgA kappa multiple myeloma diagnosed in mid 2015-high risk because of gain of 1 q- treatment with rev Dex Velcade initiated in 11/15-went on to stem cell transplant at [facility] in March 2016?  Melphalan.  She started post transplant therapy in May 2016 with Velcade rev Dex.  In September after 4 cycles Velcade was decreased to every other week with plans to continue rev Dex Velcade till progression because of high risk status.  In May 2019 her Velcade was discontinued because of worsening neuropathy.  Patient has remained on Revlimid 10 mg 21 out of 28 days since then with stable disease until November 2019 when a small IgG kappa paraprotein was noted on her blood tests which is distinct from her usual IgA kappa myeloma.  Restaging with PET scan was normal and since then the paraprotein as of 3/10/2020 has resolved    Patient has significant issues with anxiety and states that she had trouble getting her Revlimid in a timely fashion was very frustrated with this process at the [place] system-she states she was taken back when Dr. Hewitt suggested she transfer her care but is now happy to make a change    She is currently 2 days into her treatment cycle with Revlimid .she has no pain currently except intermittently in her neck where she has had some degenerative disease.  She does have neuropathy for which she is on fairly high doses of Cymbalta.  She is not taking her  gabapentin.    Continues on acyclovir for prevention and baby aspirin because of the Revlimid  She was having trouble with diarrhea from the Revlimid but this is controlled with WelChol.    She is  and lives by herself has no family nearby but good friends.  She does not smoke or drink.  She is up-to-date with mammography and does Cologuard instead of colonoscopy     have reviewed her labs in detail but cannot find any documentation of her transplant conditioning regimen (I assume it was melphalan)  or the depth of her response prior to transplant .  She states that the doctors at [facility] recommended to check her labs every month - she has been clinically BOZENA for 4 years and I suggested every other month testing but she is very adamant that she wants monthly testing      Current Outpatient Medications on File Prior to Visit   Medication Sig Dispense Refill    Acetylcarn-Alpha Lipoic Acid 400-200 MG capsule Take  by mouth.      acyclovir (ZOVIRAX) 400 MG tablet Take 1 tablet by mouth 2 (Two) Times a Day. 60 tablet 11    ALPRAZolam (XANAX) 0.25 MG tablet Take 1 tablet by mouth Daily As Needed for Anxiety. 30 tablet 2    amitriptyline (ELAVIL) 25 MG tablet TAKE 1-2 TABLET BY MOUTH EVERY NIGHT AS NEEDED FOR HEADACHES      aspirin 81 MG chewable tablet Chew 1 tablet Daily.      Azelastine HCl 137 MCG/SPRAY solution       B Complex Vitamins (VITAMIN B COMPLEX) capsule capsule Take 2 tablets by mouth Daily.      baclofen (LIORESAL) 10 MG tablet Take 1 tablet by mouth 3 (Three) Times a Day.  5    calcium carbonate-vitamin d 600-400 MG-UNIT per tablet Take 1 tablet by mouth Daily.      colesevelam (WELCHOL) 625 MG tablet Take 2 tablets by mouth 2 (Two) Times a Day With Meals. 90 tablet 2    cycloSPORINE (RESTASIS) 0.05 % ophthalmic emulsion 1 drop 2 (Two) Times a Day.      diphenoxylate-atropine (LOMOTIL) 2.5-0.025 MG per tablet Take 1 tablet by mouth 3 (Three) Times a Day As Needed for Diarrhea. 90 tablet 3     DULoxetine (CYMBALTA) 30 MG capsule Take 1 capsule by mouth Daily.      DULoxetine (CYMBALTA) 60 MG capsule Take 1 capsule by mouth Daily. 90 capsule 3    fexofenadine (ALLEGRA) 180 MG tablet Take 1 tablet by mouth As Needed.      fluticasone (FLONASE) 50 MCG/ACT nasal spray 2 sprays into the nostril(s) as directed by provider Daily.      folic acid (FOLVITE) 1 MG tablet Take 1 tablet by mouth Daily.  2    gabapentin (NEURONTIN) 100 MG capsule Take 1 capsule by mouth 2 (Two) Times a Day As Needed (Pain). 60 capsule 0    HYDROcodone-acetaminophen (NORCO) 5-325 MG per tablet Take 1 tablet by mouth Every 6 (Six) Hours As Needed for Moderate Pain  or Severe Pain . 60 tablet 0    lenalidomide (REVLIMID) 5 MG capsule Take one tablet daily for 21 days on, then 7 days off. 21 capsule 0    magnesium oxide (MAG-OX) 400 MG tablet Take 1 tablet by mouth Daily. 30 tablet 3    meloxicam (MOBIC) 15 MG tablet Take 1 tablet by mouth Daily.      Multiple Vitamins-Iron (DAILY-JONI/IRON/BETA-CAROTENE) tablet Take 1 tablet by mouth Daily.  2    omega-3 acid ethyl esters (LOVAZA) 1 g capsule Take 2 capsules by mouth.      promethazine-dextromethorphan (PROMETHAZINE-DM) 6.25-15 MG/5ML syrup TAKE 10 ML BY MOUTH TWICE A DAY AS NEEDED FOR COUGH      Synthroid 88 MCG tablet       ubrogepant 100 MG tablet As Needed.      valACYclovir (VALTREX) 1000 MG tablet As Needed.      vitamin D (ERGOCALCIFEROL) 67579 units capsule capsule Take 1 capsule by mouth 1 (One) Time Per Week. Twice a week       No current facility-administered medications on file prior to visit.        ALLERGIES:    No Known Allergies     Social History     Socioeconomic History    Marital status:     Number of children: 2   Tobacco Use    Smoking status: Former     Packs/day: 0.25     Years: 2.00     Additional pack years: 0.00     Total pack years: 0.50     Types: Cigarettes     Start date:      Quit date:      Years since quittin.9    Smokeless tobacco:  "Never    Tobacco comments:     Only lightly for 2 years   Vaping Use    Vaping Use: Never used   Substance and Sexual Activity    Alcohol use: Not Currently     Alcohol/week: 0.0 standard drinks of alcohol     Comment: Na    Drug use: Never    Sexual activity: Not Currently     Partners: Male     Birth control/protection: None     Comment: Na        Family History   Problem Relation Age of Onset    Breast cancer Mother     Sleep apnea Mother     Lymphoma Father     Sleep apnea Father     Cancer Father     Kidney cancer Paternal Grandmother     Cancer Maternal Aunt                 Review of Systems   Constitutional:  Positive for fatigue (stable).   Respiratory:  Negative for cough, choking, chest tightness and shortness of breath.    Cardiovascular:  Negative for chest pain.   Gastrointestinal:  Negative for abdominal pain, constipation, nausea and vomiting.   Neurological:  Positive for numbness (Den,foot/hand tingling no numbness). Negative for headaches (Migraine headaches worse since C-spine surgery).   Psychiatric/Behavioral:  Negative for dysphoric mood. The patient is nervous/anxious (same).    All other systems reviewed and are negative.    ROS reviewed and updated 23    Objective     Vitals:    23 0923   BP: 131/77   Pulse: 85   Resp: 16   Temp: 97.7 °F (36.5 °C)   TempSrc: Temporal   SpO2: 97%   Weight: 69 kg (152 lb 3.2 oz)   Height: 170.2 cm (67.01\")   PainSc: 0-No pain           2023     9:03 AM   Current Status   ECOG score 0       Physical Exam     CONSTITUTIONAL:  Vital signs reviewed.  No distress, looks comfortable.  EYES:  Conjunctiva and lids unremarkable.    RESPIRATORY:  Normal respiratory effort.  Lungs clear to auscultation bilaterally.  CARDIOVASCULAR:  Normal S1, S2.  No murmurs rubs or gallops.  No significant lower extremity edema.  EXT-no edema    I have reexamined the patient and the results are consistent with the previously documented exam. Yoni Pike" MD Jose       RECENT LABS:  Results from last 7 days   Lab Units 11/29/23  0909   WBC 10*3/mm3 3.82   NEUTROS ABS 10*3/mm3 1.24*   HEMOGLOBIN g/dL 12.7   HEMATOCRIT % 37.3   PLATELETS 10*3/mm3 244     Results from last 7 days   Lab Units 11/29/23  0909   SODIUM mmol/L 142   POTASSIUM mmol/L 3.7   CHLORIDE mmol/L 102   CO2 mmol/L 27.5   BUN mg/dL 12   CREATININE mg/dL 0.80   CALCIUM mg/dL 8.9   ALBUMIN g/dL 4.0   BILIRUBIN mg/dL 0.3   ALK PHOS U/L 64   ALT (SGPT) U/L 10   AST (SGOT) U/L 18   GLUCOSE mg/dL 102*   MAGNESIUM mg/dL 1.9                         Assessment & Plan      1.  IgA kappa multiple myeloma diagnosed in 2015 treated with RVD  Stem cell transplant at [facility] in 3/2016  Maintenance treatment with Velcade rev Dex started in 5/16  Velcade discontinued because of neurotoxicity in 5/19  Small IgG kappa paraprotein seen on blood work in 10/19-PET scan negative protein disappeared by 3/20  Zometa given every 3 months   Acyclovir and aspirin prophylaxis  Decrease Revlimid to 10 mg 2 out of 4 weeks in 8/21  Patient increased back to 3 out of 4 weeks with stable counts for now.  Myeloma parameters stable as of 03/2023  0.2 g of IgGK noted in 6/23 which is not her original paraprotein significance unclear continue monitoring  Revlimid decreased to 5 mg in 11/23 for worsening neuropathy parameters stable    2.  Anxiety  3.  Hypothyroidism on treatment  4.  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  Diarrhea due to treatment improved with WelChol  6.  Snoring and fatigue probable sleep apnea-CPAP instituted  7.  Vaccinated against coronavirus    Plan:  1.  Continue 5 mg of Revlimid 3 out of 4 weeks  2.  Continue monthly B12 and myeloma labs WILLIAM plus beta-2 microglobulin  3.  Zometa every 3 months, due again today  4.  Refer to supportive oncology for anxiety.    I spent 35 total minutes, face-to-face, caring for Jennifer today. Greater than 50% of this time involved counseling and/or coordination of  care as documented within this note.

## 2023-11-30 ENCOUNTER — SPECIALTY PHARMACY (OUTPATIENT)
Dept: PHARMACY | Facility: HOSPITAL | Age: 73
End: 2023-11-30
Payer: MEDICARE

## 2023-12-01 LAB
ALBUMIN SERPL ELPH-MCNC: 3.5 G/DL (ref 2.9–4.4)
ALBUMIN/GLOB SERPL: 1.2 {RATIO} (ref 0.7–1.7)
ALPHA1 GLOB SERPL ELPH-MCNC: 0.2 G/DL (ref 0–0.4)
ALPHA2 GLOB SERPL ELPH-MCNC: 0.8 G/DL (ref 0.4–1)
B-GLOBULIN SERPL ELPH-MCNC: 1 G/DL (ref 0.7–1.3)
GAMMA GLOB SERPL ELPH-MCNC: 0.9 G/DL (ref 0.4–1.8)
GLOBULIN SER-MCNC: 3 G/DL (ref 2.2–3.9)
IGA SERPL-MCNC: 119 MG/DL (ref 64–422)
IGG SERPL-MCNC: 1021 MG/DL (ref 586–1602)
IGM SERPL-MCNC: 39 MG/DL (ref 26–217)
INTERPRETATION SERPL IEP-IMP: ABNORMAL
KAPPA LC FREE SER-MCNC: 26.4 MG/L (ref 3.3–19.4)
KAPPA LC FREE/LAMBDA FREE SER: 1.49 {RATIO} (ref 0.26–1.65)
LABORATORY COMMENT REPORT: ABNORMAL
LAMBDA LC FREE SERPL-MCNC: 17.7 MG/L (ref 5.7–26.3)
M PROTEIN SERPL ELPH-MCNC: 0.3 G/DL
PROT SERPL-MCNC: 6.5 G/DL (ref 6–8.5)

## 2023-12-14 ENCOUNTER — TELEPHONE (OUTPATIENT)
Dept: ONCOLOGY | Facility: CLINIC | Age: 73
End: 2023-12-14
Payer: MEDICARE

## 2023-12-14 NOTE — TELEPHONE ENCOUNTER
Provider: Jose  Caller: patient  Relationship to Patient: self  Call Back Phone Number: [phone]  Reason for Call: patient wants to ask questions about medication

## 2023-12-14 NOTE — TELEPHONE ENCOUNTER
Called the patient to see what questions she had. She stated she told the phone people she wanted chuck. I explained that I could send her a message but she stated that she would tell me. She stated that she needed a refill for her Revlimid and she wanted a update on it. I let her knwo I would send a message to Felix in [facility] and keep her updated. She v/u.

## 2023-12-18 ENCOUNTER — SPECIALTY PHARMACY (OUTPATIENT)
Dept: PHARMACY | Facility: HOSPITAL | Age: 73
End: 2023-12-18
Payer: MEDICARE

## 2023-12-18 RX ORDER — LENALIDOMIDE 5 MG/1
CAPSULE ORAL
Qty: 21 CAPSULE | Refills: 0 | Status: SHIPPED | OUTPATIENT
Start: 2023-12-18

## 2023-12-18 NOTE — PROGRESS NOTES
Re: Refills of Oral Specialty Medication - Revlimid (lenalidomide)    Drug-Drug Interactions: The current medication list was reviewed and there are no relevant drug-drug interactions with the specialty medication.  Medication Allergies: The patient has NKDA  Review of Labs/Dose Adjustments: NO DOSE CHANGE - I reviewed the most recent note and labs and the patient will continue without any dose changes.  I sent refills as described below.    Drug: Revlimid (lenalidomide)  Strength: 5 mg  Directions: Take 1 capsule by mouth daily on days 1-21 of each 28 day cycle  Quantity: 21  Refills: 0  [facility]    Name/Credentials: Felix Gilliam, Angle, BCOP  Clinical Oncology Pharmacist    12/18/2023  14:49 EST

## 2023-12-18 NOTE — TELEPHONE ENCOUNTER
Refill requested from pharmacy. Per last chart note, patient to continue 5 mg of Revlimid 3 out of 4 weeks . Will route to MD for cosignature.    Felix Gilliam, PharmD, [facility]  Clinical Oncology Pharmacist

## 2023-12-18 NOTE — PROGRESS NOTES
Specialty Pharmacy Note: Revlimid (lenalidomide)    Drug: Revlimid (lenalidomide)  Strength: 5 mg  Directions: Take 1 capsule by mouth daily on days 1-21 of each 28 day cycle  Quantity: 21  Refills: 0  [facility]       Completed independent double check on medication order/RX.      Thanks,    Kristen HALL, PharmD

## 2023-12-27 ENCOUNTER — INFUSION (OUTPATIENT)
Dept: ONCOLOGY | Facility: HOSPITAL | Age: 73
End: 2023-12-27
Payer: MEDICARE

## 2023-12-27 ENCOUNTER — LAB (OUTPATIENT)
Dept: LAB | Facility: HOSPITAL | Age: 73
End: 2023-12-27
Payer: MEDICARE

## 2023-12-27 DIAGNOSIS — G63 NEUROPATHY ASSOCIATED WITH MULTIPLE MYELOMA: Primary | ICD-10-CM

## 2023-12-27 DIAGNOSIS — C90.00 NEUROPATHY ASSOCIATED WITH MULTIPLE MYELOMA: Primary | ICD-10-CM

## 2023-12-27 DIAGNOSIS — C90.00 MULTIPLE MYELOMA NOT HAVING ACHIEVED REMISSION: ICD-10-CM

## 2023-12-27 LAB
ALBUMIN SERPL-MCNC: 3.7 G/DL (ref 3.5–5.2)
ALBUMIN/GLOB SERPL: 1.6 G/DL
ALP SERPL-CCNC: 56 U/L (ref 39–117)
ALT SERPL W P-5'-P-CCNC: 9 U/L (ref 1–33)
ANION GAP SERPL CALCULATED.3IONS-SCNC: 7.7 MMOL/L (ref 5–15)
AST SERPL-CCNC: 18 U/L (ref 1–32)
B2 MICROGLOB SERPL-MCNC: 2.2 MG/L (ref 0.8–2.2)
BASOPHILS # BLD AUTO: 0.06 10*3/MM3 (ref 0–0.2)
BASOPHILS NFR BLD AUTO: 1.3 % (ref 0–1.5)
BILIRUB SERPL-MCNC: 0.2 MG/DL (ref 0–1.2)
BUN SERPL-MCNC: 14 MG/DL (ref 8–23)
BUN/CREAT SERPL: 15.9 (ref 7–25)
CALCIUM SPEC-SCNC: 8.6 MG/DL (ref 8.6–10.5)
CHLORIDE SERPL-SCNC: 104 MMOL/L (ref 98–107)
CO2 SERPL-SCNC: 26.3 MMOL/L (ref 22–29)
CREAT SERPL-MCNC: 0.88 MG/DL (ref 0.57–1)
DEPRECATED RDW RBC AUTO: 50.8 FL (ref 37–54)
EGFRCR SERPLBLD CKD-EPI 2021: 69.1 ML/MIN/1.73
EOSINOPHIL # BLD AUTO: 0.09 10*3/MM3 (ref 0–0.4)
EOSINOPHIL NFR BLD AUTO: 1.9 % (ref 0.3–6.2)
ERYTHROCYTE [DISTWIDTH] IN BLOOD BY AUTOMATED COUNT: 13.8 % (ref 12.3–15.4)
GLOBULIN UR ELPH-MCNC: 2.3 GM/DL
GLUCOSE SERPL-MCNC: 114 MG/DL (ref 65–99)
HCT VFR BLD AUTO: 36.1 % (ref 34–46.6)
HGB BLD-MCNC: 12.1 G/DL (ref 12–15.9)
IMM GRANULOCYTES # BLD AUTO: 0 10*3/MM3 (ref 0–0.05)
IMM GRANULOCYTES NFR BLD AUTO: 0 % (ref 0–0.5)
LYMPHOCYTES # BLD AUTO: 2.37 10*3/MM3 (ref 0.7–3.1)
LYMPHOCYTES NFR BLD AUTO: 49.7 % (ref 19.6–45.3)
MAGNESIUM SERPL-MCNC: 1.8 MG/DL (ref 1.6–2.4)
MCH RBC QN AUTO: 33.6 PG (ref 26.6–33)
MCHC RBC AUTO-ENTMCNC: 33.5 G/DL (ref 31.5–35.7)
MCV RBC AUTO: 100.3 FL (ref 79–97)
MONOCYTES # BLD AUTO: 0.83 10*3/MM3 (ref 0.1–0.9)
MONOCYTES NFR BLD AUTO: 17.4 % (ref 5–12)
NEUTROPHILS NFR BLD AUTO: 1.42 10*3/MM3 (ref 1.7–7)
NEUTROPHILS NFR BLD AUTO: 29.7 % (ref 42.7–76)
NRBC BLD AUTO-RTO: 0 /100 WBC (ref 0–0.2)
PLATELET # BLD AUTO: 246 10*3/MM3 (ref 140–450)
PMV BLD AUTO: 9 FL (ref 6–12)
POTASSIUM SERPL-SCNC: 4.2 MMOL/L (ref 3.5–5.2)
PROT SERPL-MCNC: 6 G/DL (ref 6–8.5)
RBC # BLD AUTO: 3.6 10*6/MM3 (ref 3.77–5.28)
SODIUM SERPL-SCNC: 138 MMOL/L (ref 136–145)
WBC NRBC COR # BLD AUTO: 4.77 10*3/MM3 (ref 3.4–10.8)

## 2023-12-27 PROCEDURE — 80053 COMPREHEN METABOLIC PANEL: CPT

## 2023-12-27 PROCEDURE — 96372 THER/PROPH/DIAG INJ SC/IM: CPT

## 2023-12-27 PROCEDURE — 25010000002 CYANOCOBALAMIN PER 1000 MCG: Performed by: INTERNAL MEDICINE

## 2023-12-27 PROCEDURE — 82232 ASSAY OF BETA-2 PROTEIN: CPT | Performed by: INTERNAL MEDICINE

## 2023-12-27 PROCEDURE — 85025 COMPLETE CBC W/AUTO DIFF WBC: CPT

## 2023-12-27 PROCEDURE — 83735 ASSAY OF MAGNESIUM: CPT

## 2023-12-27 RX ORDER — CYANOCOBALAMIN 1000 UG/ML
1000 INJECTION, SOLUTION INTRAMUSCULAR; SUBCUTANEOUS ONCE
Status: COMPLETED | OUTPATIENT
Start: 2023-12-27 | End: 2023-12-27

## 2023-12-27 RX ADMIN — CYANOCOBALAMIN 1000 MCG: 1000 INJECTION INTRAMUSCULAR; SUBCUTANEOUS at 15:39

## 2023-12-28 LAB
ALBUMIN SERPL ELPH-MCNC: 3.5 G/DL (ref 2.9–4.4)
ALBUMIN/GLOB SERPL: 1.4 {RATIO} (ref 0.7–1.7)
ALPHA1 GLOB SERPL ELPH-MCNC: 0.2 G/DL (ref 0–0.4)
ALPHA2 GLOB SERPL ELPH-MCNC: 0.7 G/DL (ref 0.4–1)
B-GLOBULIN SERPL ELPH-MCNC: 0.9 G/DL (ref 0.7–1.3)
GAMMA GLOB SERPL ELPH-MCNC: 0.8 G/DL (ref 0.4–1.8)
GLOBULIN SER-MCNC: 2.6 G/DL (ref 2.2–3.9)
IGA SERPL-MCNC: 103 MG/DL (ref 64–422)
IGG SERPL-MCNC: 903 MG/DL (ref 586–1602)
IGM SERPL-MCNC: 42 MG/DL (ref 26–217)
INTERPRETATION SERPL IEP-IMP: ABNORMAL
KAPPA LC FREE SER-MCNC: 30.6 MG/L (ref 3.3–19.4)
KAPPA LC FREE/LAMBDA FREE SER: 1.32 {RATIO} (ref 0.26–1.65)
LABORATORY COMMENT REPORT: ABNORMAL
LAMBDA LC FREE SERPL-MCNC: 23.1 MG/L (ref 5.7–26.3)
M PROTEIN SERPL ELPH-MCNC: ABNORMAL G/DL
PROT SERPL-MCNC: 6.1 G/DL (ref 6–8.5)

## 2024-01-05 ENCOUNTER — TELEPHONE (OUTPATIENT)
Dept: PSYCHIATRY | Facility: HOSPITAL | Age: 74
End: 2024-01-05
Payer: MEDICARE

## 2024-01-05 NOTE — TELEPHONE ENCOUNTER
Patient called to cancel her appointment for 1/8/2024 due to sickness. She said she would call back to schedule her appointment when she feels better.

## 2024-01-09 ENCOUNTER — SPECIALTY PHARMACY (OUTPATIENT)
Dept: PHARMACY | Facility: HOSPITAL | Age: 74
End: 2024-01-09
Payer: MEDICARE

## 2024-01-09 RX ORDER — LENALIDOMIDE 5 MG/1
CAPSULE ORAL
Qty: 21 CAPSULE | Refills: 0 | Status: SHIPPED | OUTPATIENT
Start: 2024-01-09

## 2024-01-09 NOTE — TELEPHONE ENCOUNTER
Refill requested from pharmacy. Per last chart note, patient to continue 5 mg of Revlimid 3 out of 4 weeks. Will route to MD for cosignature.    Felix Gilliam, PharmD, [facility]  Clinical Oncology Pharmacist

## 2024-01-09 NOTE — PROGRESS NOTES
Re: Refills of Oral Specialty Medication - Revlimid (lenalidomide)    Drug-Drug Interactions: The current medication list was reviewed and there are no relevant drug-drug interactions with the specialty medication.  Medication Allergies: The patient has NKDA  Review of Labs/Dose Adjustments: NO DOSE CHANGE - I reviewed the most recent note and labs and the patient will continue without any dose changes.  I sent refills as described below.    Drug: Revlimid (lenalidomide)  Strength: 5 mg  Directions: Take 1 capsule by mouth daily on days 1-21 of each 28 day cycle  Quantity: 21  Refills: 0  [facility]    Name/Credentials: Felix Gilliam, Angle, BCOP  Clinical Oncology Pharmacist    1/9/2024  13:18 EST

## 2024-01-09 NOTE — PROGRESS NOTES
Drug: Revlimid (lenalidomide)  Strength: 5 mg  Directions: Take 1 capsule by mouth daily on days 1-21 of each 28 day cycle  Quantity: 21  Refills: 0  [facility]  Completed independent double check on medication order/RX.  Qiana Ellis Rph, BCOP

## 2024-01-16 ENCOUNTER — SPECIALTY PHARMACY (OUTPATIENT)
Dept: PHARMACY | Facility: HOSPITAL | Age: 74
End: 2024-01-16
Payer: MEDICARE

## 2024-01-16 DIAGNOSIS — C50.911 INVASIVE DUCTAL CARCINOMA OF BREAST, FEMALE, RIGHT: Primary | ICD-10-CM

## 2024-01-16 NOTE — PROGRESS NOTES
I received a fax from insurance stating they required a PA for pt's Revlimid.  I submitted a PA via [facility].  I received an approval until 1/15/2025.     Erin Donahue  Specialty Pharmacy Technician

## 2024-01-18 DIAGNOSIS — C50.411 MALIGNANT NEOPLASM OF UPPER-OUTER QUADRANT OF RIGHT FEMALE BREAST, UNSPECIFIED ESTROGEN RECEPTOR STATUS: Primary | ICD-10-CM

## 2024-01-19 ENCOUNTER — TELEPHONE (OUTPATIENT)
Dept: SURGERY | Facility: CLINIC | Age: 74
End: 2024-01-19
Payer: MEDICARE

## 2024-01-19 NOTE — TELEPHONE ENCOUNTER
----- Message from Michaela Lopez MA sent at 1/19/2024 12:43 PM EST -----  Dr MARJORIE Lewis ordered and mri and would like for her to have genetic s  ----- Message -----  From: Lawanda Vasquez MA  Sent: 1/17/2024   3:45 PM EST  To: Michaela Lopez MA; Oziel Carvajal called to get an appt fir this pt.    I put her on 2/2 at 9:30.  She will need a chart.     Pt has an appt for a breast MRI on 1/29/2024 at [facility] on 1/29/2024 arrive at 2:15.  Pt verbalized understanding.       CMA

## 2024-01-24 ENCOUNTER — TELEPHONE (OUTPATIENT)
Dept: ONCOLOGY | Facility: CLINIC | Age: 74
End: 2024-01-24
Payer: MEDICARE

## 2024-01-25 ENCOUNTER — INFUSION (OUTPATIENT)
Dept: ONCOLOGY | Facility: HOSPITAL | Age: 74
End: 2024-01-25
Payer: MEDICARE

## 2024-01-25 ENCOUNTER — LAB (OUTPATIENT)
Dept: LAB | Facility: HOSPITAL | Age: 74
End: 2024-01-25
Payer: MEDICARE

## 2024-01-25 DIAGNOSIS — C90.00 MULTIPLE MYELOMA NOT HAVING ACHIEVED REMISSION: ICD-10-CM

## 2024-01-25 DIAGNOSIS — C90.00 NEUROPATHY ASSOCIATED WITH MULTIPLE MYELOMA: Primary | ICD-10-CM

## 2024-01-25 DIAGNOSIS — G63 NEUROPATHY ASSOCIATED WITH MULTIPLE MYELOMA: Primary | ICD-10-CM

## 2024-01-25 LAB
ALBUMIN SERPL-MCNC: 3.9 G/DL (ref 3.5–5.2)
ALBUMIN/GLOB SERPL: 1.4 G/DL
ALP SERPL-CCNC: 59 U/L (ref 39–117)
ALT SERPL W P-5'-P-CCNC: 10 U/L (ref 1–33)
ANION GAP SERPL CALCULATED.3IONS-SCNC: 9.5 MMOL/L (ref 5–15)
AST SERPL-CCNC: 18 U/L (ref 1–32)
B2 MICROGLOB SERPL-MCNC: 2.2 MG/L (ref 0.8–2.2)
BASOPHILS # BLD AUTO: 0.07 10*3/MM3 (ref 0–0.2)
BASOPHILS NFR BLD AUTO: 1.5 % (ref 0–1.5)
BILIRUB SERPL-MCNC: 0.3 MG/DL (ref 0–1.2)
BUN SERPL-MCNC: 13 MG/DL (ref 8–23)
BUN/CREAT SERPL: 16.9 (ref 7–25)
CALCIUM SPEC-SCNC: 9 MG/DL (ref 8.6–10.5)
CHLORIDE SERPL-SCNC: 103 MMOL/L (ref 98–107)
CO2 SERPL-SCNC: 26.5 MMOL/L (ref 22–29)
CREAT SERPL-MCNC: 0.77 MG/DL (ref 0.57–1)
DEPRECATED RDW RBC AUTO: 50.8 FL (ref 37–54)
EGFRCR SERPLBLD CKD-EPI 2021: 81.1 ML/MIN/1.73
EOSINOPHIL # BLD AUTO: 0.19 10*3/MM3 (ref 0–0.4)
EOSINOPHIL NFR BLD AUTO: 4.1 % (ref 0.3–6.2)
ERYTHROCYTE [DISTWIDTH] IN BLOOD BY AUTOMATED COUNT: 14 % (ref 12.3–15.4)
GLOBULIN UR ELPH-MCNC: 2.7 GM/DL
GLUCOSE SERPL-MCNC: 100 MG/DL (ref 65–99)
HCT VFR BLD AUTO: 37.4 % (ref 34–46.6)
HGB BLD-MCNC: 12.7 G/DL (ref 12–15.9)
IMM GRANULOCYTES # BLD AUTO: 0 10*3/MM3 (ref 0–0.05)
IMM GRANULOCYTES NFR BLD AUTO: 0 % (ref 0–0.5)
LYMPHOCYTES # BLD AUTO: 2.06 10*3/MM3 (ref 0.7–3.1)
LYMPHOCYTES NFR BLD AUTO: 44.6 % (ref 19.6–45.3)
MAGNESIUM SERPL-MCNC: 2.1 MG/DL (ref 1.6–2.4)
MCH RBC QN AUTO: 33.7 PG (ref 26.6–33)
MCHC RBC AUTO-ENTMCNC: 34 G/DL (ref 31.5–35.7)
MCV RBC AUTO: 99.2 FL (ref 79–97)
MONOCYTES # BLD AUTO: 0.75 10*3/MM3 (ref 0.1–0.9)
MONOCYTES NFR BLD AUTO: 16.2 % (ref 5–12)
NEUTROPHILS NFR BLD AUTO: 1.55 10*3/MM3 (ref 1.7–7)
NEUTROPHILS NFR BLD AUTO: 33.6 % (ref 42.7–76)
NRBC BLD AUTO-RTO: 0 /100 WBC (ref 0–0.2)
PLATELET # BLD AUTO: 239 10*3/MM3 (ref 140–450)
PMV BLD AUTO: 9 FL (ref 6–12)
POTASSIUM SERPL-SCNC: 4.1 MMOL/L (ref 3.5–5.2)
PROT SERPL-MCNC: 6.6 G/DL (ref 6–8.5)
RBC # BLD AUTO: 3.77 10*6/MM3 (ref 3.77–5.28)
SODIUM SERPL-SCNC: 139 MMOL/L (ref 136–145)
WBC NRBC COR # BLD AUTO: 4.62 10*3/MM3 (ref 3.4–10.8)

## 2024-01-25 PROCEDURE — 85025 COMPLETE CBC W/AUTO DIFF WBC: CPT

## 2024-01-25 PROCEDURE — 80053 COMPREHEN METABOLIC PANEL: CPT

## 2024-01-25 PROCEDURE — 25010000002 CYANOCOBALAMIN PER 1000 MCG: Performed by: NURSE PRACTITIONER

## 2024-01-25 PROCEDURE — 36415 COLL VENOUS BLD VENIPUNCTURE: CPT

## 2024-01-25 PROCEDURE — 96372 THER/PROPH/DIAG INJ SC/IM: CPT

## 2024-01-25 PROCEDURE — 82232 ASSAY OF BETA-2 PROTEIN: CPT | Performed by: INTERNAL MEDICINE

## 2024-01-25 PROCEDURE — 83735 ASSAY OF MAGNESIUM: CPT

## 2024-01-25 RX ORDER — CYANOCOBALAMIN 1000 UG/ML
1000 INJECTION, SOLUTION INTRAMUSCULAR; SUBCUTANEOUS ONCE
Status: COMPLETED | OUTPATIENT
Start: 2024-01-25 | End: 2024-01-25

## 2024-01-25 RX ADMIN — CYANOCOBALAMIN 1000 MCG: 1000 INJECTION INTRAMUSCULAR; SUBCUTANEOUS at 15:13

## 2024-01-26 LAB
ALBUMIN SERPL ELPH-MCNC: 3.7 G/DL (ref 2.9–4.4)
ALBUMIN/GLOB SERPL: 1.3 {RATIO} (ref 0.7–1.7)
ALPHA1 GLOB SERPL ELPH-MCNC: 0.2 G/DL (ref 0–0.4)
ALPHA2 GLOB SERPL ELPH-MCNC: 0.7 G/DL (ref 0.4–1)
B-GLOBULIN SERPL ELPH-MCNC: 0.9 G/DL (ref 0.7–1.3)
GAMMA GLOB SERPL ELPH-MCNC: 1 G/DL (ref 0.4–1.8)
GLOBULIN SER-MCNC: 2.9 G/DL (ref 2.2–3.9)
IGA SERPL-MCNC: 109 MG/DL (ref 64–422)
IGG SERPL-MCNC: 952 MG/DL (ref 586–1602)
IGM SERPL-MCNC: 45 MG/DL (ref 26–217)
INTERPRETATION SERPL IEP-IMP: ABNORMAL
KAPPA LC FREE SER-MCNC: 34.2 MG/L (ref 3.3–19.4)
KAPPA LC FREE/LAMBDA FREE SER: 1.14 {RATIO} (ref 0.26–1.65)
LABORATORY COMMENT REPORT: ABNORMAL
LAMBDA LC FREE SERPL-MCNC: 30 MG/L (ref 5.7–26.3)
M PROTEIN SERPL ELPH-MCNC: ABNORMAL G/DL
PROT SERPL-MCNC: 6.6 G/DL (ref 6–8.5)

## 2024-01-29 ENCOUNTER — HOSPITAL ENCOUNTER (OUTPATIENT)
Dept: MRI IMAGING | Facility: HOSPITAL | Age: 74
Discharge: HOME | End: 2024-01-29
Admitting: SURGERY
Payer: MEDICARE

## 2024-01-29 DIAGNOSIS — C50.411 MALIGNANT NEOPLASM OF UPPER-OUTER QUADRANT OF RIGHT FEMALE BREAST, UNSPECIFIED ESTROGEN RECEPTOR STATUS: ICD-10-CM

## 2024-01-29 PROCEDURE — C8937 CAD BREAST MRI: HCPCS

## 2024-01-29 PROCEDURE — C8908 MRI W/O FOL W/CONT, BREAST,: HCPCS

## 2024-01-29 PROCEDURE — A9577 INJ MULTIHANCE: HCPCS | Performed by: SURGERY

## 2024-01-29 PROCEDURE — 0 GADOBENATE DIMEGLUMINE 529 MG/ML SOLUTION: Performed by: SURGERY

## 2024-01-29 RX ADMIN — GADOBENATE DIMEGLUMINE 14 ML: 529 INJECTION, SOLUTION INTRAVENOUS at 15:51

## 2024-02-01 ENCOUNTER — SPECIALTY PHARMACY (OUTPATIENT)
Dept: PHARMACY | Facility: HOSPITAL | Age: 74
End: 2024-02-01
Payer: MEDICARE

## 2024-02-01 RX ORDER — LENALIDOMIDE 5 MG/1
CAPSULE ORAL
Qty: 21 CAPSULE | Refills: 0 | Status: SHIPPED | OUTPATIENT
Start: 2024-02-01

## 2024-02-01 NOTE — PROGRESS NOTES
Drug: Revlimid (lenalidomide)  Strength: 5 mg  Directions: Take 1 capsule by mouth daily on days 1-21 of each 28 day cycle  Quantity: 21  Refills: 0  Pharmacy prescription sent to: [facility]    Completed independent double check on medication order/RX.  Sandra Hernandez, CelestinaD, BCPS

## 2024-02-01 NOTE — PROGRESS NOTES
Re: Refills of Oral Specialty Medication - Revlimid (lenalidomide)    Drug-Drug Interactions: The current medication list was reviewed and there are no relevant drug-drug interactions with the specialty medication.  Medication Allergies: The patient has NKDA  Review of Labs/Dose Adjustments: NO DOSE CHANGE - I reviewed the most recent note and labs and the patient will continue without any dose changes.  I sent refills as described below.    Drug: Revlimid (lenalidomide)  Strength: 5 mg  Directions: Take 1 capsule by mouth daily on days 1-21 of each 28 day cycle  Quantity: 21  Refills: 0  [facility]    Name/Credentials: Felix Gilliam, Angle, BCOP  Clinical Oncology Pharmacist    2/1/2024  11:35 EST

## 2024-02-02 ENCOUNTER — TELEPHONE (OUTPATIENT)
Dept: SURGERY | Facility: CLINIC | Age: 74
End: 2024-02-02
Payer: MEDICARE

## 2024-02-02 NOTE — TELEPHONE ENCOUNTER
Patient calls office to let us know that she has covid. She developed symptoms last nigh, tested and was positive.   Canceled new patient apt for today and rescheduled Thur 2/8/24 12:30.

## 2024-02-05 LAB — CREAT BLDA-MCNC: 0.9 MG/DL (ref 0.6–1.3)

## 2024-02-08 ENCOUNTER — OFFICE VISIT (OUTPATIENT)
Dept: SURGERY | Facility: CLINIC | Age: 74
End: 2024-02-08
Payer: MEDICARE

## 2024-02-08 ENCOUNTER — HOSPITAL ENCOUNTER (OUTPATIENT)
Dept: SURGERY | Facility: HOSPITAL | Age: 74
Discharge: HOME | End: 2024-02-08
Payer: MEDICARE

## 2024-02-08 VITALS
SYSTOLIC BLOOD PRESSURE: 112 MMHG | DIASTOLIC BLOOD PRESSURE: 68 MMHG | WEIGHT: 149.4 LBS | BODY MASS INDEX: 23.45 KG/M2 | HEART RATE: 83 BPM | HEIGHT: 67 IN | OXYGEN SATURATION: 96 %

## 2024-02-08 DIAGNOSIS — F41.9 ANXIETY: ICD-10-CM

## 2024-02-08 DIAGNOSIS — Z80.41 FH: OVARIAN CANCER: ICD-10-CM

## 2024-02-08 DIAGNOSIS — Z94.84 HISTORY OF STEM CELL TRANSPLANT: ICD-10-CM

## 2024-02-08 DIAGNOSIS — C50.919 MALIGNANT NEOPLASM OF FEMALE BREAST, UNSPECIFIED ESTROGEN RECEPTOR STATUS, UNSPECIFIED LATERALITY, UNSPECIFIED SITE OF BREAST: Primary | ICD-10-CM

## 2024-02-08 DIAGNOSIS — C50.411 MALIGNANT NEOPLASM OF UPPER-OUTER QUADRANT OF RIGHT BREAST IN FEMALE, ESTROGEN RECEPTOR POSITIVE: ICD-10-CM

## 2024-02-08 DIAGNOSIS — Z17.0 MALIGNANT NEOPLASM OF UPPER-OUTER QUADRANT OF RIGHT BREAST IN FEMALE, ESTROGEN RECEPTOR POSITIVE: ICD-10-CM

## 2024-02-08 PROCEDURE — 99205 OFFICE O/P NEW HI 60 MIN: CPT | Performed by: SURGERY

## 2024-02-09 ENCOUNTER — PATIENT ROUNDING (BHMG ONLY) (OUTPATIENT)
Dept: SURGERY | Facility: CLINIC | Age: 74
End: 2024-02-09
Payer: MEDICARE

## 2024-02-09 NOTE — PROGRESS NOTES
February 9, 2024    Hello, may I speak with Jennifer Plascencia?    My name is Lawanda      I am  with [facility]  [address] [address] [phone].    Before we get started may I verify your date of birth? 1950    I am calling to officially welcome you to our practice and ask about your recent visit. Is this a good time to talk? No Left voicemail for pt to call back.     Tell me about your visit with us. What things went well?         We're always looking for ways to make our patients' experiences even better. Do you have recommendations on ways we may improve?      Overall were you satisfied with your first visit to our practice?        I appreciate you taking the time to speak with me today. Is there anything else I can do for you?       Thank you, and have a great day.

## 2024-02-12 ENCOUNTER — PREP FOR SURGERY (OUTPATIENT)
Dept: OTHER | Facility: HOSPITAL | Age: 74
End: 2024-02-12
Payer: MEDICARE

## 2024-02-12 ENCOUNTER — TELEPHONE (OUTPATIENT)
Dept: SURGERY | Facility: CLINIC | Age: 74
End: 2024-02-12
Payer: MEDICARE

## 2024-02-12 DIAGNOSIS — Z17.0 MALIGNANT NEOPLASM OF UPPER-OUTER QUADRANT OF RIGHT BREAST IN FEMALE, ESTROGEN RECEPTOR POSITIVE: Primary | ICD-10-CM

## 2024-02-12 DIAGNOSIS — C50.411 MALIGNANT NEOPLASM OF UPPER-OUTER QUADRANT OF RIGHT BREAST IN FEMALE, ESTROGEN RECEPTOR POSITIVE: Primary | ICD-10-CM

## 2024-02-12 RX ORDER — SODIUM CHLORIDE, SODIUM LACTATE, POTASSIUM CHLORIDE, CALCIUM CHLORIDE 600; 310; 30; 20 MG/100ML; MG/100ML; MG/100ML; MG/100ML
100 INJECTION, SOLUTION INTRAVENOUS CONTINUOUS
OUTPATIENT
Start: 2024-02-12

## 2024-02-12 RX ORDER — POLYETHYLENE GLYCOL 3350 17 G/17G
17 POWDER, FOR SOLUTION ORAL DAILY
Qty: 119 G | Refills: 0 | Status: SHIPPED | OUTPATIENT
Start: 2024-02-12

## 2024-02-12 RX ORDER — HYDROCODONE BITARTRATE AND ACETAMINOPHEN 5; 325 MG/1; MG/1
TABLET ORAL
Qty: 15 TABLET | Refills: 0 | Status: SHIPPED | OUTPATIENT
Start: 2024-02-12

## 2024-02-12 RX ORDER — ONDANSETRON 4 MG/1
4 TABLET, FILM COATED ORAL EVERY 8 HOURS PRN
Qty: 10 TABLET | Refills: 1 | Status: SHIPPED | OUTPATIENT
Start: 2024-02-12

## 2024-02-12 RX ORDER — DIAZEPAM 5 MG/1
10 TABLET ORAL ONCE
OUTPATIENT
Start: 2024-02-12 | End: 2024-02-12

## 2024-02-12 RX ORDER — CEFAZOLIN SODIUM 2 G/100ML
2000 INJECTION, SOLUTION INTRAVENOUS ONCE
OUTPATIENT
Start: 2024-02-12 | End: 2024-02-12

## 2024-02-13 DIAGNOSIS — Z17.0 MALIGNANT NEOPLASM OF UPPER-OUTER QUADRANT OF RIGHT BREAST IN FEMALE, ESTROGEN RECEPTOR POSITIVE: Primary | ICD-10-CM

## 2024-02-13 DIAGNOSIS — C50.411 MALIGNANT NEOPLASM OF UPPER-OUTER QUADRANT OF RIGHT BREAST IN FEMALE, ESTROGEN RECEPTOR POSITIVE: Primary | ICD-10-CM

## 2024-02-14 ENCOUNTER — TELEPHONE (OUTPATIENT)
Dept: SURGERY | Facility: CLINIC | Age: 74
End: 2024-02-14
Payer: MEDICARE

## 2024-02-15 ENCOUNTER — TELEPHONE (OUTPATIENT)
Dept: SURGERY | Facility: CLINIC | Age: 74
End: 2024-02-15
Payer: MEDICARE

## 2024-02-16 ENCOUNTER — PATIENT OUTREACH (OUTPATIENT)
Dept: OTHER | Facility: HOSPITAL | Age: 74
End: 2024-02-16
Payer: MEDICARE

## 2024-02-16 ENCOUNTER — HOSPITAL ENCOUNTER (OUTPATIENT)
Dept: ULTRASOUND IMAGING | Facility: HOSPITAL | Age: 74
End: 2024-02-16
Payer: MEDICARE

## 2024-02-16 ENCOUNTER — TELEPHONE (OUTPATIENT)
Dept: LAB | Facility: HOSPITAL | Age: 74
End: 2024-02-16
Payer: MEDICARE

## 2024-02-21 ENCOUNTER — TELEPHONE (OUTPATIENT)
Dept: SURGERY | Facility: CLINIC | Age: 74
End: 2024-02-21
Payer: MEDICARE

## 2024-02-21 ENCOUNTER — INFUSION (OUTPATIENT)
Dept: ONCOLOGY | Facility: HOSPITAL | Age: 74
End: 2024-02-21
Payer: MEDICARE

## 2024-02-21 DIAGNOSIS — C90.00 MULTIPLE MYELOMA NOT HAVING ACHIEVED REMISSION: Primary | ICD-10-CM

## 2024-02-21 NOTE — TELEPHONE ENCOUNTER
Spoke with patient in regards to their surgery date being moved up to 03/06.  Patient verbalized an understanding of the new dates/times and a new surgery packet was mailed.      Neo Placement: 03/01/24 at 9:30 am, arriving at 8:30 am    SURGERY: Arriving at 6:00 am, SNI injection 8:00 am, and surgery to begin at 9:30 am.    POST OP: 03/18/24 AT 11:30 AM      [facility]

## 2024-02-23 ENCOUNTER — TELEPHONE (OUTPATIENT)
Dept: SURGERY | Facility: CLINIC | Age: 74
End: 2024-02-23
Payer: MEDICARE

## 2024-02-23 ENCOUNTER — PATIENT OUTREACH (OUTPATIENT)
Dept: OTHER | Facility: HOSPITAL | Age: 74
End: 2024-02-23
Payer: MEDICARE

## 2024-02-23 NOTE — PROGRESS NOTES
Ms. Plascencia called with questions about her upcoming appointments and we discussed those. She was thankful for the help

## 2024-02-23 NOTE — TELEPHONE ENCOUNTER
Communication from Alexey Camilo office who manages her immunosuppression. He asked us to stop the revlamid one week preop.  We will let her know

## 2024-02-23 NOTE — TELEPHONE ENCOUNTER
Malena with Dr. Alexey Coronado's office to let let know patient should hold her revlamid x 1 week pre op.   Malena, added that Dr. Coronado is currently out of the office, one of his covering providers reviewed. Malena will call me back after Dr. Coronado is back on Monday if he has any further instructions to add.     I've informed patient

## 2024-02-27 ENCOUNTER — INFUSION (OUTPATIENT)
Dept: ONCOLOGY | Facility: HOSPITAL | Age: 74
End: 2024-02-27
Payer: MEDICARE

## 2024-02-27 ENCOUNTER — SPECIALTY PHARMACY (OUTPATIENT)
Dept: ONCOLOGY | Facility: HOSPITAL | Age: 74
End: 2024-02-27
Payer: MEDICARE

## 2024-02-27 ENCOUNTER — CONSULT (OUTPATIENT)
Dept: ONCOLOGY | Facility: CLINIC | Age: 74
End: 2024-02-27
Payer: MEDICARE

## 2024-02-27 VITALS
RESPIRATION RATE: 16 BRPM | HEIGHT: 67 IN | OXYGEN SATURATION: 97 % | TEMPERATURE: 97.8 F | BODY MASS INDEX: 23.84 KG/M2 | WEIGHT: 151.9 LBS | HEART RATE: 70 BPM | SYSTOLIC BLOOD PRESSURE: 111 MMHG | DIASTOLIC BLOOD PRESSURE: 71 MMHG

## 2024-02-27 DIAGNOSIS — C90.00 MULTIPLE MYELOMA NOT HAVING ACHIEVED REMISSION: Primary | ICD-10-CM

## 2024-02-27 DIAGNOSIS — C90.00 NEUROPATHY ASSOCIATED WITH MULTIPLE MYELOMA: ICD-10-CM

## 2024-02-27 DIAGNOSIS — C90.00 MULTIPLE MYELOMA NOT HAVING ACHIEVED REMISSION: ICD-10-CM

## 2024-02-27 DIAGNOSIS — G63 NEUROPATHY ASSOCIATED WITH MULTIPLE MYELOMA: ICD-10-CM

## 2024-02-27 LAB
ALBUMIN SERPL-MCNC: 3.5 G/DL (ref 3.5–5.2)
ALBUMIN/GLOB SERPL: 1.5 G/DL
ALP SERPL-CCNC: 54 U/L (ref 39–117)
ALT SERPL W P-5'-P-CCNC: <5 U/L (ref 1–33)
ANION GAP SERPL CALCULATED.3IONS-SCNC: 6.7 MMOL/L (ref 5–15)
AST SERPL-CCNC: 15 U/L (ref 1–32)
B2 MICROGLOB SERPL-MCNC: 2.1 MG/L (ref 0.8–2.2)
BASOPHILS # BLD AUTO: 0.05 10*3/MM3 (ref 0–0.2)
BASOPHILS NFR BLD AUTO: 1.3 % (ref 0–1.5)
BILIRUB SERPL-MCNC: 0.2 MG/DL (ref 0–1.2)
BUN SERPL-MCNC: 10 MG/DL (ref 8–23)
BUN/CREAT SERPL: 12.8 (ref 7–25)
CALCIUM SPEC-SCNC: 8.6 MG/DL (ref 8.6–10.5)
CHLORIDE SERPL-SCNC: 107 MMOL/L (ref 98–107)
CO2 SERPL-SCNC: 26.3 MMOL/L (ref 22–29)
CREAT SERPL-MCNC: 0.78 MG/DL (ref 0.57–1)
DEPRECATED RDW RBC AUTO: 49.8 FL (ref 37–54)
EGFRCR SERPLBLD CKD-EPI 2021: 79.3 ML/MIN/1.73
EOSINOPHIL # BLD AUTO: 0.28 10*3/MM3 (ref 0–0.4)
EOSINOPHIL NFR BLD AUTO: 7.1 % (ref 0.3–6.2)
ERYTHROCYTE [DISTWIDTH] IN BLOOD BY AUTOMATED COUNT: 13.8 % (ref 12.3–15.4)
GLOBULIN UR ELPH-MCNC: 2.4 GM/DL
GLUCOSE SERPL-MCNC: 96 MG/DL (ref 65–99)
HCT VFR BLD AUTO: 36 % (ref 34–46.6)
HGB BLD-MCNC: 12.1 G/DL (ref 12–15.9)
IMM GRANULOCYTES # BLD AUTO: 0 10*3/MM3 (ref 0–0.05)
IMM GRANULOCYTES NFR BLD AUTO: 0 % (ref 0–0.5)
LYMPHOCYTES # BLD AUTO: 1.78 10*3/MM3 (ref 0.7–3.1)
LYMPHOCYTES NFR BLD AUTO: 45.3 % (ref 19.6–45.3)
MAGNESIUM SERPL-MCNC: 2 MG/DL (ref 1.6–2.4)
MCH RBC QN AUTO: 33.1 PG (ref 26.6–33)
MCHC RBC AUTO-ENTMCNC: 33.6 G/DL (ref 31.5–35.7)
MCV RBC AUTO: 98.4 FL (ref 79–97)
MONOCYTES # BLD AUTO: 0.41 10*3/MM3 (ref 0.1–0.9)
MONOCYTES NFR BLD AUTO: 10.4 % (ref 5–12)
NEUTROPHILS NFR BLD AUTO: 1.41 10*3/MM3 (ref 1.7–7)
NEUTROPHILS NFR BLD AUTO: 35.9 % (ref 42.7–76)
NRBC BLD AUTO-RTO: 0 /100 WBC (ref 0–0.2)
PHOSPHATE SERPL-MCNC: 3.9 MG/DL (ref 2.5–4.5)
PLATELET # BLD AUTO: 204 10*3/MM3 (ref 140–450)
PMV BLD AUTO: 9.3 FL (ref 6–12)
POTASSIUM SERPL-SCNC: 4.1 MMOL/L (ref 3.5–5.2)
PROT SERPL-MCNC: 5.9 G/DL (ref 6–8.5)
RBC # BLD AUTO: 3.66 10*6/MM3 (ref 3.77–5.28)
SODIUM SERPL-SCNC: 140 MMOL/L (ref 136–145)
WBC NRBC COR # BLD AUTO: 3.93 10*3/MM3 (ref 3.4–10.8)

## 2024-02-27 PROCEDURE — 25810000003 SODIUM CHLORIDE 0.9 % SOLUTION: Performed by: INTERNAL MEDICINE

## 2024-02-27 PROCEDURE — 25010000002 ZOLEDRONIC ACID 4 MG/100ML SOLUTION: Performed by: INTERNAL MEDICINE

## 2024-02-27 PROCEDURE — 96409 CHEMO IV PUSH SNGL DRUG: CPT

## 2024-02-27 PROCEDURE — 85025 COMPLETE CBC W/AUTO DIFF WBC: CPT

## 2024-02-27 PROCEDURE — 84100 ASSAY OF PHOSPHORUS: CPT

## 2024-02-27 PROCEDURE — 25010000002 CYANOCOBALAMIN PER 1000 MCG: Performed by: INTERNAL MEDICINE

## 2024-02-27 PROCEDURE — 83735 ASSAY OF MAGNESIUM: CPT

## 2024-02-27 PROCEDURE — 80053 COMPREHEN METABOLIC PANEL: CPT

## 2024-02-27 PROCEDURE — 96374 THER/PROPH/DIAG INJ IV PUSH: CPT

## 2024-02-27 PROCEDURE — 96372 THER/PROPH/DIAG INJ SC/IM: CPT

## 2024-02-27 PROCEDURE — 82232 ASSAY OF BETA-2 PROTEIN: CPT | Performed by: INTERNAL MEDICINE

## 2024-02-27 RX ORDER — ZOLEDRONIC ACID 4 MG/5ML
4 INJECTION INTRAVENOUS
COMMUNITY

## 2024-02-27 RX ORDER — CYANOCOBALAMIN 1000 UG/ML
1000 INJECTION, SOLUTION INTRAMUSCULAR; SUBCUTANEOUS ONCE
Status: COMPLETED | OUTPATIENT
Start: 2024-02-27 | End: 2024-02-27

## 2024-02-27 RX ORDER — POLYMYXIN B SULFATE AND TRIMETHOPRIM 1; 10000 MG/ML; [USP'U]/ML
SOLUTION OPHTHALMIC AS NEEDED
COMMUNITY
Start: 2024-01-03

## 2024-02-27 RX ORDER — ZOLEDRONIC ACID 0.04 MG/ML
4 INJECTION, SOLUTION INTRAVENOUS ONCE
Status: COMPLETED | OUTPATIENT
Start: 2024-02-27 | End: 2024-02-27

## 2024-02-27 RX ORDER — LEVOTHYROXINE SODIUM 75 MCG
75 TABLET ORAL DAILY
COMMUNITY
Start: 2024-02-26 | End: 2025-02-25

## 2024-02-27 RX ORDER — SODIUM CHLORIDE 9 MG/ML
250 INJECTION, SOLUTION INTRAVENOUS ONCE
Status: COMPLETED | OUTPATIENT
Start: 2024-02-27 | End: 2024-02-27

## 2024-02-27 RX ADMIN — CYANOCOBALAMIN 1000 MCG: 1000 INJECTION INTRAMUSCULAR; SUBCUTANEOUS at 10:28

## 2024-02-27 RX ADMIN — SODIUM CHLORIDE 250 ML: 9 INJECTION, SOLUTION INTRAVENOUS at 10:28

## 2024-02-27 RX ADMIN — ZOLEDRONIC ACID 4 MG: 0.04 INJECTION, SOLUTION INTRAVENOUS at 10:28

## 2024-02-27 NOTE — PROGRESS NOTES
Subjective   Jennifer Plascencia is a 74 y.o. female.  Referred by Dr. Rosales for right breast invasive ductal carcinoma    History of Present Illness     Ms. Jolly Plascencia is a 74-year-old lady with diagnosis of IgA kappa high risk multiple myeloma high risk due to status post stem cell transplant in March 2016 at [facility] and currently on maintenance Revlimid 5 mg 3 out of 4 weeks.    Multiple myeloma is high risk because of gain of 1 q- treatment with rev Dex Velcade initiated in 11/15-went on to stem cell transplant at [facility] in March 2016?  Melphalan.  She started post transplant therapy in May 2016 with Velcade rev Dex.  In September after 4 cycles Velcade was decreased to every other week with plans to continue rev Dex Velcade till progression because of high risk status.  In May 2019 her Velcade was discontinued because of worsening neuropathy.  Patient has remained on Revlimid 10 mg 21 out of 28 days since then with stable disease until November 2019 when a small IgG kappa paraprotein was noted on her blood tests which is distinct from her usual IgA kappa myeloma.  Restaging with PET scan was normal and since then the paraprotein as of 3/10/2020 has resolved    She is currently on Zometa every 3 months and being followed with myeloma labs monthly.    Dr. Alexey Xavier is her hematologist at [facility].    She has been seeing Dr. Garcia from our practice for management of the multiple myeloma and Revlimid.    12/28/2023-bilateral screening mammogram  Breast that heterogeneously dense.  There is a mass with associated distortion within the upper outer right breast posterior depth.  No suspicious findings in the left breast.    1/9/2024-right breast diagnostic mammogram and ultrasound  Heterogeneously dense breast tissue.  High density mass measuring approximately 19 mm with associated lactic show distortion, 10:00, 10 cm from the nipple.  Adjacent smaller circumscribed low-density 5 mm mass is located  just medial and superior to the primary mass by approximately 2 mm.  Calcifications in the upper inner quadrant of the right breast are stable dating back to 2019.  Second set of calcifications in the upper outer right breast are also stable dating back to 2019.    Ultrasound  At 10:00, 10 cm from the nipple there is a irregular mass measuring 21 x 19 x 9 mm.  Associated to caustic shadowing.  At 1030, 10 cm from the nipple there is a hypoechoic mass measuring 4 x 3 x 5 mm.  This is thought to represent the adjacent mass seen mammographically.  In addition there is a third circumscribed hypoechoic mass measuring 4 x 3 x 3 mm labeled 930, 8 cm from the nipple.  Considered indeterminate.  Right axilla lymph nodes are essentially fatty replaced.  No morphologically abnormal lymph nodes in the right axilla.    Impression  1.irregular mass measuring 21 mm, ultrasound-guided biopsy recommended  2.5 millimeter mass at 1030, 10 cm from the nipple is suspicious, ultrasound-guided biopsy recommended  3.4 millimeter mass is indeterminate at 930, ultrasound-guided biopsy recommended.    3 site ultrasound-guided biopsy  1.right breast 10:00, 10 cm from the nipple  Invasive ductal adenocarcinoma and ductal carcinoma in situ  Tumor is grade 2  No lymphovascular invasion no perineural invasion  ER positive strong 99%  ME positive indeterminate 10%  HER2 1+ by immunohistochemistry  Ki-67 30%  DCIS is high-grade    2.right breast 9:30, 8 cm from the nipple  Sclerosing adenosis  Rare microcyst  Microcalcifications in nonneoplastic tissue    1/29/2024-bilateral breast MRI  Biopsy-proven malignancy in the right breast at 9:00, measures 2.1 cm in greatest dimension.  No other suspicious findings are seen within the right breast.  No evidence of right axillary lymphadenopathy.  No left breast abnormalities noted.      She has since seen Dr. Rosales and plans to undergo right breast lumpectomy and likely a sentinel lymph node biopsy  scheduled for 3/6/2024.    Patient is extremely anxious regarding the diagnosis and worried about proceeding with the sentinel lymph node biopsy.    She is scheduled for Zometa today.    She denies palpating any abnormalities of either breast prior to the abnormal mammogram.    She denies any family history of breast cancer.    The following portions of the patient's history were reviewed and updated as appropriate: allergies, current medications, past family history, past medical history, past social history, past surgical history, and problem list.    Past Medical History:   Diagnosis Date    Anxiety     Breast cancer 2024    Inv. Ductal with DCIS (right breast)   ER+/CA+/Her2-    COVID 2024    Depression     Disease of thyroid gland     Hashimoto's disease     History of vitamin D deficiency     Hypothyroidism     IBS (irritable bowel syndrome)     Mixed hyperlipidemia 2018    Multiple myeloma 2015    IgA kappa.  Stem cell transplant at [facility] 3/20/2016    Myeloma     KWAME (obstructive sleep apnea) 2021    Overnight polysomnogram.  Weight 173 pounds.  Mild KWAME with AHI 5.6 events per hour.  When supine, 9.4 events per hour.  During REM, moderate severity at 26 events per hour.  No sleep-related hypoxia.  The patient snored 20.5% of total sleep time.    Osteopenia     Overweight (BMI 25.0-29.9) 2018        Past Surgical History:   Procedure Laterality Date    BREAST BIOPSY Right 2024    10:00 @ 10cmFN   (Inv. Ductal Carcinoma  ER+/CA+/Her2-)  [facility]    CATARACT EXTRACTION      COLONOSCOPY      TONSILLECTOMY      VEIN SURGERY          Family History   Problem Relation Age of Onset    Breast cancer Mother     Sleep apnea Mother     Lymphoma Father     Sleep apnea Father     Cancer Father     Kidney cancer Paternal Grandmother     Cancer Maternal Aunt                 Social History     Socioeconomic History    Marital status:      Number of children: 2   Tobacco Use    Smoking status: Former     Packs/day: 0.25     Years: 2.00     Additional pack years: 0.00     Total pack years: 0.50     Types: Cigarettes     Start date:      Quit date:      Years since quittin.1    Smokeless tobacco: Never    Tobacco comments:     Only lightly for 2 years   Vaping Use    Vaping Use: Never used   Substance and Sexual Activity    Alcohol use: Not Currently     Alcohol/week: 0.0 standard drinks of alcohol     Comment: Na    Drug use: Never    Sexual activity: Not Currently     Partners: Male     Birth control/protection: None     Comment: Na        OB History          2    Para   2    Term   2            AB        Living             SAB        IAB        Ectopic        Molar        Multiple        Live Births   2             Age at menarche-11  Age at first live childbirth-23   3 para 2  1  Oral conceptive pills none  Hormone replacement therapy for about 1 week  Age of menopause-late 50s    No Known Allergies         Review of Systems   Constitutional: Negative.    HENT: Negative.     Eyes: Negative.    Respiratory: Negative.     Cardiovascular: Negative.    Gastrointestinal: Negative.    Endocrine: Negative.    Genitourinary:  Positive for breast lump.   Allergic/Immunologic: Negative.    Neurological: Negative.    Psychiatric/Behavioral:  The patient is nervous/anxious.          Objective   There were no vitals taken for this visit.   Physical Exam  Vitals reviewed.   Constitutional:       Appearance: Normal appearance. She is normal weight.   HENT:      Nose: Nose normal.      Mouth/Throat:      Pharynx: Oropharynx is clear.   Eyes:      Conjunctiva/sclera: Conjunctivae normal.   Cardiovascular:      Rate and Rhythm: Normal rate.      Pulses: Normal pulses.   Skin:     General: Skin is warm.   Neurological:      General: No focal deficit present.      Mental Status: She is alert.   Psychiatric:         Mood and  Affect: Mood normal.         Behavior: Behavior normal.         Thought Content: Thought content normal.         Judgment: Judgment normal.       Breast Exam: Right breast appears normal on inspection.  On palpation there is a 3.5 x 3.1 cm mass in the upper outer quadrant between 9 to 10 o'clock position.  No palpable right axillary lymphadenopathy.  Left breast appears normal inspection.  No palpable abnormalities of the left breast.    Hospital Outpatient Visit on 01/29/2024   Component Date Value Ref Range Status    Creatinine 01/29/2024 0.90  0.60 - 1.30 mg/dL Final    Serial Number: [id number]:  [id number]        MRI Breast Bilateral With & Without Contrast    Result Date: 1/30/2024  1. Biopsy-proven malignancy in the right breast in the posterior one-third at the 9-o'clock position that measures on the order of 2.1 cm in greatest dimension. The coil-shaped clip is located centrally within the mass. No other suspicious findings are seen within the right breast and there is no evidence for right axillary adenopathy. 2. There are no findings suspicious for malignancy in the left breast.  BI-RADS Category 6: Known biopsy-proven malignancy.  This report was finalized on 1/30/2024 5:08 PM by Dr. Bunny Ramos M.D on Workstation: [id number]          Assessment & Plan       *Right breast invasive ductal carcinoma  Tumor measures 21 mm on the ultrasound and 21 mm on the MRI but on physical exam measuring up to 3.5 cm.  Clinical T2 N0 M0  Invasive ductal carcinoma, grade 2, ER +99% strong, DE intermediate, 10%, HER2 1+ immunohistochemistry, Ki-67 30%  Prognostic stage Ib  Discussed at length the details of imaging and pathology report.Discussed the origin of breast cancer from the ducts and the lobules and the histological type of breast cancer based on site of origin. Discussed the tumor size, lymph node status and stage of the cancer. Explained the presence of DCIS. Discussed the receptor status including ER, DE and  her-2 monserrat and their significance in determining the biology and treatment. Also discussed the importance of grade and ki-67.   Agree with surgery first.  We discussed that typically for estrogen receptor positive breast cancers which are stage I or stage IIb proceed with Oncotype DX recurrence score to determine the need for chemotherapy.  In her situation given that she is 74 years old with a previous history of multiple myeloma and stem cell transplant which is currently maintained on Revlimid due to high risk disease, she may not be a very good candidate for chemotherapy.  We discussed today briefly the pros and cons of chemotherapy and patient is leaning towards not proceeding with chemotherapy.  I did explain to her that she would benefit from endocrine therapy and we plan to start that after completion of radiation.  Patient had several questions regarding sentinel lymph node biopsy.  She has met with Dr. Rosales and recommend that she discuss further with Dr. Rosales regarding the Lauryn and the sentinel lymph node biopsy procedures.    *Multiple myeloma  IgA kappa multiple myeloma diagnosed in 2015 treated with RVD  Stem cell transplant at [facility] in March 2016  Maintenance Velcade and Revlimid and dexamethasone started May 2016  Velcade discontinued because of neurotoxicity in May 2019  Small IgG kappa paraprotein seen on blood work in October 2019 and PET scan was performed which was negative.  This paraprotein subsequently resolved  Currently receiving Zometa every 3 months  Currently on Revlimid 5 mg 3 out of 4 weeks, dose decreased in November 23 for worsening neuropathy and stable disease.  She sees Dr. Alexey Xavier every 6 months.  Dr. Rosales has discussed with Dr. Xavier, based on her documentation he recommends holding Revlimid 1 week prior to surgery.  Revlimid is associated with secondary malignancies however breast cancer is not typically seen with this.  Therefore I think we should  resume Revlimid after completion of breast surgery and continue while she is on endocrine therapy.    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  Will address again on follow-up in 3 weeks    *Follow-up-3 weeks    94 minutes total spent on encounter today including reviewing the medical records, reviewing the literature, face-to-face time with the patient, documentation on the same day, care coordination.  Patient was previously seen in our practice by Dr. Garcia however today she presents to see me as a new patient for a new diagnosis of breast cancer.  She was not previously seen in our office for the management of breast cancer.

## 2024-02-28 ENCOUNTER — SPECIALTY PHARMACY (OUTPATIENT)
Dept: PHARMACY | Facility: HOSPITAL | Age: 74
End: 2024-02-28
Payer: MEDICARE

## 2024-02-28 ENCOUNTER — TELEPHONE (OUTPATIENT)
Dept: SURGERY | Facility: CLINIC | Age: 74
End: 2024-02-28
Payer: MEDICARE

## 2024-02-28 ENCOUNTER — TELEPHONE (OUTPATIENT)
Dept: RADIATION ONCOLOGY | Facility: HOSPITAL | Age: 74
End: 2024-02-28
Payer: MEDICARE

## 2024-02-28 LAB
ALBUMIN SERPL ELPH-MCNC: 3.5 G/DL (ref 2.9–4.4)
ALBUMIN/GLOB SERPL: 1.6 {RATIO} (ref 0.7–1.7)
ALPHA1 GLOB SERPL ELPH-MCNC: 0.2 G/DL (ref 0–0.4)
ALPHA2 GLOB SERPL ELPH-MCNC: 0.5 G/DL (ref 0.4–1)
B-GLOBULIN SERPL ELPH-MCNC: 0.7 G/DL (ref 0.7–1.3)
GAMMA GLOB SERPL ELPH-MCNC: 0.8 G/DL (ref 0.4–1.8)
GLOBULIN SER-MCNC: 2.2 G/DL (ref 2.2–3.9)
IGA SERPL-MCNC: 98 MG/DL (ref 64–422)
IGG SERPL-MCNC: 896 MG/DL (ref 586–1602)
IGM SERPL-MCNC: 33 MG/DL (ref 26–217)
INTERPRETATION SERPL IEP-IMP: ABNORMAL
KAPPA LC FREE SER-MCNC: 31.5 MG/L (ref 3.3–19.4)
KAPPA LC FREE/LAMBDA FREE SER: 1.02 {RATIO} (ref 0.26–1.65)
LABORATORY COMMENT REPORT: ABNORMAL
LAMBDA LC FREE SERPL-MCNC: 31 MG/L (ref 5.7–26.3)
M PROTEIN SERPL ELPH-MCNC: ABNORMAL G/DL
PROT SERPL-MCNC: 5.7 G/DL (ref 6–8.5)

## 2024-02-28 RX ORDER — LENALIDOMIDE 5 MG/1
CAPSULE ORAL
Qty: 21 CAPSULE | Refills: 0 | Status: SHIPPED | OUTPATIENT
Start: 2024-02-28

## 2024-02-28 NOTE — PROGRESS NOTES
Drug: Revlimid (lenalidomide)  Strength: 5 mg  Directions: Take 1 capsule by mouth daily on days 1-21 of each 28 day cycle  Quantity: 21  Refills: 0  [facility]    Completed independent double check on medication order/RX.  Sandra Hernandez, CelestinaD, BCPS

## 2024-02-28 NOTE — TELEPHONE ENCOUNTER
[facility] as [facility] for [facility]    We will let her know

## 2024-02-28 NOTE — PROGRESS NOTES
Re: Refills of Oral Specialty Medication - Revlimid (lenalidomide)    Drug-Drug Interactions: The current medication list was reviewed and there are no relevant drug-drug interactions with the specialty medication.  Medication Allergies: The patient has NKDA  Review of Labs/Dose Adjustments: NO DOSE CHANGE - I reviewed the most recent note and labs and the patient will continue without any dose changes.  I sent refills as described below.    Drug: Revlimid (lenalidomide)  Strength: 5 mg  Directions: Take 1 capsule by mouth daily on days 1-21 of each 28 day cycle  Quantity: 21  Refills: 0  [facility]    Name/Credentials: Felix Gilliam, Angle, BCOP  Clinical Oncology Pharmacist    2/28/2024  10:48 EST

## 2024-02-29 ENCOUNTER — PRE-ADMISSION TESTING (OUTPATIENT)
Dept: PREADMISSION TESTING | Facility: HOSPITAL | Age: 74
End: 2024-02-29
Payer: MEDICARE

## 2024-02-29 ENCOUNTER — CONSULT (OUTPATIENT)
Dept: RADIATION ONCOLOGY | Facility: HOSPITAL | Age: 74
End: 2024-02-29
Payer: MEDICARE

## 2024-02-29 ENCOUNTER — TELEPHONE (OUTPATIENT)
Dept: SURGERY | Facility: CLINIC | Age: 74
End: 2024-02-29
Payer: MEDICARE

## 2024-02-29 ENCOUNTER — HOSPITAL ENCOUNTER (OUTPATIENT)
Dept: GENERAL RADIOLOGY | Facility: HOSPITAL | Age: 74
Discharge: HOME | End: 2024-02-29
Payer: MEDICARE

## 2024-02-29 VITALS
HEART RATE: 77 BPM | WEIGHT: 156.6 LBS | HEIGHT: 67 IN | OXYGEN SATURATION: 99 % | SYSTOLIC BLOOD PRESSURE: 160 MMHG | BODY MASS INDEX: 24.58 KG/M2 | TEMPERATURE: 98.1 F | DIASTOLIC BLOOD PRESSURE: 78 MMHG | RESPIRATION RATE: 16 BRPM

## 2024-02-29 VITALS
WEIGHT: 152 LBS | BODY MASS INDEX: 23.8 KG/M2 | HEART RATE: 69 BPM | DIASTOLIC BLOOD PRESSURE: 75 MMHG | SYSTOLIC BLOOD PRESSURE: 143 MMHG | OXYGEN SATURATION: 99 %

## 2024-02-29 DIAGNOSIS — C50.411 MALIGNANT NEOPLASM OF UPPER-OUTER QUADRANT OF RIGHT BREAST IN FEMALE, ESTROGEN RECEPTOR POSITIVE: ICD-10-CM

## 2024-02-29 DIAGNOSIS — Z17.0 MALIGNANT NEOPLASM OF UPPER-OUTER QUADRANT OF RIGHT BREAST IN FEMALE, ESTROGEN RECEPTOR POSITIVE: ICD-10-CM

## 2024-02-29 DIAGNOSIS — C50.411 MALIGNANT NEOPLASM OF UPPER-OUTER QUADRANT OF RIGHT BREAST IN FEMALE, ESTROGEN RECEPTOR POSITIVE: Primary | ICD-10-CM

## 2024-02-29 DIAGNOSIS — Z17.0 MALIGNANT NEOPLASM OF UPPER-OUTER QUADRANT OF RIGHT BREAST IN FEMALE, ESTROGEN RECEPTOR POSITIVE: Primary | ICD-10-CM

## 2024-02-29 LAB
QT INTERVAL: 473 MS
QTC INTERVAL: 496 MS

## 2024-02-29 PROCEDURE — 71046 X-RAY EXAM CHEST 2 VIEWS: CPT

## 2024-02-29 PROCEDURE — 93005 ELECTROCARDIOGRAM TRACING: CPT

## 2024-02-29 PROCEDURE — G0463 HOSPITAL OUTPT CLINIC VISIT: HCPCS | Performed by: RADIOLOGY

## 2024-02-29 PROCEDURE — 93010 ELECTROCARDIOGRAM REPORT: CPT | Performed by: INTERNAL MEDICINE

## 2024-02-29 NOTE — DISCHARGE INSTRUCTIONS
Take the following medications the morning of surgery:  SYNTHROID      ARRIVAL TIME 6:00 AM      If you are on prescription narcotic pain medication to control your pain you may also take that medication the morning of surgery.    General Instructions:  Do not eat solid food after midnight the night before surgery.  You may drink clear liquids day of surgery but must stop at least one hour before your hospital arrival time.  It is beneficial for you to have a clear drink that contains carbohydrates the day of surgery.  We suggest a 12 to 20 ounce bottle of Gatorade or Powerade for non-diabetic patients or a 12 to 20 ounce bottle of G2 or Powerade Zero for diabetic patients. (Pediatric patients, are not advised to drink a 12 to 20 ounce carbohydrate drink)    Clear liquids are liquids you can see through.  Nothing red in color.     Plain water                               Sports drinks  Sodas                                   Gelatin (Jell-O)  Fruit juices without pulp such as white grape juice and apple juice  Popsicles that contain no fruit or yogurt  Tea or coffee (no cream or milk added)  Gatorade / Powerade  G2 / Powerade Zero    Infants may have breast milk up to four hours before surgery.  Infants drinking formula may drink formula up to six hours before surgery.   Patients who avoid smoking, chewing tobacco and alcohol for 4 weeks prior to surgery have a reduced risk of post-operative complications.  Quit smoking as many days before surgery as you can.  Do not smoke, use chewing tobacco or drink alcohol the day of surgery.   If applicable bring your C-PAP/ BI-PAP machine in with you to preop day of surgery.  Bring any papers given to you in the doctor’s office.  Wear clean comfortable clothes.  Do not wear contact lenses, false eyelashes or make-up.  Bring a case for your glasses.   Bring crutches or walker if applicable.  Remove all piercings.  Leave jewelry and any other valuables at home.  Hair extensions  with metal clips must be removed prior to surgery.  The Pre-Admission Testing nurse will instruct you to bring medications if unable to obtain an accurate list in Pre-Admission Testing.        If you were given a blood bank ID arm band remember to bring it with you the day of surgery.    Preventing a Surgical Site Infection:  For 2 to 3 days before surgery, avoid shaving with a razor because the razor can irritate skin and make it easier to develop an infection.    Any areas of open skin can increase the risk of a post-operative wound infection by allowing bacteria to enter and travel throughout the body.  Notify your surgeon if you have any skin wounds / rashes even if it is not near the expected surgical site.  The area will need assessed to determine if surgery should be delayed until it is healed.  The night prior to surgery shower using a fresh bar of anti-bacterial soap (such as Dial) and clean washcloth.  Sleep in a clean bed with clean clothing.  Do not allow pets to sleep with you.  Shower on the morning of surgery using a fresh bar of anti-bacterial soap (such as Dial) and clean washcloth.  Dry with a clean towel and dress in clean clothing.  Ask your surgeon if you will be receiving antibiotics prior to surgery.  Make sure you, your family, and all healthcare providers clean their hands with soap and water or an alcohol based hand  before caring for you or your wound.    Day of surgery:  Your arrival time is approximately two hours before your scheduled surgery time.  Upon arrival, a Pre-op nurse and Anesthesiologist will review your health history, obtain vital signs, and answer questions you may have.  The only belongings needed at this time will be a list of your home medications and if applicable your C-PAP/BI-PAP machine.  A Pre-op nurse will start an IV and you may receive medication in preparation for surgery, including something to help you relax.     Please be aware that surgery does  come with discomfort.  We want to make every effort to control your discomfort so please discuss any uncontrolled symptoms with your nurse.   Your doctor will most likely have prescribed pain medications.      If you are going home after surgery you will receive individualized written care instructions before being discharged.  A responsible adult must drive you to and from the hospital on the day of your surgery and ideally stay with you through the night.   .  Discharge prescriptions can be filled by the hospital pharmacy during regular pharmacy hours.  If you are having surgery late in the day/evening your prescription may be e-prescribed to your pharmacy.  Please verify your pharmacy hours or chose a 24 hour pharmacy to avoid not having access to your prescription because your pharmacy has closed for the day.    If you are staying overnight following surgery, you will be transported to your hospital room following the recovery period.  [facility] has all private rooms.    If you have any questions please call Pre-Admission Testing at [phone].  Deductibles and co-payments are collected on the day of service. Please be prepared to pay the required co-pay, deductible or deposit on the day of service as defined by your plan.    Call your surgeon immediately if you experience any of the following symptoms:  Sore Throat  Shortness of Breath or difficulty breathing  Cough  Chills  Body soreness or muscle pain  Headache  Fever  New loss of taste or smell  Do not arrive for your surgery ill.  Your procedure will need to be rescheduled to another time.  You will need to call your physician before the day of surgery to avoid any unnecessary exposure to hospital staff as well as other patients.

## 2024-02-29 NOTE — PROGRESS NOTES
CC: right breast cancer cT2N0        Dear Angelina Rosales MD     I had the pleasure of seeing Jennifer Plascencia  [facility].   The patient is a 74 y.o. female with recently diagnosed right breast cancer.  She had a screening mammogram on 12/29/23 which showed a mass with associated distortion within the upper outer right breast, posterior depth.BI-RADS 0.  She had a diagnostic right mammogram on 1/9/24 which showed  the spiculated high density mass measuring approximately 19 mm with associated architectural distortion, located at 10:00 10 cm from the nipple. An adjacent smaller circumscribed low-density 5 mm mass is located just medial and superior to the primary mass by approximately 2 mm. Calcifications in the upper inner quadrant of the right breast are stable dating back to 2019 mammogram. Ultrasound of the right breast showed at 10:00, 10 cm nipple, there is an irregular hypoechoic mass with indistinct margins measuring 21 mm x 19 mm x 9 mm. There is associated posterior acoustic shadowing. At 10;30, 10 cm nipple there is a subtle indistinct hypoechoic mass measuring 4 mm x 3 mm x 5 mm. This is thought to represent the adjacent mass seen mammographically. In addition, there is a third circumscribed oval hypoechoic 4 x 3 x 3 mm mass, labeled 9;30, 8 cm from nipple, considered indeterminate. Right axillary lymph node is essentially fatty replaced. No morphologically abnormal lymph nodes are identified.IMPRESSION: Recommend 3 site ultrasound guided biopsy BI-RADS 5.     She had an ultrasound guided biopsy of the right breast mass at 10 ocock on 1/19/24 with pathology revealing a grade II invasive ductal carcinoma measuring up to 13mm, no lvsi, ER 99% OK 10% Her 2 negative and ki67 30%.  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  Biopsy of the right breast 9:30 position revealed sclerosing adenosis, no malignancy.     She had a breast mri on 1/29/24  which showed in the posterior one-third of the right breast at the 9-o'clock position centered on the order of 8 cm from the nipple there is a focal signal void seen within an irregular enhancing mass that measures on the order of 2.1 cm in anterior posterior dimension, 1.5 cm in the superior inferior dimension and 1.5 cm in the medial lateral dimension. A focal signal void is seen centrally within the mass. This corresponds to the biopsy-proven site of malignancy with the internal coil shaped metallic clip.  No other areas of suspicious enhancement or morphology are seen in the right breast. I see no evidence for abnormal skin, nipple or chest wall enhancement of the right breast and there is no evidence for right axillary or internal mammary chain adenopathy.     She is  with menarche age 11 and first chilbirth age 23.  She did not breast feed.  She is postmenopausal, entered menopause naturally in her late 50s. She has never takenbirth control pills and took hormone replacement for only one week.     She is referred today for evaluation for radiation.  She has not had surgery yet but had several questions about radiation and thus was referred peroperatively.      Review of Systems   Constitutional: Negative.    HENT:  Positive for tinnitus.    Eyes: Negative.    Respiratory: Negative.     Cardiovascular: Negative.    Gastrointestinal: Negative.    Genitourinary: Negative.    Musculoskeletal:  Positive for neck pain and neck stiffness.   Skin: Negative.    Neurological: Negative.    Psychiatric/Behavioral: Negative.         Past Medical History:   Diagnosis Date    Anxiety     Breast cancer 2024    Inv. Ductal with DCIS (right breast)   ER+/NM+/Her2-    COVID 2024    Depression     Disease of thyroid gland     Hashimoto's disease     History of vitamin D deficiency     Hypothyroidism     IBS (irritable bowel syndrome)     Mixed hyperlipidemia 2018    Multiple myeloma 2015    IgA kappa.  Stem  cell transplant at [facility] 3/20/2016    Myeloma     KWAME (obstructive sleep apnea) 2021    Overnight polysomnogram.  Weight 173 pounds.  Mild KWAME with AHI 5.6 events per hour.  When supine, 9.4 events per hour.  During REM, moderate severity at 26 events per hour.  No sleep-related hypoxia.  The patient snored 20.5% of total sleep time.    Osteopenia     Overweight (BMI 25.0-29.9) 2018         Past Surgical History:   Procedure Laterality Date    BREAST BIOPSY Right 2024    10:00 @ 10cmFN   (Inv. Ductal Carcinoma  ER+/ID+/Her2-)  [facility]    CATARACT EXTRACTION      COLONOSCOPY      TONSILLECTOMY      VEIN SURGERY           Social History     Socioeconomic History    Marital status:     Number of children: 2   Tobacco Use    Smoking status: Former     Packs/day: 0.25     Years: 2.00     Additional pack years: 0.00     Total pack years: 0.50     Types: Cigarettes     Start date:      Quit date:      Years since quittin.1    Smokeless tobacco: Never    Tobacco comments:     Only lightly for 2 years   Vaping Use    Vaping Use: Never used   Substance and Sexual Activity    Alcohol use: Not Currently     Alcohol/week: 0.0 standard drinks of alcohol     Comment: Na    Drug use: Never    Sexual activity: Not Currently     Partners: Male     Birth control/protection: None     Comment: Na         Family History   Problem Relation Age of Onset    Breast cancer Mother     Sleep apnea Mother     Lymphoma Father     Sleep apnea Father     Cancer Father     Kidney cancer Paternal Grandmother     Cancer Maternal Aunt                   Objective    Physical Exam  Constitutional:       Appearance: Normal appearance.   HENT:      Head: Atraumatic.   Chest:      Comments: She deferred breast exam today  Musculoskeletal:         General: Normal range of motion.   Neurological:      General: No focal deficit present.      Mental Status: She is alert.    Psychiatric:         Mood and Affect: Mood normal.         Current Outpatient Medications on File Prior to Visit   Medication Sig Dispense Refill    Acetylcarn-Alpha Lipoic Acid 400-200 MG capsule Take  by mouth.      acyclovir (ZOVIRAX) 400 MG tablet Take 1 tablet by mouth 2 (Two) Times a Day. 60 tablet 11    ALPRAZolam (XANAX) 0.25 MG tablet Take 1 tablet by mouth Daily As Needed for Anxiety. 30 tablet 2    aspirin 81 MG chewable tablet Chew 1 tablet Daily.      Azelastine HCl 137 MCG/SPRAY solution       B Complex Vitamins (VITAMIN B COMPLEX) capsule capsule Take 2 tablets by mouth Daily.      baclofen (LIORESAL) 10 MG tablet Take 1 tablet by mouth 3 (Three) Times a Day.  5    calcium carbonate-vitamin d 600-400 MG-UNIT per tablet Take 1 tablet by mouth Daily.      colesevelam (WELCHOL) 625 MG tablet Take 2 tablets by mouth 2 (Two) Times a Day With Meals. 90 tablet 2    cycloSPORINE (RESTASIS) 0.05 % ophthalmic emulsion 1 drop 2 (Two) Times a Day.      diphenoxylate-atropine (LOMOTIL) 2.5-0.025 MG per tablet Take 1 tablet by mouth 3 (Three) Times a Day As Needed for Diarrhea. 90 tablet 3    DULoxetine (CYMBALTA) 30 MG capsule Take 1 capsule by mouth Daily.      DULoxetine (CYMBALTA) 60 MG capsule Take 1 capsule by mouth Daily. 90 capsule 3    fexofenadine (ALLEGRA) 180 MG tablet Take 1 tablet by mouth As Needed.      fluticasone (FLONASE) 50 MCG/ACT nasal spray 2 sprays into the nostril(s) as directed by provider Daily.      folic acid (FOLVITE) 1 MG tablet Take 1 tablet by mouth Daily.  2    gabapentin (NEURONTIN) 100 MG capsule Take 1 capsule by mouth 2 (Two) Times a Day As Needed (Pain). 60 capsule 0    HYDROcodone-acetaminophen (NORCO) 5-325 MG per tablet Take 1 tablet by mouth Every 6 (Six) Hours As Needed for Moderate Pain  or Severe Pain . 60 tablet 0    HYDROcodone-acetaminophen (NORCO) 5-325 MG per tablet 1-2 tabs po q 4-6hr prn pain, wean to tylenol 15 tablet 0    lenalidomide (Revlimid) 5 MG capsule  TAKE 1 CAPSULE DAILY FOR 21 DAYS ON, THEN 7 DAYS OFF 21 capsule 0    magnesium oxide (MAG-OX) 400 MG tablet Take 1 tablet by mouth Daily. 30 tablet 3    meloxicam (MOBIC) 15 MG tablet Take 1 tablet by mouth Daily.      Multiple Vitamins-Iron (DAILY-JONI/IRON/BETA-CAROTENE) tablet Take 1 tablet by mouth Daily.  2    omega-3 acid ethyl esters (LOVAZA) 1 g capsule Take 2 capsules by mouth.      polyethylene glycol (MiraLax) 17 GM/SCOOP powder Take 17 g by mouth Daily. Take cap of powder with 8oz water daily surrounding surgery and while on narcotic 119 g 0    promethazine-dextromethorphan (PROMETHAZINE-DM) 6.25-15 MG/5ML syrup TAKE 10 ML BY MOUTH TWICE A DAY AS NEEDED FOR COUGH      Synthroid 75 MCG tablet Take 1 tablet by mouth Daily.      trimethoprim-polymyxin b (POLYTRIM) 13765-9.1 UNIT/ML-% ophthalmic solution As Needed.      vitamin D (ERGOCALCIFEROL) 24011 units capsule capsule Take 1 capsule by mouth 1 (One) Time Per Week. Twice a week      zoledronic acid (ZOMETA) 4 MG/5ML injection Infuse 5 mL into a venous catheter Every 3 (Three) Months.       No current facility-administered medications on file prior to visit.       ALLERGIES:  No Known Allergies    /75   Pulse 69   Wt 68.9 kg (152 lb)   SpO2 99%   BMI 23.80 kg/m²      (0) Fully active, able to carry on all predisease performance without restriction      2/27/2024     8:45 AM   Current Status   ECOG score 0         Assessment & Plan   74 year old female with cT2N0 right breast cancer, ER [place] Positive Her 2 negative.  I have reviewed her imaging and pathology report.  She has not had surgery yet and thus I explained I could not make final recommendations until we have her pathology from surgery. We briefly discussed the long-term results of the CALGB 9343 randomized clinical trial assessing the benefits of radiation therapy and women with stage I age 70 and over and I explained that some women age 70 and over do not need radiation, however, I would  not be able to make this determination until after her surgery.        I discussed the details of radiation to the breast if she should proceed with radiation to decrease the risk of local recurrence.       I discussed with her in detail the risks, benefits and rationale of radiation therapy to the right breast to include but not limited to the following:     Acute: skin erythema, breakdown/moist desquamation, swelling or discomfort of the breast, fatigue, pneumonitis resulting in shortness of breath, cough or pain     Late: Permanent skin changes including hyperpigmentation, telangiectasias, fibrosis of the breast resulting in smaller size or poor cosmetic outcome, late edema or cellulitis, late rib fracture, late pulmonary fibrosis and the remote risk of second malignancies.       She voiced understanding and was given an opportunity to ask questions which I believe were answered to her satisfaction.  Based on her initial imaging, clinical stage and grade of her tumor, I would likely lean toward recommending radiation.  I explained that if she is node negative it would be 16-20 treatments daily over the course of 3-4 weeks.  If she has positive lymph nodes then I would recommend treating the regional nodes as well in 25 fractions.  She is having surgery on march 6 and I have scheduled her to return for re-evaluation on March 27.    I personally spent greater than 60 minutes today assessing, managing, discussing and documenting my visit with the patient. That time includes review of records, imaging and pathology reports, obtaining my own history, performing a medically appropriate evaluation, counseling and educating the patient, discussing goals, logistics, alternatives and risks of my recommendations, surveillance options and potential outcomes. It also includes the time documenting the clinical information in the EMR and communicating my recommendations to the other involved physicians.                Thank you  very much for allowing me to participate in the care of this very pleasant patient.    Sincerely,      Candida Aponte MD

## 2024-02-29 NOTE — PROGRESS NOTES
CC: right breast cancer cT2N0        Dear Angelina Rosales MD     I had the pleasure of seeing Jennifer Plascencia  [facility].   The patient is a 74 y.o. female with recently diagnosed right breast cancer.  She had a screening mammogram on 12/29/23 which showed a mass with associated distortion within the upper outer right breast, posterior depth.BI-RADS 0.  She had a diagnostic right mammogram on 1/9/24 which showed  the spiculated high density mass measuring approximately 19 mm with associated architectural distortion, located at 10:00 10 cm from the nipple. An adjacent smaller circumscribed low-density 5 mm mass is located just medial and superior to the primary mass by approximately 2 mm. Calcifications in the upper inner quadrant of the right breast are stable dating back to 2019 mammogram. Ultrasound of the right breast showed at 10:00, 10 cm nipple, there is an irregular hypoechoic mass with indistinct margins measuring 21 mm x 19 mm x 9 mm. There is associated posterior acoustic shadowing. At 10;30, 10 cm nipple there is a subtle indistinct hypoechoic mass measuring 4 mm x 3 mm x 5 mm. This is thought to represent the adjacent mass seen mammographically. In addition, there is a third circumscribed oval hypoechoic 4 x 3 x 3 mm mass, labeled 9;30, 8 cm from nipple, considered indeterminate. Right axillary lymph node is essentially fatty replaced. No morphologically abnormal lymph nodes are identified.IMPRESSION: Recommend 3 site ultrasound guided biopsy BI-RADS 5.     She had an ultrasound guided biopsy of the right breast mass at 10 ocock on 1/19/24 with pathology revealing a grade II invasive ductal carcinoma measuring up to 13mm, no lvsi, ER 99% OH 10% Her 2 negative and ki67 30%.  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  Biopsy of the right breast 9:30 position revealed sclerosing adenosis, no malignancy.     She had a breast mri on 1/29/24  which showed in the posterior one-third of the right breast at the 9-o'clock position centered on the order of 8 cm from the nipple there is a focal signal void seen within an irregular enhancing mass that measures on the order of 2.1 cm in anterior posterior dimension, 1.5 cm in the superior inferior dimension and 1.5 cm in the medial lateral dimension. A focal signal void is seen centrally within the mass. This corresponds to the biopsy-proven site of malignancy with the internal coil shaped metallic clip.  No other areas of suspicious enhancement or morphology are seen in the right breast. I see no evidence for abnormal skin, nipple or chest wall enhancement of the right breast and there is no evidence for right axillary or internal mammary chain adenopathy.     She is  with menarche age 11 and first chilbirth age 23.  She did not breast feed.  She is postmenopausal, entered menopause naturally in her late 50s. She has never takenbirth control pills and took hormone replacement for only one week.     She is referred today for evaluation for radiation.  She has not had surgery yet but had several questions about radiation and thus was referred peroperatively.      Review of Systems   Constitutional: Negative.    HENT:  Positive for tinnitus.    Eyes: Negative.    Respiratory: Negative.     Cardiovascular: Negative.    Gastrointestinal: Negative.    Genitourinary: Negative.    Musculoskeletal:  Positive for neck pain and neck stiffness.   Skin: Negative.    Neurological: Negative.    Psychiatric/Behavioral: Negative.         Past Medical History:   Diagnosis Date   • Anxiety    • Breast cancer 2024    Inv. Ductal with DCIS (right breast)   ER+/NH+/Her2-   • COVID 2024   • Depression    • Disease of thyroid gland    • Hashimoto's disease    • History of vitamin D deficiency    • Hypothyroidism    • IBS (irritable bowel syndrome)    • Mixed hyperlipidemia 2018   • Multiple myeloma     IgA  kappa.  Stem cell transplant at [facility] 3/20/2016   • Myeloma    • KWAME (obstructive sleep apnea) 2021    Overnight polysomnogram.  Weight 173 pounds.  Mild KWAME with AHI 5.6 events per hour.  When supine, 9.4 events per hour.  During REM, moderate severity at 26 events per hour.  No sleep-related hypoxia.  The patient snored 20.5% of total sleep time.   • Osteopenia    • Overweight (BMI 25.0-29.9) 2018         Past Surgical History:   Procedure Laterality Date   • BREAST BIOPSY Right 2024    10:00 @ 10cmFN   (Inv. Ductal Carcinoma  ER+/NE+/Her2-)  [facility]   • CATARACT EXTRACTION     • COLONOSCOPY     • TONSILLECTOMY     • VEIN SURGERY           Social History     Socioeconomic History   • Marital status:    • Number of children: 2   Tobacco Use   • Smoking status: Former     Packs/day: 0.25     Years: 2.00     Additional pack years: 0.00     Total pack years: 0.50     Types: Cigarettes     Start date:      Quit date:      Years since quittin.1   • Smokeless tobacco: Never   • Tobacco comments:     Only lightly for 2 years   Vaping Use   • Vaping Use: Never used   Substance and Sexual Activity   • Alcohol use: Not Currently     Alcohol/week: 0.0 standard drinks of alcohol     Comment: Na   • Drug use: Never   • Sexual activity: Not Currently     Partners: Male     Birth control/protection: None     Comment: Na         Family History   Problem Relation Age of Onset   • Breast cancer Mother    • Sleep apnea Mother    • Lymphoma Father    • Sleep apnea Father    • Cancer Father    • Kidney cancer Paternal Grandmother    • Cancer Maternal Aunt                   Objective    Physical Exam  Constitutional:       Appearance: Normal appearance.   HENT:      Head: Atraumatic.   Chest:      Comments: She deferred breast exam today  Musculoskeletal:         General: Normal range of motion.   Neurological:      General: No focal deficit present.       Mental Status: She is alert.   Psychiatric:         Mood and Affect: Mood normal.         Current Outpatient Medications on File Prior to Visit   Medication Sig Dispense Refill   • Acetylcarn-Alpha Lipoic Acid 400-200 MG capsule Take  by mouth.     • acyclovir (ZOVIRAX) 400 MG tablet Take 1 tablet by mouth 2 (Two) Times a Day. 60 tablet 11   • ALPRAZolam (XANAX) 0.25 MG tablet Take 1 tablet by mouth Daily As Needed for Anxiety. 30 tablet 2   • aspirin 81 MG chewable tablet Chew 1 tablet Daily.     • Azelastine HCl 137 MCG/SPRAY solution      • B Complex Vitamins (VITAMIN B COMPLEX) capsule capsule Take 2 tablets by mouth Daily.     • baclofen (LIORESAL) 10 MG tablet Take 1 tablet by mouth 3 (Three) Times a Day.  5   • calcium carbonate-vitamin d 600-400 MG-UNIT per tablet Take 1 tablet by mouth Daily.     • colesevelam (WELCHOL) 625 MG tablet Take 2 tablets by mouth 2 (Two) Times a Day With Meals. 90 tablet 2   • cycloSPORINE (RESTASIS) 0.05 % ophthalmic emulsion 1 drop 2 (Two) Times a Day.     • diphenoxylate-atropine (LOMOTIL) 2.5-0.025 MG per tablet Take 1 tablet by mouth 3 (Three) Times a Day As Needed for Diarrhea. 90 tablet 3   • DULoxetine (CYMBALTA) 30 MG capsule Take 1 capsule by mouth Daily.     • DULoxetine (CYMBALTA) 60 MG capsule Take 1 capsule by mouth Daily. 90 capsule 3   • fexofenadine (ALLEGRA) 180 MG tablet Take 1 tablet by mouth As Needed.     • fluticasone (FLONASE) 50 MCG/ACT nasal spray 2 sprays into the nostril(s) as directed by provider Daily.     • folic acid (FOLVITE) 1 MG tablet Take 1 tablet by mouth Daily.  2   • gabapentin (NEURONTIN) 100 MG capsule Take 1 capsule by mouth 2 (Two) Times a Day As Needed (Pain). 60 capsule 0   • HYDROcodone-acetaminophen (NORCO) 5-325 MG per tablet Take 1 tablet by mouth Every 6 (Six) Hours As Needed for Moderate Pain  or Severe Pain . 60 tablet 0   • HYDROcodone-acetaminophen (NORCO) 5-325 MG per tablet 1-2 tabs po q 4-6hr prn pain, wean to tylenol 15  tablet 0   • lenalidomide (Revlimid) 5 MG capsule TAKE 1 CAPSULE DAILY FOR 21 DAYS ON, THEN 7 DAYS OFF 21 capsule 0   • magnesium oxide (MAG-OX) 400 MG tablet Take 1 tablet by mouth Daily. 30 tablet 3   • meloxicam (MOBIC) 15 MG tablet Take 1 tablet by mouth Daily.     • Multiple Vitamins-Iron (DAILY-JONI/IRON/BETA-CAROTENE) tablet Take 1 tablet by mouth Daily.  2   • omega-3 acid ethyl esters (LOVAZA) 1 g capsule Take 2 capsules by mouth.     • polyethylene glycol (MiraLax) 17 GM/SCOOP powder Take 17 g by mouth Daily. Take cap of powder with 8oz water daily surrounding surgery and while on narcotic 119 g 0   • promethazine-dextromethorphan (PROMETHAZINE-DM) 6.25-15 MG/5ML syrup TAKE 10 ML BY MOUTH TWICE A DAY AS NEEDED FOR COUGH     • Synthroid 75 MCG tablet Take 1 tablet by mouth Daily.     • trimethoprim-polymyxin b (POLYTRIM) 78438-8.1 UNIT/ML-% ophthalmic solution As Needed.     • vitamin D (ERGOCALCIFEROL) 09043 units capsule capsule Take 1 capsule by mouth 1 (One) Time Per Week. Twice a week     • zoledronic acid (ZOMETA) 4 MG/5ML injection Infuse 5 mL into a venous catheter Every 3 (Three) Months.       No current facility-administered medications on file prior to visit.       ALLERGIES:  No Known Allergies    /75   Pulse 69   Wt 68.9 kg (152 lb)   SpO2 99%   BMI 23.80 kg/m²      (0) Fully active, able to carry on all predisease performance without restriction      2/27/2024     8:45 AM   Current Status   ECOG score 0         Assessment & Plan   74 year old female with cT2N0 right breast cancer, ER [place] Positive Her 2 negative.  I have reviewed her imaging and pathology report.  She has not had surgery yet and thus I explained I could not make final recommendations until we have her pathology from surgery. We briefly discussed the long-term results of the CALGB 9343 randomized clinical trial assessing the benefits of radiation therapy and women with stage I age 70 and over and I explained that some  women age 70 and over do not need radiation, however, I would not be able to make this determination until after her surgery.        I discussed the details of radiation to the breast if she should proceed with radiation to decrease the risk of local recurrence.       I discussed with her in detail the risks, benefits and rationale of radiation therapy to the right breast to include but not limited to the following:     Acute: skin erythema, breakdown/moist desquamation, swelling or discomfort of the breast, fatigue, pneumonitis resulting in shortness of breath, cough or pain     Late: Permanent skin changes including hyperpigmentation, telangiectasias, fibrosis of the breast resulting in smaller size or poor cosmetic outcome, late edema or cellulitis, late rib fracture, late pulmonary fibrosis and the remote risk of second malignancies.       She voiced understanding and was given an opportunity to ask questions which I believe were answered to her satisfaction.  Based on her initial imaging, clinical stage and grade of her tumor, I would likely lean toward recommending radiation.  I explained that if she is node negative it would be 16-20 treatments daily over the course of 3-4 weeks.  If she has positive lymph nodes then I would recommend treating the regional nodes as well in 25 fractions.  She is having surgery on march 6 and I have scheduled her to return for re-evaluation on March 27.    I personally spent greater than 60 minutes today assessing, managing, discussing and documenting my visit with the patient. That time includes review of records, imaging and pathology reports, obtaining my own history, performing a medically appropriate evaluation, counseling and educating the patient, discussing goals, logistics, alternatives and risks of my recommendations, surveillance options and potential outcomes. It also includes the time documenting the clinical information in the EMR and communicating my  recommendations to the other involved physicians.                Thank you very much for allowing me to participate in the care of this very pleasant patient.    Sincerely,      Candida Aponte MD

## 2024-02-29 NOTE — PROGRESS NOTES
CC: right breast cancer cT2N0           Dear Angelina Rosales MD     I had the pleasure of seeing Jennifer Plascencia  today in the [facility].   The patient is a 74 y.o. female with recently diagnosed right breast cancer.  She had a screening mammogram on 12/29/23 which showed a mass with associated distortion within the upper outer right breast, posterior depth.BI-RADS 0.  She had a diagnostic right mammogram on 1/9/24 which showed  the spiculated high density mass measuring approximately 19 mm with associated architectural distortion, located at 10:00 10 cm from the nipple. An adjacent smaller circumscribed low-density 5 mm mass is located just medial and superior to the primary mass by approximately 2 mm. Calcifications in the upper inner quadrant of the right breast are stable dating back to 2019 mammogram. Ultrasound of the right breast showed at 10:00, 10 cm nipple, there is an irregular hypoechoic mass with indistinct margins measuring 21 mm x 19 mm x 9 mm. There is associated posterior acoustic shadowing. At 10;30, 10 cm nipple there is a subtle indistinct hypoechoic mass measuring 4 mm x 3 mm x 5 mm. This is thought to represent the adjacent mass seen mammographically. In addition, there is a third circumscribed oval hypoechoic 4 x 3 x 3 mm mass, labeled 9;30, 8 cm from nipple, considered indeterminate. Right axillary lymph node is essentially fatty replaced. No morphologically abnormal lymph nodes are identified.IMPRESSION: Recommend 3 site ultrasound guided biopsy BI-RADS 5.     She had an ultrasound guided biopsy of the right breast mass at 10 ocock on 1/19/24 with pathology revealing a grade II invasive ductal carcinoma measuring up to 13mm, no lvsi, ER 99% HI 10% Her 2 negative and ki67 30%.  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  Biopsy of the right breast 9:30 position revealed sclerosing adenosis, no malignancy.     She had a breast mri on 1/29/24  which showed in the posterior one-third of the right breast at the 9-o'clock position centered on the order of 8 cm from the nipple there is a focal signal void seen within an irregular enhancing mass that measures on the order of 2.1 cm in anterior posterior dimension, 1.5 cm in the superior inferior dimension and 1.5 cm in the medial lateral dimension. A focal signal void is seen centrally within the mass. This corresponds to the biopsy-proven site of malignancy with the internal coil shaped metallic clip.  No other areas of suspicious enhancement or morphology are seen in the right breast. I see no evidence for abnormal skin, nipple or chest wall enhancement of the right breast and there is no evidence for right axillary or internal mammary chain adenopathy.     She is  with menarche age 11 and first chilbirth age 23.  She did not breast feed.  She is postmenopausal, entered menopause naturally in her late 50s. She has never takenbirth control pills and took hormone replacement for only one week.     She is referred today for evaluation for radiation.  She has not had surgery yet but had several questions about radiation and thus was referred peroperatively.        Review of Systems   Constitutional: Negative.    HENT:  Positive for tinnitus.    Eyes: Negative.    Respiratory: Negative.     Cardiovascular: Negative.    Gastrointestinal: Negative.    Genitourinary: Negative.    Musculoskeletal:  Positive for neck pain and neck stiffness.   Skin: Negative.    Neurological: Negative.    Psychiatric/Behavioral: Negative.          Medical History        Past Medical History:   Diagnosis Date    Anxiety      Breast cancer 2024     Inv. Ductal with DCIS (right breast)   ER+/IN+/Her2-    COVID 2024    Depression      Disease of thyroid gland      Hashimoto's disease      History of vitamin D deficiency      Hypothyroidism      IBS (irritable bowel syndrome)      Mixed hyperlipidemia 2018    Multiple  myeloma 2015     IgA kappa.  Stem cell transplant at [facility] 3/20/2016    Myeloma      KWAME (obstructive sleep apnea) 2021     Overnight polysomnogram.  Weight 173 pounds.  Mild KWAME with AHI 5.6 events per hour.  When supine, 9.4 events per hour.  During REM, moderate severity at 26 events per hour.  No sleep-related hypoxia.  The patient snored 20.5% of total sleep time.    Osteopenia      Overweight (BMI 25.0-29.9) 2018             Surgical History         Past Surgical History:   Procedure Laterality Date    BREAST BIOPSY Right 2024     10:00 @ 10cmFN   (Inv. Ductal Carcinoma  ER+/SC+/Her2-)  [facility]    CATARACT EXTRACTION        COLONOSCOPY        TONSILLECTOMY       VEIN SURGERY                Social History   Social History            Socioeconomic History    Marital status:     Number of children: 2   Tobacco Use    Smoking status: Former       Packs/day: 0.25       Years: 2.00       Additional pack years: 0.00       Total pack years: 0.50       Types: Cigarettes       Start date:        Quit date:        Years since quittin.1    Smokeless tobacco: Never    Tobacco comments:       Only lightly for 2 years   Vaping Use    Vaping Use: Never used   Substance and Sexual Activity    Alcohol use: Not Currently       Alcohol/week: 0.0 standard drinks of alcohol       Comment: Na    Drug use: Never    Sexual activity: Not Currently       Partners: Male       Birth control/protection: None       Comment: Na                   Family History   Problem Relation Age of Onset    Breast cancer Mother      Sleep apnea Mother      Lymphoma Father      Sleep apnea Father      Cancer Father      Kidney cancer Paternal Grandmother      Cancer Maternal Aunt                         Objective   Physical Exam  Constitutional:       Appearance: Normal appearance.   HENT:      Head: Atraumatic.   Chest:      Comments: She deferred breast exam  today  Musculoskeletal:         General: Normal range of motion.   Neurological:      General: No focal deficit present.      Mental Status: She is alert.   Psychiatric:         Mood and Affect: Mood normal.                 Current Outpatient Medications on File Prior to Visit   Medication Sig Dispense Refill    Acetylcarn-Alpha Lipoic Acid 400-200 MG capsule Take  by mouth.        acyclovir (ZOVIRAX) 400 MG tablet Take 1 tablet by mouth 2 (Two) Times a Day. 60 tablet 11    ALPRAZolam (XANAX) 0.25 MG tablet Take 1 tablet by mouth Daily As Needed for Anxiety. 30 tablet 2    aspirin 81 MG chewable tablet Chew 1 tablet Daily.        Azelastine HCl 137 MCG/SPRAY solution          B Complex Vitamins (VITAMIN B COMPLEX) capsule capsule Take 2 tablets by mouth Daily.        baclofen (LIORESAL) 10 MG tablet Take 1 tablet by mouth 3 (Three) Times a Day.   5    calcium carbonate-vitamin d 600-400 MG-UNIT per tablet Take 1 tablet by mouth Daily.        colesevelam (WELCHOL) 625 MG tablet Take 2 tablets by mouth 2 (Two) Times a Day With Meals. 90 tablet 2    cycloSPORINE (RESTASIS) 0.05 % ophthalmic emulsion 1 drop 2 (Two) Times a Day.        diphenoxylate-atropine (LOMOTIL) 2.5-0.025 MG per tablet Take 1 tablet by mouth 3 (Three) Times a Day As Needed for Diarrhea. 90 tablet 3    DULoxetine (CYMBALTA) 30 MG capsule Take 1 capsule by mouth Daily.        DULoxetine (CYMBALTA) 60 MG capsule Take 1 capsule by mouth Daily. 90 capsule 3    fexofenadine (ALLEGRA) 180 MG tablet Take 1 tablet by mouth As Needed.        fluticasone (FLONASE) 50 MCG/ACT nasal spray 2 sprays into the nostril(s) as directed by provider Daily.        folic acid (FOLVITE) 1 MG tablet Take 1 tablet by mouth Daily.   2    gabapentin (NEURONTIN) 100 MG capsule Take 1 capsule by mouth 2 (Two) Times a Day As Needed (Pain). 60 capsule 0    HYDROcodone-acetaminophen (NORCO) 5-325 MG per tablet Take 1 tablet by mouth Every 6 (Six) Hours As Needed for Moderate Pain   or Severe Pain . 60 tablet 0    HYDROcodone-acetaminophen (NORCO) 5-325 MG per tablet 1-2 tabs po q 4-6hr prn pain, wean to tylenol 15 tablet 0    lenalidomide (Revlimid) 5 MG capsule TAKE 1 CAPSULE DAILY FOR 21 DAYS ON, THEN 7 DAYS OFF 21 capsule 0    magnesium oxide (MAG-OX) 400 MG tablet Take 1 tablet by mouth Daily. 30 tablet 3    meloxicam (MOBIC) 15 MG tablet Take 1 tablet by mouth Daily.        Multiple Vitamins-Iron (DAILY-JONI/IRON/BETA-CAROTENE) tablet Take 1 tablet by mouth Daily.   2    omega-3 acid ethyl esters (LOVAZA) 1 g capsule Take 2 capsules by mouth.        polyethylene glycol (MiraLax) 17 GM/SCOOP powder Take 17 g by mouth Daily. Take cap of powder with 8oz water daily surrounding surgery and while on narcotic 119 g 0    promethazine-dextromethorphan (PROMETHAZINE-DM) 6.25-15 MG/5ML syrup TAKE 10 ML BY MOUTH TWICE A DAY AS NEEDED FOR COUGH        Synthroid 75 MCG tablet Take 1 tablet by mouth Daily.        trimethoprim-polymyxin b (POLYTRIM) 75302-6.1 UNIT/ML-% ophthalmic solution As Needed.        vitamin D (ERGOCALCIFEROL) 09912 units capsule capsule Take 1 capsule by mouth 1 (One) Time Per Week. Twice a week        zoledronic acid (ZOMETA) 4 MG/5ML injection Infuse 5 mL into a venous catheter Every 3 (Three) Months.          No current facility-administered medications on file prior to visit.         ALLERGIES:  No Known Allergies     /75   Pulse 69   Wt 68.9 kg (152 lb)   SpO2 99%   BMI 23.80 kg/m²       (0) Fully active, able to carry on all predisease performance without restriction       2/27/2024     8:45 AM   Current Status   ECOG score 0                  Assessment & Plan  74 year old female with cT2N0 right breast cancer, ER [place] Positive Her 2 negative.  I have reviewed her imaging and pathology report.  She has not had surgery yet and thus I explained I could not make final recommendations until we have her pathology from surgery. We briefly discussed the long-term results  of the CALGB 9343 randomized clinical trial assessing the benefits of radiation therapy and women with stage I age 70 and over and I explained that some women age 70 and over do not need radiation, however, I would not be able to make this determination until after her surgery.        I discussed the details of radiation to the breast if she should proceed with radiation to decrease the risk of local recurrence.       I discussed with her in detail the risks, benefits and rationale of radiation therapy to the right breast to include but not limited to the following:     Acute: skin erythema, breakdown/moist desquamation, swelling or discomfort of the breast, fatigue, pneumonitis resulting in shortness of breath, cough or pain     Late: Permanent skin changes including hyperpigmentation, telangiectasias, fibrosis of the breast resulting in smaller size or poor cosmetic outcome, late edema or cellulitis, late rib fracture, late pulmonary fibrosis and the remote risk of second malignancies.       She voiced understanding and was given an opportunity to ask questions which I believe were answered to her satisfaction.  Based on her initial imaging, clinical stage and grade of her tumor, I would likely lean toward recommending radiation.  I explained that if she is node negative it would be 16-20 treatments daily over the course of 3-4 weeks.  If she has positive lymph nodes then I would recommend treating the regional nodes as well in 25 fractions.  She is having surgery on march 6 and I have scheduled her to return for re-evaluation on March 27.     I personally spent greater than 60 minutes today assessing, managing, discussing and documenting my visit with the patient. That time includes review of records, imaging and pathology reports, obtaining my own history, performing a medically appropriate evaluation, counseling and educating the patient, discussing goals, logistics, alternatives and risks of my  recommendations, surveillance options and potential outcomes. It also includes the time documenting the clinical information in the EMR and communicating my recommendations to the other involved physicians.

## 2024-03-01 ENCOUNTER — HOSPITAL ENCOUNTER (OUTPATIENT)
Dept: ULTRASOUND IMAGING | Facility: HOSPITAL | Age: 74
Discharge: HOME | End: 2024-03-01
Payer: MEDICARE

## 2024-03-01 ENCOUNTER — HOSPITAL ENCOUNTER (OUTPATIENT)
Dept: MAMMOGRAPHY | Facility: HOSPITAL | Age: 74
Discharge: HOME | End: 2024-03-01
Payer: MEDICARE

## 2024-03-01 VITALS
BODY MASS INDEX: 24.48 KG/M2 | SYSTOLIC BLOOD PRESSURE: 152 MMHG | HEART RATE: 71 BPM | HEIGHT: 67 IN | OXYGEN SATURATION: 100 % | DIASTOLIC BLOOD PRESSURE: 79 MMHG | TEMPERATURE: 97.8 F | RESPIRATION RATE: 16 BRPM | WEIGHT: 156 LBS

## 2024-03-01 DIAGNOSIS — C50.411 MALIGNANT NEOPLASM OF UPPER-OUTER QUADRANT OF RIGHT BREAST IN FEMALE, ESTROGEN RECEPTOR POSITIVE: ICD-10-CM

## 2024-03-01 DIAGNOSIS — Z17.0 MALIGNANT NEOPLASM OF UPPER-OUTER QUADRANT OF RIGHT BREAST IN FEMALE, ESTROGEN RECEPTOR POSITIVE: ICD-10-CM

## 2024-03-01 DIAGNOSIS — R92.8 ABNORMAL MAMMOGRAM: ICD-10-CM

## 2024-03-01 PROCEDURE — 77065 DX MAMMO INCL CAD UNI: CPT

## 2024-03-01 PROCEDURE — C1728 CATH, BRACHYTX SEED ADM: HCPCS

## 2024-03-01 PROCEDURE — 25010000002 LIDOCAINE 1 % SOLUTION: Performed by: RADIOLOGY

## 2024-03-01 RX ORDER — LIDOCAINE HYDROCHLORIDE 10 MG/ML
10 INJECTION, SOLUTION INFILTRATION; PERINEURAL ONCE
Status: COMPLETED | OUTPATIENT
Start: 2024-03-01 | End: 2024-03-01

## 2024-03-01 RX ORDER — DIAZEPAM 5 MG/1
5 TABLET ORAL ONCE AS NEEDED
Status: CANCELLED | OUTPATIENT
Start: 2024-03-01

## 2024-03-01 RX ORDER — DIAZEPAM 5 MG/1
5 TABLET ORAL ONCE AS NEEDED
Status: DISCONTINUED | OUTPATIENT
Start: 2024-03-01 | End: 2024-03-02 | Stop reason: HOSPADM

## 2024-03-01 RX ADMIN — LIDOCAINE HYDROCHLORIDE 6 ML: 10 INJECTION, SOLUTION INFILTRATION; PERINEURAL at 09:56

## 2024-03-04 ENCOUNTER — TELEPHONE (OUTPATIENT)
Dept: SURGERY | Facility: CLINIC | Age: 74
End: 2024-03-04
Payer: MEDICARE

## 2024-03-04 NOTE — TELEPHONE ENCOUNTER
EMLA cream 30 G tube no refills   Apply topically to affected nipple, outer e of affected nipple     [facility]/pharmacy #[id number] - [place], [place] - [address] -   [phone]  - [phone] FX Phone: [phone]

## 2024-03-06 ENCOUNTER — APPOINTMENT (OUTPATIENT)
Dept: GENERAL RADIOLOGY | Facility: HOSPITAL | Age: 74
End: 2024-03-06
Payer: MEDICARE

## 2024-03-06 ENCOUNTER — ANCILLARY PROCEDURE (OUTPATIENT)
Dept: LAB | Facility: HOSPITAL | Age: 74
End: 2024-03-06
Payer: MEDICARE

## 2024-03-06 ENCOUNTER — HOSPITAL ENCOUNTER (OUTPATIENT)
Dept: NUCLEAR MEDICINE | Facility: HOSPITAL | Age: 74
Discharge: HOME | End: 2024-03-06
Payer: MEDICARE

## 2024-03-06 ENCOUNTER — ANESTHESIA EVENT (OUTPATIENT)
Dept: PERIOP | Facility: HOSPITAL | Age: 74
End: 2024-03-06
Payer: MEDICARE

## 2024-03-06 ENCOUNTER — ANESTHESIA (OUTPATIENT)
Dept: PERIOP | Facility: HOSPITAL | Age: 74
End: 2024-03-06
Payer: MEDICARE

## 2024-03-06 ENCOUNTER — TRANSCRIBE ORDERS (OUTPATIENT)
Dept: LAB | Facility: HOSPITAL | Age: 74
End: 2024-03-06
Payer: MEDICARE

## 2024-03-06 ENCOUNTER — HOSPITAL ENCOUNTER (OUTPATIENT)
Facility: HOSPITAL | Age: 74
Setting detail: HOSPITAL OUTPATIENT SURGERY
Discharge: HOME | End: 2024-03-06
Attending: SURGERY | Admitting: SURGERY
Payer: MEDICARE

## 2024-03-06 VITALS
OXYGEN SATURATION: 98 % | TEMPERATURE: 97.9 F | HEART RATE: 86 BPM | DIASTOLIC BLOOD PRESSURE: 90 MMHG | RESPIRATION RATE: 16 BRPM | SYSTOLIC BLOOD PRESSURE: 133 MMHG

## 2024-03-06 DIAGNOSIS — C50.911 INVASIVE DUCTAL CARCINOMA OF RIGHT BREAST: Primary | ICD-10-CM

## 2024-03-06 DIAGNOSIS — C50.911 INVASIVE DUCTAL CARCINOMA OF RIGHT BREAST: ICD-10-CM

## 2024-03-06 DIAGNOSIS — C50.411 MALIGNANT NEOPLASM OF UPPER-OUTER QUADRANT OF RIGHT BREAST IN FEMALE, ESTROGEN RECEPTOR POSITIVE: ICD-10-CM

## 2024-03-06 DIAGNOSIS — Z17.0 MALIGNANT NEOPLASM OF UPPER-OUTER QUADRANT OF RIGHT BREAST IN FEMALE, ESTROGEN RECEPTOR POSITIVE: ICD-10-CM

## 2024-03-06 PROCEDURE — 76098 X-RAY EXAM SURGICAL SPECIMEN: CPT

## 2024-03-06 PROCEDURE — 25010000002 MIDAZOLAM PER 1 MG: Performed by: NURSE ANESTHETIST, CERTIFIED REGISTERED

## 2024-03-06 PROCEDURE — 0 TECHETIUM TC99M TILMANOCEPT: Performed by: SURGERY

## 2024-03-06 PROCEDURE — 38525 BIOPSY/REMOVAL LYMPH NODES: CPT | Performed by: SURGERY

## 2024-03-06 PROCEDURE — 38792 RA TRACER ID OF SENTINL NODE: CPT

## 2024-03-06 PROCEDURE — 25010000002 PROPOFOL 200 MG/20ML EMULSION: Performed by: NURSE ANESTHETIST, CERTIFIED REGISTERED

## 2024-03-06 PROCEDURE — S0260 H&P FOR SURGERY: HCPCS | Performed by: SURGERY

## 2024-03-06 PROCEDURE — 25010000002 LIDOCAINE 1 % SOLUTION 20 ML VIAL: Performed by: SURGERY

## 2024-03-06 PROCEDURE — 25010000002 FENTANYL CITRATE (PF) 50 MCG/ML SOLUTION: Performed by: NURSE ANESTHETIST, CERTIFIED REGISTERED

## 2024-03-06 PROCEDURE — 25810000003 LACTATED RINGERS PER 1000 ML: Performed by: SURGERY

## 2024-03-06 PROCEDURE — A9520 TC99 TILMANOCEPT DIAG 0.5MCI: HCPCS | Performed by: SURGERY

## 2024-03-06 PROCEDURE — 19301 PARTIAL MASTECTOMY: CPT | Performed by: SURGERY

## 2024-03-06 PROCEDURE — 25010000002 BUPIVACAINE (PF) 0.25 % SOLUTION 10 ML VIAL: Performed by: SURGERY

## 2024-03-06 PROCEDURE — 88307 TISSUE EXAM BY PATHOLOGIST: CPT | Performed by: SURGERY

## 2024-03-06 PROCEDURE — 25010000002 DEXAMETHASONE SODIUM PHOSPHATE 20 MG/5ML SOLUTION: Performed by: NURSE ANESTHETIST, CERTIFIED REGISTERED

## 2024-03-06 PROCEDURE — 78195 LYMPH SYSTEM IMAGING: CPT | Performed by: SURGERY

## 2024-03-06 PROCEDURE — 25010000002 ONDANSETRON PER 1 MG: Performed by: NURSE ANESTHETIST, CERTIFIED REGISTERED

## 2024-03-06 PROCEDURE — 25010000002 PROPOFOL 10 MG/ML EMULSION: Performed by: NURSE ANESTHETIST, CERTIFIED REGISTERED

## 2024-03-06 PROCEDURE — 88341 IMHCHEM/IMCYTCHM EA ADD ANTB: CPT | Performed by: SURGERY

## 2024-03-06 PROCEDURE — 25010000002 HYDROMORPHONE PER 4 MG: Performed by: NURSE ANESTHETIST, CERTIFIED REGISTERED

## 2024-03-06 PROCEDURE — 25010000002 CEFAZOLIN IN DEXTROSE 2-4 GM/100ML-% SOLUTION: Performed by: SURGERY

## 2024-03-06 PROCEDURE — 88342 IMHCHEM/IMCYTCHM 1ST ANTB: CPT | Performed by: SURGERY

## 2024-03-06 DEVICE — CLIP LIGAT VASC HORIZON TI MD BLU 6CT: Type: IMPLANTABLE DEVICE | Site: BREAST | Status: FUNCTIONAL

## 2024-03-06 DEVICE — CLIP LIGAT VASC HORIZON TI SM/WD RD 6CT: Type: IMPLANTABLE DEVICE | Site: BREAST | Status: FUNCTIONAL

## 2024-03-06 RX ORDER — EPHEDRINE SULFATE 50 MG/ML
INJECTION, SOLUTION INTRAVENOUS AS NEEDED
Status: DISCONTINUED | OUTPATIENT
Start: 2024-03-06 | End: 2024-03-06 | Stop reason: SURG

## 2024-03-06 RX ORDER — PHENYLEPHRINE HCL IN 0.9% NACL 1 MG/10 ML
SYRINGE (ML) INTRAVENOUS AS NEEDED
Status: DISCONTINUED | OUTPATIENT
Start: 2024-03-06 | End: 2024-03-06 | Stop reason: SURG

## 2024-03-06 RX ORDER — FENTANYL CITRATE 50 UG/ML
25 INJECTION, SOLUTION INTRAMUSCULAR; INTRAVENOUS
Status: DISCONTINUED | OUTPATIENT
Start: 2024-03-06 | End: 2024-03-06 | Stop reason: HOSPADM

## 2024-03-06 RX ORDER — LIDOCAINE HYDROCHLORIDE 10 MG/ML
0.5 INJECTION, SOLUTION INFILTRATION; PERINEURAL ONCE AS NEEDED
Status: DISCONTINUED | OUTPATIENT
Start: 2024-03-06 | End: 2024-03-06 | Stop reason: HOSPADM

## 2024-03-06 RX ORDER — MIDAZOLAM HYDROCHLORIDE 1 MG/ML
0.5 INJECTION INTRAMUSCULAR; INTRAVENOUS
Status: DISCONTINUED | OUTPATIENT
Start: 2024-03-06 | End: 2024-03-06 | Stop reason: HOSPADM

## 2024-03-06 RX ORDER — DIPHENHYDRAMINE HYDROCHLORIDE 50 MG/ML
12.5 INJECTION INTRAMUSCULAR; INTRAVENOUS
Status: DISCONTINUED | OUTPATIENT
Start: 2024-03-06 | End: 2024-03-06 | Stop reason: HOSPADM

## 2024-03-06 RX ORDER — KETAMINE HCL IN NACL, ISO-OSM 100MG/10ML
SYRINGE (ML) INJECTION AS NEEDED
Status: DISCONTINUED | OUTPATIENT
Start: 2024-03-06 | End: 2024-03-06 | Stop reason: SURG

## 2024-03-06 RX ORDER — LABETALOL HYDROCHLORIDE 5 MG/ML
5 INJECTION, SOLUTION INTRAVENOUS
Status: DISCONTINUED | OUTPATIENT
Start: 2024-03-06 | End: 2024-03-06 | Stop reason: HOSPADM

## 2024-03-06 RX ORDER — HYDROCODONE BITARTRATE AND ACETAMINOPHEN 7.5; 325 MG/1; MG/1
1 TABLET ORAL EVERY 4 HOURS PRN
Status: DISCONTINUED | OUTPATIENT
Start: 2024-03-06 | End: 2024-03-06 | Stop reason: HOSPADM

## 2024-03-06 RX ORDER — SODIUM CHLORIDE, SODIUM LACTATE, POTASSIUM CHLORIDE, CALCIUM CHLORIDE 600; 310; 30; 20 MG/100ML; MG/100ML; MG/100ML; MG/100ML
100 INJECTION, SOLUTION INTRAVENOUS CONTINUOUS
Status: DISCONTINUED | OUTPATIENT
Start: 2024-03-06 | End: 2024-03-06 | Stop reason: HOSPADM

## 2024-03-06 RX ORDER — SODIUM CHLORIDE, SODIUM LACTATE, POTASSIUM CHLORIDE, CALCIUM CHLORIDE 600; 310; 30; 20 MG/100ML; MG/100ML; MG/100ML; MG/100ML
9 INJECTION, SOLUTION INTRAVENOUS CONTINUOUS
Status: DISCONTINUED | OUTPATIENT
Start: 2024-03-06 | End: 2024-03-06 | Stop reason: HOSPADM

## 2024-03-06 RX ORDER — DROPERIDOL 2.5 MG/ML
0.62 INJECTION, SOLUTION INTRAMUSCULAR; INTRAVENOUS
Status: DISCONTINUED | OUTPATIENT
Start: 2024-03-06 | End: 2024-03-06 | Stop reason: HOSPADM

## 2024-03-06 RX ORDER — HYDROMORPHONE HYDROCHLORIDE 1 MG/ML
0.25 INJECTION, SOLUTION INTRAMUSCULAR; INTRAVENOUS; SUBCUTANEOUS
Status: DISCONTINUED | OUTPATIENT
Start: 2024-03-06 | End: 2024-03-06 | Stop reason: HOSPADM

## 2024-03-06 RX ORDER — IPRATROPIUM BROMIDE AND ALBUTEROL SULFATE 2.5; .5 MG/3ML; MG/3ML
3 SOLUTION RESPIRATORY (INHALATION) ONCE AS NEEDED
Status: DISCONTINUED | OUTPATIENT
Start: 2024-03-06 | End: 2024-03-06 | Stop reason: HOSPADM

## 2024-03-06 RX ORDER — PROMETHAZINE HYDROCHLORIDE 25 MG/1
25 SUPPOSITORY RECTAL ONCE AS NEEDED
Status: DISCONTINUED | OUTPATIENT
Start: 2024-03-06 | End: 2024-03-06 | Stop reason: HOSPADM

## 2024-03-06 RX ORDER — FENTANYL CITRATE 50 UG/ML
50 INJECTION, SOLUTION INTRAMUSCULAR; INTRAVENOUS ONCE AS NEEDED
Status: DISCONTINUED | OUTPATIENT
Start: 2024-03-06 | End: 2024-03-06 | Stop reason: HOSPADM

## 2024-03-06 RX ORDER — FLUMAZENIL 0.1 MG/ML
0.2 INJECTION INTRAVENOUS AS NEEDED
Status: DISCONTINUED | OUTPATIENT
Start: 2024-03-06 | End: 2024-03-06 | Stop reason: HOSPADM

## 2024-03-06 RX ORDER — NALOXONE HCL 0.4 MG/ML
0.2 VIAL (ML) INJECTION AS NEEDED
Status: DISCONTINUED | OUTPATIENT
Start: 2024-03-06 | End: 2024-03-06 | Stop reason: HOSPADM

## 2024-03-06 RX ORDER — LIDOCAINE HYDROCHLORIDE 20 MG/ML
INJECTION, SOLUTION INFILTRATION; PERINEURAL AS NEEDED
Status: DISCONTINUED | OUTPATIENT
Start: 2024-03-06 | End: 2024-03-06 | Stop reason: SURG

## 2024-03-06 RX ORDER — HYDROCODONE BITARTRATE AND ACETAMINOPHEN 5; 325 MG/1; MG/1
1 TABLET ORAL ONCE AS NEEDED
Status: DISCONTINUED | OUTPATIENT
Start: 2024-03-06 | End: 2024-03-06 | Stop reason: HOSPADM

## 2024-03-06 RX ORDER — FAMOTIDINE 10 MG/ML
20 INJECTION, SOLUTION INTRAVENOUS ONCE
Status: COMPLETED | OUTPATIENT
Start: 2024-03-06 | End: 2024-03-06

## 2024-03-06 RX ORDER — FENTANYL CITRATE 50 UG/ML
INJECTION, SOLUTION INTRAMUSCULAR; INTRAVENOUS AS NEEDED
Status: DISCONTINUED | OUTPATIENT
Start: 2024-03-06 | End: 2024-03-06 | Stop reason: SURG

## 2024-03-06 RX ORDER — ACETAMINOPHEN 325 MG/1
325 TABLET ORAL EVERY 6 HOURS PRN
COMMUNITY

## 2024-03-06 RX ORDER — EPHEDRINE SULFATE 50 MG/ML
5 INJECTION, SOLUTION INTRAVENOUS ONCE AS NEEDED
Status: DISCONTINUED | OUTPATIENT
Start: 2024-03-06 | End: 2024-03-06 | Stop reason: HOSPADM

## 2024-03-06 RX ORDER — DIAZEPAM 5 MG/1
10 TABLET ORAL ONCE
Status: COMPLETED | OUTPATIENT
Start: 2024-03-06 | End: 2024-03-06

## 2024-03-06 RX ORDER — CEFAZOLIN SODIUM 2 G/100ML
2000 INJECTION, SOLUTION INTRAVENOUS ONCE
Status: COMPLETED | OUTPATIENT
Start: 2024-03-06 | End: 2024-03-06

## 2024-03-06 RX ORDER — SODIUM CHLORIDE 0.9 % (FLUSH) 0.9 %
3 SYRINGE (ML) INJECTION EVERY 12 HOURS SCHEDULED
Status: DISCONTINUED | OUTPATIENT
Start: 2024-03-06 | End: 2024-03-06 | Stop reason: HOSPADM

## 2024-03-06 RX ORDER — DEXAMETHASONE SODIUM PHOSPHATE 4 MG/ML
INJECTION, SOLUTION INTRA-ARTICULAR; INTRALESIONAL; INTRAMUSCULAR; INTRAVENOUS; SOFT TISSUE AS NEEDED
Status: DISCONTINUED | OUTPATIENT
Start: 2024-03-06 | End: 2024-03-06 | Stop reason: SURG

## 2024-03-06 RX ORDER — MIDAZOLAM HYDROCHLORIDE 1 MG/ML
INJECTION INTRAMUSCULAR; INTRAVENOUS AS NEEDED
Status: DISCONTINUED | OUTPATIENT
Start: 2024-03-06 | End: 2024-03-06 | Stop reason: SURG

## 2024-03-06 RX ORDER — PROPOFOL 10 MG/ML
INJECTION, EMULSION INTRAVENOUS AS NEEDED
Status: DISCONTINUED | OUTPATIENT
Start: 2024-03-06 | End: 2024-03-06 | Stop reason: SURG

## 2024-03-06 RX ORDER — LIDOCAINE AND PRILOCAINE 25; 25 MG/G; MG/G
1 CREAM TOPICAL ONCE
COMMUNITY

## 2024-03-06 RX ORDER — SODIUM CHLORIDE 0.9 % (FLUSH) 0.9 %
3-10 SYRINGE (ML) INJECTION AS NEEDED
Status: DISCONTINUED | OUTPATIENT
Start: 2024-03-06 | End: 2024-03-06 | Stop reason: HOSPADM

## 2024-03-06 RX ORDER — ONDANSETRON 2 MG/ML
INJECTION INTRAMUSCULAR; INTRAVENOUS AS NEEDED
Status: DISCONTINUED | OUTPATIENT
Start: 2024-03-06 | End: 2024-03-06 | Stop reason: SURG

## 2024-03-06 RX ORDER — PROMETHAZINE HYDROCHLORIDE 25 MG/1
25 TABLET ORAL ONCE AS NEEDED
Status: DISCONTINUED | OUTPATIENT
Start: 2024-03-06 | End: 2024-03-06 | Stop reason: HOSPADM

## 2024-03-06 RX ORDER — ONDANSETRON 2 MG/ML
4 INJECTION INTRAMUSCULAR; INTRAVENOUS ONCE AS NEEDED
Status: DISCONTINUED | OUTPATIENT
Start: 2024-03-06 | End: 2024-03-06 | Stop reason: HOSPADM

## 2024-03-06 RX ORDER — HYDRALAZINE HYDROCHLORIDE 20 MG/ML
5 INJECTION INTRAMUSCULAR; INTRAVENOUS
Status: DISCONTINUED | OUTPATIENT
Start: 2024-03-06 | End: 2024-03-06 | Stop reason: HOSPADM

## 2024-03-06 RX ADMIN — LIDOCAINE HYDROCHLORIDE 60 MG: 20 INJECTION, SOLUTION INFILTRATION; PERINEURAL at 09:36

## 2024-03-06 RX ADMIN — Medication 15 MG: at 10:53

## 2024-03-06 RX ADMIN — HYDROMORPHONE HYDROCHLORIDE 0.25 MG: 1 INJECTION, SOLUTION INTRAMUSCULAR; INTRAVENOUS; SUBCUTANEOUS at 12:22

## 2024-03-06 RX ADMIN — FENTANYL CITRATE 50 MCG: 50 INJECTION, SOLUTION INTRAMUSCULAR; INTRAVENOUS at 09:31

## 2024-03-06 RX ADMIN — FENTANYL CITRATE 50 MCG: 50 INJECTION, SOLUTION INTRAMUSCULAR; INTRAVENOUS at 09:36

## 2024-03-06 RX ADMIN — Medication 35 MG: at 09:53

## 2024-03-06 RX ADMIN — EPHEDRINE SULFATE 10 MG: 50 INJECTION INTRAVENOUS at 09:39

## 2024-03-06 RX ADMIN — Medication 100 MCG: at 09:52

## 2024-03-06 RX ADMIN — FENTANYL CITRATE 25 MCG: 50 INJECTION, SOLUTION INTRAMUSCULAR; INTRAVENOUS at 12:11

## 2024-03-06 RX ADMIN — EPHEDRINE SULFATE 10 MG: 50 INJECTION INTRAVENOUS at 11:24

## 2024-03-06 RX ADMIN — TILMANOCEPT 1 DOSE: KIT at 08:20

## 2024-03-06 RX ADMIN — HYDROCODONE BITARTRATE AND ACETAMINOPHEN 1 TABLET: 7.5; 325 TABLET ORAL at 12:17

## 2024-03-06 RX ADMIN — EPHEDRINE SULFATE 10 MG: 50 INJECTION INTRAVENOUS at 09:44

## 2024-03-06 RX ADMIN — ONDANSETRON 4 MG: 2 INJECTION INTRAMUSCULAR; INTRAVENOUS at 11:26

## 2024-03-06 RX ADMIN — MIDAZOLAM 1 MG: 1 INJECTION INTRAMUSCULAR; INTRAVENOUS at 10:25

## 2024-03-06 RX ADMIN — PROPOFOL 50 MG: 10 INJECTION, EMULSION INTRAVENOUS at 10:24

## 2024-03-06 RX ADMIN — SODIUM CHLORIDE, POTASSIUM CHLORIDE, SODIUM LACTATE AND CALCIUM CHLORIDE 100 ML/HR: 600; 310; 30; 20 INJECTION, SOLUTION INTRAVENOUS at 09:00

## 2024-03-06 RX ADMIN — FENTANYL CITRATE 25 MCG: 50 INJECTION, SOLUTION INTRAMUSCULAR; INTRAVENOUS at 12:06

## 2024-03-06 RX ADMIN — FAMOTIDINE 20 MG: 10 INJECTION INTRAVENOUS at 07:29

## 2024-03-06 RX ADMIN — MIDAZOLAM 1 MG: 1 INJECTION INTRAMUSCULAR; INTRAVENOUS at 10:38

## 2024-03-06 RX ADMIN — DEXAMETHASONE SODIUM PHOSPHATE 8 MG: 4 INJECTION, SOLUTION INTRAMUSCULAR; INTRAVENOUS at 09:52

## 2024-03-06 RX ADMIN — CEFAZOLIN SODIUM 2000 MG: 2 INJECTION, SOLUTION INTRAVENOUS at 09:21

## 2024-03-06 RX ADMIN — PROPOFOL 150 MG: 10 INJECTION, EMULSION INTRAVENOUS at 09:36

## 2024-03-06 RX ADMIN — EPHEDRINE SULFATE 10 MG: 50 INJECTION INTRAVENOUS at 09:49

## 2024-03-06 RX ADMIN — PROPOFOL 75 MCG/KG/MIN: 10 INJECTION, EMULSION INTRAVENOUS at 09:40

## 2024-03-06 RX ADMIN — DIAZEPAM 10 MG: 5 TABLET ORAL at 07:27

## 2024-03-06 RX ADMIN — HYDROMORPHONE HYDROCHLORIDE 0.25 MG: 1 INJECTION, SOLUTION INTRAMUSCULAR; INTRAVENOUS; SUBCUTANEOUS at 12:17

## 2024-03-06 NOTE — H&P
There are no changes in the history or physical exam, aside from any that are listed as an addendum at the bottom of this document.   Today, patient will go for the surgery listed in the plan below.     Held revlimid 1 week preop  Genetics without mutation  Wishes to have nodes sampled  DOUG successful (x2 placed)    Chief Complaint: Jennifer Plascencia is a 73 y.o. female who was seen in consultation at the request of Jade Carvajal MD  for newly diagnosed breast cancer    History of Present Illness:  Patient presents with newly diagnosed breast cancer. She noted no new masses, skin changes, nipple discharge, nipple changes prior to her most recent imaging.  Her most recent imaging includes the followin2018, Bilateral Digital Screening MMG Dragan ([facility] [place])  Heterogeneously dense  BI-RADS 1: Negative    2023, MMG Screening Bilateral ([facility])  Heterogeneously dense  There is a mass with associated distortion within the upper outer right breast, posterior depth.  BI-RADS 0    2024, MMG Diagnostic Right ([facility])  Heterogeneously dense  In the noted area of the focal asymmetry, there is persistent spiculated high density mass measuring approximately 19 mm with associated architectural distortion, located at 10:00 10 cm from the nipple. An adjacent smaller circumscribed low-density 5 mm mass is located just medial and superior to the primary mass by approximately 2 mm. Calcifications in the upper inner quadrant of the right breast are stable dating back to 2019 mammogram.  Ultrasound:  Right breast and right axilla was performed. At 10:00, 10 cm nipple, there is an irregular hypoechoic mass with indistinct margins measuring 21 mm x 19 mm x 9 mm. There is associated posterior acoustic shadowing. At 10;30, 10 cm nipple there is a subtle indistinct hypoechoic mass measuring 4 mm x 3 mm x 5 mm. This is thought to represent the adjacent mass seen mammographically. In addition, there is a  third circumscribed oval hypoechoic 4 x 3 x 3 mm mass, labeled 9;30, 8 cm from nipple, considered indeterminate. Right axillary lymph node is essentially fatty replaced. No morphologically abnormal lymph nodes are identified.  IMPRESSION:  Recommend 3 site ultrasound guided biopsy  BI-RADS 5    01/29/2024, Bilateral Breast MRI ([facility])  In the posterior one-third of the right breast at the 9-o'clock position  centered on the order of 8 cm from the nipple there is a focal signal  void seen within an irregular enhancing mass that measures on the order  of 2.1 cm in anterior posterior dimension, 1.5 cm in the superior  inferior dimension and 1.5 cm in the medial lateral dimension. A focal  signal void is seen centrally within the mass. This corresponds to the  biopsy-proven site of malignancy with the internal coil shaped metallic  clip.     No other areas of suspicious enhancement or morphology are seen in the  right breast. I see no evidence for abnormal skin, nipple or chest wall  enhancement of the right breast and there is no evidence for right  axillary or internal mammary chain adenopathy.     There are no areas of suspicious enhancement or morphology in the left  breast.    She had a biopsy on the following day that showed:   01/11/2024, Ultrasound Guided Biopsy x2 Sites right breast ([facility])   Indications: Right breast  Primary 19 mm mass at 10:00  12-gauge total of 3 passes.  A coil clip was then placed. 4 mm mass 9:30 12 gauge 3 passes, ribbon clip was then placed under direct sonographic guidance.  Post procedure mammogram demonstrated appropriate clip placement.  Aborted biopsy of a third suspected indistinct mass previously described as 10:30 cm the nipple. This cannot be reproduced on real time examination today.  Pathology right breast 10:00 position is concordant. Pathology of right breast 9:30 Concordant.    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 with   The invasive ductal carcinoma    The tumor measures up to 13 mm on the slide    The tumor is Chris grade II (tubular grade 3, nuclear grade 3, mitotic grade 1)    No lymphovascular invasion or perineural invasion is identified    ER, positive strong 99%  IN, positive (intermediate 10%)  HER2 by IHC negative (1+)   KI-67 positive 30%  The ductal carcinoma in situ is of high nuclear grade with comedo architecture and comedonecrosis  Breast, right, 9:30, 8 cm from nipple, biopsy (15-EX--B) Benign breast tissue with:   Sclerosing adenosis   Rare microcysts   Micro calcifications in non-neoplastic tissue.      She has not had a breast biopsy in the past.  She has her uterus and ovaries, is postmenopausal, and takes nor hormones.  Her family history includes the following: She has one daughter, one son, one sister, 3 MA, 4 PA.  She denies breast cancer in her mother (seen on outside documents), and reports ? Ovarian cancer in a maternal aunt.  She is here for evaluation.    Review of Systems:  Review of Systems   All other systems reviewed and are negative.       Past Medical and Surgical History:  Breast Biopsy History:  Patient had not had a breast biopsy prior to her cancer diagnosis.  Breast Cancer HIstory:  Patient does not have a past medical history of breast cancer.  Breast Operations, and year:  NONE   Obstetric/Gynecologic History:  Age menstrual periods began:  11  Patient is postmenopausal, entered menopause naturally at age: LATE 50S   Number of pregnancies: 3  Number of live births: 2  Number of abortions or miscarriages: 1  Age of delivery of first child: 23  Patient did not breast feed.  Length of time taking birth control pills: NONE   Patient took hormone replacement during the following dates: ONLY ON FOR 1 WEEK     Past Surgical History:   Procedure Laterality Date    BREAST BIOPSY      CATARACT EXTRACTION      COLONOSCOPY      TONSILLECTOMY      VEIN SURGERY       Past Medical History:   Diagnosis Date    Anxiety     Depression     Disease of thyroid gland     Hashimoto's disease     History of vitamin D deficiency     Hypothyroidism     IBS (irritable bowel syndrome)     Mixed hyperlipidemia 3/12/2018    Multiple myeloma 2015    IgA kappa.  Stem cell transplant at [facility] 3/20/2016    Myeloma     KWAME (obstructive sleep apnea) 2021    Overnight polysomnogram.  Weight 173 pounds.  Mild KWAME with AHI 5.6 events per hour.  When supine, 9.4 events per hour.  During REM, moderate severity at 26 events per hour.  No sleep-related hypoxia.  The patient snored 20.5% of total sleep time.    Osteopenia     Overweight (BMI 25.0-29.9) 2018       Prior Hospitalizations, other than for surgery or childbirth, and year:  NONE     Social History     Socioeconomic History    Marital status:     Number of children: 2   Tobacco Use    Smoking status: Former     Packs/day: 0.25     Years: 2.00     Additional pack years: 0.00     Total pack years: 0.50     Types: Cigarettes     Start date:      Quit date:      Years since quittin.1    Smokeless tobacco: Never    Tobacco comments:     Only lightly for 2 years   Vaping Use    Vaping Use: Never used   Substance and Sexual Activity    Alcohol use: Not Currently     Alcohol/week: 0.0 standard drinks of alcohol     Comment: Na    Drug use: Never    Sexual activity: Not Currently     Partners: Male  "    Birth control/protection: None     Comment: Na     Patient is .  Patient is retired.  Patient drinks 2 servings of caffeine per day.    Family History:  Family History   Problem Relation Age of Onset    Breast cancer Mother     Sleep apnea Mother     Lymphoma Father     Sleep apnea Father     Cancer Father     Kidney cancer Paternal Grandmother     Cancer Maternal Aunt                Vital Signs:  /68 (BP Location: Left arm, Patient Position: Sitting, Cuff Size: Adult)   Pulse 83   Ht 170.2 cm (67.01\")   Wt 67.8 kg (149 lb 6.4 oz)   SpO2 96%   BMI 23.39 kg/m²      Medications:    Current Outpatient Medications:     Acetylcarn-Alpha Lipoic Acid 400-200 MG capsule, Take  by mouth., Disp: , Rfl:     acyclovir (ZOVIRAX) 400 MG tablet, Take 1 tablet by mouth 2 (Two) Times a Day., Disp: 60 tablet, Rfl: 11    ALPRAZolam (XANAX) 0.25 MG tablet, Take 1 tablet by mouth Daily As Needed for Anxiety., Disp: 30 tablet, Rfl: 2    aspirin 81 MG chewable tablet, Chew 1 tablet Daily., Disp: , Rfl:     Azelastine HCl 137 MCG/SPRAY solution, , Disp: , Rfl:     B Complex Vitamins (VITAMIN B COMPLEX) capsule capsule, Take 2 tablets by mouth Daily., Disp: , Rfl:     baclofen (LIORESAL) 10 MG tablet, Take 1 tablet by mouth 3 (Three) Times a Day., Disp: , Rfl: 5    calcium carbonate-vitamin d 600-400 MG-UNIT per tablet, Take 1 tablet by mouth Daily., Disp: , Rfl:     colesevelam (WELCHOL) 625 MG tablet, Take 2 tablets by mouth 2 (Two) Times a Day With Meals., Disp: 90 tablet, Rfl: 2    cycloSPORINE (RESTASIS) 0.05 % ophthalmic emulsion, 1 drop 2 (Two) Times a Day., Disp: , Rfl:     diphenoxylate-atropine (LOMOTIL) 2.5-0.025 MG per tablet, Take 1 tablet by mouth 3 (Three) Times a Day As Needed for Diarrhea., Disp: 90 tablet, Rfl: 3    DULoxetine (CYMBALTA) 30 MG capsule, Take 1 capsule by mouth Daily., Disp: , Rfl:     DULoxetine (CYMBALTA) 60 MG capsule, Take 1 capsule by mouth Daily., Disp: 90 capsule, Rfl: " "3    fexofenadine (ALLEGRA) 180 MG tablet, Take 1 tablet by mouth As Needed., Disp: , Rfl:     fluticasone (FLONASE) 50 MCG/ACT nasal spray, 2 sprays into the nostril(s) as directed by provider Daily., Disp: , Rfl:     folic acid (FOLVITE) 1 MG tablet, Take 1 tablet by mouth Daily., Disp: , Rfl: 2    gabapentin (NEURONTIN) 100 MG capsule, Take 1 capsule by mouth 2 (Two) Times a Day As Needed (Pain)., Disp: 60 capsule, Rfl: 0    HYDROcodone-acetaminophen (NORCO) 5-325 MG per tablet, Take 1 tablet by mouth Every 6 (Six) Hours As Needed for Moderate Pain  or Severe Pain ., Disp: 60 tablet, Rfl: 0    lenalidomide (Revlimid) 5 MG capsule, TAKE 1 CAPSULE DAILY FOR 21 DAYS ON, THEN 7 DAYS OFF, Disp: 21 capsule, Rfl: 0    magnesium oxide (MAG-OX) 400 MG tablet, Take 1 tablet by mouth Daily., Disp: 30 tablet, Rfl: 3    meloxicam (MOBIC) 15 MG tablet, Take 1 tablet by mouth Daily., Disp: , Rfl:     Multiple Vitamins-Iron (DAILY-JONI/IRON/BETA-CAROTENE) tablet, Take 1 tablet by mouth Daily., Disp: , Rfl: 2    omega-3 acid ethyl esters (LOVAZA) 1 g capsule, Take 2 capsules by mouth., Disp: , Rfl:     promethazine-dextromethorphan (PROMETHAZINE-DM) 6.25-15 MG/5ML syrup, TAKE 10 ML BY MOUTH TWICE A DAY AS NEEDED FOR COUGH, Disp: , Rfl:     Synthroid 88 MCG tablet, , Disp: , Rfl:     vitamin D (ERGOCALCIFEROL) 61485 units capsule capsule, Take 1 capsule by mouth 1 (One) Time Per Week. Twice a week, Disp: , Rfl:     amitriptyline (ELAVIL) 25 MG tablet, TAKE 1-2 TABLET BY MOUTH EVERY NIGHT AS NEEDED FOR HEADACHES, Disp: , Rfl:     ubrogepant 100 MG tablet, As Needed., Disp: , Rfl:     valACYclovir (VALTREX) 1000 MG tablet, As Needed., Disp: , Rfl:      Allergies:  No Known Allergies    Physical Examination:  /68 (BP Location: Left arm, Patient Position: Sitting, Cuff Size: Adult)   Pulse 83   Ht 170.2 cm (67.01\")   Wt 67.8 kg (149 lb 6.4 oz)   SpO2 96%   BMI 23.39 kg/m²   General Appearance:  Patient is in no distress.  " She is well kept and has an average build.   Psychiatric:  Patient with appropriate mood and affect. Alert and oriented to self, time, and place.    Breast, RIGHT:  large sized, symmetric with the contralateral side.  Breast skin is without erythema, edema, rashes.  There are no visible abnormalities upon inspection during the arm-raising maneuver or with hands on hips in the sitting position. There is no nipple retraction, discharge or nipple/areolar skin changes. There is a palpable mass at the RIGHT 9:30, 7.5 CFN location measuring 3x 2.5 cm. Freely mobile.No overlying skin changes. There are no other masses palpable in the sitting or supine positions.    Breast, LEFT:  large sized, symmetric with the contralateral side.  Breast skin is without erythema, edema, rashes.  There are no visible abnormalities upon inspection during the arm-raising maneuver or with hands on hips in the sitting position. There is no nipple retraction, discharge or nipple/areolar skin changes.There are no masses palpable in the sitting or supine positions.    Lymphatic:  There is no axillary, cervical, infraclavicular, or supraclavicular adenopathy bilaterally.  Eyes:  Pupils are round and reactive to light.  Cardiovascular:  Heart rate and rhythm are regular.  Respiratory:  Lungs are clear bilaterally with no crackles or wheezes in any lung field.  Gastrointestinal:  Abdomen is soft, nondistended, and nontender.     Musculoskeletal:  Good strength in all 4 extremities.   There is good range of motion in both shoulders.    Skin:  No new skin lesions or rashes on the skin excluding the breast (see breast exam above).        Imagin2018, Bilateral Digital Screening MMG Dragan ([facility] [place])  Heterogeneously dense  BI-RADS 1: Negative    2023, MMG Screening Bilateral ([facility])  Heterogeneously dense  There is a mass with associated distortion within the upper outer right breast, posterior depth.  BI-RADS 0    2024, MMG  Diagnostic Right ([facility])  Heterogeneously dense  In the noted area of the focal asymmetry, there is persistent spiculated high density mass measuring approximately 19 mm with associated architectural distortion, located at 10:00 10 cm from the nipple. An adjacent smaller circumscribed low-density 5 mm mass is located just medial and superior to the primary mass by approximately 2 mm. Calcifications in the upper inner quadrant of the right breast are stable dating back to 2019 mammogram.  Ultrasound:  Right breast and right axilla was performed. At 10:00, 10 cm nipple, there is an irregular hypoechoic mass with indistinct margins measuring 21 mm x 19 mm x 9 mm. There is associated posterior acoustic shadowing. At 10;30, 10 cm nipple there is a subtle indistinct hypoechoic mass measuring 4 mm x 3 mm x 5 mm. This is thought to represent the adjacent mass seen mammographically. In addition, there is a third circumscribed oval hypoechoic 4 x 3 x 3 mm mass, labeled 9;30, 8 cm from nipple, considered indeterminate. Right axillary lymph node is essentially fatty replaced. No morphologically abnormal lymph nodes are identified.  IMPRESSION:  Recommend 3 site ultrasound guided biopsy  BI-RADS 5    01/29/2024, Bilateral Breast MRI ([facility])  In the posterior one-third of the right breast at the 9-o'clock position  centered on the order of 8 cm from the nipple there is a focal signal  void seen within an irregular enhancing mass that measures on the order  of 2.1 cm in anterior posterior dimension, 1.5 cm in the superior  inferior dimension and 1.5 cm in the medial lateral dimension. A focal  signal void is seen centrally within the mass. This corresponds to the  biopsy-proven site of malignancy with the internal coil shaped metallic  clip.     No other areas of suspicious enhancement or morphology are seen in the  right breast. I see no evidence for abnormal skin, nipple or chest wall  enhancement of the right breast  and there is no evidence for right  axillary or internal mammary chain adenopathy.     There are no areas of suspicious enhancement or morphology in the left  breast.        Pathology:  01/11/2024, Ultrasound Guided Biopsy x2 Sites right breast ([facility])   Indications: Right breast  Primary 19 mm mass at 10:00  12-gauge total of 3 passes.  A coil clip was then placed. 4 mm mass 9:30 12 gauge 3 passes, ribbon clip was then placed under direct sonographic guidance.  Post procedure mammogram demonstrated appropriate clip placement.  Aborted biopsy of a third suspected indistinct mass previously described as 10:30 cm the nipple. This cannot be reproduced on real time examination today.  Pathology right breast 10:00 position is concordant. Pathology of right breast 9:30 Concordant.    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 with   The invasive ductal carcinoma    The tumor measures up to 13 mm on the slide    The tumor is Chris grade II (tubular grade 3, nuclear grade 3, mitotic grade 1)    No lymphovascular invasion or perineural invasion is identified    ER, positive strong 99%  VT, positive (intermediate 10%)  HER2 by IHC negative (1+)   KI-67 positive 30%  The ductal carcinoma in situ is of high nuclear grade with comedo architecture and comedonecrosis  Breast, right, 9:30, 8 cm from nipple, biopsy (91-LA--B) Benign breast  tissue with:   Sclerosing adenosis   Rare microcysts   Micro calcifications in non-neoplastic tissue.    Procedures:      Assessment:   Diagnosis Plan   1. Malignant neoplasm of female breast, unspecified estrogen receptor status, unspecified laterality, unspecified site of breast  US No Charge Exam      2. Malignant neoplasm of upper-outer quadrant of right breast in female, estrogen receptor positive        3. FH: ovarian cancer        4. History of stem cell transplant        5. Anxiety        2-  RIGHT breast upper outer breast ca- 9:30, 7.5 CFN, 3cm on exam, 1.9cm on mammogram, 2.1cm on ultrasound, 2.1 cm on MRI, normal nodes on MRI and exam.  IMC, NST, int grade, no LVI, 1.3 cm in core  ER 99, IN 10, her 2 monserrat 1+, Ki 67 is 30%    Clinical stage T1cN0- IA    3-  A maternal aunt with a female cancer, she thinks ovarian    4-   Had stem cell transplant (followed at [facility], on Revlimid)- sees Dr Garcia locally    5-  Significant anxiety, sees Yeni Lujan      Plan:  The patient goes by Jennifer.  She is here unaccompanied today.    We reviewed the difference in systemic therapy versus locoregional. She knows that she will be seeing a medical oncologist in the adjuvant setting.     She specifically requests not to have Dr Garcia.    We discussed that for early stage breast cancer, there are 2 options for locoregional treatment that have equivalent survival: total mastectomy versus lumpectomy and radiation, either with sentinel node biopsy.     She is interested in breast conservation.     We discussed the option of oncoplastic closure with contralateral mastopexy, and she is not interested in this.    She is uncertain if she would like to do the sentinel node biopsy  We also discussed her family history. Due to not knowing for sure whether there is a FH ovarian cancer, she is going to give it some thought as to whether she wants to send of genetics.      We discussed the following procedure:  RIGHT breast DOUG  guided lumpectomy and RIGHT axilla sentinel node biopsy    We will not fill out the consent and will not board this since she would like some time to think about the node and the genetics.    I will arrange for her to talk with Dr flanagan from medical oncology and also to talk with radiation oncology preoperatively, as she is having some trouble making a few decisions.    I have asked her to hold mobic and asa preoperatively      I will defer management of the Revlimid, in the context of the breast cancer to Dr Flanagan.    Her next mammogram is due 12-30-24 [facility].      She understands the risks of lumpectomy including bleeding, infection, seroma and 25% chance of positive margins. She understands the risks of  sentinel node biopsy, including 7% chance of lymphedema, injury to nerves or vessels in the axilla,  seroma. We discussed that we would review her pathology from her sentinel node procedure in consideration of a complete axillary dissection, after her procedure.   NCCN guidelines have been followed.  We fitted her for a postoperative bra, gave her EMLA cream and tegederm for her sentinel node procedure, and had her to see our nurse navigator.     Angelina Rosales MD    -- addendum- patient has decided to send genetics testing and also to have her nodes sampled.     Note that genetics result if positive would not push her to have a bilateral mastectomy, so we will not delay for this result.       Today I spent 75 minutes doing the following: Reviewing records, labs, outside imaging and reports in preparation for the patient visit; obtaining medical history; performing the physical exam; counseling and educating the patient and any available family or caregivers; ordering medications, tests or procedures; coordinating care with any other physicians on her care team as needed, and documenting all of the above in the medical record as well as sending communications with her other healthcare  professionals.        Next Appointment:  Return for to be determined. Await decision from patient regarding the procedure that she wishes to have.      EMR Dragon/transcription disclaimer:    Please note that portions of this note were completed with a voice recognition program.

## 2024-03-06 NOTE — ANESTHESIA POSTPROCEDURE EVALUATION
Patient: Jennifer Plascencia    Procedure Summary       Date: 03/06/24 Room / Location: [facility] OR  / [facility] MAIN OR    Anesthesia Start: 0925 Anesthesia Stop: 1139    Procedure: RIGHT breast DOUG guided lumpectomy and RIGHT axilla sentinel node biopsy (Right: Breast) Diagnosis:       Malignant neoplasm of upper-outer quadrant of right breast in female, estrogen receptor positive      (Malignant neoplasm of upper-outer quadrant of right breast in female, estrogen receptor positive [C50.411, Z17.0])    Surgeons: Angelina Rosales MD Provider: Cortes Nina MD    Anesthesia Type: general ASA Status: 3            Anesthesia Type: general    Vitals  Vitals Value Taken Time   /83 03/06/24 1245   Temp 36.6 °C (97.9 °F) 03/06/24 1245   Pulse 84 03/06/24 1253   Resp 16 03/06/24 1245   SpO2 94 % 03/06/24 1253   Vitals shown include unfiled device data.        Post Anesthesia Care and Evaluation    Patient location during evaluation: PACU  Patient participation: complete - patient participated  Level of consciousness: awake and alert  Pain management: adequate    Airway patency: patent  Anesthetic complications: No anesthetic complications  PONV Status: controlled  Cardiovascular status: acceptable and hemodynamically stable  Respiratory status: acceptable  Hydration status: acceptable    Comments: /83   Pulse 88   Temp 36.6 °C (97.9 °F) (Oral)   Resp 16   SpO2 96%

## 2024-03-06 NOTE — ANESTHESIA PREPROCEDURE EVALUATION
Anesthesia Evaluation     Patient summary reviewed and Nursing notes reviewed                Airway   Mallampati: III  TM distance: >3 FB  Neck ROM: limited  Dental - normal exam     Pulmonary    (+) ,sleep apnea  Cardiovascular     (+) hyperlipidemia      Neuro/Psych  (+) headaches, psychiatric history Anxiety and Depression  GI/Hepatic/Renal/Endo    (+) thyroid problem     Musculoskeletal     (+) neck pain      ROS comment: Prior ACDF  Abdominal    Substance History      OB/GYN          Other   arthritis,   history of cancer (MM, breast CA) active                      Anesthesia Plan    ASA 3     general     (I have reviewed the patient's history with the patient and the chart, including all pertinent laboratory results and imaging. I have explained the risks of anesthesia including but not limited to dental damage, corneal abrasion, nerve injury, MI, stroke, and death. Questions asked and answered. Anesthetic plan discussed with patient and team as indicated. Patient expressed understanding of the above.  )  intravenous induction     Anesthetic plan, risks, benefits, and alternatives have been provided, discussed and informed consent has been obtained with: patient.        CODE STATUS:

## 2024-03-06 NOTE — PERIOPERATIVE NURSING NOTE
at bedside. Verified consent change of Right breast 'manas guided'lumpectomy. And right axillary sintinel node biopsy. Patient and myself had initialed consent to verify change.

## 2024-03-06 NOTE — OP NOTE
Operative note    Preoperative Diagnosis: Breast cancer    Postoperative Diagnosis: Breast cancer    Procedure Performed: RIGHT breast DOUG guided lumpectomy and RIGHT DEEP axillary sentinel node biopsy with lymphoscintographic guidance.    Dictating physician and surgeon: Angelina Rosales MD    EBL:min    [place] Node Biopsy for Breast Cancer - Right  Operation performed with curative intent. Yes   Tracer(s) used to identify sentinel nodes in the upfront surgery (non-neoadjuvant) setting (select all that apply). Radioactive tracer   Tracer(s) used to identify sentinel nodes in the neoadjuvant setting (select all that apply). N/A   All nodes (colored or non-colored) present at the end of a dye-filled lymphatic channel were removed. N/A   All significantly radioactive nodes were removed. Yes   All palpably suspicious nodes were removed. Yes   Biopsy-proven positive nodes marked with clips prior to chemotherapy were identified and removed. N/A               FINDINGS AND DESCRIPTION OF PROCEDURE:     The patient was brought to the operating room and placed on the table in the supine position. After adequate general-ETT anesthesia was obtained, we sterilely prepped and draped the breast and axilla. We did a time out to identify correct patient and correct operative site.  She did receive IV antibiotic within an hour of and prior to incision.     I used the DOUG handpiece for audible feedback for localization. I marked the intended area for resection and planned my incision based on the imaging and dane.    I used an UOQ radial incision, no skin removal.     I used a 15 blade to incise the skin. I then dissected using Bovie electrocautery superiorly, inferiorly, medially and laterally around the lesion.  I examined the specimen using the intraoperative faxitron to document the presence of the lesion within the specimen.I then took the following additional margins: superior, inferior, medial, lateral, anterior,  posterior.     I then passed the specimens and labelled  as follows: For the lumpectomy,  long stitch lateral short stitch superior, double stitch deep. For the margins, stitch marks true margin.    I then irrigated, assured hemostasis.  I then mobilized the breast posteriorly and closed a deep layer of breast tissue using a running 2-0 Vicryl, followed by a superficial layer of breast using a running 2-0 Vicryl. I then closed the skin in 2 layers- the first being n interrupted 3-0 Vicryl layer, followed by a running 4-0 Monocryl.    I then applied lengthwise 1/2 inch Steri-Strips, an island dressing.    Next, I turned my attention to the axilla. I used the Neoprobe at the start of the case to ensure that the radiotracer had migrated to the axilla, which it had.  I then made a curvilinear incision at the inferior margin of the hairbearing area using a 15 blade.  I then used bovie for dissection, spring retractors for exposure and the gamma probe to assist in finding the nodes with radiopharmaceutical uptake.     There were 2 sentinel nodes. First was palpable not hot, the second was hot at 208.    I then irrigated, assured hemostasis and closed the skin in 2 layers, the first an interrupted 3-0 Vicryl suture layer and the second a running 4-0 Monocryl suture layer.      These were sent for permanent analysis.    Then I wrapped her in 4x4s and a 6 inch ACE wrap.    She tolerated the procedure well, there were no immediate complications, and all counts were correct at the end of the case.

## 2024-03-06 NOTE — ANESTHESIA PROCEDURE NOTES
Airway  Urgency: elective    Date/Time: 3/6/2024 9:47 AM  Airway not difficult    General Information and Staff    Patient location during procedure: OR  Anesthesiologist: Cortes Nina MD  CRNA/CAA: Sharda James CRNA    Indications and Patient Condition  Indications for airway management: airway protection    Preoxygenated: yes  Mask difficulty assessment: 1 - vent by mask    Final Airway Details  Final airway type: supraglottic airway      Successful airway: classic  Size 4     Number of attempts at approach: 1  Assessment: lips, teeth, and gum same as pre-op and atraumatic intubation    Additional Comments  PreO2, IV induction, easy mask, LMA placed w/o difficulty, cuff with air-green zone, secured in placed, EBBSH, +etCO2, atraumatic, teeth and lips as preop.

## 2024-03-06 NOTE — DISCHARGE INSTRUCTIONS
Dr. Angelina Rosales  [address]  [phone]    Lumpectomy, [place] Node Biopsy, Excisional Breast Biopsy, Port Placement  Postoperative Discharge Instructions         Keep 6 inch ace wrap in place and dressings dry postoperatively for a total of 72 hours. May then remove dressings and ACE wrap. Can shower and get affected area wet after dressings are removed. Prefer no soaking in the tub for one week. Leave steri-strips in place.   A responsible adult should accompany the patient on discharge and for 24 hours following surgery.   No heavy lifting (>5 lbs) or strenuous upper arm activity, and no raising of arms over the head until followup appointment.   For 24 hours postoperatively, or while taking pain or nausea medications - Do not drive, operate machinery or drink alcohol.   Call the office for any of the following symptoms:    Persistent bleeding   Excessive bruising or swelling   Excessive sleepiness or trouble breathing   Severe pain   Persistent nausea or vomiting   Fever greater than 101.   Patient should keep the postoperative visit that was scheduled for him/her in [facility]. If the patient has forgotten this date, please call the clinic for a reminder of the appointment time. If it is after hours, the patient can call the clinic the next business day for this reminder. [facility] phone number is [phone].

## 2024-03-08 LAB
LAB AP CASE REPORT: NORMAL
LAB AP CLINICAL INFORMATION: NORMAL
LAB AP SPECIAL STAINS: NORMAL
LAB AP SYNOPTIC CHECKLIST: NORMAL
PATH REPORT.FINAL DX SPEC: NORMAL
PATH REPORT.GROSS SPEC: NORMAL

## 2024-03-11 ENCOUNTER — TELEPHONE (OUTPATIENT)
Dept: SURGERY | Facility: CLINIC | Age: 74
End: 2024-03-11
Payer: MEDICARE

## 2024-03-11 DIAGNOSIS — C50.411 MALIGNANT NEOPLASM OF UPPER-OUTER QUADRANT OF RIGHT BREAST IN FEMALE, ESTROGEN RECEPTOR POSITIVE: Primary | ICD-10-CM

## 2024-03-11 DIAGNOSIS — Z17.0 MALIGNANT NEOPLASM OF UPPER-OUTER QUADRANT OF RIGHT BREAST IN FEMALE, ESTROGEN RECEPTOR POSITIVE: Primary | ICD-10-CM

## 2024-03-11 NOTE — TELEPHONE ENCOUNTER
Pathology from RIGHT lumpectomy and sentinel node biopsy returned as :  IMC, NST, int grade, 3,2,2, 2.1cm, no LVI, margins clear, associated DCIS, int grade, cribiform and micropapillary with expansive comedonecrosis, 2.8cm, margins clear, LCIS and ALH seen. Superior, inferior and medial margins with ALH  0/3 nodes-     Pathologic stage T2N0- IIA    I will arrange for oncotype, medical and radiation oncology visits.        Final Diagnosis   1.  Breast, right, lumpectomy: Invasive ductal adenocarcinoma, ductal carcinoma in situ, lobular carcinoma in situ and atypical lobular hyperplasia with               -A.  Invasive ductal adenocarcinoma                            I.  The tumor is Chris grade II (tubular grade 3, nuclear grade 2, mitotic grade 2).                            II.  The tumor measures up to 21 mm (3 consecutive 7 mm slices).                            III.  No lymphovascular invasion is identified                            IV.  The tumor is seen associated with biopsy site changes and coil clip                            V.  The invasive tumor comes within 0.3 mm of the lateral margin, 1 mm of the posterior margin, 7 mm of the inferior and anterior margins, 9 mm of the medial margin and 21 mm of the superior margin.               -B.  The ductal carcinoma in situ                            I.  The ductal carcinoma in situ is of intermediate nuclear grade with cribriform and micropapillary architecture and expansive comedonecrosis.                            II.  The ductal carcinoma in situ measures up to 28 mm (4 consecutive 7 mm slices)                            III.  The ductal carcinoma in situ comes within 7 mm of the posterior margin, 8 mm of the lateral and inferior margins, greater than 9 mm of the medial margin, 21 mm of the superior margin and 22 mm of the anterior margin.               -C.  Lobular carcinoma in situ (LCIS) and atypical lobular hyperplasia        Comment: Hormone  receptors were performed on the prior biopsy which is reviewed case [id number] and those results are reported in the synoptic. The margins for part 1 were for part 1 only given the additional parts 2 through 7 the invasive tumor comes within 3 mm of the posterior margin, 10.3 mm of the lateral margin, 11 mm of the inferior margin, 13 mm of the medial margin, 14 mm of the anterior margin and 27 mm of the superior margin.  The ductal carcinoma in situ comes within 9 mm of the posterior margin, 12 mm of the inferior margin, greater than 13 mm of the medial margin, 18 mm of the lateral margin, 27 mm of the superior margin and 29 mm of the anterior margin.     2.  Breast, right, new anterior margin, excision: 7 mm of additional benign breast tissue (anterior margin) with               -A.  Margins free of atypia, in situ and invasive disease     3.  Breast, right, new superior margin, excision: 6 mm of additional breast tissue (superior margin) with               -A.  Atypical lobular hyperplasia  -B.  Margins free of in situ and invasive disease.     4.  Breast, right, new lateral margin, excision: 10 mm of additional benign breast tissue (lateral margin) with               -A.  Margins free of atypia, in situ and invasive disease.     5.  Breast, right, new inferior margin, excision: 4 mm of additional  breast tissue (inferior margin) with               -A.  Atypical lobular hyperplasia  -B.  Margins free of in situ and invasive disease.     6.  Breast, right, new medial margin, excision: 4 mm of additional  breast tissue (medial margin) with               -A.  Atypical lobular hyperplasia  -B.  Margins free of  in situ and invasive disease.     7.  Breast, right, new posterior margin, excision: 2 mm of benign skeletal muscle (posterior margin) with               -A.  Margins free of atypia, in situ and invasive disease.     8.  Lymph node, right sentinel lymph node #1, excision (2 mm slices): Single benign lymph node    "  9.  Lymph node, right sentinel node #2, excision (2 mm slices): Two benign lymph nodes   Electronically signed by Portillo Fan MD on 3/8/2024 at 1222   Synoptic Checklist   INVASIVE CARCINOMA OF THE BREAST: Resection   8th Edition - Protocol posted: 12/13/2023INVASIVE CARCINOMA OF THE BREAST: RESECTION - All Specimens  SPECIMEN   Procedure  Excision (less than total mastectomy)   Specimen Laterality  Right   TUMOR   Tumor Site  Upper outer quadrant   Histologic Type  Invasive carcinoma of no special type (ductal)   Histologic Grade (Turrell Histologic Score)     Glandular (Acinar) / Tubular Differentiation  Score 3   Nuclear Pleomorphism  Score 2   Mitotic Rate  Score 2   Overall Grade  Grade 2 (scores of 6 or 7)   Tumor Size  Greatest dimension of largest invasive focus (Millimeters): 21 mm   Tumor Focality  Single focus of invasive carcinoma   Ductal Carcinoma In Situ (DCIS)  Present     Negative for extensive intraductal component (EIC)   Size (Extent) of DCIS  Estimated size (extent) of DCIS is at least (Millimeters): 28 mm   Architectural Patterns  Cribriform     Micropapillary   Nuclear Grade  Grade II (intermediate)   Necrosis  Present, central (expansive \"comedo\" necrosis)   Lobular Carcinoma In Situ (LCIS)  Present   Lymphatic and / or Vascular Invasion  Not identified   Microcalcifications  Present in non-neoplastic tissue     Present in lobular neoplasia   Treatment Effect in the Breast  No known presurgical therapy   MARGINS   Margin Status for Invasive Carcinoma  All margins negative for invasive carcinoma   Distance from Invasive Carcinoma to Closest Margin  3 mm   Closest Margin(s) to Invasive Carcinoma  Posterior   Distance from Invasive Carcinoma to Anterior Margin  14 mm   Distance from Invasive Carcinoma to Posterior Margin  3 mm   Distance from Invasive Carcinoma to Superior Margin  27 mm   Distance from Invasive Carcinoma to Inferior Margin  11 mm   Distance from Invasive Carcinoma to " Medial Margin  13 mm   Distance from Invasive Carcinoma to Lateral Margin  10.3 mm   Margin Status for DCIS  All margins negative for DCIS   Distance from DCIS to Closest Margin  9 mm   Closest Margin(s) to DCIS  Posterior   Distance from DCIS to Anterior Margin  29 mm   Distance from DCIS to Posterior Margin  9 mm   Distance from DCIS to Superior Margin  27 mm   Distance from DCIS to Inferior Margin  12 mm   Distance from DCIS to Medial Margin  Greater than: 13 mm   Distance from DCIS to Lateral Margin  18 mm   REGIONAL LYMPH NODES   Regional Lymph Node Status  All regional lymph nodes negative for tumor   Total Number of Lymph Nodes Examined (sentinel and non-sentinel)  3   Number of [place] Nodes Examined  3   pTNM CLASSIFICATION (AJCC 8th Edition)   Reporting of pT, pN, and (when applicable) pM categories is based on information available to the pathologist at the time the report is issued. As per the AJCC (Chapter 1, 8th Ed.) it is the managing physician’s responsibility to establish the final pathologic stage based upon all pertinent information, including but potentially not limited to this pathology report.   pT Category  pT2   pN Category  pN0   N Suffix  (sn)   SPECIAL STUDIES        Estrogen Receptor (ER) Status  Positive (greater than 10% of cells demonstrate nuclear positivity)   Percentage of Cells with Nuclear Positivity  99 %        Progesterone Receptor (PgR) Status  Positive   Percentage of Cells with Nuclear Positivity  10 %        HER2 (by immunohistochemistry)  Negative (Score 1+)        Ki-67 Percentage of Positive Nuclei  30 %   .

## 2024-03-12 LAB
LAB AP CASE REPORT: NORMAL
LAB AP CLINICAL INFORMATION: NORMAL
LAB AP SPECIAL STAINS: NORMAL
LAB AP SYNOPTIC CHECKLIST: NORMAL
PATH REPORT.ADDENDUM SPEC: NORMAL
PATH REPORT.FINAL DX SPEC: NORMAL
PATH REPORT.GROSS SPEC: NORMAL

## 2024-03-14 ENCOUNTER — TELEPHONE (OUTPATIENT)
Dept: OTHER | Facility: HOSPITAL | Age: 74
End: 2024-03-14
Payer: MEDICARE

## 2024-03-14 ENCOUNTER — SPECIALTY PHARMACY (OUTPATIENT)
Dept: PHARMACY | Facility: HOSPITAL | Age: 74
End: 2024-03-14
Payer: MEDICARE

## 2024-03-14 NOTE — TELEPHONE ENCOUNTER
Oncology Social Work  Distress Screening Follow-up    Diagnosis: Right breast cancer    Distress Level: 5 (2/27/2024  9:00 AM)    Physical Concerns:    Memory or concentration: Y  Loss or change of physical abilities: Y    Practical Problems:    Treatment decisions: Y    Emotional Concerns:    Worry or anxiety: Y  Fear: Y    Family Concerns:    Relationship with family members: Y     Interventions:     Emotional Needs: emotional suppport/coping strategies; support groups    Comments:  Spoke to the patient via telephone; explained role of OSW and supportive oncology services.  Discussed the patient's distress score of 5 and her concerns of worry/anxiety, fear, treatment decisions and memory/concentration being related to the unknown of her diagnosis.  The patient had concerns about her upcoming appointments and a message was sent to Isaac Flanagan's nurse for clarification and to see if the patient can be contacted with updates.  The patient states that her PCP Jade Carvajal MD prescribes her Alprazolam for her anxiety.  The patient was informed that Behavioral Health staff are available to address medications for anxiety if needed.  The patient currently denies wanting to use Behavioral Health services as she feels if her PCP is managing her medication well. The patient denies any supportive oncology needs currently and was informed that a letter will be mailed to her listing available services, OSW contact information and information about [facility] and [facility].  The patient was encouraged to contact OSW for any supportive needs that may arise.  PRANEETH Horn

## 2024-03-14 NOTE — TELEPHONE ENCOUNTER
Oncology Social Work  Voicemail message was left for the patient to return call to OSW. PRANEETH Horn

## 2024-03-15 ENCOUNTER — HOSPITAL ENCOUNTER (OUTPATIENT)
Dept: ULTRASOUND IMAGING | Facility: HOSPITAL | Age: 74
End: 2024-03-15

## 2024-03-15 ENCOUNTER — TELEPHONE (OUTPATIENT)
Dept: OTHER | Facility: HOSPITAL | Age: 74
End: 2024-03-15
Payer: MEDICARE

## 2024-03-15 ENCOUNTER — TELEPHONE (OUTPATIENT)
Dept: ONCOLOGY | Facility: CLINIC | Age: 74
End: 2024-03-15
Payer: MEDICARE

## 2024-03-15 NOTE — TELEPHONE ENCOUNTER
Oncology Social Work  Spoke to the patient to inform her that a message was sent to Dr. Flanagan's nurse about getting her B-12 injection on the same date that she meets with Dr. Flanagan.  The patient was informed that is was changed on the schedule.  A copy of her appointment schedule was sent with her OSW letter per the patient's request.  PRANEETH Horn

## 2024-03-15 NOTE — TELEPHONE ENCOUNTER
----- Message from Charlotte Tellez RN sent at 3/15/2024 11:07 AM EDT -----  She's seeing Dr Flanagan next Friday and has labs and B12 scheduled on Tuesday the following week.  Can you please move her labs and B12 to the same day as her appt with Dr Flanagan?    Thank you so much!

## 2024-03-18 ENCOUNTER — OFFICE VISIT (OUTPATIENT)
Dept: SURGERY | Facility: CLINIC | Age: 74
End: 2024-03-18
Payer: MEDICARE

## 2024-03-18 VITALS
DIASTOLIC BLOOD PRESSURE: 76 MMHG | WEIGHT: 152 LBS | HEIGHT: 66 IN | SYSTOLIC BLOOD PRESSURE: 138 MMHG | HEART RATE: 92 BPM | OXYGEN SATURATION: 97 % | BODY MASS INDEX: 24.43 KG/M2

## 2024-03-18 DIAGNOSIS — F41.9 ANXIETY: ICD-10-CM

## 2024-03-18 DIAGNOSIS — Z80.41 FH: OVARIAN CANCER: ICD-10-CM

## 2024-03-18 DIAGNOSIS — C50.411 MALIGNANT NEOPLASM OF UPPER-OUTER QUADRANT OF RIGHT BREAST IN FEMALE, ESTROGEN RECEPTOR POSITIVE: Primary | ICD-10-CM

## 2024-03-18 DIAGNOSIS — Z94.84 HISTORY OF STEM CELL TRANSPLANT: ICD-10-CM

## 2024-03-18 DIAGNOSIS — Z17.0 MALIGNANT NEOPLASM OF UPPER-OUTER QUADRANT OF RIGHT BREAST IN FEMALE, ESTROGEN RECEPTOR POSITIVE: Primary | ICD-10-CM

## 2024-03-18 DIAGNOSIS — Z12.31 ENCOUNTER FOR SCREENING MAMMOGRAM FOR MALIGNANT NEOPLASM OF BREAST: ICD-10-CM

## 2024-03-18 PROCEDURE — 1159F MED LIST DOCD IN RCRD: CPT | Performed by: SURGERY

## 2024-03-18 PROCEDURE — 99024 POSTOP FOLLOW-UP VISIT: CPT | Performed by: SURGERY

## 2024-03-18 PROCEDURE — 1160F RVW MEDS BY RX/DR IN RCRD: CPT | Performed by: SURGERY

## 2024-03-18 NOTE — PROGRESS NOTES
Chief Complaint: Jennifer Plascencia is a 74 y.o. female who was seen in consultation at the request of Jade Carvajal MD  for newly diagnosed breast cancer and a postoperative visit    History of Present Illness:  Patient presents with newly diagnosed breast cancer. She noted no new masses, skin changes, nipple discharge, nipple changes prior to her most recent imaging.  Her most recent imaging includes the followin2018, Bilateral Digital Screening [facility] ([facility] [place]  Heterogeneously dense  BI-RADS 1: Negative    2023, MMG Screening Bilateral ([facility])  Heterogeneously dense  There is a mass with associated distortion within the upper outer right breast, posterior depth.  BI-RADS 0    2024, MMG Diagnostic Right ([facility])  Heterogeneously dense  In the noted area of the focal asymmetry, there is persistent spiculated high density mass measuring approximately 19 mm with associated architectural distortion, located at 10:00 10 cm from the nipple. An adjacent smaller circumscribed low-density 5 mm mass is located just medial and superior to the primary mass by approximately 2 mm. Calcifications in the upper inner quadrant of the right breast are stable dating back to 2019 mammogram.  Ultrasound:  Right breast and right axilla was performed. At 10:00, 10 cm nipple, there is an irregular hypoechoic mass with indistinct margins measuring 21 mm x 19 mm x 9 mm. There is associated posterior acoustic shadowing. At 10;30, 10 cm nipple there is a subtle indistinct hypoechoic mass measuring 4 mm x 3 mm x 5 mm. This is thought to represent the adjacent mass seen mammographically. In addition, there is a third circumscribed oval hypoechoic 4 x 3 x 3 mm mass, labeled 9;30, 8 cm from nipple, considered indeterminate. Right axillary lymph node is essentially fatty replaced. No morphologically abnormal lymph nodes are identified.  IMPRESSION:  Recommend 3 site ultrasound guided biopsy  BI-RADS  5    01/29/2024, Bilateral Breast MRI ([facility])  In the posterior one-third of the right breast at the 9-o'clock position  centered on the order of 8 cm from the nipple there is a focal signal  void seen within an irregular enhancing mass that measures on the order  of 2.1 cm in anterior posterior dimension, 1.5 cm in the superior  inferior dimension and 1.5 cm in the medial lateral dimension. A focal  signal void is seen centrally within the mass. This corresponds to the  biopsy-proven site of malignancy with the internal coil shaped metallic  clip.     No other areas of suspicious enhancement or morphology are seen in the  right breast. I see no evidence for abnormal skin, nipple or chest wall  enhancement of the right breast and there is no evidence for right  axillary or internal mammary chain adenopathy.     There are no areas of suspicious enhancement or morphology in the left  breast.    She had a biopsy on the following day that showed:   01/11/2024, Ultrasound Guided Biopsy x2 Sites right breast ([facility])   Indications: Right breast  Primary 19 mm mass at 10:00  12-gauge total of 3 passes.  A coil clip was then placed. 4 mm mass 9:30 12 gauge 3 passes, ribbon clip was then placed under direct sonographic guidance.  Post procedure mammogram demonstrated appropriate clip placement.  Aborted biopsy of a third suspected indistinct mass previously described as 10:30 cm the nipple. This cannot be reproduced on real time examination today.  Pathology right breast 10:00 position is concordant. Pathology of right breast 9:30 Concordant.    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 with   The invasive ductal carcinoma    The tumor measures up to 13 mm on the slide    The tumor is Chris grade II (tubular grade 3, nuclear grade 3, mitotic grade 1)    No lymphovascular invasion or perineural invasion is identified    ER, positive strong 99%  TN, positive (intermediate 10%)  HER2 by IHC negative (1+)   KI-67 positive 30%  The ductal carcinoma in situ is of high nuclear grade with comedo architecture and comedonecrosis  Breast, right, 9:30, 8 cm from nipple, biopsy (35-WW--B) Benign breast tissue with:   Sclerosing adenosis   Rare microcysts   Micro calcifications in non-neoplastic tissue.      She has not had a breast biopsy in the past.  She has her uterus and ovaries, is postmenopausal, and takes nor hormones.  Her family history includes the following: She has one daughter, one son, one sister, 3 MA, 4 PA.  She denies breast cancer in her mother (seen on outside documents), and reports ? Ovarian cancer in a maternal aunt.    Interval HIstory:  Pathology from RIGHT lumpectomy and sentinel node biopsy returned as :  IMC, NST, int grade, 3,2,2, 2.1cm, no LVI, margins clear, associated DCIS, int grade, cribiform and micropapillary with expansive comedonecrosis, 2.8cm, margins clear, LCIS and ALH seen. Superior, inferior and medial margins with ALH  0/3 nodes-      Pathologic stage T2N0- IIA     She denies significant discomfort, denies redness, warmth, drainage.      Review of Systems:  Review of Systems   Constitutional:  Positive for unexpected weight change (3 lb wt gain).   All other systems reviewed and are negative.       Past Medical and Surgical History:  Breast Biopsy History:  Patient had not had a breast biopsy prior to her cancer  diagnosis.  Breast Cancer HIstory:  Patient does not have a past medical history of breast cancer.  Breast Operations, and year:  NONE   Obstetric/Gynecologic History:  Age menstrual periods began: 11  Patient is postmenopausal, entered menopause naturally at age: LATE 50S   Number of pregnancies: 3  Number of live births: 2  Number of abortions or miscarriages: 1  Age of delivery of first child: 23  Patient did not breast feed.  Length of time taking birth control pills: NONE   Patient took hormone replacement during the following dates: ONLY ON FOR 1 WEEK     Past Surgical History:   Procedure Laterality Date    ANTERIOR CERVICAL FUSION      BREAST BIOPSY Right 01/11/2024    10:00 @ 10cmFN   (Inv. Ductal Carcinoma  ER+/UT+/Her2-)  [facility]    BREAST LUMPECTOMY WITH SENTINEL NODE BIOPSY Right 3/6/2024    Procedure: RIGHT breast DOUG guided lumpectomy and RIGHT axilla sentinel node biopsy;  Surgeon: Angelina Rosales MD;  Location: [facility];  Service: General;  Laterality: Right;    CATARACT EXTRACTION      COLONOSCOPY      TONSILLECTOMY  1956    VEIN SURGERY  1991     Past Medical History:   Diagnosis Date    Anxiety     Arthritis     Breast cancer 01/11/2024    Inv. Ductal with DCIS (right breast)   ER+/UT+/Her2-    Depression     Disease of thyroid gland     Hashimoto's disease     History of vitamin D deficiency     Hypothyroidism     IBS (irritable bowel syndrome)     Multiple myeloma 2015    IgA kappa.  Stem cell transplant at [facility] 3/20/2016    Myeloma     Neck pain     Neuropathy     KWAME (obstructive sleep apnea) 07/08/2021    Overnight polysomnogram.  Weight 173 pounds.  Mild KWAME with AHI 5.6 events per hour.  When supine, 9.4 events per hour.  During REM, moderate severity at 26 events per hour.  No sleep-related hypoxia.  The patient snored 20.5% of total sleep time.    Osteopenia     Overweight (BMI 25.0-29.9) 02/05/2018       Prior Hospitalizations, other than for  "surgery or childbirth, and year:  NONE     Social History     Socioeconomic History    Marital status:     Number of children: 2   Tobacco Use    Smoking status: Former     Current packs/day: 0.00     Average packs/day: 0.3 packs/day for 2.0 years (0.5 ttl pk-yrs)     Types: Cigarettes     Quit date:      Years since quittin.2    Smokeless tobacco: Never    Tobacco comments:     Only lightly for 2 years   Vaping Use    Vaping status: Never Used   Substance and Sexual Activity    Alcohol use: Not Currently    Drug use: Never    Sexual activity: Defer     Partners: Male     Birth control/protection: None     Comment: Na     Patient is .  Patient is retired.  Patient drinks 2 servings of caffeine per day.    Family History:  Family History   Problem Relation Age of Onset    Breast cancer Mother     Sleep apnea Mother     Lymphoma Father     Sleep apnea Father     Cancer Father     Cancer Maternal Aunt             Kidney cancer Paternal Grandmother     Malig Hyperthermia Neg Hx        Vital Signs:  /76 (BP Location: Left arm, Patient Position: Sitting, Cuff Size: Adult)   Pulse 92   Ht 168.9 cm (66.5\")   Wt 68.9 kg (152 lb)   SpO2 97%   BMI 24.17 kg/m²      Medications:    Current Outpatient Medications:     acetaminophen (TYLENOL) 325 MG tablet, Take 1 tablet by mouth Every 6 (Six) Hours As Needed for Mild Pain., Disp: , Rfl:     Acetylcarn-Alpha Lipoic Acid 400-200 MG capsule, Take  by mouth. HOLD PRIOR TO SURG, Disp: , Rfl:     ALPRAZolam (XANAX) 0.25 MG tablet, Take 1 tablet by mouth Daily As Needed for Anxiety., Disp: 30 tablet, Rfl: 2    Azelastine HCl 137 MCG/SPRAY solution, , Disp: , Rfl:     B Complex Vitamins (VITAMIN B COMPLEX) capsule capsule, Take 2 tablets by mouth Daily. HOLD PRIOR TO SURG, Disp: , Rfl:     baclofen (LIORESAL) 10 MG tablet, Take 1 tablet by mouth 3 (Three) Times a Day As Needed., Disp: , Rfl: 5    calcium carbonate-vitamin d 600-400 MG-UNIT per " tablet, Take 1 tablet by mouth Daily. HOLD PRIOR TO SURG, Disp: , Rfl:     colesevelam (WELCHOL) 625 MG tablet, Take 2 tablets by mouth 2 (Two) Times a Day With Meals., Disp: 90 tablet, Rfl: 2    cycloSPORINE (RESTASIS) 0.05 % ophthalmic emulsion, 1 drop 2 (Two) Times a Day., Disp: , Rfl:     diphenoxylate-atropine (LOMOTIL) 2.5-0.025 MG per tablet, Take 1 tablet by mouth 3 (Three) Times a Day As Needed for Diarrhea., Disp: 90 tablet, Rfl: 3    DULoxetine (CYMBALTA) 30 MG capsule, Take 1 capsule by mouth Every Night., Disp: , Rfl:     DULoxetine (CYMBALTA) 60 MG capsule, Take 1 capsule by mouth Daily. (Patient taking differently: Take 1 capsule by mouth Every Night.), Disp: 90 capsule, Rfl: 3    fexofenadine (ALLEGRA) 180 MG tablet, Take 1 tablet by mouth As Needed., Disp: , Rfl:     fluticasone (FLONASE) 50 MCG/ACT nasal spray, 2 sprays into the nostril(s) as directed by provider Daily., Disp: , Rfl:     folic acid (FOLVITE) 1 MG tablet, Take 1 tablet by mouth Daily., Disp: , Rfl: 2    HYDROcodone-acetaminophen (NORCO) 5-325 MG per tablet, 1-2 tabs po q 4-6hr prn pain, wean to tylenol, Disp: 15 tablet, Rfl: 0    lidocaine-prilocaine (EMLA) 2.5-2.5 % cream, Apply 1 Application topically to the appropriate area as directed 1 (One) Time., Disp: , Rfl:     Multiple Vitamins-Iron (DAILY-JONI/IRON/BETA-CAROTENE) tablet, Take 1 tablet by mouth Daily. HOLD PRIOR TO SURG, Disp: , Rfl: 2    polyethylene glycol (MiraLax) 17 GM/SCOOP powder, Take 17 g by mouth Daily. Take cap of powder with 8oz water daily surrounding surgery and while on narcotic, Disp: 119 g, Rfl: 0    promethazine-dextromethorphan (PROMETHAZINE-DM) 6.25-15 MG/5ML syrup, TAKE 10 ML BY MOUTH TWICE A DAY AS NEEDED FOR COUGH, Disp: , Rfl:     Synthroid 75 MCG tablet, Take 1 tablet by mouth Daily., Disp: , Rfl:     trimethoprim-polymyxin b (POLYTRIM) 28654-5.1 UNIT/ML-% ophthalmic solution, As Needed., Disp: , Rfl:     vitamin D (ERGOCALCIFEROL) 20799 units  "capsule capsule, Take 1 capsule by mouth 2 (Two) Times a Week. Twice a week, Disp: , Rfl:     zoledronic acid (ZOMETA) 4 MG/5ML injection, Infuse 5 mL into a venous catheter Every 3 (Three) Months., Disp: , Rfl:      Allergies:  No Known Allergies    Physical Examination:  /76 (BP Location: Left arm, Patient Position: Sitting, Cuff Size: Adult)   Pulse 92   Ht 168.9 cm (66.5\")   Wt 68.9 kg (152 lb)   SpO2 97%   BMI 24.17 kg/m²   General Appearance:  Patient is in no distress.  She is well kept and has an average build.   Psychiatric:  Patient with appropriate mood and affect. Alert and oriented to self, time, and place.    Breast, RIGHT:  large sized, symmetric with the contralateral side.  Breast skin is without erythema, edema, rashes.  There are no visible abnormalities upon inspection during the arm-raising maneuver or with hands on hips in the sitting position. There is no nipple retraction, discharge or nipple/areolar skin changes. There is a well healing radial incision RIGHT radial incision UOQ with no erythema, warmth, drainage form her 3-2024 lumpectomy.  There are no masses palpable in the sitting or supine positions.    Breast, LEFT:  large sized, symmetric with the contralateral side.  Breast skin is without erythema, edema, rashes.  There are no visible abnormalities upon inspection during the arm-raising maneuver or with hands on hips in the sitting position. There is no nipple retraction, discharge or nipple/areolar skin changes.There are no masses palpable in the sitting or supine positions.    Lymphatic:  There is no axillary, cervical, infraclavicular, or supraclavicular adenopathy bilaterally. There is a well healing sentinel node biopsy incision with no erythema, warmth drainage and no palpable seroma.  Eyes:  Pupils are round and reactive to light.  Cardiovascular:  Heart rate and rhythm are regular.  Respiratory:  Lungs are clear bilaterally with no crackles or wheezes in any lung " field.  Gastrointestinal:  Abdomen is soft, nondistended, and nontender.     Musculoskeletal:  Good strength in all 4 extremities.   There is good range of motion in both shoulders.    Skin:  No new skin lesions or rashes on the skin excluding the breast (see breast exam above).        [place], [place] [facility] [place] [place] [place]  Heterogeneously dense  BI-RADS 1: Negative    2023, MMG Screening Bilateral ([facility])  Heterogeneously dense  There is a mass with associated distortion within the upper outer right breast, posterior depth.  BI-RADS 0    2024, MMG Diagnostic Right ([facility])  Heterogeneously dense  In the noted area of the focal asymmetry, there is persistent spiculated high density mass measuring approximately 19 mm with associated architectural distortion, located at 10:00 10 cm from the nipple. An adjacent smaller circumscribed low-density 5 mm mass is located just medial and superior to the primary mass by approximately 2 mm. Calcifications in the upper inner quadrant of the right breast are stable dating back to 2019 mammogram.  Ultrasound:  Right breast and right axilla was performed. At 10:00, 10 cm nipple, there is an irregular hypoechoic mass with indistinct margins measuring 21 mm x 19 mm x 9 mm. There is associated posterior acoustic shadowing. At 10;30, 10 cm nipple there is a subtle indistinct hypoechoic mass measuring 4 mm x 3 mm x 5 mm. This is thought to represent the adjacent mass seen mammographically. In addition, there is a third circumscribed oval hypoechoic 4 x 3 x 3 mm mass, labeled 9;30, 8 cm from nipple, considered indeterminate. Right axillary lymph node is essentially fatty replaced. No morphologically abnormal lymph nodes are identified.  IMPRESSION:  Recommend 3 site ultrasound guided biopsy  BI-RADS 5    2024, Bilateral Breast MRI ([facility])  In the posterior one-third of the right breast at the 9-o'clock position  centered  on the order of 8 cm from the nipple there is a focal signal  void seen within an irregular enhancing mass that measures on the order  of 2.1 cm in anterior posterior dimension, 1.5 cm in the superior  inferior dimension and 1.5 cm in the medial lateral dimension. A focal  signal void is seen centrally within the mass. This corresponds to the  biopsy-proven site of malignancy with the internal coil shaped metallic  clip.     No other areas of suspicious enhancement or morphology are seen in the  right breast. I see no evidence for abnormal skin, nipple or chest wall  enhancement of the right breast and there is no evidence for right  axillary or internal mammary chain adenopathy.     There are no areas of suspicious enhancement or morphology in the left  breast.        Pathology:  01/11/2024, Ultrasound Guided Biopsy x2 Sites right breast ([facility])   Indications: Right breast  Primary 19 mm mass at 10:00  12-gauge total of 3 passes.  A coil clip was then placed. 4 mm mass 9:30 12 gauge 3 passes, ribbon clip was then placed under direct sonographic guidance.  Post procedure mammogram demonstrated appropriate clip placement.  Aborted biopsy of a third suspected indistinct mass previously described as 10:30 cm the nipple. This cannot be reproduced on real time examination today.  Pathology right breast 10:00 position is concordant. Pathology of right breast 9:30 Concordant.    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 with   The invasive ductal carcinoma    The tumor measures up to 13 mm on the slide    The tumor is [place] grade II (tubular grade 3, nuclear grade 3, mitotic grade 1)    No lymphovascular invasion or perineural invasion is identified    ER, positive strong 99%  AK, positive (intermediate 10%)  HER2 by IHC negative (1+)   KI-67 positive 30%  The ductal carcinoma in situ is of high nuclear grade with comedo architecture and comedonecrosis  Breast, right, 9:30, 8 cm from nipple, biopsy (54-PE--B) Benign breast tissue with:   Sclerosing adenosis   Rare microcysts   Micro calcifications in non-neoplastic tissue.        Pathology from RIGHT lumpectomy and sentinel node biopsy returned as :  IMC, NST, int grade, 3,2,2, 2.1cm, no LVI, margins clear, associated DCIS, int grade, cribiform and micropapillary with expansive comedonecrosis, 2.8cm, margins clear, LCIS and ALH seen. Superior, inferior and medial margins with ALH  0/3 nodes-      Pathologic stage T2N0- IIA           Final Diagnosis   1.  Breast, right, lumpectomy: Invasive ductal adenocarcinoma, ductal carcinoma in situ, lobular carcinoma in situ and atypical lobular hyperplasia with               -A.  Invasive ductal adenocarcinoma                            I.  The tumor is [place] grade II (tubular grade 3, nuclear grade 2, mitotic grade 2).                            II.  The tumor measures up to 21 mm (3 consecutive 7 mm slices).                            III.  No lymphovascular invasion is identified                            IV.  The tumor is seen associated with biopsy site changes and coil clip                            V.  The invasive tumor comes within 0.3 mm of the lateral margin, 1 mm of the posterior margin, 7 mm of the inferior and anterior margins, 9 mm of the medial margin and 21 mm of the superior margin.               -B.  The ductal carcinoma in  situ                            I.  The ductal carcinoma in situ is of intermediate nuclear grade with cribriform and micropapillary architecture and expansive comedonecrosis.                            II.  The ductal carcinoma in situ measures up to 28 mm (4 consecutive 7 mm slices)                            III.  The ductal carcinoma in situ comes within 7 mm of the posterior margin, 8 mm of the lateral and inferior margins, greater than 9 mm of the medial margin, 21 mm of the superior margin and 22 mm of the anterior margin.               -C.  Lobular carcinoma in situ (LCIS) and atypical lobular hyperplasia        Comment: Hormone receptors were performed on the prior biopsy which is reviewed case [id number] and those results are reported in the synoptic. The margins for part 1 were for part 1 only given the additional parts 2 through 7 the invasive tumor comes within 3 mm of the posterior margin, 10.3 mm of the lateral margin, 11 mm of the inferior margin, 13 mm of the medial margin, 14 mm of the anterior margin and 27 mm of the superior margin.  The ductal carcinoma in situ comes within 9 mm of the posterior margin, 12 mm of the inferior margin, greater than 13 mm of the medial margin, 18 mm of the lateral margin, 27 mm of the superior margin and 29 mm of the anterior margin.     2.  Breast, right, new anterior margin, excision: 7 mm of additional benign breast tissue (anterior margin) with               -A.  Margins free of atypia, in situ and invasive disease     3.  Breast, right, new superior margin, excision: 6 mm of additional breast tissue (superior margin) with               -A.  Atypical lobular hyperplasia  -B.  Margins free of in situ and invasive disease.     4.  Breast, right, new lateral margin, excision: 10 mm of additional benign breast tissue (lateral margin) with               -A.  Margins free of atypia, in situ and invasive disease.     5.  Breast, right, new inferior margin, excision: 4 mm  "of additional  breast tissue (inferior margin) with               -A.  Atypical lobular hyperplasia  -B.  Margins free of in situ and invasive disease.     6.  Breast, right, new medial margin, excision: 4 mm of additional  breast tissue (medial margin) with               -A.  Atypical lobular hyperplasia  -B.  Margins free of  in situ and invasive disease.     7.  Breast, right, new posterior margin, excision: 2 mm of benign skeletal muscle (posterior margin) with               -A.  Margins free of atypia, in situ and invasive disease.     8.  Lymph node, right sentinel lymph node #1, excision (2 mm slices): Single benign lymph node     9.  Lymph node, right sentinel node #2, excision (2 mm slices): Two benign lymph nodes   Electronically signed by Portillo Fan MD on 3/8/2024 at 1222   Synoptic Checklist   INVASIVE CARCINOMA OF THE BREAST: Resection   8th Edition - Protocol posted: 12/13/2023INVASIVE CARCINOMA OF THE BREAST: RESECTION - All Specimens         SPECIMEN   Procedure   Excision (less than total mastectomy)   Specimen Laterality   Right   TUMOR   Tumor Site   Upper outer quadrant   Histologic Type   Invasive carcinoma of no special type (ductal)   Histologic Grade ([place] Histologic Score)       Glandular (Acinar) / Tubular Differentiation   Score 3   Nuclear Pleomorphism   Score 2   Mitotic Rate   Score 2   Overall Grade   Grade 2 (scores of 6 or 7)   Tumor Size   Greatest dimension of largest invasive focus (Millimeters): 21 mm   Tumor Focality   Single focus of invasive carcinoma   Ductal Carcinoma In Situ (DCIS)   Present       Negative for extensive intraductal component (EIC)   Size (Extent) of DCIS   Estimated size (extent) of DCIS is at least (Millimeters): 28 mm   Architectural Patterns   Cribriform       Micropapillary   Nuclear Grade   Grade II (intermediate)   Necrosis   Present, central (expansive \"comedo\" necrosis)   Lobular Carcinoma In Situ (LCIS)   Present   Lymphatic and / or " Vascular Invasion   Not identified   Microcalcifications   Present in non-neoplastic tissue       Present in lobular neoplasia   Treatment Effect in the Breast   No known presurgical therapy   MARGINS   Margin Status for Invasive Carcinoma   All margins negative for invasive carcinoma   Distance from Invasive Carcinoma to Closest Margin   3 mm   Closest Margin(s) to Invasive Carcinoma   Posterior   Distance from Invasive Carcinoma to Anterior Margin   14 mm   Distance from Invasive Carcinoma to Posterior Margin   3 mm   Distance from Invasive Carcinoma to Superior Margin   27 mm   Distance from Invasive Carcinoma to Inferior Margin   11 mm   Distance from Invasive Carcinoma to Medial Margin   13 mm   Distance from Invasive Carcinoma to Lateral Margin   10.3 mm   Margin Status for DCIS   All margins negative for DCIS   Distance from DCIS to Closest Margin   9 mm   Closest Margin(s) to DCIS   Posterior   Distance from DCIS to Anterior Margin   29 mm   Distance from DCIS to Posterior Margin   9 mm   Distance from DCIS to Superior Margin   27 mm   Distance from DCIS to Inferior Margin   12 mm   Distance from DCIS to Medial Margin   Greater than: 13 mm   Distance from DCIS to Lateral Margin   18 mm   REGIONAL LYMPH NODES   Regional Lymph Node Status   All regional lymph nodes negative for tumor   Total Number of Lymph Nodes Examined (sentinel and non-sentinel)   3   Number of [place] Nodes Examined   3   pTNM CLASSIFICATION (AJCC 8th Edition)   Reporting of pT, pN, and (when applicable) pM categories is based on information available to the pathologist at the time the report is issued. As per the AJCC (Chapter 1, 8th Ed.) it is the managing physician’s responsibility to establish the final pathologic stage based upon all pertinent information, including but potentially not limited to this pathology report.   pT Category   pT2   pN Category   pN0   N Suffix   (sn)   SPECIAL STUDIES           Estrogen Receptor (ER) Status    Positive (greater than 10% of cells demonstrate nuclear positivity)   Percentage of Cells with Nuclear Positivity   99 %           Progesterone Receptor (PgR) Status   Positive   Percentage of Cells with Nuclear Positivity   10 %           HER2 (by immunohistochemistry)   Negative (Score 1+)           Ki-67 Percentage of Positive Nuclei   30 %   .                Labs:  [facility] breast and gynecology panel dated 2-24-24 returned as negative for mutation  We will let her know      Communication from [facility] office who manages her immunosuppression. He asked us to stop the revlamid one week preop.  We will let her know      Procedures:      Assessment:   Diagnosis Plan   1. Malignant neoplasm of upper-outer quadrant of right breast in female, estrogen receptor positive  Mammo Screening Digital Tomosynthesis Bilateral With CAD      2. FH: ovarian cancer        3. History of stem cell transplant        4. Anxiety        5. Encounter for screening mammogram for malignant neoplasm of breast  Mammo Screening Digital Tomosynthesis Bilateral With CAD        2-  RIGHT breast upper outer breast ca- 9:30, 7.5 CFN, 3cm on exam, 1.9cm on mammogram, 2.1cm on ultrasound, 2.1 cm on MRI, normal nodes on MRI and exam.  IMC, NST, int grade, no LVI, 1.3 cm in core  ER 99, SC 10, her 2 monserrat 1+, Ki 67 is 30%    Clinical stage T1cN0- IA    Pathology from RIGHT lumpectomy and sentinel node biopsy returned as :  IMC, NST, int grade, 3,2,2, 2.1cm, no LVI, margins clear, associated DCIS, int grade, cribiform and micropapillary with expansive comedonecrosis, 2.8cm, margins clear, LCIS and ALH seen. Superior, inferior and medial margins with ALH  0/3 nodes-      Pathologic stage T2N0- IIA      3-  A maternal aunt with a female cancer, she thinks ovarian  [facility] common hereditary panel negative for mutation 2-24-24      4-   Had stem cell transplant (followed at [facility], on Revlimid)- sees Dr Jose titus  Revgenaroid on hold until  completes radiation per patient report      5-  Significant anxiety, sees Yeni Lujan      Plan:  The patient goes by Jennifer.  She is here unaccompanied today.    We reviewed her pathology and exam together today.      She is pleased with her cosmesis and pathology report.    Her next mammogram is due 12-30-24 and she would like to have that done here at [facility].    I will arrange and see her back after.    She has an appt with Dr Flanagan and with Dr Walton. Her oncotype has been sent on her surgical specimen.        Angelina Rosales MD          Next Appointment:  Return for Next scheduled follow up, after imaging.      EMR Dragon/transcription disclaimer:    Please note that portions of this note were completed with a voice recognition program.

## 2024-03-19 LAB
LAB AP CASE REPORT: NORMAL
LAB AP CLINICAL INFORMATION: NORMAL
LAB AP SPECIAL STAINS: NORMAL
LAB AP SYNOPTIC CHECKLIST: NORMAL
Lab: NORMAL
PATH REPORT.ADDENDUM SPEC: NORMAL
PATH REPORT.FINAL DX SPEC: NORMAL
PATH REPORT.GROSS SPEC: NORMAL

## 2024-03-20 ENCOUNTER — PATIENT OUTREACH (OUTPATIENT)
Dept: OTHER | Facility: HOSPITAL | Age: 74
End: 2024-03-20
Payer: MEDICARE

## 2024-03-20 NOTE — PROGRESS NOTES
Called MsRachael Temo to see how she was doing. Left a message with my contact information and asked her to call back at her convenience.

## 2024-03-21 ENCOUNTER — SPECIALTY PHARMACY (OUTPATIENT)
Dept: PHARMACY | Facility: HOSPITAL | Age: 74
End: 2024-03-21
Payer: MEDICARE

## 2024-03-21 NOTE — PROGRESS NOTES
Encounter created to make changes to MTM Team members and Provider for SpRx Program enrollment.     Sujey Randall, Pharmacy Technician  Specialty Pharmacy Technician

## 2024-03-22 ENCOUNTER — LAB (OUTPATIENT)
Dept: LAB | Facility: HOSPITAL | Age: 74
End: 2024-03-22
Payer: MEDICARE

## 2024-03-22 ENCOUNTER — APPOINTMENT (OUTPATIENT)
Dept: LAB | Facility: HOSPITAL | Age: 74
End: 2024-03-22
Payer: MEDICARE

## 2024-03-22 ENCOUNTER — TELEPHONE (OUTPATIENT)
Dept: RADIATION ONCOLOGY | Facility: HOSPITAL | Age: 74
End: 2024-03-22
Payer: MEDICARE

## 2024-03-22 ENCOUNTER — INFUSION (OUTPATIENT)
Dept: ONCOLOGY | Facility: HOSPITAL | Age: 74
End: 2024-03-22
Payer: MEDICARE

## 2024-03-22 ENCOUNTER — OFFICE VISIT (OUTPATIENT)
Dept: ONCOLOGY | Facility: CLINIC | Age: 74
End: 2024-03-22
Payer: MEDICARE

## 2024-03-22 VITALS
SYSTOLIC BLOOD PRESSURE: 140 MMHG | WEIGHT: 154 LBS | DIASTOLIC BLOOD PRESSURE: 83 MMHG | HEIGHT: 66 IN | BODY MASS INDEX: 24.75 KG/M2 | TEMPERATURE: 96.9 F | OXYGEN SATURATION: 96 % | RESPIRATION RATE: 16 BRPM | HEART RATE: 71 BPM

## 2024-03-22 DIAGNOSIS — G63 NEUROPATHY ASSOCIATED WITH MULTIPLE MYELOMA: ICD-10-CM

## 2024-03-22 DIAGNOSIS — G63 NEUROPATHY ASSOCIATED WITH MULTIPLE MYELOMA: Primary | ICD-10-CM

## 2024-03-22 DIAGNOSIS — C90.00 NEUROPATHY ASSOCIATED WITH MULTIPLE MYELOMA: Primary | ICD-10-CM

## 2024-03-22 DIAGNOSIS — Z79.899 HIGH RISK MEDICATION USE: ICD-10-CM

## 2024-03-22 DIAGNOSIS — C50.411 MALIGNANT NEOPLASM OF UPPER-OUTER QUADRANT OF RIGHT BREAST IN FEMALE, ESTROGEN RECEPTOR POSITIVE: ICD-10-CM

## 2024-03-22 DIAGNOSIS — C90.00 MULTIPLE MYELOMA NOT HAVING ACHIEVED REMISSION: Primary | ICD-10-CM

## 2024-03-22 DIAGNOSIS — C90.00 NEUROPATHY ASSOCIATED WITH MULTIPLE MYELOMA: ICD-10-CM

## 2024-03-22 DIAGNOSIS — C90.00 MULTIPLE MYELOMA NOT HAVING ACHIEVED REMISSION: ICD-10-CM

## 2024-03-22 DIAGNOSIS — Z17.0 MALIGNANT NEOPLASM OF UPPER-OUTER QUADRANT OF RIGHT BREAST IN FEMALE, ESTROGEN RECEPTOR POSITIVE: ICD-10-CM

## 2024-03-22 LAB
ALBUMIN SERPL-MCNC: 3.9 G/DL (ref 3.5–5.2)
ALBUMIN/GLOB SERPL: 1.7 G/DL
ALP SERPL-CCNC: 63 U/L (ref 39–117)
ALT SERPL W P-5'-P-CCNC: 12 U/L (ref 1–33)
ANION GAP SERPL CALCULATED.3IONS-SCNC: 10.3 MMOL/L (ref 5–15)
AST SERPL-CCNC: 17 U/L (ref 1–32)
B2 MICROGLOB SERPL-MCNC: 2.2 MG/L (ref 0.8–2.2)
BASOPHILS # BLD AUTO: 0.05 10*3/MM3 (ref 0–0.2)
BASOPHILS NFR BLD AUTO: 0.9 % (ref 0–1.5)
BILIRUB SERPL-MCNC: <0.2 MG/DL (ref 0–1.2)
BUN SERPL-MCNC: 14 MG/DL (ref 8–23)
BUN/CREAT SERPL: 16.7 (ref 7–25)
CALCIUM SPEC-SCNC: 8.7 MG/DL (ref 8.6–10.5)
CHLORIDE SERPL-SCNC: 105 MMOL/L (ref 98–107)
CO2 SERPL-SCNC: 25.7 MMOL/L (ref 22–29)
CREAT SERPL-MCNC: 0.84 MG/DL (ref 0.57–1)
DEPRECATED RDW RBC AUTO: 50.3 FL (ref 37–54)
EGFRCR SERPLBLD CKD-EPI 2021: 72.6 ML/MIN/1.73
EOSINOPHIL # BLD AUTO: 0.13 10*3/MM3 (ref 0–0.4)
EOSINOPHIL NFR BLD AUTO: 2.2 % (ref 0.3–6.2)
ERYTHROCYTE [DISTWIDTH] IN BLOOD BY AUTOMATED COUNT: 13.9 % (ref 12.3–15.4)
GLOBULIN UR ELPH-MCNC: 2.3 GM/DL
GLUCOSE SERPL-MCNC: 94 MG/DL (ref 65–99)
HCT VFR BLD AUTO: 37.4 % (ref 34–46.6)
HGB BLD-MCNC: 12.8 G/DL (ref 12–15.9)
IMM GRANULOCYTES # BLD AUTO: 0.01 10*3/MM3 (ref 0–0.05)
IMM GRANULOCYTES NFR BLD AUTO: 0.2 % (ref 0–0.5)
LYMPHOCYTES # BLD AUTO: 2.21 10*3/MM3 (ref 0.7–3.1)
LYMPHOCYTES NFR BLD AUTO: 38 % (ref 19.6–45.3)
MAGNESIUM SERPL-MCNC: 2.2 MG/DL (ref 1.6–2.4)
MCH RBC QN AUTO: 33.6 PG (ref 26.6–33)
MCHC RBC AUTO-ENTMCNC: 34.2 G/DL (ref 31.5–35.7)
MCV RBC AUTO: 98.2 FL (ref 79–97)
MONOCYTES # BLD AUTO: 0.53 10*3/MM3 (ref 0.1–0.9)
MONOCYTES NFR BLD AUTO: 9.1 % (ref 5–12)
NEUTROPHILS NFR BLD AUTO: 2.89 10*3/MM3 (ref 1.7–7)
NEUTROPHILS NFR BLD AUTO: 49.6 % (ref 42.7–76)
NRBC BLD AUTO-RTO: 0 /100 WBC (ref 0–0.2)
PHOSPHATE SERPL-MCNC: 4.1 MG/DL (ref 2.5–4.5)
PLATELET # BLD AUTO: 275 10*3/MM3 (ref 140–450)
PMV BLD AUTO: 9.2 FL (ref 6–12)
POTASSIUM SERPL-SCNC: 4.9 MMOL/L (ref 3.5–5.2)
PROT SERPL-MCNC: 6.2 G/DL (ref 6–8.5)
RBC # BLD AUTO: 3.81 10*6/MM3 (ref 3.77–5.28)
SODIUM SERPL-SCNC: 141 MMOL/L (ref 136–145)
WBC NRBC COR # BLD AUTO: 5.82 10*3/MM3 (ref 3.4–10.8)

## 2024-03-22 PROCEDURE — 84100 ASSAY OF PHOSPHORUS: CPT

## 2024-03-22 PROCEDURE — 83735 ASSAY OF MAGNESIUM: CPT

## 2024-03-22 PROCEDURE — 96372 THER/PROPH/DIAG INJ SC/IM: CPT

## 2024-03-22 PROCEDURE — 85025 COMPLETE CBC W/AUTO DIFF WBC: CPT

## 2024-03-22 PROCEDURE — 82232 ASSAY OF BETA-2 PROTEIN: CPT | Performed by: INTERNAL MEDICINE

## 2024-03-22 PROCEDURE — 25010000002 CYANOCOBALAMIN PER 1000 MCG: Performed by: INTERNAL MEDICINE

## 2024-03-22 PROCEDURE — 80053 COMPREHEN METABOLIC PANEL: CPT

## 2024-03-22 PROCEDURE — 36415 COLL VENOUS BLD VENIPUNCTURE: CPT

## 2024-03-22 RX ORDER — CYANOCOBALAMIN 1000 UG/ML
1000 INJECTION, SOLUTION INTRAMUSCULAR; SUBCUTANEOUS ONCE
Status: COMPLETED | OUTPATIENT
Start: 2024-03-22 | End: 2024-03-22

## 2024-03-22 RX ORDER — ANASTROZOLE 1 MG/1
1 TABLET ORAL DAILY
Qty: 30 TABLET | Refills: 5 | Status: SHIPPED | OUTPATIENT
Start: 2024-03-22 | End: 2024-04-21

## 2024-03-22 RX ADMIN — CYANOCOBALAMIN 1000 MCG: 1000 INJECTION INTRAMUSCULAR; SUBCUTANEOUS at 10:08

## 2024-03-22 NOTE — PROGRESS NOTES
Subjective   Jennifer Plascencia is a 74 y.o. female.  Referred by Dr. Rosales for right breast invasive ductal carcinoma    History of Present Illness     Ms. Jolly Plascencia is a 74-year-old lady with diagnosis of IgA kappa high risk multiple myeloma high risk due to status post stem cell transplant in March 2016 at [facility] and currently on maintenance Revlimid 5 mg 3 out of 4 weeks.    Multiple myeloma is high risk because of gain of 1 q- treatment with rev Dex Velcade initiated in 11/15-went on to stem cell transplant at [facility] in March 2016?  Melphalan.  She started post transplant therapy in May 2016 with Velcade rev Dex.  In September after 4 cycles Velcade was decreased to every other week with plans to continue rev Dex Velcade till progression because of high risk status.  In May 2019 her Velcade was discontinued because of worsening neuropathy.  Patient has remained on Revlimid 10 mg 21 out of 28 days since then with stable disease until November 2019 when a small IgG kappa paraprotein was noted on her blood tests which is distinct from her usual IgA kappa myeloma.  Restaging with PET scan was normal and since then the paraprotein as of 3/10/2020 has resolved    She is currently on Zometa every 3 months and being followed with myeloma labs monthly.    Dr. Alexey Xavier is her hematologist at [facility].    She has been seeing Dr. Garcia from our practice for management of the multiple myeloma and Revlimid.    12/28/2023-bilateral screening mammogram  Breast that heterogeneously dense.  There is a mass with associated distortion within the upper outer right breast posterior depth.  No suspicious findings in the left breast.    1/9/2024-right breast diagnostic mammogram and ultrasound  Heterogeneously dense breast tissue.  High density mass measuring approximately 19 mm with associated lactic show distortion, 10:00, 10 cm from the nipple.  Adjacent smaller circumscribed low-density 5 mm mass is located  just medial and superior to the primary mass by approximately 2 mm.  Calcifications in the upper inner quadrant of the right breast are stable dating back to 2019.  Second set of calcifications in the upper outer right breast are also stable dating back to 2019.    Ultrasound  At 10:00, 10 cm from the nipple there is a irregular mass measuring 21 x 19 x 9 mm.  Associated to caustic shadowing.  At 1030, 10 cm from the nipple there is a hypoechoic mass measuring 4 x 3 x 5 mm.  This is thought to represent the adjacent mass seen mammographically.  In addition there is a third circumscribed hypoechoic mass measuring 4 x 3 x 3 mm labeled 930, 8 cm from the nipple.  Considered indeterminate.  Right axilla lymph nodes are essentially fatty replaced.  No morphologically abnormal lymph nodes in the right axilla.    Impression  1.irregular mass measuring 21 mm, ultrasound-guided biopsy recommended  2.5 millimeter mass at 1030, 10 cm from the nipple is suspicious, ultrasound-guided biopsy recommended  3.4 millimeter mass is indeterminate at 930, ultrasound-guided biopsy recommended.    3 site ultrasound-guided biopsy  1.right breast 10:00, 10 cm from the nipple  Invasive ductal adenocarcinoma and ductal carcinoma in situ  Tumor is grade 2  No lymphovascular invasion no perineural invasion  ER positive strong 99%  VT positive indeterminate 10%  HER2 1+ by immunohistochemistry  Ki-67 30%  DCIS is high-grade    2.right breast 9:30, 8 cm from the nipple  Sclerosing adenosis  Rare microcyst  Microcalcifications in nonneoplastic tissue    1/29/2024-bilateral breast MRI  Biopsy-proven malignancy in the right breast at 9:00, measures 2.1 cm in greatest dimension.  No other suspicious findings are seen within the right breast.  No evidence of right axillary lymphadenopathy.  No left breast abnormalities noted.      She denies any family history of breast cancer.      Patient underwent right breast lumpectomy on 3/6/2024.    Pathology  shows invasive ductal carcinoma, measuring 21 mm, grade 2, 28 mm of DCIS, all margins -3 sentinel lymph nodes negative.  ER +91 to 100%  AR +10%  HER2 negative  Ki-67 30%  Oncotype DX recurrence score of 12 with less than 1% benefit from chemotherapy and 3% risk of distant metastasis at 9 years    She is recovering well from surgery.  Myeloma labs have been obtained today and results pending.    2/27/2024-Labs reviewed and CBC normal with a WBC of 3.93, hemoglobin 12.1 and platelets 204,000, CMP within normal limits, magnesium normal at 2.2, phosphorus normal at 3.9, beta-2 microglobulin normal at 2.1, SPEP with negative M spike, free light chain ratio normal at 1.02, free light chain kappa 31.5, free light chain lambda 31.0    Repeat myeloma labs obtained today, CBC and CMP as well as magnesium and phosphorus not obtained.  We will add these to the labs.    Patient tells me that she has an appointment with Dr. Xavier in April and plans to hold Revlimid until she sees him back.    She remains anxious regarding holding the Revlimid and concerns regarding recurrence of myeloma.    Denies any other complaints at this time.      The following portions of the patient's history were reviewed and updated as appropriate: allergies, current medications, past family history, past medical history, past social history, past surgical history, and problem list.    Past Medical History:   Diagnosis Date    Anxiety     Arthritis     Breast cancer 01/11/2024    Inv. Ductal with DCIS (right breast)   ER+/AR+/Her2-    Depression     Disease of thyroid gland     Hashimoto's disease     History of vitamin D deficiency     Hypothyroidism     IBS (irritable bowel syndrome)     Multiple myeloma 2015    IgA kappa.  Stem cell transplant at [facility] 3/20/2016    Myeloma     Neck pain     Neuropathy     KWAME (obstructive sleep apnea) 07/08/2021    Overnight polysomnogram.  Weight 173 pounds.  Mild KWAME with AHI 5.6 events per hour.  When  supine, 9.4 events per hour.  During REM, moderate severity at 26 events per hour.  No sleep-related hypoxia.  The patient snored 20.5% of total sleep time.    Osteopenia     Overweight (BMI 25.0-29.9) 2018        Past Surgical History:   Procedure Laterality Date    ANTERIOR CERVICAL FUSION      BREAST BIOPSY Right 2024    10:00 @ 10cmFN   (Inv. Ductal Carcinoma  ER+/ND+/Her2-)  [facility]    BREAST LUMPECTOMY WITH SENTINEL NODE BIOPSY Right 3/6/2024    Procedure: RIGHT breast DOUG guided lumpectomy and RIGHT axilla sentinel node biopsy;  Surgeon: Angelina Rosales MD;  Location: [facility] OR;  Service: General;  Laterality: Right;    CATARACT EXTRACTION      COLONOSCOPY      TONSILLECTOMY      VEIN SURGERY          Family History   Problem Relation Age of Onset    Breast cancer Mother     Sleep apnea Mother     Lymphoma Father     Sleep apnea Father     Cancer Father     Cancer Maternal Aunt             Kidney cancer Paternal Grandmother     Malig Hyperthermia Neg Hx         Social History     Socioeconomic History    Marital status:     Number of children: 2   Tobacco Use    Smoking status: Former     Current packs/day: 0.00     Average packs/day: 0.3 packs/day for 2.0 years (0.5 ttl pk-yrs)     Types: Cigarettes     Quit date:      Years since quittin.2    Smokeless tobacco: Never    Tobacco comments:     Only lightly for 2 years   Vaping Use    Vaping status: Never Used   Substance and Sexual Activity    Alcohol use: Not Currently    Drug use: Never    Sexual activity: Defer     Partners: Male     Birth control/protection: None     Comment: Na        OB History          2    Para   2    Term   2            AB        Living             SAB        IAB        Ectopic        Molar        Multiple        Live Births   2             Age at menarche-11  Age at first live childbirth-23   3 para 2  1  Oral conceptive pills  "none  Hormone replacement therapy for about 1 week  Age of menopause-late 50s    No Known Allergies         Review of Systems   Constitutional: Negative.    HENT: Negative.     Eyes: Negative.    Respiratory: Negative.     Cardiovascular: Negative.    Gastrointestinal: Negative.    Endocrine: Negative.    Genitourinary:  Positive for breast lump.   Allergic/Immunologic: Negative.    Neurological: Negative.    Psychiatric/Behavioral:  The patient is nervous/anxious.          Objective   Blood pressure 140/83, pulse 71, temperature 96.9 °F (36.1 °C), temperature source Temporal, resp. rate 16, height 168.9 cm (66.5\"), weight 69.9 kg (154 lb), SpO2 96%.   Physical Exam  Vitals reviewed.   Constitutional:       Appearance: Normal appearance. She is normal weight.   HENT:      Nose: Nose normal.      Mouth/Throat:      Pharynx: Oropharynx is clear.   Eyes:      Conjunctiva/sclera: Conjunctivae normal.   Cardiovascular:      Rate and Rhythm: Normal rate.      Pulses: Normal pulses.   Skin:     General: Skin is warm.   Neurological:      General: No focal deficit present.      Mental Status: She is alert.   Psychiatric:         Mood and Affect: Mood normal.         Behavior: Behavior normal.         Thought Content: Thought content normal.         Judgment: Judgment normal.       Breast Exam: Right breast appears normal on inspection.  Scar in the upper outer quadrant of the right breast as well as the right axilla.  Underlying scar tissue and mild swelling.  Left breast appears normal inspection.  No palpable abnormalities of the left breast.    Admission on 03/06/2024, Discharged on 03/06/2024   Component Date Value Ref Range Status    Addendum 2 03/06/2024    Final                    Value:This result contains rich text formatting which cannot be displayed here.    Addendum 03/06/2024    Final                    Value:This result contains rich text formatting which cannot be displayed here.    Case Report 03/06/2024    " Final                    Value:Surgical Pathology Report                         Case: [id number]                                  Authorizing Provider:  Angelina Rosales MD    Collected:           03/06/2024 10:11 AM          Ordering Location:     [facility]  Received:            03/06/2024 10:26 AM                                 MAIN OR                                                                      Pathologist:           Portillo Fan MD                                                         Specimens:   1) - Breast, Right, RIGHT Breast lump - short stitch superior, long stitch lateral,                 double stitch deep                                                                                  2) - Breast, Right, RIGHT Breast Anterior Margin - stitch marks true margin                         3) - Breast, Right, RIGHT Breast Superior Margin - stitch marks true margin                         4) - Breast, Right, RIGHT Breast Lateral Margin - stitch marks true margin                                                    5) - Breast, Right, RIGHT Breast Inferior Margin - stitch marks true margin                         6) - Breast, Right, RIGHT Breast Medial Margin - stitch marks true margin                           7) - Breast, Right, RIGHT Breast Posterior Margin - stitch marks true margin                        8) - [place] Lymph Node, RIGHT [place] Node - #1                                                  9) - [place] Lymph Node, RIGHT [place] Node - #2                                         Clinical Information 03/06/2024    Final                    Value:This result contains rich text formatting which cannot be displayed here.    Final Diagnosis 03/06/2024    Final                    Value:This result contains rich text formatting which cannot be displayed here.    Synoptic Checklist 03/06/2024    Final                    Value:INVASIVE CARCINOMA OF THE BREAST:  "Resection                            INVASIVE CARCINOMA OF THE BREAST: RESECTION - All Specimens                            8th Edition - Protocol posted: 12/13/2023                                                        SPECIMEN                               Procedure:    Excision (less than total mastectomy)                                Specimen Laterality:    Right                                                         TUMOR                               Tumor Site:    Upper outer quadrant                                Histologic Type:    Invasive carcinoma of no special type (ductal)                                Histologic Grade ([place] Histologic Score):                                     Glandular (Acinar) / Tubular Differentiation:    Score 3                                  Nuclear Pleomorphism:    Score 2                                  Mitotic Rate:    Score 2                                  Overall Grade:    Grade 2 (scores of 6 or 7)                                Tumor Size:    Greatest dimension of largest invasive focus (Millimeters): 21 mm                               Tumor Focality:    Single focus of invasive carcinoma                                Ductal Carcinoma In Situ (DCIS):    Present                                  :    Negative for extensive intraductal component (EIC)                                  Size (Extent) of DCIS:    Estimated size (extent) of DCIS is at least (Millimeters): 28 mm                                 Architectural Patterns:    Cribriform                                  Architectural Patterns:    Micropapillary                                  Nuclear Grade:    Grade II (intermediate)                                  Necrosis:    Present, central (expansive \"comedo\" necrosis)                                Lobular Carcinoma In Situ (LCIS):    Present                                Lymphatic and / or Vascular Invasion:    Not identified                  "               Microcalcifications:    Present in non-neoplastic tissue                                Microcalcifications:    Present in lobular neoplasia                                Treatment Effect in the Breast:    No known presurgical therapy                                                         MARGINS                               Margin Status for Invasive Carcinoma:    All margins negative for invasive carcinoma                                  Distance from Invasive Carcinoma to Closest Margin:    3 mm                                 Closest Margin(s) to Invasive Carcinoma:    Posterior                                  Distance from Invasive Carcinoma to Anterior Margin:    14 mm                                 Distance from Invasive Carcinoma to Posterior Margin:    3 mm                                 Distance from Invasive Carcinoma to Superior Margin:    27 mm                                 Distance from Invasive Carcinoma to Inferior Margin:    11 mm                                 Distance from Invasive Carcinoma to Medial Margin:    13 mm                                 Distance from Invasive Carcinoma to Lateral Margin:    10.3 mm                               Margin Status for DCIS:    All margins negative for DCIS                                  Distance from DCIS to Closest Margin:    9 mm                                 Closest Margin(s) to DCIS:    Posterior                                  Distance from DCIS to Anterior Margin:    29 mm                                 Distance from DCIS to Posterior Margin:    9 mm                                 Distance from DCIS to Superior Margin:    27 mm                                 Distance from DCIS to Inferior Margin:    12 mm                                 Distance from DCIS to Medial Margin:    Greater than: 13 mm                                 Distance from DCIS to Lateral Margin:    18 mm                                                         REGIONAL LYMPH NODES                               Regional Lymph Node Status:                                     :    All regional lymph nodes negative for tumor                                  Total Number of Lymph Nodes Examined (sentinel and non-sentinel):    3                                  Number of [place] Nodes Examined:    3                                                         pTNM CLASSIFICATION (AJCC 8th Edition)                               Reporting of pT, pN, and (when applicable) pM categories is based on information available to the pathologist at the time the report is issued. As per the AJCC (Chapter 1, 8th Ed.) it is the managing physician’s responsibility to establish the final pathologic stage based upon all pertinent information, including but potentially not limited to this pathology report.                               pT Category:    pT2                                pN Category:    pN0                                N Suffix:    (sn)                                                         SPECIAL STUDIES                               Estrogen Receptor (ER) Status:    Positive (greater than 10% of cells demonstrate nuclear positivity)                                  Percentage of Cells with Nuclear Positivity:    99 %                               Progesterone Receptor (PgR) Status:    Positive                                  Percentage of Cells with Nuclear Positivity:    10 %                               HER2 (by immunohistochemistry):    Negative (Score 1+)                                Ki-67 Percentage of Positive Nuclei:    30 %      Gross Description 03/06/2024    Final                    Value:This result contains rich text formatting which cannot be displayed here.    Special Stains 03/06/2024    Final                    Value:This result contains rich text formatting which cannot be displayed here.   Pre-Admission Testing on 02/29/2024   Component Date Value  Ref Range Status    QT Interval 02/29/2024 473  ms Final    QTC Interval 02/29/2024 496  ms Final   Infusion on 02/27/2024   Component Date Value Ref Range Status    Glucose 02/27/2024 96  65 - 99 mg/dL Final    BUN 02/27/2024 10  8 - 23 mg/dL Final    Creatinine 02/27/2024 0.78  0.57 - 1.00 mg/dL Final    Sodium 02/27/2024 140  136 - 145 mmol/L Final    Potassium 02/27/2024 4.1  3.5 - 5.2 mmol/L Final    Chloride 02/27/2024 107  98 - 107 mmol/L Final    CO2 02/27/2024 26.3  22.0 - 29.0 mmol/L Final    Calcium 02/27/2024 8.6  8.6 - 10.5 mg/dL Final    Total Protein 02/27/2024 5.9 (L)  6.0 - 8.5 g/dL Final    Albumin 02/27/2024 3.5  3.5 - 5.2 g/dL Final    ALT (SGPT) 02/27/2024 <5  1 - 33 U/L Final    AST (SGOT) 02/27/2024 15  1 - 32 U/L Final    Alkaline Phosphatase 02/27/2024 54  39 - 117 U/L Final    Total Bilirubin 02/27/2024 0.2  0.0 - 1.2 mg/dL Final    Globulin 02/27/2024 2.4  gm/dL Final    A/G Ratio 02/27/2024 1.5  g/dL Final    BUN/Creatinine Ratio 02/27/2024 12.8  7.0 - 25.0 Final    Anion Gap 02/27/2024 6.7  5.0 - 15.0 mmol/L Final    eGFR 02/27/2024 79.3  >60.0 mL/min/1.73 Final    Magnesium 02/27/2024 2.0  1.6 - 2.4 mg/dL Final    Phosphorus 02/27/2024 3.9  2.5 - 4.5 mg/dL Final    WBC 02/27/2024 3.93  3.40 - 10.80 10*3/mm3 Final    RBC 02/27/2024 3.66 (L)  3.77 - 5.28 10*6/mm3 Final    Hemoglobin 02/27/2024 12.1  12.0 - 15.9 g/dL Final    Hematocrit 02/27/2024 36.0  34.0 - 46.6 % Final    MCV 02/27/2024 98.4 (H)  79.0 - 97.0 fL Final    MCH 02/27/2024 33.1 (H)  26.6 - 33.0 pg Final    MCHC 02/27/2024 33.6  31.5 - 35.7 g/dL Final    RDW 02/27/2024 13.8  12.3 - 15.4 % Final    RDW-SD 02/27/2024 49.8  37.0 - 54.0 fl Final    MPV 02/27/2024 9.3  6.0 - 12.0 fL Final    Platelets 02/27/2024 204  140 - 450 10*3/mm3 Final    Neutrophil % 02/27/2024 35.9 (L)  42.7 - 76.0 % Final    Lymphocyte % 02/27/2024 45.3  19.6 - 45.3 % Final    Monocyte % 02/27/2024 10.4  5.0 - 12.0 % Final    Eosinophil % 02/27/2024 7.1  (H)  0.3 - 6.2 % Final    Basophil % 02/27/2024 1.3  0.0 - 1.5 % Final    Immature Grans % 02/27/2024 0.0  0.0 - 0.5 % Final    Neutrophils, Absolute 02/27/2024 1.41 (L)  1.70 - 7.00 10*3/mm3 Final    Lymphocytes, Absolute 02/27/2024 1.78  0.70 - 3.10 10*3/mm3 Final    Monocytes, Absolute 02/27/2024 0.41  0.10 - 0.90 10*3/mm3 Final    Eosinophils, Absolute 02/27/2024 0.28  0.00 - 0.40 10*3/mm3 Final    Basophils, Absolute 02/27/2024 0.05  0.00 - 0.20 10*3/mm3 Final    Immature Grans, Absolute 02/27/2024 0.00  0.00 - 0.05 10*3/mm3 Final    nRBC 02/27/2024 0.0  0.0 - 0.2 /100 WBC Final    IgG 02/27/2024 896  586 - 1602 mg/dL Final    IgA 02/27/2024 98  64 - 422 mg/dL Final    IgM 02/27/2024 33  26 - 217 mg/dL Final    Total Protein 02/27/2024 5.7 (L)  6.0 - 8.5 g/dL Final    Albumin 02/27/2024 3.5  2.9 - 4.4 g/dL Final    Alpha-1-Globulin 02/27/2024 0.2  0.0 - 0.4 g/dL Final    Alpha-2-Globulin 02/27/2024 0.5  0.4 - 1.0 g/dL Final    Beta Globulin 02/27/2024 0.7  0.7 - 1.3 g/dL Final    Gamma Globulin 02/27/2024 0.8  0.4 - 1.8 g/dL Final    M-Ruben 02/27/2024 Not Observed  Not Observed g/dL Final    Globulin 02/27/2024 2.2  2.2 - 3.9 g/dL Final    A/G Ratio 02/27/2024 1.6  0.7 - 1.7 Final    Immunofixation Reflex, Serum 02/27/2024 Comment   Final    No monoclonality detected.    Please note 02/27/2024 Comment   Final    Protein electrophoresis scan will follow via computer, mail, or   delivery.    Free Light Chain, [place] 02/27/2024 31.5 (H)  3.3 - 19.4 mg/L Final    Free Lambda Light Chains 02/27/2024 31.0 (H)  5.7 - 26.3 mg/L Final    Kappa/Lambda Ratio 02/27/2024 1.02  0.26 - 1.65 Final    Beta-2 Microglobulin 02/27/2024 2.1  0.8 - 2.2 mg/L Final        US Device Placement Breast Without Biopsy 1st    Result Date: 3/4/2024  Technically successful ultrasound-guided placement of a Lauryn device in the right breast at a biopsy-proven site of malignancy.  This report was finalized on 3/4/2024 8:35 AM by   Bunny Ramos M.D on Workstation: [id number]      Mammo Diagnostic Right With CAD    Result Date: 3/4/2024  Technically successful ultrasound-guided placement of a Lauryn device in the right breast at a biopsy-proven site of malignancy.  This report was finalized on 3/4/2024 8:35 AM by Dr. Bunny Ramos M.D on Workstation: [id number]          Assessment & Plan       *Right breast invasive ductal carcinoma  Tumor measures 21 mm on the ultrasound and 21 mm on the MRI but on physical exam measuring up to 3.5 cm.  Clinical T2 N0 M0  Invasive ductal carcinoma, grade 2, ER +99% strong, NE intermediate, 10%, HER2 1+ immunohistochemistry, Ki-67 30%  Prognostic stage Ib  Status post right breast lumpectomy and sentinel lymph node biopsy on 3/6/2024.  Pathology shows invasive ductal carcinoma, tumor measures 21 mm, 28 mm of DCIS, margins negative, grade 2, ER strongly positive at 99%, NE 10% and HER2 1+ with a Ki-67 of 30%.  Oncotype DX recurrence score of 12, less than 1% benefit from chemotherapy and 3% risk of distant metastasis at 9 years with AI or tamoxifen alone.  Given that the Oncotype DX recurrence score is low there is no additional benefit from chemotherapy I recommend that she proceed with adjuvant radiation followed by adjuvant endocrine therapy.  She is scheduled to see Dr. Lama soon.  Patient is concerned about holding Revlimid for prolonged period of time.  We discussed the adverse effects of endocrine therapy including but not limited to hot flashes, mood changes, fatigue, nausea, sleep disturbances, arthralgias and myalgias, decrease in bone mineral density.  She is on every 3 monthly Zometa currently for myeloma which will be protective for her bones.  We discussed the cardiovascular risk associated with endocrine therapy.  Prescription will be sent to pharmacy today.    *Multiple myeloma  IgA kappa multiple myeloma diagnosed in 2015 treated with RVD  Stem cell transplant at [facility] in March  2016  Maintenance Velcade and Revlimid and dexamethasone started May 2016  Velcade discontinued because of neurotoxicity in May 2019  Small IgG kappa paraprotein seen on blood work in October 2019 and PET scan was performed which was negative.  This paraprotein subsequently resolved  Currently receiving Zometa every 3 months  Currently on Revlimid 5 mg 3 out of 4 weeks, dose decreased in November 23 for worsening neuropathy and stable disease.  She sees Dr. Alexey Xavier every 6 months.  Dr. Rosales has discussed with Dr. Xavier, based on her documentation he recommends holding Revlimid 1 week prior to surgery.  Revlimid is associated with secondary malignancies however breast cancer is not typically seen with this.  Therefore I think we should resume Revlimid after completion of breast surgery and continue while she is on endocrine therapy.  Patient has a follow-up appointment with her myeloma Dr. Next month.  She plans to hold Revlimid until she is recommended to resume by him.  She insist that she wants monthly labs including CBC CMP and myeloma studies as well as magnesium and phosphorus.  She continues on every 3 monthly Zometa.    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    Anxiety much better today    *Bone health  8/6/2020 DEXA scan shows osteopenia with a T-score of -0.6 in the right femoral neck, T-score is -1.8 in the left femoral neck, T-score of 0.8 in the lumbar spine  Patient reports that she had a DEXA last year however I do not have the results of the same.  She continues on Zometa.    *Follow-up-3 months    43 minutes total spent on the encounter including reviewing the medical records, face-to-face time, care coordination, documentation on the same day

## 2024-03-25 ENCOUNTER — SPECIALTY PHARMACY (OUTPATIENT)
Dept: PHARMACY | Facility: HOSPITAL | Age: 74
End: 2024-03-25
Payer: MEDICARE

## 2024-03-25 ENCOUNTER — OFFICE VISIT (OUTPATIENT)
Dept: RADIATION ONCOLOGY | Facility: HOSPITAL | Age: 74
End: 2024-03-25
Payer: MEDICARE

## 2024-03-25 VITALS
HEART RATE: 86 BPM | DIASTOLIC BLOOD PRESSURE: 85 MMHG | OXYGEN SATURATION: 95 % | BODY MASS INDEX: 24.17 KG/M2 | SYSTOLIC BLOOD PRESSURE: 150 MMHG | WEIGHT: 152 LBS

## 2024-03-25 DIAGNOSIS — C90.00 MULTIPLE MYELOMA NOT HAVING ACHIEVED REMISSION: Primary | ICD-10-CM

## 2024-03-25 LAB
ALBUMIN SERPL ELPH-MCNC: 3.4 G/DL (ref 2.9–4.4)
ALBUMIN/GLOB SERPL: 1.4 {RATIO} (ref 0.7–1.7)
ALPHA1 GLOB SERPL ELPH-MCNC: 0.2 G/DL (ref 0–0.4)
ALPHA2 GLOB SERPL ELPH-MCNC: 0.7 G/DL (ref 0.4–1)
B-GLOBULIN SERPL ELPH-MCNC: 1 G/DL (ref 0.7–1.3)
GAMMA GLOB SERPL ELPH-MCNC: 0.7 G/DL (ref 0.4–1.8)
GLOBULIN SER-MCNC: 2.6 G/DL (ref 2.2–3.9)
IGA SERPL-MCNC: 87 MG/DL (ref 64–422)
IGG SERPL-MCNC: 848 MG/DL (ref 586–1602)
IGM SERPL-MCNC: 28 MG/DL (ref 26–217)
INTERPRETATION SERPL IEP-IMP: ABNORMAL
KAPPA LC FREE SER-MCNC: 20.9 MG/L (ref 3.3–19.4)
KAPPA LC FREE/LAMBDA FREE SER: 0.83 {RATIO} (ref 0.26–1.65)
LABORATORY COMMENT REPORT: ABNORMAL
LAMBDA LC FREE SERPL-MCNC: 25.2 MG/L (ref 5.7–26.3)
M PROTEIN SERPL ELPH-MCNC: ABNORMAL G/DL
PROT SERPL-MCNC: 6 G/DL (ref 6–8.5)

## 2024-03-25 PROCEDURE — G0463 HOSPITAL OUTPT CLINIC VISIT: HCPCS | Performed by: RADIOLOGY

## 2024-03-25 NOTE — PROGRESS NOTES
Pt is able to use [employer] for her Revlimid dispense. Her copay is $191.73 but I can find her copay assistance.    [facility] Pharmacist notified.    I will contact pt to discuss.    Sujey Randall, Pharmacy Technician  Specialty Pharmacy Technician

## 2024-03-25 NOTE — PROGRESS NOTES
Cancer Staging   CC: pT2N0 right breast cancer uoq, ERPR positive      HPI:    I had the pleasure of seeing Jennifer Plascencia  today in the [facility].   The patient is a 74 y.o. female with recently diagnosed right breast cancer.  She had a screening mammogram on 12/29/23 which showed a mass with associated distortion within the upper outer right breast, posterior depth.BI-RADS 0.  She had a diagnostic right mammogram on 1/9/24 which showed  the spiculated high density mass measuring approximately 19 mm with associated architectural distortion, located at 10:00 10 cm from the nipple. An adjacent smaller circumscribed low-density 5 mm mass is located just medial and superior to the primary mass by approximately 2 mm. Calcifications in the upper inner quadrant of the right breast are stable dating back to 2019 mammogram. Ultrasound of the right breast showed at 10:00, 10 cm nipple, there is an irregular hypoechoic mass with indistinct margins measuring 21 mm x 19 mm x 9 mm. There is associated posterior acoustic shadowing. At 10;30, 10 cm nipple there is a subtle indistinct hypoechoic mass measuring 4 mm x 3 mm x 5 mm. This is thought to represent the adjacent mass seen mammographically. In addition, there is a third circumscribed oval hypoechoic 4 x 3 x 3 mm mass, labeled 9;30, 8 cm from nipple, considered indeterminate. Right axillary lymph node is essentially fatty replaced. No morphologically abnormal lymph nodes are identified.IMPRESSION: Recommend 3 site ultrasound guided biopsy BI-RADS 5.     She had an ultrasound guided biopsy of the right breast mass at 10 ocock on 1/19/24 with pathology revealing a grade II invasive ductal carcinoma measuring up to 13mm, no lvsi, ER 99% GA 10% Her 2 negative and ki67 30%.  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  Biopsy of the right breast 9:30 position revealed sclerosing adenosis, no malignancy.     She had a breast mri on  24 which showed in the posterior one-third of the right breast at the 9-o'clock position centered on the order of 8 cm from the nipple there is a focal signal void seen within an irregular enhancing mass that measures on the order of 2.1 cm in anterior posterior dimension, 1.5 cm in the superior inferior dimension and 1.5 cm in the medial lateral dimension. A focal signal void is seen centrally within the mass. This corresponds to the biopsy-proven site of malignancy with the internal coil shaped metallic clip.  No other areas of suspicious enhancement or morphology are seen in the right breast. I see no evidence for abnormal skin, nipple or chest wall enhancement of the right breast and there is no evidence for right axillary or internal mammary chain adenopathy.     She is  with menarche age 11 and first chilbirth age 23.  She did not breast feed.  She is postmenopausal, entered menopause naturally in her late 50s. She has never takenbirth control pills and took hormone replacement for only one week.     She is referred today for evaluation for radiation.  She has not had surgery yet but had several questions about radiation and thus was referred peroperatively.    Interval hx 3/6/24:  She underwent a right breast lumpectomy and sentinel node biopsy on 3/6/24 with pathology revealing grade II invasive ductal carcinoma, up to 21mm in size, no lvsi,.  The closest posterior margin was 3mm. Intermediate grade associated dcis measuring up to 28mm was associated located 9mm from the posterior margin.  Three sentinel lymph nodes were negative for metastatic disease.  Her pathologic stage was pT2N0.    She is recovering well from her surgery and referred today for evaluation for adjuvant radiation.  She is scheduled to see her medical oncologist at [facility] on  to discuss resuming revlimid. She is anxious to get the radiation started.    Review of Systems   Constitutional: Negative.    Respiratory:  Negative.     Skin: Negative.    Psychiatric/Behavioral: Negative.         Past Medical History:   Diagnosis Date    Anxiety     Arthritis     Breast cancer 2024    Inv. Ductal with DCIS (right breast)   ER+/RI+/Her2-    Depression     Disease of thyroid gland     Hashimoto's disease     History of vitamin D deficiency     Hypothyroidism     IBS (irritable bowel syndrome)     Multiple myeloma     IgA kappa.  Stem cell [facility] 3/20/2016    Myeloma     Neck pain     Neuropathy     KWAME (obstructive sleep apnea) 2021    Overnight polysomnogram.  Weight 173 pounds.  Mild KWAME with AHI 5.6 events per hour.  When supine, 9.4 events per hour.  During REM, moderate severity at 26 events per hour.  No sleep-related hypoxia.  The patient snored 20.5% of total sleep time.    Osteopenia     Overweight (BMI 25.0-29.9) 2018         Past Surgical History:   Procedure Laterality Date    ANTERIOR CERVICAL FUSION      BREAST BIOPSY Right 2024    10:00 @ 10cmFN   (Inv. Ductal Carcinoma  ER+/RI+/Her2-)  [facility]    BREAST LUMPECTOMY WITH SENTINEL NODE BIOPSY Right 3/6/2024    Procedure: RIGHT breast DOUG guided lumpectomy and RIGHT axilla sentinel node biopsy;  Surgeon: Angelina Rosales MD;  Location: [facility];  Service: General;  Laterality: Right;    CATARACT EXTRACTION      COLONOSCOPY      TONSILLECTOMY      VEIN SURGERY           Social History     Socioeconomic History    Marital status:     Number of children: 2   Tobacco Use    Smoking status: Former     Current packs/day: 0.00     Average packs/day: 0.3 packs/day for 2.0 years (0.5 ttl pk-yrs)     Types: Cigarettes     Quit date:      Years since quittin.2    Smokeless tobacco: Never    Tobacco comments:     Only lightly for 2 years   Vaping Use    Vaping status: Never Used   Substance and Sexual Activity    Alcohol use: Not Currently    Drug use: Never    Sexual activity: Defer      Partners: Male     Birth control/protection: None     Comment: Na         Family History   Problem Relation Age of Onset    Breast cancer Mother     Sleep apnea Mother     Lymphoma Father     Sleep apnea Father     Cancer Father     Cancer Maternal Aunt             Kidney cancer Paternal Grandmother     Malig Hyperthermia Neg Hx           Objective    Physical Exam  Chest:          Comments: Well healed incision lateral right breast, small seroma, no other palpable masses or adenopathy  Neurological:      General: No focal deficit present.      Mental Status: She is alert and oriented to person, place, and time.   Psychiatric:         Mood and Affect: Mood normal.         Current Outpatient Medications on File Prior to Visit   Medication Sig Dispense Refill    acetaminophen (TYLENOL) 325 MG tablet Take 1 tablet by mouth Every 6 (Six) Hours As Needed for Mild Pain.      Acetylcarn-Alpha Lipoic Acid 400-200 MG capsule Take  by mouth. HOLD PRIOR TO SURG      ALPRAZolam (XANAX) 0.25 MG tablet Take 1 tablet by mouth Daily As Needed for Anxiety. 30 tablet 2    anastrozole (ARIMIDEX) 1 MG tablet Take 1 tablet by mouth Daily for 30 days. 30 tablet 5    Azelastine HCl 137 MCG/SPRAY solution       B Complex Vitamins (VITAMIN B COMPLEX) capsule capsule Take 2 tablets by mouth Daily. HOLD PRIOR TO SURG      baclofen (LIORESAL) 10 MG tablet Take 1 tablet by mouth 3 (Three) Times a Day As Needed.  5    calcium carbonate-vitamin d 600-400 MG-UNIT per tablet Take 1 tablet by mouth Daily. HOLD PRIOR TO SURG      colesevelam (WELCHOL) 625 MG tablet Take 2 tablets by mouth 2 (Two) Times a Day With Meals. 90 tablet 2    cycloSPORINE (RESTASIS) 0.05 % ophthalmic emulsion 1 drop 2 (Two) Times a Day.      diphenoxylate-atropine (LOMOTIL) 2.5-0.025 MG per tablet Take 1 tablet by mouth 3 (Three) Times a Day As Needed for Diarrhea. 90 tablet 3    DULoxetine (CYMBALTA) 30 MG capsule Take 1 capsule by mouth Every Night.       DULoxetine (CYMBALTA) 60 MG capsule Take 1 capsule by mouth Daily. (Patient taking differently: Take 1 capsule by mouth Every Night.) 90 capsule 3    fexofenadine (ALLEGRA) 180 MG tablet Take 1 tablet by mouth As Needed.      fluticasone (FLONASE) 50 MCG/ACT nasal spray 2 sprays into the nostril(s) as directed by provider Daily.      folic acid (FOLVITE) 1 MG tablet Take 1 tablet by mouth Daily.  2    HYDROcodone-acetaminophen (NORCO) 5-325 MG per tablet 1-2 tabs po q 4-6hr prn pain, wean to tylenol 15 tablet 0    lidocaine-prilocaine (EMLA) 2.5-2.5 % cream Apply 1 Application topically to the appropriate area as directed 1 (One) Time.      Multiple Vitamins-Iron (DAILY-JONI/IRON/BETA-CAROTENE) tablet Take 1 tablet by mouth Daily. HOLD PRIOR TO SURG  2    polyethylene glycol (MiraLax) 17 GM/SCOOP powder Take 17 g by mouth Daily. Take cap of powder with 8oz water daily surrounding surgery and while on narcotic 119 g 0    promethazine-dextromethorphan (PROMETHAZINE-DM) 6.25-15 MG/5ML syrup TAKE 10 ML BY MOUTH TWICE A DAY AS NEEDED FOR COUGH      Synthroid 75 MCG tablet Take 1 tablet by mouth Daily.      trimethoprim-polymyxin b (POLYTRIM) 61904-9.1 UNIT/ML-% ophthalmic solution As Needed.      vitamin D (ERGOCALCIFEROL) 56010 units capsule capsule Take 1 capsule by mouth 2 (Two) Times a Week. Twice a week      zoledronic acid (ZOMETA) 4 MG/5ML injection Infuse 5 mL into a venous catheter Every 3 (Three) Months.       No current facility-administered medications on file prior to visit.       ALLERGIES:  No Known Allergies    There were no vitals taken for this visit.         3/22/2024    10:21 AM   Current Status   ECOG score 0         Assessment & Plan     74 year old female with pT2N0 right breast cancer, uoq, ER TN Positive Her 2 negative.  I have reviewed her imaging and pathology report from her recent lumpectomy. Given the size of her invasive tumor and the associated 2.8cm of DCIS, I recommend adjuvant radiation  to the breast to decrease the risk of local recurrence.       I discussed with her in detail the risks, benefits and rationale of radiation therapy to the right breast to include but not limited to the following:     Acute: skin erythema, breakdown/moist desquamation, swelling or discomfort of the breast, fatigue, pneumonitis resulting in shortness of breath, cough or pain     Late: Permanent skin changes including hyperpigmentation, telangiectasias, fibrosis of the breast resulting in smaller size or poor cosmetic outcome, late edema or cellulitis, late rib fracture, late pulmonary fibrosis and the remote risk of second malignancies.       She voiced understanding and was given an opportunity to ask questions which I believe were answered to her satisfaction. I have scheduled her to return for CT simulation on March 26.  I plan to deliver a dose of 4256cGy in 16 fractions followed by a boost to the tumor bed for an additional 1000cgy in 4 fractions.     I personally spent greater than 35 minutes today assessing, managing, discussing and documenting my visit with the patient. That time includes review of records, imaging and pathology reports, obtaining my own history, performing a medically appropriate evaluation, counseling and educating the patient, discussing goals, logistics, alternatives and risks of my recommendations, surveillance options and potential outcomes. It also includes the time documenting the clinical information in the EMR and communicating my recommendations to the other involved physicians.            Thank you very much for allowing me to participate in the care of this very pleasant patient.    Sincerely,      Candida Aponte MD

## 2024-03-27 ENCOUNTER — HOSPITAL ENCOUNTER (OUTPATIENT)
Dept: RADIATION ONCOLOGY | Facility: HOSPITAL | Age: 74
Setting detail: RADIATION/ONCOLOGY SERIES
End: 2024-03-27
Payer: MEDICARE

## 2024-03-27 PROCEDURE — 77290 THER RAD SIMULAJ FIELD CPLX: CPT | Performed by: RADIOLOGY

## 2024-03-27 PROCEDURE — 77332 RADIATION TREATMENT AID(S): CPT | Performed by: RADIOLOGY

## 2024-03-28 PROCEDURE — 77334 RADIATION TREATMENT AID(S): CPT | Performed by: RADIOLOGY

## 2024-03-28 PROCEDURE — 77295 3-D RADIOTHERAPY PLAN: CPT | Performed by: RADIOLOGY

## 2024-03-28 PROCEDURE — 77300 RADIATION THERAPY DOSE PLAN: CPT | Performed by: RADIOLOGY

## 2024-04-01 ENCOUNTER — HOSPITAL ENCOUNTER (OUTPATIENT)
Dept: RADIATION ONCOLOGY | Facility: HOSPITAL | Age: 74
Setting detail: RADIATION/ONCOLOGY SERIES
End: 2024-04-01
Payer: MEDICARE

## 2024-04-02 ENCOUNTER — HOSPITAL ENCOUNTER (OUTPATIENT)
Dept: RADIATION ONCOLOGY | Facility: HOSPITAL | Age: 74
Discharge: HOME | End: 2024-04-02

## 2024-04-02 ENCOUNTER — RADIATION ONCOLOGY WEEKLY ASSESSMENT (OUTPATIENT)
Dept: RADIATION ONCOLOGY | Facility: HOSPITAL | Age: 74
End: 2024-04-02
Payer: MEDICARE

## 2024-04-02 DIAGNOSIS — C50.411 MALIGNANT NEOPLASM OF UPPER-OUTER QUADRANT OF RIGHT BREAST IN FEMALE, ESTROGEN RECEPTOR POSITIVE: Primary | ICD-10-CM

## 2024-04-02 DIAGNOSIS — Z17.0 MALIGNANT NEOPLASM OF UPPER-OUTER QUADRANT OF RIGHT BREAST IN FEMALE, ESTROGEN RECEPTOR POSITIVE: Primary | ICD-10-CM

## 2024-04-02 LAB
RAD ONC ARIA COURSE ID: NORMAL
RAD ONC ARIA COURSE INTENT: NORMAL
RAD ONC ARIA COURSE LAST TREATMENT DATE: NORMAL
RAD ONC ARIA COURSE START DATE: NORMAL
RAD ONC ARIA COURSE TREATMENT ELAPSED DAYS: 0
RAD ONC ARIA FIRST TREATMENT DATE: NORMAL
RAD ONC ARIA PLAN FRACTIONS TREATED TO DATE: 1
RAD ONC ARIA PLAN ID: NORMAL
RAD ONC ARIA PLAN PRESCRIBED DOSE PER FRACTION: 2.66 GY
RAD ONC ARIA PLAN PRIMARY REFERENCE POINT: NORMAL
RAD ONC ARIA PLAN TOTAL FRACTIONS PRESCRIBED: 16
RAD ONC ARIA PLAN TOTAL PRESCRIBED DOSE: 4256 CGY
RAD ONC ARIA REFERENCE POINT DOSAGE GIVEN TO DATE: 2.66 GY
RAD ONC ARIA REFERENCE POINT ID: NORMAL
RAD ONC ARIA REFERENCE POINT SESSION DOSAGE GIVEN: 2.66 GY

## 2024-04-02 PROCEDURE — 77427 RADIATION TX MANAGEMENT X5: CPT | Performed by: STUDENT IN AN ORGANIZED HEALTH CARE EDUCATION/TRAINING PROGRAM

## 2024-04-02 PROCEDURE — 77412 RADIATION TX DELIVERY LVL 3: CPT | Performed by: STUDENT IN AN ORGANIZED HEALTH CARE EDUCATION/TRAINING PROGRAM

## 2024-04-02 PROCEDURE — 77280 THER RAD SIMULAJ FIELD SMPL: CPT | Performed by: STUDENT IN AN ORGANIZED HEALTH CARE EDUCATION/TRAINING PROGRAM

## 2024-04-02 NOTE — PROGRESS NOTES
Radiation Oncology  On-Treatment Note      Patient: Jennifer Plascencia    MRN: [id number]    Attending Physician: Candida Aponte MD     Diagnosis:     ICD-10-CM ICD-9-CM   1. Malignant neoplasm of upper-outer quadrant of right breast in female, estrogen receptor positive  C50.411 174.4    Z17.0 V86.0       Radiation Therapy Visit:  Continue radiation therapy, Dosimetry plan remains acceptable, Films reviewed and remains acceptable, Pain assessed, Pain management planned, Radiation dose schedule reviewed and remains acceptable, Radiation technique remains acceptable, and Symptoms within expected range    Radiation Treatments       Active   Plans   R Breast-3D   Most recent treatment: Dose planned: 266 cGy (fraction 1 on 4/2/2024)   Total: Dose planned: 4,256 cGy (16 fractions)   Elapsed Days: 0      Reference Points   Rx: R Breast   Most recent treatment: Dose given: 266 cGy (on 4/2/2024)   Total: Dose given: 266 cGy   Elapsed Days: 0                      Physical Examination:  Vitals: There were no vitals taken for this visit.  There were no vitals filed for this visit.    Fully active, able to carry on all pre-disease performance without restriction = 0    We examined the relevant areas: yes  Findings are within the expected range for this stage of treatment: yes  -------------------------------------------------------------------------------------------------------------------    ACTION ITEMS:  Patient tolerating treatment well and as expected for this stage in their treatment and Continue radiation therapy as planned    Estimated Completion Date: 4/29/2024      Enoch Benjamin MD  Radiation Oncology

## 2024-04-03 ENCOUNTER — HOSPITAL ENCOUNTER (OUTPATIENT)
Dept: RADIATION ONCOLOGY | Facility: HOSPITAL | Age: 74
Discharge: HOME | End: 2024-04-03

## 2024-04-03 LAB
RAD ONC ARIA COURSE ID: NORMAL
RAD ONC ARIA COURSE INTENT: NORMAL
RAD ONC ARIA COURSE LAST TREATMENT DATE: NORMAL
RAD ONC ARIA COURSE START DATE: NORMAL
RAD ONC ARIA COURSE TREATMENT ELAPSED DAYS: 1
RAD ONC ARIA FIRST TREATMENT DATE: NORMAL
RAD ONC ARIA PLAN FRACTIONS TREATED TO DATE: 2
RAD ONC ARIA PLAN ID: NORMAL
RAD ONC ARIA PLAN PRESCRIBED DOSE PER FRACTION: 2.66 GY
RAD ONC ARIA PLAN PRIMARY REFERENCE POINT: NORMAL
RAD ONC ARIA PLAN TOTAL FRACTIONS PRESCRIBED: 16
RAD ONC ARIA PLAN TOTAL PRESCRIBED DOSE: 4256 CGY
RAD ONC ARIA REFERENCE POINT DOSAGE GIVEN TO DATE: 5.32 GY
RAD ONC ARIA REFERENCE POINT ID: NORMAL
RAD ONC ARIA REFERENCE POINT SESSION DOSAGE GIVEN: 2.66 GY

## 2024-04-03 PROCEDURE — 77417 THER RADIOLOGY PORT IMAGE(S): CPT | Performed by: RADIOLOGY

## 2024-04-03 PROCEDURE — 77412 RADIATION TX DELIVERY LVL 3: CPT | Performed by: STUDENT IN AN ORGANIZED HEALTH CARE EDUCATION/TRAINING PROGRAM

## 2024-04-04 ENCOUNTER — HOSPITAL ENCOUNTER (OUTPATIENT)
Dept: RADIATION ONCOLOGY | Facility: HOSPITAL | Age: 74
Discharge: HOME | End: 2024-04-04

## 2024-04-04 LAB
RAD ONC ARIA COURSE ID: NORMAL
RAD ONC ARIA COURSE INTENT: NORMAL
RAD ONC ARIA COURSE LAST TREATMENT DATE: NORMAL
RAD ONC ARIA COURSE START DATE: NORMAL
RAD ONC ARIA COURSE TREATMENT ELAPSED DAYS: 2
RAD ONC ARIA FIRST TREATMENT DATE: NORMAL
RAD ONC ARIA PLAN FRACTIONS TREATED TO DATE: 3
RAD ONC ARIA PLAN ID: NORMAL
RAD ONC ARIA PLAN PRESCRIBED DOSE PER FRACTION: 2.66 GY
RAD ONC ARIA PLAN PRIMARY REFERENCE POINT: NORMAL
RAD ONC ARIA PLAN TOTAL FRACTIONS PRESCRIBED: 16
RAD ONC ARIA PLAN TOTAL PRESCRIBED DOSE: 4256 CGY
RAD ONC ARIA REFERENCE POINT DOSAGE GIVEN TO DATE: 7.98 GY
RAD ONC ARIA REFERENCE POINT ID: NORMAL
RAD ONC ARIA REFERENCE POINT SESSION DOSAGE GIVEN: 2.66 GY

## 2024-04-04 PROCEDURE — 77336 RADIATION PHYSICS CONSULT: CPT | Performed by: RADIOLOGY

## 2024-04-04 PROCEDURE — 77412 RADIATION TX DELIVERY LVL 3: CPT | Performed by: STUDENT IN AN ORGANIZED HEALTH CARE EDUCATION/TRAINING PROGRAM

## 2024-04-05 ENCOUNTER — HOSPITAL ENCOUNTER (OUTPATIENT)
Dept: RADIATION ONCOLOGY | Facility: HOSPITAL | Age: 74
Discharge: HOME | End: 2024-04-05

## 2024-04-05 LAB
RAD ONC ARIA COURSE ID: NORMAL
RAD ONC ARIA COURSE INTENT: NORMAL
RAD ONC ARIA COURSE LAST TREATMENT DATE: NORMAL
RAD ONC ARIA COURSE START DATE: NORMAL
RAD ONC ARIA COURSE TREATMENT ELAPSED DAYS: 3
RAD ONC ARIA FIRST TREATMENT DATE: NORMAL
RAD ONC ARIA PLAN FRACTIONS TREATED TO DATE: 4
RAD ONC ARIA PLAN ID: NORMAL
RAD ONC ARIA PLAN PRESCRIBED DOSE PER FRACTION: 2.66 GY
RAD ONC ARIA PLAN PRIMARY REFERENCE POINT: NORMAL
RAD ONC ARIA PLAN TOTAL FRACTIONS PRESCRIBED: 16
RAD ONC ARIA PLAN TOTAL PRESCRIBED DOSE: 4256 CGY
RAD ONC ARIA REFERENCE POINT DOSAGE GIVEN TO DATE: 10.64 GY
RAD ONC ARIA REFERENCE POINT ID: NORMAL
RAD ONC ARIA REFERENCE POINT SESSION DOSAGE GIVEN: 2.66 GY

## 2024-04-05 PROCEDURE — 77412 RADIATION TX DELIVERY LVL 3: CPT | Performed by: STUDENT IN AN ORGANIZED HEALTH CARE EDUCATION/TRAINING PROGRAM

## 2024-04-08 ENCOUNTER — HOSPITAL ENCOUNTER (OUTPATIENT)
Dept: RADIATION ONCOLOGY | Facility: HOSPITAL | Age: 74
Discharge: HOME | End: 2024-04-08
Payer: MEDICARE

## 2024-04-08 LAB
RAD ONC ARIA COURSE ID: NORMAL
RAD ONC ARIA COURSE INTENT: NORMAL
RAD ONC ARIA COURSE LAST TREATMENT DATE: NORMAL
RAD ONC ARIA COURSE START DATE: NORMAL
RAD ONC ARIA COURSE TREATMENT ELAPSED DAYS: 6
RAD ONC ARIA FIRST TREATMENT DATE: NORMAL
RAD ONC ARIA PLAN FRACTIONS TREATED TO DATE: 5
RAD ONC ARIA PLAN ID: NORMAL
RAD ONC ARIA PLAN PRESCRIBED DOSE PER FRACTION: 2.66 GY
RAD ONC ARIA PLAN PRIMARY REFERENCE POINT: NORMAL
RAD ONC ARIA PLAN TOTAL FRACTIONS PRESCRIBED: 16
RAD ONC ARIA PLAN TOTAL PRESCRIBED DOSE: 4256 CGY
RAD ONC ARIA REFERENCE POINT DOSAGE GIVEN TO DATE: 13.3 GY
RAD ONC ARIA REFERENCE POINT ID: NORMAL
RAD ONC ARIA REFERENCE POINT SESSION DOSAGE GIVEN: 2.66 GY

## 2024-04-08 PROCEDURE — 77412 RADIATION TX DELIVERY LVL 3: CPT | Performed by: RADIOLOGY

## 2024-04-09 ENCOUNTER — HOSPITAL ENCOUNTER (OUTPATIENT)
Dept: RADIATION ONCOLOGY | Facility: HOSPITAL | Age: 74
Discharge: HOME | End: 2024-04-09

## 2024-04-09 ENCOUNTER — RADIATION ONCOLOGY WEEKLY ASSESSMENT (OUTPATIENT)
Dept: RADIATION ONCOLOGY | Facility: HOSPITAL | Age: 74
End: 2024-04-09
Payer: MEDICARE

## 2024-04-09 VITALS — HEART RATE: 73 BPM | SYSTOLIC BLOOD PRESSURE: 120 MMHG | DIASTOLIC BLOOD PRESSURE: 69 MMHG | OXYGEN SATURATION: 96 %

## 2024-04-09 DIAGNOSIS — C50.411 MALIGNANT NEOPLASM OF UPPER-OUTER QUADRANT OF RIGHT BREAST IN FEMALE, ESTROGEN RECEPTOR POSITIVE: Primary | ICD-10-CM

## 2024-04-09 DIAGNOSIS — Z17.0 MALIGNANT NEOPLASM OF UPPER-OUTER QUADRANT OF RIGHT BREAST IN FEMALE, ESTROGEN RECEPTOR POSITIVE: Primary | ICD-10-CM

## 2024-04-09 LAB
RAD ONC ARIA COURSE ID: NORMAL
RAD ONC ARIA COURSE INTENT: NORMAL
RAD ONC ARIA COURSE LAST TREATMENT DATE: NORMAL
RAD ONC ARIA COURSE START DATE: NORMAL
RAD ONC ARIA COURSE TREATMENT ELAPSED DAYS: 7
RAD ONC ARIA FIRST TREATMENT DATE: NORMAL
RAD ONC ARIA PLAN FRACTIONS TREATED TO DATE: 6
RAD ONC ARIA PLAN ID: NORMAL
RAD ONC ARIA PLAN PRESCRIBED DOSE PER FRACTION: 2.66 GY
RAD ONC ARIA PLAN PRIMARY REFERENCE POINT: NORMAL
RAD ONC ARIA PLAN TOTAL FRACTIONS PRESCRIBED: 16
RAD ONC ARIA PLAN TOTAL PRESCRIBED DOSE: 4256 CGY
RAD ONC ARIA REFERENCE POINT DOSAGE GIVEN TO DATE: 15.96 GY
RAD ONC ARIA REFERENCE POINT ID: NORMAL
RAD ONC ARIA REFERENCE POINT SESSION DOSAGE GIVEN: 2.66 GY

## 2024-04-09 PROCEDURE — 77417 THER RADIOLOGY PORT IMAGE(S): CPT | Performed by: RADIOLOGY

## 2024-04-09 PROCEDURE — 77427 RADIATION TX MANAGEMENT X5: CPT | Performed by: RADIOLOGY

## 2024-04-09 PROCEDURE — 77412 RADIATION TX DELIVERY LVL 3: CPT | Performed by: RADIOLOGY

## 2024-04-09 NOTE — PROGRESS NOTES
Physician Weekly Management Note    Diagnosis:     Diagnosis Plan   1. Malignant neoplasm of upper-outer quadrant of right breast in female, estrogen receptor positive            RT Details:  fx 6/20 right breast     Notes on Treatment course, Films, Medical progress:  Kps 90% doing well mild pain right axilla, 2/10,  no erythema cont on     Weekly Management:  Medication reviewed?   Yes  New medications given?   No  Problemlist reviewed?   Yes  Problem added?   No  Issues raised requiring referral to support services - task assigned to:  na    Technical aspects reviewed:  Weekly OBI approved?   Yes  Weekly port films approved?   Yes  Change requests noted on port film?   No  Patient setup and plan reviewed?   Yes    Chart Reviewed:  Continue current treatment plan?   Yes  Treatment plan change requested?   No  CBC reviewed?   No  Concurrent Chemo?   No    Objective     Toxicities:   none      Review of Systems   Constitutional: Negative.    Skin: Negative.         There were no vitals filed for this visit.        3/22/2024    10:21 AM   Current Status   ECOG score 0       Physical Exam  Constitutional:       Appearance: Normal appearance.   Chest:          Comments: No erythema  Neurological:      Mental Status: She is alert.           Problem Summary List    Diagnosis:     Diagnosis Plan   1. Malignant neoplasm of upper-outer quadrant of right breast in female, estrogen receptor positive          Pathology:   breast     Past Medical History:   Diagnosis Date   • Anxiety    • Arthritis    • Breast cancer 01/11/2024    Inv. Ductal with DCIS (right breast)   ER+/[place]+/Her2-   • Depression    • Disease of thyroid gland    • Hashimoto's disease    • History of vitamin D deficiency    • Hypothyroidism    • IBS (irritable bowel syndrome)    • Multiple myeloma 2015    IgA kappa.  Stem cell transplant at [facility] 3/20/2016   • Myeloma    • Neck pain    • Neuropathy    • KWAME (obstructive sleep apnea) 07/08/2021     Overnight polysomnogram.  Weight 173 pounds.  Mild KWAME with AHI 5.6 events per hour.  When supine, 9.4 events per hour.  During REM, moderate severity at 26 events per hour.  No sleep-related hypoxia.  The patient snored 20.5% of total sleep time.   • Osteopenia    • Overweight (BMI 25.0-29.9) 02/05/2018         Past Surgical History:   Procedure Laterality Date   • ANTERIOR CERVICAL FUSION     • BREAST BIOPSY Right 01/11/2024    10:00 @ 10cmFN   (Inv. Ductal Carcinoma  ER+/[place]+/Her2-)  [facility]   • BREAST LUMPECTOMY WITH SENTINEL NODE BIOPSY Right 3/6/2024    Procedure: RIGHT breast DOUG guided lumpectomy and RIGHT axilla sentinel node biopsy;  Surgeon: Angelina Rosales MD;  Location: [facility];  Service: General;  Laterality: Right;   • CATARACT EXTRACTION     • COLONOSCOPY     • TONSILLECTOMY  1956   • VEIN SURGERY  1991         Current Outpatient Medications on File Prior to Visit   Medication Sig Dispense Refill   • acetaminophen (TYLENOL) 325 MG tablet Take 1 tablet by mouth Every 6 (Six) Hours As Needed for Mild Pain.     • Acetylcarn-Alpha Lipoic Acid 400-200 MG capsule Take  by mouth. HOLD PRIOR TO SURG     • ALPRAZolam (XANAX) 0.25 MG tablet Take 1 tablet by mouth Daily As Needed for Anxiety. 30 tablet 2   • anastrozole (ARIMIDEX) 1 MG tablet Take 1 tablet by mouth Daily for 30 days. 30 tablet 5   • Azelastine HCl 137 MCG/SPRAY solution      • B Complex Vitamins (VITAMIN B COMPLEX) capsule capsule Take 2 tablets by mouth Daily. HOLD PRIOR TO SURG     • baclofen (LIORESAL) 10 MG tablet Take 1 tablet by mouth 3 (Three) Times a Day As Needed.  5   • calcium carbonate-vitamin d 600-400 MG-UNIT per tablet Take 1 tablet by mouth Daily. HOLD PRIOR TO SURG     • colesevelam (WELCHOL) 625 MG tablet Take 2 tablets by mouth 2 (Two) Times a Day With Meals. 90 tablet 2   • cycloSPORINE (RESTASIS) 0.05 % ophthalmic emulsion 1 drop 2 (Two) Times a Day.     • diphenoxylate-atropine (LOMOTIL)  2.5-0.025 MG per tablet Take 1 tablet by mouth 3 (Three) Times a Day As Needed for Diarrhea. 90 tablet 3   • DULoxetine (CYMBALTA) 30 MG capsule Take 1 capsule by mouth Every Night.     • DULoxetine (CYMBALTA) 60 MG capsule Take 1 capsule by mouth Daily. (Patient taking differently: Take 1 capsule by mouth Every Night.) 90 capsule 3   • fexofenadine (ALLEGRA) 180 MG tablet Take 1 tablet by mouth As Needed.     • fluticasone (FLONASE) 50 MCG/ACT nasal spray 2 sprays into the nostril(s) as directed by provider Daily.     • folic acid (FOLVITE) 1 MG tablet Take 1 tablet by mouth Daily.  2   • HYDROcodone-acetaminophen (NORCO) 5-325 MG per tablet 1-2 tabs po q 4-6hr prn pain, wean to tylenol 15 tablet 0   • lidocaine-prilocaine (EMLA) 2.5-2.5 % cream Apply 1 Application topically to the appropriate area as directed 1 (One) Time.     • Multiple Vitamins-Iron (DAILY-JONI/IRON/BETA-CAROTENE) tablet Take 1 tablet by mouth Daily. HOLD PRIOR TO SURG  2   • polyethylene glycol (MiraLax) 17 GM/SCOOP powder Take 17 g by mouth Daily. Take cap of powder with 8oz water daily surrounding surgery and while on narcotic 119 g 0   • promethazine-dextromethorphan (PROMETHAZINE-DM) 6.25-15 MG/5ML syrup TAKE 10 ML BY MOUTH TWICE A DAY AS NEEDED FOR COUGH     • Synthroid 75 MCG tablet Take 1 tablet by mouth Daily.     • trimethoprim-polymyxin b (POLYTRIM) 40269-6.1 UNIT/ML-% ophthalmic solution As Needed.     • vitamin D (ERGOCALCIFEROL) 17325 units capsule capsule Take 1 capsule by mouth 2 (Two) Times a Week. Twice a week     • zoledronic acid (ZOMETA) 4 MG/5ML injection Infuse 5 mL into a venous catheter Every 3 (Three) Months.       No current facility-administered medications on file prior to visit.       No Known Allergies      Referring Provider:    No referring provider defined for this encounter.    Oncologist:  No primary care provider on file.      Seen and approved by:  Candida Aponte MD  04/09/2024

## 2024-04-10 ENCOUNTER — HOSPITAL ENCOUNTER (OUTPATIENT)
Dept: RADIATION ONCOLOGY | Facility: HOSPITAL | Age: 74
Discharge: HOME | End: 2024-04-10

## 2024-04-10 LAB
RAD ONC ARIA COURSE ID: NORMAL
RAD ONC ARIA COURSE INTENT: NORMAL
RAD ONC ARIA COURSE LAST TREATMENT DATE: NORMAL
RAD ONC ARIA COURSE START DATE: NORMAL
RAD ONC ARIA COURSE TREATMENT ELAPSED DAYS: 8
RAD ONC ARIA FIRST TREATMENT DATE: NORMAL
RAD ONC ARIA PLAN FRACTIONS TREATED TO DATE: 7
RAD ONC ARIA PLAN ID: NORMAL
RAD ONC ARIA PLAN PRESCRIBED DOSE PER FRACTION: 2.66 GY
RAD ONC ARIA PLAN PRIMARY REFERENCE POINT: NORMAL
RAD ONC ARIA PLAN TOTAL FRACTIONS PRESCRIBED: 16
RAD ONC ARIA PLAN TOTAL PRESCRIBED DOSE: 4256 CGY
RAD ONC ARIA REFERENCE POINT DOSAGE GIVEN TO DATE: 18.62 GY
RAD ONC ARIA REFERENCE POINT ID: NORMAL
RAD ONC ARIA REFERENCE POINT SESSION DOSAGE GIVEN: 2.66 GY

## 2024-04-10 PROCEDURE — 77412 RADIATION TX DELIVERY LVL 3: CPT | Performed by: RADIOLOGY

## 2024-04-11 ENCOUNTER — HOSPITAL ENCOUNTER (OUTPATIENT)
Dept: RADIATION ONCOLOGY | Facility: HOSPITAL | Age: 74
Discharge: HOME | End: 2024-04-11

## 2024-04-11 LAB
RAD ONC ARIA COURSE ID: NORMAL
RAD ONC ARIA COURSE INTENT: NORMAL
RAD ONC ARIA COURSE LAST TREATMENT DATE: NORMAL
RAD ONC ARIA COURSE START DATE: NORMAL
RAD ONC ARIA COURSE TREATMENT ELAPSED DAYS: 9
RAD ONC ARIA FIRST TREATMENT DATE: NORMAL
RAD ONC ARIA PLAN FRACTIONS TREATED TO DATE: 8
RAD ONC ARIA PLAN ID: NORMAL
RAD ONC ARIA PLAN PRESCRIBED DOSE PER FRACTION: 2.66 GY
RAD ONC ARIA PLAN PRIMARY REFERENCE POINT: NORMAL
RAD ONC ARIA PLAN TOTAL FRACTIONS PRESCRIBED: 16
RAD ONC ARIA PLAN TOTAL PRESCRIBED DOSE: 4256 CGY
RAD ONC ARIA REFERENCE POINT DOSAGE GIVEN TO DATE: 21.28 GY
RAD ONC ARIA REFERENCE POINT ID: NORMAL
RAD ONC ARIA REFERENCE POINT SESSION DOSAGE GIVEN: 2.66 GY

## 2024-04-11 PROCEDURE — 77412 RADIATION TX DELIVERY LVL 3: CPT | Performed by: RADIOLOGY

## 2024-04-12 ENCOUNTER — HOSPITAL ENCOUNTER (OUTPATIENT)
Dept: RADIATION ONCOLOGY | Facility: HOSPITAL | Age: 74
Discharge: HOME | End: 2024-04-12

## 2024-04-12 LAB
RAD ONC ARIA COURSE ID: NORMAL
RAD ONC ARIA COURSE INTENT: NORMAL
RAD ONC ARIA COURSE LAST TREATMENT DATE: NORMAL
RAD ONC ARIA COURSE START DATE: NORMAL
RAD ONC ARIA COURSE TREATMENT ELAPSED DAYS: 10
RAD ONC ARIA FIRST TREATMENT DATE: NORMAL
RAD ONC ARIA PLAN FRACTIONS TREATED TO DATE: 9
RAD ONC ARIA PLAN ID: NORMAL
RAD ONC ARIA PLAN PRESCRIBED DOSE PER FRACTION: 2.66 GY
RAD ONC ARIA PLAN PRIMARY REFERENCE POINT: NORMAL
RAD ONC ARIA PLAN TOTAL FRACTIONS PRESCRIBED: 16
RAD ONC ARIA PLAN TOTAL PRESCRIBED DOSE: 4256 CGY
RAD ONC ARIA REFERENCE POINT DOSAGE GIVEN TO DATE: 23.94 GY
RAD ONC ARIA REFERENCE POINT ID: NORMAL
RAD ONC ARIA REFERENCE POINT SESSION DOSAGE GIVEN: 2.66 GY

## 2024-04-12 PROCEDURE — 77412 RADIATION TX DELIVERY LVL 3: CPT | Performed by: RADIOLOGY

## 2024-04-12 PROCEDURE — 77336 RADIATION PHYSICS CONSULT: CPT | Performed by: RADIOLOGY

## 2024-04-15 ENCOUNTER — HOSPITAL ENCOUNTER (OUTPATIENT)
Dept: RADIATION ONCOLOGY | Facility: HOSPITAL | Age: 74
Discharge: HOME | End: 2024-04-15
Payer: MEDICARE

## 2024-04-15 LAB
RAD ONC ARIA COURSE ID: NORMAL
RAD ONC ARIA COURSE INTENT: NORMAL
RAD ONC ARIA COURSE LAST TREATMENT DATE: NORMAL
RAD ONC ARIA COURSE START DATE: NORMAL
RAD ONC ARIA COURSE TREATMENT ELAPSED DAYS: 13
RAD ONC ARIA FIRST TREATMENT DATE: NORMAL
RAD ONC ARIA PLAN FRACTIONS TREATED TO DATE: 10
RAD ONC ARIA PLAN ID: NORMAL
RAD ONC ARIA PLAN PRESCRIBED DOSE PER FRACTION: 2.66 GY
RAD ONC ARIA PLAN PRIMARY REFERENCE POINT: NORMAL
RAD ONC ARIA PLAN TOTAL FRACTIONS PRESCRIBED: 16
RAD ONC ARIA PLAN TOTAL PRESCRIBED DOSE: 4256 CGY
RAD ONC ARIA REFERENCE POINT DOSAGE GIVEN TO DATE: 26.6 GY
RAD ONC ARIA REFERENCE POINT ID: NORMAL
RAD ONC ARIA REFERENCE POINT SESSION DOSAGE GIVEN: 2.66 GY

## 2024-04-15 PROCEDURE — 77412 RADIATION TX DELIVERY LVL 3: CPT | Performed by: RADIOLOGY

## 2024-04-16 ENCOUNTER — HOSPITAL ENCOUNTER (OUTPATIENT)
Dept: RADIATION ONCOLOGY | Facility: HOSPITAL | Age: 74
Discharge: HOME | End: 2024-04-16

## 2024-04-16 ENCOUNTER — RADIATION ONCOLOGY WEEKLY ASSESSMENT (OUTPATIENT)
Dept: RADIATION ONCOLOGY | Facility: HOSPITAL | Age: 74
End: 2024-04-16
Payer: MEDICARE

## 2024-04-16 VITALS — OXYGEN SATURATION: 97 % | HEART RATE: 92 BPM | SYSTOLIC BLOOD PRESSURE: 154 MMHG | DIASTOLIC BLOOD PRESSURE: 77 MMHG

## 2024-04-16 DIAGNOSIS — Z17.0 MALIGNANT NEOPLASM OF UPPER-OUTER QUADRANT OF RIGHT BREAST IN FEMALE, ESTROGEN RECEPTOR POSITIVE: Primary | ICD-10-CM

## 2024-04-16 DIAGNOSIS — C50.411 MALIGNANT NEOPLASM OF UPPER-OUTER QUADRANT OF RIGHT BREAST IN FEMALE, ESTROGEN RECEPTOR POSITIVE: Primary | ICD-10-CM

## 2024-04-16 LAB
RAD ONC ARIA COURSE ID: NORMAL
RAD ONC ARIA COURSE INTENT: NORMAL
RAD ONC ARIA COURSE LAST TREATMENT DATE: NORMAL
RAD ONC ARIA COURSE START DATE: NORMAL
RAD ONC ARIA COURSE TREATMENT ELAPSED DAYS: 14
RAD ONC ARIA FIRST TREATMENT DATE: NORMAL
RAD ONC ARIA PLAN FRACTIONS TREATED TO DATE: 11
RAD ONC ARIA PLAN ID: NORMAL
RAD ONC ARIA PLAN PRESCRIBED DOSE PER FRACTION: 2.66 GY
RAD ONC ARIA PLAN PRIMARY REFERENCE POINT: NORMAL
RAD ONC ARIA PLAN TOTAL FRACTIONS PRESCRIBED: 16
RAD ONC ARIA PLAN TOTAL PRESCRIBED DOSE: 4256 CGY
RAD ONC ARIA REFERENCE POINT DOSAGE GIVEN TO DATE: 29.26 GY
RAD ONC ARIA REFERENCE POINT ID: NORMAL
RAD ONC ARIA REFERENCE POINT SESSION DOSAGE GIVEN: 2.66 GY

## 2024-04-16 PROCEDURE — 77412 RADIATION TX DELIVERY LVL 3: CPT | Performed by: RADIOLOGY

## 2024-04-16 PROCEDURE — 77417 THER RADIOLOGY PORT IMAGE(S): CPT | Performed by: RADIOLOGY

## 2024-04-16 PROCEDURE — 77427 RADIATION TX MANAGEMENT X5: CPT | Performed by: RADIOLOGY

## 2024-04-16 NOTE — PROGRESS NOTES
Physician Weekly Management Note    Diagnosis:     Diagnosis Plan   1. Malignant neoplasm of upper-outer quadrant of right breast in female, estrogen receptor positive            RT Details:  fx 11/20 right breast 2926cGy    Notes on Treatment course, Films, Medical progress:  Kp s90% doing well, no pain, no erythema, cont on     Weekly Management:  Medication reviewed?   Yes  New medications given?   No  Problemlist reviewed?   Yes  Problem added?   No  Issues raised requiring referral to support services - task assigned to:  na    Technical aspects reviewed:  Weekly OBI approved?   Yes  Weekly port films approved?   Yes  Change requests noted on port film?   No  Patient setup and plan reviewed?   Yes    Chart Reviewed:  Continue current treatment plan?   Yes  Treatment plan change requested?   No  CBC reviewed?   No  Concurrent Chemo?   No    Objective     Toxicities:   none      Review of Systems   Constitutional:  Positive for fatigue.   Skin:  Negative for color change.        There were no vitals filed for this visit.        3/22/2024    10:21 AM   Current Status   ECOG score 0       Physical Exam  Constitutional:       Appearance: Normal appearance.   Chest:      Comments: No erythema  Neurological:      Mental Status: She is alert.           Problem Summary List    Diagnosis:     Diagnosis Plan   1. Malignant neoplasm of upper-outer quadrant of right breast in female, estrogen receptor positive          Pathology:   breast    Past Medical History:   Diagnosis Date   • Anxiety    • Arthritis    • Breast cancer 01/11/2024    Inv. Ductal with DCIS (right breast)   ER+/LA+/Her2-   • Depression    • Disease of thyroid gland    • Hashimoto's disease    • History of vitamin D deficiency    • Hypothyroidism    • IBS (irritable bowel syndrome)    • Multiple myeloma 2015    IgA kappa.  Stem cell transplant at [facility] 3/20/2016   • Myeloma    • Neck pain    • Neuropathy    • KWAME (obstructive sleep apnea) 07/08/2021     Overnight polysomnogram.  Weight 173 pounds.  Mild KWAME with AHI 5.6 events per hour.  When supine, 9.4 events per hour.  During REM, moderate severity at 26 events per hour.  No sleep-related hypoxia.  The patient snored 20.5% of total sleep time.   • Osteopenia    • Overweight (BMI 25.0-29.9) 02/05/2018         Past Surgical History:   Procedure Laterality Date   • ANTERIOR CERVICAL FUSION     • BREAST BIOPSY Right 01/11/2024    10:00 @ 10cmFN   (Inv. Ductal Carcinoma  ER+/[place]+/Her2-)  [facility]   • BREAST LUMPECTOMY WITH SENTINEL NODE BIOPSY Right 3/6/2024    Procedure: RIGHT breast DOUG guided lumpectomy and RIGHT axilla sentinel node biopsy;  Surgeon: Angelina Rosales MD;  [facility];  Service: General;  Laterality: Right;   • CATARACT EXTRACTION     • COLONOSCOPY     • TONSILLECTOMY  1956   • VEIN SURGERY  1991         Current Outpatient Medications on File Prior to Visit   Medication Sig Dispense Refill   • acetaminophen (TYLENOL) 325 MG tablet Take 1 tablet by mouth Every 6 (Six) Hours As Needed for Mild Pain.     • Acetylcarn-Alpha Lipoic Acid 400-200 MG capsule Take  by mouth. HOLD PRIOR TO SURG     • ALPRAZolam (XANAX) 0.25 MG tablet Take 1 tablet by mouth Daily As Needed for Anxiety. 30 tablet 2   • anastrozole (ARIMIDEX) 1 MG tablet Take 1 tablet by mouth Daily for 30 days. 30 tablet 5   • Azelastine HCl 137 MCG/SPRAY solution      • B Complex Vitamins (VITAMIN B COMPLEX) capsule capsule Take 2 tablets by mouth Daily. HOLD PRIOR TO SURG     • baclofen (LIORESAL) 10 MG tablet Take 1 tablet by mouth 3 (Three) Times a Day As Needed.  5   • calcium carbonate-vitamin d 600-400 MG-UNIT per tablet Take 1 tablet by mouth Daily. HOLD PRIOR TO SURG     • colesevelam (WELCHOL) 625 MG tablet Take 2 tablets by mouth 2 (Two) Times a Day With Meals. 90 tablet 2   • cycloSPORINE (RESTASIS) 0.05 % ophthalmic emulsion 1 drop 2 (Two) Times a Day.     • diphenoxylate-atropine (LOMOTIL)  2.5-0.025 MG per tablet Take 1 tablet by mouth 3 (Three) Times a Day As Needed for Diarrhea. 90 tablet 3   • DULoxetine (CYMBALTA) 30 MG capsule Take 1 capsule by mouth Every Night.     • DULoxetine (CYMBALTA) 60 MG capsule Take 1 capsule by mouth Daily. (Patient taking differently: Take 1 capsule by mouth Every Night.) 90 capsule 3   • fexofenadine (ALLEGRA) 180 MG tablet Take 1 tablet by mouth As Needed.     • fluticasone (FLONASE) 50 MCG/ACT nasal spray 2 sprays into the nostril(s) as directed by provider Daily.     • folic acid (FOLVITE) 1 MG tablet Take 1 tablet by mouth Daily.  2   • HYDROcodone-acetaminophen (NORCO) 5-325 MG per tablet 1-2 tabs po q 4-6hr prn pain, wean to tylenol 15 tablet 0   • lidocaine-prilocaine (EMLA) 2.5-2.5 % cream Apply 1 Application topically to the appropriate area as directed 1 (One) Time.     • Multiple Vitamins-Iron (DAILY-JONI/IRON/BETA-CAROTENE) tablet Take 1 tablet by mouth Daily. HOLD PRIOR TO SURG  2   • polyethylene glycol (MiraLax) 17 GM/SCOOP powder Take 17 g by mouth Daily. Take cap of powder with 8oz water daily surrounding surgery and while on narcotic 119 g 0   • promethazine-dextromethorphan (PROMETHAZINE-DM) 6.25-15 MG/5ML syrup TAKE 10 ML BY MOUTH TWICE A DAY AS NEEDED FOR COUGH     • Synthroid 75 MCG tablet Take 1 tablet by mouth Daily.     • trimethoprim-polymyxin b (POLYTRIM) 28446-6.1 UNIT/ML-% ophthalmic solution As Needed.     • vitamin D (ERGOCALCIFEROL) 97671 units capsule capsule Take 1 capsule by mouth 2 (Two) Times a Week. Twice a week     • zoledronic acid (ZOMETA) 4 MG/5ML injection Infuse 5 mL into a venous catheter Every 3 (Three) Months.       No current facility-administered medications on file prior to visit.       No Known Allergies      Referring Provider:    No referring provider defined for this encounter.    Oncologist:  No primary care provider on file.      Seen and approved by:  Candida Aponte MD  04/16/2024

## 2024-04-17 ENCOUNTER — HOSPITAL ENCOUNTER (OUTPATIENT)
Dept: RADIATION ONCOLOGY | Facility: HOSPITAL | Age: 74
Discharge: HOME | End: 2024-04-17

## 2024-04-17 LAB
RAD ONC ARIA COURSE ID: NORMAL
RAD ONC ARIA COURSE INTENT: NORMAL
RAD ONC ARIA COURSE LAST TREATMENT DATE: NORMAL
RAD ONC ARIA COURSE START DATE: NORMAL
RAD ONC ARIA COURSE TREATMENT ELAPSED DAYS: 15
RAD ONC ARIA FIRST TREATMENT DATE: NORMAL
RAD ONC ARIA PLAN FRACTIONS TREATED TO DATE: 12
RAD ONC ARIA PLAN ID: NORMAL
RAD ONC ARIA PLAN PRESCRIBED DOSE PER FRACTION: 2.66 GY
RAD ONC ARIA PLAN PRIMARY REFERENCE POINT: NORMAL
RAD ONC ARIA PLAN TOTAL FRACTIONS PRESCRIBED: 16
RAD ONC ARIA PLAN TOTAL PRESCRIBED DOSE: 4256 CGY
RAD ONC ARIA REFERENCE POINT DOSAGE GIVEN TO DATE: 31.92 GY
RAD ONC ARIA REFERENCE POINT ID: NORMAL
RAD ONC ARIA REFERENCE POINT SESSION DOSAGE GIVEN: 2.66 GY

## 2024-04-17 PROCEDURE — 77412 RADIATION TX DELIVERY LVL 3: CPT | Performed by: RADIOLOGY

## 2024-04-19 ENCOUNTER — HOSPITAL ENCOUNTER (OUTPATIENT)
Dept: RADIATION ONCOLOGY | Facility: HOSPITAL | Age: 74
Discharge: HOME | End: 2024-04-19

## 2024-04-19 LAB
RAD ONC ARIA COURSE ID: NORMAL
RAD ONC ARIA COURSE INTENT: NORMAL
RAD ONC ARIA COURSE LAST TREATMENT DATE: NORMAL
RAD ONC ARIA COURSE START DATE: NORMAL
RAD ONC ARIA COURSE TREATMENT ELAPSED DAYS: 17
RAD ONC ARIA FIRST TREATMENT DATE: NORMAL
RAD ONC ARIA PLAN FRACTIONS TREATED TO DATE: 13
RAD ONC ARIA PLAN ID: NORMAL
RAD ONC ARIA PLAN PRESCRIBED DOSE PER FRACTION: 2.66 GY
RAD ONC ARIA PLAN PRIMARY REFERENCE POINT: NORMAL
RAD ONC ARIA PLAN TOTAL FRACTIONS PRESCRIBED: 16
RAD ONC ARIA PLAN TOTAL PRESCRIBED DOSE: 4256 CGY
RAD ONC ARIA REFERENCE POINT DOSAGE GIVEN TO DATE: 34.58 GY
RAD ONC ARIA REFERENCE POINT ID: NORMAL
RAD ONC ARIA REFERENCE POINT SESSION DOSAGE GIVEN: 2.66 GY

## 2024-04-19 PROCEDURE — 77412 RADIATION TX DELIVERY LVL 3: CPT | Performed by: RADIOLOGY

## 2024-04-19 PROCEDURE — 77336 RADIATION PHYSICS CONSULT: CPT | Performed by: RADIOLOGY

## 2024-04-25 ENCOUNTER — PATIENT OUTREACH (OUTPATIENT)
Dept: OTHER | Facility: HOSPITAL | Age: 74
End: 2024-04-25
Payer: MEDICARE

## 2024-04-25 NOTE — PROGRESS NOTES
Called Ms. Plascencia to see how she was doing. She stated she is in [place] seeing her doctor for MM. She has to post pone her radiation this week and will start back Monday. She has no resource or support needs at this time. She was thankful for the call and will reach out if any questions or needs arise.

## 2024-04-29 ENCOUNTER — APPOINTMENT (OUTPATIENT)
Dept: RADIATION ONCOLOGY | Facility: HOSPITAL | Age: 74
End: 2024-04-29
Payer: MEDICARE

## 2024-04-29 ENCOUNTER — HOSPITAL ENCOUNTER (OUTPATIENT)
Dept: RADIATION ONCOLOGY | Facility: HOSPITAL | Age: 74
Setting detail: RADIATION/ONCOLOGY SERIES
Discharge: HOME | End: 2024-04-29
Payer: MEDICARE

## 2024-04-29 LAB
RAD ONC ARIA COURSE ID: NORMAL
RAD ONC ARIA COURSE INTENT: NORMAL
RAD ONC ARIA COURSE LAST TREATMENT DATE: NORMAL
RAD ONC ARIA COURSE START DATE: NORMAL
RAD ONC ARIA COURSE TREATMENT ELAPSED DAYS: 27
RAD ONC ARIA FIRST TREATMENT DATE: NORMAL
RAD ONC ARIA PLAN FRACTIONS TREATED TO DATE: 14
RAD ONC ARIA PLAN ID: NORMAL
RAD ONC ARIA PLAN PRESCRIBED DOSE PER FRACTION: 2.66 GY
RAD ONC ARIA PLAN PRIMARY REFERENCE POINT: NORMAL
RAD ONC ARIA PLAN TOTAL FRACTIONS PRESCRIBED: 16
RAD ONC ARIA PLAN TOTAL PRESCRIBED DOSE: 4256 CGY
RAD ONC ARIA REFERENCE POINT DOSAGE GIVEN TO DATE: 37.24 GY
RAD ONC ARIA REFERENCE POINT ID: NORMAL
RAD ONC ARIA REFERENCE POINT SESSION DOSAGE GIVEN: 2.66 GY

## 2024-04-29 PROCEDURE — 77412 RADIATION TX DELIVERY LVL 3: CPT | Performed by: RADIOLOGY

## 2024-04-30 ENCOUNTER — RADIATION ONCOLOGY WEEKLY ASSESSMENT (OUTPATIENT)
Dept: RADIATION ONCOLOGY | Facility: HOSPITAL | Age: 74
End: 2024-04-30
Payer: MEDICARE

## 2024-04-30 ENCOUNTER — HOSPITAL ENCOUNTER (OUTPATIENT)
Dept: RADIATION ONCOLOGY | Facility: HOSPITAL | Age: 74
Setting detail: RADIATION/ONCOLOGY SERIES
Discharge: HOME | End: 2024-04-30
Payer: MEDICARE

## 2024-04-30 ENCOUNTER — TELEPHONE (OUTPATIENT)
Dept: ONCOLOGY | Facility: CLINIC | Age: 74
End: 2024-04-30
Payer: MEDICARE

## 2024-04-30 ENCOUNTER — APPOINTMENT (OUTPATIENT)
Dept: RADIATION ONCOLOGY | Facility: HOSPITAL | Age: 74
End: 2024-04-30
Payer: MEDICARE

## 2024-04-30 DIAGNOSIS — C50.411 MALIGNANT NEOPLASM OF UPPER-OUTER QUADRANT OF RIGHT BREAST IN FEMALE, ESTROGEN RECEPTOR POSITIVE: Primary | ICD-10-CM

## 2024-04-30 DIAGNOSIS — Z17.0 MALIGNANT NEOPLASM OF UPPER-OUTER QUADRANT OF RIGHT BREAST IN FEMALE, ESTROGEN RECEPTOR POSITIVE: Primary | ICD-10-CM

## 2024-04-30 LAB
RAD ONC ARIA COURSE ID: NORMAL
RAD ONC ARIA COURSE INTENT: NORMAL
RAD ONC ARIA COURSE LAST TREATMENT DATE: NORMAL
RAD ONC ARIA COURSE START DATE: NORMAL
RAD ONC ARIA COURSE TREATMENT ELAPSED DAYS: 28
RAD ONC ARIA FIRST TREATMENT DATE: NORMAL
RAD ONC ARIA PLAN FRACTIONS TREATED TO DATE: 15
RAD ONC ARIA PLAN ID: NORMAL
RAD ONC ARIA PLAN PRESCRIBED DOSE PER FRACTION: 2.66 GY
RAD ONC ARIA PLAN PRIMARY REFERENCE POINT: NORMAL
RAD ONC ARIA PLAN TOTAL FRACTIONS PRESCRIBED: 16
RAD ONC ARIA PLAN TOTAL PRESCRIBED DOSE: 4256 CGY
RAD ONC ARIA REFERENCE POINT DOSAGE GIVEN TO DATE: 39.9 GY
RAD ONC ARIA REFERENCE POINT ID: NORMAL
RAD ONC ARIA REFERENCE POINT SESSION DOSAGE GIVEN: 2.66 GY

## 2024-04-30 PROCEDURE — 77417 THER RADIOLOGY PORT IMAGE(S): CPT | Performed by: RADIOLOGY

## 2024-04-30 PROCEDURE — 77412 RADIATION TX DELIVERY LVL 3: CPT | Performed by: RADIOLOGY

## 2024-04-30 NOTE — PROGRESS NOTES
"Physician Weekly Management Note    Diagnosis:     Diagnosis Plan   1. Malignant neoplasm of upper-outer quadrant of right breast in female, estrogen receptor positive            RT Details:  fx 15 right breast/16 plus boost x 4, 3990/4256cgy plus 0/1000cgy boost     Notes on Treatment course, Films, Medical progress:  Kps 80% doing fine, no erythema, saw a Dr. Jennifer Ambrosio at [facility] last week and had concerns we are treating her \"axilla\" I explained to her that we are not treating her axilla, only her right breast but a few low axillary nodes could be in the axillary tail of the breast but again, we are NOT treating her axilla. I explained that if she did not feel confident with her care her I would be happy to make a referral tomorrow to another radiation center.  She declined that option and wishes to continue treatment here and states she understands we are not treating her axilla.     Weekly Management:  Medication reviewed?   Yes  New medications given?   No  Problemlist reviewed?   Yes  Problem added?   No  Issues raised requiring referral to support services - task assigned to:  na    Technical aspects reviewed:  Weekly OBI approved?   Yes  Weekly port films approved?   Yes  Change requests noted on port film?   No  Patient setup and plan reviewed?   Yes    Chart Reviewed:  Continue current treatment plan?   Yes  Treatment plan change requested?   No  CBC reviewed?   No  Concurrent Chemo?   No    Objective     Toxicities:   none      Review of Systems   Constitutional: Negative.    Skin: Negative.    Psychiatric/Behavioral:  The patient is nervous/anxious.         There were no vitals filed for this visit.        3/22/2024    10:21 AM   Current Status   ECOG score 0       Physical Exam  Constitutional:       Appearance: Normal appearance.   Chest:          Comments: No erythema  Neurological:      Mental Status: She is alert.   Psychiatric:      Comments: Seems anxious and at times confused when I " reitterate we are not treating the axilla           Problem Summary List    Diagnosis:     Diagnosis Plan   1. Malignant neoplasm of upper-outer quadrant of right breast in female, estrogen receptor positive          Pathology:   breast     Past Medical History:   Diagnosis Date   • Anxiety    • Arthritis    • Breast cancer 01/11/2024    Inv. Ductal with DCIS (right breast)   ER+/NE+/Her2-   • Depression    • Disease of thyroid gland    • Hashimoto's disease    • History of vitamin D deficiency    • Hypothyroidism    • IBS (irritable bowel syndrome)    • Multiple myeloma 2015    IgA kappa.  Stem cell transplant at [facility] 3/20/2016   • Myeloma    • Neck pain    • Neuropathy    • KWAME (obstructive sleep apnea) 07/08/2021    Overnight polysomnogram.  Weight 173 pounds.  Mild KWAME with AHI 5.6 events per hour.  When supine, 9.4 events per hour.  During REM, moderate severity at 26 events per hour.  No sleep-related hypoxia.  The patient snored 20.5% of total sleep time.   • Osteopenia    • Overweight (BMI 25.0-29.9) 02/05/2018         Past Surgical History:   Procedure Laterality Date   • ANTERIOR CERVICAL FUSION     • BREAST BIOPSY Right 01/11/2024    10:00 @ 10cmFN   (Inv. Ductal Carcinoma  ER+/NE+/Her2-)  [facility]   • BREAST LUMPECTOMY WITH SENTINEL NODE BIOPSY Right 3/6/2024    Procedure: RIGHT breast DOUG guided lumpectomy and RIGHT axilla sentinel node biopsy;  Surgeon: Angelina Rosales MD;  Location: [facility];  Service: General;  Laterality: Right;   • CATARACT EXTRACTION     • COLONOSCOPY     • TONSILLECTOMY  1956   • VEIN SURGERY  1991         Current Outpatient Medications on File Prior to Visit   Medication Sig Dispense Refill   • acetaminophen (TYLENOL) 325 MG tablet Take 1 tablet by mouth Every 6 (Six) Hours As Needed for Mild Pain.     • Acetylcarn-Alpha Lipoic Acid 400-200 MG capsule Take  by mouth. HOLD PRIOR TO SURG     • ALPRAZolam (XANAX) 0.25 MG tablet Take 1 tablet  by mouth Daily As Needed for Anxiety. 30 tablet 2   • Azelastine HCl 137 MCG/SPRAY solution      • B Complex Vitamins (VITAMIN B COMPLEX) capsule capsule Take 2 tablets by mouth Daily. HOLD PRIOR TO SURG     • baclofen (LIORESAL) 10 MG tablet Take 1 tablet by mouth 3 (Three) Times a Day As Needed.  5   • calcium carbonate-vitamin d 600-400 MG-UNIT per tablet Take 1 tablet by mouth Daily. HOLD PRIOR TO SURG     • colesevelam (WELCHOL) 625 MG tablet Take 2 tablets by mouth 2 (Two) Times a Day With Meals. 90 tablet 2   • cycloSPORINE (RESTASIS) 0.05 % ophthalmic emulsion 1 drop 2 (Two) Times a Day.     • diphenoxylate-atropine (LOMOTIL) 2.5-0.025 MG per tablet Take 1 tablet by mouth 3 (Three) Times a Day As Needed for Diarrhea. 90 tablet 3   • DULoxetine (CYMBALTA) 30 MG capsule Take 1 capsule by mouth Every Night.     • DULoxetine (CYMBALTA) 60 MG capsule Take 1 capsule by mouth Daily. (Patient taking differently: Take 1 capsule by mouth Every Night.) 90 capsule 3   • fexofenadine (ALLEGRA) 180 MG tablet Take 1 tablet by mouth As Needed.     • fluticasone (FLONASE) 50 MCG/ACT nasal spray 2 sprays into the nostril(s) as directed by provider Daily.     • folic acid (FOLVITE) 1 MG tablet Take 1 tablet by mouth Daily.  2   • HYDROcodone-acetaminophen (NORCO) 5-325 MG per tablet 1-2 tabs po q 4-6hr prn pain, wean to tylenol 15 tablet 0   • lidocaine-prilocaine (EMLA) 2.5-2.5 % cream Apply 1 Application topically to the appropriate area as directed 1 (One) Time.     • Multiple Vitamins-Iron (DAILY-JONI/IRON/BETA-CAROTENE) tablet Take 1 tablet by mouth Daily. HOLD PRIOR TO SURG  2   • polyethylene glycol (MiraLax) 17 GM/SCOOP powder Take 17 g by mouth Daily. Take cap of powder with 8oz water daily surrounding surgery and while on narcotic 119 g 0   • promethazine-dextromethorphan (PROMETHAZINE-DM) 6.25-15 MG/5ML syrup TAKE 10 ML BY MOUTH TWICE A DAY AS NEEDED FOR COUGH     • Synthroid 75 MCG tablet Take 1 tablet by mouth  Daily.     • trimethoprim-polymyxin b (POLYTRIM) 90377-4.1 UNIT/ML-% ophthalmic solution As Needed.     • vitamin D (ERGOCALCIFEROL) 47001 units capsule capsule Take 1 capsule by mouth 2 (Two) Times a Week. Twice a week     • zoledronic acid (ZOMETA) 4 MG/5ML injection Infuse 5 mL into a venous catheter Every 3 (Three) Months.       No current facility-administered medications on file prior to visit.       No Known Allergies      Referring Provider:    No referring provider defined for this encounter.    Oncologist:  No primary care provider on file.      Seen and approved by:  Candida Aponte MD  04/30/2024

## 2024-04-30 NOTE — TELEPHONE ENCOUNTER
----- Message from Jodie SEO sent at 4/29/2024  4:22 PM EDT -----  She needs to be scheduled for her zometa this week, can you please call her to schedule?    Thank you!

## 2024-05-01 ENCOUNTER — HOSPITAL ENCOUNTER (OUTPATIENT)
Dept: RADIATION ONCOLOGY | Facility: HOSPITAL | Age: 74
Setting detail: RADIATION/ONCOLOGY SERIES
Discharge: HOME | End: 2024-05-01
Payer: MEDICARE

## 2024-05-01 ENCOUNTER — HOSPITAL ENCOUNTER (OUTPATIENT)
Dept: RADIATION ONCOLOGY | Facility: HOSPITAL | Age: 74
Setting detail: RADIATION/ONCOLOGY SERIES
End: 2024-05-01
Payer: MEDICARE

## 2024-05-01 LAB
RAD ONC ARIA COURSE ID: NORMAL
RAD ONC ARIA COURSE INTENT: NORMAL
RAD ONC ARIA COURSE LAST TREATMENT DATE: NORMAL
RAD ONC ARIA COURSE START DATE: NORMAL
RAD ONC ARIA COURSE TREATMENT ELAPSED DAYS: 29
RAD ONC ARIA FIRST TREATMENT DATE: NORMAL
RAD ONC ARIA PLAN FRACTIONS TREATED TO DATE: 16
RAD ONC ARIA PLAN ID: NORMAL
RAD ONC ARIA PLAN PRESCRIBED DOSE PER FRACTION: 2.66 GY
RAD ONC ARIA PLAN PRIMARY REFERENCE POINT: NORMAL
RAD ONC ARIA PLAN TOTAL FRACTIONS PRESCRIBED: 16
RAD ONC ARIA PLAN TOTAL PRESCRIBED DOSE: 4256 CGY
RAD ONC ARIA REFERENCE POINT DOSAGE GIVEN TO DATE: 42.56 GY
RAD ONC ARIA REFERENCE POINT ID: NORMAL
RAD ONC ARIA REFERENCE POINT SESSION DOSAGE GIVEN: 2.66 GY

## 2024-05-01 PROCEDURE — 77427 RADIATION TX MANAGEMENT X5: CPT | Performed by: RADIOLOGY

## 2024-05-01 PROCEDURE — 77412 RADIATION TX DELIVERY LVL 3: CPT | Performed by: RADIOLOGY

## 2024-05-02 ENCOUNTER — TELEPHONE (OUTPATIENT)
Dept: ONCOLOGY | Facility: CLINIC | Age: 74
End: 2024-05-02
Payer: MEDICARE

## 2024-05-02 LAB
RAD ONC ARIA COURSE ID: NORMAL
RAD ONC ARIA COURSE INTENT: NORMAL
RAD ONC ARIA COURSE LAST TREATMENT DATE: NORMAL
RAD ONC ARIA COURSE START DATE: NORMAL
RAD ONC ARIA COURSE TREATMENT ELAPSED DAYS: 30
RAD ONC ARIA FIRST TREATMENT DATE: NORMAL
RAD ONC ARIA PLAN FRACTIONS TREATED TO DATE: 1
RAD ONC ARIA PLAN ID: NORMAL
RAD ONC ARIA PLAN PRESCRIBED DOSE PER FRACTION: 2.5 GY
RAD ONC ARIA PLAN PRIMARY REFERENCE POINT: NORMAL
RAD ONC ARIA PLAN TOTAL FRACTIONS PRESCRIBED: 4
RAD ONC ARIA PLAN TOTAL PRESCRIBED DOSE: 1000 CGY
RAD ONC ARIA REFERENCE POINT DOSAGE GIVEN TO DATE: 2.5 GY
RAD ONC ARIA REFERENCE POINT ID: NORMAL
RAD ONC ARIA REFERENCE POINT SESSION DOSAGE GIVEN: 2.5 GY

## 2024-05-02 PROCEDURE — 77280 THER RAD SIMULAJ FIELD SMPL: CPT | Performed by: STUDENT IN AN ORGANIZED HEALTH CARE EDUCATION/TRAINING PROGRAM

## 2024-05-02 PROCEDURE — 77412 RADIATION TX DELIVERY LVL 3: CPT | Performed by: STUDENT IN AN ORGANIZED HEALTH CARE EDUCATION/TRAINING PROGRAM

## 2024-05-02 NOTE — TELEPHONE ENCOUNTER
Provider: Panchito  Caller: patient  Relationship to Patient: self  Call Back Phone Number: [phone]  Reason for Call: she has a question about appointment

## 2024-05-02 NOTE — TELEPHONE ENCOUNTER
Called the patient back. She wanted to go over her appts as she had them written down wrong. She asked for a copy to be mailed to her and I asked Jodie if she could place this in the mail for her. Patient v/u.

## 2024-05-03 ENCOUNTER — HOSPITAL ENCOUNTER (OUTPATIENT)
Dept: RADIATION ONCOLOGY | Facility: HOSPITAL | Age: 74
Setting detail: RADIATION/ONCOLOGY SERIES
Discharge: HOME | End: 2024-05-03
Payer: MEDICARE

## 2024-05-03 LAB
RAD ONC ARIA COURSE ID: NORMAL
RAD ONC ARIA COURSE INTENT: NORMAL
RAD ONC ARIA COURSE LAST TREATMENT DATE: NORMAL
RAD ONC ARIA COURSE START DATE: NORMAL
RAD ONC ARIA COURSE TREATMENT ELAPSED DAYS: 31
RAD ONC ARIA FIRST TREATMENT DATE: NORMAL
RAD ONC ARIA PLAN FRACTIONS TREATED TO DATE: 2
RAD ONC ARIA PLAN ID: NORMAL
RAD ONC ARIA PLAN PRESCRIBED DOSE PER FRACTION: 2.5 GY
RAD ONC ARIA PLAN PRIMARY REFERENCE POINT: NORMAL
RAD ONC ARIA PLAN TOTAL FRACTIONS PRESCRIBED: 4
RAD ONC ARIA PLAN TOTAL PRESCRIBED DOSE: 1000 CGY
RAD ONC ARIA REFERENCE POINT DOSAGE GIVEN TO DATE: 5 GY
RAD ONC ARIA REFERENCE POINT ID: NORMAL
RAD ONC ARIA REFERENCE POINT SESSION DOSAGE GIVEN: 2.5 GY

## 2024-05-03 PROCEDURE — 77412 RADIATION TX DELIVERY LVL 3: CPT | Performed by: RADIOLOGY

## 2024-05-06 ENCOUNTER — HOSPITAL ENCOUNTER (OUTPATIENT)
Dept: RADIATION ONCOLOGY | Facility: HOSPITAL | Age: 74
Setting detail: RADIATION/ONCOLOGY SERIES
Discharge: HOME | End: 2024-05-06
Payer: MEDICARE

## 2024-05-06 LAB
RAD ONC ARIA COURSE ID: NORMAL
RAD ONC ARIA COURSE INTENT: NORMAL
RAD ONC ARIA COURSE LAST TREATMENT DATE: NORMAL
RAD ONC ARIA COURSE START DATE: NORMAL
RAD ONC ARIA COURSE TREATMENT ELAPSED DAYS: 34
RAD ONC ARIA FIRST TREATMENT DATE: NORMAL
RAD ONC ARIA PLAN FRACTIONS TREATED TO DATE: 3
RAD ONC ARIA PLAN ID: NORMAL
RAD ONC ARIA PLAN PRESCRIBED DOSE PER FRACTION: 2.5 GY
RAD ONC ARIA PLAN PRIMARY REFERENCE POINT: NORMAL
RAD ONC ARIA PLAN TOTAL FRACTIONS PRESCRIBED: 4
RAD ONC ARIA PLAN TOTAL PRESCRIBED DOSE: 1000 CGY
RAD ONC ARIA REFERENCE POINT DOSAGE GIVEN TO DATE: 7.5 GY
RAD ONC ARIA REFERENCE POINT ID: NORMAL
RAD ONC ARIA REFERENCE POINT SESSION DOSAGE GIVEN: 2.5 GY

## 2024-05-06 PROCEDURE — 77412 RADIATION TX DELIVERY LVL 3: CPT | Performed by: RADIOLOGY

## 2024-05-06 PROCEDURE — 77336 RADIATION PHYSICS CONSULT: CPT | Performed by: RADIOLOGY

## 2024-05-07 ENCOUNTER — HOSPITAL ENCOUNTER (OUTPATIENT)
Dept: RADIATION ONCOLOGY | Facility: HOSPITAL | Age: 74
Setting detail: RADIATION/ONCOLOGY SERIES
Discharge: HOME | End: 2024-05-07
Payer: MEDICARE

## 2024-05-07 ENCOUNTER — RADIATION ONCOLOGY WEEKLY ASSESSMENT (OUTPATIENT)
Dept: RADIATION ONCOLOGY | Facility: HOSPITAL | Age: 74
End: 2024-05-07
Payer: MEDICARE

## 2024-05-07 VITALS — OXYGEN SATURATION: 95 % | DIASTOLIC BLOOD PRESSURE: 83 MMHG | HEART RATE: 71 BPM | SYSTOLIC BLOOD PRESSURE: 146 MMHG

## 2024-05-07 DIAGNOSIS — C50.411 MALIGNANT NEOPLASM OF UPPER-OUTER QUADRANT OF RIGHT BREAST IN FEMALE, ESTROGEN RECEPTOR POSITIVE: Primary | ICD-10-CM

## 2024-05-07 DIAGNOSIS — Z17.0 MALIGNANT NEOPLASM OF UPPER-OUTER QUADRANT OF RIGHT BREAST IN FEMALE, ESTROGEN RECEPTOR POSITIVE: Primary | ICD-10-CM

## 2024-05-07 LAB
RAD ONC ARIA COURSE ID: NORMAL
RAD ONC ARIA COURSE INTENT: NORMAL
RAD ONC ARIA COURSE LAST TREATMENT DATE: NORMAL
RAD ONC ARIA COURSE START DATE: NORMAL
RAD ONC ARIA COURSE TREATMENT ELAPSED DAYS: 35
RAD ONC ARIA FIRST TREATMENT DATE: NORMAL
RAD ONC ARIA PLAN FRACTIONS TREATED TO DATE: 4
RAD ONC ARIA PLAN ID: NORMAL
RAD ONC ARIA PLAN PRESCRIBED DOSE PER FRACTION: 2.5 GY
RAD ONC ARIA PLAN PRIMARY REFERENCE POINT: NORMAL
RAD ONC ARIA PLAN TOTAL FRACTIONS PRESCRIBED: 4
RAD ONC ARIA PLAN TOTAL PRESCRIBED DOSE: 1000 CGY
RAD ONC ARIA REFERENCE POINT DOSAGE GIVEN TO DATE: 10 GY
RAD ONC ARIA REFERENCE POINT ID: NORMAL
RAD ONC ARIA REFERENCE POINT SESSION DOSAGE GIVEN: 2.5 GY

## 2024-05-07 PROCEDURE — 77412 RADIATION TX DELIVERY LVL 3: CPT | Performed by: RADIOLOGY

## 2024-05-07 PROCEDURE — 77417 THER RADIOLOGY PORT IMAGE(S): CPT | Performed by: RADIOLOGY

## 2024-05-09 DIAGNOSIS — Z17.0 MALIGNANT NEOPLASM OF UPPER-OUTER QUADRANT OF RIGHT BREAST IN FEMALE, ESTROGEN RECEPTOR POSITIVE: Primary | ICD-10-CM

## 2024-05-09 DIAGNOSIS — C50.411 MALIGNANT NEOPLASM OF UPPER-OUTER QUADRANT OF RIGHT BREAST IN FEMALE, ESTROGEN RECEPTOR POSITIVE: Primary | ICD-10-CM

## 2024-05-13 LAB
RAD ONC ARIA COURSE END DATE: NORMAL
RAD ONC ARIA COURSE ID: NORMAL
RAD ONC ARIA COURSE INTENT: NORMAL
RAD ONC ARIA COURSE LAST TREATMENT DATE: NORMAL
RAD ONC ARIA COURSE START DATE: NORMAL
RAD ONC ARIA COURSE TREATMENT ELAPSED DAYS: 35
RAD ONC ARIA FIRST TREATMENT DATE: NORMAL
RAD ONC ARIA PLAN FRACTIONS TREATED TO DATE: 16
RAD ONC ARIA PLAN FRACTIONS TREATED TO DATE: 4
RAD ONC ARIA PLAN ID: NORMAL
RAD ONC ARIA PLAN ID: NORMAL
RAD ONC ARIA PLAN NAME: NORMAL
RAD ONC ARIA PLAN NAME: NORMAL
RAD ONC ARIA PLAN PRESCRIBED DOSE PER FRACTION: 2.5 GY
RAD ONC ARIA PLAN PRESCRIBED DOSE PER FRACTION: 2.66 GY
RAD ONC ARIA PLAN PRIMARY REFERENCE POINT: NORMAL
RAD ONC ARIA PLAN PRIMARY REFERENCE POINT: NORMAL
RAD ONC ARIA PLAN TOTAL FRACTIONS PRESCRIBED: 16
RAD ONC ARIA PLAN TOTAL FRACTIONS PRESCRIBED: 4
RAD ONC ARIA PLAN TOTAL PRESCRIBED DOSE: 1000 CGY
RAD ONC ARIA PLAN TOTAL PRESCRIBED DOSE: 4256 CGY
RAD ONC ARIA REFERENCE POINT DOSAGE GIVEN TO DATE: 10 GY
RAD ONC ARIA REFERENCE POINT DOSAGE GIVEN TO DATE: 42.56 GY
RAD ONC ARIA REFERENCE POINT ID: NORMAL
RAD ONC ARIA REFERENCE POINT ID: NORMAL

## 2024-05-15 ENCOUNTER — TELEPHONE (OUTPATIENT)
Dept: OTHER | Facility: HOSPITAL | Age: 74
End: 2024-05-15
Payer: MEDICARE

## 2024-05-15 NOTE — TELEPHONE ENCOUNTER
[facility]  SURVIVORSHIP SERVICES CARE COORDINATION NOTE  PHONE      Call placed to Patient   RE: open referral for survivorship care plan and treatment summary visit.    Left voice mail     Introduced myself and reviewed purpose and goals of survivorship visit as well as what to expect..    Patient mailed name and contact information for Survivorship Clinic [phone]

## 2024-05-21 ENCOUNTER — SPECIALTY PHARMACY (OUTPATIENT)
Dept: PHARMACY | Facility: HOSPITAL | Age: 74
End: 2024-05-21
Payer: MEDICARE

## 2024-05-21 ENCOUNTER — INFUSION (OUTPATIENT)
Dept: ONCOLOGY | Facility: HOSPITAL | Age: 74
End: 2024-05-21
Payer: MEDICARE

## 2024-05-21 ENCOUNTER — OFFICE VISIT (OUTPATIENT)
Dept: ONCOLOGY | Facility: CLINIC | Age: 74
End: 2024-05-21
Payer: MEDICARE

## 2024-05-21 VITALS
SYSTOLIC BLOOD PRESSURE: 128 MMHG | WEIGHT: 153 LBS | DIASTOLIC BLOOD PRESSURE: 75 MMHG | RESPIRATION RATE: 16 BRPM | HEIGHT: 67 IN | TEMPERATURE: 98.1 F | HEART RATE: 89 BPM | BODY MASS INDEX: 24.01 KG/M2 | OXYGEN SATURATION: 95 %

## 2024-05-21 DIAGNOSIS — C90.00 MULTIPLE MYELOMA NOT HAVING ACHIEVED REMISSION: ICD-10-CM

## 2024-05-21 DIAGNOSIS — C90.00 NEUROPATHY ASSOCIATED WITH MULTIPLE MYELOMA: Primary | ICD-10-CM

## 2024-05-21 DIAGNOSIS — Z79.899 HIGH RISK MEDICATION USE: ICD-10-CM

## 2024-05-21 DIAGNOSIS — G63 NEUROPATHY ASSOCIATED WITH MULTIPLE MYELOMA: Primary | ICD-10-CM

## 2024-05-21 LAB
ALBUMIN SERPL-MCNC: 4 G/DL (ref 3.5–5.2)
ALBUMIN/GLOB SERPL: 1.7 G/DL
ALP SERPL-CCNC: 70 U/L (ref 39–117)
ALT SERPL W P-5'-P-CCNC: 16 U/L (ref 1–33)
ANION GAP SERPL CALCULATED.3IONS-SCNC: 10.6 MMOL/L (ref 5–15)
AST SERPL-CCNC: 22 U/L (ref 1–32)
B2 MICROGLOB SERPL-MCNC: 2.7 MG/L (ref 0.8–2.2)
BASOPHILS # BLD AUTO: 0.02 10*3/MM3 (ref 0–0.2)
BASOPHILS NFR BLD AUTO: 0.5 % (ref 0–1.5)
BILIRUB SERPL-MCNC: 0.2 MG/DL (ref 0–1.2)
BUN SERPL-MCNC: 10 MG/DL (ref 8–23)
BUN/CREAT SERPL: 12.3 (ref 7–25)
CALCIUM SPEC-SCNC: 9.1 MG/DL (ref 8.6–10.5)
CHLORIDE SERPL-SCNC: 103 MMOL/L (ref 98–107)
CO2 SERPL-SCNC: 25.4 MMOL/L (ref 22–29)
CREAT SERPL-MCNC: 0.81 MG/DL (ref 0.57–1)
DEPRECATED RDW RBC AUTO: 52.6 FL (ref 37–54)
EGFRCR SERPLBLD CKD-EPI 2021: 75.8 ML/MIN/1.73
EOSINOPHIL # BLD AUTO: 0.14 10*3/MM3 (ref 0–0.4)
EOSINOPHIL NFR BLD AUTO: 3.3 % (ref 0.3–6.2)
ERYTHROCYTE [DISTWIDTH] IN BLOOD BY AUTOMATED COUNT: 14.6 % (ref 12.3–15.4)
GLOBULIN UR ELPH-MCNC: 2.4 GM/DL
GLUCOSE SERPL-MCNC: 111 MG/DL (ref 65–99)
HCT VFR BLD AUTO: 37.8 % (ref 34–46.6)
HGB BLD-MCNC: 12.5 G/DL (ref 12–15.9)
IMM GRANULOCYTES # BLD AUTO: 0.01 10*3/MM3 (ref 0–0.05)
IMM GRANULOCYTES NFR BLD AUTO: 0.2 % (ref 0–0.5)
LYMPHOCYTES # BLD AUTO: 1.41 10*3/MM3 (ref 0.7–3.1)
LYMPHOCYTES NFR BLD AUTO: 33 % (ref 19.6–45.3)
MAGNESIUM SERPL-MCNC: 1.9 MG/DL (ref 1.6–2.4)
MCH RBC QN AUTO: 32.4 PG (ref 26.6–33)
MCHC RBC AUTO-ENTMCNC: 33.1 G/DL (ref 31.5–35.7)
MCV RBC AUTO: 97.9 FL (ref 79–97)
MONOCYTES # BLD AUTO: 0.59 10*3/MM3 (ref 0.1–0.9)
MONOCYTES NFR BLD AUTO: 13.8 % (ref 5–12)
NEUTROPHILS NFR BLD AUTO: 2.1 10*3/MM3 (ref 1.7–7)
NEUTROPHILS NFR BLD AUTO: 49.2 % (ref 42.7–76)
NRBC BLD AUTO-RTO: 0 /100 WBC (ref 0–0.2)
PHOSPHATE SERPL-MCNC: 3.1 MG/DL (ref 2.5–4.5)
PLATELET # BLD AUTO: 216 10*3/MM3 (ref 140–450)
PMV BLD AUTO: 9.2 FL (ref 6–12)
POTASSIUM SERPL-SCNC: 4 MMOL/L (ref 3.5–5.2)
PROT SERPL-MCNC: 6.4 G/DL (ref 6–8.5)
RBC # BLD AUTO: 3.86 10*6/MM3 (ref 3.77–5.28)
SODIUM SERPL-SCNC: 139 MMOL/L (ref 136–145)
WBC NRBC COR # BLD AUTO: 4.27 10*3/MM3 (ref 3.4–10.8)

## 2024-05-21 PROCEDURE — 83735 ASSAY OF MAGNESIUM: CPT

## 2024-05-21 PROCEDURE — 85025 COMPLETE CBC W/AUTO DIFF WBC: CPT

## 2024-05-21 PROCEDURE — 25010000002 ZOLEDRONIC ACID 4 MG/100ML SOLUTION: Performed by: INTERNAL MEDICINE

## 2024-05-21 PROCEDURE — 25810000003 SODIUM CHLORIDE 0.9 % SOLUTION: Performed by: INTERNAL MEDICINE

## 2024-05-21 PROCEDURE — G2211 COMPLEX E/M VISIT ADD ON: HCPCS | Performed by: INTERNAL MEDICINE

## 2024-05-21 PROCEDURE — 25010000002 CYANOCOBALAMIN PER 1000 MCG: Performed by: INTERNAL MEDICINE

## 2024-05-21 PROCEDURE — 99215 OFFICE O/P EST HI 40 MIN: CPT | Performed by: INTERNAL MEDICINE

## 2024-05-21 PROCEDURE — 84100 ASSAY OF PHOSPHORUS: CPT

## 2024-05-21 PROCEDURE — 96374 THER/PROPH/DIAG INJ IV PUSH: CPT

## 2024-05-21 PROCEDURE — 82232 ASSAY OF BETA-2 PROTEIN: CPT | Performed by: INTERNAL MEDICINE

## 2024-05-21 PROCEDURE — 1160F RVW MEDS BY RX/DR IN RCRD: CPT | Performed by: INTERNAL MEDICINE

## 2024-05-21 PROCEDURE — 1159F MED LIST DOCD IN RCRD: CPT | Performed by: INTERNAL MEDICINE

## 2024-05-21 PROCEDURE — 96372 THER/PROPH/DIAG INJ SC/IM: CPT

## 2024-05-21 PROCEDURE — 1126F AMNT PAIN NOTED NONE PRSNT: CPT | Performed by: INTERNAL MEDICINE

## 2024-05-21 PROCEDURE — 80053 COMPREHEN METABOLIC PANEL: CPT

## 2024-05-21 RX ORDER — SODIUM CHLORIDE 9 MG/ML
250 INJECTION, SOLUTION INTRAVENOUS ONCE
Status: CANCELLED | OUTPATIENT
Start: 2024-05-21

## 2024-05-21 RX ORDER — SODIUM CHLORIDE 9 MG/ML
250 INJECTION, SOLUTION INTRAVENOUS ONCE
Status: COMPLETED | OUTPATIENT
Start: 2024-05-21 | End: 2024-05-21

## 2024-05-21 RX ORDER — CYANOCOBALAMIN 1000 UG/ML
1000 INJECTION, SOLUTION INTRAMUSCULAR; SUBCUTANEOUS ONCE
Status: COMPLETED | OUTPATIENT
Start: 2024-05-21 | End: 2024-05-21

## 2024-05-21 RX ORDER — ZOLEDRONIC ACID 0.04 MG/ML
4 INJECTION, SOLUTION INTRAVENOUS ONCE
Status: COMPLETED | OUTPATIENT
Start: 2024-05-21 | End: 2024-05-21

## 2024-05-21 RX ORDER — ZOLEDRONIC ACID 0.04 MG/ML
4 INJECTION, SOLUTION INTRAVENOUS ONCE
Status: CANCELLED | OUTPATIENT
Start: 2024-05-21

## 2024-05-21 RX ORDER — GABAPENTIN 100 MG/1
1 CAPSULE ORAL 3 TIMES DAILY
COMMUNITY
Start: 2024-05-08

## 2024-05-21 RX ADMIN — CYANOCOBALAMIN 1000 MCG: 1000 INJECTION, SOLUTION INTRAMUSCULAR at 13:28

## 2024-05-21 RX ADMIN — SODIUM CHLORIDE 250 ML: 9 INJECTION, SOLUTION INTRAVENOUS at 13:28

## 2024-05-21 RX ADMIN — ZOLEDRONIC ACID 4 MG: 0.04 INJECTION, SOLUTION INTRAVENOUS at 13:29

## 2024-05-21 NOTE — PROGRESS NOTES
Subjective   Jennifer Plascencia is a 74 y.o. female.  Referred by Dr. Rosales for right breast invasive ductal carcinoma    History of Present Illness     Ms. Jolly Plascencia is a 74-year-old lady with diagnosis of IgA kappa high risk multiple myeloma high risk due to status post stem cell transplant in March 2016 at [facility] and currently on maintenance Revlimid 5 mg 3 out of 4 weeks.    Multiple myeloma is high risk because of gain of 1 q- treatment with rev Dex Velcade initiated in 11/15-went on to stem cell transplant at [facility] in March 2016?  Melphalan.  She started post transplant therapy in May 2016 with Velcade rev Dex.  In September after 4 cycles Velcade was decreased to every other week with plans to continue rev Dex Velcade till progression because of high risk status.  In May 2019 her Velcade was discontinued because of worsening neuropathy.  Patient has remained on Revlimid 10 mg 21 out of 28 days since then with stable disease until November 2019 when a small IgG kappa paraprotein was noted on her blood tests which is distinct from her usual IgA kappa myeloma.  Restaging with PET scan was normal and since then the paraprotein as of 3/10/2020 has resolved    She is currently on Zometa every 3 months and being followed with myeloma labs monthly.    Dr. Alexey Xavier is her hematologist at [facility].    She has been seeing Dr. Garcia from our practice for management of the multiple myeloma and Revlimid.    12/28/2023-bilateral screening mammogram  Breast that heterogeneously dense.  There is a mass with associated distortion within the upper outer right breast posterior depth.  No suspicious findings in the left breast.    1/9/2024-right breast diagnostic mammogram and ultrasound  Heterogeneously dense breast tissue.  High density mass measuring approximately 19 mm with associated lactic show distortion, 10:00, 10 cm from the nipple.  Adjacent smaller circumscribed low-density 5 mm mass is located  just medial and superior to the primary mass by approximately 2 mm.  Calcifications in the upper inner quadrant of the right breast are stable dating back to 2019.  Second set of calcifications in the upper outer right breast are also stable dating back to 2019.    Ultrasound  At 10:00, 10 cm from the nipple there is a irregular mass measuring 21 x 19 x 9 mm.  Associated to caustic shadowing.  At 1030, 10 cm from the nipple there is a hypoechoic mass measuring 4 x 3 x 5 mm.  This is thought to represent the adjacent mass seen mammographically.  In addition there is a third circumscribed hypoechoic mass measuring 4 x 3 x 3 mm labeled 930, 8 cm from the nipple.  Considered indeterminate.  Right axilla lymph nodes are essentially fatty replaced.  No morphologically abnormal lymph nodes in the right axilla.    Impression  1.irregular mass measuring 21 mm, ultrasound-guided biopsy recommended  2.5 millimeter mass at 1030, 10 cm from the nipple is suspicious, ultrasound-guided biopsy recommended  3.4 millimeter mass is indeterminate at 930, ultrasound-guided biopsy recommended.    3 site ultrasound-guided biopsy  1.right breast 10:00, 10 cm from the nipple  Invasive ductal adenocarcinoma and ductal carcinoma in situ  Tumor is grade 2  No lymphovascular invasion no perineural invasion  ER positive strong 99%  CO positive indeterminate 10%  HER2 1+ by immunohistochemistry  Ki-67 30%  DCIS is high-grade    2.right breast 9:30, 8 cm from the nipple  Sclerosing adenosis  Rare microcyst  Microcalcifications in nonneoplastic tissue    1/29/2024-bilateral breast MRI  Biopsy-proven malignancy in the right breast at 9:00, measures 2.1 cm in greatest dimension.  No other suspicious findings are seen within the right breast.  No evidence of right axillary lymphadenopathy.  No left breast abnormalities noted.      She denies any family history of breast cancer.      Patient underwent right breast lumpectomy on 3/6/2024.    Pathology  shows invasive ductal carcinoma, measuring 21 mm, grade 2, 28 mm of DCIS, all margins -3 sentinel lymph nodes negative.  ER +91 to 100%  WV +10%  HER2 negative  Ki-67 30%  Oncotype DX recurrence score of 12 with less than 1% benefit from chemotherapy and 3% risk of distant metastasis at 9 years    She is recovering well from surgery.  Myeloma labs have been obtained today and results pending.    2/27/2024-Labs reviewed and CBC normal with a WBC of 3.93, hemoglobin 12.1 and platelets 204,000, CMP within normal limits, magnesium normal at 2.2, phosphorus normal at 3.9, beta-2 microglobulin normal at 2.1, SPEP with negative M spike, free light chain ratio normal at 1.02, free light chain kappa 31.5, free light chain lambda 31.0      Patient saw Dr. Hawthorne 4/23/2024 and he recommended resuming Revlimid 7.5 mg 21 out of 28 days.   He also recommended starting prophylactic Eliquis to Xarelto as he was concerned about the risk of DVT and PE with Revlimid, her age and endocrine therapy.  She also had an MRI which showed a 1.7 cm area of T2 hyperintensity in the upper sternum without diffusion restriction which was new from 5/11/2022.  This was nonspecific and indeterminate but could represent a myeloma lesion.  Attention to follow-up recommended.    Myeloma studies performed 4/23/2024 reviewed and no evidence of M protein, free light chain ratio normal.    She completed adjuvant radiation to the right breast in May 2024.    Interval history  She presents to the clinic today for follow-up.  She has not started the anastrozole yet and she was waiting to follow-up with me to start back on anastrozole.  She has also been recommended to start back on Revlimid by .   He is recommending a 7.5 mg dose of Revlimid.  She currently takes baby aspirin.  She denies any new complaints at this time.    The following portions of the patient's history were reviewed and updated as appropriate: allergies, current medications, past  family history, past medical history, past social history, past surgical history, and problem list.    Past Medical History:   Diagnosis Date    Anxiety     Arthritis     Breast cancer 2024    Inv. Ductal with DCIS (right breast)   ER+/MA+/Her2-    Depression     Disease of thyroid gland     Hashimoto's disease     History of vitamin D deficiency     Hypothyroidism     IBS (irritable bowel syndrome)     Multiple myeloma     IgA kappa.  Stem cell transplant at [facility] 3/20/2016    Myeloma     Neck pain     Neuropathy     KWAME (obstructive sleep apnea) 2021    Overnight polysomnogram.  Weight 173 pounds.  Mild KWAME with AHI 5.6 events per hour.  When supine, 9.4 events per hour.  During REM, moderate severity at 26 events per hour.  No sleep-related hypoxia.  The patient snored 20.5% of total sleep time.    Osteopenia     Overweight (BMI 25.0-29.9) 2018        Past Surgical History:   Procedure Laterality Date    ANTERIOR CERVICAL FUSION      BREAST BIOPSY Right 2024    10:00 @ 10cmFN   (Inv. Ductal Carcinoma  ER+/MA+/Her2-)  [facility]    BREAST LUMPECTOMY WITH SENTINEL NODE BIOPSY Right 3/6/2024    Procedure: RIGHT breast DOUG guided lumpectomy and RIGHT axilla sentinel node biopsy;  Surgeon: Angelina Rosales MD;  Location: [facility];  Service: General;  Laterality: Right;    CATARACT EXTRACTION      COLONOSCOPY      TONSILLECTOMY      VEIN SURGERY          Family History   Problem Relation Age of Onset    Breast cancer Mother     Sleep apnea Mother     Lymphoma Father     Sleep apnea Father     Cancer Father     Cancer Maternal Aunt             Kidney cancer Paternal Grandmother     Malig Hyperthermia Neg Hx         Social History     Socioeconomic History    Marital status:     Number of children: 2   Tobacco Use    Smoking status: Former     Current packs/day: 0.00     Average packs/day: 0.3 packs/day for 2.0 years (0.5 ttl pk-yrs)  "    Types: Cigarettes     Quit date:      Years since quittin.4    Smokeless tobacco: Never    Tobacco comments:     Only lightly for 2 years   Vaping Use    Vaping status: Never Used   Substance and Sexual Activity    Alcohol use: Not Currently    Drug use: Never    Sexual activity: Defer     Partners: Male     Birth control/protection: None     Comment: Na        OB History          2    Para   2    Term   2            AB        Living             SAB        IAB        Ectopic        Molar        Multiple        Live Births   2             Age at menarche-11  Age at first live childbirth-23   3 para 2  1  Oral conceptive pills none  Hormone replacement therapy for about 1 week  Age of menopause-late 50s    No Known Allergies         Review of Systems   Constitutional: Negative.    HENT: Negative.     Eyes: Negative.    Respiratory: Negative.     Cardiovascular: Negative.    Gastrointestinal: Negative.    Endocrine: Negative.    Genitourinary:  Positive for breast lump.   Allergic/Immunologic: Negative.    Neurological: Negative.    Psychiatric/Behavioral:  The patient is nervous/anxious.          Objective   Blood pressure 128/75, pulse 89, temperature 98.1 °F (36.7 °C), temperature source Oral, resp. rate 16, height 168.9 cm (66.5\"), weight 69.4 kg (153 lb), SpO2 95%.   Physical Exam  Vitals reviewed.   Constitutional:       Appearance: Normal appearance. She is normal weight.   HENT:      Nose: Nose normal.      Mouth/Throat:      Pharynx: Oropharynx is clear.   Eyes:      Conjunctiva/sclera: Conjunctivae normal.   Cardiovascular:      Rate and Rhythm: Normal rate.      Pulses: Normal pulses.   Skin:     General: Skin is warm.   Neurological:      General: No focal deficit present.      Mental Status: She is alert.   Psychiatric:         Mood and Affect: Mood normal.         Behavior: Behavior normal.         Thought Content: Thought content normal.         Judgment: " Judgment normal.       Breast Exam: Right breast appears normal on inspection.  Scar in the upper outer quadrant of the right breast as well as the right axilla.  Underlying scar tissue and mild swelling.  Left breast appears normal inspection.  No palpable abnormalities of the left breast.    I have reexamined the patient and the results are consistent with the previously documented exam. Teetee Flanagan MD      Infusion on 05/21/2024   Component Date Value Ref Range Status    WBC 05/21/2024 4.27  3.40 - 10.80 10*3/mm3 Final    RBC 05/21/2024 3.86  3.77 - 5.28 10*6/mm3 Final    Hemoglobin 05/21/2024 12.5  12.0 - 15.9 g/dL Final    Hematocrit 05/21/2024 37.8  34.0 - 46.6 % Final    MCV 05/21/2024 97.9 (H)  79.0 - 97.0 fL Final    MCH 05/21/2024 32.4  26.6 - 33.0 pg Final    MCHC 05/21/2024 33.1  31.5 - 35.7 g/dL Final    RDW 05/21/2024 14.6  12.3 - 15.4 % Final    RDW-SD 05/21/2024 52.6  37.0 - 54.0 fl Final    MPV 05/21/2024 9.2  6.0 - 12.0 fL Final    Platelets 05/21/2024 216  140 - 450 10*3/mm3 Final    Neutrophil % 05/21/2024 49.2  42.7 - 76.0 % Final    Lymphocyte % 05/21/2024 33.0  19.6 - 45.3 % Final    Monocyte % 05/21/2024 13.8 (H)  5.0 - 12.0 % Final    Eosinophil % 05/21/2024 3.3  0.3 - 6.2 % Final    Basophil % 05/21/2024 0.5  0.0 - 1.5 % Final    Immature Grans % 05/21/2024 0.2  0.0 - 0.5 % Final    Neutrophils, Absolute 05/21/2024 2.10  1.70 - 7.00 10*3/mm3 Final    Lymphocytes, Absolute 05/21/2024 1.41  0.70 - 3.10 10*3/mm3 Final    Monocytes, Absolute 05/21/2024 0.59  0.10 - 0.90 10*3/mm3 Final    Eosinophils, Absolute 05/21/2024 0.14  0.00 - 0.40 10*3/mm3 Final    Basophils, Absolute 05/21/2024 0.02  0.00 - 0.20 10*3/mm3 Final    Immature Grans, Absolute 05/21/2024 0.01  0.00 - 0.05 10*3/mm3 Final    nRBC 05/21/2024 0.0  0.0 - 0.2 /100 WBC Final   Orders Only on 05/13/2024   Component Date Value Ref Range Status    Course ID 05/13/2024 C1 R Breast   Final    Course Intent 05/13/2024 Curative    Final    Course Start Date 05/13/2024 3/27/2024 10:00 AM   Final    Course End Date 05/13/2024 5/13/2024  7:51 AM   Final    Course First Treatment Date 05/13/2024 4/2/2024  3:45 PM   Final    Course Last Treatment Date 05/13/2024 5/7/2024  4:30 PM   Final    Course Elapsed Days 05/13/2024 35   Final    Reference Point ID 05/13/2024 Rx: R Breast   Final    Reference Point Dosage Given to Da* 05/13/2024 42.56  Gy Final    Reference Point ID 05/13/2024 Rx: R Breast BV   Final    Reference Point Dosage Given to Da* 05/13/2024 10  Gy Final    Plan ID 05/13/2024 R Breast-3D   Final    Plan Name 05/13/2024 R Breast-3D   Final    Plan Fractions Treated to Date 05/13/2024 16   Final    Plan Total Fractions Prescribed 05/13/2024 16   Final    Plan Prescribed Dose Per Fraction 05/13/2024 2.66  Gy Final    Plan Total Prescribed Dose 05/13/2024 4,256  cGy Final    Plan Primary Reference Point 05/13/2024 Rx: R Breast   Final    Plan ID 05/13/2024 R BreastBV-3D   Final    Plan Name 05/13/2024 R BreastBV-3D   Final    Plan Fractions Treated to Date 05/13/2024 4   Final    Plan Total Fractions Prescribed 05/13/2024 4   Final    Plan Prescribed Dose Per Fraction 05/13/2024 2.5  Gy Final    Plan Total Prescribed Dose 05/13/2024 1,000  cGy Final    Plan Primary Reference Point 05/13/2024 Rx: R Breast BV   Final   Hospital Outpatient Visit on 05/07/2024   Component Date Value Ref Range Status    Course ID 05/07/2024 C1 R Breast   Final    Course Intent 05/07/2024 Curative   Final    Course Start Date 05/07/2024 3/27/2024 10:00 AM   Final    Course First Treatment Date 05/07/2024 4/2/2024  3:45 PM   Final    Course Last Treatment Date 05/07/2024 5/7/2024  4:30 PM   Final    Course Elapsed Days 05/07/2024 35   Final    Reference Point ID 05/07/2024 Rx: R Breast BV   Final    Reference Point Dosage Given to Da* 05/07/2024 10  Gy Final    Reference Point Session Dosage Giv* 05/07/2024 2.5  Gy Final    Plan ID 05/07/2024 R BreastBV-3D    Final    Plan Fractions Treated to Date 05/07/2024 4   Final    Plan Total Fractions Prescribed 05/07/2024 4   Final    Plan Prescribed Dose Per Fraction 05/07/2024 2.5  Gy Final    Plan Total Prescribed Dose 05/07/2024 1,000  cGy Final    Plan Primary Reference Point 05/07/2024 Rx: R Breast BV   Final   Hospital Outpatient Visit on 05/06/2024   Component Date Value Ref Range Status    Course ID 05/06/2024 C1 R Breast   Final    Course Intent 05/06/2024 Curative   Final    Course Start Date 05/06/2024 3/27/2024 10:00 AM   Final    Course First Treatment Date 05/06/2024 4/2/2024  3:45 PM   Final    Course Last Treatment Date 05/06/2024 5/6/2024  4:29 PM   Final    Course Elapsed Days 05/06/2024 34   Final    Reference Point ID 05/06/2024 Rx: R Breast BV   Final    Reference Point Dosage Given to Da* 05/06/2024 7.5  Gy Final    Reference Point Session Dosage Giv* 05/06/2024 2.5  Gy Final    Plan ID 05/06/2024 R BreastBV-3D   Final    Plan Fractions Treated to Date 05/06/2024 3   Final    Plan Total Fractions Prescribed 05/06/2024 4   Final    Plan Prescribed Dose Per Fraction 05/06/2024 2.5  Gy Final    Plan Total Prescribed Dose 05/06/2024 1,000  cGy Final    Plan Primary Reference Point 05/06/2024 Rx: R Breast BV   Final   Hospital Outpatient Visit on 05/03/2024   Component Date Value Ref Range Status    Course ID 05/03/2024 C1 R Breast   Final    Course Intent 05/03/2024 Curative   Final    Course Start Date 05/03/2024 3/27/2024 10:00 AM   Final    Course First Treatment Date 05/03/2024 4/2/2024  3:45 PM   Final    Course Last Treatment Date 05/03/2024 5/3/2024  4:21 PM   Final    Course Elapsed Days 05/03/2024 31   Final    Reference Point ID 05/03/2024 Rx: R Breast BV   Final    Reference Point Dosage Given to Da* 05/03/2024 5  Gy Final    Reference Point Session Dosage Giv* 05/03/2024 2.5  Gy Final    Plan ID 05/03/2024 R BreastBV-3D   Final    Plan Fractions Treated to Date 05/03/2024 2   Final    Plan Total  Fractions Prescribed 05/03/2024 4   Final    Plan Prescribed Dose Per Fraction 05/03/2024 2.5  Gy Final    Plan Total Prescribed Dose 05/03/2024 1,000  cGy Final    Plan Primary Reference Point 05/03/2024 Rx: R Breast BV   Final   Orders Only on 05/02/2024   Component Date Value Ref Range Status    Course ID 05/02/2024 C1 R Breast   Final    Course Intent 05/02/2024 Curative   Final    Course Start Date 05/02/2024 3/27/2024 10:00 AM   Final    Course First Treatment Date 05/02/2024 4/2/2024  3:45 PM   Final    Course Last Treatment Date 05/02/2024 5/2/2024  4:31 PM   Final    Course Elapsed Days 05/02/2024 30   Final    Reference Point ID 05/02/2024 Rx: R Breast BV   Final    Reference Point Dosage Given to Da* 05/02/2024 2.5  Gy Final    Reference Point Session Dosage Giv* 05/02/2024 2.5  Gy Final    Plan ID 05/02/2024 R BreastBV-3D   Final    Plan Fractions Treated to Date 05/02/2024 1   Final    Plan Total Fractions Prescribed 05/02/2024 4   Final    Plan Prescribed Dose Per Fraction 05/02/2024 2.5  Gy Final    Plan Total Prescribed Dose 05/02/2024 1,000  cGy Final    Plan Primary Reference Point 05/02/2024 Rx: R Breast BV   Final   Orders Only on 05/01/2024   Component Date Value Ref Range Status    Course ID 05/01/2024 C1 R Breast   Final    Course Intent 05/01/2024 Curative   Final    Course Start Date 05/01/2024 3/27/2024 10:00 AM   Final    Course First Treatment Date 05/01/2024 4/2/2024  3:45 PM   Final    Course Last Treatment Date 05/01/2024 5/1/2024  4:24 PM   Final    Course Elapsed Days 05/01/2024 29   Final    Reference Point ID 05/01/2024 Rx: R Breast   Final    Reference Point Dosage Given to Da* 05/01/2024 42.56  Gy Final    Reference Point Session Dosage Giv* 05/01/2024 2.66  Gy Final    Plan ID 05/01/2024 R Breast-3D   Final    Plan Fractions Treated to Date 05/01/2024 16   Final    Plan Total Fractions Prescribed 05/01/2024 16   Final    Plan Prescribed Dose Per Fraction 05/01/2024 2.66  Gy  Final    Plan Total Prescribed Dose 05/01/2024 4,256  cGy Final    Plan Primary Reference Point 05/01/2024 Rx: R Breast   Final   Orders Only on 04/30/2024   Component Date Value Ref Range Status    Course ID 04/30/2024 C1 R Breast   Final    Course Intent 04/30/2024 Curative   Final    Course Start Date 04/30/2024 3/27/2024 10:00 AM   Final    Course First Treatment Date 04/30/2024 4/2/2024  3:45 PM   Final    Course Last Treatment Date 04/30/2024 4/30/2024  4:11 PM   Final    Course Elapsed Days 04/30/2024 28   Final    Reference Point ID 04/30/2024 Rx: R Breast   Final    Reference Point Dosage Given to Da* 04/30/2024 39.9  Gy Final    Reference Point Session Dosage Giv* 04/30/2024 2.66  Gy Final    Plan ID 04/30/2024 R Breast-3D   Final    Plan Fractions Treated to Date 04/30/2024 15   Final    Plan Total Fractions Prescribed 04/30/2024 16   Final    Plan Prescribed Dose Per Fraction 04/30/2024 2.66  Gy Final    Plan Total Prescribed Dose 04/30/2024 4,256  cGy Final    Plan Primary Reference Point 04/30/2024 Rx: R Breast   Final   Orders Only on 04/29/2024   Component Date Value Ref Range Status    Course ID 04/29/2024 C1 R Breast   Final    Course Intent 04/29/2024 Curative   Final    Course Start Date 04/29/2024 3/27/2024 10:00 AM   Final    Course First Treatment Date 04/29/2024 4/2/2024  3:45 PM   Final    Course Last Treatment Date 04/29/2024 4/29/2024  3:51 PM   Final    Course Elapsed Days 04/29/2024 27   Final    Reference Point ID 04/29/2024 Rx: R Breast   Final    Reference Point Dosage Given to Da* 04/29/2024 37.24  Gy Final    Reference Point Session Dosage Giv* 04/29/2024 2.66  Gy Final    Plan ID 04/29/2024 R Breast-3D   Final    Plan Fractions Treated to Date 04/29/2024 14   Final    Plan Total Fractions Prescribed 04/29/2024 16   Final    Plan Prescribed Dose Per Fraction 04/29/2024 2.66  Gy Final    Plan Total Prescribed Dose 04/29/2024 4,256  cGy Final    Plan Primary Reference Point 04/29/2024  Rx: R Breast   Final        MRI Bone Marrow Blood Supply    Result Date: 4/24/2024  1.  1.7 cm area of T2 hyperintensity in the upper sternum without diffusion restriction, new from 5/11/2022. This is nonspecific and indeterminate but could represent a myeloma lesion. Attention on follow-up, defer to clinical team for follow-up timeline. 2.  2.7 x 2.3 x 2.2 cm cyst in the right axillary fat without diffusion restriction, new from 5/11/2022. ATTESTATION: I, Radha Joe, as teaching physician have reviewed the images, if any, for this patient's exam, and if necessary, have edited the report originally created by Taniya Whalen.        Assessment & Plan       *Right breast invasive ductal carcinoma  Tumor measures 21 mm on the ultrasound and 21 mm on the MRI but on physical exam measuring up to 3.5 cm.  Clinical T2 N0 M0  Invasive ductal carcinoma, grade 2, ER +99% strong, IN intermediate, 10%, HER2 1+ immunohistochemistry, Ki-67 30%  Prognostic stage Ib  Status post right breast lumpectomy and sentinel lymph node biopsy on 3/6/2024.  Pathology shows invasive ductal carcinoma, tumor measures 21 mm, 28 mm of DCIS, margins negative, grade 2, ER strongly positive at 99%, IN 10% and HER2 1+ with a Ki-67 of 30%.  Oncotype DX recurrence score of 12, less than 1% benefit from chemotherapy and 3% risk of distant metastasis at 9 years with AI or tamoxifen alone.  Given that the Oncotype DX recurrence score is low there is no additional benefit from chemotherapy I recommend that she proceed with adjuvant radiation followed by adjuvant endocrine therapy.  She completed adjuvant radiation to the right breast in May 2024.  She has not started anastrozole yet.  Recommend that she start anastrozole now.    *Multiple myeloma  IgA kappa multiple myeloma diagnosed in 2015 treated with RVD  Stem cell transplant at [facility] in March 2016  Maintenance Velcade and Revlimid and dexamethasone started May 2016  Velcade discontinued because of  neurotoxicity in May 2019  Small IgG kappa paraprotein seen on blood work in October 2019 and PET scan was performed which was negative.  This paraprotein subsequently resolved  Currently receiving Zometa every 3 months  Currently on Revlimid 5 mg 3 out of 4 weeks, dose decreased in November 23 for worsening neuropathy and stable disease.  She sees Dr. Alexey Xavier every 6 months.  Dr. Rosales has discussed with Dr. Xavier, based on her documentation he recommends holding Revlimid 1 week prior to surgery.  Revlimid is associated with secondary malignancies however breast cancer is not typically seen with this.  Therefore I think we should resume Revlimid after completion of breast surgery and continue while she is on endocrine therapy.  4/23/2024-Notes from  reviewed, MRI from 4/23/2024 reviewed.  Myeloma labs reviewed.  Myeloma labs remain negative.  There is a T2 hyperintense lesion on the sternum on the MRI.  This is indeterminate and follow-up is recommended.  She is being recommended to start back on Revlimid 7.5 mg 21 out of 28 days and also being recommended to start prophylactic Eliquis or Xarelto.  The prophylaxis has been recommended with the concern of DVT PE associated with endocrine therapy along with her age as well as multiple myeloma and use of Revlimid.  Use of Revlimid and her age do pose an increased risk of DVT PE however she will be on anastrozole which does not necessarily increase the risk of DVT PE.  Patient plans to call her myeloma physician and clarify the need for prophylaxis with Eliquis or Xarelto.  She will continue on baby aspirin.    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  Stable    *Diarrhea-improved    *Anxiety  Severe  Related to recent diagnosis of breast cancer  We discussed briefly supportive oncology referral today.    Anxiety much better today    *Bone health  8/6/2020 DEXA scan shows osteopenia  with a T-score of -0.6 in the right femoral neck, T-score is -1.8 in the left femoral neck, T-score of 0.8 in the lumbar spine  Patient reports that she had a DEXA last year however I do not have the results of the same.  She continues on Zometa every 3 months.    *Follow-up-3 months with APRN in 6 months with MD    40 minutes total spent on the encounter including reviewing the medical records, face-to-face time, care coordination, documentation on the same day

## 2024-05-21 NOTE — PROGRESS NOTES
Staff message rec from [facility] Pharmacist that pt will be starting back on Lenalidomide at 7.5 mg daily for 21 days out of 28.    To get this dose-she can take three of the 2.5 mg caps to avoid having 2 different strengths and double copay. I have submitted the PA to [employer] through [employer].     The request is approved but the test claim is still rejecting for the Qty (63 for 28 day supply). This exceeds the plans limits. I will call to get a qty limit override with [facility].    Brandi Gilliam, [facility] sent to Sujey Randall, Pharmacy Technician         Previous Messages       ----- Message -----  From: Cahrlotte Tellez RN  Sent: 5/21/2024   1:15 PM EDT  To: [facility] Oral Onc Pool    Her Myeloma specialist is recommending she resume revlimid at 7.5mg 21/28 days.    Thank you     Sujey Randall, Pharmacy Technician  Specialty Pharmacy Technician

## 2024-05-21 NOTE — PROGRESS NOTES
Specialty Pharmacy Note: Revlimid (lenalidomide)    Jennifer Plascencia is a 74 y.o. female with multiple myeloma and breast cancer was seen today by Dr. Flanagan. Per provider dictation, resume Revlimid 7.5 mg 21 out of 28 days per recommendation by Dr. Hawthorne.  Labs Review: The CMP and CBC from today have been reviewed. No dose adjustments are needed for the oral specialty medication(s) based on the labs.    Specialty pharmacy will continue to follow patient.      Felix Gilliam, Angle, [facility]  Clinical Oncology Pharmacist    5/21/2024  14:59 EDT

## 2024-05-22 ENCOUNTER — SPECIALTY PHARMACY (OUTPATIENT)
Dept: PHARMACY | Facility: HOSPITAL | Age: 74
End: 2024-05-22
Payer: MEDICARE

## 2024-05-22 LAB
ALBUMIN SERPL ELPH-MCNC: 3.5 G/DL (ref 2.9–4.4)
ALBUMIN/GLOB SERPL: 1.3 {RATIO} (ref 0.7–1.7)
ALPHA1 GLOB SERPL ELPH-MCNC: 0.2 G/DL (ref 0–0.4)
ALPHA2 GLOB SERPL ELPH-MCNC: 0.8 G/DL (ref 0.4–1)
B-GLOBULIN SERPL ELPH-MCNC: 0.9 G/DL (ref 0.7–1.3)
GAMMA GLOB SERPL ELPH-MCNC: 0.7 G/DL (ref 0.4–1.8)
GLOBULIN SER-MCNC: 2.7 G/DL (ref 2.2–3.9)
IGA SERPL-MCNC: 85 MG/DL (ref 64–422)
IGG SERPL-MCNC: 836 MG/DL (ref 586–1602)
IGM SERPL-MCNC: 26 MG/DL (ref 26–217)
INTERPRETATION SERPL IEP-IMP: ABNORMAL
KAPPA LC FREE SER-MCNC: 23.2 MG/L (ref 3.3–19.4)
KAPPA LC FREE/LAMBDA FREE SER: 0.93 {RATIO} (ref 0.26–1.65)
LABORATORY COMMENT REPORT: ABNORMAL
LAMBDA LC FREE SERPL-MCNC: 25 MG/L (ref 5.7–26.3)
M PROTEIN SERPL ELPH-MCNC: ABNORMAL G/DL
PROT SERPL-MCNC: 6.2 G/DL (ref 6–8.5)

## 2024-05-22 RX ORDER — LENALIDOMIDE 2.5 MG/1
7.5 CAPSULE ORAL DAILY
Qty: 63 CAPSULE | Refills: 0 | Status: SHIPPED | OUTPATIENT
Start: 2024-05-22

## 2024-05-22 NOTE — PROGRESS NOTES
Specialty Pharmacy Note: Revlimid (lenalidomide)    Drug: Revlimid (lenalidomide)  Strength: 2.5 mg  Directions: Take 3 capsules by mouth daily. Take for 21 days on then 7 days off.  Quantity: 63  Refills: 0  [facility]    Completed independent double check on medication order/RX.        Thanks,    Kristen HALL, PharmD

## 2024-05-22 NOTE — PROGRESS NOTES
Re: Refills of Oral Specialty Medication - Revlimid (lenalidomide)    Drug-Drug Interactions: The current medication list was reviewed and there are no relevant drug-drug interactions with the specialty medication.  Medication Allergies: The patient has NKDA  Review of Labs/Dose Adjustments: DOSE CHANGE - I reviewed the most recent note and labs. Due to provider preference for maintenance the dose is being increased. I sent refills as described below.    Drug: Revlimid (lenalidomide)  Strength: 2.5 mg  Directions: Take 3 capsules by mouth daily on days 1-21 of each 28 day cycle  Quantity: 63  Refills: 0  Pharmacy prescription sent to: [facility]    Name/Credentials: Felix Gilliam, PharmD, BCOP  Clinical Oncology Pharmacist    5/22/2024  14:01 EDT

## 2024-05-22 NOTE — PROGRESS NOTES
I contacted [employer] this morning to get the Qty exceeds override. I was transferred 5 times with the last call being a transfer to [facility].    I called [employer] again ([phone]) and followed the prompts for PA. I spoke to Katelyn who was not able to access pts account as she has a Part D plan. I was transferred to Saud ([phone]) who was able to complete the Qty exceeds review with me over the phone. The Qty exceeds request has been approved. Case $ [id number]. He ran a test claim to make sure it went through. Now the rejection is a cost exceeds max that needs to be completed by the dispensing pharmacy.    I have notified [facility] Pharmacist to send the rx to [facility] and I will follow for delivery.    Sujey Randall, Pharmacy Technician  Specialty Pharmacy Technician

## 2024-05-23 ENCOUNTER — TELEPHONE (OUTPATIENT)
Dept: ONCOLOGY | Facility: CLINIC | Age: 74
End: 2024-05-23
Payer: MEDICARE

## 2024-05-23 NOTE — TELEPHONE ENCOUNTER
Caller: [facility], [facility] ([place] - [place], [place] - [address], [address] - [phone] [facility] [phone] FX    Relationship: Pharmacy  [facility]     Best call back number: [phone]  EXT [id number]      What was the call regarding: PHARMACY CALLED REGARDING AUTH FOR THE REVLIMID. THEY ARE STATING WE NEED TO CALL THE INSURANCE TO GIVE THE QUANTITY AMOUNT    PLEASE CALL [phone]

## 2024-05-24 NOTE — TELEPHONE ENCOUNTER
Caller: [facility]    Relationship: Pharmacy [facility]     Best call back number: [phone]    What test/procedure requested: PRIOR AUTH FOR REVLIMID     When is it needed: NEEDING PRIOR AUTH FOR REVLIMID MEDICATION     OVERRIDE WAS SUBMITTED  BUT TRIED TO REPROCESS AND STILL STATING NEEDS A PRIOR AUTH    CALLING TO LET KNOW THIS AND VERIFY WHEN THIS WOULD BE SUBMITTED

## 2024-05-24 NOTE — TELEPHONE ENCOUNTER
HUB call from [employer] forwarded to me-they were calling again stating the medication needed a PA.    I contacted [facility] PA dept [phone] and spoke to Gerry. I inquired on a PA and Qty override for pts Revlimid 2.5 mg #63 for 28 days.    She states the medication is approved for 1 cap per day. I asked about the Qty override-she stated she did not see one on file.    I explained I called the other day and spoke to Emmanuel who put this in with a case # of [id number]. She reviewed this approval and stated that was for the 5 mg strength. I asked for this to be updated to the 2.5 mg Qty 63 for 28 days.     She has updated the request-new approval case # [id number] and effective from 4/24/24-5/24/25. The medication will need a cost exceeds max done by the dispensing pharmacy.    I have notified Rocio Paz and Kathleen (MADYSON RN) at [employer].

## 2024-05-28 ENCOUNTER — SPECIALTY PHARMACY (OUTPATIENT)
Dept: PHARMACY | Facility: HOSPITAL | Age: 74
End: 2024-05-28
Payer: MEDICARE

## 2024-05-28 RX ORDER — LENALIDOMIDE 2.5 MG/1
7.5 CAPSULE ORAL DAILY
Qty: 63 CAPSULE | Refills: 0 | Status: SHIPPED | OUTPATIENT
Start: 2024-05-28

## 2024-05-28 NOTE — PROGRESS NOTES
Call rec from Belkys at [facility]-she was inquiring on the Revlimid rx that was sent to [facility]. I explained our contract with [facility] for our [facility] products.    She stated they were trying to get a cost exceeds override for the Revlimid and questioned the dosing. The dosing sent in was explained to her. She proceeded to inform me that pt called concerned about the price and notification that [facility] was not able to get the cost exceeds override and preferred to fill with [facility]. Belkys states she will follow up with [facility] on pts behalf.    I have requested for the rx to be sent to [facility].     Sujey Randall, Pharmacy Technician  Specialty Pharmacy Technician

## 2024-05-28 NOTE — PROGRESS NOTES
Re: Refills of Oral Specialty Medication - Revlimid (lenalidomide)     Drug-Drug Interactions: The current medication list was reviewed and there are no relevant drug-drug interactions with the specialty medication.  Medication Allergies: The patient has NKDA  Review of Labs/Dose Adjustments: DOSE CHANGE - I reviewed the most recent note and labs. Due to provider preference for maintenance the dose is being increased. I sent refills as described below.     Drug: Revlimid (lenalidomide)  Strength: 2.5 mg  Directions: Take 3 capsules by mouth daily on days 1-21 of each 28 day cycle  Quantity: 63  Refills: 0  Pharmacy prescription sent to: [facility]    Name/Credentials: Sandra Hernandez, CelestinaD, BCPS    5/28/2024  14:47 EDT

## 2024-05-29 ENCOUNTER — SPECIALTY PHARMACY (OUTPATIENT)
Dept: PHARMACY | Facility: HOSPITAL | Age: 74
End: 2024-05-29
Payer: MEDICARE

## 2024-05-29 NOTE — PROGRESS NOTES
Per Zbigniew at [facility] (communicated through Provider Portal secure chat)-the Revlimid rx has been rec and marked urgent for processing. He has requested 24-48 hours for completion. I will check with [facility] tomorrow.     Sujey Randall, Pharmacy Technician  Specialty Pharmacy Technician

## 2024-05-29 NOTE — PROGRESS NOTES
Drug: Revlimid (lenalidomide)  Strength: 2.5 mg  Directions: Take 3 capsules by mouth daily on days 1-21 of each 28 day cycle  Quantity: 63  Refills: 0  Pharmacy prescription sent to: [facility]    Completed independent double check on medication order/RX.  Felix Gilliam, CelestinaD, BCOP  Clinical Oncology Pharmacist

## 2024-06-03 ENCOUNTER — SPECIALTY PHARMACY (OUTPATIENT)
Dept: PHARMACY | Facility: HOSPITAL | Age: 74
End: 2024-06-03
Payer: MEDICARE

## 2024-06-03 NOTE — PROGRESS NOTES
Revlimid 2.5 mg supply from [facility] delivered to pt on 5/29/2024.     Sujey Randall, Pharmacy Technician  Specialty Pharmacy Technician

## 2024-06-04 ENCOUNTER — PATIENT OUTREACH (OUTPATIENT)
Dept: OTHER | Facility: HOSPITAL | Age: 74
End: 2024-06-04
Payer: MEDICARE

## 2024-06-04 RX ORDER — LENALIDOMIDE 2.5 MG/1
CAPSULE ORAL
Refills: 0 | OUTPATIENT
Start: 2024-06-04

## 2024-06-04 NOTE — TELEPHONE ENCOUNTER
Revlimid refill request rec electronically from [facility]. Pt just rec a delivery from them on 5/29/2024. New rx will be sent when [employer] Provider Authorization opens. Request denied

## 2024-06-17 DIAGNOSIS — C90.00 MULTIPLE MYELOMA NOT HAVING ACHIEVED REMISSION: Primary | ICD-10-CM

## 2024-06-18 ENCOUNTER — INFUSION (OUTPATIENT)
Dept: ONCOLOGY | Facility: HOSPITAL | Age: 74
End: 2024-06-18
Payer: MEDICARE

## 2024-06-18 ENCOUNTER — SPECIALTY PHARMACY (OUTPATIENT)
Dept: PHARMACY | Facility: HOSPITAL | Age: 74
End: 2024-06-18
Payer: MEDICARE

## 2024-06-18 DIAGNOSIS — C90.00 NEUROPATHY ASSOCIATED WITH MULTIPLE MYELOMA: Primary | ICD-10-CM

## 2024-06-18 DIAGNOSIS — G63 NEUROPATHY ASSOCIATED WITH MULTIPLE MYELOMA: Primary | ICD-10-CM

## 2024-06-18 RX ORDER — CYANOCOBALAMIN 1000 UG/ML
1000 INJECTION, SOLUTION INTRAMUSCULAR; SUBCUTANEOUS ONCE
Status: DISCONTINUED | OUTPATIENT
Start: 2024-06-18 | End: 2024-06-19 | Stop reason: HOSPADM

## 2024-06-18 RX ORDER — LENALIDOMIDE 2.5 MG/1
CAPSULE ORAL
Qty: 63 CAPSULE | Refills: 0 | Status: SHIPPED | OUTPATIENT
Start: 2024-06-18

## 2024-06-18 NOTE — PROGRESS NOTES
Re: Refills of Oral Specialty Medication - Revlimid (lenalidomide)    Drug-Drug Interactions: The current medication list was reviewed and there are no relevant drug-drug interactions with the specialty medication.  Medication Allergies: The patient has NKDA  Review of Labs/Dose Adjustments: NO DOSE CHANGE - I reviewed the most recent note and labs and the patient will continue without any dose changes.  I sent refills as described below.    Drug: Revlimid (lenalidomide)  Strength: 2.5 mg  Directions: Take three capsules by mouth daily for 21 days on then 7 days off  Quantity: 63  Refills: 0  Pharmacy prescription sent to: [facility]    Name/Credentials: Sandra Hernandez, CelestinaD, BCPS    6/18/2024  10:09 EDT

## 2024-06-18 NOTE — PROGRESS NOTES
Drug: Revlimid (lenalidomide)  Strength: 2.5 mg  Directions: Take three capsules by mouth daily for 21 days on then 7 days off  Quantity: 63  Refills: 0  Pharmacy prescription sent to: [facility]    Completed independent double check on medication order/RX.  Felix Gilliam, PharmD, BCOP  Clinical Oncology Pharmacist

## 2024-06-20 ENCOUNTER — SPECIALTY PHARMACY (OUTPATIENT)
Dept: ONCOLOGY | Facility: HOSPITAL | Age: 74
End: 2024-06-20
Payer: MEDICARE

## 2024-06-20 ENCOUNTER — INFUSION (OUTPATIENT)
Dept: ONCOLOGY | Facility: HOSPITAL | Age: 74
End: 2024-06-20
Payer: MEDICARE

## 2024-06-20 ENCOUNTER — LAB (OUTPATIENT)
Dept: LAB | Facility: HOSPITAL | Age: 74
End: 2024-06-20
Payer: MEDICARE

## 2024-06-20 DIAGNOSIS — C90.00 MULTIPLE MYELOMA NOT HAVING ACHIEVED REMISSION: ICD-10-CM

## 2024-06-20 DIAGNOSIS — Z17.0 MALIGNANT NEOPLASM OF UPPER-OUTER QUADRANT OF RIGHT BREAST IN FEMALE, ESTROGEN RECEPTOR POSITIVE: ICD-10-CM

## 2024-06-20 DIAGNOSIS — C90.00 NEUROPATHY ASSOCIATED WITH MULTIPLE MYELOMA: Primary | ICD-10-CM

## 2024-06-20 DIAGNOSIS — Z79.899 HIGH RISK MEDICATION USE: ICD-10-CM

## 2024-06-20 DIAGNOSIS — C90.00 MULTIPLE MYELOMA NOT HAVING ACHIEVED REMISSION: Primary | ICD-10-CM

## 2024-06-20 DIAGNOSIS — G63 NEUROPATHY ASSOCIATED WITH MULTIPLE MYELOMA: Primary | ICD-10-CM

## 2024-06-20 DIAGNOSIS — C50.411 MALIGNANT NEOPLASM OF UPPER-OUTER QUADRANT OF RIGHT BREAST IN FEMALE, ESTROGEN RECEPTOR POSITIVE: ICD-10-CM

## 2024-06-20 LAB
ALBUMIN SERPL-MCNC: 3.9 G/DL (ref 3.5–5.2)
ALBUMIN/GLOB SERPL: 1.8 G/DL
ALP SERPL-CCNC: 63 U/L (ref 39–117)
ALT SERPL W P-5'-P-CCNC: 14 U/L (ref 1–33)
ANION GAP SERPL CALCULATED.3IONS-SCNC: 9.6 MMOL/L (ref 5–15)
AST SERPL-CCNC: 16 U/L (ref 1–32)
B2 MICROGLOB SERPL-MCNC: 2.5 MG/L (ref 0.8–2.2)
BASOPHILS # BLD AUTO: 0.05 10*3/MM3 (ref 0–0.2)
BASOPHILS NFR BLD AUTO: 1.1 % (ref 0–1.5)
BILIRUB SERPL-MCNC: 0.2 MG/DL (ref 0–1.2)
BUN SERPL-MCNC: 14 MG/DL (ref 8–23)
BUN/CREAT SERPL: 14.6 (ref 7–25)
CALCIUM SPEC-SCNC: 8.5 MG/DL (ref 8.6–10.5)
CHLORIDE SERPL-SCNC: 105 MMOL/L (ref 98–107)
CO2 SERPL-SCNC: 26.4 MMOL/L (ref 22–29)
CREAT SERPL-MCNC: 0.96 MG/DL (ref 0.57–1)
DEPRECATED RDW RBC AUTO: 51.3 FL (ref 37–54)
EGFRCR SERPLBLD CKD-EPI 2021: 61.8 ML/MIN/1.73
EOSINOPHIL # BLD AUTO: 0.41 10*3/MM3 (ref 0–0.4)
EOSINOPHIL NFR BLD AUTO: 9.2 % (ref 0.3–6.2)
ERYTHROCYTE [DISTWIDTH] IN BLOOD BY AUTOMATED COUNT: 14 % (ref 12.3–15.4)
GLOBULIN UR ELPH-MCNC: 2.2 GM/DL
GLUCOSE SERPL-MCNC: 95 MG/DL (ref 65–99)
HCT VFR BLD AUTO: 35.7 % (ref 34–46.6)
HGB BLD-MCNC: 11.9 G/DL (ref 12–15.9)
IMM GRANULOCYTES # BLD AUTO: 0.01 10*3/MM3 (ref 0–0.05)
IMM GRANULOCYTES NFR BLD AUTO: 0.2 % (ref 0–0.5)
LYMPHOCYTES # BLD AUTO: 1.52 10*3/MM3 (ref 0.7–3.1)
LYMPHOCYTES NFR BLD AUTO: 33.9 % (ref 19.6–45.3)
MAGNESIUM SERPL-MCNC: 2 MG/DL (ref 1.6–2.4)
MCH RBC QN AUTO: 33.2 PG (ref 26.6–33)
MCHC RBC AUTO-ENTMCNC: 33.3 G/DL (ref 31.5–35.7)
MCV RBC AUTO: 99.7 FL (ref 79–97)
MONOCYTES # BLD AUTO: 0.61 10*3/MM3 (ref 0.1–0.9)
MONOCYTES NFR BLD AUTO: 13.6 % (ref 5–12)
NEUTROPHILS NFR BLD AUTO: 1.88 10*3/MM3 (ref 1.7–7)
NEUTROPHILS NFR BLD AUTO: 42 % (ref 42.7–76)
NRBC BLD AUTO-RTO: 0 /100 WBC (ref 0–0.2)
PHOSPHATE SERPL-MCNC: 3.4 MG/DL (ref 2.5–4.5)
PLATELET # BLD AUTO: 225 10*3/MM3 (ref 140–450)
PMV BLD AUTO: 9.3 FL (ref 6–12)
POTASSIUM SERPL-SCNC: 3.9 MMOL/L (ref 3.5–5.2)
PROT SERPL-MCNC: 6.1 G/DL (ref 6–8.5)
RBC # BLD AUTO: 3.58 10*6/MM3 (ref 3.77–5.28)
SODIUM SERPL-SCNC: 141 MMOL/L (ref 136–145)
WBC NRBC COR # BLD AUTO: 4.48 10*3/MM3 (ref 3.4–10.8)

## 2024-06-20 PROCEDURE — 80053 COMPREHEN METABOLIC PANEL: CPT

## 2024-06-20 PROCEDURE — 83735 ASSAY OF MAGNESIUM: CPT

## 2024-06-20 PROCEDURE — 25010000002 CYANOCOBALAMIN PER 1000 MCG: Performed by: NURSE PRACTITIONER

## 2024-06-20 PROCEDURE — 84100 ASSAY OF PHOSPHORUS: CPT

## 2024-06-20 PROCEDURE — 96372 THER/PROPH/DIAG INJ SC/IM: CPT

## 2024-06-20 PROCEDURE — 36415 COLL VENOUS BLD VENIPUNCTURE: CPT

## 2024-06-20 PROCEDURE — 82232 ASSAY OF BETA-2 PROTEIN: CPT | Performed by: INTERNAL MEDICINE

## 2024-06-20 PROCEDURE — 85025 COMPLETE CBC W/AUTO DIFF WBC: CPT

## 2024-06-20 RX ORDER — CYANOCOBALAMIN 1000 UG/ML
1000 INJECTION, SOLUTION INTRAMUSCULAR; SUBCUTANEOUS ONCE
Status: COMPLETED | OUTPATIENT
Start: 2024-06-20 | End: 2024-06-20

## 2024-06-20 RX ORDER — CYANOCOBALAMIN 1000 UG/ML
1000 INJECTION, SOLUTION INTRAMUSCULAR; SUBCUTANEOUS ONCE
Status: DISCONTINUED | OUTPATIENT
Start: 2024-06-20 | End: 2024-06-21 | Stop reason: HOSPADM

## 2024-06-20 RX ADMIN — CYANOCOBALAMIN 1000 MCG: 1000 INJECTION, SOLUTION INTRAMUSCULAR at 15:52

## 2024-06-20 NOTE — PROGRESS NOTES
Specialty Pharmacy Patient Management Program  Oncology 6-Month Clinical Assessment       Jennifer Plascencia is a 74 y.o. female with multiple myeloma and breast cancer seen today to assess adherence and side effects.    Regimen: Revlimid 7.5 mg po daily 21/28 days    Reason for Outreach: Routine medication check-in .       Problem list reviewed by Snadra Hernandez RPH on 6/20/2024 at  4:00 PM  Medicines reviewed by Sandra Hernandez RPH on 6/20/2024 at  4:00 PM  Allergies reviewed by Sandra Hernandez RPH on 6/20/2024 at  4:00 PM     Goals Addressed Today        Specialty Pharmacy General Goal      Progression free survival             Medication Assessment:  Medication Assessment  Follow Up Clinical Assessment  Linked Medication(s) Assessed: Lenalidomide  Therapeutic appropriateness: Appropriate  Medication tolerability: Tolerating with no to minimal ADRs  Medication plan: Continue therapy with normal follow-up  Quality of Life Improvement Scale: 5-No change  Administration: Patient is taking every day at the same time  and as prescribed .   Patient can self administer medications: yes  Medication Follow-Up Plan: Next clinical assessment  Lab Review: The labs listed below have been reviewed. No dose adjustments are needed for the oral specialty medication(s) based on the labs.    Lab Results   Component Value Date    GLUCOSE 95 06/20/2024    CALCIUM 8.5 (L) 06/20/2024     06/20/2024    K 3.9 06/20/2024    CO2 26.4 06/20/2024     06/20/2024    BUN 14 06/20/2024    CREATININE 0.96 06/20/2024    EGFRIFAFRI 108 09/18/2018    EGFRIFNONA 61 02/08/2022    BCR 14.6 06/20/2024    ANIONGAP 9.6 06/20/2024     Lab Results   Component Value Date    WBC 4.48 06/20/2024    RBC 3.58 (L) 06/20/2024    HGB 11.9 (L) 06/20/2024    HCT 35.7 06/20/2024    MCV 99.7 (H) 06/20/2024    MCH 33.2 (H) 06/20/2024    MCHC 33.3 06/20/2024    RDW 14.0 06/20/2024    RDWSD 51.3 06/20/2024    MPV 9.3 06/20/2024     06/20/2024     NEUTRORELPCT 42.0 (L) 06/20/2024    LYMPHORELPCT 33.9 06/20/2024    MONORELPCT 13.6 (H) 06/20/2024    EOSRELPCT 9.2 (H) 06/20/2024    BASORELPCT 1.1 06/20/2024    AUTOIGPER 0.2 06/20/2024    NEUTROABS 1.88 06/20/2024    LYMPHSABS 1.52 06/20/2024    MONOSABS 0.61 06/20/2024    EOSABS 0.41 (H) 06/20/2024    BASOSABS 0.05 06/20/2024    AUTOIGNUM 0.01 06/20/2024    NRBC 0.0 06/20/2024     Drug-drug interactions  Completed medication reconciliation today to assess for drug interactions. Patient denies starting or stopping any medications.    Assessed medication list for interactions, no significant drug interactions noted.   Advised patient to call the clinic if any new medications are started so we can assess for drug-drug interactions.  Drug-food interactions discussed:  none  Vaccines are coordinated by the patient's oncologist and primary care provider.    Allergies  Known allergies and reactions were discussed with the patient. The patient's chart has been reviewed for allergy information and updated as necessary.   No Known Allergies     Hospitalizations and Urgent Care Visits Since Last Assessment:  Unplanned hospitalizations or inpatient admissions: no  ED Visits: no  Urgent Office Visits: no    Adherence Assessment:  Adherence Questions  Linked Medication(s) Assessed: Lenalidomide  On average, how many doses/injections does the patient miss per month?: 0  What are the identified reasons for non-adherence or missed doses? : no problems identified  What is the estimated medication adherence level?: %  Based on the patient/caregiver response and refill history, does this patient require an MTP to track adherence improvements?: no    Quality of Life Assessment:  Quality of Life Assessment  Quality of Life Improvement Scale: 5-No change  -- Quality of Life: 7/10    Financial Assessment:  Medication availability/affordability: Patient has had no issues obtaining medication from pharmacy.    Attestation     I  attest the patient was actively involved in and has agreed to the above plan of care.  If the prescribed therapy is at any point deemed not appropriate based on the current or future assessments, a consultation will be initiated with the patient's specialty care provider to determine the best course of action. The revised plan of therapy will be documented along with any required assessments and/or additional patient education provided.       All questions addressed and patient had no additional concerns. Patient has pharmacy contact information.    Name/Credentials: Sandra Hernandez, Angle, BCPS    6/20/2024  16:01 EDT

## 2024-06-24 LAB
ALBUMIN SERPL ELPH-MCNC: 3.6 G/DL (ref 2.9–4.4)
ALBUMIN/GLOB SERPL: 1.6 {RATIO} (ref 0.7–1.7)
ALPHA1 GLOB SERPL ELPH-MCNC: 0.2 G/DL (ref 0–0.4)
ALPHA2 GLOB SERPL ELPH-MCNC: 0.7 G/DL (ref 0.4–1)
B-GLOBULIN SERPL ELPH-MCNC: 0.8 G/DL (ref 0.7–1.3)
GAMMA GLOB SERPL ELPH-MCNC: 0.7 G/DL (ref 0.4–1.8)
GLOBULIN SER-MCNC: 2.4 G/DL (ref 2.2–3.9)
IGA SERPL-MCNC: 87 MG/DL (ref 64–422)
IGG SERPL-MCNC: 822 MG/DL (ref 586–1602)
IGM SERPL-MCNC: 25 MG/DL (ref 26–217)
INTERPRETATION SERPL IEP-IMP: ABNORMAL
KAPPA LC FREE SER-MCNC: 28.4 MG/L (ref 3.3–19.4)
KAPPA LC FREE/LAMBDA FREE SER: 1.01 {RATIO} (ref 0.26–1.65)
LABORATORY COMMENT REPORT: ABNORMAL
LAMBDA LC FREE SERPL-MCNC: 28.2 MG/L (ref 5.7–26.3)
M PROTEIN SERPL ELPH-MCNC: ABNORMAL G/DL
PROT SERPL-MCNC: 6 G/DL (ref 6–8.5)

## 2024-07-06 LAB
LAB AP CASE REPORT: NORMAL
LAB AP CLINICAL INFORMATION: NORMAL
LAB AP SPECIAL STAINS: NORMAL
LAB AP SYNOPTIC CHECKLIST: NORMAL
Lab: NORMAL
Lab: NORMAL
PATH REPORT.ADDENDUM SPEC: NORMAL
PATH REPORT.FINAL DX SPEC: NORMAL
PATH REPORT.GROSS SPEC: NORMAL

## 2024-07-10 ENCOUNTER — TELEPHONE (OUTPATIENT)
Dept: ONCOLOGY | Facility: CLINIC | Age: 74
End: 2024-07-10
Payer: MEDICARE

## 2024-07-10 NOTE — TELEPHONE ENCOUNTER
Provider: Panchito  Caller: patient  Relationship to Patient: self  Call Back Phone Number: [phone]  Reason for Call: Pt calling regarding her upcoming appt. Has question about labs as well.

## 2024-07-10 NOTE — TELEPHONE ENCOUNTER
Called patient, no answer and VM is full, unable to leave message.  Calling regarding her questions about her upcoming appts.

## 2024-07-16 ENCOUNTER — TELEPHONE (OUTPATIENT)
Dept: ONCOLOGY | Facility: CLINIC | Age: 74
End: 2024-07-16

## 2024-07-16 ENCOUNTER — INFUSION (OUTPATIENT)
Dept: ONCOLOGY | Facility: HOSPITAL | Age: 74
End: 2024-07-16
Payer: MEDICARE

## 2024-07-16 ENCOUNTER — SPECIALTY PHARMACY (OUTPATIENT)
Dept: PHARMACY | Facility: HOSPITAL | Age: 74
End: 2024-07-16
Payer: MEDICARE

## 2024-07-16 DIAGNOSIS — G63 NEUROPATHY ASSOCIATED WITH MULTIPLE MYELOMA: Primary | ICD-10-CM

## 2024-07-16 DIAGNOSIS — C90.00 NEUROPATHY ASSOCIATED WITH MULTIPLE MYELOMA: Primary | ICD-10-CM

## 2024-07-16 DIAGNOSIS — C90.00 MULTIPLE MYELOMA NOT HAVING ACHIEVED REMISSION: ICD-10-CM

## 2024-07-16 RX ORDER — LENALIDOMIDE 2.5 MG/1
7.5 CAPSULE ORAL DAILY
Qty: 63 CAPSULE | Refills: 0 | Status: SHIPPED | OUTPATIENT
Start: 2024-07-16

## 2024-07-16 RX ORDER — CYANOCOBALAMIN 1000 UG/ML
1000 INJECTION, SOLUTION INTRAMUSCULAR; SUBCUTANEOUS ONCE
Status: DISCONTINUED | OUTPATIENT
Start: 2024-07-16 | End: 2024-07-17 | Stop reason: HOSPADM

## 2024-07-16 NOTE — TELEPHONE ENCOUNTER
Provider: Panchito  Caller: patient  Relationship to Patient: self  Call Back Phone Number: [phone]  Reason for Call: Pt has questions about chemo pill.

## 2024-07-16 NOTE — PROGRESS NOTES
Re: Refills of Oral Specialty Medication - Revlimid (lenalidomide)    Drug-Drug Interactions: The current medication list was reviewed and there are no relevant drug-drug interactions with the specialty medication.  Medication Allergies: The patient has NKDA  Review of Labs/Dose Adjustments: NO DOSE CHANGE - I reviewed the most recent note and labs and the patient will continue without any dose changes.  I sent refills as described below.    Drug: Revlimid (lenalidomide)  Strength: 2.5 mg  Directions: Take three capsules by mouth daily for 21 days on then 7 days off.  Quantity: 63  Refills: 0  Pharmacy prescription sent to: [facility]    Name/Credentials: Sandra Hernandez, CelestinaD, BCPS    7/16/2024  12:40 EDT

## 2024-07-16 NOTE — PROGRESS NOTES
Drug: Revlimid (lenalidomide)  Strength: 2.5 mg  Directions: Take three capsules by mouth daily for 21 days on then 7 days off.  Quantity: 63  Refills: 0  Pharmacy prescription sent to: [facility]       Completed independent double check on medication order/RX.  Felix Gilliam, CelestinaD, BCOP  Clinical Oncology Pharmacist

## 2024-07-17 ENCOUNTER — INFUSION (OUTPATIENT)
Dept: ONCOLOGY | Facility: HOSPITAL | Age: 74
End: 2024-07-17
Payer: MEDICARE

## 2024-07-17 ENCOUNTER — SPECIALTY PHARMACY (OUTPATIENT)
Dept: PHARMACY | Facility: HOSPITAL | Age: 74
End: 2024-07-17
Payer: MEDICARE

## 2024-07-17 DIAGNOSIS — C90.00 NEUROPATHY ASSOCIATED WITH MULTIPLE MYELOMA: Primary | ICD-10-CM

## 2024-07-17 DIAGNOSIS — G63 NEUROPATHY ASSOCIATED WITH MULTIPLE MYELOMA: Primary | ICD-10-CM

## 2024-07-17 RX ORDER — CYANOCOBALAMIN 1000 UG/ML
1000 INJECTION, SOLUTION INTRAMUSCULAR; SUBCUTANEOUS ONCE
Status: DISCONTINUED | OUTPATIENT
Start: 2024-07-17 | End: 2024-07-18 | Stop reason: HOSPADM

## 2024-07-17 NOTE — PROGRESS NOTES
Mrs. Plascencia called yesterday with questions about her anastrozole. I returned her call today and discussed some common side effects (hot flashes, muscle/joint pain, nausea, diarrhea). I also emailed her the [employer] sheet for anastrozole with added percentages indicating how common each side effect is. I encouraged Mrs. Plascencia to call back with any questions she had after reviewing the education sheet.    Vitaly Rutherford  Pharmacy Intern

## 2024-07-18 ENCOUNTER — LAB (OUTPATIENT)
Dept: LAB | Facility: HOSPITAL | Age: 74
End: 2024-07-18
Payer: MEDICARE

## 2024-07-18 ENCOUNTER — INFUSION (OUTPATIENT)
Dept: ONCOLOGY | Facility: HOSPITAL | Age: 74
End: 2024-07-18
Payer: MEDICARE

## 2024-07-18 DIAGNOSIS — C90.00 MULTIPLE MYELOMA NOT HAVING ACHIEVED REMISSION: ICD-10-CM

## 2024-07-18 DIAGNOSIS — Z79.899 HIGH RISK MEDICATION USE: ICD-10-CM

## 2024-07-18 DIAGNOSIS — C90.00 NEUROPATHY ASSOCIATED WITH MULTIPLE MYELOMA: Primary | ICD-10-CM

## 2024-07-18 DIAGNOSIS — C50.411 MALIGNANT NEOPLASM OF UPPER-OUTER QUADRANT OF RIGHT BREAST IN FEMALE, ESTROGEN RECEPTOR POSITIVE: ICD-10-CM

## 2024-07-18 DIAGNOSIS — Z17.0 MALIGNANT NEOPLASM OF UPPER-OUTER QUADRANT OF RIGHT BREAST IN FEMALE, ESTROGEN RECEPTOR POSITIVE: ICD-10-CM

## 2024-07-18 DIAGNOSIS — G63 NEUROPATHY ASSOCIATED WITH MULTIPLE MYELOMA: Primary | ICD-10-CM

## 2024-07-18 LAB
ALBUMIN SERPL-MCNC: 3.9 G/DL (ref 3.5–5.2)
ALBUMIN/GLOB SERPL: 1.6 G/DL
ALP SERPL-CCNC: 59 U/L (ref 39–117)
ALT SERPL W P-5'-P-CCNC: 9 U/L (ref 1–33)
ANION GAP SERPL CALCULATED.3IONS-SCNC: 12.4 MMOL/L (ref 5–15)
AST SERPL-CCNC: 22 U/L (ref 1–32)
B2 MICROGLOB SERPL-MCNC: 2 MG/L (ref 0.8–2.2)
BASOPHILS # BLD AUTO: 0.04 10*3/MM3 (ref 0–0.2)
BASOPHILS NFR BLD AUTO: 1 % (ref 0–1.5)
BILIRUB SERPL-MCNC: 0.2 MG/DL (ref 0–1.2)
BUN SERPL-MCNC: 13 MG/DL (ref 8–23)
BUN/CREAT SERPL: 16.7 (ref 7–25)
CALCIUM SPEC-SCNC: 8.6 MG/DL (ref 8.6–10.5)
CHLORIDE SERPL-SCNC: 102 MMOL/L (ref 98–107)
CO2 SERPL-SCNC: 24.6 MMOL/L (ref 22–29)
CREAT SERPL-MCNC: 0.78 MG/DL (ref 0.57–1)
DEPRECATED RDW RBC AUTO: 50.4 FL (ref 37–54)
EGFRCR SERPLBLD CKD-EPI 2021: 79.3 ML/MIN/1.73
EOSINOPHIL # BLD AUTO: 0.35 10*3/MM3 (ref 0–0.4)
EOSINOPHIL NFR BLD AUTO: 8.4 % (ref 0.3–6.2)
ERYTHROCYTE [DISTWIDTH] IN BLOOD BY AUTOMATED COUNT: 13.7 % (ref 12.3–15.4)
GLOBULIN UR ELPH-MCNC: 2.5 GM/DL
GLUCOSE SERPL-MCNC: 148 MG/DL (ref 65–99)
HCT VFR BLD AUTO: 37.1 % (ref 34–46.6)
HGB BLD-MCNC: 12 G/DL (ref 12–15.9)
IMM GRANULOCYTES # BLD AUTO: 0.01 10*3/MM3 (ref 0–0.05)
IMM GRANULOCYTES NFR BLD AUTO: 0.2 % (ref 0–0.5)
LYMPHOCYTES # BLD AUTO: 1.22 10*3/MM3 (ref 0.7–3.1)
LYMPHOCYTES NFR BLD AUTO: 29.3 % (ref 19.6–45.3)
MAGNESIUM SERPL-MCNC: 2 MG/DL (ref 1.6–2.4)
MCH RBC QN AUTO: 32.7 PG (ref 26.6–33)
MCHC RBC AUTO-ENTMCNC: 32.3 G/DL (ref 31.5–35.7)
MCV RBC AUTO: 101.1 FL (ref 79–97)
MONOCYTES # BLD AUTO: 0.51 10*3/MM3 (ref 0.1–0.9)
MONOCYTES NFR BLD AUTO: 12.3 % (ref 5–12)
NEUTROPHILS NFR BLD AUTO: 2.03 10*3/MM3 (ref 1.7–7)
NEUTROPHILS NFR BLD AUTO: 48.8 % (ref 42.7–76)
NRBC BLD AUTO-RTO: 0 /100 WBC (ref 0–0.2)
PHOSPHATE SERPL-MCNC: 3.4 MG/DL (ref 2.5–4.5)
PLATELET # BLD AUTO: 203 10*3/MM3 (ref 140–450)
PMV BLD AUTO: 9.6 FL (ref 6–12)
POTASSIUM SERPL-SCNC: 3.8 MMOL/L (ref 3.5–5.2)
PROT SERPL-MCNC: 6.4 G/DL (ref 6–8.5)
RBC # BLD AUTO: 3.67 10*6/MM3 (ref 3.77–5.28)
SODIUM SERPL-SCNC: 139 MMOL/L (ref 136–145)
WBC NRBC COR # BLD AUTO: 4.16 10*3/MM3 (ref 3.4–10.8)

## 2024-07-18 PROCEDURE — 85025 COMPLETE CBC W/AUTO DIFF WBC: CPT

## 2024-07-18 PROCEDURE — 80053 COMPREHEN METABOLIC PANEL: CPT

## 2024-07-18 PROCEDURE — 84100 ASSAY OF PHOSPHORUS: CPT

## 2024-07-18 PROCEDURE — 96372 THER/PROPH/DIAG INJ SC/IM: CPT

## 2024-07-18 PROCEDURE — 25010000002 CYANOCOBALAMIN PER 1000 MCG: Performed by: NURSE PRACTITIONER

## 2024-07-18 PROCEDURE — 36415 COLL VENOUS BLD VENIPUNCTURE: CPT

## 2024-07-18 PROCEDURE — 83735 ASSAY OF MAGNESIUM: CPT

## 2024-07-18 PROCEDURE — 82232 ASSAY OF BETA-2 PROTEIN: CPT | Performed by: INTERNAL MEDICINE

## 2024-07-18 RX ORDER — CYANOCOBALAMIN 1000 UG/ML
1000 INJECTION, SOLUTION INTRAMUSCULAR; SUBCUTANEOUS ONCE
Status: COMPLETED | OUTPATIENT
Start: 2024-07-18 | End: 2024-07-18

## 2024-07-18 RX ADMIN — CYANOCOBALAMIN 1000 MCG: 1000 INJECTION, SOLUTION INTRAMUSCULAR at 10:15

## 2024-07-19 LAB
ALBUMIN SERPL ELPH-MCNC: 3.7 G/DL (ref 2.9–4.4)
ALBUMIN/GLOB SERPL: 1.5 {RATIO} (ref 0.7–1.7)
ALPHA1 GLOB SERPL ELPH-MCNC: 0.2 G/DL (ref 0–0.4)
ALPHA2 GLOB SERPL ELPH-MCNC: 0.7 G/DL (ref 0.4–1)
B-GLOBULIN SERPL ELPH-MCNC: 0.9 G/DL (ref 0.7–1.3)
GAMMA GLOB SERPL ELPH-MCNC: 0.7 G/DL (ref 0.4–1.8)
GLOBULIN SER-MCNC: 2.5 G/DL (ref 2.2–3.9)
IGA SERPL-MCNC: 96 MG/DL (ref 64–422)
IGG SERPL-MCNC: 851 MG/DL (ref 586–1602)
IGM SERPL-MCNC: 26 MG/DL (ref 26–217)
INTERPRETATION SERPL IEP-IMP: ABNORMAL
KAPPA LC FREE SER-MCNC: 25.6 MG/L (ref 3.3–19.4)
KAPPA LC FREE/LAMBDA FREE SER: 0.92 {RATIO} (ref 0.26–1.65)
LABORATORY COMMENT REPORT: ABNORMAL
LAMBDA LC FREE SERPL-MCNC: 27.8 MG/L (ref 5.7–26.3)
M PROTEIN SERPL ELPH-MCNC: ABNORMAL G/DL
PROT SERPL-MCNC: 6.2 G/DL (ref 6–8.5)

## 2024-08-06 ENCOUNTER — TELEPHONE (OUTPATIENT)
Dept: RADIATION ONCOLOGY | Facility: HOSPITAL | Age: 74
End: 2024-08-06
Payer: MEDICARE

## 2024-08-14 ENCOUNTER — INFUSION (OUTPATIENT)
Dept: ONCOLOGY | Facility: HOSPITAL | Age: 74
End: 2024-08-14
Payer: MEDICARE

## 2024-08-14 ENCOUNTER — OFFICE VISIT (OUTPATIENT)
Dept: ONCOLOGY | Facility: CLINIC | Age: 74
End: 2024-08-14
Payer: MEDICARE

## 2024-08-14 VITALS
BODY MASS INDEX: 24.11 KG/M2 | OXYGEN SATURATION: 97 % | WEIGHT: 150 LBS | TEMPERATURE: 99 F | DIASTOLIC BLOOD PRESSURE: 70 MMHG | HEART RATE: 75 BPM | RESPIRATION RATE: 16 BRPM | SYSTOLIC BLOOD PRESSURE: 119 MMHG | HEIGHT: 66 IN

## 2024-08-14 DIAGNOSIS — C90.00 NEUROPATHY ASSOCIATED WITH MULTIPLE MYELOMA: ICD-10-CM

## 2024-08-14 DIAGNOSIS — C90.00 MULTIPLE MYELOMA NOT HAVING ACHIEVED REMISSION: ICD-10-CM

## 2024-08-14 DIAGNOSIS — Z79.899 HIGH RISK MEDICATION USE: ICD-10-CM

## 2024-08-14 DIAGNOSIS — M85.852 OSTEOPENIA OF LEFT FEMORAL NECK: ICD-10-CM

## 2024-08-14 DIAGNOSIS — C90.00 MULTIPLE MYELOMA NOT HAVING ACHIEVED REMISSION: Primary | ICD-10-CM

## 2024-08-14 DIAGNOSIS — G63 NEUROPATHY ASSOCIATED WITH MULTIPLE MYELOMA: ICD-10-CM

## 2024-08-14 DIAGNOSIS — E53.8 B12 DEFICIENCY: Primary | ICD-10-CM

## 2024-08-14 DIAGNOSIS — C50.411 MALIGNANT NEOPLASM OF UPPER-OUTER QUADRANT OF RIGHT BREAST IN FEMALE, ESTROGEN RECEPTOR POSITIVE: ICD-10-CM

## 2024-08-14 DIAGNOSIS — Z17.0 MALIGNANT NEOPLASM OF UPPER-OUTER QUADRANT OF RIGHT BREAST IN FEMALE, ESTROGEN RECEPTOR POSITIVE: ICD-10-CM

## 2024-08-14 LAB
ALBUMIN SERPL-MCNC: 4.2 G/DL (ref 3.5–5.2)
ALBUMIN/GLOB SERPL: 1.6 G/DL
ALP SERPL-CCNC: 58 U/L (ref 39–117)
ALT SERPL W P-5'-P-CCNC: 7 U/L (ref 1–33)
ANION GAP SERPL CALCULATED.3IONS-SCNC: 10.6 MMOL/L (ref 5–15)
AST SERPL-CCNC: 18 U/L (ref 1–32)
B2 MICROGLOB SERPL-MCNC: 2.1 MG/L (ref 0.8–2.2)
BASOPHILS # BLD AUTO: 0.05 10*3/MM3 (ref 0–0.2)
BASOPHILS NFR BLD AUTO: 1.3 % (ref 0–1.5)
BILIRUB SERPL-MCNC: 0.4 MG/DL (ref 0–1.2)
BUN SERPL-MCNC: 10 MG/DL (ref 8–23)
BUN/CREAT SERPL: 11.9 (ref 7–25)
CALCIUM SPEC-SCNC: 8.7 MG/DL (ref 8.6–10.5)
CHLORIDE SERPL-SCNC: 101 MMOL/L (ref 98–107)
CO2 SERPL-SCNC: 25.4 MMOL/L (ref 22–29)
CREAT SERPL-MCNC: 0.84 MG/DL (ref 0.57–1)
DEPRECATED RDW RBC AUTO: 50.8 FL (ref 37–54)
EGFRCR SERPLBLD CKD-EPI 2021: 72.6 ML/MIN/1.73
EOSINOPHIL # BLD AUTO: 0.12 10*3/MM3 (ref 0–0.4)
EOSINOPHIL NFR BLD AUTO: 3.2 % (ref 0.3–6.2)
ERYTHROCYTE [DISTWIDTH] IN BLOOD BY AUTOMATED COUNT: 14 % (ref 12.3–15.4)
GLOBULIN UR ELPH-MCNC: 2.6 GM/DL
GLUCOSE SERPL-MCNC: 132 MG/DL (ref 65–99)
HCT VFR BLD AUTO: 38.6 % (ref 34–46.6)
HGB BLD-MCNC: 12.9 G/DL (ref 12–15.9)
IMM GRANULOCYTES # BLD AUTO: 0.01 10*3/MM3 (ref 0–0.05)
IMM GRANULOCYTES NFR BLD AUTO: 0.3 % (ref 0–0.5)
LYMPHOCYTES # BLD AUTO: 1.7 10*3/MM3 (ref 0.7–3.1)
LYMPHOCYTES NFR BLD AUTO: 45.3 % (ref 19.6–45.3)
MAGNESIUM SERPL-MCNC: 1.9 MG/DL (ref 1.6–2.4)
MCH RBC QN AUTO: 32.8 PG (ref 26.6–33)
MCHC RBC AUTO-ENTMCNC: 33.4 G/DL (ref 31.5–35.7)
MCV RBC AUTO: 98.2 FL (ref 79–97)
MONOCYTES # BLD AUTO: 0.32 10*3/MM3 (ref 0.1–0.9)
MONOCYTES NFR BLD AUTO: 8.5 % (ref 5–12)
NEUTROPHILS NFR BLD AUTO: 1.55 10*3/MM3 (ref 1.7–7)
NEUTROPHILS NFR BLD AUTO: 41.4 % (ref 42.7–76)
NRBC BLD AUTO-RTO: 0 /100 WBC (ref 0–0.2)
PHOSPHATE SERPL-MCNC: 3.9 MG/DL (ref 2.5–4.5)
PLATELET # BLD AUTO: 194 10*3/MM3 (ref 140–450)
PMV BLD AUTO: 8.8 FL (ref 6–12)
POTASSIUM SERPL-SCNC: 3.6 MMOL/L (ref 3.5–5.2)
PROT SERPL-MCNC: 6.8 G/DL (ref 6–8.5)
RBC # BLD AUTO: 3.93 10*6/MM3 (ref 3.77–5.28)
SODIUM SERPL-SCNC: 137 MMOL/L (ref 136–145)
WBC NRBC COR # BLD AUTO: 3.75 10*3/MM3 (ref 3.4–10.8)

## 2024-08-14 PROCEDURE — 96372 THER/PROPH/DIAG INJ SC/IM: CPT

## 2024-08-14 PROCEDURE — 25010000002 ZOLEDRONIC ACID 4 MG/100ML SOLUTION: Performed by: NURSE PRACTITIONER

## 2024-08-14 PROCEDURE — 80053 COMPREHEN METABOLIC PANEL: CPT

## 2024-08-14 PROCEDURE — 96374 THER/PROPH/DIAG INJ IV PUSH: CPT

## 2024-08-14 PROCEDURE — 25810000003 SODIUM CHLORIDE 0.9 % SOLUTION: Performed by: NURSE PRACTITIONER

## 2024-08-14 PROCEDURE — 25010000002 CYANOCOBALAMIN PER 1000 MCG: Performed by: NURSE PRACTITIONER

## 2024-08-14 PROCEDURE — 83735 ASSAY OF MAGNESIUM: CPT

## 2024-08-14 PROCEDURE — 82232 ASSAY OF BETA-2 PROTEIN: CPT | Performed by: INTERNAL MEDICINE

## 2024-08-14 PROCEDURE — 84100 ASSAY OF PHOSPHORUS: CPT

## 2024-08-14 PROCEDURE — 85025 COMPLETE CBC W/AUTO DIFF WBC: CPT

## 2024-08-14 RX ORDER — CYANOCOBALAMIN 1000 UG/ML
1000 INJECTION, SOLUTION INTRAMUSCULAR; SUBCUTANEOUS ONCE
Status: COMPLETED | OUTPATIENT
Start: 2024-08-14 | End: 2024-08-14

## 2024-08-14 RX ORDER — ZOLEDRONIC ACID 0.04 MG/ML
4 INJECTION, SOLUTION INTRAVENOUS ONCE
Status: COMPLETED | OUTPATIENT
Start: 2024-08-14 | End: 2024-08-14

## 2024-08-14 RX ORDER — SODIUM CHLORIDE 9 MG/ML
250 INJECTION, SOLUTION INTRAVENOUS ONCE
Status: COMPLETED | OUTPATIENT
Start: 2024-08-14 | End: 2024-08-14

## 2024-08-14 RX ADMIN — SODIUM CHLORIDE 250 ML: 9 INJECTION, SOLUTION INTRAVENOUS at 11:34

## 2024-08-14 RX ADMIN — CYANOCOBALAMIN 1000 MCG: 1000 INJECTION, SOLUTION INTRAMUSCULAR at 11:56

## 2024-08-14 RX ADMIN — ZOLEDRONIC ACID 4 MG: 0.04 INJECTION, SOLUTION INTRAVENOUS at 11:34

## 2024-08-14 NOTE — PROGRESS NOTES
Subjective   Jennifer Plascencia is a 74 y.o. female.  Referred by Dr. Rosales for right breast invasive ductal carcinoma    History of Present Illness     Ms. Jolly Plascencia is a 74-year-old lady with diagnosis of IgA kappa high risk multiple myeloma high risk due to status post stem cell transplant in March 2016 at [facility] and currently on maintenance Revlimid 5 mg 3 out of 4 weeks.    Multiple myeloma is high risk because of gain of 1 q- treatment with rev Dex Velcade initiated in 11/15-went on to stem cell transplant at [facility] in March 2016?  Melphalan.  She started post transplant therapy in May 2016 with Velcade rev Dex.  In September after 4 cycles Velcade was decreased to every other week with plans to continue rev Dex Velcade till progression because of high risk status.  In May 2019 her Velcade was discontinued because of worsening neuropathy.  Patient has remained on Revlimid 10 mg 21 out of 28 days since then with stable disease until November 2019 when a small IgG kappa paraprotein was noted on her blood tests which is distinct from her usual IgA kappa myeloma.  Restaging with PET scan was normal and since then the paraprotein as of 3/10/2020 has resolved    She is currently on Zometa every 3 months and being followed with myeloma labs monthly.    Dr. Alexey Xavier is her hematologist at [facility].    She has been seeing Dr. Garcia from our practice for management of the multiple myeloma and Revlimid.    12/28/2023-bilateral screening mammogram  Breast that heterogeneously dense.  There is a mass with associated distortion within the upper outer right breast posterior depth.  No suspicious findings in the left breast.    1/9/2024-right breast diagnostic mammogram and ultrasound  Heterogeneously dense breast tissue.  High density mass measuring approximately 19 mm with associated lactic show distortion, 10:00, 10 cm from the nipple.  Adjacent smaller circumscribed low-density 5 mm mass is located  just medial and superior to the primary mass by approximately 2 mm.  Calcifications in the upper inner quadrant of the right breast are stable dating back to 2019.  Second set of calcifications in the upper outer right breast are also stable dating back to 2019.    Ultrasound  At 10:00, 10 cm from the nipple there is a irregular mass measuring 21 x 19 x 9 mm.  Associated to caustic shadowing.  At 1030, 10 cm from the nipple there is a hypoechoic mass measuring 4 x 3 x 5 mm.  This is thought to represent the adjacent mass seen mammographically.  In addition there is a third circumscribed hypoechoic mass measuring 4 x 3 x 3 mm labeled 930, 8 cm from the nipple.  Considered indeterminate.  Right axilla lymph nodes are essentially fatty replaced.  No morphologically abnormal lymph nodes in the right axilla.    Impression  1.irregular mass measuring 21 mm, ultrasound-guided biopsy recommended  2.5 millimeter mass at 1030, 10 cm from the nipple is suspicious, ultrasound-guided biopsy recommended  3.4 millimeter mass is indeterminate at 930, ultrasound-guided biopsy recommended.    3 site ultrasound-guided biopsy  1.right breast 10:00, 10 cm from the nipple  Invasive ductal adenocarcinoma and ductal carcinoma in situ  Tumor is grade 2  No lymphovascular invasion no perineural invasion  ER positive strong 99%  CT positive indeterminate 10%  HER2 1+ by immunohistochemistry  Ki-67 30%  DCIS is high-grade    2.right breast 9:30, 8 cm from the nipple  Sclerosing adenosis  Rare microcyst  Microcalcifications in nonneoplastic tissue    1/29/2024-bilateral breast MRI  Biopsy-proven malignancy in the right breast at 9:00, measures 2.1 cm in greatest dimension.  No other suspicious findings are seen within the right breast.  No evidence of right axillary lymphadenopathy.  No left breast abnormalities noted.      She denies any family history of breast cancer.      Patient underwent right breast lumpectomy on 3/6/2024.    Pathology  shows invasive ductal carcinoma, measuring 21 mm, grade 2, 28 mm of DCIS, all margins -3 sentinel lymph nodes negative.  ER +91 to 100%  FL +10%  HER2 negative  Ki-67 30%  Oncotype DX recurrence score of 12 with less than 1% benefit from chemotherapy and 3% risk of distant metastasis at 9 years    She is recovering well from surgery.  Myeloma labs have been obtained today and results pending.    2/27/2024-Labs reviewed and CBC normal with a WBC of 3.93, hemoglobin 12.1 and platelets 204,000, CMP within normal limits, magnesium normal at 2.2, phosphorus normal at 3.9, beta-2 microglobulin normal at 2.1, SPEP with negative M spike, free light chain ratio normal at 1.02, free light chain kappa 31.5, free light chain lambda 31.0      Patient saw Dr. Hawthorne 4/23/2024 and he recommended resuming Revlimid 7.5 mg 21 out of 28 days.   He also recommended starting prophylactic Eliquis to Xarelto as he was concerned about the risk of DVT and PE with Revlimid, her age and endocrine therapy.  She also had an MRI which showed a 1.7 cm area of T2 hyperintensity in the upper sternum without diffusion restriction which was new from 5/11/2022.  This was nonspecific and indeterminate but could represent a myeloma lesion.  Attention to follow-up recommended.    Myeloma studies performed 4/23/2024 reviewed and no evidence of M protein, free light chain ratio normal.    She completed adjuvant radiation to the right breast in May 2024.    Interval history  She has also been recommended to start back on Revlimid by .   She is recommending a 7.5 mg dose of Revlimid.  She currently takes baby aspirin.  She questions about her last DEXA scan and per our records from us he is scan performed in 2020 though not since.  She states she does not think she has had 1 though on previous conversation was documented that she thought she had 1 with her primary care.  She wishes for this to be updated and done at [facility] which we will get  scheduled for her.  She is noticing some hair loss and fatigue.  She asks about switching her anastrozole to brand-name and we state we can try to do that though not sure insurance coverage.  She continues with achiness that she states has been present for quite some time.  She    The following portions of the patient's history were reviewed and updated as appropriate: allergies, current medications, past family history, past medical history, past social history, past surgical history, and problem list.    Past Medical History:   Diagnosis Date    Anxiety     Arthritis     Breast cancer 01/11/2024    Inv. Ductal with DCIS (right breast)   ER+/CO+/Her2-    Depression     Disease of thyroid gland     Hashimoto's disease     History of vitamin D deficiency     Hypothyroidism     IBS (irritable bowel syndrome)     Multiple myeloma 2015    IgA kappa.  Stem cell transplant at [facility] 3/20/2016    Myeloma     Neck pain     Neuropathy     KAWME (obstructive sleep apnea) 07/08/2021    Overnight polysomnogram.  Weight 173 pounds.  Mild KWAME with AHI 5.6 events per hour.  When supine, 9.4 events per hour.  During REM, moderate severity at 26 events per hour.  No sleep-related hypoxia.  The patient snored 20.5% of total sleep time.    Osteopenia     Overweight (BMI 25.0-29.9) 02/05/2018        Past Surgical History:   Procedure Laterality Date    ANTERIOR CERVICAL FUSION      BREAST BIOPSY Right 01/11/2024    10:00 @ 10cmFN   (Inv. Ductal Carcinoma  ER+/CO+/Her2-)  [facility]    BREAST LUMPECTOMY WITH SENTINEL NODE BIOPSY Right 3/6/2024    Procedure: RIGHT breast DOUG guided lumpectomy and RIGHT axilla sentinel node biopsy;  Surgeon: Angelina Rosales MD;  Location: [facility] OR;  Service: General;  Laterality: Right;    CATARACT EXTRACTION      COLONOSCOPY      TONSILLECTOMY  1956    VEIN SURGERY  1991        Family History   Problem Relation Age of Onset    Breast cancer Mother     Sleep apnea Mother   "   Lymphoma Father     Sleep apnea Father     Cancer Father     Cancer Maternal Aunt             Kidney cancer Paternal Grandmother     Malig Hyperthermia Neg Hx         Social History     Socioeconomic History    Marital status:     Number of children: 2   Tobacco Use    Smoking status: Former     Current packs/day: 0.00     Average packs/day: 0.3 packs/day for 2.0 years (0.5 ttl pk-yrs)     Types: Cigarettes     Quit date:      Years since quittin.6    Smokeless tobacco: Never    Tobacco comments:     Only lightly for 2 years   Vaping Use    Vaping status: Never Used   Substance and Sexual Activity    Alcohol use: Not Currently    Drug use: Never    Sexual activity: Defer     Partners: Male     Birth control/protection: None     Comment: Na        OB History          2    Para   2    Term   2            AB        Living             SAB        IAB        Ectopic        Molar        Multiple        Live Births   2             Age at menarche-11  Age at first live childbirth-23   3 para 2  1  Oral conceptive pills none  Hormone replacement therapy for about 1 week  Age of menopause-late 50s    No Known Allergies         Review of Systems   Constitutional: Negative.    HENT: Negative.     Eyes: Negative.    Respiratory: Negative.     Cardiovascular: Negative.    Gastrointestinal: Negative.    Endocrine: Negative.    Genitourinary:  Positive for breast lump.   Allergic/Immunologic: Negative.    Neurological: Negative.    Psychiatric/Behavioral:  The patient is nervous/anxious.          Objective   Blood pressure 119/70, pulse 75, temperature 99 °F (37.2 °C), temperature source Oral, resp. rate 16, height 168.9 cm (66.5\"), weight 68 kg (150 lb), SpO2 97%.   Physical Exam  Vitals reviewed.   Constitutional:       Appearance: Normal appearance. She is normal weight.   HENT:      Nose: Nose normal.      Mouth/Throat:      Pharynx: Oropharynx is clear.   Eyes:      " Extraocular Movements: Extraocular movements intact.      Conjunctiva/sclera: Conjunctivae normal.   Cardiovascular:      Rate and Rhythm: Normal rate.      Pulses: Normal pulses.   Pulmonary:      Effort: Pulmonary effort is normal. No respiratory distress.      Breath sounds: No wheezing.   Skin:     General: Skin is warm.   Neurological:      General: No focal deficit present.      Mental Status: She is alert.   Psychiatric:         Mood and Affect: Mood normal.         Behavior: Behavior normal.         Thought Content: Thought content normal.         Judgment: Judgment normal.         I have reexamined the patient and the results are consistent with the previously documented exam. Summer Jennings, APRN      Infusion on 08/14/2024   Component Date Value Ref Range Status    WBC 08/14/2024 3.75  3.40 - 10.80 10*3/mm3 Final    RBC 08/14/2024 3.93  3.77 - 5.28 10*6/mm3 Final    Hemoglobin 08/14/2024 12.9  12.0 - 15.9 g/dL Final    Hematocrit 08/14/2024 38.6  34.0 - 46.6 % Final    MCV 08/14/2024 98.2 (H)  79.0 - 97.0 fL Final    MCH 08/14/2024 32.8  26.6 - 33.0 pg Final    MCHC 08/14/2024 33.4  31.5 - 35.7 g/dL Final    RDW 08/14/2024 14.0  12.3 - 15.4 % Final    RDW-SD 08/14/2024 50.8  37.0 - 54.0 fl Final    MPV 08/14/2024 8.8  6.0 - 12.0 fL Final    Platelets 08/14/2024 194  140 - 450 10*3/mm3 Final    Neutrophil % 08/14/2024 41.4 (L)  42.7 - 76.0 % Final    Lymphocyte % 08/14/2024 45.3  19.6 - 45.3 % Final    Monocyte % 08/14/2024 8.5  5.0 - 12.0 % Final    Eosinophil % 08/14/2024 3.2  0.3 - 6.2 % Final    Basophil % 08/14/2024 1.3  0.0 - 1.5 % Final    Immature Grans % 08/14/2024 0.3  0.0 - 0.5 % Final    Neutrophils, Absolute 08/14/2024 1.55 (L)  1.70 - 7.00 10*3/mm3 Final    Lymphocytes, Absolute 08/14/2024 1.70  0.70 - 3.10 10*3/mm3 Final    Monocytes, Absolute 08/14/2024 0.32  0.10 - 0.90 10*3/mm3 Final    Eosinophils, Absolute 08/14/2024 0.12  0.00 - 0.40 10*3/mm3 Final    Basophils, Absolute 08/14/2024 0.05   0.00 - 0.20 10*3/mm3 Final    Immature Grans, Absolute 08/14/2024 0.01  0.00 - 0.05 10*3/mm3 Final    nRBC 08/14/2024 0.0  0.0 - 0.2 /100 WBC Final   Lab on 07/18/2024   Component Date Value Ref Range Status    IgG 07/18/2024 851  586 - 1602 mg/dL Final    IgA 07/18/2024 96  64 - 422 mg/dL Final    IgM 07/18/2024 26  26 - 217 mg/dL Final    Total Protein 07/18/2024 6.2  6.0 - 8.5 g/dL Final    Albumin 07/18/2024 3.7  2.9 - 4.4 g/dL Final    Alpha-1-Globulin 07/18/2024 0.2  0.0 - 0.4 g/dL Final    Alpha-2-Globulin 07/18/2024 0.7  0.4 - 1.0 g/dL Final    Beta Globulin 07/18/2024 0.9  0.7 - 1.3 g/dL Final    Gamma Globulin 07/18/2024 0.7  0.4 - 1.8 g/dL Final    M-Ruben 07/18/2024 Not Observed  Not Observed g/dL Final    Globulin 07/18/2024 2.5  2.2 - 3.9 g/dL Final    A/G Ratio 07/18/2024 1.5  0.7 - 1.7 Final    Immunofixation Reflex, Serum 07/18/2024 Comment   Final    No monoclonality detected.    Please note 07/18/2024 Comment   Final    Protein electrophoresis scan will follow via computer, mail, or   delivery.    Free Light Chain, Kappa 07/18/2024 25.6 (H)  3.3 - 19.4 mg/L Final    Free Lambda Light Chains 07/18/2024 27.8 (H)  5.7 - 26.3 mg/L Final    Kappa/Lambda Ratio 07/18/2024 0.92  0.26 - 1.65 Final    Beta-2 Microglobulin 07/18/2024 2.0  0.8 - 2.2 mg/L Final    Glucose 07/18/2024 148 (H)  65 - 99 mg/dL Final    BUN 07/18/2024 13  8 - 23 mg/dL Final    Creatinine 07/18/2024 0.78  0.57 - 1.00 mg/dL Final    Sodium 07/18/2024 139  136 - 145 mmol/L Final    Potassium 07/18/2024 3.8  3.5 - 5.2 mmol/L Final    Slight hemolysis detected by analyzer. Result may be falsely elevated.    Chloride 07/18/2024 102  98 - 107 mmol/L Final    CO2 07/18/2024 24.6  22.0 - 29.0 mmol/L Final    Calcium 07/18/2024 8.6  8.6 - 10.5 mg/dL Final    Total Protein 07/18/2024 6.4  6.0 - 8.5 g/dL Final    Albumin 07/18/2024 3.9  3.5 - 5.2 g/dL Final    ALT (SGPT) 07/18/2024 9  1 - 33 U/L Final    AST (SGOT) 07/18/2024 22  1 - 32  U/L Final    Alkaline Phosphatase 07/18/2024 59  39 - 117 U/L Final    Total Bilirubin 07/18/2024 0.2  0.0 - 1.2 mg/dL Final    Globulin 07/18/2024 2.5  gm/dL Final    A/G Ratio 07/18/2024 1.6  g/dL Final    BUN/Creatinine Ratio 07/18/2024 16.7  7.0 - 25.0 Final    Anion Gap 07/18/2024 12.4  5.0 - 15.0 mmol/L Final    eGFR 07/18/2024 79.3  >60.0 mL/min/1.73 Final    Magnesium 07/18/2024 2.0  1.6 - 2.4 mg/dL Final    Phosphorus 07/18/2024 3.4  2.5 - 4.5 mg/dL Final    WBC 07/18/2024 4.16  3.40 - 10.80 10*3/mm3 Final    RBC 07/18/2024 3.67 (L)  3.77 - 5.28 10*6/mm3 Final    Hemoglobin 07/18/2024 12.0  12.0 - 15.9 g/dL Final    Hematocrit 07/18/2024 37.1  34.0 - 46.6 % Final    MCV 07/18/2024 101.1 (H)  79.0 - 97.0 fL Final    MCH 07/18/2024 32.7  26.6 - 33.0 pg Final    MCHC 07/18/2024 32.3  31.5 - 35.7 g/dL Final    RDW 07/18/2024 13.7  12.3 - 15.4 % Final    RDW-SD 07/18/2024 50.4  37.0 - 54.0 fl Final    MPV 07/18/2024 9.6  6.0 - 12.0 fL Final    Platelets 07/18/2024 203  140 - 450 10*3/mm3 Final    Neutrophil % 07/18/2024 48.8  42.7 - 76.0 % Final    Lymphocyte % 07/18/2024 29.3  19.6 - 45.3 % Final    Monocyte % 07/18/2024 12.3 (H)  5.0 - 12.0 % Final    Eosinophil % 07/18/2024 8.4 (H)  0.3 - 6.2 % Final    Basophil % 07/18/2024 1.0  0.0 - 1.5 % Final    Immature Grans % 07/18/2024 0.2  0.0 - 0.5 % Final    Neutrophils, Absolute 07/18/2024 2.03  1.70 - 7.00 10*3/mm3 Final    Lymphocytes, Absolute 07/18/2024 1.22  0.70 - 3.10 10*3/mm3 Final    Monocytes, Absolute 07/18/2024 0.51  0.10 - 0.90 10*3/mm3 Final    Eosinophils, Absolute 07/18/2024 0.35  0.00 - 0.40 10*3/mm3 Final    Basophils, Absolute 07/18/2024 0.04  0.00 - 0.20 10*3/mm3 Final    Immature Grans, Absolute 07/18/2024 0.01  0.00 - 0.05 10*3/mm3 Final    nRBC 07/18/2024 0.0  0.0 - 0.2 /100 WBC Final        No radiology results for the last 30 days.       Assessment & Plan       *Right breast invasive ductal carcinoma  Tumor measures 21 mm on the  ultrasound and 21 mm on the MRI but on physical exam measuring up to 3.5 cm.  Clinical T2 N0 M0  Invasive ductal carcinoma, grade 2, ER +99% strong, RI intermediate, 10%, HER2 1+ immunohistochemistry, Ki-67 30%  Prognostic stage Ib  Status post right breast lumpectomy and sentinel lymph node biopsy on 3/6/2024.  Pathology shows invasive ductal carcinoma, tumor measures 21 mm, 28 mm of DCIS, margins negative, grade 2, ER strongly positive at 99%, RI 10% and HER2 1+ with a Ki-67 of 30%.  Oncotype DX recurrence score of 12, less than 1% benefit from chemotherapy and 3% risk of distant metastasis at 9 years with AI or tamoxifen alone.  Given that the Oncotype DX recurrence score is low there is no additional benefit from chemotherapy I recommend that she proceed with adjuvant radiation followed by adjuvant endocrine therapy.  She completed adjuvant radiation to the right breast in May 2024.  She has not started anastrozole yet.  Recommend that she start anastrozole now.  She returns 8/14/2024 in follow-up having been on anastrozole now reporting increased fatigue.  She has existing arthralgias however has not noticed these worsen.  She has noticed some hair loss.  She asks about switching to brand-name Arimidex instead however after further discussion does not wish to do so at this time.  She states she is read about exemestane and letrozole and is concerned this will cause more hair loss.    *Multiple myeloma  IgA kappa multiple myeloma diagnosed in 2015 treated with RVD  Stem cell transplant at [facility] in March 2016  Maintenance Velcade and Revlimid and dexamethasone started May 2016  Velcade discontinued because of neurotoxicity in May 2019  Small IgG kappa paraprotein seen on blood work in October 2019 and PET scan was performed which was negative.  This paraprotein subsequently resolved  Currently receiving Zometa every 3 months  Currently on Revlimid 5 mg 3 out of 4 weeks, dose decreased in November 23 for  worsening neuropathy and stable disease.  She sees Dr. Alexey Xavier every 6 months.  Dr. Rosales has discussed with Dr. Xavier, based on her documentation he recommends holding Revlimid 1 week prior to surgery.  Revlimid is associated with secondary malignancies however breast cancer is not typically seen with this.  Therefore I think we should resume Revlimid after completion of breast surgery and continue while she is on endocrine therapy.  4/23/2024-Notes from  reviewed, MRI from 4/23/2024 reviewed.  Myeloma labs reviewed.  Myeloma labs remain negative.  There is a T2 hyperintense lesion on the sternum on the MRI.  This is indeterminate and follow-up is recommended.  She is being recommended to start back on Revlimid 7.5 mg 21 out of 28 days and also being recommended to start prophylactic Eliquis or Xarelto.  The prophylaxis has been recommended with the concern of DVT PE associated with endocrine therapy along with her age as well as multiple myeloma and use of Revlimid.  Use of Revlimid and her age do pose an increased risk of DVT PE however she will be on anastrozole which does not necessarily increase the risk of DVT PE.  Patient plans to call her myeloma physician and clarify the need for prophylaxis with Eliquis or Xarelto.  She will continue on baby aspirin.  As she returns on 8/14/2024 she continues on Revlimid 7.5 mg.  She is once again asked to clarify the need for Eliquis or Xarelto as discussed at the last visit.    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  Stable    *Diarrhea-improved    *Anxiety  Severe  Related to recent diagnosis of breast cancer  We discussed briefly supportive oncology referral today.    Anxiety much better today    *Bone health  8/6/2020 DEXA scan shows osteopenia with a T-score of -0.6 in the right femoral neck, T-score is -1.8 in the left femoral neck, T-score of 0.8 in the lumbar spine  Patient  reports that she had a DEXA last year however I do not have the results of the same.  She continues on Zometa every 3 months.  She reports any 14 2024 that she does not have an up-to-date DEXA and she wishes to have this updated.  She wishes for this to be performed at [facility] and we we will place an order today.    *History of B12 deficiency on monthly B12 injections  Patient on monthly B12 injections, she expressed that she that she may need twice monthly B12 injections.  We will add on a B12 level today to assess the need for this.  We discussed if her level is currently optimal would not increase her B12 injections.    *Follow-up in 3 months with Dr. Flanagan as planned.  Patient continues monthly B12 in the interim and will be due for Zometa in 3 months.  DEXA scan will be performed in the interim per patient request.  Continue anastrozole daily.    I spent 45 minutes caring for Jennifer on this date of service. This time includes time spent by me in the following activities: preparing for the visit, reviewing tests, obtaining and/or reviewing a separately obtained history, performing a medically appropriate examination and/or evaluation, counseling and educating the patient/family/caregiver, ordering medications, tests, or procedures, referring and communicating with other health care professionals, documenting information in the medical record, and independently interpreting results and communicating that information with the patient/family/caregiver.

## 2024-08-15 ENCOUNTER — SPECIALTY PHARMACY (OUTPATIENT)
Dept: PHARMACY | Facility: HOSPITAL | Age: 74
End: 2024-08-15
Payer: MEDICARE

## 2024-08-15 LAB
ALBUMIN SERPL ELPH-MCNC: 3.6 G/DL (ref 2.9–4.4)
ALBUMIN/GLOB SERPL: 1.3 {RATIO} (ref 0.7–1.7)
ALPHA1 GLOB SERPL ELPH-MCNC: 0.2 G/DL (ref 0–0.4)
ALPHA2 GLOB SERPL ELPH-MCNC: 0.8 G/DL (ref 0.4–1)
B-GLOBULIN SERPL ELPH-MCNC: 1 G/DL (ref 0.7–1.3)
GAMMA GLOB SERPL ELPH-MCNC: 0.9 G/DL (ref 0.4–1.8)
GLOBULIN SER-MCNC: 3 G/DL (ref 2.2–3.9)
IGA SERPL-MCNC: 103 MG/DL (ref 64–422)
IGG SERPL-MCNC: 904 MG/DL (ref 586–1602)
IGM SERPL-MCNC: 30 MG/DL (ref 26–217)
INTERPRETATION SERPL IEP-IMP: ABNORMAL
KAPPA LC FREE SER-MCNC: 24.7 MG/L (ref 3.3–19.4)
KAPPA LC FREE/LAMBDA FREE SER: 1.03 {RATIO} (ref 0.26–1.65)
LABORATORY COMMENT REPORT: ABNORMAL
LAMBDA LC FREE SERPL-MCNC: 24 MG/L (ref 5.7–26.3)
M PROTEIN SERPL ELPH-MCNC: ABNORMAL G/DL
PROT SERPL-MCNC: 6.6 G/DL (ref 6–8.5)

## 2024-08-15 NOTE — PROGRESS NOTES
Specialty Pharmacy Note: Revlimid (lenalidomide)    Jennifer Plascencia is a 74 y.o. female with multiple myeloma and breast cancer was seen 8/14/24 by APRN. Per provider dictation, no changes to oral oncology regimen Revlimid (lenalidomide).  Labs Review: The CMP and CBC from 8/14/24 have been reviewed. No dose adjustments are needed for the oral specialty medication(s) based on the labs.    Specialty pharmacy will continue to follow patient.    Sandra Hernandez, CelestinaD, BCPS  8/15/2024  13:55 EDT

## 2024-08-19 DIAGNOSIS — C90.00 MULTIPLE MYELOMA NOT HAVING ACHIEVED REMISSION: ICD-10-CM

## 2024-08-20 ENCOUNTER — SPECIALTY PHARMACY (OUTPATIENT)
Dept: PHARMACY | Facility: HOSPITAL | Age: 74
End: 2024-08-20
Payer: MEDICARE

## 2024-08-20 RX ORDER — LENALIDOMIDE 2.5 MG/1
CAPSULE ORAL
Qty: 63 CAPSULE | Refills: 0 | Status: SHIPPED | OUTPATIENT
Start: 2024-08-20

## 2024-08-20 NOTE — PROGRESS NOTES
Re: Refills of Oral Specialty Medication - Revlimid (lenalidomide)    Drug-Drug Interactions: The current medication list was reviewed and there are no relevant drug-drug interactions with the specialty medication.  Medication Allergies: The patient has NKDA  Review of Labs/Dose Adjustments: NO DOSE CHANGE - I reviewed the most recent note and labs and the patient will continue without any dose changes.  I sent refills as described below.    Drug: Revlimid (lenalidomide)  Strength: 2.5 mg  Directions: TAKE THREE CAPSULES (7.5MG) BY MOUTH ONCE DAILY FOR 21 DAYS ON, 7 DAYS OFF OF EACH 28 DAYS CYCLE.   Quantity: 63  Refills: 0  Pharmacy prescription sent to: [facility]    Name/Credentials: Sandra Hernandez PharmD, BCPS    8/20/2024  08:24 EDT

## 2024-08-20 NOTE — PROGRESS NOTES
Drug: Revlimid (lenalidomide)  Strength: 2.5 mg  Directions: TAKE THREE CAPSULES (7.5MG) BY MOUTH ONCE DAILY FOR 21 DAYS ON, 7 DAYS OFF OF EACH 28 DAYS CYCLE.   Quantity: 63  Refills: 0  Pharmacy prescription sent to: [facility]  Completed independent double check on medication order/RX.  Brittani Ellis, Rptay, BCOP

## 2024-09-10 ENCOUNTER — INFUSION (OUTPATIENT)
Dept: ONCOLOGY | Facility: HOSPITAL | Age: 74
End: 2024-09-10
Payer: MEDICARE

## 2024-09-10 ENCOUNTER — TELEPHONE (OUTPATIENT)
Dept: RADIATION ONCOLOGY | Facility: HOSPITAL | Age: 74
End: 2024-09-10
Payer: MEDICARE

## 2024-09-10 DIAGNOSIS — G63 NEUROPATHY ASSOCIATED WITH MULTIPLE MYELOMA: Primary | ICD-10-CM

## 2024-09-10 DIAGNOSIS — C90.00 NEUROPATHY ASSOCIATED WITH MULTIPLE MYELOMA: Primary | ICD-10-CM

## 2024-09-10 RX ORDER — CYANOCOBALAMIN 1000 UG/ML
1000 INJECTION, SOLUTION INTRAMUSCULAR; SUBCUTANEOUS ONCE
Status: DISCONTINUED | OUTPATIENT
Start: 2024-09-10 | End: 2024-09-11 | Stop reason: HOSPADM

## 2024-09-11 ENCOUNTER — OFFICE VISIT (OUTPATIENT)
Dept: RADIATION ONCOLOGY | Facility: HOSPITAL | Age: 74
End: 2024-09-11
Payer: MEDICARE

## 2024-09-11 VITALS
WEIGHT: 153 LBS | BODY MASS INDEX: 24.33 KG/M2 | HEART RATE: 87 BPM | SYSTOLIC BLOOD PRESSURE: 161 MMHG | DIASTOLIC BLOOD PRESSURE: 87 MMHG | OXYGEN SATURATION: 95 %

## 2024-09-11 DIAGNOSIS — C50.411 MALIGNANT NEOPLASM OF UPPER-OUTER QUADRANT OF RIGHT BREAST IN FEMALE, ESTROGEN RECEPTOR POSITIVE: Primary | ICD-10-CM

## 2024-09-11 DIAGNOSIS — Z17.0 MALIGNANT NEOPLASM OF UPPER-OUTER QUADRANT OF RIGHT BREAST IN FEMALE, ESTROGEN RECEPTOR POSITIVE: Primary | ICD-10-CM

## 2024-09-11 PROCEDURE — G0463 HOSPITAL OUTPT CLINIC VISIT: HCPCS | Performed by: RADIOLOGY

## 2024-09-11 NOTE — PROGRESS NOTES
CC: 4 month follow up for right breast cancer                                S:    I had the pleasure of seeing Jennifer Plascencia  [facility].  She is a 74 year old female with pT2N0 right breast cancer, uoq, ER [place] Positive Her 2 negative. She returns today for follow up now 4 months out from completion of her radiation therapy to the right breast.  She completed a dose of 4256cGy followed by a 1000cgy tumor bed boost on 5/7/24.   She has been doing well since I last saw her.  She is on anastrazole and reports signficant arthralgias and fatigue.      Review of Systems   Constitutional:  Positive for activity change and fatigue.   Musculoskeletal:  Positive for arthralgias.   Psychiatric/Behavioral: Negative.         Past Medical History:   Diagnosis Date    Anxiety     Arthritis     Breast cancer 01/11/2024    Inv. Ductal with DCIS (right breast)   ER+/NE+/Her2-    Depression     Disease of thyroid gland     Hashimoto's disease     History of vitamin D deficiency     Hypothyroidism     IBS (irritable bowel syndrome)     Multiple myeloma 2015    IgA kappa.  Stem cell transplant at [facility] 3/20/2016    Myeloma     Neck pain     Neuropathy     KWAME (obstructive sleep apnea) 07/08/2021    Overnight polysomnogram.  Weight 173 pounds.  Mild KWAME with AHI 5.6 events per hour.  When supine, 9.4 events per hour.  During REM, moderate severity at 26 events per hour.  No sleep-related hypoxia.  The patient snored 20.5% of total sleep time.    Osteopenia     Overweight (BMI 25.0-29.9) 02/05/2018         Past Surgical History:   Procedure Laterality Date    ANTERIOR CERVICAL FUSION      BREAST BIOPSY Right 01/11/2024    10:00 @ 10cmFN   (Inv. Ductal Carcinoma  ER+/NE+/Her2-)  [facility]    BREAST LUMPECTOMY WITH SENTINEL NODE BIOPSY Right 3/6/2024    Procedure: RIGHT breast DOUG guided lumpectomy and RIGHT axilla sentinel node biopsy;  Surgeon: Angelina Rosales MD;  Location: [facility]  OR;  Service: General;  Laterality: Right;    CATARACT EXTRACTION      COLONOSCOPY      TONSILLECTOMY      VEIN SURGERY           Social History     Socioeconomic History    Marital status:     Number of children: 2   Tobacco Use    Smoking status: Former     Current packs/day: 0.00     Average packs/day: 0.3 packs/day for 2.0 years (0.5 ttl pk-yrs)     Types: Cigarettes     Quit date:      Years since quittin.7    Smokeless tobacco: Never    Tobacco comments:     Only lightly for 2 years   Vaping Use    Vaping status: Never Used   Substance and Sexual Activity    Alcohol use: Not Currently    Drug use: Never    Sexual activity: Defer     Partners: Male     Birth control/protection: None     Comment: Na         Family History   Problem Relation Age of Onset    Breast cancer Mother     Sleep apnea Mother     Lymphoma Father     Sleep apnea Father     Cancer Father     Cancer Maternal Aunt             Kidney cancer Paternal Grandmother     Malig Hyperthermia Neg Hx           Objective    Physical Exam  Constitutional:       Appearance: Normal appearance.   Chest:          Comments: No palpable masses in right breast or axilla  Neurological:      General: No focal deficit present.      Mental Status: She is alert.         Current Outpatient Medications on File Prior to Visit   Medication Sig Dispense Refill    acetaminophen (TYLENOL) 325 MG tablet Take 1 tablet by mouth Every 6 (Six) Hours As Needed for Mild Pain.      Acetylcarn-Alpha Lipoic Acid 400-200 MG capsule Take  by mouth. HOLD PRIOR TO SURG      ALPRAZolam (XANAX) 0.25 MG tablet Take 1 tablet by mouth Daily As Needed for Anxiety. (Patient not taking: Reported on 2024) 30 tablet 2    Azelastine HCl 137 MCG/SPRAY solution       B Complex Vitamins (VITAMIN B COMPLEX) capsule capsule Take 2 tablets by mouth Daily. HOLD PRIOR TO SURG      baclofen (LIORESAL) 10 MG tablet Take 1 tablet by mouth 3 (Three) Times a Day As Needed.   5    calcium carbonate-vitamin d 600-400 MG-UNIT per tablet Take 1 tablet by mouth Daily. HOLD PRIOR TO SURG      colesevelam (WELCHOL) 625 MG tablet Take 2 tablets by mouth 2 (Two) Times a Day With Meals. 90 tablet 2    cycloSPORINE (RESTASIS) 0.05 % ophthalmic emulsion 1 drop 2 (Two) Times a Day.      diphenoxylate-atropine (LOMOTIL) 2.5-0.025 MG per tablet Take 1 tablet by mouth 3 (Three) Times a Day As Needed for Diarrhea. 90 tablet 3    DULoxetine (CYMBALTA) 30 MG capsule Take 1 capsule by mouth Every Night.      DULoxetine (CYMBALTA) 60 MG capsule Take 1 capsule by mouth Daily. (Patient taking differently: Take 1 capsule by mouth Every Night.) 90 capsule 3    fexofenadine (ALLEGRA) 180 MG tablet Take 1 tablet by mouth As Needed.      fluticasone (FLONASE) 50 MCG/ACT nasal spray 2 sprays into the nostril(s) as directed by provider Daily.      folic acid (FOLVITE) 1 MG tablet Take 1 tablet by mouth Daily.  2    gabapentin (NEURONTIN) 100 MG capsule Take 1 capsule by mouth 3 times a day.      HYDROcodone-acetaminophen (NORCO) 5-325 MG per tablet 1-2 tabs po q 4-6hr prn pain, wean to tylenol 15 tablet 0    lenalidomide (Revlimid) 2.5 MG capsule TAKE THREE CAPSULES (7.5MG) BY MOUTH ONCE DAILY FOR 21 DAYS ON, 7 DAYS OFF OF EACH 28 DAYS CYCLE. 63 capsule 0    lidocaine-prilocaine (EMLA) 2.5-2.5 % cream Apply 1 Application topically to the appropriate area as directed 1 (One) Time.      Multiple Vitamins-Iron (DAILY-JONI/IRON/BETA-CAROTENE) tablet Take 1 tablet by mouth Daily. HOLD PRIOR TO SURG  2    polyethylene glycol (MiraLax) 17 GM/SCOOP powder Take 17 g by mouth Daily. Take cap of powder with 8oz water daily surrounding surgery and while on narcotic 119 g 0    promethazine-dextromethorphan (PROMETHAZINE-DM) 6.25-15 MG/5ML syrup TAKE 10 ML BY MOUTH TWICE A DAY AS NEEDED FOR COUGH      Synthroid 75 MCG tablet Take 1 tablet by mouth Daily.      trimethoprim-polymyxin b (POLYTRIM) 80987-2.1 UNIT/ML-% ophthalmic  solution As Needed.      vitamin D (ERGOCALCIFEROL) 31137 units capsule capsule Take 1 capsule by mouth 2 (Two) Times a Week. Twice a week      zoledronic acid (ZOMETA) 4 MG/5ML injection Infuse 5 mL into a venous catheter Every 3 (Three) Months.       Current Facility-Administered Medications on File Prior to Visit   Medication Dose Route Frequency Provider Last Rate Last Admin    [DISCONTINUED] cyanocobalamin injection 1,000 mcg  1,000 mcg Intramuscular Once Teetee Flanagan MD           ALLERGIES:  No Known Allergies    There were no vitals taken for this visit.         8/14/2024    10:22 AM   Current Status   ECOG score 0         Assessment & Plan     74 year old female with pT2N0 right breast cancer, uoq, ER [place] Positive Her 2 negative now 4 months out from radiation and doing well.  She will due for her yearly mammogram in December 2024 and follow up with Dr. Rosales shortly after.  She will have regular follow up with her medical oncologist.  I will see her back in one year for follow up.  She knows to call for this appointment.      I personally spent greater than 10 minutes today assessing, managing, discussing and documenting my visit with the patient. That time includes review of records, imaging and pathology reports, obtaining my own history, performing a medically appropriate evaluation, counseling and educating the patient, discussing goals, logistics, alternatives and risks of my recommendations, surveillance options and potential outcomes. It also includes the time documenting the clinical information in the EMR and communicating my recommendations to the other involved physicians.              Thank you very much for allowing me to participate in the care of this very pleasant patient.    Sincerely,      Candida Aponte MD

## 2024-09-13 ENCOUNTER — SPECIALTY PHARMACY (OUTPATIENT)
Dept: PHARMACY | Facility: HOSPITAL | Age: 74
End: 2024-09-13
Payer: MEDICARE

## 2024-09-13 ENCOUNTER — LAB (OUTPATIENT)
Dept: LAB | Facility: HOSPITAL | Age: 74
End: 2024-09-13
Payer: MEDICARE

## 2024-09-13 ENCOUNTER — INFUSION (OUTPATIENT)
Dept: ONCOLOGY | Facility: HOSPITAL | Age: 74
End: 2024-09-13
Payer: MEDICARE

## 2024-09-13 DIAGNOSIS — G63 NEUROPATHY ASSOCIATED WITH MULTIPLE MYELOMA: Primary | ICD-10-CM

## 2024-09-13 DIAGNOSIS — C90.00 NEUROPATHY ASSOCIATED WITH MULTIPLE MYELOMA: Primary | ICD-10-CM

## 2024-09-13 DIAGNOSIS — Z17.0 MALIGNANT NEOPLASM OF UPPER-OUTER QUADRANT OF RIGHT BREAST IN FEMALE, ESTROGEN RECEPTOR POSITIVE: ICD-10-CM

## 2024-09-13 DIAGNOSIS — C90.00 MULTIPLE MYELOMA NOT HAVING ACHIEVED REMISSION: ICD-10-CM

## 2024-09-13 DIAGNOSIS — C50.411 MALIGNANT NEOPLASM OF UPPER-OUTER QUADRANT OF RIGHT BREAST IN FEMALE, ESTROGEN RECEPTOR POSITIVE: ICD-10-CM

## 2024-09-13 DIAGNOSIS — Z79.899 HIGH RISK MEDICATION USE: ICD-10-CM

## 2024-09-13 LAB
ALBUMIN SERPL-MCNC: 3.8 G/DL (ref 3.5–5.2)
ALBUMIN/GLOB SERPL: 1.5 G/DL
ALP SERPL-CCNC: 59 U/L (ref 39–117)
ALT SERPL W P-5'-P-CCNC: 10 U/L (ref 1–33)
ANION GAP SERPL CALCULATED.3IONS-SCNC: 9.2 MMOL/L (ref 5–15)
AST SERPL-CCNC: 16 U/L (ref 1–32)
B2 MICROGLOB SERPL-MCNC: 2 MG/L (ref 0.8–2.2)
BASOPHILS # BLD AUTO: 0.03 10*3/MM3 (ref 0–0.2)
BASOPHILS NFR BLD AUTO: 0.7 % (ref 0–1.5)
BILIRUB SERPL-MCNC: 0.2 MG/DL (ref 0–1.2)
BUN SERPL-MCNC: 12 MG/DL (ref 8–23)
BUN/CREAT SERPL: 15 (ref 7–25)
CALCIUM SPEC-SCNC: 8.3 MG/DL (ref 8.6–10.5)
CHLORIDE SERPL-SCNC: 103 MMOL/L (ref 98–107)
CO2 SERPL-SCNC: 27.8 MMOL/L (ref 22–29)
CREAT SERPL-MCNC: 0.8 MG/DL (ref 0.57–1)
DEPRECATED RDW RBC AUTO: 54.4 FL (ref 37–54)
EGFRCR SERPLBLD CKD-EPI 2021: 76.9 ML/MIN/1.73
EOSINOPHIL # BLD AUTO: 0.36 10*3/MM3 (ref 0–0.4)
EOSINOPHIL NFR BLD AUTO: 8.9 % (ref 0.3–6.2)
ERYTHROCYTE [DISTWIDTH] IN BLOOD BY AUTOMATED COUNT: 14.7 % (ref 12.3–15.4)
GLOBULIN UR ELPH-MCNC: 2.5 GM/DL
GLUCOSE SERPL-MCNC: 109 MG/DL (ref 65–99)
HCT VFR BLD AUTO: 37.3 % (ref 34–46.6)
HGB BLD-MCNC: 12 G/DL (ref 12–15.9)
IMM GRANULOCYTES # BLD AUTO: 0.01 10*3/MM3 (ref 0–0.05)
IMM GRANULOCYTES NFR BLD AUTO: 0.2 % (ref 0–0.5)
LYMPHOCYTES # BLD AUTO: 1.56 10*3/MM3 (ref 0.7–3.1)
LYMPHOCYTES NFR BLD AUTO: 38.5 % (ref 19.6–45.3)
MAGNESIUM SERPL-MCNC: 2.2 MG/DL (ref 1.6–2.4)
MCH RBC QN AUTO: 32.5 PG (ref 26.6–33)
MCHC RBC AUTO-ENTMCNC: 32.2 G/DL (ref 31.5–35.7)
MCV RBC AUTO: 101.1 FL (ref 79–97)
MONOCYTES # BLD AUTO: 0.44 10*3/MM3 (ref 0.1–0.9)
MONOCYTES NFR BLD AUTO: 10.9 % (ref 5–12)
NEUTROPHILS NFR BLD AUTO: 1.65 10*3/MM3 (ref 1.7–7)
NEUTROPHILS NFR BLD AUTO: 40.8 % (ref 42.7–76)
NRBC BLD AUTO-RTO: 0 /100 WBC (ref 0–0.2)
PHOSPHATE SERPL-MCNC: 3.6 MG/DL (ref 2.5–4.5)
PLATELET # BLD AUTO: 217 10*3/MM3 (ref 140–450)
PMV BLD AUTO: 9.3 FL (ref 6–12)
POTASSIUM SERPL-SCNC: 4 MMOL/L (ref 3.5–5.2)
PROT SERPL-MCNC: 6.3 G/DL (ref 6–8.5)
RBC # BLD AUTO: 3.69 10*6/MM3 (ref 3.77–5.28)
SODIUM SERPL-SCNC: 140 MMOL/L (ref 136–145)
VIT B12 BLD-MCNC: 354 PG/ML (ref 211–946)
WBC NRBC COR # BLD AUTO: 4.05 10*3/MM3 (ref 3.4–10.8)

## 2024-09-13 PROCEDURE — 82607 VITAMIN B-12: CPT | Performed by: NURSE PRACTITIONER

## 2024-09-13 PROCEDURE — 83735 ASSAY OF MAGNESIUM: CPT

## 2024-09-13 PROCEDURE — 96372 THER/PROPH/DIAG INJ SC/IM: CPT

## 2024-09-13 PROCEDURE — 85025 COMPLETE CBC W/AUTO DIFF WBC: CPT

## 2024-09-13 PROCEDURE — 84100 ASSAY OF PHOSPHORUS: CPT

## 2024-09-13 PROCEDURE — 25010000002 CYANOCOBALAMIN PER 1000 MCG: Performed by: INTERNAL MEDICINE

## 2024-09-13 PROCEDURE — 80053 COMPREHEN METABOLIC PANEL: CPT

## 2024-09-13 PROCEDURE — 36415 COLL VENOUS BLD VENIPUNCTURE: CPT

## 2024-09-13 PROCEDURE — 82232 ASSAY OF BETA-2 PROTEIN: CPT | Performed by: INTERNAL MEDICINE

## 2024-09-13 RX ORDER — CYANOCOBALAMIN 1000 UG/ML
1000 INJECTION, SOLUTION INTRAMUSCULAR; SUBCUTANEOUS ONCE
Status: COMPLETED | OUTPATIENT
Start: 2024-09-13 | End: 2024-09-13

## 2024-09-13 RX ORDER — LENALIDOMIDE 2.5 MG/1
7.5 CAPSULE ORAL DAILY
Qty: 63 CAPSULE | Refills: 0 | Status: SHIPPED | OUTPATIENT
Start: 2024-09-13

## 2024-09-13 RX ORDER — CYANOCOBALAMIN 1000 UG/ML
1000 INJECTION, SOLUTION INTRAMUSCULAR; SUBCUTANEOUS ONCE
Status: DISCONTINUED | OUTPATIENT
Start: 2024-09-13 | End: 2024-09-16 | Stop reason: HOSPADM

## 2024-09-13 RX ADMIN — CYANOCOBALAMIN 1000 MCG: 1000 INJECTION, SOLUTION INTRAMUSCULAR at 10:14

## 2024-09-13 NOTE — PROGRESS NOTES
Specialty Pharmacy Patient Management Program  Per Protocol Prescription Order or Refill     Patient will be filling or currently fills medications at [facility] and is enrolled in the Patient Management Program.    Requested Prescriptions     Signed Prescriptions Disp Refills    lenalidomide (REVLIMID) 2.5 MG capsule 63 capsule 0     Sig: Take 3 capsules by mouth Daily. Take for 21 days on, then 7 days off     Prescription orders above were sent to the pharmacy per Collaborative Care Agreement Protocol.     Last Office Visit: 8/14/24  Next Office Visit: 11/12/24    Felix Gilliam PharmD, BCOP  Clinical Specialty Pharmacist, Oncology  9/13/2024  12:17 EDT

## 2024-09-16 LAB
ALBUMIN SERPL ELPH-MCNC: 3.3 G/DL (ref 2.9–4.4)
ALBUMIN/GLOB SERPL: 1.4 {RATIO} (ref 0.7–1.7)
ALPHA1 GLOB SERPL ELPH-MCNC: 0.2 G/DL (ref 0–0.4)
ALPHA2 GLOB SERPL ELPH-MCNC: 0.7 G/DL (ref 0.4–1)
B-GLOBULIN SERPL ELPH-MCNC: 0.8 G/DL (ref 0.7–1.3)
GAMMA GLOB SERPL ELPH-MCNC: 0.8 G/DL (ref 0.4–1.8)
GLOBULIN SER-MCNC: 2.5 G/DL (ref 2.2–3.9)
IGA SERPL-MCNC: 92 MG/DL (ref 64–422)
IGG SERPL-MCNC: 788 MG/DL (ref 586–1602)
IGM SERPL-MCNC: 26 MG/DL (ref 26–217)
INTERPRETATION SERPL IEP-IMP: ABNORMAL
KAPPA LC FREE SER-MCNC: 26.2 MG/L (ref 3.3–19.4)
KAPPA LC FREE/LAMBDA FREE SER: 1.07 {RATIO} (ref 0.26–1.65)
LABORATORY COMMENT REPORT: ABNORMAL
LAMBDA LC FREE SERPL-MCNC: 24.4 MG/L (ref 5.7–26.3)
M PROTEIN SERPL ELPH-MCNC: ABNORMAL G/DL
PROT SERPL-MCNC: 5.8 G/DL (ref 6–8.5)

## 2024-09-16 NOTE — PROGRESS NOTES
Specialty Pharmacy Patient Management Program  Per Protocol Prescription Order or Refill       Requested Prescriptions     Signed Prescriptions Disp Refills    lenalidomide (REVLIMID) 2.5 MG capsule 63 capsule 0     Sig: Take 3 capsules by mouth Daily. Take for 21 days on, then 7 days off     Prescription orders above were sent to [facility] per Collaborative Care Agreement Protocol.     Completed independent double check on medication order/RX.    Sandra Hernandez, PharmD, BCPS  Clinical Specialty Pharmacist, Oncology  9/16/2024  08:37 EDT

## 2024-09-23 ENCOUNTER — OFFICE VISIT (OUTPATIENT)
Dept: INTERNAL MEDICINE | Facility: CLINIC | Age: 74
End: 2024-09-23
Payer: MEDICARE

## 2024-09-23 VITALS
BODY MASS INDEX: 25.33 KG/M2 | HEART RATE: 71 BPM | DIASTOLIC BLOOD PRESSURE: 78 MMHG | OXYGEN SATURATION: 97 % | HEIGHT: 66 IN | WEIGHT: 157.6 LBS | SYSTOLIC BLOOD PRESSURE: 122 MMHG

## 2024-09-23 DIAGNOSIS — Z12.11 COLON CANCER SCREENING: ICD-10-CM

## 2024-09-23 DIAGNOSIS — F41.9 ANXIETY: ICD-10-CM

## 2024-09-23 DIAGNOSIS — C90.00 MULTIPLE MYELOMA NOT HAVING ACHIEVED REMISSION: ICD-10-CM

## 2024-09-23 DIAGNOSIS — Z79.899 CONTROLLED SUBSTANCE AGREEMENT SIGNED: ICD-10-CM

## 2024-09-23 DIAGNOSIS — E03.8 HYPOTHYROIDISM DUE TO HASHIMOTO'S THYROIDITIS: Primary | ICD-10-CM

## 2024-09-23 DIAGNOSIS — E06.3 HYPOTHYROIDISM DUE TO HASHIMOTO'S THYROIDITIS: Primary | ICD-10-CM

## 2024-09-23 PROCEDURE — 99203 OFFICE O/P NEW LOW 30 MIN: CPT | Performed by: STUDENT IN AN ORGANIZED HEALTH CARE EDUCATION/TRAINING PROGRAM

## 2024-09-23 PROCEDURE — 1159F MED LIST DOCD IN RCRD: CPT | Performed by: STUDENT IN AN ORGANIZED HEALTH CARE EDUCATION/TRAINING PROGRAM

## 2024-09-23 PROCEDURE — 1125F AMNT PAIN NOTED PAIN PRSNT: CPT | Performed by: STUDENT IN AN ORGANIZED HEALTH CARE EDUCATION/TRAINING PROGRAM

## 2024-09-23 PROCEDURE — 1160F RVW MEDS BY RX/DR IN RCRD: CPT | Performed by: STUDENT IN AN ORGANIZED HEALTH CARE EDUCATION/TRAINING PROGRAM

## 2024-09-23 RX ORDER — ALPRAZOLAM 0.25 MG
0.25 TABLET ORAL 2 TIMES DAILY PRN
Qty: 60 TABLET | Refills: 0 | Status: SHIPPED | OUTPATIENT
Start: 2024-09-23 | End: 2024-10-23

## 2024-10-07 ENCOUNTER — TELEPHONE (OUTPATIENT)
Dept: ONCOLOGY | Facility: CLINIC | Age: 74
End: 2024-10-07
Payer: MEDICARE

## 2024-10-07 NOTE — TELEPHONE ENCOUNTER
Caller: Jennifer Plascencia    Relationship to patient: Self    Best call back number: [phone]    Chief complaint: CANC. - R/S     Type of visit: LAB & INJECTION    Requested date: 10/10/24  - 9:30    If rescheduling, when is the original appointment: 10/08/24     Additional notes:PLEASE CALL ASAP AS PT NEEDS TO PLAN TRANSPORTATION.

## 2024-10-11 ENCOUNTER — LAB (OUTPATIENT)
Dept: LAB | Facility: HOSPITAL | Age: 74
End: 2024-10-11
Payer: MEDICARE

## 2024-10-11 ENCOUNTER — INFUSION (OUTPATIENT)
Dept: ONCOLOGY | Facility: HOSPITAL | Age: 74
End: 2024-10-11
Payer: MEDICARE

## 2024-10-11 DIAGNOSIS — C50.411 MALIGNANT NEOPLASM OF UPPER-OUTER QUADRANT OF RIGHT BREAST IN FEMALE, ESTROGEN RECEPTOR POSITIVE: ICD-10-CM

## 2024-10-11 DIAGNOSIS — Z17.0 MALIGNANT NEOPLASM OF UPPER-OUTER QUADRANT OF RIGHT BREAST IN FEMALE, ESTROGEN RECEPTOR POSITIVE: ICD-10-CM

## 2024-10-11 DIAGNOSIS — E55.9 VITAMIN D DEFICIENCY: Primary | ICD-10-CM

## 2024-10-11 DIAGNOSIS — C90.00 MULTIPLE MYELOMA NOT HAVING ACHIEVED REMISSION: ICD-10-CM

## 2024-10-11 DIAGNOSIS — Z79.899 HIGH RISK MEDICATION USE: ICD-10-CM

## 2024-10-11 LAB
ALBUMIN SERPL-MCNC: 3.8 G/DL (ref 3.5–5.2)
ALBUMIN/GLOB SERPL: 1.6 G/DL
ALP SERPL-CCNC: 64 U/L (ref 39–117)
ALT SERPL W P-5'-P-CCNC: 9 U/L (ref 1–33)
ANION GAP SERPL CALCULATED.3IONS-SCNC: 10.8 MMOL/L (ref 5–15)
AST SERPL-CCNC: 15 U/L (ref 1–32)
B2 MICROGLOB SERPL-MCNC: 1.9 MG/L (ref 0.8–2.2)
BASOPHILS # BLD AUTO: 0.06 10*3/MM3 (ref 0–0.2)
BASOPHILS NFR BLD AUTO: 1.8 % (ref 0–1.5)
BILIRUB SERPL-MCNC: 0.2 MG/DL (ref 0–1.2)
BUN SERPL-MCNC: 8 MG/DL (ref 8–23)
BUN/CREAT SERPL: 10.1 (ref 7–25)
CALCIUM SPEC-SCNC: 8.6 MG/DL (ref 8.6–10.5)
CHLORIDE SERPL-SCNC: 104 MMOL/L (ref 98–107)
CO2 SERPL-SCNC: 27.2 MMOL/L (ref 22–29)
CREAT SERPL-MCNC: 0.79 MG/DL (ref 0.57–1)
DEPRECATED RDW RBC AUTO: 51.1 FL (ref 37–54)
EGFRCR SERPLBLD CKD-EPI 2021: 78.1 ML/MIN/1.73
EOSINOPHIL # BLD AUTO: 0.19 10*3/MM3 (ref 0–0.4)
EOSINOPHIL NFR BLD AUTO: 5.6 % (ref 0.3–6.2)
ERYTHROCYTE [DISTWIDTH] IN BLOOD BY AUTOMATED COUNT: 14.2 % (ref 12.3–15.4)
GLOBULIN UR ELPH-MCNC: 2.4 GM/DL
GLUCOSE SERPL-MCNC: 69 MG/DL (ref 65–99)
HCT VFR BLD AUTO: 37.6 % (ref 34–46.6)
HGB BLD-MCNC: 12.3 G/DL (ref 12–15.9)
IMM GRANULOCYTES # BLD AUTO: 0.01 10*3/MM3 (ref 0–0.05)
IMM GRANULOCYTES NFR BLD AUTO: 0.3 % (ref 0–0.5)
LYMPHOCYTES # BLD AUTO: 1.48 10*3/MM3 (ref 0.7–3.1)
LYMPHOCYTES NFR BLD AUTO: 43.4 % (ref 19.6–45.3)
MAGNESIUM SERPL-MCNC: 2 MG/DL (ref 1.6–2.4)
MCH RBC QN AUTO: 32.3 PG (ref 26.6–33)
MCHC RBC AUTO-ENTMCNC: 32.7 G/DL (ref 31.5–35.7)
MCV RBC AUTO: 98.7 FL (ref 79–97)
MONOCYTES # BLD AUTO: 0.46 10*3/MM3 (ref 0.1–0.9)
MONOCYTES NFR BLD AUTO: 13.5 % (ref 5–12)
NEUTROPHILS NFR BLD AUTO: 1.21 10*3/MM3 (ref 1.7–7)
NEUTROPHILS NFR BLD AUTO: 35.4 % (ref 42.7–76)
NRBC BLD AUTO-RTO: 0 /100 WBC (ref 0–0.2)
PHOSPHATE SERPL-MCNC: 2.4 MG/DL (ref 2.5–4.5)
PLATELET # BLD AUTO: 218 10*3/MM3 (ref 140–450)
PMV BLD AUTO: 8.8 FL (ref 6–12)
POTASSIUM SERPL-SCNC: 3.4 MMOL/L (ref 3.5–5.2)
PROT SERPL-MCNC: 6.2 G/DL (ref 6–8.5)
RBC # BLD AUTO: 3.81 10*6/MM3 (ref 3.77–5.28)
SODIUM SERPL-SCNC: 142 MMOL/L (ref 136–145)
WBC NRBC COR # BLD AUTO: 3.41 10*3/MM3 (ref 3.4–10.8)

## 2024-10-11 PROCEDURE — 80053 COMPREHEN METABOLIC PANEL: CPT | Performed by: INTERNAL MEDICINE

## 2024-10-11 PROCEDURE — 82232 ASSAY OF BETA-2 PROTEIN: CPT | Performed by: INTERNAL MEDICINE

## 2024-10-11 PROCEDURE — 83735 ASSAY OF MAGNESIUM: CPT | Performed by: INTERNAL MEDICINE

## 2024-10-11 PROCEDURE — 84100 ASSAY OF PHOSPHORUS: CPT | Performed by: INTERNAL MEDICINE

## 2024-10-11 PROCEDURE — 96372 THER/PROPH/DIAG INJ SC/IM: CPT

## 2024-10-11 PROCEDURE — 25010000002 CYANOCOBALAMIN PER 1000 MCG: Performed by: INTERNAL MEDICINE

## 2024-10-11 PROCEDURE — 36415 COLL VENOUS BLD VENIPUNCTURE: CPT

## 2024-10-11 PROCEDURE — 85025 COMPLETE CBC W/AUTO DIFF WBC: CPT

## 2024-10-11 RX ORDER — CYANOCOBALAMIN 1000 UG/ML
1000 INJECTION, SOLUTION INTRAMUSCULAR; SUBCUTANEOUS ONCE
Status: COMPLETED | OUTPATIENT
Start: 2024-10-11 | End: 2024-10-11

## 2024-10-11 RX ADMIN — CYANOCOBALAMIN 1000 MCG: 1000 INJECTION, SOLUTION INTRAMUSCULAR at 15:23

## 2024-10-14 ENCOUNTER — TELEPHONE (OUTPATIENT)
Dept: ONCOLOGY | Facility: CLINIC | Age: 74
End: 2024-10-14
Payer: MEDICARE

## 2024-10-14 DIAGNOSIS — C50.411 MALIGNANT NEOPLASM OF UPPER-OUTER QUADRANT OF RIGHT BREAST IN FEMALE, ESTROGEN RECEPTOR POSITIVE: ICD-10-CM

## 2024-10-14 DIAGNOSIS — Z17.0 MALIGNANT NEOPLASM OF UPPER-OUTER QUADRANT OF RIGHT BREAST IN FEMALE, ESTROGEN RECEPTOR POSITIVE: ICD-10-CM

## 2024-10-14 LAB
ALBUMIN SERPL ELPH-MCNC: 3.5 G/DL (ref 2.9–4.4)
ALBUMIN/GLOB SERPL: 1.5 {RATIO} (ref 0.7–1.7)
ALPHA1 GLOB SERPL ELPH-MCNC: 0.2 G/DL (ref 0–0.4)
ALPHA2 GLOB SERPL ELPH-MCNC: 0.7 G/DL (ref 0.4–1)
B-GLOBULIN SERPL ELPH-MCNC: 0.9 G/DL (ref 0.7–1.3)
GAMMA GLOB SERPL ELPH-MCNC: 0.7 G/DL (ref 0.4–1.8)
GLOBULIN SER-MCNC: 2.4 G/DL (ref 2.2–3.9)
IGA SERPL-MCNC: 97 MG/DL (ref 64–422)
IGG SERPL-MCNC: 825 MG/DL (ref 586–1602)
IGM SERPL-MCNC: 41 MG/DL (ref 26–217)
INTERPRETATION SERPL IEP-IMP: ABNORMAL
KAPPA LC FREE SER-MCNC: 26.1 MG/L (ref 3.3–19.4)
KAPPA LC FREE/LAMBDA FREE SER: 1.1 {RATIO} (ref 0.26–1.65)
LABORATORY COMMENT REPORT: ABNORMAL
LAMBDA LC FREE SERPL-MCNC: 23.8 MG/L (ref 5.7–26.3)
M PROTEIN SERPL ELPH-MCNC: ABNORMAL G/DL
PROT SERPL-MCNC: 5.9 G/DL (ref 6–8.5)

## 2024-10-14 NOTE — TELEPHONE ENCOUNTER
Called patient, said that Dr. Flanagan is a newish patient for her, said that she took Hydrocodone for her bone pain from her Multiple Myeloma. Said that this medication was previous filled by her PCP from Dr. Carvajal but the patient has switched PCP and is requesting that Dr. Flanagan take over the Norco prescription, 10mg. Told her I would discuss this with Dr. Flanagan. Pt v/u

## 2024-10-14 NOTE — TELEPHONE ENCOUNTER
Caller: Jennifer Plascencia    Relationship: Self    Best call back number: [phone]    What is the best time to reach you: ASAP    Who are you requesting to speak with (clinical staff, provider,  specific staff member): CLINICAL    What was the call regarding: PT REQUESTING A CALL BACK ASAP REGARDING A PRESCRIPTION FOR HYDROCODONE

## 2024-10-15 DIAGNOSIS — C50.411 MALIGNANT NEOPLASM OF UPPER-OUTER QUADRANT OF RIGHT BREAST IN FEMALE, ESTROGEN RECEPTOR POSITIVE: ICD-10-CM

## 2024-10-15 DIAGNOSIS — Z17.0 MALIGNANT NEOPLASM OF UPPER-OUTER QUADRANT OF RIGHT BREAST IN FEMALE, ESTROGEN RECEPTOR POSITIVE: ICD-10-CM

## 2024-10-15 RX ORDER — HYDROCODONE BITARTRATE AND ACETAMINOPHEN 5; 325 MG/1; MG/1
1 TABLET ORAL EVERY 6 HOURS PRN
Qty: 30 TABLET | Refills: 0 | Status: SHIPPED | OUTPATIENT
Start: 2024-10-15

## 2024-10-15 NOTE — TELEPHONE ENCOUNTER
Called patient, let her know we sent in Norco 5mg q6 prn for the patient per Dr. Flanagan. No answer, unable to leave voicemail.

## 2024-10-17 ENCOUNTER — SPECIALTY PHARMACY (OUTPATIENT)
Dept: PHARMACY | Facility: HOSPITAL | Age: 74
End: 2024-10-17
Payer: MEDICARE

## 2024-10-17 DIAGNOSIS — C90.00 MULTIPLE MYELOMA NOT HAVING ACHIEVED REMISSION: ICD-10-CM

## 2024-10-17 RX ORDER — LENALIDOMIDE 2.5 MG/1
7.5 CAPSULE ORAL DAILY
Qty: 63 CAPSULE | Refills: 0 | Status: SHIPPED | OUTPATIENT
Start: 2024-10-17

## 2024-10-17 NOTE — PROGRESS NOTES
Specialty Pharmacy Patient Management Program  Per Protocol Prescription Order or Refill       Requested Prescriptions     Signed Prescriptions Disp Refills    lenalidomide (REVLIMID) 2.5 MG capsule 63 capsule 0     Sig: Take 3 capsules by mouth Daily. Take for 21 days on, then 7 days off     Prescription orders above were sent to [facility] per Collaborative Care Agreement Protocol.     Completed independent double check on medication order/RX.    Felix Gilliam, Angle, BCOP  Clinical Specialty Pharmacist, Oncology  10/17/2024  10:02 EDT

## 2024-10-17 NOTE — PROGRESS NOTES
Specialty Pharmacy Patient Management Program  Per Protocol Prescription Order or Refill     Patient will be filling or currently fills medications at \[facility]\"" [facility] and is enrolled in the Patient Management Program.    Requested Prescriptions     Signed Prescriptions Disp Refills    lenalidomide (REVLIMID) 2.5 MG capsule 63 capsule 0     Sig: Take 3 capsules by mouth Daily. Take for 21 days on, then 7 days off     Prescription orders above were sent to the pharmacy per Collaborative Care Agreement Protocol.     Last Office Visit: 8/14/24  Next Office Visit: 11/12/24    Sandra Hernandez PharmD, [facility]  Clinical Specialty Pharmacist, Oncology  10/17/2024  09:54 EDT

## 2024-10-31 ENCOUNTER — TELEPHONE (OUTPATIENT)
Dept: ONCOLOGY | Facility: CLINIC | Age: 74
End: 2024-10-31
Payer: MEDICARE

## 2024-10-31 NOTE — TELEPHONE ENCOUNTER
Called patient , she states that she was prescribed olanzapine from her Dr's at [facility] and doesn't know why and is a nervous wreck. States that she wants to only talk to Dr. Flanagan about this.

## 2024-11-01 ENCOUNTER — TELEPHONE (OUTPATIENT)
Dept: ONCOLOGY | Facility: CLINIC | Age: 74
End: 2024-11-01
Payer: MEDICARE

## 2024-11-01 NOTE — TELEPHONE ENCOUNTER
Called patient, told her per Dr. Flanagan that the patient should reach out to [facility] to discuss why the patient was placed on the medication. Patient stated that she already reached out and they told her it order to help with her pain. Patient said that she woke up this morning and it was great.

## 2024-11-01 NOTE — TELEPHONE ENCOUNTER
Caller: Jennifer Plascencia    Relationship: Self    Best call back number: [phone]     What is the best time to reach you: ANYTIME    Who are you requesting to speak with (clinical staff, provider,  specific staff member): CLINICAL    What was the call regarding: PT IS CALLING BACK. WOULD LIKE TO SPEAK WITH DR SCHMITT ABOUT THE MESSAGE FROM YESTERDAY. OR CAN CHANEL CALL BACK?

## 2024-11-08 ENCOUNTER — SPECIALTY PHARMACY (OUTPATIENT)
Dept: PHARMACY | Facility: HOSPITAL | Age: 74
End: 2024-11-08
Payer: MEDICARE

## 2024-11-08 DIAGNOSIS — C90.00 MULTIPLE MYELOMA NOT HAVING ACHIEVED REMISSION: ICD-10-CM

## 2024-11-08 RX ORDER — LENALIDOMIDE 2.5 MG/1
7.5 CAPSULE ORAL DAILY
Qty: 63 CAPSULE | Refills: 0 | Status: SHIPPED | OUTPATIENT
Start: 2024-11-08

## 2024-11-08 NOTE — PROGRESS NOTES
Specialty Pharmacy Patient Management Program  Per Protocol Prescription Order or Refill       Requested Prescriptions     Signed Prescriptions Disp Refills    lenalidomide (REVLIMID) 2.5 MG capsule 63 capsule 0     Sig: Take 3 capsules by mouth Daily. Take for 21 days on, then 7 days off     Prescription orders above were sent to [facility] per Collaborative Care Agreement Protocol.     Completed independent double check on medication order/RX.    Brittani Ellis Rph, BCOP  Clinical Specialty Pharmacist, Oncology  11/8/2024  13:55 EST

## 2024-11-08 NOTE — PROGRESS NOTES
Specialty Pharmacy Patient Management Program  Per Protocol Prescription Order or Refill     Patient will be filling or currently fills medications at [facility] and is enrolled in the Patient Management Program.    Requested Prescriptions     Signed Prescriptions Disp Refills    lenalidomide (REVLIMID) 2.5 MG capsule 63 capsule 0     Sig: Take 3 capsules by mouth Daily. Take for 21 days on, then 7 days off     Prescription orders above were sent to the pharmacy per Collaborative Care Agreement Protocol.     Last Office Visit: 8/14/24  Next Office Visit: 11/12/24    Sandra Hernandez PharmD, [facility]  Clinical Specialty Pharmacist, Oncology  11/8/2024  13:48 EST

## 2024-11-12 ENCOUNTER — SPECIALTY PHARMACY (OUTPATIENT)
Dept: ONCOLOGY | Facility: HOSPITAL | Age: 74
End: 2024-11-12
Payer: MEDICARE

## 2024-11-12 ENCOUNTER — OFFICE VISIT (OUTPATIENT)
Dept: ONCOLOGY | Facility: CLINIC | Age: 74
End: 2024-11-12
Payer: MEDICARE

## 2024-11-12 ENCOUNTER — INFUSION (OUTPATIENT)
Dept: ONCOLOGY | Facility: HOSPITAL | Age: 74
End: 2024-11-12
Payer: MEDICARE

## 2024-11-12 VITALS
RESPIRATION RATE: 16 BRPM | HEART RATE: 95 BPM | BODY MASS INDEX: 23.57 KG/M2 | WEIGHT: 150.2 LBS | DIASTOLIC BLOOD PRESSURE: 82 MMHG | HEIGHT: 67 IN | TEMPERATURE: 98.4 F | SYSTOLIC BLOOD PRESSURE: 142 MMHG | OXYGEN SATURATION: 98 %

## 2024-11-12 DIAGNOSIS — C90.00 MULTIPLE MYELOMA NOT HAVING ACHIEVED REMISSION: Primary | ICD-10-CM

## 2024-11-12 DIAGNOSIS — C90.00 MULTIPLE MYELOMA NOT HAVING ACHIEVED REMISSION: ICD-10-CM

## 2024-11-12 DIAGNOSIS — C50.411 MALIGNANT NEOPLASM OF UPPER-OUTER QUADRANT OF RIGHT BREAST IN FEMALE, ESTROGEN RECEPTOR POSITIVE: Primary | ICD-10-CM

## 2024-11-12 DIAGNOSIS — Z17.0 MALIGNANT NEOPLASM OF UPPER-OUTER QUADRANT OF RIGHT BREAST IN FEMALE, ESTROGEN RECEPTOR POSITIVE: Primary | ICD-10-CM

## 2024-11-12 DIAGNOSIS — E53.8 B12 DEFICIENCY: ICD-10-CM

## 2024-11-12 LAB
ALBUMIN SERPL-MCNC: 4.3 G/DL (ref 3.5–5.2)
ALBUMIN/GLOB SERPL: 1.7 G/DL
ALP SERPL-CCNC: 82 U/L (ref 39–117)
ALT SERPL W P-5'-P-CCNC: 20 U/L (ref 1–33)
ANION GAP SERPL CALCULATED.3IONS-SCNC: 11.4 MMOL/L (ref 5–15)
AST SERPL-CCNC: 25 U/L (ref 1–32)
B2 MICROGLOB SERPL-MCNC: 2.4 MG/L (ref 0.8–2.2)
BASOPHILS # BLD AUTO: 0.06 10*3/MM3 (ref 0–0.2)
BASOPHILS NFR BLD AUTO: 1 % (ref 0–1.5)
BILIRUB SERPL-MCNC: 0.4 MG/DL (ref 0–1.2)
BUN SERPL-MCNC: 12 MG/DL (ref 8–23)
BUN/CREAT SERPL: 13.5 (ref 7–25)
CALCIUM SPEC-SCNC: 9.2 MG/DL (ref 8.6–10.5)
CHLORIDE SERPL-SCNC: 101 MMOL/L (ref 98–107)
CO2 SERPL-SCNC: 24.6 MMOL/L (ref 22–29)
CREAT SERPL-MCNC: 0.89 MG/DL (ref 0.57–1)
DEPRECATED RDW RBC AUTO: 51.3 FL (ref 37–54)
EGFRCR SERPLBLD CKD-EPI 2021: 67.7 ML/MIN/1.73
EOSINOPHIL # BLD AUTO: 0.28 10*3/MM3 (ref 0–0.4)
EOSINOPHIL NFR BLD AUTO: 4.9 % (ref 0.3–6.2)
ERYTHROCYTE [DISTWIDTH] IN BLOOD BY AUTOMATED COUNT: 14.1 % (ref 12.3–15.4)
GLOBULIN UR ELPH-MCNC: 2.6 GM/DL
GLUCOSE SERPL-MCNC: 100 MG/DL (ref 65–99)
HCT VFR BLD AUTO: 40.2 % (ref 34–46.6)
HGB BLD-MCNC: 13 G/DL (ref 12–15.9)
IMM GRANULOCYTES # BLD AUTO: 0.01 10*3/MM3 (ref 0–0.05)
IMM GRANULOCYTES NFR BLD AUTO: 0.2 % (ref 0–0.5)
LYMPHOCYTES # BLD AUTO: 2.28 10*3/MM3 (ref 0.7–3.1)
LYMPHOCYTES NFR BLD AUTO: 39.5 % (ref 19.6–45.3)
MAGNESIUM SERPL-MCNC: 2.2 MG/DL (ref 1.6–2.4)
MCH RBC QN AUTO: 32.4 PG (ref 26.6–33)
MCHC RBC AUTO-ENTMCNC: 32.3 G/DL (ref 31.5–35.7)
MCV RBC AUTO: 100.2 FL (ref 79–97)
MONOCYTES # BLD AUTO: 0.74 10*3/MM3 (ref 0.1–0.9)
MONOCYTES NFR BLD AUTO: 12.8 % (ref 5–12)
NEUTROPHILS NFR BLD AUTO: 2.4 10*3/MM3 (ref 1.7–7)
NEUTROPHILS NFR BLD AUTO: 41.6 % (ref 42.7–76)
NRBC BLD AUTO-RTO: 0 /100 WBC (ref 0–0.2)
PHOSPHATE SERPL-MCNC: 3.9 MG/DL (ref 2.5–4.5)
PLATELET # BLD AUTO: 225 10*3/MM3 (ref 140–450)
PMV BLD AUTO: 10.1 FL (ref 6–12)
POTASSIUM SERPL-SCNC: 4.4 MMOL/L (ref 3.5–5.2)
PROT SERPL-MCNC: 6.9 G/DL (ref 6–8.5)
RBC # BLD AUTO: 4.01 10*6/MM3 (ref 3.77–5.28)
SODIUM SERPL-SCNC: 137 MMOL/L (ref 136–145)
WBC NRBC COR # BLD AUTO: 5.77 10*3/MM3 (ref 3.4–10.8)

## 2024-11-12 PROCEDURE — 36415 COLL VENOUS BLD VENIPUNCTURE: CPT

## 2024-11-12 PROCEDURE — 84100 ASSAY OF PHOSPHORUS: CPT

## 2024-11-12 PROCEDURE — 85025 COMPLETE CBC W/AUTO DIFF WBC: CPT

## 2024-11-12 PROCEDURE — 82232 ASSAY OF BETA-2 PROTEIN: CPT | Performed by: INTERNAL MEDICINE

## 2024-11-12 PROCEDURE — 96374 THER/PROPH/DIAG INJ IV PUSH: CPT

## 2024-11-12 PROCEDURE — 80053 COMPREHEN METABOLIC PANEL: CPT

## 2024-11-12 PROCEDURE — 96372 THER/PROPH/DIAG INJ SC/IM: CPT

## 2024-11-12 PROCEDURE — 83735 ASSAY OF MAGNESIUM: CPT

## 2024-11-12 PROCEDURE — 25010000002 ZOLEDRONIC ACID PER 1 MG: Performed by: INTERNAL MEDICINE

## 2024-11-12 PROCEDURE — 25010000002 CYANOCOBALAMIN PER 1000 MCG: Performed by: INTERNAL MEDICINE

## 2024-11-12 RX ORDER — CYANOCOBALAMIN 1000 UG/ML
1000 INJECTION, SOLUTION INTRAMUSCULAR; SUBCUTANEOUS ONCE
Status: COMPLETED | OUTPATIENT
Start: 2024-11-12 | End: 2024-11-12

## 2024-11-12 RX ORDER — ACETYLCARNITIN/ALPHA LIPOIC AC 400-200 MG
2 CAPSULE ORAL 2 TIMES DAILY
Qty: 360 CAPSULE | Refills: 1 | Status: SHIPPED | OUTPATIENT
Start: 2024-11-12

## 2024-11-12 RX ADMIN — CYANOCOBALAMIN 1000 MCG: 1000 INJECTION, SOLUTION INTRAMUSCULAR at 11:14

## 2024-11-12 RX ADMIN — ZOLEDRONIC ACID 3.5 MG: 4 INJECTION INTRAVENOUS at 10:56

## 2024-11-12 NOTE — PROGRESS NOTES
Specialty Pharmacy Patient Management Program  Oncology Reassessment     Jennifer Plascencia was referred by an their provider to the Oncology Patient Management program offered by [facility] for multiple myeloma. A follow-up outreach was conducted, including assessment of continued therapy appropriateness, medication adherence, and side effect incidence and management for Revlimid.    Changes to Insurance Coverage or Financial Support  none    Relevant Past Medical History and Comorbidities  Relevant medical history and concomitant health conditions were discussed with the patient. The patient's chart has been reviewed for relevant past medical history and comorbid health conditions and updated as necessary.   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  Stem cell transplant at [facility] 3/20/2016    Myeloma     Neck pain     Neuropathy     KWAME (obstructive sleep apnea) 2021    Overnight polysomnogram.  Weight 173 pounds.  Mild KWAME with AHI 5.6 events per hour.  When supine, 9.4 events per hour.  During REM, moderate severity at 26 events per hour.  No sleep-related hypoxia.  The patient snored 20.5% of total sleep time.    Osteopenia     Overweight (BMI 25.0-29.9) 2018     Social History     Socioeconomic History    Marital status:     Number of children: 2   Tobacco Use    Smoking status: Former     Current packs/day: 0.00     Average packs/day: 0.3 packs/day for 3.0 years (0.7 ttl pk-yrs)     Types: Cigarettes     Start date: 1971     Quit date: 1972     Years since quittin.9    Smokeless tobacco: Never    Tobacco comments:      Only lightly for 2 years   Vaping Use    Vaping status: Never Used   Substance and Sexual Activity    Alcohol use: Not Currently     Comment: Na    Drug use: Never    Sexual activity: Not Currently     Partners: Male     Birth control/protection: None     Comment: Karime     Problem list reviewed by Sandra Hernandez RPH on 11/12/2024 at 11:42 AM    Hospitalizations and Urgent Care Since Last Assessment  ED Visits, Admissions, or Hospitalizations: yes  Urgent Office Visits: no    Allergies  Known allergies and reactions were discussed with the patient. The patient's chart has been reviewed for allergy information and updated as necessary.   No Known Allergies  Allergies reviewed by Sandra Hernandez RPH on 11/12/2024 at 11:42 AM    Relevant Laboratory Values  Relevant laboratory values were discussed with the patient. The following specialty medication dose adjustment(s) are recommended: No dose adjustments are needed for the oral specialty medication(s) based on the labs.    Lab Results   Component Value Date    GLUCOSE 100 (H) 11/12/2024    CALCIUM 9.2 11/12/2024     11/12/2024    K 4.4 11/12/2024    CO2 24.6 11/12/2024     11/12/2024    BUN 12 11/12/2024    CREATININE 0.89 11/12/2024    EGFRIFAFRI 108 09/18/2018    EGFRIFNONA 61 02/08/2022    BCR 13.5 11/12/2024    ANIONGAP 11.4 11/12/2024     Lab Results   Component Value Date    WBC 5.77 11/12/2024    RBC 4.01 11/12/2024    HGB 13.0 11/12/2024    HCT 40.2 11/12/2024    .2 (H) 11/12/2024    MCH 32.4 11/12/2024    MCHC 32.3 11/12/2024    RDW 14.1 11/12/2024    RDWSD 51.3 11/12/2024    MPV 10.1 11/12/2024     11/12/2024    NEUTRORELPCT 41.6 (L) 11/12/2024    LYMPHORELPCT 39.5 11/12/2024    MONORELPCT 12.8 (H) 11/12/2024    EOSRELPCT 4.9 11/12/2024    BASORELPCT 1.0 11/12/2024    AUTOIGPER 0.2 11/12/2024    NEUTROABS 2.40 11/12/2024    LYMPHSABS 2.28 11/12/2024    MONOSABS 0.74 11/12/2024    EOSABS 0.28 11/12/2024    BASOSABS 0.06 11/12/2024     AUTOIGNUM 0.01 11/12/2024    NRBC 0.0 11/12/2024       Current Medication List  This medication list has been reviewed with the patient and evaluated for any interactions or necessary modifications/recommendations, and updated to include all prescription medications, OTC medications, and supplements the patient is currently taking.  This list reflects what is contained in the patient's profile, which has also been marked as reviewed to communicate to other providers it is the most up to date version of the patient's current medication therapy.     Current Outpatient Medications:     Acetylcarn-Alpha Lipoic Acid 400-200 MG capsule, Take 2 capsules by mouth 2 (Two) Times a Day., Disp: 360 capsule, Rfl: 1    acetaminophen (TYLENOL) 325 MG tablet, Take 1 tablet by mouth Every 6 (Six) Hours As Needed for Mild Pain., Disp: , Rfl:     Azelastine HCl 137 MCG/SPRAY solution, , Disp: , Rfl:     B Complex Vitamins (VITAMIN B COMPLEX) capsule capsule, Take 2 tablets by mouth Daily. HOLD PRIOR TO SURG, Disp: , Rfl:     baclofen (LIORESAL) 10 MG tablet, Take 1 tablet by mouth 3 (Three) Times a Day As Needed., Disp: , Rfl: 5    calcium carbonate-vitamin d 600-400 MG-UNIT per tablet, Take 1 tablet by mouth Daily. HOLD PRIOR TO SURG, Disp: , Rfl:     colesevelam (WELCHOL) 625 MG tablet, Take 2 tablets by mouth 2 (Two) Times a Day With Meals., Disp: 90 tablet, Rfl: 2    cycloSPORINE (RESTASIS) 0.05 % ophthalmic emulsion, 1 drop 2 (Two) Times a Day., Disp: , Rfl:     diphenoxylate-atropine (LOMOTIL) 2.5-0.025 MG per tablet, Take 1 tablet by mouth 3 (Three) Times a Day As Needed for Diarrhea., Disp: 90 tablet, Rfl: 3    DULoxetine (CYMBALTA) 30 MG capsule, Take 1 capsule by mouth Every Night., Disp: , Rfl:     DULoxetine (CYMBALTA) 60 MG capsule, Take 1 capsule by mouth Daily. (Patient taking differently: Take 1 capsule by mouth Every Night.), Disp: 90 capsule, Rfl: 3    fexofenadine (ALLEGRA) 180 MG tablet, Take 1 tablet by mouth  As Needed., Disp: , Rfl:     fluticasone (FLONASE) 50 MCG/ACT nasal spray, Administer 2 sprays into the nostril(s) as directed by provider Daily., Disp: , Rfl:     folic acid (FOLVITE) 1 MG tablet, Take 1 tablet by mouth Daily., Disp: , Rfl: 2    gabapentin (NEURONTIN) 100 MG capsule, Take 1 capsule by mouth 3 times a day., Disp: , Rfl:     HYDROcodone-acetaminophen (NORCO) 5-325 MG per tablet, Take 1 tablet by mouth Every 6 (Six) Hours As Needed for Moderate Pain., Disp: 30 tablet, Rfl: 0    lenalidomide (REVLIMID) 2.5 MG capsule, Take 3 capsules by mouth Daily. Take for 21 days on, then 7 days off, Disp: 63 capsule, Rfl: 0    lidocaine-prilocaine (EMLA) 2.5-2.5 % cream, Apply 1 Application topically to the appropriate area as directed 1 (One) Time., Disp: , Rfl:     Multiple Vitamins-Iron (DAILY-JONI/IRON/BETA-CAROTENE) tablet, Take 1 tablet by mouth Daily. HOLD PRIOR TO SURG, Disp: , Rfl: 2    polyethylene glycol (MiraLax) 17 GM/SCOOP powder, Take 17 g by mouth Daily. Take cap of powder with 8oz water daily surrounding surgery and while on narcotic, Disp: 119 g, Rfl: 0    promethazine-dextromethorphan (PROMETHAZINE-DM) 6.25-15 MG/5ML syrup, TAKE 10 ML BY MOUTH TWICE A DAY AS NEEDED FOR COUGH, Disp: , Rfl:     Synthroid 75 MCG tablet, Take 1 tablet by mouth Daily., Disp: , Rfl:     Tart Cherry 1200 MG capsule, Take 1 capsule by mouth 3 (Three) Times a Day., Disp: 270 capsule, Rfl: 1    trimethoprim-polymyxin b (POLYTRIM) 67216-8.1 UNIT/ML-% ophthalmic solution, As Needed., Disp: , Rfl:     vitamin D (ERGOCALCIFEROL) 29287 units capsule capsule, Take 1 capsule by mouth 2 (Two) Times a Week. Twice a week, Disp: , Rfl:     zoledronic acid (ZOMETA) 4 MG/5ML injection, Infuse 5 mL into a venous catheter Every 3 (Three) Months., Disp: , Rfl:   No current facility-administered medications for this visit.    Medicines reviewed by Sandra Hernandez RPH on 11/12/2024 at 11:42 AM    Drug Interactions  Assessed medication list  for interactions, no significant drug interactions noted.   Advised patient to call the clinic if any new medications are started so we can assess for drug-drug interactions.  Drug-food interactions discussed:  none    Adverse Drug Reactions  Medication tolerability: Tolerating with no to minimal ADRs  Medication plan: Continue therapy with normal follow-up  Plan for ADR Management: not needed    Adherence, Self-Administration, and Current Therapy Problems  Adherence related to the patient's specialty therapy was discussed with the patient. The Adherence segment of this outreach has been reviewed and updated.     Adherence Questions  Linked Medication(s) Assessed: Lenalidomide (REVLIMID)  On average, how many doses/injections does the patient miss per month?: 0  What are the identified reasons for non-adherence or missed doses? : no problems identified  What is the estimated medication adherence level?: %  Based on the patient/caregiver response and refill history, does this patient require an MTP to track adherence improvements?: no    Additional Barriers to Patient Self-Administration: none  Methods for Supporting Patient Self-Administration: none  Patient has had no issues obtaining medication from pharmacy.    Open Medication Therapy Problems  No medication therapy recommendations to display    Goals of Therapy  Goals related to the patient's specialty therapy were discussed with the patient. The Patient Goals segment of this outreach has been reviewed and updated.   Goals Addressed Today        Specialty Pharmacy General Goal      Progression free survival               Quality of Life Assessment   Quality of Life related to the patient's enrollment in the patient management program and services provided was discussed with the patient. The QOL segment of this outreach has been reviewed and updated.  Quality of Life Improvement Scale: 6-A little better    Discussed aforementioned material with patient in  person, face-to-face, in clinic.     Reassessment Plan & Follow-Up  1. Medication Therapy Changes: none  2. Related Plans, Therapy Recommendations, or Issues to Be Addressed: none  3. Pharmacist to perform regular assessments no more than (6) months from the previous assessment.   4. Care Coordinator to set up future refill outreaches, coordinate prescription delivery, and escalate clinical questions to pharmacist.    Attestation  Therapeutic appropriateness: Appropriate   I attest the patient was actively involved in and has agreed to the above plan of care.  If the prescribed therapy is at any point deemed not appropriate based on the current or future assessments, a consultation will be initiated with the patient's specialty care provider to determine the best course of action. The revised plan of therapy will be documented along with any required assessments and/or additional patient education provided.     Sandra Hernandez, [place], [facility]  Clinical Specialty Pharmacist, Oncology  11/12/2024  11:43 EST

## 2024-11-12 NOTE — PROGRESS NOTES
Subjective   Jennifer Plascencia is a 74 y.o. female.  Referred by Dr. Rosales for right breast invasive ductal carcinoma    History of Present Illness     Ms. Jolly Plascencia is a 74-year-old lady with diagnosis of IgA kappa high risk multiple myeloma high risk due to status post stem cell transplant in March 2016 at [facility] and currently on maintenance Revlimid 5 mg 3 out of 4 weeks.    Multiple myeloma is high risk because of gain of 1 q- treatment with rev Dex Velcade initiated in 11/15-went on to stem cell transplant at [facility] in March 2016?  Melphalan.  She started post transplant therapy in May 2016 with Velcade rev Dex.  In September after 4 cycles Velcade was decreased to every other week with plans to continue rev Dex Velcade till progression because of high risk status.  In May 2019 her Velcade was discontinued because of worsening neuropathy.  Patient has remained on Revlimid 10 mg 21 out of 28 days since then with stable disease until November 2019 when a small IgG kappa paraprotein was noted on her blood tests which is distinct from her usual IgA kappa myeloma.  Restaging with PET scan was normal and since then the paraprotein as of 3/10/2020 has resolved    She is currently on Zometa every 3 months and being followed with myeloma labs monthly.    Dr. Alexey Xavier is her hematologist at [facility].    She has been seeing Dr. Garcia from our practice for management of the multiple myeloma and Revlimid.    12/28/2023-bilateral screening mammogram  Breast that heterogeneously dense.  There is a mass with associated distortion within the upper outer right breast posterior depth.  No suspicious findings in the left breast.    1/9/2024-right breast diagnostic mammogram and ultrasound  Heterogeneously dense breast tissue.  High density mass measuring approximately 19 mm with associated lactic show distortion, 10:00, 10 cm from the nipple.  Adjacent smaller circumscribed low-density 5 mm mass is located  just medial and superior to the primary mass by approximately 2 mm.  Calcifications in the upper inner quadrant of the right breast are stable dating back to 2019.  Second set of calcifications in the upper outer right breast are also stable dating back to 2019.    Ultrasound  At 10:00, 10 cm from the nipple there is a irregular mass measuring 21 x 19 x 9 mm.  Associated to caustic shadowing.  At 1030, 10 cm from the nipple there is a hypoechoic mass measuring 4 x 3 x 5 mm.  This is thought to represent the adjacent mass seen mammographically.  In addition there is a third circumscribed hypoechoic mass measuring 4 x 3 x 3 mm labeled 930, 8 cm from the nipple.  Considered indeterminate.  Right axilla lymph nodes are essentially fatty replaced.  No morphologically abnormal lymph nodes in the right axilla.    Impression  1.irregular mass measuring 21 mm, ultrasound-guided biopsy recommended  2.5 millimeter mass at 1030, 10 cm from the nipple is suspicious, ultrasound-guided biopsy recommended  3.4 millimeter mass is indeterminate at 930, ultrasound-guided biopsy recommended.    3 site ultrasound-guided biopsy  1.right breast 10:00, 10 cm from the nipple  Invasive ductal adenocarcinoma and ductal carcinoma in situ  Tumor is grade 2  No lymphovascular invasion no perineural invasion  ER positive strong 99%  VT positive indeterminate 10%  HER2 1+ by immunohistochemistry  Ki-67 30%  DCIS is high-grade    2.right breast 9:30, 8 cm from the nipple  Sclerosing adenosis  Rare microcyst  Microcalcifications in nonneoplastic tissue    1/29/2024-bilateral breast MRI  Biopsy-proven malignancy in the right breast at 9:00, measures 2.1 cm in greatest dimension.  No other suspicious findings are seen within the right breast.  No evidence of right axillary lymphadenopathy.  No left breast abnormalities noted.      She denies any family history of breast cancer.      Patient underwent right breast lumpectomy on 3/6/2024.    Pathology  shows invasive ductal carcinoma, measuring 21 mm, grade 2, 28 mm of DCIS, all margins -3 sentinel lymph nodes negative.  ER +91 to 100%  MS +10%  HER2 negative  Ki-67 30%  Oncotype DX recurrence score of 12 with less than 1% benefit from chemotherapy and 3% risk of distant metastasis at 9 years    She is recovering well from surgery.  Myeloma labs have been obtained today and results pending.    2/27/2024-Labs reviewed and CBC normal with a WBC of 3.93, hemoglobin 12.1 and platelets 204,000, CMP within normal limits, magnesium normal at 2.2, phosphorus normal at 3.9, beta-2 microglobulin normal at 2.1, SPEP with negative M spike, free light chain ratio normal at 1.02, free light chain kappa 31.5, free light chain lambda 31.0      Patient saw Dr. Hawthorne 4/23/2024 and he recommended resuming Revlimid 7.5 mg 21 out of 28 days.   He also recommended starting prophylactic Eliquis to Xarelto as he was concerned about the risk of DVT and PE with Revlimid, her age and endocrine therapy.  She also had an MRI which showed a 1.7 cm area of T2 hyperintensity in the upper sternum without diffusion restriction which was new from 5/11/2022.  This was nonspecific and indeterminate but could represent a myeloma lesion.  Attention to follow-up recommended.    Myeloma studies performed 4/23/2024 reviewed and no evidence of M protein, free light chain ratio normal.    She completed adjuvant radiation to the right breast in May 2024.    Interval history  Patient returns today for follow-up.  She had a recent visit with  at [facility] and at that time she was hospitalized due to operative issues and evaluated by breast medical oncology at [facility] by Dr. Jennifer Ambrosio is a nurse practitioner commended to hold anastrozole for 2 weeks to see if her symptoms would improve.  Patient does not recall any of this and she told me that she was admitted for COVID-19 infection and has also told me that she has been compliant with  anastrozole.  She was discharged on Zyprexa and the nurse practitioner from [facility] had called me to inform about the same however patient denies that she is on any such medication.  She is also constantly asking me to refill a particular medication which she cannot recall what that would be.  Overall her mental status still seems pretty altered and not quite normal.  She tells me that she ran 2 miles this morning and she feels quite strong.    The following portions of the patient's history were reviewed and updated as appropriate: allergies, current medications, past family history, past medical history, past social history, past surgical history, and problem list.    Past Medical History:   Diagnosis Date    Anxiety     Arthritis     Breast cancer 01/11/2024    Inv. Ductal with DCIS (right breast)   ER+/VA+/Her2-    Cataract     Removed    Depression     Disease of thyroid gland     Hashimoto's disease     Headache     2014    History of vitamin D deficiency     Hypothyroidism     IBS (irritable bowel syndrome)     Multiple myeloma 2015    IgA kappa.  Stem cell transplant at [facility] 3/20/2016    Myeloma     Neck pain     Neuropathy     KWAME (obstructive sleep apnea) 07/08/2021    Overnight polysomnogram.  Weight 173 pounds.  Mild KWAME with AHI 5.6 events per hour.  When supine, 9.4 events per hour.  During REM, moderate severity at 26 events per hour.  No sleep-related hypoxia.  The patient snored 20.5% of total sleep time.    Osteopenia     Overweight (BMI 25.0-29.9) 02/05/2018        Past Surgical History:   Procedure Laterality Date    ADENOIDECTOMY      In 2955 with tonsilectomy    ANTERIOR CERVICAL FUSION      BREAST BIOPSY Right 01/11/2024    10:00 @ 10cmFN   (Inv. Ductal Carcinoma  ER+/VA+/Her2-)  [facility]    BREAST LUMPECTOMY WITH SENTINEL NODE BIOPSY Right 03/06/2024    Procedure: RIGHT breast DOUG guided lumpectomy and RIGHT axilla sentinel node biopsy;  Surgeon: Connie  Angelina HUTCHISON MD;  Location: [facility] OR;  Service: General;  Laterality: Right;    CATARACT EXTRACTION      COLONOSCOPY      TONSILLECTOMY      VEIN SURGERY          Family History   Problem Relation Age of Onset    Breast cancer Mother     Sleep apnea Mother     Lymphoma Father     Sleep apnea Father     Cancer Father         Lymphoma    Cancer Maternal Aunt             Kidney cancer Paternal Grandmother     Depression Paternal Aunt         Bladder csncer    Malig Hyperthermia Neg Hx         Social History     Socioeconomic History    Marital status:     Number of children: 2   Tobacco Use    Smoking status: Former     Current packs/day: 0.00     Average packs/day: 0.3 packs/day for 3.0 years (0.7 ttl pk-yrs)     Types: Cigarettes     Start date: 1971     Quit date:      Years since quittin.9    Smokeless tobacco: Never    Tobacco comments:     Only lightly for 2 years   Vaping Use    Vaping status: Never Used   Substance and Sexual Activity    Alcohol use: Not Currently     Comment: Na    Drug use: Never    Sexual activity: Not Currently     Partners: Male     Birth control/protection: None     Comment: Na        OB History          2    Para   2    Term   2            AB        Living             SAB        IAB        Ectopic        Molar        Multiple        Live Births   2             Age at menarche-11  Age at first live childbirth-23   3 para 2  1  Oral conceptive pills none  Hormone replacement therapy for about 1 week  Age of menopause-late 50s    No Known Allergies         Review of Systems   Constitutional: Negative.    HENT: Negative.     Eyes: Negative.    Respiratory: Negative.     Cardiovascular: Negative.    Gastrointestinal: Negative.    Endocrine: Negative.    Genitourinary:  Positive for breast lump.   Allergic/Immunologic: Negative.    Neurological: Negative.    Psychiatric/Behavioral:  The patient is nervous/anxious.   "        Objective   Blood pressure 142/82, pulse 95, temperature 98.4 °F (36.9 °C), temperature source Oral, resp. rate 16, height 170.2 cm (67\"), weight 68.1 kg (150 lb 3.2 oz), SpO2 98%.   Physical Exam  Vitals reviewed.   Constitutional:       Appearance: Normal appearance. She is normal weight.   HENT:      Nose: Nose normal.      Mouth/Throat:      Pharynx: Oropharynx is clear.   Eyes:      Extraocular Movements: Extraocular movements intact.      Conjunctiva/sclera: Conjunctivae normal.   Cardiovascular:      Rate and Rhythm: Normal rate.      Pulses: Normal pulses.   Pulmonary:      Effort: Pulmonary effort is normal. No respiratory distress.      Breath sounds: No wheezing.   Skin:     General: Skin is warm.   Neurological:      General: No focal deficit present.      Mental Status: She is alert.   Psychiatric:         Mood and Affect: Mood normal.         Behavior: Behavior normal.         Thought Content: Thought content normal.         Judgment: Judgment normal.       I have reexamined the patient and the results are consistent with the previously documented exam. Teetee Flanagan MD      Infusion on 11/12/2024   Component Date Value Ref Range Status    WBC 11/12/2024 5.77  3.40 - 10.80 10*3/mm3 Final    RBC 11/12/2024 4.01  3.77 - 5.28 10*6/mm3 Final    Hemoglobin 11/12/2024 13.0  12.0 - 15.9 g/dL Final    Hematocrit 11/12/2024 40.2  34.0 - 46.6 % Final    MCV 11/12/2024 100.2 (H)  79.0 - 97.0 fL Final    MCH 11/12/2024 32.4  26.6 - 33.0 pg Final    MCHC 11/12/2024 32.3  31.5 - 35.7 g/dL Final    RDW 11/12/2024 14.1  12.3 - 15.4 % Final    RDW-SD 11/12/2024 51.3  37.0 - 54.0 fl Final    MPV 11/12/2024 10.1  6.0 - 12.0 fL Final    Platelets 11/12/2024 225  140 - 450 10*3/mm3 Final    Neutrophil % 11/12/2024 41.6 (L)  42.7 - 76.0 % Final    Lymphocyte % 11/12/2024 39.5  19.6 - 45.3 % Final    Monocyte % 11/12/2024 12.8 (H)  5.0 - 12.0 % Final    Eosinophil % 11/12/2024 4.9  0.3 - 6.2 % Final    Basophil % " 11/12/2024 1.0  0.0 - 1.5 % Final    Immature Grans % 11/12/2024 0.2  0.0 - 0.5 % Final    Neutrophils, Absolute 11/12/2024 2.40  1.70 - 7.00 10*3/mm3 Final    Lymphocytes, Absolute 11/12/2024 2.28  0.70 - 3.10 10*3/mm3 Final    Monocytes, Absolute 11/12/2024 0.74  0.10 - 0.90 10*3/mm3 Final    Eosinophils, Absolute 11/12/2024 0.28  0.00 - 0.40 10*3/mm3 Final    Basophils, Absolute 11/12/2024 0.06  0.00 - 0.20 10*3/mm3 Final    Immature Grans, Absolute 11/12/2024 0.01  0.00 - 0.05 10*3/mm3 Final    nRBC 11/12/2024 0.0  0.0 - 0.2 /100 WBC Final        MRI Brain    Result Date: 10/25/2024  1.  No acute intracranial abnormality or abnormal enhancement. 2.  Parenchymal volume loss and chronic small vessel disease. 3.  Mild scattered paranasal sinus mucosal thickening. Right mastoid effusion. ATTESTATION: I, Meron Calloway, as teaching physician have reviewed the images, if any, for this patient's exam, and if necessary, have edited the report originally created by Rafal Avelar.    CT Head Without Contrast Stroke Protocol    Result Date: 10/23/2024  No acute intracranial findings. ATTESTATION: I, Jonatan Henriquez, as teaching physician have reviewed the images, if any, for this patient's exam, and if necessary, have edited the report originally created by Elin Mims.    XR Chest    Result Date: 10/23/2024  Stable appearance of the chest with large calcified mediastinal nodes likely due to previous granulomatous infection. No new abnormality.        Assessment & Plan       *Right breast invasive ductal carcinoma  Tumor measures 21 mm on the ultrasound and 21 mm on the MRI but on physical exam measuring up to 3.5 cm.  Clinical T2 N0 M0  Invasive ductal carcinoma, grade 2, ER +99% strong, [place] intermediate, 10%, HER2 1+ immunohistochemistry, Ki-67 30%  Prognostic stage Ib  Status post right breast lumpectomy and sentinel lymph node biopsy on 3/6/2024.  Pathology shows invasive ductal carcinoma, tumor measures 21 mm, 28  mm of DCIS, margins negative, grade 2, ER strongly positive at 99%, UT 10% and HER2 1+ with a Ki-67 of 30%.  Oncotype DX recurrence score of 12, less than 1% benefit from chemotherapy and 3% risk of distant metastasis at 9 years with AI or tamoxifen alone.  Given that the Oncotype DX recurrence score is low there is no additional benefit from chemotherapy I recommend that she proceed with adjuvant radiation followed by adjuvant endocrine therapy.  She completed adjuvant radiation to the right breast in May 2024.  She has not started anastrozole yet.  Recommend that she start anastrozole now.  She returns 8/14/2024 in follow-up having been on anastrozole now reporting increased fatigue.  She has existing arthralgias however has not noticed these worsen.  She has noticed some hair loss.  She asks about switching to brand-name Arimidex instead however after further discussion does not wish to do so at this time.  She states she is read about exemestane and letrozole and is concerned this will cause more hair loss.  11/12/2024-and returns today for follow-up.  She was admitted to the hospital at [facility] [place] in October 2024 for COVID-19 infection and altered mental status.  She was recommended to hold anastrozole for 2 weeks and reassess symptoms by the medical oncologist Dr. Jolly Ambrosio at [facility] [place].  Patient tells me that she has been taking anastrozole and has been compliant with it.  Continue anastrozole.  Hard for me to assess her compliance with anastrozole.    *Multiple myeloma  IgA kappa multiple myeloma diagnosed in 2015 treated with RVD  Stem cell transplant at [facility] [place] in March 2016  Maintenance Velcade and Revlimid and dexamethasone started May 2016  Velcade discontinued because of neurotoxicity in May 2019  Small IgG kappa paraprotein seen on blood work in October 2019 and PET scan was performed which was negative.  This paraprotein subsequently resolved  Currently receiving Zometa every 3  months  Currently on Revlimid 5 mg 3 out of 4 weeks, dose decreased in November 23 for worsening neuropathy and stable disease.  She sees Dr. Alexey Xavier every 6 months.  Dr. Rosales has discussed with Dr. Xavier, based on her documentation he recommends holding Revlimid 1 week prior to surgery.  Revlimid is associated with secondary malignancies however breast cancer is not typically seen with this.  Therefore I think we should resume Revlimid after completion of breast surgery and continue while she is on endocrine therapy.  4/23/2024-Notes from  reviewed, MRI from 4/23/2024 reviewed.  Myeloma labs reviewed.  Myeloma labs remain negative.  There is a T2 hyperintense lesion on the sternum on the MRI.  This is indeterminate and follow-up is recommended.  She is being recommended to start back on Revlimid 7.5 mg 21 out of 28 days and also being recommended to start prophylactic Eliquis or Xarelto.  The prophylaxis has been recommended with the concern of DVT PE associated with endocrine therapy along with her age as well as multiple myeloma and use of Revlimid.  Use of Revlimid and her age do pose an increased risk of DVT PE however she will be on anastrozole which does not necessarily increase the risk of DVT PE.  Patient plans to call her myeloma physician and clarify the need for prophylaxis with Eliquis or Xarelto.  She will continue on baby aspirin  11/12/2024 CBC reviewed and within normal limits with a WBC of 5.77, hemoglobin 13.2 and platelets 225,000, CMP reviewed and BUN 12, creatinine 0.89, LFTs normal.  Magnesium 2.2, phosphorus 3.9, beta-2 microglobulin 2.4, kappa lambda ratio from 10/22/2024 normal  Continue with Zometa every 3 months.    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  Stable    *Diarrhea-improved    *Anxiety  Severe  Related to recent diagnosis of breast cancer  We discussed briefly supportive oncology referral  today.    Recent admission to the hospital at [facility] in October 2024 for mental status changes which were determined to be related to worsening of underlying mental health issues.  Patient does not currently see a psychiatrist.  She refuses that she has any issues with her mental health.  She was discharged on Zyprexa however Ms. Plascencia may or may not be taking this currently.    *Bone health  8/6/2020 DEXA scan shows osteopenia with a T-score of -0.6 in the right femoral neck, T-score is -1.8 in the left femoral neck, T-score of 0.8 in the lumbar spine  Patient reports that she had a DEXA last year however I do not have the results of the same.  She continues on Zometa every 3 months.    *History of B12 deficiency on monthly B12 injections  Patient on monthly B12 injections, she expressed that she that she may need twice monthly B12 injections.  We will add on a B12 level today to assess the need for this.  We discussed if her level is currently optimal would not increase her B12 injections.    *Follow-up in 3 months with APRN in 6 months with MD.  Continue monthly labs with monthly B12 and every 3 monthly Zometa.    I spent 48 minutes caring for Jennifer on this date of service. This time includes time spent by me in the following activities: preparing for the visit, reviewing tests, obtaining and/or reviewing a separately obtained history, performing a medically appropriate examination and/or evaluation, counseling and educating the patient/family/caregiver, ordering medications, tests, or procedures, referring and communicating with other health care professionals, documenting information in the medical record, and independently interpreting results and communicating that information with the patient/family/caregiver.

## 2024-11-13 ENCOUNTER — SPECIALTY PHARMACY (OUTPATIENT)
Dept: PHARMACY | Facility: HOSPITAL | Age: 74
End: 2024-11-13
Payer: MEDICARE

## 2024-11-13 ENCOUNTER — TELEPHONE (OUTPATIENT)
Dept: ONCOLOGY | Facility: CLINIC | Age: 74
End: 2024-11-13
Payer: MEDICARE

## 2024-11-13 NOTE — PROGRESS NOTES
Specialty Pharmacy Note: Revlimid (lenalidomide)    Jennifer Plascencia is a 74 y.o. female with multiple myeloma was seen 11/12/24 by Dr. Flanagan. Per provider dictation, no changes to oral oncology regimen Revlimid (lenalidomide).  Labs Review: The CMP and CBC from 11/12/24 have been reviewed. No dose adjustments are needed for the oral specialty medication(s) based on the labs.    Specialty pharmacy will continue to follow patient.    Sandra Hernandez, PharmD, BCPS  11/13/2024  11:17 EST

## 2024-11-14 LAB
ALBUMIN SERPL ELPH-MCNC: 3.1 G/DL (ref 2.9–4.4)
ALBUMIN/GLOB SERPL: 0.8 {RATIO} (ref 0.7–1.7)
ALPHA1 GLOB SERPL ELPH-MCNC: 0.3 G/DL (ref 0–0.4)
ALPHA2 GLOB SERPL ELPH-MCNC: 0.6 G/DL (ref 0.4–1)
B-GLOBULIN SERPL ELPH-MCNC: 2.2 G/DL (ref 0.7–1.3)
GAMMA GLOB SERPL ELPH-MCNC: 0.9 G/DL (ref 0.4–1.8)
GLOBULIN SER-MCNC: 4 G/DL (ref 2.2–3.9)
IGA SERPL-MCNC: 104 MG/DL (ref 64–422)
IGG SERPL-MCNC: 961 MG/DL (ref 586–1602)
IGM SERPL-MCNC: 41 MG/DL (ref 26–217)
INTERPRETATION SERPL IEP-IMP: ABNORMAL
KAPPA LC FREE SER-MCNC: ABNORMAL MG/L
KAPPA LC FREE/LAMBDA FREE SER: ABNORMAL {RATIO}
LABORATORY COMMENT REPORT: ABNORMAL
LAMBDA LC FREE SERPL-MCNC: ABNORMAL MG/L
M PROTEIN SERPL ELPH-MCNC: ABNORMAL G/DL
PROT SERPL-MCNC: 7.1 G/DL (ref 6–8.5)

## 2024-11-26 ENCOUNTER — TELEPHONE (OUTPATIENT)
Dept: ONCOLOGY | Facility: CLINIC | Age: 74
End: 2024-11-26
Payer: MEDICARE

## 2024-11-26 DIAGNOSIS — F41.9 ANXIETY: ICD-10-CM

## 2024-11-26 RX ORDER — ALPRAZOLAM 0.25 MG/1
0.25 TABLET ORAL 2 TIMES DAILY PRN
Qty: 60 TABLET | Refills: 0 | Status: SHIPPED | OUTPATIENT
Start: 2024-11-26 | End: 2024-12-26

## 2024-11-26 NOTE — TELEPHONE ENCOUNTER
Rx Refill Note  Requested Prescriptions     Pending Prescriptions Disp Refills    ALPRAZolam (XANAX) 0.25 MG tablet [Pharmacy Med Name: ALPRAZOLAM 0.25 MG TABLET] 60 tablet 0     Sig: TAKE 1 TABLET BY MOUTH 2 (TWO) TIMES A DAY AS NEEDED FOR ANXIETY FOR UP TO 30 DAYS.      Last office visit with prescribing clinician: 9/23/2024   Last telemedicine visit with prescribing clinician: Visit date not found   Next office visit with prescribing clinician: 3/24/2025                         Would you like a call back once the refill request has been completed: [] Yes [] No    If the office needs to give you a call back, can they leave a voicemail: [] Yes [] No    Dutch Ramos MA  11/26/24, 10:22 EST

## 2024-11-26 NOTE — TELEPHONE ENCOUNTER
Called patient, said that she is taking anastrozole and he breast is very tender around the nipple area. Said the tenderness has been off and on/not everyday but feeling it this morning and is external. She  wants to know if this is caused by the anastrozole?

## 2024-11-27 DIAGNOSIS — Z17.0 MALIGNANT NEOPLASM OF UPPER-OUTER QUADRANT OF RIGHT BREAST IN FEMALE, ESTROGEN RECEPTOR POSITIVE: ICD-10-CM

## 2024-11-27 DIAGNOSIS — C50.411 MALIGNANT NEOPLASM OF UPPER-OUTER QUADRANT OF RIGHT BREAST IN FEMALE, ESTROGEN RECEPTOR POSITIVE: ICD-10-CM

## 2024-11-27 RX ORDER — HYDROCODONE BITARTRATE AND ACETAMINOPHEN 5; 325 MG/1; MG/1
1 TABLET ORAL EVERY 6 HOURS PRN
Qty: 30 TABLET | Refills: 0 | Status: SHIPPED | OUTPATIENT
Start: 2024-11-27

## 2024-11-27 NOTE — TELEPHONE ENCOUNTER
"Called patient back, let her know per Dr. Flanagan \"The breast pain is not likely related to the AI. I recently did a breast exam and everything was ok.  Pls reassure pt. \" Pt v/u and asked is she can get her hydrocodone refilled, I sent this to MD for review.   "

## 2024-12-04 ENCOUNTER — SPECIALTY PHARMACY (OUTPATIENT)
Dept: PHARMACY | Facility: HOSPITAL | Age: 74
End: 2024-12-04
Payer: MEDICARE

## 2024-12-04 DIAGNOSIS — C90.00 MULTIPLE MYELOMA NOT HAVING ACHIEVED REMISSION: ICD-10-CM

## 2024-12-04 RX ORDER — LENALIDOMIDE 2.5 MG/1
CAPSULE ORAL
Qty: 63 CAPSULE | Refills: 0 | Status: SHIPPED | OUTPATIENT
Start: 2024-12-04

## 2024-12-04 NOTE — TELEPHONE ENCOUNTER
Specialty Pharmacy Patient Management Program  Per Protocol Prescription Order or Refill     Patient will be filling or currently fills medications at \[facility]\"" [facility]acy and is enrolled in the Patient Management Program.    Requested Prescriptions     Pending Prescriptions Disp Refills    lenalidomide (Revlimid) 2.5 MG capsule [Pharmacy Med Name: Revlimid Oral Capsule 2.5 Mg] 63 capsule 0     Sig: TAKE THREE CAPSULES (7.5MG) BY MOUTH ONCE DAILY FOR 21 DAYS ON, 7 DAYS OFF OF EACH 28 DAYS CYCLE.     Prescription orders above were sent to the pharmacy per Collaborative Care Agreement Protocol.     Last Office Visit: 11/12/24  Next Office Visit:     Celestina CastilloD, [facility]  Clinical Specialty Pharmacist, Oncology  12/4/2024  13:58 EST

## 2024-12-04 NOTE — TELEPHONE ENCOUNTER
Specialty Pharmacy Patient Management Program  Per Protocol Prescription Order or Refill       Requested Prescriptions     Signed Prescriptions Disp Refills    lenalidomide (Revlimid) 2.5 MG capsule 63 capsule 0     Sig: TAKE THREE CAPSULES (7.5MG) BY MOUTH ONCE DAILY FOR 21 DAYS ON, 7 DAYS OFF OF EACH 28 DAYS CYCLE.     Authorizing Provider: RAJINDER SCHMITT     Ordering User: NAOMY NOEL     Prescription orders above were sent to [facility] per Collaborative Care Agreement Protocol.     Completed independent double check on medication order/RX.    Brittani Ellis RPH, BCOP  Clinical Specialty Pharmacist, Oncology  12/4/2024  14:14 EST

## 2024-12-12 ENCOUNTER — TELEPHONE (OUTPATIENT)
Dept: INTERNAL MEDICINE | Facility: CLINIC | Age: 74
End: 2024-12-12
Payer: MEDICARE

## 2024-12-12 NOTE — TELEPHONE ENCOUNTER
Caller: Jennifer Plascencia    Relationship: Self    Best call back number: [phone]    What medication are you requesting: SLEEP MEDICATION     What are your current symptoms: DIFFICULTY SLEEPING, SHAKING    How long have you been experiencing symptoms: THE PAST MONTH    Have you had these symptoms before:    [] Yes  [x] No    Have you been treated for these symptoms before:   [] Yes  [x] No    If a prescription is needed, what is your preferred pharmacy and phone number: [id number] - [place], [place] - [address] [phone] [facility] [phone]      Additional notes: STATED THAT THEY WOULD LIKE TO GET THE MEDICATION AS SOON AS THEY CAN BECAUSE THEY ARE ONLY GETTING ABOUT 30 MINUTES TO 2 HOURS AT A TIME. STATED THAT THEY NEED TO GET BACK TO GETTING A FULL NIGHT SLEEP. PLEASE CALL AND ADVISE

## 2024-12-13 ENCOUNTER — TELEPHONE (OUTPATIENT)
Dept: ONCOLOGY | Facility: CLINIC | Age: 74
End: 2024-12-13
Payer: MEDICARE

## 2024-12-13 DIAGNOSIS — Z17.0 MALIGNANT NEOPLASM OF UPPER-OUTER QUADRANT OF RIGHT BREAST IN FEMALE, ESTROGEN RECEPTOR POSITIVE: ICD-10-CM

## 2024-12-13 DIAGNOSIS — C50.411 MALIGNANT NEOPLASM OF UPPER-OUTER QUADRANT OF RIGHT BREAST IN FEMALE, ESTROGEN RECEPTOR POSITIVE: ICD-10-CM

## 2024-12-13 RX ORDER — HYDROCODONE BITARTRATE AND ACETAMINOPHEN 5; 325 MG/1; MG/1
1 TABLET ORAL EVERY 6 HOURS PRN
Qty: 30 TABLET | Refills: 0 | Status: SHIPPED | OUTPATIENT
Start: 2024-12-13

## 2024-12-13 NOTE — TELEPHONE ENCOUNTER
Provider: Dr. Flanagan  Caller: Jennifer Plascencia  Relationship to Patient: Self  Call Back Phone Number: [phone]  Reason for Call: Pt stated she is out of her Hydrocodone Rx # [id number]. Needs it sent to [facility]. Please call pt with confirmation

## 2024-12-16 ENCOUNTER — TELEPHONE (OUTPATIENT)
Dept: INTERNAL MEDICINE | Facility: CLINIC | Age: 74
End: 2024-12-16

## 2024-12-16 ENCOUNTER — OFFICE VISIT (OUTPATIENT)
Dept: INTERNAL MEDICINE | Facility: CLINIC | Age: 74
End: 2024-12-16
Payer: MEDICARE

## 2024-12-16 VITALS
DIASTOLIC BLOOD PRESSURE: 80 MMHG | RESPIRATION RATE: 18 BRPM | WEIGHT: 144.3 LBS | OXYGEN SATURATION: 97 % | HEART RATE: 87 BPM | SYSTOLIC BLOOD PRESSURE: 134 MMHG | BODY MASS INDEX: 22.65 KG/M2 | HEIGHT: 67 IN

## 2024-12-16 DIAGNOSIS — F51.01 PRIMARY INSOMNIA: Primary | ICD-10-CM

## 2024-12-16 DIAGNOSIS — F32.A DEPRESSION, UNSPECIFIED DEPRESSION TYPE: ICD-10-CM

## 2024-12-16 DIAGNOSIS — M79.2 NEUROPATHIC PAIN: ICD-10-CM

## 2024-12-16 PROCEDURE — 1159F MED LIST DOCD IN RCRD: CPT | Performed by: STUDENT IN AN ORGANIZED HEALTH CARE EDUCATION/TRAINING PROGRAM

## 2024-12-16 PROCEDURE — 99213 OFFICE O/P EST LOW 20 MIN: CPT | Performed by: STUDENT IN AN ORGANIZED HEALTH CARE EDUCATION/TRAINING PROGRAM

## 2024-12-16 PROCEDURE — 1126F AMNT PAIN NOTED NONE PRSNT: CPT | Performed by: STUDENT IN AN ORGANIZED HEALTH CARE EDUCATION/TRAINING PROGRAM

## 2024-12-16 PROCEDURE — 1160F RVW MEDS BY RX/DR IN RCRD: CPT | Performed by: STUDENT IN AN ORGANIZED HEALTH CARE EDUCATION/TRAINING PROGRAM

## 2024-12-16 RX ORDER — TRAZODONE HYDROCHLORIDE 50 MG/1
25 TABLET, FILM COATED ORAL NIGHTLY
Qty: 15 TABLET | Refills: 0 | Status: SHIPPED | OUTPATIENT
Start: 2024-12-16 | End: 2024-12-17

## 2024-12-16 RX ORDER — ESCITALOPRAM OXALATE 5 MG/1
5 TABLET ORAL EVERY MORNING
Qty: 30 TABLET | Refills: 0 | Status: SHIPPED | OUTPATIENT
Start: 2024-12-16 | End: 2024-12-17

## 2024-12-16 NOTE — Clinical Note
Good afternoon Dr. Flanagan Wanted to reach out regarding this shared mutual patient.   I have recently taken over care as her PCP.  From your standpoint is there a reason that the duloxetine was discontinued?  Patient has not been taking this for several weeks and reports crying episodes and depression along with insomnia.  I do not know if she just did not realize she was post to continue this and wanted to touch base with you before restarting.  She seemed very anxious in office today. In addition, her previous PCP Dr. Carvajal had written for a pain cream (gabapentin, baclofen lidocaine, hide of ibuprofen) and I wanted to review the appropriateness from your standpoint regarding this. Thank you so much Mimi Yang MD

## 2024-12-16 NOTE — PROGRESS NOTES
"Chief Complaint  Depression (Insomnia has gotten bad x3 weeks/Would like to be on meds for depression/Lervena moisturizer cream, new wanting to start for vaginal drynessa/Compound medication reorder from previous PCP/)    Subjective        Jennifer Plascencia presents to [facility]  History of Present Illness  This is a 74-year-old female here for depression.  She reports she has noticed this for the past month that she has had associated insomnia.  She is adamant that she would like to start a prescription medication for insomnia.  She is on Presalin twice a day but reports is more helpful for anxiety and she does not take this for sleep.  Has been off Cymbalta for about 6 weeks.  She is unsure if there is a reason why this was stopped.  She has never tried anything else for depression and the Cymbalta was started in the context of peripheral neuropathy with her associated multiple myeloma and right breast invasive ductal carcinoma treatment.  She has also never tried any prescription medication for insomnia.  Previous patient of Dr. Carvajal and is wondering about a refill for a pain cream that was [facility].    Objective   Vital Signs:  /80 (BP Location: Left arm, Patient Position: Sitting, Cuff Size: Adult)   Pulse 87   Resp 18   Ht 170.2 cm (67.01\")   Wt 65.5 kg (144 lb 4.8 oz)   SpO2 97%   BMI 22.60 kg/m²   Estimated body mass index is 22.6 kg/m² as calculated from the following:    Height as of this encounter: 170.2 cm (67.01\").    Weight as of this encounter: 65.5 kg (144 lb 4.8 oz).    BMI is within normal parameters. No other follow-up for BMI required.      Physical Exam  Vitals and nursing note reviewed.   Constitutional:       Appearance: Normal appearance. She is normal weight. She is not ill-appearing.   Cardiovascular:      Rate and Rhythm: Normal rate.   Pulmonary:      Effort: No respiratory distress.   Neurological:      Mental " Status: She is alert.      Comments: Anxious        Result Review :                Assessment and Plan   Diagnoses and all orders for this visit:    1. Primary insomnia (Primary)    2. Depression, unspecified depression type    3. Neuropathic pain    Other orders  -     traZODone (DESYREL) 50 MG tablet; Take 0.5 tablets by mouth Every Night for 30 days.  Dispense: 15 tablet; Refill: 0  -     escitalopram (Lexapro) 5 MG tablet; Take 1 tablet by mouth Every Morning for 30 days.  Dispense: 30 tablet; Refill: 0    Originally had discussed with the patient it may be helpful to take the escitalopram in the morning and the trazodone in the evening to help with insomnia.  These medicines can be taken together and we will start a low-dose of both.  Further instructions were given regarding SSRI and SNRI up titration therapy.     After touching base with her oncologist, restarted Cymbalta.  Patient has a desire to restart this. A pain cream was also [facility] G10/ibuprofen/baclofen/lidocaine DMSO, which was filled last July by her previous PCP.       Follow Up   Return in about 4 weeks (around 1/13/2025) for Recheck.  Patient was given instructions and counseling regarding her condition or for health maintenance advice. Please see specific information pulled into the AVS if appropriate.             Answers submitted by the patient for this visit:  Primary Reason for Visit (Submitted on 12/15/2024)  What is the primary reason for your visit?: Problem Not Listed

## 2024-12-16 NOTE — TELEPHONE ENCOUNTER
Caller: Jennifer Plascencia    Relationship to patient: Self    Best call back number: [phone]     Patient is needing: ASKING FOR DR. ASENCIO TO GIVE HER A CALL BACK WHEN AVAILABLE HAS SOME ADDITIONAL QUESTION FORGOT DURING OFFICE VISIT

## 2024-12-16 NOTE — PATIENT INSTRUCTIONS
Patient Education: SSRI/SNRI  Selective serotonin reuptake inhibitors (SSRIs) -- Medications called selective serotonin reuptake inhibitors (SSRIs) are generally the first-line medication for depression and anxiety because most people have only mild (or no) side effects, and the medication is generally taken once per day.    SSRIs that have been studied and used in adults with unipolar major depression include fluoxetine (brand name: Prozac), citalopram (brand name: Celexa), escitalopram (brand name: Lexapro), fluvoxamine (brand name: Luvox), paroxetine (brand name: Paxil), and sertraline (brand name: Zoloft).     Side effects -- Side effects of SSRI antidepressants often improve quickly (within one to two weeks), but may include headache, abdominal pain, diarrhea and nausea, sleep changes, jitteriness, agitation, sexual side effects (decreased libido, delayed ability or inability to experience orgasm/ejaculate), or a tendency to bruise.    A more serious potential side effect of SSRIs is serotonin syndrome. Symptoms of serotonin syndrome can include agitation, confusion, and overheating (hyperthermia). This can occur with high doses of an SSRI or if an SSRI is taken in combination with other medications that affect serotonin, such as a class of migraine medications called triptans.    Antidepressants and the risk of suicide -- Depression significantly increases a person's risk of having suicidal thoughts and committing suicide. However, some parents are concerned that treatment with antidepressants can actually increase the risk of suicide. While there appears to be a very small increased risk of suicidal thoughts and behavior in people under the age of 25 who are in the initial stages of antidepressant treatment, many more patients benefit from antidepressants than will experience suicidal thoughts.    Bipolar Depression is sometimes treated differently than regular depression including the use of mood stabilizing  agents as well as second gen antipsychotics, these medications carry certain risks/benefits. Often times a patient will have bipolar disorder and be un-diagnosed or will not seek out care until they enter the depressive stage of the illness.  Standard antidepressants are often times not effective alone or carry the risk of sathish/hypomania.    Duration of Treatment  Time required for a response -- Some people respond to antidepressant medication after about two weeks, but for most, the full effect is not seen until four to six weeks or longer. During the first few weeks, the dose is usually increased gradually.    By six to eight weeks after starting an antidepressant medication, it is usually possible to determine if the medication is effective. If symptoms have improved somewhat during this time, the dose of the medication may be increased. If there has been no improvement in symptoms, an alternate antidepressant medication may be recommended; psychotherapy may also be added if it was not already part of the treatment plan. If symptoms still don't improve, the clinician (if not a psychiatrist) may refer the patient to a psychiatrist to evaluate for other possible diagnoses, such as bipolar disorder or substance use disorder, as well as other factors that may be interfering with treatment (such as stress, bullying, or abuse).    Duration -- In most cases, the antidepressant medication is continued for at least 6 to 12 months after the symptoms of depression improve. This recommendation varies greatly depending upon the individual's situation. The decision to stop antidepressant medication should be shared among the child or adolescent, parent(s), and the clinician. Ideally, discontinuation occurs during a lower stress time for the patient (eg, at the beginning of summer vacation).    Tapering is often recommended when an SSRI/SNRI is stopped. Side effects associated with stopping antidepressant medication quickly  can include jitteriness, dizziness, nausea, fatigue, muscle aches, chills, anxiety, and irritability. Although these symptoms are not dangerous and usually improve over one to two weeks, they can be quite distressing and uncomfortable.    Maintenance drug therapy -- Maintenance drug therapy (long-term antidepressant therapy) may be appropriate for some people. Relapse often occurs in patients who stop taking medication too soon.  Maintenance therapy may last from one year to indefinitely, depending upon the individual's situation and personal history of depression.

## 2024-12-17 ENCOUNTER — INFUSION (OUTPATIENT)
Dept: ONCOLOGY | Facility: HOSPITAL | Age: 74
End: 2024-12-17
Payer: MEDICARE

## 2024-12-17 ENCOUNTER — LAB (OUTPATIENT)
Dept: LAB | Facility: HOSPITAL | Age: 74
End: 2024-12-17
Payer: MEDICARE

## 2024-12-17 ENCOUNTER — TELEPHONE (OUTPATIENT)
Dept: INTERNAL MEDICINE | Facility: CLINIC | Age: 74
End: 2024-12-17
Payer: MEDICARE

## 2024-12-17 DIAGNOSIS — G63 NEUROPATHY ASSOCIATED WITH MULTIPLE MYELOMA: Primary | ICD-10-CM

## 2024-12-17 DIAGNOSIS — C90.00 MULTIPLE MYELOMA NOT HAVING ACHIEVED REMISSION: ICD-10-CM

## 2024-12-17 DIAGNOSIS — C90.00 NEUROPATHY ASSOCIATED WITH MULTIPLE MYELOMA: Primary | ICD-10-CM

## 2024-12-17 DIAGNOSIS — Z17.0 MALIGNANT NEOPLASM OF UPPER-OUTER QUADRANT OF RIGHT BREAST IN FEMALE, ESTROGEN RECEPTOR POSITIVE: ICD-10-CM

## 2024-12-17 DIAGNOSIS — E53.8 B12 DEFICIENCY: ICD-10-CM

## 2024-12-17 DIAGNOSIS — C50.411 MALIGNANT NEOPLASM OF UPPER-OUTER QUADRANT OF RIGHT BREAST IN FEMALE, ESTROGEN RECEPTOR POSITIVE: ICD-10-CM

## 2024-12-17 LAB
ALBUMIN SERPL-MCNC: 3.7 G/DL (ref 3.5–5.2)
ALBUMIN/GLOB SERPL: 1.5 G/DL
ALP SERPL-CCNC: 68 U/L (ref 39–117)
ALT SERPL W P-5'-P-CCNC: 15 U/L (ref 1–33)
ANION GAP SERPL CALCULATED.3IONS-SCNC: 9.3 MMOL/L (ref 5–15)
AST SERPL-CCNC: 15 U/L (ref 1–32)
B2 MICROGLOB SERPL-MCNC: 2.3 MG/L (ref 0.8–2.2)
BASOPHILS # BLD AUTO: 0.02 10*3/MM3 (ref 0–0.2)
BASOPHILS NFR BLD AUTO: 0.4 % (ref 0–1.5)
BILIRUB SERPL-MCNC: 0.3 MG/DL (ref 0–1.2)
BUN SERPL-MCNC: 9 MG/DL (ref 8–23)
BUN/CREAT SERPL: 10.1 (ref 7–25)
CALCIUM SPEC-SCNC: 8.4 MG/DL (ref 8.6–10.5)
CHLORIDE SERPL-SCNC: 100 MMOL/L (ref 98–107)
CO2 SERPL-SCNC: 26.7 MMOL/L (ref 22–29)
CREAT SERPL-MCNC: 0.89 MG/DL (ref 0.57–1)
DEPRECATED RDW RBC AUTO: 57.3 FL (ref 37–54)
EGFRCR SERPLBLD CKD-EPI 2021: 67.7 ML/MIN/1.73
EOSINOPHIL # BLD AUTO: 0.2 10*3/MM3 (ref 0–0.4)
EOSINOPHIL NFR BLD AUTO: 4.1 % (ref 0.3–6.2)
ERYTHROCYTE [DISTWIDTH] IN BLOOD BY AUTOMATED COUNT: 15.5 % (ref 12.3–15.4)
GLOBULIN UR ELPH-MCNC: 2.5 GM/DL
GLUCOSE SERPL-MCNC: 107 MG/DL (ref 65–99)
HCT VFR BLD AUTO: 35.1 % (ref 34–46.6)
HGB BLD-MCNC: 11.5 G/DL (ref 12–15.9)
IMM GRANULOCYTES # BLD AUTO: 0.02 10*3/MM3 (ref 0–0.05)
IMM GRANULOCYTES NFR BLD AUTO: 0.4 % (ref 0–0.5)
LYMPHOCYTES # BLD AUTO: 1.3 10*3/MM3 (ref 0.7–3.1)
LYMPHOCYTES NFR BLD AUTO: 26.4 % (ref 19.6–45.3)
MCH RBC QN AUTO: 32.9 PG (ref 26.6–33)
MCHC RBC AUTO-ENTMCNC: 32.8 G/DL (ref 31.5–35.7)
MCV RBC AUTO: 100.3 FL (ref 79–97)
MONOCYTES # BLD AUTO: 0.85 10*3/MM3 (ref 0.1–0.9)
MONOCYTES NFR BLD AUTO: 17.2 % (ref 5–12)
NEUTROPHILS NFR BLD AUTO: 2.54 10*3/MM3 (ref 1.7–7)
NEUTROPHILS NFR BLD AUTO: 51.5 % (ref 42.7–76)
NRBC BLD AUTO-RTO: 0 /100 WBC (ref 0–0.2)
PLATELET # BLD AUTO: 225 10*3/MM3 (ref 140–450)
PMV BLD AUTO: 8.8 FL (ref 6–12)
POTASSIUM SERPL-SCNC: 3.8 MMOL/L (ref 3.5–5.2)
PROT SERPL-MCNC: 6.2 G/DL (ref 6–8.5)
RBC # BLD AUTO: 3.5 10*6/MM3 (ref 3.77–5.28)
SODIUM SERPL-SCNC: 136 MMOL/L (ref 136–145)
WBC NRBC COR # BLD AUTO: 4.93 10*3/MM3 (ref 3.4–10.8)

## 2024-12-17 PROCEDURE — 82232 ASSAY OF BETA-2 PROTEIN: CPT | Performed by: INTERNAL MEDICINE

## 2024-12-17 PROCEDURE — 25010000002 CYANOCOBALAMIN PER 1000 MCG: Performed by: INTERNAL MEDICINE

## 2024-12-17 PROCEDURE — 36415 COLL VENOUS BLD VENIPUNCTURE: CPT

## 2024-12-17 PROCEDURE — 80053 COMPREHEN METABOLIC PANEL: CPT

## 2024-12-17 PROCEDURE — 96372 THER/PROPH/DIAG INJ SC/IM: CPT

## 2024-12-17 PROCEDURE — 85025 COMPLETE CBC W/AUTO DIFF WBC: CPT

## 2024-12-17 RX ORDER — LENALIDOMIDE 2.5 MG/1
CAPSULE ORAL
Qty: 63 CAPSULE | Refills: 0 | Status: CANCELLED | OUTPATIENT
Start: 2024-12-17

## 2024-12-17 RX ORDER — CYANOCOBALAMIN 1000 UG/ML
1000 INJECTION, SOLUTION INTRAMUSCULAR; SUBCUTANEOUS ONCE
Status: COMPLETED | OUTPATIENT
Start: 2024-12-17 | End: 2024-12-17

## 2024-12-17 RX ADMIN — CYANOCOBALAMIN 1000 MCG: 1000 INJECTION, SOLUTION INTRAMUSCULAR at 09:49

## 2024-12-17 NOTE — TELEPHONE ENCOUNTER
Caller: ANGELIA    Relationship: Other    Best call back number: [phone]    Requested Prescriptions:   Requested Prescriptions     Pending Prescriptions Disp Refills    lenalidomide (REVLIMID) 2.5 MG capsule 63 capsule 0        Pharmacy where request should be sent: [facility] [address] - [phone]  - [phone]     Last office visit with prescribing clinician: 11/12/2024   Last telemedicine visit with prescribing clinician: Visit date not found   Next office visit with prescribing clinician: 5/2/2025     Additional details provided by patient: PATIENT'S PREFERRED PHARMACY IS SWITCHING SO PLEASE SEND NEW SCRIPT TO GET THIS STARTED.     Does the patient have less than a 3 day supply:  [] Yes  [x] No    Would you like a call back once the refill request has been completed: [] Yes [x] No    If the office needs to give you a call back, can they leave a voicemail: [] Yes [x] No

## 2024-12-17 NOTE — TELEPHONE ENCOUNTER
Caller: Jennifer Plascencia    Relationship: Self    Best call back number: [phone]    Who are you requesting to speak with (clinical staff, provider,  specific staff member): DR ASENCIO    What was the call regarding: RETURNED MISSED CALL

## 2024-12-17 NOTE — TELEPHONE ENCOUNTER
Spoke to patient.  She would like to continue the previously prescribed duloxetine 30 mg in the morning and 60 mg at night. she reports this was helpful for sleep.  She is not does not wish to take the trazodone or escitalopram; will cancel these.  She reports she does not need the appointment tomorrow that she had scheduled.

## 2024-12-17 NOTE — TELEPHONE ENCOUNTER
Attempted to call patient back at number listed in the chart [phone] to see what questions she had and she did not answer the phone.  I did not leave a message.

## 2024-12-19 LAB
ALBUMIN SERPL ELPH-MCNC: 3.2 G/DL (ref 2.9–4.4)
ALBUMIN/GLOB SERPL: 1.3 {RATIO} (ref 0.7–1.7)
ALPHA1 GLOB SERPL ELPH-MCNC: 0.3 G/DL (ref 0–0.4)
ALPHA2 GLOB SERPL ELPH-MCNC: 0.8 G/DL (ref 0.4–1)
B-GLOBULIN SERPL ELPH-MCNC: 0.9 G/DL (ref 0.7–1.3)
GAMMA GLOB SERPL ELPH-MCNC: 0.7 G/DL (ref 0.4–1.8)
GLOBULIN SER-MCNC: 2.6 G/DL (ref 2.2–3.9)
IGA SERPL-MCNC: 74 MG/DL (ref 64–422)
IGG SERPL-MCNC: 721 MG/DL (ref 586–1602)
IGM SERPL-MCNC: 35 MG/DL (ref 26–217)
INTERPRETATION SERPL IEP-IMP: ABNORMAL
KAPPA LC FREE SER-MCNC: 20.3 MG/L (ref 3.3–19.4)
KAPPA LC FREE/LAMBDA FREE SER: 1.01 {RATIO} (ref 0.26–1.65)
LABORATORY COMMENT REPORT: ABNORMAL
LAMBDA LC FREE SERPL-MCNC: 20.1 MG/L (ref 5.7–26.3)
M PROTEIN SERPL ELPH-MCNC: ABNORMAL G/DL
PROT SERPL-MCNC: 5.8 G/DL (ref 6–8.5)

## 2024-12-20 DIAGNOSIS — F41.9 ANXIETY: ICD-10-CM

## 2024-12-20 RX ORDER — ALPRAZOLAM 0.25 MG/1
0.25 TABLET ORAL 2 TIMES DAILY PRN
Qty: 60 TABLET | Refills: 0 | OUTPATIENT
Start: 2024-12-20 | End: 2025-01-19

## 2024-12-20 NOTE — TELEPHONE ENCOUNTER
Rx Refill Note  Requested Prescriptions     Pending Prescriptions Disp Refills    ALPRAZolam (XANAX) 0.25 MG tablet [Pharmacy Med Name: ALPRAZOLAM 0.25 MG TABLET] 60 tablet 0     Sig: TAKE 1 TABLET BY MOUTH 2 (TWO) TIMES A DAY AS NEEDED FOR ANXIETY FOR UP TO 30 DAYS.      Last office visit with prescribing clinician: 12/16/2024   Last telemedicine visit with prescribing clinician: Visit date not found   Next office visit with prescribing clinician: 1/13/2025                         Would you like a call back once the refill request has been completed: [] Yes [] No    If the office needs to give you a call back, can they leave a voicemail: [] Yes [] No    Dutch Ramos MA  12/20/24, 10:50 EST

## 2024-12-23 DIAGNOSIS — F41.9 ANXIETY: ICD-10-CM

## 2024-12-26 ENCOUNTER — TELEPHONE (OUTPATIENT)
Dept: INTERNAL MEDICINE | Facility: CLINIC | Age: 74
End: 2024-12-26
Payer: MEDICARE

## 2024-12-26 DIAGNOSIS — C90.00 NEUROPATHY ASSOCIATED WITH MULTIPLE MYELOMA: Primary | ICD-10-CM

## 2024-12-26 DIAGNOSIS — G63 NEUROPATHY ASSOCIATED WITH MULTIPLE MYELOMA: Primary | ICD-10-CM

## 2024-12-26 RX ORDER — ALPRAZOLAM 0.25 MG/1
0.25 TABLET ORAL 2 TIMES DAILY PRN
Qty: 60 TABLET | Refills: 0 | Status: SHIPPED | OUTPATIENT
Start: 2024-12-26 | End: 2025-01-25

## 2024-12-26 NOTE — TELEPHONE ENCOUNTER
Patient is asking for a refill of compounded pain cream from [facility]    She does the neuropathy cream.

## 2024-12-26 NOTE — TELEPHONE ENCOUNTER
Caller: Jennifer Plascencia    Relationship: Self    Best call back number: [phone]    Who are you requesting to speak with (clinical staff, provider,  specific staff member): DR ASENCIO       What was the call regarding: WOULD LIKE TO SPEAK TO DR ASENCIO ABOUT PAIN CREAM FOR NEUROPATHY   PLEASE ADVISE

## 2024-12-26 NOTE — TELEPHONE ENCOUNTER
Rx Refill Note  Requested Prescriptions     Pending Prescriptions Disp Refills    ALPRAZolam (XANAX) 0.25 MG tablet [Pharmacy Med Name: ALPRAZOLAM 0.25 MG TABLET] 60 tablet 0     Sig: TAKE 1 TABLET BY MOUTH 2 (TWO) TIMES A DAY AS NEEDED FOR ANXIETY FOR UP TO 30 DAYS.      Last office visit with prescribing clinician: 12/16/2024   Last telemedicine visit with prescribing clinician: Visit date not found   Next office visit with prescribing clinician: 12/26/2024                         Would you like a call back once the refill request has been completed: [] Yes [] No    If the office needs to give you a call back, can they leave a voicemail: [] Yes [] No    Cayla Elias  12/26/24, 12:06 EST

## 2024-12-27 ENCOUNTER — SPECIALTY PHARMACY (OUTPATIENT)
Dept: PHARMACY | Facility: HOSPITAL | Age: 74
End: 2024-12-27
Payer: MEDICARE

## 2024-12-27 DIAGNOSIS — C90.00 MULTIPLE MYELOMA NOT HAVING ACHIEVED REMISSION: ICD-10-CM

## 2024-12-27 RX ORDER — LENALIDOMIDE 2.5 MG/1
7.5 CAPSULE ORAL DAILY
Qty: 63 CAPSULE | Refills: 0 | Status: SHIPPED | OUTPATIENT
Start: 2024-12-27

## 2024-12-27 NOTE — TELEPHONE ENCOUNTER
CALLING BACK TODAY 12/27/2024 AT 8:19 AM, WILL BE OUT AROUND 1/1/2025. PLEASE SEND REFILL       Caller: ANGELIA     Relationship: Other     Best call back number: [phone]     Requested Prescriptions:   Requested Prescriptions            Pending Prescriptions Disp Refills    lenalidomide (REVLIMID) 2.5 MG capsule 63 capsule 0         Pharmacy where request should be sent: [address] [phone] [facility] [phone]       Last office visit with prescribing clinician: 11/12/2024   Last telemedicine visit with prescribing clinician: Visit date not found   Next office visit with prescribing clinician: 5/2/2025      Additional details provided by patient: PATIENT'S PREFERRED PHARMACY IS SWITCHING SO PLEASE SEND NEW SCRIPT TO GET THIS STARTED.      Does the patient have less than a 3 day supply:  [x] Yes  [] No     Would you like a call back once the refill request has been completed: [] Yes [x] No     If the office needs to give you a call back, can they leave a voicemail: [] Yes [x] No

## 2024-12-27 NOTE — PROGRESS NOTES
Specialty Pharmacy Patient Management Program  Per Protocol Prescription Order or Refill     Patient will be filling or currently fills medications at hospitals [facility] and is enrolled in the Patient Management Program.    Requested Prescriptions     Signed Prescriptions Disp Refills    lenalidomide (REVLIMID) 2.5 MG capsule 63 capsule 0     Sig: Take 3 capsules by mouth Daily. Take for 21 days on then 7 days off.     Prescription orders above were sent to the pharmacy per Collaborative Care Agreement Protocol.     Last Office Visit: 11/12/24  Next Office Visit: 2/4/25    Sandra Hernandez PharmD, [facility]  Clinical Specialty Pharmacist, Oncology  12/27/2024  09:16 EST

## 2024-12-27 NOTE — PROGRESS NOTES
Specialty Pharmacy Patient Management Program  Per Protocol Prescription Order or Refill       Requested Prescriptions     Signed Prescriptions Disp Refills    lenalidomide (REVLIMID) 2.5 MG capsule 63 capsule 0     Sig: Take 3 capsules by mouth Daily. Take for 21 days on then 7 days off.     Prescription orders above were sent to [facility] per Collaborative Care Agreement Protocol.     Completed independent double check on medication order/RX.    Felix Gilliam, Ang[place], BCOP  Clinical Specialty Pharmacist, Oncology  12/27/2024  09:45 EST

## 2024-12-30 ENCOUNTER — TELEPHONE (OUTPATIENT)
Dept: INTERNAL MEDICINE | Facility: CLINIC | Age: 74
End: 2024-12-30
Payer: MEDICARE

## 2024-12-30 RX ORDER — TRAZODONE HYDROCHLORIDE 50 MG/1
50 TABLET, FILM COATED ORAL NIGHTLY
Qty: 15 TABLET | Refills: 1 | Status: SHIPPED | OUTPATIENT
Start: 2024-12-30 | End: 2025-01-29

## 2024-12-30 NOTE — TELEPHONE ENCOUNTER
Name: EdenilsonJennifer farias    Relationship: Self    Best Callback Number: [phone]     HUB PROVIDED THE RELAY MESSAGE FROM THE OFFICE   PATIENT VOICED UNDERSTANDING AND HAS NO FURTHER QUESTIONS AT THIS TIME

## 2024-12-30 NOTE — TELEPHONE ENCOUNTER
Caller: Jennifer Plascencia    Relationship: Self    Best call back number: [phone]     Who are you requesting to speak with (clinical staff, provider,  specific staff member): PCP        What was the call regarding: PATIENT SAID SHE IS NOT GETTING ANY SLEEP AND ASKING IF THIS IS GOING TO BE DONE TODAY.

## 2024-12-30 NOTE — TELEPHONE ENCOUNTER
Hub staff attempted to follow warm transfer process and was unsuccessful     Caller: Jennifer Plascencia    Relationship to patient: Self    Best call back number: [phone]    Patient is needing: patient would like a call back to discuss her Trazodone medication. Patient stated she would like to take a whole tablet instead of half because she keeps waking up at 3 am and wants to get a good nights rest.    Patient stated she also needs a refill of the Trazodone.    Please call to advise.

## 2025-01-02 DIAGNOSIS — Z17.0 MALIGNANT NEOPLASM OF UPPER-OUTER QUADRANT OF RIGHT BREAST IN FEMALE, ESTROGEN RECEPTOR POSITIVE: ICD-10-CM

## 2025-01-02 DIAGNOSIS — C50.411 MALIGNANT NEOPLASM OF UPPER-OUTER QUADRANT OF RIGHT BREAST IN FEMALE, ESTROGEN RECEPTOR POSITIVE: ICD-10-CM

## 2025-01-02 RX ORDER — HYDROCODONE BITARTRATE AND ACETAMINOPHEN 5; 325 MG/1; MG/1
1 TABLET ORAL EVERY 6 HOURS PRN
Qty: 60 TABLET | Refills: 0 | Status: SHIPPED | OUTPATIENT
Start: 2025-01-02

## 2025-01-02 NOTE — TELEPHONE ENCOUNTER
I called the patient, she wanted to have her hydrocodone refilled. She is requesting a 60 pill Rx for a 30 day supply instead of 30 pills for a 15 day supply. She states that she usually take 2 pils for her bone pain, 1 in the morning and 1 in the evening, but sometimes take 1 in between.

## 2025-01-02 NOTE — TELEPHONE ENCOUNTER
Caller: Jennifer Plascencia    Relationship: Self    Best call back number: [phone]    What is the best time to reach you: ANY    Who are you requesting to speak with (clinical staff, provider,  specific staff member): CHANEL    What was the call regarding: JENNIFER IS WANTING TO SPEAK TO CHANEL REGARDING ONE OF HER PRESCRIPTIONS    PLEASE CALL TO DISCUSS

## 2025-01-13 DIAGNOSIS — C90.00 MULTIPLE MYELOMA NOT HAVING ACHIEVED REMISSION: ICD-10-CM

## 2025-01-13 RX ORDER — LENALIDOMIDE 2.5 MG/1
7.5 CAPSULE ORAL DAILY
Qty: 63 CAPSULE | Refills: 0 | OUTPATIENT
Start: 2025-01-13

## 2025-01-13 NOTE — TELEPHONE ENCOUNTER
Caller: ANGELIA    Relationship: Other    Best call back number: [phone]    Requested Prescriptions:   Requested Prescriptions     Pending Prescriptions Disp Refills    lenalidomide (REVLIMID) 2.5 MG capsule 63 capsule 0     Sig: Take 3 capsules by mouth Daily. Take for 21 days on then 7 days off.        Pharmacy where request should be sent: [facility] [address] [facility] [phone] [facility] [phone] FX     Last office visit with prescribing clinician: 11/12/2024   Last telemedicine visit with prescribing clinician: Visit date not found   Next office visit with prescribing clinician: 5/2/2025     Additional details provided by patient: PLEASE SEND JANUARY SCRIPT TO [facility] FOR PATIENT TO BE ABLE TO AFFORD MEDICATION.     Does the patient have less than a 3 day supply:  [] Yes  [x] No    Would you like a call back once the refill request has been completed: [] Yes [x] No    If the office needs to give you a call back, can they leave a voicemail: [] Yes [x] No

## 2025-01-14 ENCOUNTER — INFUSION (OUTPATIENT)
Dept: ONCOLOGY | Facility: HOSPITAL | Age: 75
End: 2025-01-14
Payer: MEDICARE

## 2025-01-14 ENCOUNTER — LAB (OUTPATIENT)
Dept: LAB | Facility: HOSPITAL | Age: 75
End: 2025-01-14
Payer: MEDICARE

## 2025-01-14 DIAGNOSIS — C50.411 MALIGNANT NEOPLASM OF UPPER-OUTER QUADRANT OF RIGHT BREAST IN FEMALE, ESTROGEN RECEPTOR POSITIVE: ICD-10-CM

## 2025-01-14 DIAGNOSIS — Z17.0 MALIGNANT NEOPLASM OF UPPER-OUTER QUADRANT OF RIGHT BREAST IN FEMALE, ESTROGEN RECEPTOR POSITIVE: ICD-10-CM

## 2025-01-14 DIAGNOSIS — C90.00 NEUROPATHY ASSOCIATED WITH MULTIPLE MYELOMA: Primary | ICD-10-CM

## 2025-01-14 DIAGNOSIS — G63 NEUROPATHY ASSOCIATED WITH MULTIPLE MYELOMA: ICD-10-CM

## 2025-01-14 DIAGNOSIS — C90.00 MULTIPLE MYELOMA NOT HAVING ACHIEVED REMISSION: ICD-10-CM

## 2025-01-14 DIAGNOSIS — G63 NEUROPATHY ASSOCIATED WITH MULTIPLE MYELOMA: Primary | ICD-10-CM

## 2025-01-14 DIAGNOSIS — C90.00 NEUROPATHY ASSOCIATED WITH MULTIPLE MYELOMA: ICD-10-CM

## 2025-01-14 LAB
ALBUMIN SERPL-MCNC: 3.8 G/DL (ref 3.5–5.2)
ALBUMIN/GLOB SERPL: 1.5 G/DL
ALP SERPL-CCNC: 59 U/L (ref 39–117)
ALT SERPL W P-5'-P-CCNC: 9 U/L (ref 1–33)
ANION GAP SERPL CALCULATED.3IONS-SCNC: 9.8 MMOL/L (ref 5–15)
AST SERPL-CCNC: 15 U/L (ref 1–32)
B2 MICROGLOB SERPL-MCNC: 2.1 MG/L (ref 0.8–2.2)
BASOPHILS # BLD AUTO: 0.04 10*3/MM3 (ref 0–0.2)
BASOPHILS NFR BLD AUTO: 0.9 % (ref 0–1.5)
BILIRUB SERPL-MCNC: 0.2 MG/DL (ref 0–1.2)
BUN SERPL-MCNC: 11 MG/DL (ref 8–23)
BUN/CREAT SERPL: 11.7 (ref 7–25)
CALCIUM SPEC-SCNC: 9.2 MG/DL (ref 8.6–10.5)
CHLORIDE SERPL-SCNC: 102 MMOL/L (ref 98–107)
CO2 SERPL-SCNC: 29.2 MMOL/L (ref 22–29)
CREAT SERPL-MCNC: 0.94 MG/DL (ref 0.57–1)
DEPRECATED RDW RBC AUTO: 55.3 FL (ref 37–54)
EGFRCR SERPLBLD CKD-EPI 2021: 63.4 ML/MIN/1.73
EOSINOPHIL # BLD AUTO: 0.12 10*3/MM3 (ref 0–0.4)
EOSINOPHIL NFR BLD AUTO: 2.6 % (ref 0.3–6.2)
ERYTHROCYTE [DISTWIDTH] IN BLOOD BY AUTOMATED COUNT: 14.9 % (ref 12.3–15.4)
GLOBULIN UR ELPH-MCNC: 2.5 GM/DL
GLUCOSE SERPL-MCNC: 106 MG/DL (ref 65–99)
HCT VFR BLD AUTO: 37.3 % (ref 34–46.6)
HGB BLD-MCNC: 12.1 G/DL (ref 12–15.9)
IMM GRANULOCYTES # BLD AUTO: 0.01 10*3/MM3 (ref 0–0.05)
IMM GRANULOCYTES NFR BLD AUTO: 0.2 % (ref 0–0.5)
LYMPHOCYTES # BLD AUTO: 1.65 10*3/MM3 (ref 0.7–3.1)
LYMPHOCYTES NFR BLD AUTO: 35.9 % (ref 19.6–45.3)
MCH RBC QN AUTO: 32.5 PG (ref 26.6–33)
MCHC RBC AUTO-ENTMCNC: 32.4 G/DL (ref 31.5–35.7)
MCV RBC AUTO: 100.3 FL (ref 79–97)
MONOCYTES # BLD AUTO: 0.56 10*3/MM3 (ref 0.1–0.9)
MONOCYTES NFR BLD AUTO: 12.2 % (ref 5–12)
NEUTROPHILS NFR BLD AUTO: 2.21 10*3/MM3 (ref 1.7–7)
NEUTROPHILS NFR BLD AUTO: 48.2 % (ref 42.7–76)
NRBC BLD AUTO-RTO: 0 /100 WBC (ref 0–0.2)
PLATELET # BLD AUTO: 231 10*3/MM3 (ref 140–450)
PMV BLD AUTO: 8.8 FL (ref 6–12)
POTASSIUM SERPL-SCNC: 3.7 MMOL/L (ref 3.5–5.2)
PROT SERPL-MCNC: 6.3 G/DL (ref 6–8.5)
RBC # BLD AUTO: 3.72 10*6/MM3 (ref 3.77–5.28)
SODIUM SERPL-SCNC: 141 MMOL/L (ref 136–145)
WBC NRBC COR # BLD AUTO: 4.59 10*3/MM3 (ref 3.4–10.8)

## 2025-01-14 PROCEDURE — 25010000002 CYANOCOBALAMIN PER 1000 MCG: Performed by: INTERNAL MEDICINE

## 2025-01-14 PROCEDURE — 36415 COLL VENOUS BLD VENIPUNCTURE: CPT

## 2025-01-14 PROCEDURE — 80053 COMPREHEN METABOLIC PANEL: CPT

## 2025-01-14 PROCEDURE — 96372 THER/PROPH/DIAG INJ SC/IM: CPT

## 2025-01-14 PROCEDURE — 85025 COMPLETE CBC W/AUTO DIFF WBC: CPT

## 2025-01-14 PROCEDURE — 82232 ASSAY OF BETA-2 PROTEIN: CPT | Performed by: INTERNAL MEDICINE

## 2025-01-14 RX ORDER — CYANOCOBALAMIN 1000 UG/ML
1000 INJECTION, SOLUTION INTRAMUSCULAR; SUBCUTANEOUS ONCE
Status: COMPLETED | OUTPATIENT
Start: 2025-01-14 | End: 2025-01-14

## 2025-01-14 RX ADMIN — CYANOCOBALAMIN 1000 MCG: 1000 INJECTION, SOLUTION INTRAMUSCULAR at 15:00

## 2025-01-16 LAB
ALBUMIN SERPL ELPH-MCNC: 3.5 G/DL (ref 2.9–4.4)
ALBUMIN/GLOB SERPL: 1.3 {RATIO} (ref 0.7–1.7)
ALPHA1 GLOB SERPL ELPH-MCNC: 0.3 G/DL (ref 0–0.4)
ALPHA2 GLOB SERPL ELPH-MCNC: 0.7 G/DL (ref 0.4–1)
B-GLOBULIN SERPL ELPH-MCNC: 0.9 G/DL (ref 0.7–1.3)
GAMMA GLOB SERPL ELPH-MCNC: 0.8 G/DL (ref 0.4–1.8)
GLOBULIN SER-MCNC: 2.7 G/DL (ref 2.2–3.9)
IGA SERPL-MCNC: 85 MG/DL (ref 64–422)
IGG SERPL-MCNC: 789 MG/DL (ref 586–1602)
IGM SERPL-MCNC: 41 MG/DL (ref 26–217)
INTERPRETATION SERPL IEP-IMP: ABNORMAL
KAPPA LC FREE SER-MCNC: 20.2 MG/L (ref 3.3–19.4)
KAPPA LC FREE/LAMBDA FREE SER: 1.02 {RATIO} (ref 0.26–1.65)
LABORATORY COMMENT REPORT: ABNORMAL
LAMBDA LC FREE SERPL-MCNC: 19.8 MG/L (ref 5.7–26.3)
M PROTEIN SERPL ELPH-MCNC: ABNORMAL G/DL
PROT SERPL-MCNC: 6.2 G/DL (ref 6–8.5)

## 2025-01-16 NOTE — TELEPHONE ENCOUNTER
Caller: ANGELIA    Relationship: Other    Best call back number: [phone]      Who are you requesting to speak with (clinical staff, provider,  specific staff member): CLINICAL      What was the call regarding: ANGELIA FOLLOWING UP ON PREVIOUS FILL REQUEST.  WANTS TO MAKE SURE THE NEXT FILL GOES TO [facility]

## 2025-01-22 ENCOUNTER — TELEPHONE (OUTPATIENT)
Dept: ONCOLOGY | Facility: CLINIC | Age: 75
End: 2025-01-22
Payer: MEDICARE

## 2025-01-22 ENCOUNTER — SPECIALTY PHARMACY (OUTPATIENT)
Dept: PHARMACY | Facility: HOSPITAL | Age: 75
End: 2025-01-22
Payer: MEDICARE

## 2025-01-22 NOTE — TELEPHONE ENCOUNTER
Message from pt forwarded to me-she called the office and spoke with Kenny about her Revlimid copay and the advocates within her insurance company.     The copay for Revlimid through [employer] is $6.66 and with [employer] the copay is $1351.    I called and spoke with pt-I explained the phases of Medicare for 2025 and offered to apply for mireille assistance so she can remain filling with [employer] SP. She was agreeable to this.     I have secured her $12,000 through [employer] from 12/23/2024-12/22/2025.    I explained how the mireille works and let her know that we can seek additional funding for 2026 if needed.    Processing information provided to Brandi at [email or web address] SP    Sujey Randall, Pharmacy Technician  01/22/25  16:22 EST

## 2025-01-22 NOTE — TELEPHONE ENCOUNTER
I called the patient, she states her insurance has advocates about making decisions to see if it there are better solutions. She states her Revlimid and her copay with [employer] is 1351$ for a 28 day supply. [employer]'s copay is $6.66. The patient wants the Revlimid through [employer]. I told the patient I would contact the pharmacy and notify. The patient v/u and asked for a call back once this is taken care of.

## 2025-01-22 NOTE — TELEPHONE ENCOUNTER
Caller: Jennifer Plascencia    Relationship: Self    Best call back number: [phone]    What is the best time to reach you: ASAP     Who are you requesting to speak with (clinical staff, provider,  specific staff member): CHANEL SHAW        What was the call regarding:     MEDICATION NEEDING TO SPEAK WITH CHANEL ABOUT.     NO OTHER DETAILS GIVEN     Is it okay if the provider responds through MyChart: NO

## 2025-01-23 ENCOUNTER — SPECIALTY PHARMACY (OUTPATIENT)
Dept: PHARMACY | Facility: HOSPITAL | Age: 75
End: 2025-01-23
Payer: MEDICARE

## 2025-01-23 DIAGNOSIS — C90.00 MULTIPLE MYELOMA NOT HAVING ACHIEVED REMISSION: ICD-10-CM

## 2025-01-23 RX ORDER — LENALIDOMIDE 2.5 MG/1
7.5 CAPSULE ORAL DAILY
Qty: 63 CAPSULE | Refills: 0 | Status: SHIPPED | OUTPATIENT
Start: 2025-01-23

## 2025-01-23 NOTE — PROGRESS NOTES
Specialty Pharmacy Patient Management Program  Per Protocol Prescription Order or Refill     Patient will be filling or currently fills medications at [facility] and is enrolled in the Patient Management Program.    Requested Prescriptions     Signed Prescriptions Disp Refills    lenalidomide (REVLIMID) 2.5 MG capsule 63 capsule 0     Sig: Take 3 capsules by mouth Daily. Take for 21 days on then 7 days off.     Prescription orders above were sent to the pharmacy per Collaborative Care Agreement Protocol.     Last Office Visit: 11/12/24  Next Office Visit: 2/4/25    Sandra Hernandez PharmD, [facility]  Clinical Specialty Pharmacist, Oncology  1/23/2025  13:13 EST

## 2025-01-23 NOTE — PROGRESS NOTES
Specialty Pharmacy Patient Management Program  Per Protocol Prescription Order or Refill       Requested Prescriptions     Signed Prescriptions Disp Refills    lenalidomide (REVLIMID) 2.5 MG capsule 63 capsule 0     Sig: Take 3 capsules by mouth Daily. Take for 21 days on then 7 days off.     Prescription orders above were sent to [facility] per Collaborative Care Agreement Protocol.     Completed independent double check on medication order/RX.    Brittani Ellis Rph, BCOP  Clinical Specialty Pharmacist, Oncology  1/23/2025  13:29 EST

## 2025-01-28 ENCOUNTER — TELEPHONE (OUTPATIENT)
Dept: INTERNAL MEDICINE | Facility: CLINIC | Age: 75
End: 2025-01-28
Payer: MEDICARE

## 2025-01-28 NOTE — TELEPHONE ENCOUNTER
Caller: Jennifer Plascencia    Relationship: Self    Best call back number: [phone]     Who are you requesting to speak with (clinical staff, provider,  specific staff member): DR ASENCIO OR MA    What was the call regarding: PATIENT REQUESTS CALL BACK TO DISCUSS ISSUE WITH PERSISTENT DIARRHEA

## 2025-01-29 RX ORDER — TRAZODONE HYDROCHLORIDE 50 MG/1
50 TABLET, FILM COATED ORAL NIGHTLY
Qty: 30 TABLET | Refills: 1 | Status: SHIPPED | OUTPATIENT
Start: 2025-01-29

## 2025-02-03 ENCOUNTER — HOSPITAL ENCOUNTER (OUTPATIENT)
Dept: BONE DENSITY | Facility: HOSPITAL | Age: 75
Discharge: HOME | End: 2025-02-03
Admitting: NURSE PRACTITIONER
Payer: MEDICARE

## 2025-02-03 DIAGNOSIS — M85.852 OSTEOPENIA OF LEFT FEMORAL NECK: ICD-10-CM

## 2025-02-03 PROCEDURE — 77080 DXA BONE DENSITY AXIAL: CPT

## 2025-02-04 ENCOUNTER — OFFICE VISIT (OUTPATIENT)
Dept: ONCOLOGY | Facility: CLINIC | Age: 75
End: 2025-02-04
Payer: MEDICARE

## 2025-02-04 ENCOUNTER — INFUSION (OUTPATIENT)
Dept: ONCOLOGY | Facility: HOSPITAL | Age: 75
End: 2025-02-04
Payer: MEDICARE

## 2025-02-04 VITALS
WEIGHT: 139.6 LBS | BODY MASS INDEX: 21.91 KG/M2 | HEART RATE: 82 BPM | TEMPERATURE: 98.4 F | DIASTOLIC BLOOD PRESSURE: 82 MMHG | HEIGHT: 67 IN | SYSTOLIC BLOOD PRESSURE: 131 MMHG | OXYGEN SATURATION: 97 %

## 2025-02-04 DIAGNOSIS — C90.00 NEUROPATHY ASSOCIATED WITH MULTIPLE MYELOMA: Primary | ICD-10-CM

## 2025-02-04 DIAGNOSIS — E53.8 B12 DEFICIENCY: ICD-10-CM

## 2025-02-04 DIAGNOSIS — C90.00 NEUROPATHY ASSOCIATED WITH MULTIPLE MYELOMA: ICD-10-CM

## 2025-02-04 DIAGNOSIS — C90.00 MULTIPLE MYELOMA NOT HAVING ACHIEVED REMISSION: Primary | ICD-10-CM

## 2025-02-04 DIAGNOSIS — C50.411 MALIGNANT NEOPLASM OF UPPER-OUTER QUADRANT OF RIGHT BREAST IN FEMALE, ESTROGEN RECEPTOR POSITIVE: ICD-10-CM

## 2025-02-04 DIAGNOSIS — G63 NEUROPATHY ASSOCIATED WITH MULTIPLE MYELOMA: ICD-10-CM

## 2025-02-04 DIAGNOSIS — G63 NEUROPATHY ASSOCIATED WITH MULTIPLE MYELOMA: Primary | ICD-10-CM

## 2025-02-04 DIAGNOSIS — C90.00 MULTIPLE MYELOMA NOT HAVING ACHIEVED REMISSION: ICD-10-CM

## 2025-02-04 DIAGNOSIS — Z17.0 MALIGNANT NEOPLASM OF UPPER-OUTER QUADRANT OF RIGHT BREAST IN FEMALE, ESTROGEN RECEPTOR POSITIVE: ICD-10-CM

## 2025-02-04 LAB
ALBUMIN SERPL-MCNC: 3.5 G/DL (ref 3.5–5.2)
ALBUMIN/GLOB SERPL: 1.7 G/DL
ALP SERPL-CCNC: 51 U/L (ref 39–117)
ALT SERPL W P-5'-P-CCNC: 7 U/L (ref 1–33)
ANION GAP SERPL CALCULATED.3IONS-SCNC: 9.5 MMOL/L (ref 5–15)
AST SERPL-CCNC: 12 U/L (ref 1–32)
B2 MICROGLOB SERPL-MCNC: 2.4 MG/L (ref 0.8–2.2)
BASOPHILS # BLD AUTO: 0.04 10*3/MM3 (ref 0–0.2)
BASOPHILS NFR BLD AUTO: 0.9 % (ref 0–1.5)
BILIRUB SERPL-MCNC: 0.2 MG/DL (ref 0–1.2)
BUN SERPL-MCNC: 11 MG/DL (ref 8–23)
BUN/CREAT SERPL: 12.4 (ref 7–25)
CALCIUM SPEC-SCNC: 8.5 MG/DL (ref 8.6–10.5)
CHLORIDE SERPL-SCNC: 103 MMOL/L (ref 98–107)
CO2 SERPL-SCNC: 27.5 MMOL/L (ref 22–29)
CREAT SERPL-MCNC: 0.89 MG/DL (ref 0.57–1)
DEPRECATED RDW RBC AUTO: 53.8 FL (ref 37–54)
EGFRCR SERPLBLD CKD-EPI 2021: 67.7 ML/MIN/1.73
EOSINOPHIL # BLD AUTO: 0.2 10*3/MM3 (ref 0–0.4)
EOSINOPHIL NFR BLD AUTO: 4.4 % (ref 0.3–6.2)
ERYTHROCYTE [DISTWIDTH] IN BLOOD BY AUTOMATED COUNT: 14.7 % (ref 12.3–15.4)
GLOBULIN UR ELPH-MCNC: 2.1 GM/DL
GLUCOSE SERPL-MCNC: 117 MG/DL (ref 65–99)
HCT VFR BLD AUTO: 35.9 % (ref 34–46.6)
HGB BLD-MCNC: 12.1 G/DL (ref 12–15.9)
IMM GRANULOCYTES # BLD AUTO: 0.01 10*3/MM3 (ref 0–0.05)
IMM GRANULOCYTES NFR BLD AUTO: 0.2 % (ref 0–0.5)
LYMPHOCYTES # BLD AUTO: 1.98 10*3/MM3 (ref 0.7–3.1)
LYMPHOCYTES NFR BLD AUTO: 43.1 % (ref 19.6–45.3)
MAGNESIUM SERPL-MCNC: 2 MG/DL (ref 1.6–2.4)
MCH RBC QN AUTO: 33.4 PG (ref 26.6–33)
MCHC RBC AUTO-ENTMCNC: 33.7 G/DL (ref 31.5–35.7)
MCV RBC AUTO: 99.2 FL (ref 79–97)
MONOCYTES # BLD AUTO: 0.7 10*3/MM3 (ref 0.1–0.9)
MONOCYTES NFR BLD AUTO: 15.3 % (ref 5–12)
NEUTROPHILS NFR BLD AUTO: 1.66 10*3/MM3 (ref 1.7–7)
NEUTROPHILS NFR BLD AUTO: 36.1 % (ref 42.7–76)
NRBC BLD AUTO-RTO: 0 /100 WBC (ref 0–0.2)
PHOSPHATE SERPL-MCNC: 3.5 MG/DL (ref 2.5–4.5)
PLATELET # BLD AUTO: 251 10*3/MM3 (ref 140–450)
PMV BLD AUTO: 9.7 FL (ref 6–12)
POTASSIUM SERPL-SCNC: 3.7 MMOL/L (ref 3.5–5.2)
PROT SERPL-MCNC: 5.6 G/DL (ref 6–8.5)
RBC # BLD AUTO: 3.62 10*6/MM3 (ref 3.77–5.28)
SODIUM SERPL-SCNC: 140 MMOL/L (ref 136–145)
WBC NRBC COR # BLD AUTO: 4.59 10*3/MM3 (ref 3.4–10.8)

## 2025-02-04 PROCEDURE — 83735 ASSAY OF MAGNESIUM: CPT

## 2025-02-04 PROCEDURE — 96372 THER/PROPH/DIAG INJ SC/IM: CPT

## 2025-02-04 PROCEDURE — 36415 COLL VENOUS BLD VENIPUNCTURE: CPT

## 2025-02-04 PROCEDURE — 84100 ASSAY OF PHOSPHORUS: CPT

## 2025-02-04 PROCEDURE — 80053 COMPREHEN METABOLIC PANEL: CPT

## 2025-02-04 PROCEDURE — 25010000002 ZOLEDRONIC ACID PER 1 MG: Performed by: INTERNAL MEDICINE

## 2025-02-04 PROCEDURE — 25010000002 CYANOCOBALAMIN PER 1000 MCG: Performed by: INTERNAL MEDICINE

## 2025-02-04 PROCEDURE — 82232 ASSAY OF BETA-2 PROTEIN: CPT | Performed by: INTERNAL MEDICINE

## 2025-02-04 PROCEDURE — 96374 THER/PROPH/DIAG INJ IV PUSH: CPT

## 2025-02-04 PROCEDURE — 85025 COMPLETE CBC W/AUTO DIFF WBC: CPT

## 2025-02-04 RX ORDER — CYANOCOBALAMIN 1000 UG/ML
1000 INJECTION, SOLUTION INTRAMUSCULAR; SUBCUTANEOUS ONCE
Status: COMPLETED | OUTPATIENT
Start: 2025-02-04 | End: 2025-02-04

## 2025-02-04 RX ADMIN — CYANOCOBALAMIN 1000 MCG: 1000 INJECTION, SOLUTION INTRAMUSCULAR at 15:23

## 2025-02-04 RX ADMIN — ZOLEDRONIC ACID 3.5 MG: 4 INJECTION INTRAVENOUS at 15:21

## 2025-02-04 NOTE — PROGRESS NOTES
Subjective   Jennifer Plascencia is a 74 y.o. female.  Referred by Dr. Rosales for right breast invasive ductal carcinoma    History of Present Illness     Ms. Jolly Plascencia is a 74-year-old lady with diagnosis of IgA kappa high risk multiple myeloma high risk due to status post stem cell transplant in March 2016 at [facility] and currently on maintenance Revlimid 5 mg 3 out of 4 weeks.    Multiple myeloma is high risk because of gain of 1 q- treatment with rev Dex Velcade initiated in 11/15-went on to stem cell transplant at [facility] in March 2016?  Melphalan.  She started post transplant therapy in May 2016 with Velcade rev Dex.  In September after 4 cycles Velcade was decreased to every other week with plans to continue rev Dex Velcade till progression because of high risk status.  In May 2019 her Velcade was discontinued because of worsening neuropathy.  Patient has remained on Revlimid 10 mg 21 out of 28 days since then with stable disease until November 2019 when a small IgG kappa paraprotein was noted on her blood tests which is distinct from her usual IgA kappa myeloma.  Restaging with PET scan was normal and since then the paraprotein as of 3/10/2020 has resolved    She is currently on Zometa every 3 months and being followed with myeloma labs monthly.    Dr. Alexey Xavier is her hematologist at [facility].    She has been seeing Dr. Garcia from our practice for management of the multiple myeloma and Revlimid.    12/28/2023-bilateral screening mammogram  Breast that heterogeneously dense.  There is a mass with associated distortion within the upper outer right breast posterior depth.  No suspicious findings in the left breast.    1/9/2024-right breast diagnostic mammogram and ultrasound  Heterogeneously dense breast tissue.  High density mass measuring approximately 19 mm with associated lactic show distortion, 10:00, 10 cm from the nipple.  Adjacent smaller circumscribed low-density 5 mm mass is located  just medial and superior to the primary mass by approximately 2 mm.  Calcifications in the upper inner quadrant of the right breast are stable dating back to 2019.  Second set of calcifications in the upper outer right breast are also stable dating back to 2019.    Ultrasound  At 10:00, 10 cm from the nipple there is a irregular mass measuring 21 x 19 x 9 mm.  Associated to caustic shadowing.  At 1030, 10 cm from the nipple there is a hypoechoic mass measuring 4 x 3 x 5 mm.  This is thought to represent the adjacent mass seen mammographically.  In addition there is a third circumscribed hypoechoic mass measuring 4 x 3 x 3 mm labeled 930, 8 cm from the nipple.  Considered indeterminate.  Right axilla lymph nodes are essentially fatty replaced.  No morphologically abnormal lymph nodes in the right axilla.    Impression  1.irregular mass measuring 21 mm, ultrasound-guided biopsy recommended  2.5 millimeter mass at 1030, 10 cm from the nipple is suspicious, ultrasound-guided biopsy recommended  3.4 millimeter mass is indeterminate at 930, ultrasound-guided biopsy recommended.    3 site ultrasound-guided biopsy  1.right breast 10:00, 10 cm from the nipple  Invasive ductal adenocarcinoma and ductal carcinoma in situ  Tumor is grade 2  No lymphovascular invasion no perineural invasion  ER positive strong 99%  OR positive indeterminate 10%  HER2 1+ by immunohistochemistry  Ki-67 30%  DCIS is high-grade    2.right breast 9:30, 8 cm from the nipple  Sclerosing adenosis  Rare microcyst  Microcalcifications in nonneoplastic tissue    1/29/2024-bilateral breast MRI  Biopsy-proven malignancy in the right breast at 9:00, measures 2.1 cm in greatest dimension.  No other suspicious findings are seen within the right breast.  No evidence of right axillary lymphadenopathy.  No left breast abnormalities noted.      She denies any family history of breast cancer.      Patient underwent right breast lumpectomy on 3/6/2024.    Pathology  shows invasive ductal carcinoma, measuring 21 mm, grade 2, 28 mm of DCIS, all margins -3 sentinel lymph nodes negative.  ER +91 to 100%  IN +10%  HER2 negative  Ki-67 30%  Oncotype DX recurrence score of 12 with less than 1% benefit from chemotherapy and 3% risk of distant metastasis at 9 years    She is recovering well from surgery.  Myeloma labs have been obtained today and results pending.    2/27/2024-Labs reviewed and CBC normal with a WBC of 3.93, hemoglobin 12.1 and platelets 204,000, CMP within normal limits, magnesium normal at 2.2, phosphorus normal at 3.9, beta-2 microglobulin normal at 2.1, SPEP with negative M spike, free light chain ratio normal at 1.02, free light chain kappa 31.5, free light chain lambda 31.0      Patient saw Dr. Hawthorne 4/23/2024 and he recommended resuming Revlimid 7.5 mg 21 out of 28 days.   He also recommended starting prophylactic Eliquis to Xarelto as he was concerned about the risk of DVT and PE with Revlimid, her age and endocrine therapy.  She also had an MRI which showed a 1.7 cm area of T2 hyperintensity in the upper sternum without diffusion restriction which was new from 5/11/2022.  This was nonspecific and indeterminate but could represent a myeloma lesion.  Attention to follow-up recommended.    Myeloma studies performed 4/23/2024 reviewed and no evidence of M protein, free light chain ratio normal.    She completed adjuvant radiation to the right breast in May 2024.    Interval history  She returns today for 2 month follow up for the above diagnosis. She notes diarrhea off and on but said she will deal with that through her PCP. She states she is tolerating the anastrozole well. She does not have new concerns today aside from frustrations unrelated to treatment regimen.     The following portions of the patient's history were reviewed and updated as appropriate: allergies, current medications, past family history, past medical history, past social history, past  surgical history, and problem list.    Past Medical History:   Diagnosis Date    Anxiety     Arthritis     Breast cancer 2024    Inv. Ductal with DCIS (right breast)   ER+/MD+/Her2-    Cataract     Removed    Depression     Disease of thyroid gland     Hashimoto's disease     Headache     2014    History of vitamin D deficiency     Hypothyroidism     IBS (irritable bowel syndrome)     Multiple myeloma 2015    IgA kappa.  Stem cell transplant at [facility] 3/20/2016    Myeloma     Neck pain     Neuropathy     KWAME (obstructive sleep apnea) 2021    Overnight polysomnogram.  Weight 173 pounds.  Mild KWAME with AHI 5.6 events per hour.  When supine, 9.4 events per hour.  During REM, moderate severity at 26 events per hour.  No sleep-related hypoxia.  The patient snored 20.5% of total sleep time.    Osteopenia     Overweight (BMI 25.0-29.9) 2018        Past Surgical History:   Procedure Laterality Date    ADENOIDECTOMY      In 2955 with tonsilectomy    ANTERIOR CERVICAL FUSION      BREAST BIOPSY Right 2024    10:00 @ 10cmFN   (Inv. Ductal Carcinoma  ER+/MD+/Her2-)  [facility]    BREAST LUMPECTOMY WITH SENTINEL NODE BIOPSY Right 2024    Procedure: RIGHT breast DOUG guided lumpectomy and RIGHT axilla sentinel node biopsy;  Surgeon: Angelina Rosales MD;  Location: [facility];  Service: General;  Laterality: Right;    CATARACT EXTRACTION      COLONOSCOPY      TONSILLECTOMY      VEIN SURGERY          Family History   Problem Relation Age of Onset    Breast cancer Mother     Sleep apnea Mother     Lymphoma Father     Sleep apnea Father     Cancer Father         Lymphoma    Cancer Maternal Aunt             Kidney cancer Paternal Grandmother     Depression Paternal Aunt         Bladder csncer    Malig Hyperthermia Neg Hx         Social History     Socioeconomic History    Marital status:     Number of children: 2   Tobacco Use    Smoking status: Former  "    Current packs/day: 0.00     Average packs/day: 0.3 packs/day for 3.0 years (0.7 ttl pk-yrs)     Types: Cigarettes     Start date: 1971     Quit date:      Years since quittin.1    Smokeless tobacco: Never    Tobacco comments:     Only lightly for 2 years   Vaping Use    Vaping status: Never Used   Substance and Sexual Activity    Alcohol use: Not Currently     Comment: Na    Drug use: Never    Sexual activity: Not Currently     Partners: Male     Birth control/protection: None     Comment: Na        OB History          2    Para   2    Term   2            AB        Living             SAB        IAB        Ectopic        Molar        Multiple        Live Births   2             Age at menarche-11  Age at first live childbirth-23   3 para 2  1  Oral conceptive pills none  Hormone replacement therapy for about 1 week  Age of menopause-late 50s    No Known Allergies       Objective   Blood pressure 131/82, pulse 82, temperature 98.4 °F (36.9 °C), temperature source Oral, height 170.2 cm (67.01\"), weight 63.3 kg (139 lb 9.6 oz), SpO2 97%.       Physical Exam  Cardiovascular:      Rate and Rhythm: Normal rate.      Pulses: Normal pulses.   Pulmonary:      Effort: Pulmonary effort is normal.   Skin:     General: Skin is warm and dry.   Neurological:      Mental Status: She is oriented to person, place, and time.   Psychiatric:         Mood and Affect: Mood is anxious. Affect is angry.       Lab Results:  Results from last 7 days   Lab Units 25  1254   WBC 10*3/mm3 4.59   NEUTROS ABS 10*3/mm3 1.66*   HEMOGLOBIN g/dL 12.1   HEMATOCRIT % 35.9   PLATELETS 10*3/mm3 251     Results from last 7 days   Lab Units 25  1416   SODIUM mmol/L 140   POTASSIUM mmol/L 3.7   CHLORIDE mmol/L 103   CO2 mmol/L 27.5   BUN mg/dL 11   CREATININE mg/dL 0.89   CALCIUM mg/dL 8.5*   ALBUMIN g/dL 3.5   BILIRUBIN mg/dL 0.2   ALK PHOS U/L 51   ALT (SGPT) U/L 7   AST (SGOT) U/L 12   GLUCOSE mg/dL " 117*   MAGNESIUM mg/dL 2.0             Assessment & Plan   *Right breast invasive ductal carcinoma  Tumor measures 21 mm on the ultrasound and 21 mm on the MRI but on physical exam measuring up to 3.5 cm.  Clinical T2 N0 M0  Invasive ductal carcinoma, grade 2, ER +99% strong, RI intermediate, 10%, HER2 1+ immunohistochemistry, Ki-67 30%  Prognostic stage Ib  Status post right breast lumpectomy and sentinel lymph node biopsy on 3/6/2024.  Pathology shows invasive ductal carcinoma, tumor measures 21 mm, 28 mm of DCIS, margins negative, grade 2, ER strongly positive at 99%, RI 10% and HER2 1+ with a Ki-67 of 30%.  Oncotype DX recurrence score of 12, less than 1% benefit from chemotherapy and 3% risk of distant metastasis at 9 years with AI or tamoxifen alone.  Given that the Oncotype DX recurrence score is low there is no additional benefit from chemotherapy I recommend that she proceed with adjuvant radiation followed by adjuvant endocrine therapy.  She completed adjuvant radiation to the right breast in May 2024.  She has not started anastrozole yet.  Recommend that she start anastrozole now.  She returns 8/14/2024 in follow-up having been on anastrozole now reporting increased fatigue.  She has existing arthralgias however has not noticed these worsen.  She has noticed some hair loss.  She asks about switching to brand-name Arimidex instead however after further discussion does not wish to do so at this time.  She states she is read about exemestane and letrozole and is concerned this will cause more hair loss.  11/12/2024-and returns today for follow-up.  She was admitted to the hospital at [facility] in October 2024 for COVID-19 infection and altered mental status.  She was recommended to hold anastrozole for 2 weeks and reassess symptoms by the medical oncologist Dr. Jolly Ambrosio at [facility].  Patient tells me that she has been taking anastrozole and has been compliant with it.  Continue anastrozole.  Hard  for me to assess her compliance with anastrozole.  2/4/2025: She reports being compliant with anastrozole.     *Multiple myeloma  IgA kappa multiple myeloma diagnosed in 2015 treated with RVD  Stem cell transplant at [facility] in March 2016  Maintenance Velcade and Revlimid and dexamethasone started May 2016  Velcade discontinued because of neurotoxicity in May 2019  Small IgG kappa paraprotein seen on blood work in October 2019 and PET scan was performed which was negative.  This paraprotein subsequently resolved  Currently receiving Zometa every 3 months  Currently on Revlimid 5 mg 3 out of 4 weeks, dose decreased in November 23 for worsening neuropathy and stable disease.  She sees Dr. Alexey Xavier every 6 months.  Dr. Rosales has discussed with Dr. Xavier, based on her documentation he recommends holding Revlimid 1 week prior to surgery.  Revlimid is associated with secondary malignancies however breast cancer is not typically seen with this.  Therefore I think we should resume Revlimid after completion of breast surgery and continue while she is on endocrine therapy.  4/23/2024-Notes from  reviewed, MRI from 4/23/2024 reviewed.  Myeloma labs reviewed.  Myeloma labs remain negative.  There is a T2 hyperintense lesion on the sternum on the MRI.  This is indeterminate and follow-up is recommended.  She is being recommended to start back on Revlimid 7.5 mg 21 out of 28 days and also being recommended to start prophylactic Eliquis or Xarelto.  The prophylaxis has been recommended with the concern of DVT PE associated with endocrine therapy along with her age as well as multiple myeloma and use of Revlimid.  Use of Revlimid and her age do pose an increased risk of DVT PE however she will be on anastrozole which does not necessarily increase the risk of DVT PE.  Patient plans to call her myeloma physician and clarify the need for prophylaxis with Eliquis or Xarelto.  She will continue on baby  aspirin  11/12/2024 CBC reviewed and within normal limits with a WBC of 5.77, hemoglobin 13.2 and platelets 225,000, CMP reviewed and BUN 12, creatinine 0.89, LFTs normal.  Magnesium 2.2, phosphorus 3.9, beta-2 microglobulin 2.4, kappa lambda ratio from 10/22/2024 normal  Continue with Zometa every 3 months.  2/4/2025: Zometa given today. She is scheduled for follow up with [facility] soon. Para protein studies pending today.     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  Stable    *Diarrhea-improved    *Anxiety  Severe  Related to recent diagnosis of breast cancer  We discussed briefly supportive oncology referral today.    Recent admission to the hospital at [facility] in October 2024 for mental status changes which were determined to be related to worsening of underlying mental health issues.  Patient does not currently see a psychiatrist.  She refuses that she has any issues with her mental health.  She was discharged on Zyprexa however Ms. Plascencia may or may not be taking this currently.    *Bone health  8/6/2020 DEXA scan shows osteopenia with a T-score of -0.6 in the right femoral neck, T-score is -1.8 in the left femoral neck, T-score of 0.8 in the lumbar spine  Patient reports that she had a DEXA last year however I do not have the results of the same.  She continues on Zometa every 3 months.  2/4/2025: Dexa scan completed yesterday demonstrates osteopenia. Continue zometa.     *History of B12 deficiency on monthly B12 injections  Patient on monthly B12 injections, she expressed that she that she may need twice monthly B12 injections.  We will add on a B12 level today to assess the need for this.  We discussed if her level is currently optimal would not increase her B12 injections.    Plan:   Continue anastrozole 1mg po daily  Continue Revlimid 7.5mg daily   Proceed with Vitamin b12 today.    Continue Zometa every 3 months, dose due today.    Return to clinic monthly for beta 2 microglobulin and paraprotein studies.   Return to clinic in 3 months for MD visit, Zometa, cbc, cmp,  magnesium, phosphorus, paraprotein studies, beta 2 microglobulin.   Instructed to reach out sooner with any concerns.

## 2025-02-05 ENCOUNTER — SPECIALTY PHARMACY (OUTPATIENT)
Dept: PHARMACY | Facility: HOSPITAL | Age: 75
End: 2025-02-05
Payer: MEDICARE

## 2025-02-05 ENCOUNTER — OFFICE VISIT (OUTPATIENT)
Dept: INTERNAL MEDICINE | Facility: CLINIC | Age: 75
End: 2025-02-05
Payer: MEDICARE

## 2025-02-05 VITALS
HEART RATE: 75 BPM | WEIGHT: 140.3 LBS | SYSTOLIC BLOOD PRESSURE: 142 MMHG | RESPIRATION RATE: 18 BRPM | OXYGEN SATURATION: 99 % | DIASTOLIC BLOOD PRESSURE: 84 MMHG | BODY MASS INDEX: 22.02 KG/M2 | HEIGHT: 67 IN

## 2025-02-05 DIAGNOSIS — F51.01 PRIMARY INSOMNIA: Primary | ICD-10-CM

## 2025-02-05 DIAGNOSIS — R19.7 DIARRHEA, UNSPECIFIED TYPE: ICD-10-CM

## 2025-02-05 PROCEDURE — 1159F MED LIST DOCD IN RCRD: CPT | Performed by: STUDENT IN AN ORGANIZED HEALTH CARE EDUCATION/TRAINING PROGRAM

## 2025-02-05 PROCEDURE — 99213 OFFICE O/P EST LOW 20 MIN: CPT | Performed by: STUDENT IN AN ORGANIZED HEALTH CARE EDUCATION/TRAINING PROGRAM

## 2025-02-05 PROCEDURE — 1125F AMNT PAIN NOTED PAIN PRSNT: CPT | Performed by: STUDENT IN AN ORGANIZED HEALTH CARE EDUCATION/TRAINING PROGRAM

## 2025-02-05 PROCEDURE — 1160F RVW MEDS BY RX/DR IN RCRD: CPT | Performed by: STUDENT IN AN ORGANIZED HEALTH CARE EDUCATION/TRAINING PROGRAM

## 2025-02-05 RX ORDER — DULOXETIN HYDROCHLORIDE 30 MG/1
CAPSULE, DELAYED RELEASE ORAL
COMMUNITY

## 2025-02-05 RX ORDER — TRETINOIN 1 MG/G
CREAM TOPICAL
COMMUNITY
Start: 2024-12-02

## 2025-02-05 RX ORDER — HYDROQUINONE 40 MG/G
CREAM TOPICAL DAILY PRN
COMMUNITY
Start: 2024-12-02

## 2025-02-05 RX ORDER — FEXOFENADINE HCL 60 MG/1
1 TABLET, FILM COATED ORAL DAILY
COMMUNITY
Start: 2024-10-15

## 2025-02-05 NOTE — Clinical Note
Good afternoon Miracle, Thank you for discussing with her the possibility of referral for behavioral health. I agree this would be helpful for her, and I do not have the rapport having met her a few times as her new PCP.  Keeping you in the loop because also at some point there was a concern about diarrhea from anastrozole, but I let the patient know I think this is related to the trazodone as it is gotten better since cessation.  Mimi Yang

## 2025-02-05 NOTE — PROGRESS NOTES
Specialty Pharmacy Note: Revlimid (lenalidomide)    Jennifer Plascencia is a 74 y.o. female with multiple myeloma was seen 2/4/25 by APRN. Per provider dictation, no changes to oral oncology regimen Revlimid (lenalidomide).  Labs Review: The CMP and CBC from 2/4/25 have been reviewed. No dose adjustments are needed for the oral specialty medication(s) based on the labs.    Specialty pharmacy will continue to follow patient.    Sandra Hernandez, PharmD, BCPS  2/5/2025  10:39 EST

## 2025-02-05 NOTE — PROGRESS NOTES
"Chief Complaint  Diarrhea (X3 weeks, n/v thinks it's caused by sleeping pill/Tired all the time)    Subjective        Jennifer Plascencia presents to [facility]  History of Present Illness  74 year old female here for diarrhea.  This has been going on for 3 to 4 weeks and is improved after cessation of trazodone that was recently prescribed for insomnia.  Reports associated decreased appetite and weight loss.  She has had several small bowel movements daily.  She denies abdominal pain, sometimes will have some left lower cramping.  No fever or blood in stool.    Objective   Vital Signs:  /84 (BP Location: Left arm, Patient Position: Sitting, Cuff Size: Pediatric)   Pulse 75   Resp 18   Ht 170.2 cm (67.01\")   Wt 63.6 kg (140 lb 4.8 oz)   SpO2 99%   BMI 21.97 kg/m²   Estimated body mass index is 21.97 kg/m² as calculated from the following:    Height as of this encounter: 170.2 cm (67.01\").    Weight as of this encounter: 63.6 kg (140 lb 4.8 oz).    BMI is within normal parameters. No other follow-up for BMI required.      Physical Exam  Vitals reviewed.   Constitutional:       General: She is not in acute distress.     Appearance: Normal appearance. She is not ill-appearing or toxic-appearing.   Cardiovascular:      Rate and Rhythm: Normal rate.   Pulmonary:      Effort: Pulmonary effort is normal. No respiratory distress.   Abdominal:      General: Bowel sounds are normal. There is no distension.      Palpations: Abdomen is soft. There is no mass.      Tenderness: There is no abdominal tenderness.   Skin:     General: Skin is warm and dry.   Neurological:      Mental Status: She is alert.        Result Review :                Assessment and Plan   Diagnoses and all orders for this visit:    1. Primary insomnia (Primary)    2. Diarrhea, unspecified type    I discussed with patient referral for gastroenterology for colonoscopy as she last had Cologuard done for screening.  Given " the weight loss as well as diarrhea and decreased appetite discussed this would be pertinent for to pursue.  She declines colonoscopy at this point in time.  Encouraging that the diarrhea is improved with cessation of trazodone, but let her know that many medications may cause diarrhea and if this is a persistent issue consider the possibility of this being a medication side effect.  Do see where diarrhea ha occurred in the past with other medications.  At this point in time I am hesitant about prescribing alternative medication for sleep due to concern for side effects.  She is prescribed alprazolam and I discussed with her timing this to take at bedtime to help with sleep since she is already on this.  Also discussed the possibility of referral for sleep specialist regarding ongoing issues and she declines at this time.         Follow Up   Return in about 7 weeks (around 3/24/2025) for Medicare Wellness.  Patient was given instructions and counseling regarding her condition or for health maintenance advice. Please see specific information pulled into the AVS if appropriate.

## 2025-02-06 LAB
ALBUMIN SERPL ELPH-MCNC: 3 G/DL (ref 2.9–4.4)
ALBUMIN/GLOB SERPL: 1.3 {RATIO} (ref 0.7–1.7)
ALPHA1 GLOB SERPL ELPH-MCNC: 0.3 G/DL (ref 0–0.4)
ALPHA2 GLOB SERPL ELPH-MCNC: 0.7 G/DL (ref 0.4–1)
B-GLOBULIN SERPL ELPH-MCNC: 0.8 G/DL (ref 0.7–1.3)
GAMMA GLOB SERPL ELPH-MCNC: 0.7 G/DL (ref 0.4–1.8)
GLOBULIN SER-MCNC: 2.5 G/DL (ref 2.2–3.9)
IGA SERPL-MCNC: 76 MG/DL (ref 64–422)
IGG SERPL-MCNC: 739 MG/DL (ref 586–1602)
IGM SERPL-MCNC: 45 MG/DL (ref 26–217)
INTERPRETATION SERPL IEP-IMP: ABNORMAL
KAPPA LC FREE SER-MCNC: 19.1 MG/L (ref 3.3–19.4)
KAPPA LC FREE/LAMBDA FREE SER: 1.02 {RATIO} (ref 0.26–1.65)
LABORATORY COMMENT REPORT: ABNORMAL
LAMBDA LC FREE SERPL-MCNC: 18.8 MG/L (ref 5.7–26.3)
M PROTEIN SERPL ELPH-MCNC: ABNORMAL G/DL
PROT SERPL-MCNC: 5.5 G/DL (ref 6–8.5)

## 2025-02-13 ENCOUNTER — HOSPITAL ENCOUNTER (OUTPATIENT)
Dept: MAMMOGRAPHY | Facility: HOSPITAL | Age: 75
Discharge: HOME | End: 2025-02-13
Admitting: SURGERY
Payer: MEDICARE

## 2025-02-13 DIAGNOSIS — Z17.0 MALIGNANT NEOPLASM OF UPPER-OUTER QUADRANT OF RIGHT BREAST IN FEMALE, ESTROGEN RECEPTOR POSITIVE: ICD-10-CM

## 2025-02-13 DIAGNOSIS — C50.411 MALIGNANT NEOPLASM OF UPPER-OUTER QUADRANT OF RIGHT BREAST IN FEMALE, ESTROGEN RECEPTOR POSITIVE: ICD-10-CM

## 2025-02-13 DIAGNOSIS — Z12.31 ENCOUNTER FOR SCREENING MAMMOGRAM FOR MALIGNANT NEOPLASM OF BREAST: ICD-10-CM

## 2025-02-13 DIAGNOSIS — C90.00 MULTIPLE MYELOMA NOT HAVING ACHIEVED REMISSION: ICD-10-CM

## 2025-02-13 PROCEDURE — 77067 SCR MAMMO BI INCL CAD: CPT

## 2025-02-13 PROCEDURE — 77063 BREAST TOMOSYNTHESIS BI: CPT

## 2025-02-14 ENCOUNTER — OFFICE VISIT (OUTPATIENT)
Dept: SURGERY | Facility: CLINIC | Age: 75
End: 2025-02-14
Payer: MEDICARE

## 2025-02-14 VITALS
DIASTOLIC BLOOD PRESSURE: 80 MMHG | OXYGEN SATURATION: 97 % | WEIGHT: 140 LBS | SYSTOLIC BLOOD PRESSURE: 130 MMHG | HEART RATE: 92 BPM | HEIGHT: 67 IN | BODY MASS INDEX: 21.97 KG/M2

## 2025-02-14 DIAGNOSIS — C90.01 MULTIPLE MYELOMA IN REMISSION: ICD-10-CM

## 2025-02-14 DIAGNOSIS — R92.1 BREAST CALCIFICATIONS ON MAMMOGRAM: ICD-10-CM

## 2025-02-14 DIAGNOSIS — Z17.0 MALIGNANT NEOPLASM OF UPPER-OUTER QUADRANT OF RIGHT BREAST IN FEMALE, ESTROGEN RECEPTOR POSITIVE: Primary | ICD-10-CM

## 2025-02-14 DIAGNOSIS — Z80.41 FH: OVARIAN CANCER: ICD-10-CM

## 2025-02-14 DIAGNOSIS — R92.8 ABNORMAL MAMMOGRAM: ICD-10-CM

## 2025-02-14 DIAGNOSIS — Z12.31 ENCOUNTER FOR SCREENING MAMMOGRAM FOR MALIGNANT NEOPLASM OF BREAST: ICD-10-CM

## 2025-02-14 DIAGNOSIS — F41.9 ANXIETY: ICD-10-CM

## 2025-02-14 DIAGNOSIS — Z94.84 HISTORY OF STEM CELL TRANSPLANT: ICD-10-CM

## 2025-02-14 DIAGNOSIS — C50.411 MALIGNANT NEOPLASM OF UPPER-OUTER QUADRANT OF RIGHT BREAST IN FEMALE, ESTROGEN RECEPTOR POSITIVE: Primary | ICD-10-CM

## 2025-02-14 RX ORDER — PHENAZOPYRIDINE HYDROCHLORIDE 200 MG/1
TABLET, FILM COATED ORAL
COMMUNITY
Start: 2025-02-07

## 2025-02-14 RX ORDER — HYDROXYZINE HYDROCHLORIDE 25 MG/1
TABLET, FILM COATED ORAL
COMMUNITY
Start: 2025-02-03

## 2025-02-14 RX ORDER — ANASTROZOLE 1 MG/1
1 TABLET ORAL DAILY
Qty: 90 TABLET | Refills: 1 | Status: SHIPPED | OUTPATIENT
Start: 2025-02-14

## 2025-02-14 NOTE — PROGRESS NOTES
Chief Complaint: Jennifer Plascencia is a 74 y.o. female who was seen in consultation at the request of No ref. provider found  for newly diagnosed breast cancer and a postoperative visit    History of Present Illness:  Patient presents with newly diagnosed breast cancer. She noted no new masses, skin changes, nipple discharge, nipple changes prior to her most recent imaging.  Her most recent imaging includes the followin2018, [place] [facility] [place] [place]  Heterogeneously dense  BI-RADS 1: Negative    2023, MMG Screening Bilateral ([facility])  Heterogeneously dense  There is a mass with associated distortion within the upper outer right breast, posterior depth.  BI-RADS 0    2024, MMG Diagnostic Right ([facility])  Heterogeneously dense  In the noted area of the focal asymmetry, there is persistent spiculated high density mass measuring approximately 19 mm with associated architectural distortion, located at 10:00 10 cm from the nipple. An adjacent smaller circumscribed low-density 5 mm mass is located just medial and superior to the primary mass by approximately 2 mm. Calcifications in the upper inner quadrant of the right breast are stable dating back to 2019 mammogram.  Ultrasound:  Right breast and right axilla was performed. At 10:00, 10 cm nipple, there is an irregular hypoechoic mass with indistinct margins measuring 21 mm x 19 mm x 9 mm. There is associated posterior acoustic shadowing. At 10;30, 10 cm nipple there is a subtle indistinct hypoechoic mass measuring 4 mm x 3 mm x 5 mm. This is thought to represent the adjacent mass seen mammographically. In addition, there is a third circumscribed oval hypoechoic 4 x 3 x 3 mm mass, labeled 9;30, 8 cm from nipple, considered indeterminate. Right axillary lymph node is essentially fatty replaced. No morphologically abnormal lymph nodes are identified.  IMPRESSION:  Recommend 3 site ultrasound guided biopsy  BI-RADS  5    01/29/2024, Bilateral Breast MRI ([facility])  In the posterior one-third of the right breast at the 9-o'clock position  centered on the order of 8 cm from the nipple there is a focal signal  void seen within an irregular enhancing mass that measures on the order  of 2.1 cm in anterior posterior dimension, 1.5 cm in the superior  inferior dimension and 1.5 cm in the medial lateral dimension. A focal  signal void is seen centrally within the mass. This corresponds to the  biopsy-proven site of malignancy with the internal coil shaped metallic  clip.     No other areas of suspicious enhancement or morphology are seen in the  right breast. I see no evidence for abnormal skin, nipple or chest wall  enhancement of the right breast and there is no evidence for right  axillary or internal mammary chain adenopathy.     There are no areas of suspicious enhancement or morphology in the left  breast.    She had a biopsy on the following day that showed:   01/11/2024, Ultrasound Guided Biopsy x2 Sites right breast ([facility])   Indications: Right breast  Primary 19 mm mass at 10:00  12-gauge total of 3 passes.  A coil clip was then placed. 4 mm mass 9:30 12 gauge 3 passes, ribbon clip was then placed under direct sonographic guidance.  Post procedure mammogram demonstrated appropriate clip placement.  Aborted biopsy of a third suspected indistinct mass previously described as 10:30 cm the nipple. This cannot be reproduced on real time examination today.  Pathology right breast 10:00 position is concordant. Pathology of right breast 9:30 Concordant.    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 with   The invasive ductal carcinoma    The tumor measures up to 13 mm on the slide    The tumor is Chris grade II (tubular grade 3, nuclear grade 3, mitotic grade 1)    No lymphovascular invasion or perineural invasion is identified    ER, positive strong 99%  IA, positive (intermediate 10%)  HER2 by IHC negative (1+)   KI-67 positive 30%  The ductal carcinoma in situ is of high nuclear grade with comedo architecture and comedonecrosis  Breast, right, 9:30, 8 cm from nipple, biopsy (23-AD--B) Benign breast tissue with:   Sclerosing adenosis   Rare microcysts   Micro calcifications in non-neoplastic tissue.      She has not had a breast biopsy in the past.  She has her uterus and ovaries, is postmenopausal, and takes nor hormones.  Her family history includes the following: She has one daughter, one son, one sister, 3 MA, 4 PA.  She denies breast cancer in her mother (seen on outside documents), and reports ? Ovarian cancer in a maternal aunt.      Pathology from RIGHT lumpectomy and sentinel node biopsy returned as :  IMC, NST, int grade, 3,2,2, 2.1cm, no LVI, margins clear, associated DCIS, int grade, cribiform and micropapillary with expansive comedonecrosis, 2.8cm, margins clear, LCIS and ALH seen. Superior, inferior and medial margins with ALH  0/3 nodes-      Pathologic stage T2N0- IIA     She denies significant discomfort, denies redness, warmth, drainage.    Interval HIstory:  In the interim,  Jennifer Plascencia  has done well.  She has noted no changes in her breast exam. No new masses, skin changes, nipple changes, nipple discharge either breast.   She denies headache, bone pain, belly pain, cough, changes in vision or gait.  She saw Dr. Lama and took 16  whole breast plus boost treatments, completing this radiation May 7, 2024.  She saw Dr. Flanagan and has been taking anastrozole after radiation.  She is compliant with this and tells me that it gives her some diarrhea.  She does follow-up with some nurse practitioners in Dr. Flanagan's office.  She tells me that the right breast gives her some discomfort since radiation.    She had her mammogram February 13,2025 [facility]:  ? Architectural distortion UOQ and calcifications in UIQ posterior LEFT breast- BR0-          Review of Systems:  Review of Systems   Constitutional:  Positive for unexpected weight change (3 lb wt gain).   All other systems reviewed and are negative.       Past Medical and Surgical History:  Breast Biopsy History:  Patient had not had a breast biopsy prior to her cancer diagnosis.  Breast Cancer HIstory:  Patient does not have a past medical history of breast cancer.  Breast Operations, and year:  NONE   Obstetric/Gynecologic History:  Age menstrual periods began: 11  Patient is postmenopausal, entered menopause naturally at age: LATE 50S   Number of pregnancies: 3  Number of live births: 2  Number of abortions or miscarriages: 1  Age of delivery of first child: 23  Patient did not breast feed.  Length of time taking birth control pills: NONE   Patient took hormone replacement during the following dates: ONLY ON FOR 1 WEEK     Past Surgical History:   Procedure Laterality Date    ADENOIDECTOMY      In 2955 with tonsilectomy    ANTERIOR CERVICAL FUSION      BREAST BIOPSY Right 01/11/2024    10:00 @ 10cmFN   (Inv. Ductal Carcinoma  ER+/CO+/Her2-)  [facility]    BREAST LUMPECTOMY WITH SENTINEL NODE BIOPSY Right 03/06/2024    Procedure: RIGHT breast DOUG guided lumpectomy and RIGHT axilla sentinel node biopsy;  Surgeon: Angelina Rosales MD;  Location: [facility];  Service: General;  Laterality: Right;    CATARACT EXTRACTION      COLONOSCOPY      TONSILLECTOMY  1956    VEIN SURGERY        Past Medical History:   Diagnosis Date    Anxiety     Arthritis     Breast cancer 2024    Inv. Ductal with DCIS (right breast)   ER+/NH+/Her2-    Cataract     Removed    Depression     Disease of thyroid gland     Hashimoto's disease     Headache     2014    History of vitamin D deficiency     Hypothyroidism     IBS (irritable bowel syndrome)     Multiple myeloma 2015    IgA kappa.  Stem cell transplant at [facility] 3/20/2016    Myeloma     Neck pain     Neuropathy     KWAME (obstructive sleep apnea) 2021    Overnight polysomnogram.  Weight 173 pounds.  Mild KWAME with AHI 5.6 events per hour.  When supine, 9.4 events per hour.  During REM, moderate severity at 26 events per hour.  No sleep-related hypoxia.  The patient snored 20.5% of total sleep time.    Osteopenia     Overweight (BMI 25.0-29.9) 2018       Prior Hospitalizations, other than for surgery or childbirth, and year:  NONE     Social History     Socioeconomic History    Marital status:     Number of children: 2   Tobacco Use    Smoking status: Former     Current packs/day: 0.00     Average packs/day: 0.3 packs/day for 3.0 years (0.7 ttl pk-yrs)     Types: Cigarettes     Start date: 1971     Quit date:      Years since quittin.1     Passive exposure: Never    Smokeless tobacco: Never    Tobacco comments:     Only lightly for 2 years   Vaping Use    Vaping status: Never Used   Substance and Sexual Activity    Alcohol use: Not Currently     Comment: Na    Drug use: Never    Sexual activity: Not Currently     Partners: Male     Birth control/protection: None     Comment: Na     Patient is .  Patient is retired.  Patient drinks 2 servings of caffeine per day.    Family History:  Family History   Problem Relation Age of Onset    Breast cancer Mother     Sleep apnea Mother     Lymphoma Father     Sleep apnea Father     Cancer Father         Lymphoma    Cancer Maternal Aunt             Kidney cancer  "Paternal Grandmother     Depression Paternal Aunt         Bladder csncer    Malig Hyperthermia Neg Hx        Vital Signs:  /80   Pulse 92   Ht 170.2 cm (67.01\")   Wt 63.5 kg (140 lb)   SpO2 97%   BMI 21.92 kg/m²      Medications:    Current Outpatient Medications:     acetaminophen (TYLENOL) 325 MG tablet, Take 1 tablet by mouth Every 6 (Six) Hours As Needed for Mild Pain., Disp: , Rfl:     Acetylcarn-Alpha Lipoic Acid 400-200 MG capsule, Take 2 capsules by mouth 2 (Two) Times a Day., Disp: 360 capsule, Rfl: 1    Azelastine HCl 137 MCG/SPRAY solution, , Disp: , Rfl:     B Complex Vitamins (VITAMIN B COMPLEX) capsule capsule, Take 2 tablets by mouth Daily. HOLD PRIOR TO SURG, Disp: , Rfl:     baclofen (LIORESAL) 10 MG tablet, Take 1 tablet by mouth 3 (Three) Times a Day As Needed., Disp: , Rfl: 5    calcium carbonate-vitamin d 600-400 MG-UNIT per tablet, Take 1 tablet by mouth Daily. HOLD PRIOR TO SURG, Disp: , Rfl:     colesevelam (WELCHOL) 625 MG tablet, Take 2 tablets by mouth 2 (Two) Times a Day With Meals., Disp: 90 tablet, Rfl: 2    cycloSPORINE (RESTASIS) 0.05 % ophthalmic emulsion, 1 drop 2 (Two) Times a Day., Disp: , Rfl:     diphenoxylate-atropine (LOMOTIL) 2.5-0.025 MG per tablet, Take 1 tablet by mouth 3 (Three) Times a Day As Needed for Diarrhea., Disp: 90 tablet, Rfl: 3    DULoxetine (CYMBALTA) 30 MG capsule, TAKE 1 CAPSULE BY MOUTH ONCE A DAY WITH 60MG TO EQUAL 90MG, Disp: , Rfl:     DULoxetine (CYMBALTA) 60 MG capsule, Take 1 capsule by mouth Daily. (Patient taking differently: Take 1 capsule by mouth Every Night.), Disp: 90 capsule, Rfl: 3    fexofenadine (ALLEGRA) 180 MG tablet, Take 1 tablet by mouth As Needed., Disp: , Rfl:     fexofenadine (ALLEGRA) 60 MG tablet, Take 1 tablet by mouth Daily., Disp: , Rfl:     fluticasone (FLONASE) 50 MCG/ACT nasal spray, Administer 2 sprays into the nostril(s) as directed by provider Daily., Disp: , Rfl:     folic acid (FOLVITE) 1 MG tablet, Take 1 " tablet by mouth Daily., Disp: , Rfl: 2    gabapentin (NEURONTIN) 100 MG capsule, Take 1 capsule by mouth 3 times a day., Disp: , Rfl:     HYDROcodone-acetaminophen (NORCO) 5-325 MG per tablet, Take 1 tablet by mouth Every 6 (Six) Hours As Needed for Moderate Pain., Disp: 60 tablet, Rfl: 0    hydroquinone 4 % cream, Daily As Needed., Disp: , Rfl:     hydrOXYzine (ATARAX) 25 MG tablet, TAKE 1 TABLET BY MOUTH EVERY NIGHT AT BEDTIME 1 HOUR PRIOR TO WHEN YOU WANT TO BE ASLEEP, Disp: , Rfl:     Ibuprofen 3 %, Gabapentin 10 %, Baclofen 2 %, lidocaine 4 %, Ketamine HCl 4 %, Apply 1-2 g topically to the appropriate area as directed 3 (Three) to 4 (Four) times daily., Disp: 90 g, Rfl: 0    lenalidomide (REVLIMID) 2.5 MG capsule, Take 3 capsules by mouth Daily. Take for 21 days on then 7 days off., Disp: 63 capsule, Rfl: 0    lidocaine-prilocaine (EMLA) 2.5-2.5 % cream, Apply 1 Application topically to the appropriate area as directed 1 (One) Time., Disp: , Rfl:     Luvena Vaginal Moisturizer gel, Insert 1 Application into the vagina Daily As Needed (lubrication)., Disp: 90 g, Rfl: 1    methylPREDNISolone (Medrol) 4 MG dose pack, follow package directions, Disp: 1 each, Rfl: 0    Multiple Vitamins-Iron (DAILY-JONI/IRON/BETA-CAROTENE) tablet, Take 1 tablet by mouth Daily. HOLD PRIOR TO SURG, Disp: , Rfl: 2    Omega-3-Acid Eth Est, Dietary, 1 g capsule, Take 2 capsules by mouth 2 (Two) Times a Day., Disp: 360 capsule, Rfl: 1    phenazopyridine (PYRIDIUM) 200 MG tablet, TAKE 1 TABLET BY MOUTH EVERY 8 HRS AS NEEDED FOR BLADDER DISCOMFORT, Disp: , Rfl:     polyethylene glycol (MiraLax) 17 GM/SCOOP powder, Take 17 g by mouth Daily. Take cap of powder with 8oz water daily surrounding surgery and while on narcotic, Disp: 119 g, Rfl: 0    promethazine-dextromethorphan (PROMETHAZINE-DM) 6.25-15 MG/5ML syrup, TAKE 10 ML BY MOUTH TWICE A DAY AS NEEDED FOR COUGH, Disp: , Rfl:     Synthroid 75 MCG tablet, Take 1 tablet by mouth Daily.,  "Disp: , Rfl:     Tart Cherry 1200 MG capsule, Take 1 capsule by mouth 3 (Three) Times a Day., Disp: 270 capsule, Rfl: 1    traZODone (DESYREL) 50 MG tablet, TAKE 1 TABLET BY MOUTH EVERY NIGHT, Disp: 30 tablet, Rfl: 1    tretinoin (RETIN-A) 0.1 % cream, APPLY SPARINGLY AT BEDTIME, Disp: , Rfl:     trimethoprim-polymyxin b (POLYTRIM) 90428-3.1 UNIT/ML-% ophthalmic solution, As Needed., Disp: , Rfl:     vitamin D (ERGOCALCIFEROL) 80487 units capsule capsule, Take 1 capsule by mouth 2 (Two) Times a Week. Twice a week, Disp: , Rfl:     zoledronic acid (ZOMETA) 4 MG/5ML injection, Infuse 5 mL into a venous catheter Every 3 (Three) Months., Disp: , Rfl:      Allergies:  No Known Allergies    Physical Examination:  /80   Pulse 92   Ht 170.2 cm (67.01\")   Wt 63.5 kg (140 lb)   SpO2 97%   BMI 21.92 kg/m²   General Appearance:  Patient is in no distress.  She is well kept and has an average build.   Psychiatric:  Patient with appropriate mood and affect. Alert and oriented to self, time, and place.    Breast, RIGHT:  large sized, symmetric with the contralateral side.  Breast skin is without erythema, edema, rashes.  There are no visible abnormalities upon inspection during the arm-raising maneuver or with hands on hips in the sitting position. There is no nipple retraction, discharge or nipple/areolar skin changes. There is a well healed radial incision RIGHT radial incision lateral/UOQ  from her 3-2024 lumpectomy.  There are no masses palpable in the sitting or supine positions.    Breast, LEFT:  large sized, symmetric with the contralateral side.  Breast skin is without erythema, edema, rashes.  There are no visible abnormalities upon inspection during the arm-raising maneuver or with hands on hips in the sitting position. There is no nipple retraction, discharge or nipple/areolar skin changes.There are no masses palpable in the sitting or supine positions.    Lymphatic:  There is no axillary, cervical, " infraclavicular, or supraclavicular adenopathy bilaterally. There is a well healed sentinel node biopsy incision   Eyes:  Pupils are round and reactive to light.  Cardiovascular:  Heart rate and rhythm are regular.  Respiratory:  Lungs are clear bilaterally with no crackles or wheezes in any lung field.  Gastrointestinal:  Abdomen is soft, nondistended, and nontender.     Musculoskeletal:  Good strength in all 4 extremities.   There is good range of motion in both shoulders.    Skin:  No new skin lesions or rashes on the skin excluding the breast (see breast exam above).        Imagin2018, Bilateral Digital Screening [facility] ([facility] [place])  Heterogeneously dense  BI-RADS 1: Negative    2023, MMG Screening Bilateral ([facility])  Heterogeneously dense  There is a mass with associated distortion within the upper outer right breast, posterior depth.  BI-RADS 0    2024, MMG Diagnostic Right ([facility])  Heterogeneously dense  In the noted area of the focal asymmetry, there is persistent spiculated high density mass measuring approximately 19 mm with associated architectural distortion, located at 10:00 10 cm from the nipple. An adjacent smaller circumscribed low-density 5 mm mass is located just medial and superior to the primary mass by approximately 2 mm. Calcifications in the upper inner quadrant of the right breast are stable dating back to 2019 mammogram.  Ultrasound:  Right breast and right axilla was performed. At 10:00, 10 cm nipple, there is an irregular hypoechoic mass with indistinct margins measuring 21 mm x 19 mm x 9 mm. There is associated posterior acoustic shadowing. At 10;30, 10 cm nipple there is a subtle indistinct hypoechoic mass measuring 4 mm x 3 mm x 5 mm. This is thought to represent the adjacent mass seen mammographically. In addition, there is a third circumscribed oval hypoechoic 4 x 3 x 3 mm mass, labeled 9;30, 8 cm from nipple, considered indeterminate. Right  axillary lymph node is essentially fatty replaced. No morphologically abnormal lymph nodes are identified.  IMPRESSION:  Recommend 3 site ultrasound guided biopsy  BI-RADS 5    01/29/2024, Bilateral Breast MRI ([facility])  In the posterior one-third of the right breast at the 9-o'clock position  centered on the order of 8 cm from the nipple there is a focal signal  void seen within an irregular enhancing mass that measures on the order  of 2.1 cm in anterior posterior dimension, 1.5 cm in the superior  inferior dimension and 1.5 cm in the medial lateral dimension. A focal  signal void is seen centrally within the mass. This corresponds to the  biopsy-proven site of malignancy with the internal coil shaped metallic  clip.     No other areas of suspicious enhancement or morphology are seen in the  right breast. I see no evidence for abnormal skin, nipple or chest wall  enhancement of the right breast and there is no evidence for right  axillary or internal mammary chain adenopathy.     There are no areas of suspicious enhancement or morphology in the left  breast.        Pathology:  01/11/2024, Ultrasound Guided Biopsy x2 Sites right breast ([facility])   Indications: Right breast  Primary 19 mm mass at 10:00  12-gauge total of 3 passes.  A coil clip was then placed. 4 mm mass 9:30 12 gauge 3 passes, ribbon clip was then placed under direct sonographic guidance.  Post procedure mammogram demonstrated appropriate clip placement.  Aborted biopsy of a third suspected indistinct mass previously described as 10:30 cm the nipple. This cannot be reproduced on real time examination today.  Pathology right breast 10:00 position is concordant. Pathology of right breast 9:30 Concordant.    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 with   The invasive ductal carcinoma    The tumor measures up to 13 mm on the slide    The tumor is Sundance grade II (tubular grade 3, nuclear grade 3, mitotic grade 1)    No lymphovascular invasion or perineural invasion is identified    ER, positive strong 99%  WV, positive (intermediate 10%)  HER2 by IHC negative (1+)   KI-67 positive 30%  The ductal carcinoma in situ is of high nuclear grade with comedo architecture and comedonecrosis  Breast, right, 9:30, 8 cm from nipple, biopsy (09-ZX--B) Benign breast tissue with:   Sclerosing adenosis   Rare microcysts   Micro calcifications in non-neoplastic tissue.        Pathology from RIGHT lumpectomy and sentinel node biopsy returned as :  IMC, NST, int grade, 3,2,2, 2.1cm, no LVI, margins clear, associated DCIS, int grade, cribiform and micropapillary with expansive comedonecrosis, 2.8cm, margins clear, LCIS and ALH seen. Superior, inferior and medial margins with ALH  0/3 nodes-      Pathologic stage T2N0- IIA           Final Diagnosis   1.  Breast, right, lumpectomy: Invasive ductal adenocarcinoma, ductal carcinoma in situ, lobular carcinoma in situ and atypical lobular hyperplasia with               -A.  Invasive ductal adenocarcinoma                            I.  The tumor is Chris grade II (tubular grade 3, nuclear grade 2, mitotic grade 2).                            II.  The tumor measures up to 21 mm (3 consecutive 7 mm slices).                            III.  No lymphovascular invasion is identified                            IV.  The tumor is seen associated with  biopsy site changes and coil clip                            V.  The invasive tumor comes within 0.3 mm of the lateral margin, 1 mm of the posterior margin, 7 mm of the inferior and anterior margins, 9 mm of the medial margin and 21 mm of the superior margin.               -B.  The ductal carcinoma in situ                            I.  The ductal carcinoma in situ is of intermediate nuclear grade with cribriform and micropapillary architecture and expansive comedonecrosis.                            II.  The ductal carcinoma in situ measures up to 28 mm (4 consecutive 7 mm slices)                            III.  The ductal carcinoma in situ comes within 7 mm of the posterior margin, 8 mm of the lateral and inferior margins, greater than 9 mm of the medial margin, 21 mm of the superior margin and 22 mm of the anterior margin.               -C.  Lobular carcinoma in situ (LCIS) and atypical lobular hyperplasia        Comment: Hormone receptors were performed on the prior biopsy which is reviewed case [id number] and those results are reported in the synoptic. The margins for part 1 were for part 1 only given the additional parts 2 through 7 the invasive tumor comes within 3 mm of the posterior margin, 10.3 mm of the lateral margin, 11 mm of the inferior margin, 13 mm of the medial margin, 14 mm of the anterior margin and 27 mm of the superior margin.  The ductal carcinoma in situ comes within 9 mm of the posterior margin, 12 mm of the inferior margin, greater than 13 mm of the medial margin, 18 mm of the lateral margin, 27 mm of the superior margin and 29 mm of the anterior margin.     2.  Breast, right, new anterior margin, excision: 7 mm of additional benign breast tissue (anterior margin) with               -A.  Margins free of atypia, in situ and invasive disease     3.  Breast, right, new superior margin, excision: 6 mm of additional breast tissue (superior margin) with               -A.  Atypical lobular  hyperplasia  -B.  Margins free of in situ and invasive disease.     4.  Breast, right, new lateral margin, excision: 10 mm of additional benign breast tissue (lateral margin) with               -A.  Margins free of atypia, in situ and invasive disease.     5.  Breast, right, new inferior margin, excision: 4 mm of additional  breast tissue (inferior margin) with               -A.  Atypical lobular hyperplasia  -B.  Margins free of in situ and invasive disease.     6.  Breast, right, new medial margin, excision: 4 mm of additional  breast tissue (medial margin) with               -A.  Atypical lobular hyperplasia  -B.  Margins free of  in situ and invasive disease.     7.  Breast, right, new posterior margin, excision: 2 mm of benign skeletal muscle (posterior margin) with               -A.  Margins free of atypia, in situ and invasive disease.     8.  Lymph node, right sentinel lymph node #1, excision (2 mm slices): Single benign lymph node     9.  Lymph node, right sentinel node #2, excision (2 mm slices): Two benign lymph nodes   Electronically signed by Portillo Fan MD on 3/8/2024 at 1222   Synoptic Checklist   INVASIVE CARCINOMA OF THE BREAST: Resection   8th Edition - Protocol posted: 12/13/2023INVASIVE CARCINOMA OF THE BREAST: RESECTION - All Specimens         SPECIMEN   Procedure   Excision (less than total mastectomy)   Specimen Laterality   Right   TUMOR   Tumor Site   Upper outer quadrant   Histologic Type   Invasive carcinoma of no special type (ductal)   Histologic Grade (Raleigh Histologic Score)       Glandular (Acinar) / Tubular Differentiation   Score 3   Nuclear Pleomorphism   Score 2   Mitotic Rate   Score 2   Overall Grade   Grade 2 (scores of 6 or 7)   Tumor Size   Greatest dimension of largest invasive focus (Millimeters): 21 mm   Tumor Focality   Single focus of invasive carcinoma   Ductal Carcinoma In Situ (DCIS)   Present       Negative for extensive intraductal component (EIC)   Size  "(Extent) of DCIS   Estimated size (extent) of DCIS is at least (Millimeters): 28 mm   Architectural Patterns   Cribriform       Micropapillary   Nuclear Grade   Grade II (intermediate)   Necrosis   Present, central (expansive \"comedo\" necrosis)   Lobular Carcinoma In Situ (LCIS)   Present   Lymphatic and / or Vascular Invasion   Not identified   Microcalcifications   Present in non-neoplastic tissue       Present in lobular neoplasia   Treatment Effect in the Breast   No known presurgical therapy   MARGINS   Margin Status for Invasive Carcinoma   All margins negative for invasive carcinoma   Distance from Invasive Carcinoma to Closest Margin   3 mm   Closest Margin(s) to Invasive Carcinoma   Posterior   Distance from Invasive Carcinoma to Anterior Margin   14 mm   Distance from Invasive Carcinoma to Posterior Margin   3 mm   Distance from Invasive Carcinoma to Superior Margin   27 mm   Distance from Invasive Carcinoma to Inferior Margin   11 mm   Distance from Invasive Carcinoma to Medial Margin   13 mm   Distance from Invasive Carcinoma to Lateral Margin   10.3 mm   Margin Status for DCIS   All margins negative for DCIS   Distance from DCIS to Closest Margin   9 mm   Closest Margin(s) to DCIS   Posterior   Distance from DCIS to Anterior Margin   29 mm   Distance from DCIS to Posterior Margin   9 mm   Distance from DCIS to Superior Margin   27 mm   Distance from DCIS to Inferior Margin   12 mm   Distance from DCIS to Medial Margin   Greater than: 13 mm   Distance from DCIS to Lateral Margin   18 mm   REGIONAL LYMPH NODES   Regional Lymph Node Status   All regional lymph nodes negative for tumor   Total Number of Lymph Nodes Examined (sentinel and non-sentinel)   3   Number of [place] Nodes Examined   3   pTNM CLASSIFICATION (AJCC 8th Edition)   Reporting of pT, pN, and (when applicable) pM categories is based on information available to the pathologist at the time the report is issued. As per the AJCC (Chapter 1, " 8th Ed.) it is the managing physician’s responsibility to establish the final pathologic stage based upon all pertinent information, including but potentially not limited to this pathology report.   pT Category   pT2   pN Category   pN0   N Suffix   (sn)   SPECIAL STUDIES           Estrogen Receptor (ER) Status   Positive (greater than 10% of cells demonstrate nuclear positivity)   Percentage of Cells with Nuclear Positivity   99 %           Progesterone Receptor (PgR) Status   Positive   Percentage of Cells with Nuclear Positivity   10 %           HER2 (by immunohistochemistry)   Negative (Score 1+)           Ki-67 Percentage of Positive Nuclei   30 %   .                Labs:  [facility] breast and gynecology panel dated 2-24-24 returned as negative for mutation  We will let her know      Communication from Alexey Camilo office who manages her immunosuppression. He asked us to stop the revlamid one week preop.  We will let her know      Procedures:      Assessment:   Diagnosis Plan   1. Malignant neoplasm of upper-outer quadrant of right breast in female, estrogen receptor positive  Mammo Screening Digital Tomosynthesis Bilateral With CAD    Mammo Diagnostic Digital Tomosynthesis Left With CAD      2. FH: ovarian cancer        3. History of stem cell transplant        4. Anxiety        5. Multiple myeloma in remission        6. Abnormal mammogram        7. Breast calcifications on mammogram        8. Encounter for screening mammogram for malignant neoplasm of breast  Mammo Screening Digital Tomosynthesis Bilateral With CAD          2-  RIGHT breast upper outer breast ca- 9:30, 7.5 CFN, 3cm on exam, 1.9cm on mammogram, 2.1cm on ultrasound, 2.1 cm on MRI, normal nodes on MRI and exam.  IMC, NST, int grade, no LVI, 1.3 cm in core  ER 99, GA 10, her 2 monserrat 1+, Ki 67 is 30%    Clinical stage T1cN0- IA    March 6, 2024 pathology from RIGHT lumpectomy and sentinel node biopsy returned as :  IMC, NST, int grade, 3,2,2, 2.1cm,  no LVI, margins clear, associated DCIS, int grade, cribiform and micropapillary with expansive comedonecrosis, 2.8cm, margins clear, LCIS and ALH seen. Superior, inferior and medial margins with ALH  0/3 nodes-      Pathologic stage T2N0- IIA    Oncotype 12    Dr. Lama and took 16 whole breast plus boost treatments, completing this radiation May 7, 2024.  She saw Dr. Flanagan and has been taking anastrozole after radiation        2-  A maternal aunt with a female cancer, she thinks ovarian  [facility] common hereditary panel negative for mutation 2-24-24      3-   Had stem cell transplant (followed at [facility], on Revlimid)- sees Dr Jose titus  Revlamid on hold until completes radiation per patient report      4  Significant anxiety, sees Yeni Lujan    5-  Multiple myeloma- treated at [facility]    6-7   ? Architectural distortion UOQ and calcifications in UIQ posterior LEFT breast- BR0-      Plan:  The patient goes by Jennifer.  She is here unaccompanied today.  This is her nearly 1 year post operative follow-up.  She completed her radiation and is compliant with her anastrozole.  I will message Dr. Flanaagn's office about the GI symptoms that she feels are related to the medication.  With regards to her right breast tenderness, she does have some lymphedema of the skin of the breast.  I instructed her on manual decongestive therapy and moisturization.    I will arrange for a left diagnostic mammogram to workup the distortion and calcifications.  Her next routine mammogram will be due February 14, 20[facility] at [facility].  I will schedule this and she will see Camilo thereafter.      Angelina Rosales MD    Next Appointment:  Return for pending diagnostic imaging.  Today I spent 25 minutes doing the following: Reviewing records, labs, outside imaging and reports in preparation for the patient visit; obtaining medical history; performing the physical exam; counseling and educating the patient and any  available family or caregivers; ordering medications, tests or procedures; coordinating care with any other physicians on her care team as needed, and documenting all of the above in the medical record as well as sending communications with her other healthcare professionals.      EMR Dragon/transcription disclaimer:    Please note that portions of this note were completed with a voice recognition program.

## 2025-02-17 ENCOUNTER — TELEPHONE (OUTPATIENT)
Dept: SURGERY | Facility: CLINIC | Age: 75
End: 2025-02-17

## 2025-02-17 PROBLEM — E55.9 VITAMIN D DEFICIENCY: Status: ACTIVE | Noted: 2023-01-04

## 2025-02-17 PROBLEM — C50.919 MALIGNANT NEOPLASM OF FEMALE BREAST: Status: ACTIVE | Noted: 2024-02-01

## 2025-02-17 PROBLEM — Z78.9: Status: ACTIVE | Noted: 2024-10-23

## 2025-02-17 PROBLEM — R41.82 ALTERED MENTAL STATUS: Status: ACTIVE | Noted: 2024-10-23

## 2025-02-17 PROBLEM — C50.919 BREAST CANCER: Status: ACTIVE | Noted: 2024-10-23

## 2025-02-17 PROBLEM — U07.1 COVID: Status: ACTIVE | Noted: 2024-10-24

## 2025-02-17 NOTE — TELEPHONE ENCOUNTER
Caller: Jennifer Plascencia    Relationship to patient: Self    Best call back number: [phone] (home)     Patient is needing: PATIENT REQUESTS A CALLBACK FROM DR COOK REGARDING INFORMATION DISCUSSED AT LAST VISIT ON 2/14/25. PT WOULD LIKE TO TALK ABOUT FURTHER TESTING/CARE BEFORE ANYTHING IS SCHEDULED.  PLEASE ADVISE.

## 2025-02-19 ENCOUNTER — SPECIALTY PHARMACY (OUTPATIENT)
Dept: PHARMACY | Facility: HOSPITAL | Age: 75
End: 2025-02-19
Payer: MEDICARE

## 2025-02-19 DIAGNOSIS — C90.00 MULTIPLE MYELOMA NOT HAVING ACHIEVED REMISSION: ICD-10-CM

## 2025-02-19 RX ORDER — LENALIDOMIDE 2.5 MG/1
7.5 CAPSULE ORAL DAILY
Qty: 63 CAPSULE | Refills: 0 | Status: SHIPPED | OUTPATIENT
Start: 2025-02-19

## 2025-02-19 RX ORDER — LENALIDOMIDE 2.5 MG/1
CAPSULE ORAL
Refills: 0 | OUTPATIENT
Start: 2025-02-19

## 2025-02-19 NOTE — PROGRESS NOTES
Specialty Pharmacy Patient Management Program  Per Protocol Prescription Order or Refill       Requested Prescriptions     Signed Prescriptions Disp Refills    lenalidomide (REVLIMID) 2.5 MG capsule 63 capsule 0     Sig: Take 3 capsules by mouth Daily. Take for 21 days on then 7 days off.     Prescription orders above were sent to [facility] per Collaborative Care Agreement Protocol.     Completed independent double check on medication order/RX.    Felix Gilliam, Angle, BCOP  Clinical Specialty Pharmacist, Oncology  2/19/2025  09:58 EST

## 2025-02-19 NOTE — PROGRESS NOTES
Specialty Pharmacy Patient Management Program  Per Protocol Prescription Order or Refill     Patient will be filling or currently fills medications at [facility] and is enrolled in the Patient Management Program.    Requested Prescriptions     Signed Prescriptions Disp Refills    lenalidomide (REVLIMID) 2.5 MG capsule 63 capsule 0     Sig: Take 3 capsules by mouth Daily. Take for 21 days on then 7 days off.     Prescription orders above were sent to the pharmacy per Collaborative Care Agreement Protocol.     Last Office Visit: 2/4/25  Next Office Visit: 5/2/25    Sandra Hernandez PharmD, [facility]  Clinical Specialty Pharmacist, Oncology  2/19/2025  09:47 EST

## 2025-02-21 ENCOUNTER — PATIENT ROUNDING (BHMG ONLY) (OUTPATIENT)
Dept: URGENT CARE | Facility: CLINIC | Age: 75
End: 2025-02-21
Payer: MEDICARE

## 2025-02-21 NOTE — ED NOTES
Thank you for letting us care for you in your recent visit to our urgent care center. We would love to hear about your experience with us. Was this the first time you have visited our location?    We’re always looking for ways to make our patients’ experiences even better. Do you have any recommendations on ways we may improve?     I appreciate you taking the time to respond. Please be on the lookout for a survey about your recent visit from [employer] via text or email. We would greatly appreciate if you could fill that out and turn it back in. We want your voice to be heard and we value your feedback.   Thank you for choosing [facility] for your healthcare needs.

## 2025-02-24 DIAGNOSIS — Z17.0 MALIGNANT NEOPLASM OF UPPER-OUTER QUADRANT OF RIGHT BREAST IN FEMALE, ESTROGEN RECEPTOR POSITIVE: Primary | ICD-10-CM

## 2025-02-24 DIAGNOSIS — C50.411 MALIGNANT NEOPLASM OF UPPER-OUTER QUADRANT OF RIGHT BREAST IN FEMALE, ESTROGEN RECEPTOR POSITIVE: Primary | ICD-10-CM

## 2025-02-25 DIAGNOSIS — C50.411 MALIGNANT NEOPLASM OF UPPER-OUTER QUADRANT OF RIGHT BREAST IN FEMALE, ESTROGEN RECEPTOR POSITIVE: ICD-10-CM

## 2025-02-25 DIAGNOSIS — Z17.0 MALIGNANT NEOPLASM OF UPPER-OUTER QUADRANT OF RIGHT BREAST IN FEMALE, ESTROGEN RECEPTOR POSITIVE: ICD-10-CM

## 2025-02-25 NOTE — TELEPHONE ENCOUNTER
Caller: Jennifer Plascencia    Relationship: Self    Best call back number: [phone]    Requested Prescriptions:   Requested Prescriptions     Pending Prescriptions Disp Refills    HYDROcodone-acetaminophen (NORCO) 5-325 MG per tablet 60 tablet 0     Sig: Take 1 tablet by mouth Every 6 (Six) Hours As Needed for Moderate Pain.        Pharmacy where request should be sent:  [facility]    Last office visit with prescribing clinician: 11/12/2024   Last telemedicine visit with prescribing clinician: Visit date not found   Next office visit with prescribing clinician: 5/14/2025     Additional details provided by patient: NA    Does the patient have less than a 3 day supply:  [x] Yes  [] No    Would you like a call back once the refill request has been completed: [] Yes [] No    If the office needs to give you a call back, can they leave a voicemail: [] Yes [] No    Lance Mathews Rep   02/25/25 14:14 EST

## 2025-02-26 RX ORDER — HYDROCODONE BITARTRATE AND ACETAMINOPHEN 5; 325 MG/1; MG/1
1 TABLET ORAL EVERY 6 HOURS PRN
Qty: 60 TABLET | Refills: 0 | Status: SHIPPED | OUTPATIENT
Start: 2025-02-26

## 2025-03-03 DIAGNOSIS — C50.411 MALIGNANT NEOPLASM OF UPPER-OUTER QUADRANT OF RIGHT BREAST IN FEMALE, ESTROGEN RECEPTOR POSITIVE: ICD-10-CM

## 2025-03-03 DIAGNOSIS — Z17.0 MALIGNANT NEOPLASM OF UPPER-OUTER QUADRANT OF RIGHT BREAST IN FEMALE, ESTROGEN RECEPTOR POSITIVE: ICD-10-CM

## 2025-03-03 RX ORDER — CYANOCOBALAMIN 1000 UG/ML
1000 INJECTION, SOLUTION INTRAMUSCULAR; SUBCUTANEOUS ONCE
Status: CANCELLED | OUTPATIENT
Start: 2025-03-05

## 2025-03-03 RX ORDER — HYDROCODONE BITARTRATE AND ACETAMINOPHEN 5; 325 MG/1; MG/1
1 TABLET ORAL EVERY 6 HOURS PRN
Qty: 60 TABLET | Refills: 0 | OUTPATIENT
Start: 2025-03-03

## 2025-03-03 NOTE — TELEPHONE ENCOUNTER
Caller: Jennifer Plascencia    Relationship: Self    Best call back number: [phone]    Requested Prescriptions:   Requested Prescriptions     Pending Prescriptions Disp Refills    HYDROcodone-acetaminophen (NORCO) 5-325 MG per tablet 60 tablet 0     Sig: Take 1 tablet by mouth Every 6 (Six) Hours As Needed for Moderate Pain.      Pharmacy where request should be sent: [facility]/PHARMACY #[id number] - [place], [place] - [facility] [phone] [facility] [phone]      Last office visit with prescribing clinician: 11/12/2024   Last telemedicine visit with prescribing clinician: Visit date not found   Next office visit with prescribing clinician: 5/14/2025     Does the patient have less than a 3 day supply:  [x] Yes  [] No    Would you like a call back once the refill request has been completed: [] Yes [x] No    If the office needs to give you a call back, can they leave a voicemail: [] Yes [x] No

## 2025-03-04 ENCOUNTER — INFUSION (OUTPATIENT)
Dept: ONCOLOGY | Facility: HOSPITAL | Age: 75
End: 2025-03-04
Payer: MEDICARE

## 2025-03-04 ENCOUNTER — LAB (OUTPATIENT)
Dept: LAB | Facility: HOSPITAL | Age: 75
End: 2025-03-04
Payer: MEDICARE

## 2025-03-04 DIAGNOSIS — E53.8 B12 DEFICIENCY: ICD-10-CM

## 2025-03-04 DIAGNOSIS — C90.00 NEUROPATHY ASSOCIATED WITH MULTIPLE MYELOMA: ICD-10-CM

## 2025-03-04 DIAGNOSIS — C50.411 MALIGNANT NEOPLASM OF UPPER-OUTER QUADRANT OF RIGHT BREAST IN FEMALE, ESTROGEN RECEPTOR POSITIVE: ICD-10-CM

## 2025-03-04 DIAGNOSIS — G63 NEUROPATHY ASSOCIATED WITH MULTIPLE MYELOMA: Primary | ICD-10-CM

## 2025-03-04 DIAGNOSIS — C90.00 MULTIPLE MYELOMA NOT HAVING ACHIEVED REMISSION: ICD-10-CM

## 2025-03-04 DIAGNOSIS — C90.00 NEUROPATHY ASSOCIATED WITH MULTIPLE MYELOMA: Primary | ICD-10-CM

## 2025-03-04 DIAGNOSIS — Z17.0 MALIGNANT NEOPLASM OF UPPER-OUTER QUADRANT OF RIGHT BREAST IN FEMALE, ESTROGEN RECEPTOR POSITIVE: ICD-10-CM

## 2025-03-04 DIAGNOSIS — G63 NEUROPATHY ASSOCIATED WITH MULTIPLE MYELOMA: ICD-10-CM

## 2025-03-04 LAB
ALBUMIN SERPL-MCNC: 3.9 G/DL (ref 3.5–5.2)
ALBUMIN/GLOB SERPL: 1.5 G/DL
ALP SERPL-CCNC: 59 U/L (ref 39–117)
ALT SERPL W P-5'-P-CCNC: 11 U/L (ref 1–33)
ANION GAP SERPL CALCULATED.3IONS-SCNC: 10.3 MMOL/L (ref 5–15)
AST SERPL-CCNC: 14 U/L (ref 1–32)
B2 MICROGLOB SERPL-MCNC: 2.3 MG/L (ref 0.8–2.2)
BASOPHILS # BLD AUTO: 0.03 10*3/MM3 (ref 0–0.2)
BASOPHILS NFR BLD AUTO: 0.8 % (ref 0–1.5)
BILIRUB SERPL-MCNC: 0.3 MG/DL (ref 0–1.2)
BUN SERPL-MCNC: 10 MG/DL (ref 8–23)
BUN/CREAT SERPL: 13 (ref 7–25)
CALCIUM SPEC-SCNC: 8.7 MG/DL (ref 8.6–10.5)
CHLORIDE SERPL-SCNC: 101 MMOL/L (ref 98–107)
CO2 SERPL-SCNC: 27.7 MMOL/L (ref 22–29)
CREAT SERPL-MCNC: 0.77 MG/DL (ref 0.57–1)
DEPRECATED RDW RBC AUTO: 49.8 FL (ref 37–54)
EGFRCR SERPLBLD CKD-EPI 2021: 80.1 ML/MIN/1.73
EOSINOPHIL # BLD AUTO: 0.2 10*3/MM3 (ref 0–0.4)
EOSINOPHIL NFR BLD AUTO: 5 % (ref 0.3–6.2)
ERYTHROCYTE [DISTWIDTH] IN BLOOD BY AUTOMATED COUNT: 13.7 % (ref 12.3–15.4)
GLOBULIN UR ELPH-MCNC: 2.6 GM/DL
GLUCOSE SERPL-MCNC: 90 MG/DL (ref 65–99)
HCT VFR BLD AUTO: 37.8 % (ref 34–46.6)
HGB BLD-MCNC: 12.4 G/DL (ref 12–15.9)
IMM GRANULOCYTES # BLD AUTO: 0 10*3/MM3 (ref 0–0.05)
IMM GRANULOCYTES NFR BLD AUTO: 0 % (ref 0–0.5)
LYMPHOCYTES # BLD AUTO: 1.94 10*3/MM3 (ref 0.7–3.1)
LYMPHOCYTES NFR BLD AUTO: 48.7 % (ref 19.6–45.3)
MCH RBC QN AUTO: 32.6 PG (ref 26.6–33)
MCHC RBC AUTO-ENTMCNC: 32.8 G/DL (ref 31.5–35.7)
MCV RBC AUTO: 99.5 FL (ref 79–97)
MONOCYTES # BLD AUTO: 0.38 10*3/MM3 (ref 0.1–0.9)
MONOCYTES NFR BLD AUTO: 9.5 % (ref 5–12)
NEUTROPHILS NFR BLD AUTO: 1.43 10*3/MM3 (ref 1.7–7)
NEUTROPHILS NFR BLD AUTO: 36 % (ref 42.7–76)
NRBC BLD AUTO-RTO: 0 /100 WBC (ref 0–0.2)
PLATELET # BLD AUTO: 232 10*3/MM3 (ref 140–450)
PMV BLD AUTO: 8.8 FL (ref 6–12)
POTASSIUM SERPL-SCNC: 4.1 MMOL/L (ref 3.5–5.2)
PROT SERPL-MCNC: 6.5 G/DL (ref 6–8.5)
RBC # BLD AUTO: 3.8 10*6/MM3 (ref 3.77–5.28)
SODIUM SERPL-SCNC: 139 MMOL/L (ref 136–145)
VIT B12 BLD-MCNC: 476 PG/ML (ref 211–946)
WBC NRBC COR # BLD AUTO: 3.98 10*3/MM3 (ref 3.4–10.8)

## 2025-03-04 PROCEDURE — 25010000002 CYANOCOBALAMIN PER 1000 MCG: Performed by: NURSE PRACTITIONER

## 2025-03-04 PROCEDURE — 82232 ASSAY OF BETA-2 PROTEIN: CPT | Performed by: INTERNAL MEDICINE

## 2025-03-04 PROCEDURE — 82607 VITAMIN B-12: CPT

## 2025-03-04 PROCEDURE — 80053 COMPREHEN METABOLIC PANEL: CPT | Performed by: INTERNAL MEDICINE

## 2025-03-04 PROCEDURE — 36415 COLL VENOUS BLD VENIPUNCTURE: CPT

## 2025-03-04 PROCEDURE — 85025 COMPLETE CBC W/AUTO DIFF WBC: CPT

## 2025-03-04 PROCEDURE — 96372 THER/PROPH/DIAG INJ SC/IM: CPT

## 2025-03-04 RX ORDER — CYANOCOBALAMIN 1000 UG/ML
1000 INJECTION, SOLUTION INTRAMUSCULAR; SUBCUTANEOUS ONCE
Status: COMPLETED | OUTPATIENT
Start: 2025-03-04 | End: 2025-03-04

## 2025-03-04 RX ADMIN — CYANOCOBALAMIN 1000 MCG: 1000 INJECTION, SOLUTION INTRAMUSCULAR at 09:50

## 2025-03-06 LAB
ALBUMIN SERPL ELPH-MCNC: 3.8 G/DL (ref 2.9–4.4)
ALBUMIN/GLOB SERPL: 1.7 {RATIO} (ref 0.7–1.7)
ALPHA1 GLOB SERPL ELPH-MCNC: 0.2 G/DL (ref 0–0.4)
ALPHA2 GLOB SERPL ELPH-MCNC: 0.6 G/DL (ref 0.4–1)
B-GLOBULIN SERPL ELPH-MCNC: 0.8 G/DL (ref 0.7–1.3)
GAMMA GLOB SERPL ELPH-MCNC: 0.7 G/DL (ref 0.4–1.8)
GLOBULIN SER-MCNC: 2.3 G/DL (ref 2.2–3.9)
IGA SERPL-MCNC: 83 MG/DL (ref 64–422)
IGG SERPL-MCNC: 829 MG/DL (ref 586–1602)
IGM SERPL-MCNC: 35 MG/DL (ref 26–217)
INTERPRETATION SERPL IEP-IMP: ABNORMAL
KAPPA LC FREE SER-MCNC: 20.6 MG/L (ref 3.3–19.4)
KAPPA LC FREE/LAMBDA FREE SER: 1.1 {RATIO} (ref 0.26–1.65)
LABORATORY COMMENT REPORT: ABNORMAL
LAMBDA LC FREE SERPL-MCNC: 18.7 MG/L (ref 5.7–26.3)
M PROTEIN SERPL ELPH-MCNC: ABNORMAL G/DL
PROT SERPL-MCNC: 6.1 G/DL (ref 6–8.5)

## 2025-03-12 ENCOUNTER — HOSPITAL ENCOUNTER (OUTPATIENT)
Dept: ULTRASOUND IMAGING | Facility: HOSPITAL | Age: 75
Discharge: HOME | End: 2025-03-12
Payer: MEDICARE

## 2025-03-12 ENCOUNTER — HOSPITAL ENCOUNTER (OUTPATIENT)
Dept: MAMMOGRAPHY | Facility: HOSPITAL | Age: 75
Discharge: HOME | End: 2025-03-12
Payer: MEDICARE

## 2025-03-12 DIAGNOSIS — Z17.0 MALIGNANT NEOPLASM OF UPPER-OUTER QUADRANT OF RIGHT BREAST IN FEMALE, ESTROGEN RECEPTOR POSITIVE: ICD-10-CM

## 2025-03-12 DIAGNOSIS — C50.411 MALIGNANT NEOPLASM OF UPPER-OUTER QUADRANT OF RIGHT BREAST IN FEMALE, ESTROGEN RECEPTOR POSITIVE: ICD-10-CM

## 2025-03-12 PROCEDURE — 77065 DX MAMMO INCL CAD UNI: CPT

## 2025-03-12 PROCEDURE — G0279 TOMOSYNTHESIS, MAMMO: HCPCS

## 2025-03-12 PROCEDURE — 76642 ULTRASOUND BREAST LIMITED: CPT

## 2025-03-13 ENCOUNTER — SPECIALTY PHARMACY (OUTPATIENT)
Dept: PHARMACY | Facility: HOSPITAL | Age: 75
End: 2025-03-13
Payer: MEDICARE

## 2025-03-13 DIAGNOSIS — C90.00 MULTIPLE MYELOMA NOT HAVING ACHIEVED REMISSION: ICD-10-CM

## 2025-03-13 RX ORDER — LENALIDOMIDE 2.5 MG/1
7.5 CAPSULE ORAL DAILY
Qty: 63 CAPSULE | Refills: 0 | Status: SHIPPED | OUTPATIENT
Start: 2025-03-13

## 2025-03-13 NOTE — PROGRESS NOTES
Specialty Pharmacy Patient Management Program  Per Protocol Prescription Order or Refill     Patient will be filling or currently fills medications at [facility] and is enrolled in the Patient Management Program.    Requested Prescriptions     Signed Prescriptions Disp Refills    lenalidomide (REVLIMID) 2.5 MG capsule 63 capsule 0     Sig: Take 3 capsules by mouth Daily. Take for 21 days on then 7 days off.     Prescription orders above were sent to the pharmacy per Collaborative Care Agreement Protocol.     Sandra Hernandez PharmD, [facility]  Clinical Specialty Pharmacist, Oncology  3/13/2025  08:37 EDT

## 2025-03-13 NOTE — PROGRESS NOTES
Specialty Pharmacy Patient Management Program  Per Protocol Prescription Order or Refill       Requested Prescriptions     Signed Prescriptions Disp Refills    lenalidomide (REVLIMID) 2.5 MG capsule 63 capsule 0     Sig: Take 3 capsules by mouth Daily. Take for 21 days on then 7 days off.     Prescription orders above were sent to [facility] per Collaborative Care Agreement Protocol.     Completed independent double check on medication order/RX.    Felix Gilliam, Angle, BCOP  Clinical Specialty Pharmacist, Oncology  3/13/2025  10:05 EDT

## 2025-03-17 NOTE — TELEPHONE ENCOUNTER
Caller: Jennifer Plascencia    Relationship: Self    Best call back number: [phone]     What medication are you requesting: DULoxetine (CYMBALTA) 30 MG capsule AND DULoxetine (CYMBALTA) 60 MG capsule  (TAKES 90 MG DAILY)    What are your current symptoms:     How long have you been experiencing symptoms:     Have you had these symptoms before:    [x] Yes  [] No    Have you been treated for these symptoms before:   [x] Yes  [] No    If a prescription is needed, what is your preferred pharmacy and phone number: [facility]/PHARMACY #[id number] [place], [place] - [address] [phone] [facility] [phone] FX     Additional notes: PATIENT NEEDS THIS TO BE A 90 DAY SUPPLE. PATIENT WAS ORIGINAL LEXAPRO BUT WENT BACK TO DULOXETINE.

## 2025-03-18 RX ORDER — DULOXETIN HYDROCHLORIDE 60 MG/1
60 CAPSULE, DELAYED RELEASE ORAL NIGHTLY
Qty: 90 CAPSULE | Refills: 0 | Status: CANCELLED | OUTPATIENT
Start: 2025-03-18

## 2025-03-18 RX ORDER — DULOXETIN HYDROCHLORIDE 30 MG/1
30 CAPSULE, DELAYED RELEASE ORAL NIGHTLY
Qty: 90 CAPSULE | Refills: 0 | Status: CANCELLED | OUTPATIENT
Start: 2025-03-18

## 2025-03-18 RX ORDER — DULOXETIN HYDROCHLORIDE 30 MG/1
30 CAPSULE, DELAYED RELEASE ORAL DAILY
Qty: 90 CAPSULE | Refills: 3 | Status: SHIPPED | OUTPATIENT
Start: 2025-03-18

## 2025-03-18 RX ORDER — DULOXETIN HYDROCHLORIDE 60 MG/1
60 CAPSULE, DELAYED RELEASE ORAL NIGHTLY
Qty: 90 CAPSULE | Refills: 3 | Status: SHIPPED | OUTPATIENT
Start: 2025-03-18

## 2025-03-18 NOTE — TELEPHONE ENCOUNTER
Pt is out of medication and is req new script to be filled by our office now that we are her new PCP. Pt is req both 60 and 90mg as a 90day supply and is unsure why we wouldn't be able to fill

## 2025-03-18 NOTE — TELEPHONE ENCOUNTER
Returned call to Jolly, no answer and mailbox is full. Unable to leave message.  Need clarification on cymbalta request as it appears this has not been refilled in several years.

## 2025-03-24 ENCOUNTER — OFFICE VISIT (OUTPATIENT)
Dept: INTERNAL MEDICINE | Facility: CLINIC | Age: 75
End: 2025-03-24
Payer: MEDICARE

## 2025-03-24 VITALS
HEIGHT: 67 IN | DIASTOLIC BLOOD PRESSURE: 60 MMHG | SYSTOLIC BLOOD PRESSURE: 118 MMHG | WEIGHT: 142.6 LBS | HEART RATE: 73 BPM | OXYGEN SATURATION: 96 % | RESPIRATION RATE: 18 BRPM | BODY MASS INDEX: 22.38 KG/M2

## 2025-03-24 DIAGNOSIS — F41.9 ANXIETY: ICD-10-CM

## 2025-03-24 DIAGNOSIS — Z00.00 ENCOUNTER FOR SUBSEQUENT ANNUAL WELLNESS VISIT IN MEDICARE PATIENT: Primary | ICD-10-CM

## 2025-03-24 DIAGNOSIS — Z78.9 MEASLES, MUMPS, RUBELLA (MMR) VACCINATION STATUS UNKNOWN: ICD-10-CM

## 2025-03-24 PROCEDURE — 1170F FXNL STATUS ASSESSED: CPT | Performed by: STUDENT IN AN ORGANIZED HEALTH CARE EDUCATION/TRAINING PROGRAM

## 2025-03-24 PROCEDURE — 1160F RVW MEDS BY RX/DR IN RCRD: CPT | Performed by: STUDENT IN AN ORGANIZED HEALTH CARE EDUCATION/TRAINING PROGRAM

## 2025-03-24 PROCEDURE — 99213 OFFICE O/P EST LOW 20 MIN: CPT | Performed by: STUDENT IN AN ORGANIZED HEALTH CARE EDUCATION/TRAINING PROGRAM

## 2025-03-24 PROCEDURE — 1159F MED LIST DOCD IN RCRD: CPT | Performed by: STUDENT IN AN ORGANIZED HEALTH CARE EDUCATION/TRAINING PROGRAM

## 2025-03-24 PROCEDURE — 1126F AMNT PAIN NOTED NONE PRSNT: CPT | Performed by: STUDENT IN AN ORGANIZED HEALTH CARE EDUCATION/TRAINING PROGRAM

## 2025-03-24 PROCEDURE — G0439 PPPS, SUBSEQ VISIT: HCPCS | Performed by: STUDENT IN AN ORGANIZED HEALTH CARE EDUCATION/TRAINING PROGRAM

## 2025-03-24 RX ORDER — FLUTICASONE PROPIONATE 50 MCG
2 SPRAY, SUSPENSION (ML) NASAL DAILY
Qty: 16 G | Refills: 3 | Status: SHIPPED | OUTPATIENT
Start: 2025-03-24

## 2025-03-24 RX ORDER — ALPRAZOLAM 0.25 MG
0.25 TABLET ORAL 2 TIMES DAILY PRN
Qty: 60 TABLET | Refills: 1 | Status: SHIPPED | OUTPATIENT
Start: 2025-03-24 | End: 2025-05-23

## 2025-03-24 RX ORDER — LEVOTHYROXINE SODIUM 75 MCG
75 TABLET ORAL DAILY
COMMUNITY
Start: 2025-03-11 | End: 2026-03-10

## 2025-03-24 NOTE — PROGRESS NOTES
Subjective   The ABCs of the Annual Wellness Visit  Medicare Wellness Visit      Jennifer Plascencia is a 75 y.o. patient who presents for a Medicare Wellness Visit.    The following portions of the patient's history were reviewed and   updated as appropriate: allergies, current medications, past medical history, past social history, past surgical history, and problem list.    Compared to one year ago, the patient's physical   health is worse.  Compared to one year ago, the patient's mental   health is worse.    Recent Hospitalizations:  She was admitted within the past 365 days at [facility].     Current Medical Providers:  Patient Care Team:  Mimi Yang MD as PCP - General (Internal Medicine)  Arturo Joe MD as Consulting Physician (Ophthalmology)  Navin Randall MD as Consulting Physician (Endocrinology)  Jose Briggs MD as Surgeon (Orthopedic Surgery)  Candida Aponte MD as Consulting Physician (Radiation Oncology)  Teetee Flanagan MD as Consulting Physician (Hematology and Oncology)  Louise Iverson MD as Consulting Physician (Endocrinology)    Outpatient Medications Prior to Visit   Medication Sig Dispense Refill    acetaminophen (TYLENOL) 325 MG tablet Take 1 tablet by mouth Every 6 (Six) Hours As Needed for Mild Pain.      Acetylcarn-Alpha Lipoic Acid 400-200 MG capsule Take 2 capsules by mouth 2 (Two) Times a Day. 360 capsule 1    anastrozole (ARIMIDEX) 1 MG tablet TAKE 1 TABLET BY MOUTH EVERY DAY 90 tablet 1    Azelastine HCl 137 MCG/SPRAY solution       B Complex Vitamins (VITAMIN B COMPLEX) capsule capsule Take 2 tablets by mouth Daily. HOLD PRIOR TO SURG      baclofen (LIORESAL) 10 MG tablet Take 1 tablet by mouth 3 (Three) Times a Day As Needed.  5    calcium carbonate-vitamin d 600-400 MG-UNIT per tablet Take 1 tablet by mouth Daily. HOLD PRIOR TO SURG      colesevelam (WELCHOL) 625 MG tablet Take 2 tablets by mouth 2 (Two) Times a Day With Meals. 90 tablet 2     cycloSPORINE (RESTASIS) 0.05 % ophthalmic emulsion 1 drop 2 (Two) Times a Day.      diphenoxylate-atropine (LOMOTIL) 2.5-0.025 MG per tablet Take 1 tablet by mouth 3 (Three) Times a Day As Needed for Diarrhea. 90 tablet 3    DULoxetine (CYMBALTA) 30 MG capsule Take 1 capsule by mouth Daily. 90 capsule 3    DULoxetine (CYMBALTA) 60 MG capsule Take 1 capsule by mouth Every Night. 90 capsule 3    gabapentin (NEURONTIN) 100 MG capsule Take 1 capsule by mouth 3 times a day.      HYDROcodone-acetaminophen (NORCO) 5-325 MG per tablet Take 1 tablet by mouth Every 6 (Six) Hours As Needed for Moderate Pain. 60 tablet 0    hydroquinone 4 % cream Daily As Needed.      hydrOXYzine (ATARAX) 25 MG tablet TAKE 1 TABLET BY MOUTH EVERY NIGHT AT BEDTIME 1 HOUR PRIOR TO WHEN YOU WANT TO BE ASLEEP      Ibuprofen 3 %, Gabapentin 10 %, Baclofen 2 %, lidocaine 4 %, Ketamine HCl 4 % Apply 1-2 g topically to the appropriate area as directed 3 (Three) to 4 (Four) times daily. 90 g 0    lenalidomide (REVLIMID) 2.5 MG capsule Take 3 capsules by mouth Daily. Take for 21 days on then 7 days off. 63 capsule 0    lidocaine-prilocaine (EMLA) 2.5-2.5 % cream Apply 1 Application topically to the appropriate area as directed 1 (One) Time.      methylPREDNISolone (Medrol) 4 MG dose pack follow package directions 1 each 0    Multiple Vitamins-Iron (DAILY-JONI/IRON/BETA-CAROTENE) tablet Take 1 tablet by mouth Daily. HOLD PRIOR TO SURG  2    Omega-3-Acid Eth Est, Dietary, 1 g capsule Take 2 capsules by mouth 2 (Two) Times a Day. 360 capsule 1    phenazopyridine (PYRIDIUM) 200 MG tablet TAKE 1 TABLET BY MOUTH EVERY 8 HRS AS NEEDED FOR BLADDER DISCOMFORT      polyethylene glycol (MiraLax) 17 GM/SCOOP powder Take 17 g by mouth Daily. Take cap of powder with 8oz water daily surrounding surgery and while on narcotic 119 g 0    Synthroid 75 MCG tablet Take 1 tablet by mouth Daily.      Tart Diaz 1200 MG capsule Take 1 capsule by mouth 3 (Three) Times a Day.  270 capsule 1    traZODone (DESYREL) 50 MG tablet TAKE 1 TABLET BY MOUTH EVERY NIGHT 30 tablet 1    tretinoin (RETIN-A) 0.1 % cream APPLY SPARINGLY AT BEDTIME      trimethoprim-polymyxin b (POLYTRIM) 89217-1.1 UNIT/ML-% ophthalmic solution As Needed.      vitamin D (ERGOCALCIFEROL) 14692 units capsule capsule Take 1 capsule by mouth 2 (Two) Times a Week. Twice a week      zoledronic acid (ZOMETA) 4 MG/5ML injection Infuse 5 mL into a venous catheter Every 3 (Three) Months.      fexofenadine (ALLEGRA) 180 MG tablet Take 1 tablet by mouth As Needed. (Patient not taking: Reported on 3/24/2025)      fexofenadine (ALLEGRA) 60 MG tablet Take 1 tablet by mouth Daily.      folic acid (FOLVITE) 1 MG tablet Take 1 tablet by mouth Daily.  2    Luvena Vaginal Moisturizer gel Insert 1 Application into the vagina Daily As Needed (lubrication). (Patient not taking: Reported on 3/24/2025) 90 g 1    omega-3 acid ethyl esters (LOVAZA) 1 g capsule  (Patient not taking: Reported on 3/24/2025)      promethazine-dextromethorphan (PROMETHAZINE-DM) 6.25-15 MG/5ML syrup TAKE 10 ML BY MOUTH TWICE A DAY AS NEEDED FOR COUGH (Patient not taking: Reported on 3/24/2025)      fluticasone (FLONASE) 50 MCG/ACT nasal spray Administer 2 sprays into the nostril(s) as directed by provider Daily.       No facility-administered medications prior to visit.     Opioid medication/s are on active medication list.  and I have evaluated her active treatment plan and pain score trends (see table).  Vitals:    03/24/25 1030   PainSc: 0-No pain     I have reviewed the chart for potential of high risk medication and harmful drug interactions in the elderly.        Aspirin is not on active medication list.  Aspirin use is indicated based on review of current medical condition/s. Pros and cons of this therapy have been discussed with this patient. Benefits of this medication outweigh potential harm.  Patient has been instructed to start taking this  "medication..    Patient Active Problem List   Diagnosis    Anxiety    Depression    Psychophysiological insomnia    Family history of breast cancer    History of auto stem cell transplant    Hypothyroidism    IBS (irritable bowel syndrome)    Multiple myeloma not having achieved remission    Osteopenia    Vitamin D deficiency    Overweight (BMI 25.0-29.9)    Neuropathy associated with multiple myeloma    Mixed hyperlipidemia    Immunocompromised state associated with stem cell transplant    Macrocytosis without anemia    Chronic tension-type headache, not intractable    DDD (degenerative disc disease), cervical    Hypersomnia with sleep apnea    Cervical spondylosis with radiculopathy    Cervicogenic headache    Chronically dry eyes    Constipation    Disturbance in sleep behavior    Elevated blood pressure reading    Hypothyroidism due to Hashimoto's thyroiditis    Mucositis due to antineoplastic therapy    Nasal congestion    Nausea    Orthostatic hypotension    Pancytopenia    KWAME (obstructive sleep apnea) treated with auto CPAP    High risk medication use    Malignant neoplasm of upper-outer quadrant of right breast in female, estrogen receptor positive    Altered mental status    Chronic health problem    COVID    Breast cancer    Malignant neoplasm of female breast    Multiple myeloma    Vitamin D deficiency     Advance Care Planning Advance Directive is not on file.  ACP discussion was held with the patient during this visit. Patient has an advance directive (not in EMR), copy requested.            Objective   Vitals:    03/24/25 1030   BP: 118/60   BP Location: Left arm   Patient Position: Sitting   Cuff Size: Adult   Pulse: 73   Resp: 18   SpO2: 96%   Weight: 64.7 kg (142 lb 9.6 oz)   Height: 170.2 cm (67.01\")   PainSc: 0-No pain       Estimated body mass index is 22.33 kg/m² as calculated from the following:    Height as of this encounter: 170.2 cm (67.01\").    Weight as of this encounter: 64.7 kg (142 lb " 9.6 oz).    BMI is within normal parameters. No other follow-up for BMI required.           Does the patient have evidence of cognitive impairment? No                                                                                                Health  Risk Assessment    Smoking Status:  Social History     Tobacco Use   Smoking Status Former    Current packs/day: 0.00    Average packs/day: 0.3 packs/day for 3.0 years (0.7 ttl pk-yrs)    Types: Cigarettes    Start date: 1971    Quit date:     Years since quittin.2    Passive exposure: Never   Smokeless Tobacco Never   Tobacco Comments    Only lightly for 2 years     Alcohol Consumption:  Social History     Substance and Sexual Activity   Alcohol Use Not Currently    Comment: Na       Fall Risk Screen  STEADI Fall Risk Assessment was completed, and patient is at LOW risk for falls.Assessment completed on:3/24/2025    Depression Screening   Little interest or pleasure in doing things? Not at all   Feeling down, depressed, or hopeless? Not at all   PHQ-2 Total Score 0      Health Habits and Functional and Cognitive Screening:      3/24/2025    10:42 AM   Functional & Cognitive Status   Do you have difficulty preparing food and eating? No   Do you have difficulty bathing yourself, getting dressed or grooming yourself? No   Do you have difficulty using the toilet? No   Do you have difficulty moving around from place to place? No   Do you have trouble with steps or getting out of a bed or a chair? No   Current Diet Well Balanced Diet   Dental Exam Up to date   Eye Exam Up to date   Exercise (times per week) 3 times per week   Current Exercises Include Other   Do you need help using the phone?  No   Are you deaf or do you have serious difficulty hearing?  No   Do you need help to go to places out of walking distance? No   Do you need help shopping? No   Do you need help preparing meals?  No   Do you need help with housework?  No   Do you need help with  laundry? No   Do you need help taking your medications? No   Do you need help managing money? No   Do you ever drive or ride in a car without wearing a seat belt? No   Have you felt unusual stress, anger or loneliness in the last month? No   Who do you live with? Alone   If you need help, do you have trouble finding someone available to you? No   Have you been bothered in the last four weeks by sexual problems? No   Do you have difficulty concentrating, remembering or making decisions? No           Age-appropriate Screening Schedule:  Refer to the list below for future screening recommendations based on patient's age, sex and/or medical conditions. Orders for these recommended tests are listed in the plan section. The patient has been provided with a written plan.    Health Maintenance List  Health Maintenance   Topic Date Due    TDAP/TD VACCINES (1 - Tdap) Never done    LIPID PANEL  09/19/2020    COLORECTAL CANCER SCREENING  03/22/2025    COVID-19 Vaccine (7 - Pfizer risk 2024-25 season) 04/04/2025    ANNUAL WELLNESS VISIT  03/24/2026    DXA SCAN  02/03/2027    HEPATITIS C SCREENING  Completed    RSV Vaccine - Adults  Completed    INFLUENZA VACCINE  Completed    Pneumococcal Vaccine 50+  Completed    ZOSTER VACCINE  Completed    MAMMOGRAM  Discontinued    PAP SMEAR  Discontinued                                                                                                                                                CMS Preventative Services Quick Reference  Risk Factors Identified During Encounter  None Identified    The above risks/problems have been discussed with the patient.  Pertinent information has been shared with the patient in the After Visit Summary.  An After Visit Summary and PPPS were made available to the patient.    Follow Up:   Next Medicare Wellness visit to be scheduled in 1 year.         Additional E&M Note during same encounter follows:  Patient has additional, significant, and separately  "identifiable condition(s)/problem(s) that require work above and beyond the Medicare Wellness Visit     Chief Complaint  Medicare Wellness-subsequent    Subjective    HPI    Anxiety and insomnia: Discussed with patient that refills for alprazolam need to come through this office and she voiced understanding, reports prior refill was an error.  Will refill today.  PDMP reviewed.  discussed with patient I think would be in her best interest to see a psychiatrist regarding the above issues and she declines at this time.  Neuropathy from myeloma treatment: Let her know that Cymbalta 90 mg daily has been filled by her oncologist.  She is interested in verifying immunity to MMR; had vaccine approximately 2016            Objective   Vital Signs:  /60 (BP Location: Left arm, Patient Position: Sitting, Cuff Size: Adult)   Pulse 73   Resp 18   Ht 170.2 cm (67.01\")   Wt 64.7 kg (142 lb 9.6 oz)   SpO2 96%   BMI 22.33 kg/m²   Physical Exam  Vitals reviewed.   Constitutional:       Appearance: Normal appearance. She is normal weight. She is not ill-appearing or toxic-appearing.   Pulmonary:      Effort: No respiratory distress.   Neurological:      Mental Status: She is alert.   Psychiatric:         Attention and Perception: Attention normal.         Mood and Affect: Mood normal.         Speech: Speech normal.         Behavior: Behavior normal. Behavior is cooperative.                    Assessment and Plan         Follow Up   Return in 6 months (on 9/24/2025), or if symptoms worsen or fail to improve, for Recheck.  Patient was given instructions and counseling regarding her condition or for health maintenance advice. Please see specific information pulled into the AVS if appropriate.      "

## 2025-03-25 LAB
MEV IGG SER IA-ACNC: >300 AU/ML
MUV IGG SER IA-ACNC: 84 AU/ML
RUBV IGG SERPL IA-ACNC: 6.53 INDEX

## 2025-03-26 ENCOUNTER — OFFICE VISIT (OUTPATIENT)
Dept: SURGERY | Facility: CLINIC | Age: 75
End: 2025-03-26
Payer: MEDICARE

## 2025-03-26 VITALS
DIASTOLIC BLOOD PRESSURE: 66 MMHG | BODY MASS INDEX: 22.35 KG/M2 | OXYGEN SATURATION: 97 % | SYSTOLIC BLOOD PRESSURE: 128 MMHG | WEIGHT: 142.4 LBS | HEART RATE: 80 BPM | HEIGHT: 67 IN

## 2025-03-26 DIAGNOSIS — R92.8 ABNORMAL MAMMOGRAM: ICD-10-CM

## 2025-03-26 DIAGNOSIS — Z17.0 MALIGNANT NEOPLASM OF UPPER-OUTER QUADRANT OF RIGHT BREAST IN FEMALE, ESTROGEN RECEPTOR POSITIVE: Primary | ICD-10-CM

## 2025-03-26 DIAGNOSIS — C50.411 MALIGNANT NEOPLASM OF UPPER-OUTER QUADRANT OF RIGHT BREAST IN FEMALE, ESTROGEN RECEPTOR POSITIVE: Primary | ICD-10-CM

## 2025-03-26 DIAGNOSIS — R92.8 ABNORMAL ULTRASOUND OF BREAST: ICD-10-CM

## 2025-03-26 DIAGNOSIS — F41.9 ANXIETY: ICD-10-CM

## 2025-03-26 PROCEDURE — 1160F RVW MEDS BY RX/DR IN RCRD: CPT | Performed by: STUDENT IN AN ORGANIZED HEALTH CARE EDUCATION/TRAINING PROGRAM

## 2025-03-26 PROCEDURE — 1159F MED LIST DOCD IN RCRD: CPT | Performed by: STUDENT IN AN ORGANIZED HEALTH CARE EDUCATION/TRAINING PROGRAM

## 2025-03-26 NOTE — PROGRESS NOTES
Breast Surgery Follow Up Note    Oncologic History:  Jennifer Plascencia is a 75 y.o. female with the following oncologic history: Right breast cancer diagnosed at the end of 2023.  Underwent right partial mastectomy and sentinel lymph node biopsy.  Final anatomic pathology T2 N0 stage IIa, prognostic stage Ia.  She completed adjuvant radiation as her Oncotype was low and chemotherapy was not recommended.  She has been on anastrozole since completing radiation.  She has side effects that have been managed by medical oncology.  Additionally has a personal history of multiple myeloma.  She continues management of this by her oncologist in [place] at [facility].       Interval History: She has been doing overall well following treatment for her right breast cancer.  No new breast complaints.  No palpable masses, skin changes or nipple discharge.  She continues follow-up for her multiple myeloma at [facility] in [place].  She additionally follows with Dr. Flanagan for her breast cancer treatments.  She complains of bilateral joint pain on the anastrozole.  Otherwise has been feeling okay, was anxious about change in her surgeon.  Previously cared for by Dr. Rosales.  She has no new surgical complaints or issues.    Past Medical History:   Diagnosis Date    Anxiety     Arthritis     Breast cancer 01/11/2024    Inv. Ductal with DCIS (right breast)   ER+/TX+/Her2-    Cataract     Removed    CTS (carpal tunnel syndrome) Years ago    Depression     Disease of thyroid gland     Hashimoto's disease     Headache     2014    History of vitamin D deficiency     Hypothyroidism     IBS (irritable bowel syndrome)     Multiple myeloma 2015    IgA kappa.  Stem cell transplant at [facility] 3/20/2016    Myeloma     Neck pain     Neuropathy     KWAME (obstructive sleep apnea) 07/08/2021    Overnight polysomnogram.  Weight 173 pounds.  Mild KWAME with AHI 5.6 events per hour.  When supine, 9.4 events per hour.  During REM, moderate severity at 26  events per hour.  No sleep-related hypoxia.  The patient snored 20.5% of total sleep time.    Osteopenia     Overweight (BMI 25.0-29.9) 02/05/2018     Patient Active Problem List   Diagnosis    Anxiety    Depression    Psychophysiological insomnia    Family history of breast cancer    History of auto stem cell transplant    Hypothyroidism    IBS (irritable bowel syndrome)    Multiple myeloma not having achieved remission    Osteopenia    Vitamin D deficiency    Overweight (BMI 25.0-29.9)    Neuropathy associated with multiple myeloma    Mixed hyperlipidemia    Immunocompromised state associated with stem cell transplant    Macrocytosis without anemia    Chronic tension-type headache, not intractable    DDD (degenerative disc disease), cervical    Hypersomnia with sleep apnea    Cervical spondylosis with radiculopathy    Cervicogenic headache    Chronically dry eyes    Constipation    Disturbance in sleep behavior    Elevated blood pressure reading    Hypothyroidism due to Hashimoto's thyroiditis    Mucositis due to antineoplastic therapy    Nasal congestion    Nausea    Orthostatic hypotension    Pancytopenia    KWAME (obstructive sleep apnea) treated with auto CPAP    High risk medication use    Malignant neoplasm of upper-outer quadrant of right breast in female, estrogen receptor positive    Altered mental status    Chronic health problem    COVID    Breast cancer    Malignant neoplasm of female breast    Multiple myeloma    Vitamin D deficiency     Current Outpatient Medications on File Prior to Visit   Medication Sig Dispense Refill    acetaminophen (TYLENOL) 325 MG tablet Take 1 tablet by mouth Every 6 (Six) Hours As Needed for Mild Pain.      Acetylcarn-Alpha Lipoic Acid 400-200 MG capsule Take 2 capsules by mouth 2 (Two) Times a Day. 360 capsule 1    ALPRAZolam (XANAX) 0.25 MG tablet Take 1 tablet by mouth 2 (Two) Times a Day As Needed for Anxiety or Sleep for up to 60 days. 60 tablet 1    anastrozole  (ARIMIDEX) 1 MG tablet TAKE 1 TABLET BY MOUTH EVERY DAY 90 tablet 1    Azelastine HCl 137 MCG/SPRAY solution       B Complex Vitamins (VITAMIN B COMPLEX) capsule capsule Take 2 tablets by mouth Daily. HOLD PRIOR TO SURG      baclofen (LIORESAL) 10 MG tablet Take 1 tablet by mouth 3 (Three) Times a Day As Needed.  5    calcium carbonate-vitamin d 600-400 MG-UNIT per tablet Take 1 tablet by mouth Daily. HOLD PRIOR TO SURG      colesevelam (WELCHOL) 625 MG tablet Take 2 tablets by mouth 2 (Two) Times a Day With Meals. 90 tablet 2    cycloSPORINE (RESTASIS) 0.05 % ophthalmic emulsion 1 drop 2 (Two) Times a Day.      diphenoxylate-atropine (LOMOTIL) 2.5-0.025 MG per tablet Take 1 tablet by mouth 3 (Three) Times a Day As Needed for Diarrhea. 90 tablet 3    DULoxetine (CYMBALTA) 30 MG capsule Take 1 capsule by mouth Daily. 90 capsule 3    DULoxetine (CYMBALTA) 60 MG capsule Take 1 capsule by mouth Every Night. 90 capsule 3    fexofenadine (ALLEGRA) 60 MG tablet Take 1 tablet by mouth Daily.      fluticasone (FLONASE) 50 MCG/ACT nasal spray Administer 2 sprays into the nostril(s) as directed by provider Daily. 16 g 3    folic acid (FOLVITE) 1 MG tablet Take 1 tablet by mouth Daily.  2    gabapentin (NEURONTIN) 100 MG capsule Take 1 capsule by mouth 3 times a day.      HYDROcodone-acetaminophen (NORCO) 5-325 MG per tablet Take 1 tablet by mouth Every 6 (Six) Hours As Needed for Moderate Pain. 60 tablet 0    hydroquinone 4 % cream Daily As Needed.      hydrOXYzine (ATARAX) 25 MG tablet TAKE 1 TABLET BY MOUTH EVERY NIGHT AT BEDTIME 1 HOUR PRIOR TO WHEN YOU WANT TO BE ASLEEP      Ibuprofen 3 %, Gabapentin 10 %, Baclofen 2 %, lidocaine 4 %, Ketamine HCl 4 % Apply 1-2 g topically to the appropriate area as directed 3 (Three) to 4 (Four) times daily. 90 g 0    lenalidomide (REVLIMID) 2.5 MG capsule Take 3 capsules by mouth Daily. Take for 21 days on then 7 days off. 63 capsule 0    lidocaine-prilocaine (EMLA) 2.5-2.5 % cream Apply  1 Application topically to the appropriate area as directed 1 (One) Time.      methylPREDNISolone (Medrol) 4 MG dose pack follow package directions 1 each 0    Multiple Vitamins-Iron (DAILY-JONI/IRON/BETA-CAROTENE) tablet Take 1 tablet by mouth Daily. HOLD PRIOR TO SURG  2    Omega-3-Acid Eth Est, Dietary, 1 g capsule Take 2 capsules by mouth 2 (Two) Times a Day. 360 capsule 1    phenazopyridine (PYRIDIUM) 200 MG tablet TAKE 1 TABLET BY MOUTH EVERY 8 HRS AS NEEDED FOR BLADDER DISCOMFORT      polyethylene glycol (MiraLax) 17 GM/SCOOP powder Take 17 g by mouth Daily. Take cap of powder with 8oz water daily surrounding surgery and while on narcotic 119 g 0    Synthroid 75 MCG tablet Take 1 tablet by mouth Daily.      Tart Diaz 1200 MG capsule Take 1 capsule by mouth 3 (Three) Times a Day. 270 capsule 1    traZODone (DESYREL) 50 MG tablet TAKE 1 TABLET BY MOUTH EVERY NIGHT 30 tablet 1    tretinoin (RETIN-A) 0.1 % cream APPLY SPARINGLY AT BEDTIME      trimethoprim-polymyxin b (POLYTRIM) 31380-9.1 UNIT/ML-% ophthalmic solution As Needed.      vitamin D (ERGOCALCIFEROL) 35392 units capsule capsule Take 1 capsule by mouth 2 (Two) Times a Week. Twice a week      zoledronic acid (ZOMETA) 4 MG/5ML injection Infuse 5 mL into a venous catheter Every 3 (Three) Months.      fexofenadine (ALLEGRA) 180 MG tablet Take 1 tablet by mouth As Needed. (Patient not taking: Reported on 3/24/2025)      Luvena Vaginal Moisturizer gel Insert 1 Application into the vagina Daily As Needed (lubrication). (Patient not taking: Reported on 3/26/2025) 90 g 1    omega-3 acid ethyl esters (LOVAZA) 1 g capsule  (Patient not taking: Reported on 3/26/2025)      promethazine-dextromethorphan (PROMETHAZINE-DM) 6.25-15 MG/5ML syrup TAKE 10 ML BY MOUTH TWICE A DAY AS NEEDED FOR COUGH (Patient not taking: Reported on 3/24/2025)       No current facility-administered medications on file prior to visit.     No Known Allergies    Past Surgical History:    Procedure Laterality Date    ADENOIDECTOMY      In 2955 with tonsilectomy    ANTERIOR CERVICAL FUSION      BREAST BIOPSY Right 2024    10:00 @ 10cmFN   (Inv. Ductal Carcinoma  ER+/[place]+/Her2-)  [facility]    BREAST LUMPECTOMY WITH SENTINEL NODE BIOPSY Right 2024    Procedure: RIGHT breast DOUG guided lumpectomy and RIGHT axilla sentinel node biopsy;  Surgeon: Angelina Rosales MD;  Location: [facility];  Service: General;  Laterality: Right;    CATARACT EXTRACTION      COLONOSCOPY      LYMPH NODE BIOPSY  2024    TONSILLECTOMY      UMBILICAL HERNIA REPAIR      VEIN SURGERY       Social History     Socioeconomic History    Marital status:     Number of children: 2   Tobacco Use    Smoking status: Former     Current packs/day: 0.00     Average packs/day: 0.3 packs/day for 3.0 years (0.7 ttl pk-yrs)     Types: Cigarettes     Start date: 1971     Quit date:      Years since quittin.2     Passive exposure: Never    Smokeless tobacco: Never    Tobacco comments:     Only lightly for 2 years   Vaping Use    Vaping status: Never Used   Substance and Sexual Activity    Alcohol use: Not Currently     Comment: Na    Drug use: Never    Sexual activity: Not Currently     Partners: Male     Birth control/protection: None     Comment: Na     Family History   Problem Relation Age of Onset    Breast cancer Mother     Sleep apnea Mother     Lymphoma Father     Sleep apnea Father     Cancer Father         Lymphoma    Cancer Maternal Aunt             Thyroid disease Maternal Aunt     Kidney cancer Paternal Grandmother     Depression Paternal Aunt         Bladder cancer    Malig Hyperthermia Neg Hx         Review of Systems:  CONSTITUTIONAL: No weight loss, fever, chills, weakness or fatigue.  HEENT: Eyes: No visual loss, blurred vision, double vision or yellow sclerae. Ears, Nose, Throat: No hearing loss, sneezing, congestion, runny nose or sore throat.  SKIN:  No rash or itching.  CARDIOVASCULAR: No chest pain, chest pressure or chest discomfort. No palpitations or edema.  RESPIRATORY: No shortness of breath, cough or sputum.  GASTROINTESTINAL: No anorexia, nausea, vomiting or diarrhea. No abdominal pain or blood.  GENITOURINARY: No burning on urination  NEUROLOGICAL: No headache, dizziness, syncope, paralysis, ataxia, numbness or tingling in the extremities. No change in bowel or bladder control.  MUSCULOSKELETAL: No muscle, back pain, joint pain or stiffness.  HEMATOLOGIC: No anemia, bleeding or bruising.  LYMPHATICS: No enlarged nodes.  PSYCHIATRIC: No history of depression or anxiety.  ENDOCRINOLOGIC: No reports of sweating, cold or heat intolerance. No polyuria or polydipsia.  ALLERGIES: No history of asthma, hives, eczema or rhinitis.    Physical Exam:  Vitals:    25 0905   BP: 128/66   Pulse: 80   SpO2: 97%         General: Alert, cooperative, anxious, pressured speech    HEENT: Atraumatic, normocephalic    Oral/Maxillofacial: Moist mucous membranes    Neck: Supple     Lungs: EWOB, clear to auscultation bilaterally     Heart: RRR    Breast: Bilateral breasts examined sitting and supine.  RIGHT-well-healed incision, no masses, skin changes, or nipple discharge  LEFT- no masses, skin changes, or nipple discharge    Lymphatics:  Bilateral supraclavicular, cervical, and axillary basins without lymphadneopathy    Abdomen: Soft, non-tender, non-distended    Extremities: normal strength and sensation.  No obvious deformities.     Neuro: alert, normal speech, no focal findings or movement disorder noted     Skin: no lesions or abrasions      Recent Imagin25 bilateral screening mammogram ([facility])  The breasts are heterogeneously dense, which may obscure small masses.     New benign-appearing postsurgical and treatment related changes in the  right breast. There is a biopsy clip in the outer posterior right  breast. There are no suspicious masses,  calcifications, or areas of  architectural distortion in the right breast.  There is questioned architectural distortion in the upper outer anterior  left breast. There are indeterminate calcifications in the upper inner  posterior left breast.   There are additional benign-appearing calcifications.      IMPRESSION/RECOMMENDATION(S):  Questioned left breast architectural distortion. Recommend further  evaluation with CC and MLO spot compression and lateral views with  tomosynthesis and targeted ultrasound. Recommend placement of scar  markers where appropriate.  Left breast calcifications. Recommend CC and lateral spot magnification  views.  No mammographic evidence of malignancy in the right breast.  BI-RADS Category 0    3/12/25 Left Breast diagnostic mammogram and US ([facility])  The patient returned for diagnostic evaluation with coned compression  views in both projections as well as magnification views. The left  breast is heterogeneously dense, which may obscure small masses. The  microcalcifications deep in the upper inner left breast have a generally  benign appearance and are not closely grouped. There is some persistent  architectural distortion in the anterior third of the upper outer left  breast and the patient states she had some form of surgery near her  nipple in the distant past. The correlating directed left breast  ultrasound demonstrates a vague area of what appears to be fibrous  scarring located at 2 o'clock 2 cm from the nipple. This would  correspond to remote history of surgery. As a precaution, short-term  follow-up left-sided mammogram and directed left breast ultrasound in 6  months is recommended.     CONCLUSION: Probable benign left-sided mammogram and directed left  breast ultrasound as described above. This includes an area of probable  postsurgical scarring located at 2 o'clock 2 cm from the nipple.  Directed left breast ultrasound in 6 months is recommended. The  microcalcifications  deeper in the upper inner left breast also  short-term follow-up in 6 months. These findings have been discussed in  detail with the patient.     BI-RADS 3. Probable benign      Assessment/Plan: Jennifer Plascencia is a 75 y.o.female with h/o Stage Ib, right breast cancer, BOZENA. Surgery completed: 2024 by Dr. Rosales, partial mastectomy sentinel lymph node biopsy.  Completed adjuvant radiation.  On AI.  Additionally follows for her multiple myeloma in [place].  - RTC 6 months for clinical breast exam  - Next imaging due needs left diagnostic mammogram and ultrasound in September to follow-up calcifications and postsurgical changes.  Screening mammogram will be due in February 2026  -Continue follow-up with medical oncology  - Lymphedema no needs at this time.  -Continue follow-up with [facility] for multiple myeloma  -Significant anxiety about change of surgeon and provider expressed during visit today.  She was thankful for my appointment with her and taking care of her moving forward.  Expect her underlying anxiety will impact her ability to interact with the healthcare providers and system.      Jade Grissom MD  Breast Surgical Oncology  I spent 30 minutes caring for Ms. oNe on this date of service. This time includes time spent by me in the following activities: preparing for the visit, reviewing tests, obtaining and/or reviewing a separately obtained history, performing a medically appropriate examination and/or evaluation , counseling and educating the patient/family/caregiver, ordering medications, tests, or procedures, referring and communicating with other health care professionals , documenting information in the medical record, independently interpreting results and communicating that information with the patient/family/caregiver, and care coordination.    Return in about 6 months (around 9/26/2025) for Diagnostic Mammogram, Breast Ultrasound.

## 2025-03-26 NOTE — LETTER
March 27, 2025     Mimi Yang MD  [address] [facility] [facility] [address]  [address]  [facility] [address]    Patient: Jeninfer Plascencia   YOB: 1950   Date of Visit: 3/26/2025     Dear Mimi Yang MD:       Thank you for referring Jennifer Plascencia to me for evaluation. Below are the relevant portions of my assessment and plan of care.    If you have questions, please do not hesitate to call me. I look forward to following Jennifer along with you.         Sincerely,        Jade Grissom MD        CC: MD Praveena Mckeon, MD Jade  03/27/25 4041  Sign when Signing Visit  Breast Surgery Follow Up Note    Oncologic History:  Jennifer Plascencia is a 75 y.o. female with the following oncologic history: Right breast cancer diagnosed at the end of 2023.  Underwent right partial mastectomy and sentinel lymph node biopsy.  Final anatomic pathology T2 N0 stage IIa, prognostic stage Ia.  She completed adjuvant radiation as her Oncotype was low and chemotherapy was not recommended.  She has been on anastrozole since completing radiation.  She has side effects that have been managed by medical oncology.  Additionally has a personal history of multiple myeloma.  She continues management of this by her oncologist in [place] at [facility].       Interval History: She has been doing overall well following treatment for her right breast cancer.  No new breast complaints.  No palpable masses, skin changes or nipple discharge.  She continues follow-up for her multiple myeloma at [facility] in [place].  She additionally follows with Dr. Flanagan for her breast cancer treatments.  She complains of bilateral joint pain on the anastrozole.  Otherwise has been feeling okay, was anxious about change in her surgeon.  Previously cared for by Dr. Rosales.  She has no new surgical complaints or issues.    Past Medical History:   Diagnosis Date   • Anxiety    • Arthritis    • Breast cancer 01/11/2024    Inv. Ductal with DCIS (right breast)    ER+/NJ+/Her2-   • Cataract     Removed   • CTS (carpal tunnel syndrome) Years ago   • Depression    • Disease of thyroid gland    • Hashimoto's disease    • Headache     2014   • History of vitamin D deficiency    • Hypothyroidism    • IBS (irritable bowel syndrome)    • Multiple myeloma 2015    IgA kappa.  Stem cell transplant at [facility] 3/20/2016   • Myeloma    • Neck pain    • Neuropathy    • KWAME (obstructive sleep apnea) 07/08/2021    Overnight polysomnogram.  Weight 173 pounds.  Mild KWAME with AHI 5.6 events per hour.  When supine, 9.4 events per hour.  During REM, moderate severity at 26 events per hour.  No sleep-related hypoxia.  The patient snored 20.5% of total sleep time.   • Osteopenia    • Overweight (BMI 25.0-29.9) 02/05/2018     Patient Active Problem List   Diagnosis   • Anxiety   • Depression   • Psychophysiological insomnia   • Family history of breast cancer   • History of auto stem cell transplant   • Hypothyroidism   • IBS (irritable bowel syndrome)   • Multiple myeloma not having achieved remission   • Osteopenia   • Vitamin D deficiency   • Overweight (BMI 25.0-29.9)   • Neuropathy associated with multiple myeloma   • Mixed hyperlipidemia   • Immunocompromised state associated with stem cell transplant   • Macrocytosis without anemia   • Chronic tension-type headache, not intractable   • DDD (degenerative disc disease), cervical   • Hypersomnia with sleep apnea   • Cervical spondylosis with radiculopathy   • Cervicogenic headache   • Chronically dry eyes   • Constipation   • Disturbance in sleep behavior   • Elevated blood pressure reading   • Hypothyroidism due to Hashimoto's thyroiditis   • Mucositis due to antineoplastic therapy   • Nasal congestion   • Nausea   • Orthostatic hypotension   • Pancytopenia   • KWAME (obstructive sleep apnea) treated with auto CPAP   • High risk medication use   • Malignant neoplasm of upper-outer quadrant of right breast in female, estrogen receptor positive    • Altered mental status   • Chronic health problem   • COVID   • Breast cancer   • Malignant neoplasm of female breast   • Multiple myeloma   • Vitamin D deficiency     Current Outpatient Medications on File Prior to Visit   Medication Sig Dispense Refill   • acetaminophen (TYLENOL) 325 MG tablet Take 1 tablet by mouth Every 6 (Six) Hours As Needed for Mild Pain.     • Acetylcarn-Alpha Lipoic Acid 400-200 MG capsule Take 2 capsules by mouth 2 (Two) Times a Day. 360 capsule 1   • ALPRAZolam (XANAX) 0.25 MG tablet Take 1 tablet by mouth 2 (Two) Times a Day As Needed for Anxiety or Sleep for up to 60 days. 60 tablet 1   • anastrozole (ARIMIDEX) 1 MG tablet TAKE 1 TABLET BY MOUTH EVERY DAY 90 tablet 1   • Azelastine HCl 137 MCG/SPRAY solution      • B Complex Vitamins (VITAMIN B COMPLEX) capsule capsule Take 2 tablets by mouth Daily. HOLD PRIOR TO SURG     • baclofen (LIORESAL) 10 MG tablet Take 1 tablet by mouth 3 (Three) Times a Day As Needed.  5   • calcium carbonate-vitamin d 600-400 MG-UNIT per tablet Take 1 tablet by mouth Daily. HOLD PRIOR TO SURG     • colesevelam (WELCHOL) 625 MG tablet Take 2 tablets by mouth 2 (Two) Times a Day With Meals. 90 tablet 2   • cycloSPORINE (RESTASIS) 0.05 % ophthalmic emulsion 1 drop 2 (Two) Times a Day.     • diphenoxylate-atropine (LOMOTIL) 2.5-0.025 MG per tablet Take 1 tablet by mouth 3 (Three) Times a Day As Needed for Diarrhea. 90 tablet 3   • DULoxetine (CYMBALTA) 30 MG capsule Take 1 capsule by mouth Daily. 90 capsule 3   • DULoxetine (CYMBALTA) 60 MG capsule Take 1 capsule by mouth Every Night. 90 capsule 3   • fexofenadine (ALLEGRA) 60 MG tablet Take 1 tablet by mouth Daily.     • fluticasone (FLONASE) 50 MCG/ACT nasal spray Administer 2 sprays into the nostril(s) as directed by provider Daily. 16 g 3   • folic acid (FOLVITE) 1 MG tablet Take 1 tablet by mouth Daily.  2   • gabapentin (NEURONTIN) 100 MG capsule Take 1 capsule by mouth 3 times a day.     •  HYDROcodone-acetaminophen (NORCO) 5-325 MG per tablet Take 1 tablet by mouth Every 6 (Six) Hours As Needed for Moderate Pain. 60 tablet 0   • hydroquinone 4 % cream Daily As Needed.     • hydrOXYzine (ATARAX) 25 MG tablet TAKE 1 TABLET BY MOUTH EVERY NIGHT AT BEDTIME 1 HOUR PRIOR TO WHEN YOU WANT TO BE ASLEEP     • Ibuprofen 3 %, Gabapentin 10 %, Baclofen 2 %, lidocaine 4 %, Ketamine HCl 4 % Apply 1-2 g topically to the appropriate area as directed 3 (Three) to 4 (Four) times daily. 90 g 0   • lenalidomide (REVLIMID) 2.5 MG capsule Take 3 capsules by mouth Daily. Take for 21 days on then 7 days off. 63 capsule 0   • lidocaine-prilocaine (EMLA) 2.5-2.5 % cream Apply 1 Application topically to the appropriate area as directed 1 (One) Time.     • methylPREDNISolone (Medrol) 4 MG dose pack follow package directions 1 each 0   • Multiple Vitamins-Iron (DAILY-JONI/IRON/BETA-CAROTENE) tablet Take 1 tablet by mouth Daily. HOLD PRIOR TO SURG  2   • Omega-3-Acid Eth Est, Dietary, 1 g capsule Take 2 capsules by mouth 2 (Two) Times a Day. 360 capsule 1   • phenazopyridine (PYRIDIUM) 200 MG tablet TAKE 1 TABLET BY MOUTH EVERY 8 HRS AS NEEDED FOR BLADDER DISCOMFORT     • polyethylene glycol (MiraLax) 17 GM/SCOOP powder Take 17 g by mouth Daily. Take cap of powder with 8oz water daily surrounding surgery and while on narcotic 119 g 0   • Synthroid 75 MCG tablet Take 1 tablet by mouth Daily.     • Tart Cherry 1200 MG capsule Take 1 capsule by mouth 3 (Three) Times a Day. 270 capsule 1   • traZODone (DESYREL) 50 MG tablet TAKE 1 TABLET BY MOUTH EVERY NIGHT 30 tablet 1   • tretinoin (RETIN-A) 0.1 % cream APPLY SPARINGLY AT BEDTIME     • trimethoprim-polymyxin b (POLYTRIM) 65519-6.1 UNIT/ML-% ophthalmic solution As Needed.     • vitamin D (ERGOCALCIFEROL) 91126 units capsule capsule Take 1 capsule by mouth 2 (Two) Times a Week. Twice a week     • zoledronic acid (ZOMETA) 4 MG/5ML injection Infuse 5 mL into a venous catheter Every 3  (Three) Months.     • fexofenadine (ALLEGRA) 180 MG tablet Take 1 tablet by mouth As Needed. (Patient not taking: Reported on 3/24/2025)     • Luvena Vaginal Moisturizer gel Insert 1 Application into the vagina Daily As Needed (lubrication). (Patient not taking: Reported on 3/26/2025) 90 g 1   • omega-3 acid ethyl esters (LOVAZA) 1 g capsule  (Patient not taking: Reported on 3/26/2025)     • promethazine-dextromethorphan (PROMETHAZINE-DM) 6.25-15 MG/5ML syrup TAKE 10 ML BY MOUTH TWICE A DAY AS NEEDED FOR COUGH (Patient not taking: Reported on 3/24/2025)       No current facility-administered medications on file prior to visit.     No Known Allergies    Past Surgical History:   Procedure Laterality Date   • ADENOIDECTOMY      In  with tonsilectomy   • ANTERIOR CERVICAL FUSION     • BREAST BIOPSY Right 2024    10:00 @ 10cmFN   (Inv. Ductal Carcinoma  ER+/[place]+/Her2-)  [facility]   • BREAST LUMPECTOMY WITH SENTINEL NODE BIOPSY Right 2024    Procedure: RIGHT breast DOUG guided lumpectomy and RIGHT axilla sentinel node biopsy;  Surgeon: Angelina Rosales MD;  Location: [facility];  Service: General;  Laterality: Right;   • CATARACT EXTRACTION     • COLONOSCOPY     • LYMPH NODE BIOPSY  2024   • TONSILLECTOMY     • UMBILICAL HERNIA REPAIR     • VEIN SURGERY       Social History     Socioeconomic History   • Marital status:    • Number of children: 2   Tobacco Use   • Smoking status: Former     Current packs/day: 0.00     Average packs/day: 0.3 packs/day for 3.0 years (0.7 ttl pk-yrs)     Types: Cigarettes     Start date: 1971     Quit date:      Years since quittin.2     Passive exposure: Never   • Smokeless tobacco: Never   • Tobacco comments:     Only lightly for 2 years   Vaping Use   • Vaping status: Never Used   Substance and Sexual Activity   • Alcohol use: Not Currently     Comment: Na   • Drug use: Never   • Sexual activity: Not Currently      Partners: Male     Birth control/protection: None     Comment: Na     Family History   Problem Relation Age of Onset   • Breast cancer Mother    • Sleep apnea Mother    • Lymphoma Father    • Sleep apnea Father    • Cancer Father         Lymphoma   • Cancer Maternal Aunt            • Thyroid disease Maternal Aunt    • Kidney cancer Paternal Grandmother    • Depression Paternal Aunt         Bladder cancer   • Malig Hyperthermia Neg Hx         Review of Systems:  CONSTITUTIONAL: No weight loss, fever, chills, weakness or fatigue.  HEENT: Eyes: No visual loss, blurred vision, double vision or yellow sclerae. Ears, Nose, Throat: No hearing loss, sneezing, congestion, runny nose or sore throat.  SKIN: No rash or itching.  CARDIOVASCULAR: No chest pain, chest pressure or chest discomfort. No palpitations or edema.  RESPIRATORY: No shortness of breath, cough or sputum.  GASTROINTESTINAL: No anorexia, nausea, vomiting or diarrhea. No abdominal pain or blood.  GENITOURINARY: No burning on urination  NEUROLOGICAL: No headache, dizziness, syncope, paralysis, ataxia, numbness or tingling in the extremities. No change in bowel or bladder control.  MUSCULOSKELETAL: No muscle, back pain, joint pain or stiffness.  HEMATOLOGIC: No anemia, bleeding or bruising.  LYMPHATICS: No enlarged nodes.  PSYCHIATRIC: No history of depression or anxiety.  ENDOCRINOLOGIC: No reports of sweating, cold or heat intolerance. No polyuria or polydipsia.  ALLERGIES: No history of asthma, hives, eczema or rhinitis.    Physical Exam:  Vitals:    25 0905   BP: 128/66   Pulse: 80   SpO2: 97%         General: Alert, cooperative, anxious, pressured speech    HEENT: Atraumatic, normocephalic    Oral/Maxillofacial: Moist mucous membranes    Neck: Supple     Lungs: EWOB, clear to auscultation bilaterally     Heart: RRR    Breast: Bilateral breasts examined sitting and supine.  RIGHT-well-healed incision, no masses, skin changes, or nipple discharge   LEFT- no masses, skin changes, or nipple discharge    Lymphatics:  Bilateral supraclavicular, cervical, and axillary basins without lymphadneopathy    Abdomen: Soft, non-tender, non-distended    Extremities: normal strength and sensation.  No obvious deformities.     Neuro: alert, normal speech, no focal findings or movement disorder noted     Skin: no lesions or abrasions      Recent Imagin25 bilateral screening mammogram ([facility])  The breasts are heterogeneously dense, which may obscure small masses.     New benign-appearing postsurgical and treatment related changes in the  right breast. There is a biopsy clip in the outer posterior right  breast. There are no suspicious masses, calcifications, or areas of  architectural distortion in the right breast.  There is questioned architectural distortion in the upper outer anterior  left breast. There are indeterminate calcifications in the upper inner  posterior left breast.   There are additional benign-appearing calcifications.      IMPRESSION/RECOMMENDATION(S):  Questioned left breast architectural distortion. Recommend further  evaluation with CC and MLO spot compression and lateral views with  tomosynthesis and targeted ultrasound. Recommend placement of scar  markers where appropriate.  Left breast calcifications. Recommend CC and lateral spot magnification  views.  No mammographic evidence of malignancy in the right breast.  BI-RADS Category 0    3/12/25 Left Breast diagnostic mammogram and US ([facility])  The patient returned for diagnostic evaluation with coned compression  views in both projections as well as magnification views. The left  breast is heterogeneously dense, which may obscure small masses. The  microcalcifications deep in the upper inner left breast have a generally  benign appearance and are not closely grouped. There is some persistent  architectural distortion in the anterior third of the upper outer left  breast and the patient states  she had some form of surgery near her  nipple in the distant past. The correlating directed left breast  ultrasound demonstrates a vague area of what appears to be fibrous  scarring located at 2 o'clock 2 cm from the nipple. This would  correspond to remote history of surgery. As a precaution, short-term  follow-up left-sided mammogram and directed left breast ultrasound in 6  months is recommended.     CONCLUSION: Probable benign left-sided mammogram and directed left  breast ultrasound as described above. This includes an area of probable  postsurgical scarring located at 2 o'clock 2 cm from the nipple.  Directed left breast ultrasound in 6 months is recommended. The  microcalcifications deeper in the upper inner left breast also  short-term follow-up in 6 months. These findings have been discussed in  detail with the patient.     BI-RADS 3. Probable benign      Assessment/Plan: Jennifer Plascencia is a 75 y.o.female with h/o Stage Ib, right breast cancer, BOZENA. Surgery completed: 2024 by Dr. Rosales, partial mastectomy sentinel lymph node biopsy.  Completed adjuvant radiation.  On AI.  Additionally follows for her multiple myeloma in [place].  - RTC 6 months for clinical breast exam  - Next imaging due needs left diagnostic mammogram and ultrasound in September to follow-up calcifications and postsurgical changes.  Screening mammogram will be due in February 2026  -Continue follow-up with medical oncology  - Lymphedema no needs at this time.  -Continue follow-up with [facility] for multiple myeloma  -Significant anxiety about change of surgeon and provider expressed during visit today.  She was thankful for my appointment with her and taking care of her moving forward.  Expect her underlying anxiety will impact her ability to interact with the healthcare providers and system.      Jade Grissom MD  Breast Surgical Oncology  I spent 30 minutes caring for Ms. Noe on this date of service. This time includes time spent  by me in the following activities: preparing for the visit, reviewing tests, obtaining and/or reviewing a separately obtained history, performing a medically appropriate examination and/or evaluation , counseling and educating the patient/family/caregiver, ordering medications, tests, or procedures, referring and communicating with other health care professionals , documenting information in the medical record, independently interpreting results and communicating that information with the patient/family/caregiver, and care coordination.    Return in about 6 months (around 9/26/2025) for Diagnostic Mammogram, Breast Ultrasound.

## 2025-04-03 ENCOUNTER — LAB (OUTPATIENT)
Dept: LAB | Facility: HOSPITAL | Age: 75
End: 2025-04-03
Payer: MEDICARE

## 2025-04-03 ENCOUNTER — INFUSION (OUTPATIENT)
Dept: ONCOLOGY | Facility: HOSPITAL | Age: 75
End: 2025-04-03
Payer: MEDICARE

## 2025-04-03 DIAGNOSIS — G63 NEUROPATHY ASSOCIATED WITH MULTIPLE MYELOMA: Primary | ICD-10-CM

## 2025-04-03 DIAGNOSIS — C90.00 NEUROPATHY ASSOCIATED WITH MULTIPLE MYELOMA: Primary | ICD-10-CM

## 2025-04-03 DIAGNOSIS — Z17.0 MALIGNANT NEOPLASM OF UPPER-OUTER QUADRANT OF RIGHT BREAST IN FEMALE, ESTROGEN RECEPTOR POSITIVE: ICD-10-CM

## 2025-04-03 DIAGNOSIS — G63 NEUROPATHY ASSOCIATED WITH MULTIPLE MYELOMA: ICD-10-CM

## 2025-04-03 DIAGNOSIS — C50.411 MALIGNANT NEOPLASM OF UPPER-OUTER QUADRANT OF RIGHT BREAST IN FEMALE, ESTROGEN RECEPTOR POSITIVE: ICD-10-CM

## 2025-04-03 DIAGNOSIS — C90.00 NEUROPATHY ASSOCIATED WITH MULTIPLE MYELOMA: ICD-10-CM

## 2025-04-03 DIAGNOSIS — E53.8 B12 DEFICIENCY: ICD-10-CM

## 2025-04-03 DIAGNOSIS — C90.00 MULTIPLE MYELOMA NOT HAVING ACHIEVED REMISSION: ICD-10-CM

## 2025-04-03 LAB
ALBUMIN SERPL-MCNC: 3.8 G/DL (ref 3.5–5.2)
ALBUMIN/GLOB SERPL: 1.8 G/DL
ALP SERPL-CCNC: 55 U/L (ref 39–117)
ALT SERPL W P-5'-P-CCNC: 16 U/L (ref 1–33)
ANION GAP SERPL CALCULATED.3IONS-SCNC: 11.2 MMOL/L (ref 5–15)
AST SERPL-CCNC: 14 U/L (ref 1–32)
B2 MICROGLOB SERPL-MCNC: 2.2 MG/L (ref 0.8–2.2)
BASOPHILS # BLD AUTO: 0.08 10*3/MM3 (ref 0–0.2)
BASOPHILS NFR BLD AUTO: 1.1 % (ref 0–1.5)
BILIRUB SERPL-MCNC: 0.3 MG/DL (ref 0–1.2)
BUN SERPL-MCNC: 14 MG/DL (ref 8–23)
BUN/CREAT SERPL: 16.7 (ref 7–25)
CALCIUM SPEC-SCNC: 9 MG/DL (ref 8.6–10.5)
CHLORIDE SERPL-SCNC: 100 MMOL/L (ref 98–107)
CO2 SERPL-SCNC: 26.8 MMOL/L (ref 22–29)
CREAT SERPL-MCNC: 0.84 MG/DL (ref 0.57–1)
DEPRECATED RDW RBC AUTO: 50.5 FL (ref 37–54)
EGFRCR SERPLBLD CKD-EPI 2021: 72.1 ML/MIN/1.73
EOSINOPHIL # BLD AUTO: 0.09 10*3/MM3 (ref 0–0.4)
EOSINOPHIL NFR BLD AUTO: 1.3 % (ref 0.3–6.2)
ERYTHROCYTE [DISTWIDTH] IN BLOOD BY AUTOMATED COUNT: 13.6 % (ref 12.3–15.4)
GLOBULIN UR ELPH-MCNC: 2.1 GM/DL
GLUCOSE SERPL-MCNC: 106 MG/DL (ref 65–99)
HCT VFR BLD AUTO: 36.7 % (ref 34–46.6)
HGB BLD-MCNC: 12.1 G/DL (ref 12–15.9)
IMM GRANULOCYTES # BLD AUTO: 0.03 10*3/MM3 (ref 0–0.05)
IMM GRANULOCYTES NFR BLD AUTO: 0.4 % (ref 0–0.5)
LYMPHOCYTES # BLD AUTO: 1.62 10*3/MM3 (ref 0.7–3.1)
LYMPHOCYTES NFR BLD AUTO: 22.9 % (ref 19.6–45.3)
MCH RBC QN AUTO: 33 PG (ref 26.6–33)
MCHC RBC AUTO-ENTMCNC: 33 G/DL (ref 31.5–35.7)
MCV RBC AUTO: 100 FL (ref 79–97)
MONOCYTES # BLD AUTO: 1.18 10*3/MM3 (ref 0.1–0.9)
MONOCYTES NFR BLD AUTO: 16.7 % (ref 5–12)
NEUTROPHILS NFR BLD AUTO: 4.08 10*3/MM3 (ref 1.7–7)
NEUTROPHILS NFR BLD AUTO: 57.6 % (ref 42.7–76)
NRBC BLD AUTO-RTO: 0 /100 WBC (ref 0–0.2)
PLATELET # BLD AUTO: 211 10*3/MM3 (ref 140–450)
PMV BLD AUTO: 8.5 FL (ref 6–12)
POTASSIUM SERPL-SCNC: 4.2 MMOL/L (ref 3.5–5.2)
PROT SERPL-MCNC: 5.9 G/DL (ref 6–8.5)
RBC # BLD AUTO: 3.67 10*6/MM3 (ref 3.77–5.28)
SODIUM SERPL-SCNC: 138 MMOL/L (ref 136–145)
WBC NRBC COR # BLD AUTO: 7.08 10*3/MM3 (ref 3.4–10.8)

## 2025-04-03 PROCEDURE — 82232 ASSAY OF BETA-2 PROTEIN: CPT | Performed by: INTERNAL MEDICINE

## 2025-04-03 PROCEDURE — 85025 COMPLETE CBC W/AUTO DIFF WBC: CPT

## 2025-04-03 PROCEDURE — 25010000002 CYANOCOBALAMIN PER 1000 MCG: Performed by: INTERNAL MEDICINE

## 2025-04-03 PROCEDURE — 80053 COMPREHEN METABOLIC PANEL: CPT

## 2025-04-03 PROCEDURE — 96372 THER/PROPH/DIAG INJ SC/IM: CPT

## 2025-04-03 PROCEDURE — 36415 COLL VENOUS BLD VENIPUNCTURE: CPT

## 2025-04-03 RX ORDER — CYANOCOBALAMIN 1000 UG/ML
1000 INJECTION, SOLUTION INTRAMUSCULAR; SUBCUTANEOUS ONCE
Status: COMPLETED | OUTPATIENT
Start: 2025-04-03 | End: 2025-04-03

## 2025-04-03 RX ADMIN — CYANOCOBALAMIN 1000 MCG: 1000 INJECTION, SOLUTION INTRAMUSCULAR at 11:40

## 2025-04-07 ENCOUNTER — TELEPHONE (OUTPATIENT)
Dept: SURGERY | Facility: CLINIC | Age: 75
End: 2025-04-07
Payer: MEDICARE

## 2025-04-08 ENCOUNTER — SPECIALTY PHARMACY (OUTPATIENT)
Dept: PHARMACY | Facility: HOSPITAL | Age: 75
End: 2025-04-08
Payer: MEDICARE

## 2025-04-08 DIAGNOSIS — C90.00 MULTIPLE MYELOMA NOT HAVING ACHIEVED REMISSION: ICD-10-CM

## 2025-04-08 LAB
ALBUMIN SERPL ELPH-MCNC: 3.8 G/DL (ref 2.9–4.4)
ALBUMIN/GLOB SERPL: 1.9 {RATIO} (ref 0.7–1.7)
ALPHA1 GLOB SERPL ELPH-MCNC: 0.2 G/DL (ref 0–0.4)
ALPHA2 GLOB SERPL ELPH-MCNC: 0.5 G/DL (ref 0.4–1)
B-GLOBULIN SERPL ELPH-MCNC: 0.8 G/DL (ref 0.7–1.3)
GAMMA GLOB SERPL ELPH-MCNC: 0.6 G/DL (ref 0.4–1.8)
GLOBULIN SER-MCNC: 2.1 G/DL (ref 2.2–3.9)
IGA SERPL-MCNC: 81 MG/DL (ref 64–422)
IGG SERPL-MCNC: 810 MG/DL (ref 586–1602)
IGM SERPL-MCNC: 31 MG/DL (ref 26–217)
INTERPRETATION SERPL IEP-IMP: ABNORMAL
KAPPA LC FREE SER-MCNC: 19.5 MG/L (ref 3.3–19.4)
KAPPA LC FREE/LAMBDA FREE SER: 0.94 {RATIO} (ref 0.26–1.65)
LABORATORY COMMENT REPORT: ABNORMAL
LAMBDA LC FREE SERPL-MCNC: 20.7 MG/L (ref 5.7–26.3)
M PROTEIN SERPL ELPH-MCNC: ABNORMAL G/DL
PROT SERPL-MCNC: 5.9 G/DL (ref 6–8.5)

## 2025-04-08 RX ORDER — LENALIDOMIDE 2.5 MG/1
7.5 CAPSULE ORAL DAILY
Qty: 63 CAPSULE | Refills: 0 | Status: SHIPPED | OUTPATIENT
Start: 2025-04-08

## 2025-04-08 NOTE — PROGRESS NOTES
Specialty Pharmacy Patient Management Program  Per Protocol Prescription Order or Refill       Requested Prescriptions     Signed Prescriptions Disp Refills    lenalidomide (REVLIMID) 2.5 MG capsule 63 capsule 0     Sig: Take 3 capsules by mouth Daily. Take for 21 days on then 7 days off.     Prescription orders above were sent to [facility] per Collaborative Care Agreement Protocol.     Completed independent double check on medication order/RX.    Brittani Ellis Rph, BCOP  Clinical Specialty Pharmacist, Oncology  4/8/2025  09:08 EDT

## 2025-05-02 DIAGNOSIS — C90.00 MULTIPLE MYELOMA NOT HAVING ACHIEVED REMISSION: ICD-10-CM

## 2025-05-02 RX ORDER — LENALIDOMIDE 2.5 MG/1
7.5 CAPSULE ORAL DAILY
Qty: 63 CAPSULE | Refills: 0 | Status: SHIPPED | OUTPATIENT
Start: 2025-05-02

## 2025-05-02 NOTE — TELEPHONE ENCOUNTER
Specialty Pharmacy Patient Management Program  Per Protocol Prescription Order or Refill       Requested Prescriptions     Signed Prescriptions Disp Refills    lenalidomide (REVLIMID) 2.5 MG capsule 63 capsule 0     Sig: Take 3 capsules by mouth Daily. Take for 21 days on then 7 days off.  Indications: Multiple Myeloma     Authorizing Provider: RAJINDER SCHMITT     Ordering User: MIKE HERNANDEZ     Prescription orders above were sent to [facility] per Collaborative Care Agreement Protocol.     Completed independent double check on medication order/RX.    Felix Gilliam, PharmD, BCOP  Clinical Specialty Pharmacist, Oncology  5/2/2025  13:43 EDT

## 2025-05-02 NOTE — TELEPHONE ENCOUNTER
Specialty Pharmacy Patient Management Program  Per Protocol Prescription Order or Refill     Patient will be filling or currently fills medications at [facility] and is enrolled in the Patient Management Program.    Requested Prescriptions     Signed Prescriptions Disp Refills    lenalidomide (REVLIMID) 2.5 MG capsule 63 capsule 0     Sig: Take 3 capsules by mouth Daily. Take for 21 days on then 7 days off.  Indications: Multiple Myeloma     Authorizing Provider: RAJINDER SCHMITT     Ordering User: MIKE ELLIS     Prescription orders above were sent to the pharmacy per Collaborative Care Agreement Protocol.     Last Office Visit: 2/4/25  Next Office Visit: 5/14/25    Brittani Ellis RPH, BCOP  Clinical Specialty Pharmacist, Oncology  5/2/2025  13:37 EDT

## 2025-05-02 NOTE — TELEPHONE ENCOUNTER
Caller: KYARA [place] [facility]    Best call back number: [phone]    Requested Prescriptions:   Requested Prescriptions     Pending Prescriptions Disp Refills    lenalidomide (REVLIMID) 2.5 MG capsule 63 capsule 0     Sig: Take 3 capsules by mouth Daily. Take for 21 days on then 7 days off.        Pharmacy where request should be sent: [facility], [facility] ([place]) - [address] - [phone] [facility] [phone] FX     Last office visit with prescribing clinician: 11/12/2024   Last telemedicine visit with prescribing clinician: Visit date not found   Next office visit with prescribing clinician: 5/14/2025     Additional details provided by patient: KYARA IS INQUIRING IF THIS CAN BE PRESCRIBED AS 5 MG 1X DAILY & 2.5 MG 1X DAILY WHICH EQUALS THE 7.50 MG - IT SAVES $30,000.00 IF WRITTEN THIS WAY..    Does the patient have less than a 3 day supply:  [x] Yes  [] No    Would you like a call back once the refill request has been completed: [] Yes [x] No    If the office needs to give you a call back, can they leave a voicemail: [] Yes [x] No

## 2025-05-06 ENCOUNTER — SPECIALTY PHARMACY (OUTPATIENT)
Dept: PHARMACY | Facility: HOSPITAL | Age: 75
End: 2025-05-06
Payer: MEDICARE

## 2025-05-06 DIAGNOSIS — C50.411 MALIGNANT NEOPLASM OF UPPER-OUTER QUADRANT OF RIGHT BREAST IN FEMALE, ESTROGEN RECEPTOR POSITIVE: ICD-10-CM

## 2025-05-06 DIAGNOSIS — Z17.0 MALIGNANT NEOPLASM OF UPPER-OUTER QUADRANT OF RIGHT BREAST IN FEMALE, ESTROGEN RECEPTOR POSITIVE: ICD-10-CM

## 2025-05-06 RX ORDER — HYDROCODONE BITARTRATE AND ACETAMINOPHEN 5; 325 MG/1; MG/1
1 TABLET ORAL EVERY 6 HOURS PRN
Qty: 60 TABLET | Refills: 0 | Status: SHIPPED | OUTPATIENT
Start: 2025-05-06

## 2025-05-06 NOTE — PROGRESS NOTES
Specialty Pharmacy Patient Management Program  Oncology Reassessment     Jennifer Plascencia was referred by an their provider to the Oncology Patient Management program offered by [facility] for multiple myeloma. A follow-up outreach was conducted, including assessment of continued therapy appropriateness, medication adherence, and side effect incidence and management for Revlimid.    Changes to Insurance Coverage or Financial Support  none    Relevant Past Medical History and Comorbidities  Relevant medical history and concomitant health conditions were discussed with the patient. The patient's chart has been reviewed for relevant past medical history and comorbid health conditions and updated as necessary.   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  Stem cell transplant at [facility] 3/20/2016    Myeloma     Neck pain     Neuropathy     KWAME (obstructive sleep apnea) 2021    Overnight polysomnogram.  Weight 173 pounds.  Mild KWAME with AHI 5.6 events per hour.  When supine, 9.4 events per hour.  During REM, moderate severity at 26 events per hour.  No sleep-related hypoxia.  The patient snored 20.5% of total sleep time.    Osteopenia     Overweight (BMI 25.0-29.9) 2018     Social History     Socioeconomic History    Marital status:     Number of children: 2   Tobacco Use    Smoking status: Former     Current packs/day: 0.00     Average packs/day: 0.3 packs/day for 3.0 years (0.7 ttl pk-yrs)     Types: Cigarettes     Start date: 1971     Quit date: 1972     Years since quittin.3      Passive exposure: Never    Smokeless tobacco: Never    Tobacco comments:     Only lightly for 2 years   Vaping Use    Vaping status: Never Used   Substance and Sexual Activity    Alcohol use: Not Currently     Comment: Na    Drug use: Never    Sexual activity: Not Currently     Partners: Male     Birth control/protection: None     Comment: Karime     Problem list reviewed by Sandra Hernandez RPH on 5/6/2025 at 10:41 AM    Hospitalizations and Urgent Care Since Last Assessment  ED Visits, Admissions, or Hospitalizations: no  Urgent Office Visits: no    Allergies  Known allergies and reactions were discussed with the patient. The patient's chart has been reviewed for allergy information and updated as necessary.   No Known Allergies  Allergies reviewed by Sandra Hernandez RPH on 5/6/2025 at 10:40 AM    Relevant Laboratory Values  Relevant laboratory values were discussed with the patient. The following specialty medication dose adjustment(s) are recommended: No dose adjustments are needed for the oral specialty medication(s) based on the labs.    Lab Results   Component Value Date    GLUCOSE 106 (H) 04/03/2025    CALCIUM 9.0 04/03/2025     04/03/2025    K 4.2 04/03/2025    CO2 26.8 04/03/2025     04/03/2025    BUN 14 04/03/2025    CREATININE 0.84 04/03/2025    EGFRIFAFRI 108 09/18/2018    EGFRIFNONA 61 02/08/2022    BCR 16.7 04/03/2025    ANIONGAP 11.2 04/03/2025     Lab Results   Component Value Date    WBC 7.08 04/03/2025    RBC 3.67 (L) 04/03/2025    HGB 12.1 04/03/2025    HCT 36.7 04/03/2025    .0 (H) 04/03/2025    MCH 33.0 04/03/2025    MCHC 33.0 04/03/2025    RDW 13.6 04/03/2025    RDWSD 50.5 04/03/2025    MPV 8.5 04/03/2025     04/03/2025    NEUTRORELPCT 57.6 04/03/2025    LYMPHORELPCT 22.9 04/03/2025    MONORELPCT 16.7 (H) 04/03/2025    EOSRELPCT 1.3 04/03/2025    BASORELPCT 1.1 04/03/2025    AUTOIGPER 0.4 04/03/2025    NEUTROABS 4.08 04/03/2025    LYMPHSABS 1.62 04/03/2025    MONOSABS 1.18  (H) 04/03/2025    EOSABS 0.09 04/03/2025    BASOSABS 0.08 04/03/2025    AUTOIGNUM 0.03 04/03/2025    NRBC 0.0 04/03/2025       Current Medication List  This medication list has been reviewed with the patient and evaluated for any interactions or necessary modifications/recommendations, and updated to include all prescription medications, OTC medications, and supplements the patient is currently taking.  This list reflects what is contained in the patient's profile, which has also been marked as reviewed to communicate to other providers it is the most up to date version of the patient's current medication therapy.     Current Outpatient Medications:     acetaminophen (TYLENOL) 325 MG tablet, Take 1 tablet by mouth Every 6 (Six) Hours As Needed for Mild Pain., Disp: , Rfl:     Acetylcarn-Alpha Lipoic Acid 400-200 MG capsule, Take 2 capsules by mouth 2 (Two) Times a Day., Disp: 360 capsule, Rfl: 1    ALPRAZolam (XANAX) 0.25 MG tablet, Take 1 tablet by mouth 2 (Two) Times a Day As Needed for Anxiety or Sleep for up to 60 days., Disp: 60 tablet, Rfl: 1    anastrozole (ARIMIDEX) 1 MG tablet, TAKE 1 TABLET BY MOUTH EVERY DAY, Disp: 90 tablet, Rfl: 1    Azelastine HCl 137 MCG/SPRAY solution, , Disp: , Rfl:     B Complex Vitamins (VITAMIN B COMPLEX) capsule capsule, Take 2 tablets by mouth Daily. HOLD PRIOR TO SURG, Disp: , Rfl:     baclofen (LIORESAL) 10 MG tablet, Take 1 tablet by mouth 3 (Three) Times a Day As Needed., Disp: , Rfl: 5    calcium carbonate-vitamin d 600-400 MG-UNIT per tablet, Take 1 tablet by mouth Daily. HOLD PRIOR TO SURG, Disp: , Rfl:     colesevelam (WELCHOL) 625 MG tablet, Take 2 tablets by mouth 2 (Two) Times a Day With Meals., Disp: 90 tablet, Rfl: 2    cycloSPORINE (RESTASIS) 0.05 % ophthalmic emulsion, 1 drop 2 (Two) Times a Day., Disp: , Rfl:     diphenoxylate-atropine (LOMOTIL) 2.5-0.025 MG per tablet, Take 1 tablet by mouth 3 (Three) Times a Day As Needed for Diarrhea., Disp: 90 tablet, Rfl:  3    DULoxetine (CYMBALTA) 30 MG capsule, Take 1 capsule by mouth Daily., Disp: 90 capsule, Rfl: 3    DULoxetine (CYMBALTA) 60 MG capsule, Take 1 capsule by mouth Every Night., Disp: 90 capsule, Rfl: 3    fexofenadine (ALLEGRA) 60 MG tablet, Take 1 tablet by mouth Daily., Disp: , Rfl:     fluticasone (FLONASE) 50 MCG/ACT nasal spray, Administer 2 sprays into the nostril(s) as directed by provider Daily., Disp: 16 g, Rfl: 3    folic acid (FOLVITE) 1 MG tablet, Take 1 tablet by mouth Daily., Disp: , Rfl: 2    gabapentin (NEURONTIN) 100 MG capsule, Take 1 capsule by mouth 3 times a day., Disp: , Rfl:     HYDROcodone-acetaminophen (NORCO) 5-325 MG per tablet, Take 1 tablet by mouth Every 6 (Six) Hours As Needed for Moderate Pain., Disp: 60 tablet, Rfl: 0    hydroquinone 4 % cream, Daily As Needed., Disp: , Rfl:     hydrOXYzine (ATARAX) 25 MG tablet, TAKE 1 TABLET BY MOUTH EVERY NIGHT AT BEDTIME 1 HOUR PRIOR TO WHEN YOU WANT TO BE ASLEEP, Disp: , Rfl:     Ibuprofen 3 %, Gabapentin 10 %, Baclofen 2 %, lidocaine 4 %, Ketamine HCl 4 %, Apply 1-2 g topically to the appropriate area as directed 3 (Three) to 4 (Four) times daily., Disp: 90 g, Rfl: 0    lenalidomide (REVLIMID) 2.5 MG capsule, Take 3 capsules by mouth Daily. Take for 21 days on then 7 days off.  Indications: Multiple Myeloma, Disp: 63 capsule, Rfl: 0    lidocaine-prilocaine (EMLA) 2.5-2.5 % cream, Apply 1 Application topically to the appropriate area as directed 1 (One) Time., Disp: , Rfl:     Luvena Vaginal Moisturizer gel, Insert 1 Application into the vagina Daily As Needed (lubrication). (Patient not taking: Reported on 3/26/2025), Disp: 90 g, Rfl: 1    methylPREDNISolone (Medrol) 4 MG dose pack, follow package directions, Disp: 1 each, Rfl: 0    Multiple Vitamins-Iron (DAILY-JONI/IRON/BETA-CAROTENE) tablet, Take 1 tablet by mouth Daily. HOLD PRIOR TO SURG, Disp: , Rfl: 2    omega-3 acid ethyl esters (LOVAZA) 1 g capsule, , Disp: , Rfl:     Omega-3-Acid Eth  Est, Dietary, 1 g capsule, Take 2 capsules by mouth 2 (Two) Times a Day., Disp: 360 capsule, Rfl: 1    phenazopyridine (PYRIDIUM) 200 MG tablet, TAKE 1 TABLET BY MOUTH EVERY 8 HRS AS NEEDED FOR BLADDER DISCOMFORT, Disp: , Rfl:     polyethylene glycol (MiraLax) 17 GM/SCOOP powder, Take 17 g by mouth Daily. Take cap of powder with 8oz water daily surrounding surgery and while on narcotic, Disp: 119 g, Rfl: 0    promethazine-dextromethorphan (PROMETHAZINE-DM) 6.25-15 MG/5ML syrup, TAKE 10 ML BY MOUTH TWICE A DAY AS NEEDED FOR COUGH (Patient not taking: Reported on 3/24/2025), Disp: , Rfl:     Synthroid 75 MCG tablet, Take 1 tablet by mouth Daily., Disp: , Rfl:     Tart Cherry 1200 MG capsule, Take 1 capsule by mouth 3 (Three) Times a Day., Disp: 270 capsule, Rfl: 1    traZODone (DESYREL) 50 MG tablet, TAKE 1 TABLET BY MOUTH EVERY NIGHT, Disp: 30 tablet, Rfl: 1    tretinoin (RETIN-A) 0.1 % cream, APPLY SPARINGLY AT BEDTIME, Disp: , Rfl:     trimethoprim-polymyxin b (POLYTRIM) 98197-7.1 UNIT/ML-% ophthalmic solution, As Needed., Disp: , Rfl:     vitamin D (ERGOCALCIFEROL) 51730 units capsule capsule, Take 1 capsule by mouth 2 (Two) Times a Week. Twice a week, Disp: , Rfl:     zoledronic acid (ZOMETA) 4 MG/5ML injection, Infuse 5 mL into a venous catheter Every 3 (Three) Months., Disp: , Rfl:     Medicines reviewed by Sandra Hernandez RPH on 5/6/2025 at 10:41 AM    Drug Interactions  Assessed medication list for interactions, no significant drug interactions noted.   Advised patient to call the clinic if any new medications are started so we can assess for drug-drug interactions.  Drug-food interactions discussed:  none    Adverse Drug Reactions  Medication tolerability: Tolerating with no to minimal ADRs  Medication plan: Continue therapy with normal follow-up  Plan for ADR Management: not needed    Adherence, Self-Administration, and Current Therapy Problems  Adherence related to the patient's specialty therapy was  discussed with the patient. The Adherence segment of this outreach has been reviewed and updated.     Adherence Questions  Linked Medication(s) Assessed: Lenalidomide (REVLIMID)  On average, how many doses/injections does the patient miss per month?: 0  What are the identified reasons for non-adherence or missed doses? : no problems identified  What is the estimated medication adherence level?: %  Based on the patient/caregiver response and refill history, does this patient require an MTP to track adherence improvements?: no    Additional Barriers to Patient Self-Administration: none  Methods for Supporting Patient Self-Administration: none  Patient has had no issues obtaining medication from pharmacy.    Open Medication Therapy Problems  No medication therapy recommendations to display    Goals of Therapy  Goals related to the patient's specialty therapy were discussed with the patient. The Patient Goals segment of this outreach has been reviewed and updated.   Goals Addressed Today        Specialty Pharmacy General Goal      Progression free survival               Quality of Life Assessment   Quality of Life related to the patient's enrollment in the patient management program and services provided was discussed with the patient. The QOL segment of this outreach has been reviewed and updated.  Quality of Life Improvement Scale: 6-A little better    Discussed aforementioned material with patient over the phone.     Reassessment Plan & Follow-Up  1. Medication Therapy Changes: none  2. Related Plans, Therapy Recommendations, or Issues to Be Addressed: none  3. Pharmacist to perform regular assessments no more than (6) months from the previous assessment.   4. Care Coordinator to set up future refill outreaches, coordinate prescription delivery, and escalate clinical questions to pharmacist.    Attestation  Therapeutic appropriateness: Appropriate   I attest the patient was actively involved in and has agreed  to the above plan of care.  If the prescribed therapy is at any point deemed not appropriate based on the current or future assessments, a consultation will be initiated with the patient's specialty care provider to determine the best course of action. The revised plan of therapy will be documented along with any required assessments and/or additional patient education provided.     Sandra Hernandez, [place], [facility]  Clinical Specialty Pharmacist, Oncology  5/6/2025  10:42 EDT

## 2025-05-14 ENCOUNTER — INFUSION (OUTPATIENT)
Dept: ONCOLOGY | Facility: HOSPITAL | Age: 75
End: 2025-05-14
Payer: MEDICARE

## 2025-05-14 ENCOUNTER — OFFICE VISIT (OUTPATIENT)
Dept: ONCOLOGY | Facility: CLINIC | Age: 75
End: 2025-05-14
Payer: MEDICARE

## 2025-05-14 VITALS
OXYGEN SATURATION: 96 % | HEIGHT: 67 IN | WEIGHT: 141.6 LBS | BODY MASS INDEX: 22.22 KG/M2 | HEART RATE: 76 BPM | DIASTOLIC BLOOD PRESSURE: 76 MMHG | TEMPERATURE: 98.7 F | SYSTOLIC BLOOD PRESSURE: 126 MMHG

## 2025-05-14 DIAGNOSIS — G63 NEUROPATHY ASSOCIATED WITH MULTIPLE MYELOMA: ICD-10-CM

## 2025-05-14 DIAGNOSIS — K52.1 DIARRHEA DUE TO DRUG: ICD-10-CM

## 2025-05-14 DIAGNOSIS — C50.411 MALIGNANT NEOPLASM OF UPPER-OUTER QUADRANT OF RIGHT BREAST IN FEMALE, ESTROGEN RECEPTOR POSITIVE: ICD-10-CM

## 2025-05-14 DIAGNOSIS — Z17.0 MALIGNANT NEOPLASM OF UPPER-OUTER QUADRANT OF RIGHT BREAST IN FEMALE, ESTROGEN RECEPTOR POSITIVE: ICD-10-CM

## 2025-05-14 DIAGNOSIS — E53.8 B12 DEFICIENCY: ICD-10-CM

## 2025-05-14 DIAGNOSIS — C90.00 NEUROPATHY ASSOCIATED WITH MULTIPLE MYELOMA: ICD-10-CM

## 2025-05-14 DIAGNOSIS — C90.00 MULTIPLE MYELOMA NOT HAVING ACHIEVED REMISSION: Primary | ICD-10-CM

## 2025-05-14 DIAGNOSIS — C90.00 MULTIPLE MYELOMA NOT HAVING ACHIEVED REMISSION: ICD-10-CM

## 2025-05-14 LAB
ALBUMIN SERPL-MCNC: 4.2 G/DL (ref 3.5–5.2)
ALBUMIN/GLOB SERPL: 2.1 G/DL
ALP SERPL-CCNC: 56 U/L (ref 39–117)
ALT SERPL W P-5'-P-CCNC: 12 U/L (ref 1–33)
ANION GAP SERPL CALCULATED.3IONS-SCNC: 9.7 MMOL/L (ref 5–15)
AST SERPL-CCNC: 16 U/L (ref 1–32)
B2 MICROGLOB SERPL-MCNC: 2.2 MG/L (ref 0.8–2.2)
BASOPHILS # BLD AUTO: 0.02 10*3/MM3 (ref 0–0.2)
BASOPHILS NFR BLD AUTO: 0.5 % (ref 0–1.5)
BILIRUB SERPL-MCNC: 0.3 MG/DL (ref 0–1.2)
BUN SERPL-MCNC: 14 MG/DL (ref 8–23)
BUN/CREAT SERPL: 15.4 (ref 7–25)
CALCIUM SPEC-SCNC: 8.8 MG/DL (ref 8.6–10.5)
CHLORIDE SERPL-SCNC: 103 MMOL/L (ref 98–107)
CO2 SERPL-SCNC: 25.3 MMOL/L (ref 22–29)
CREAT SERPL-MCNC: 0.91 MG/DL (ref 0.57–1)
DEPRECATED RDW RBC AUTO: 49.7 FL (ref 37–54)
EGFRCR SERPLBLD CKD-EPI 2021: 65.5 ML/MIN/1.73
EOSINOPHIL # BLD AUTO: 0.05 10*3/MM3 (ref 0–0.4)
EOSINOPHIL NFR BLD AUTO: 1.3 % (ref 0.3–6.2)
ERYTHROCYTE [DISTWIDTH] IN BLOOD BY AUTOMATED COUNT: 13.6 % (ref 12.3–15.4)
GLOBULIN UR ELPH-MCNC: 2 GM/DL
GLUCOSE SERPL-MCNC: 111 MG/DL (ref 65–99)
HCT VFR BLD AUTO: 35 % (ref 34–46.6)
HGB BLD-MCNC: 11.5 G/DL (ref 12–15.9)
IMM GRANULOCYTES # BLD AUTO: 0.01 10*3/MM3 (ref 0–0.05)
IMM GRANULOCYTES NFR BLD AUTO: 0.3 % (ref 0–0.5)
LYMPHOCYTES # BLD AUTO: 1.39 10*3/MM3 (ref 0.7–3.1)
LYMPHOCYTES NFR BLD AUTO: 35.3 % (ref 19.6–45.3)
MAGNESIUM SERPL-MCNC: 2 MG/DL (ref 1.6–2.4)
MCH RBC QN AUTO: 32.7 PG (ref 26.6–33)
MCHC RBC AUTO-ENTMCNC: 32.9 G/DL (ref 31.5–35.7)
MCV RBC AUTO: 99.4 FL (ref 79–97)
MONOCYTES # BLD AUTO: 0.42 10*3/MM3 (ref 0.1–0.9)
MONOCYTES NFR BLD AUTO: 10.7 % (ref 5–12)
NEUTROPHILS NFR BLD AUTO: 2.05 10*3/MM3 (ref 1.7–7)
NEUTROPHILS NFR BLD AUTO: 51.9 % (ref 42.7–76)
NRBC BLD AUTO-RTO: 0 /100 WBC (ref 0–0.2)
PHOSPHATE SERPL-MCNC: 3.5 MG/DL (ref 2.5–4.5)
PLATELET # BLD AUTO: 205 10*3/MM3 (ref 140–450)
PMV BLD AUTO: 8.9 FL (ref 6–12)
POTASSIUM SERPL-SCNC: 4 MMOL/L (ref 3.5–5.2)
PROT SERPL-MCNC: 6.2 G/DL (ref 6–8.5)
RBC # BLD AUTO: 3.52 10*6/MM3 (ref 3.77–5.28)
SODIUM SERPL-SCNC: 138 MMOL/L (ref 136–145)
WBC NRBC COR # BLD AUTO: 3.94 10*3/MM3 (ref 3.4–10.8)

## 2025-05-14 PROCEDURE — 96365 THER/PROPH/DIAG IV INF INIT: CPT

## 2025-05-14 PROCEDURE — 84100 ASSAY OF PHOSPHORUS: CPT

## 2025-05-14 PROCEDURE — 85025 COMPLETE CBC W/AUTO DIFF WBC: CPT

## 2025-05-14 PROCEDURE — 80053 COMPREHEN METABOLIC PANEL: CPT

## 2025-05-14 PROCEDURE — 96372 THER/PROPH/DIAG INJ SC/IM: CPT

## 2025-05-14 PROCEDURE — 82232 ASSAY OF BETA-2 PROTEIN: CPT | Performed by: INTERNAL MEDICINE

## 2025-05-14 PROCEDURE — 83735 ASSAY OF MAGNESIUM: CPT

## 2025-05-14 PROCEDURE — 25010000002 CYANOCOBALAMIN PER 1000 MCG: Performed by: INTERNAL MEDICINE

## 2025-05-14 PROCEDURE — 96374 THER/PROPH/DIAG INJ IV PUSH: CPT

## 2025-05-14 PROCEDURE — 25010000002 ZOLEDRONIC ACID PER 1 MG: Performed by: INTERNAL MEDICINE

## 2025-05-14 RX ORDER — GABAPENTIN 100 MG/1
100 CAPSULE ORAL 3 TIMES DAILY
Qty: 270 CAPSULE | Refills: 0 | Status: SHIPPED | OUTPATIENT
Start: 2025-05-14

## 2025-05-14 RX ORDER — CYANOCOBALAMIN 1000 UG/ML
1000 INJECTION, SOLUTION INTRAMUSCULAR; SUBCUTANEOUS ONCE
Status: COMPLETED | OUTPATIENT
Start: 2025-05-14 | End: 2025-05-14

## 2025-05-14 RX ORDER — SODIUM CHLORIDE 9 MG/ML
250 INJECTION, SOLUTION INTRAVENOUS ONCE
Status: CANCELLED | OUTPATIENT
Start: 2025-05-14

## 2025-05-14 RX ORDER — ZOLEDRONIC ACID 0.04 MG/ML
4 INJECTION, SOLUTION INTRAVENOUS ONCE
Status: CANCELLED | OUTPATIENT
Start: 2025-05-14

## 2025-05-14 RX ORDER — DIPHENOXYLATE HYDROCHLORIDE AND ATROPINE SULFATE 2.5; .025 MG/1; MG/1
1 TABLET ORAL 3 TIMES DAILY PRN
Qty: 90 TABLET | Refills: 3 | Status: SHIPPED | OUTPATIENT
Start: 2025-05-14

## 2025-05-14 RX ADMIN — CYANOCOBALAMIN 1000 MCG: 1000 INJECTION, SOLUTION INTRAMUSCULAR at 10:55

## 2025-05-14 RX ADMIN — ZOLEDRONIC ACID 3.5 MG: 4 INJECTION INTRAVENOUS at 11:05

## 2025-05-15 ENCOUNTER — SPECIALTY PHARMACY (OUTPATIENT)
Dept: PHARMACY | Facility: HOSPITAL | Age: 75
End: 2025-05-15
Payer: MEDICARE

## 2025-05-15 NOTE — PROGRESS NOTES
Specialty Pharmacy Note: Revlimid (lenalidomide)    Jennifer Plascencia is a 75 y.o. female with multiple myeloma was seen 5/14/25 by Dr. Flanagan. Per provider dictation, no changes to oral oncology regimen Revlimid (lenalidomide).  Labs Review: The CMP and CBC from 5/14/25 have been reviewed. No dose adjustments are needed for the oral specialty medication(s) based on the labs.    Specialty pharmacy will continue to follow patient.    Sandra Hernandez, CelestinaD, BCPS  5/15/2025  09:04 EDT

## 2025-05-16 LAB
ALBUMIN SERPL ELPH-MCNC: 3.4 G/DL (ref 2.9–4.4)
ALBUMIN/GLOB SERPL: 1.5 {RATIO} (ref 0.7–1.7)
ALPHA1 GLOB SERPL ELPH-MCNC: 0.2 G/DL (ref 0–0.4)
ALPHA2 GLOB SERPL ELPH-MCNC: 0.7 G/DL (ref 0.4–1)
B-GLOBULIN SERPL ELPH-MCNC: 0.8 G/DL (ref 0.7–1.3)
GAMMA GLOB SERPL ELPH-MCNC: 0.7 G/DL (ref 0.4–1.8)
GLOBULIN SER-MCNC: 2.4 G/DL (ref 2.2–3.9)
IGA SERPL-MCNC: 72 MG/DL (ref 64–422)
IGG SERPL-MCNC: 734 MG/DL (ref 586–1602)
IGM SERPL-MCNC: 33 MG/DL (ref 26–217)
INTERPRETATION SERPL IEP-IMP: ABNORMAL
KAPPA LC FREE SER-MCNC: 13.7 MG/L (ref 3.3–19.4)
KAPPA LC FREE/LAMBDA FREE SER: 0.95 {RATIO} (ref 0.26–1.65)
LABORATORY COMMENT REPORT: ABNORMAL
LAMBDA LC FREE SERPL-MCNC: 14.4 MG/L (ref 5.7–26.3)
M PROTEIN SERPL ELPH-MCNC: ABNORMAL G/DL
PROT SERPL-MCNC: 5.8 G/DL (ref 6–8.5)

## 2025-06-02 ENCOUNTER — TELEPHONE (OUTPATIENT)
Dept: ONCOLOGY | Facility: CLINIC | Age: 75
End: 2025-06-02

## 2025-06-02 NOTE — TELEPHONE ENCOUNTER
Caller: KYARA FROM [facility]     Relationship:     Best call back number: [phone]    What was the call regarding: SONG CALLING WOULD LIKE TO DISCUSS CHANGING PATIENT'S LENALIDOMIDE (REVLIMID)  TO A 2.5 MG AND 5 MG TAKEN ONCE A DAY, FOR 28 DAYS     IT WILL DROP THE COST BY ABOUT $35029 A MONTH    CAN YOU SEND A NEW SCRIPT TO [facility]

## 2025-06-03 DIAGNOSIS — C90.00 MULTIPLE MYELOMA NOT HAVING ACHIEVED REMISSION: ICD-10-CM

## 2025-06-04 ENCOUNTER — SPECIALTY PHARMACY (OUTPATIENT)
Dept: PHARMACY | Facility: HOSPITAL | Age: 75
End: 2025-06-04
Payer: MEDICARE

## 2025-06-04 RX ORDER — LENALIDOMIDE 2.5 MG/1
CAPSULE ORAL
Qty: 63 CAPSULE | Refills: 0 | Status: SHIPPED | OUTPATIENT
Start: 2025-06-04

## 2025-06-04 NOTE — TELEPHONE ENCOUNTER
Specialty Pharmacy Patient Management Program  Per Protocol Prescription Order or Refill       Requested Prescriptions     Signed Prescriptions Disp Refills    lenalidomide (Revlimid) 2.5 MG capsule 63 capsule 0     Sig: TAKE THREE CAPSULES (7.5MG) BY MOUTH ONCE DAILY FOR 21 DAYS ON, 7 DAYS OFF OF EACH 28 DAYS CYCLE.     Authorizing Provider: RAJINDER SCHMITT     Ordering User: NAOMY NOEL     Prescription orders above were sent to [facility] per Collaborative Care Agreement Protocol.     Completed independent double check on medication order/RX.    Felix Gilliam, PharmD, BCOP  Clinical Specialty Pharmacist, Oncology  6/4/2025  13:45 EDT

## 2025-06-04 NOTE — TELEPHONE ENCOUNTER
Specialty Pharmacy Patient Management Program  Per Protocol Prescription Order or Refill     Patient will be filling or currently fills medications at [facility] and is enrolled in the Patient Management Program.    Requested Prescriptions     Pending Prescriptions Disp Refills    lenalidomide (Revlimid) 2.5 MG capsule [Pharmacy Med Name: Revlimid Oral Capsule 2.5 Mg] 63 capsule 0     Sig: TAKE THREE CAPSULES (7.5MG) BY MOUTH ONCE DAILY FOR 21 DAYS ON, 7 DAYS OFF OF EACH 28 DAYS CYCLE.     Prescription orders above were sent to the pharmacy per Collaborative Care Agreement Protocol.     Sandra Hernandez, PharmD, [facility]  Clinical Specialty Pharmacist, Oncology  6/4/2025  12:53 EDT        stable

## 2025-06-05 ENCOUNTER — TELEPHONE (OUTPATIENT)
Dept: ONCOLOGY | Facility: CLINIC | Age: 75
End: 2025-06-05

## 2025-06-05 NOTE — TELEPHONE ENCOUNTER
Caller: Jennifer Plascencia    Relationship: Self    Best call back number: [phone]      What was the call regarding: PATIENT STATES SHE SHOULD BE SCHEDULED FOR LABS AND B 12 INJECTION EVERY MONTH AND NOTHING IS SCHEDULED FOR JUNE OF JULY

## 2025-06-10 ENCOUNTER — TELEPHONE (OUTPATIENT)
Dept: INTERNAL MEDICINE | Facility: CLINIC | Age: 75
End: 2025-06-10
Payer: MEDICARE

## 2025-06-10 NOTE — TELEPHONE ENCOUNTER
Dr DUTTON received an order form re CPAP supplies  I s/w Pt asking who initially ordered this  Pt states she has contacted the correct office and will also contact [place] to make the correction for the Provider.  [place] number was given to Pt

## 2025-06-17 ENCOUNTER — LAB (OUTPATIENT)
Dept: LAB | Facility: HOSPITAL | Age: 75
End: 2025-06-17
Payer: MEDICARE

## 2025-06-17 ENCOUNTER — INFUSION (OUTPATIENT)
Dept: ONCOLOGY | Facility: HOSPITAL | Age: 75
End: 2025-06-17
Payer: MEDICARE

## 2025-06-17 DIAGNOSIS — C50.411 MALIGNANT NEOPLASM OF UPPER-OUTER QUADRANT OF RIGHT BREAST IN FEMALE, ESTROGEN RECEPTOR POSITIVE: ICD-10-CM

## 2025-06-17 DIAGNOSIS — G63 NEUROPATHY ASSOCIATED WITH MULTIPLE MYELOMA: Primary | ICD-10-CM

## 2025-06-17 DIAGNOSIS — C90.00 NEUROPATHY ASSOCIATED WITH MULTIPLE MYELOMA: ICD-10-CM

## 2025-06-17 DIAGNOSIS — C90.00 MULTIPLE MYELOMA NOT HAVING ACHIEVED REMISSION: ICD-10-CM

## 2025-06-17 DIAGNOSIS — C90.00 NEUROPATHY ASSOCIATED WITH MULTIPLE MYELOMA: Primary | ICD-10-CM

## 2025-06-17 DIAGNOSIS — G63 NEUROPATHY ASSOCIATED WITH MULTIPLE MYELOMA: ICD-10-CM

## 2025-06-17 DIAGNOSIS — Z17.0 MALIGNANT NEOPLASM OF UPPER-OUTER QUADRANT OF RIGHT BREAST IN FEMALE, ESTROGEN RECEPTOR POSITIVE: ICD-10-CM

## 2025-06-17 DIAGNOSIS — E53.8 B12 DEFICIENCY: ICD-10-CM

## 2025-06-17 LAB
ALBUMIN SERPL-MCNC: 4 G/DL (ref 3.5–5.2)
ALBUMIN/GLOB SERPL: 2 G/DL
ALP SERPL-CCNC: 55 U/L (ref 39–117)
ALT SERPL W P-5'-P-CCNC: 12 U/L (ref 1–33)
ANION GAP SERPL CALCULATED.3IONS-SCNC: 11.6 MMOL/L (ref 5–15)
AST SERPL-CCNC: 15 U/L (ref 1–32)
B2 MICROGLOB SERPL-MCNC: 2.2 MG/L (ref 0.8–2.2)
BASOPHILS # BLD AUTO: 0.04 10*3/MM3 (ref 0–0.2)
BASOPHILS NFR BLD AUTO: 0.6 % (ref 0–1.5)
BILIRUB SERPL-MCNC: 0.3 MG/DL (ref 0–1.2)
BUN SERPL-MCNC: 14.8 MG/DL (ref 8–23)
BUN/CREAT SERPL: 18.5 (ref 7–25)
CALCIUM SPEC-SCNC: 8.9 MG/DL (ref 8.6–10.5)
CHLORIDE SERPL-SCNC: 103 MMOL/L (ref 98–107)
CO2 SERPL-SCNC: 24.4 MMOL/L (ref 22–29)
CREAT SERPL-MCNC: 0.8 MG/DL (ref 0.57–1)
DEPRECATED RDW RBC AUTO: 47.9 FL (ref 37–54)
EGFRCR SERPLBLD CKD-EPI 2021: 76.5 ML/MIN/1.73
EOSINOPHIL # BLD AUTO: 0.24 10*3/MM3 (ref 0–0.4)
EOSINOPHIL NFR BLD AUTO: 3.8 % (ref 0.3–6.2)
ERYTHROCYTE [DISTWIDTH] IN BLOOD BY AUTOMATED COUNT: 13.2 % (ref 12.3–15.4)
GLOBULIN UR ELPH-MCNC: 2 GM/DL
GLUCOSE SERPL-MCNC: 96 MG/DL (ref 65–99)
HCT VFR BLD AUTO: 34.6 % (ref 34–46.6)
HGB BLD-MCNC: 11.4 G/DL (ref 12–15.9)
IMM GRANULOCYTES # BLD AUTO: 0.01 10*3/MM3 (ref 0–0.05)
IMM GRANULOCYTES NFR BLD AUTO: 0.2 % (ref 0–0.5)
LYMPHOCYTES # BLD AUTO: 1.56 10*3/MM3 (ref 0.7–3.1)
LYMPHOCYTES NFR BLD AUTO: 24.8 % (ref 19.6–45.3)
MCH RBC QN AUTO: 32.4 PG (ref 26.6–33)
MCHC RBC AUTO-ENTMCNC: 32.9 G/DL (ref 31.5–35.7)
MCV RBC AUTO: 98.3 FL (ref 79–97)
MONOCYTES # BLD AUTO: 0.88 10*3/MM3 (ref 0.1–0.9)
MONOCYTES NFR BLD AUTO: 14 % (ref 5–12)
NEUTROPHILS NFR BLD AUTO: 3.55 10*3/MM3 (ref 1.7–7)
NEUTROPHILS NFR BLD AUTO: 56.6 % (ref 42.7–76)
NRBC BLD AUTO-RTO: 0 /100 WBC (ref 0–0.2)
PLATELET # BLD AUTO: 219 10*3/MM3 (ref 140–450)
PMV BLD AUTO: 8.3 FL (ref 6–12)
POTASSIUM SERPL-SCNC: 3.9 MMOL/L (ref 3.5–5.2)
PROT SERPL-MCNC: 6 G/DL (ref 6–8.5)
RBC # BLD AUTO: 3.52 10*6/MM3 (ref 3.77–5.28)
SODIUM SERPL-SCNC: 139 MMOL/L (ref 136–145)
WBC NRBC COR # BLD AUTO: 6.28 10*3/MM3 (ref 3.4–10.8)

## 2025-06-17 PROCEDURE — 82232 ASSAY OF BETA-2 PROTEIN: CPT | Performed by: INTERNAL MEDICINE

## 2025-06-17 PROCEDURE — 80053 COMPREHEN METABOLIC PANEL: CPT

## 2025-06-17 PROCEDURE — 25010000002 CYANOCOBALAMIN PER 1000 MCG: Performed by: INTERNAL MEDICINE

## 2025-06-17 PROCEDURE — 36415 COLL VENOUS BLD VENIPUNCTURE: CPT

## 2025-06-17 PROCEDURE — 96372 THER/PROPH/DIAG INJ SC/IM: CPT

## 2025-06-17 PROCEDURE — 85025 COMPLETE CBC W/AUTO DIFF WBC: CPT

## 2025-06-17 RX ORDER — CYANOCOBALAMIN 1000 UG/ML
1000 INJECTION, SOLUTION INTRAMUSCULAR; SUBCUTANEOUS ONCE
Status: COMPLETED | OUTPATIENT
Start: 2025-06-17 | End: 2025-06-17

## 2025-06-17 RX ADMIN — CYANOCOBALAMIN 1000 MCG: 1000 INJECTION, SOLUTION INTRAMUSCULAR at 08:43

## 2025-06-19 LAB
ALBUMIN SERPL ELPH-MCNC: 3.3 G/DL (ref 2.9–4.4)
ALBUMIN/GLOB SERPL: 1.3 {RATIO} (ref 0.7–1.7)
ALPHA1 GLOB SERPL ELPH-MCNC: 0.2 G/DL (ref 0–0.4)
ALPHA2 GLOB SERPL ELPH-MCNC: 0.7 G/DL (ref 0.4–1)
B-GLOBULIN SERPL ELPH-MCNC: 0.9 G/DL (ref 0.7–1.3)
GAMMA GLOB SERPL ELPH-MCNC: 0.8 G/DL (ref 0.4–1.8)
GLOBULIN SER-MCNC: 2.6 G/DL (ref 2.2–3.9)
IGA SERPL-MCNC: 78 MG/DL (ref 64–422)
IGG SERPL-MCNC: 779 MG/DL (ref 586–1602)
IGM SERPL-MCNC: 26 MG/DL (ref 26–217)
INTERPRETATION SERPL IEP-IMP: ABNORMAL
KAPPA LC FREE SER-MCNC: 21.6 MG/L (ref 3.3–19.4)
KAPPA LC FREE/LAMBDA FREE SER: 0.97 {RATIO} (ref 0.26–1.65)
LABORATORY COMMENT REPORT: ABNORMAL
LAMBDA LC FREE SERPL-MCNC: 22.2 MG/L (ref 5.7–26.3)
M PROTEIN SERPL ELPH-MCNC: ABNORMAL G/DL
PROT SERPL-MCNC: 5.9 G/DL (ref 6–8.5)

## 2025-06-27 DIAGNOSIS — C90.00 MULTIPLE MYELOMA NOT HAVING ACHIEVED REMISSION: ICD-10-CM

## 2025-06-27 RX ORDER — LENALIDOMIDE 2.5 MG/1
7.5 CAPSULE ORAL DAILY
Qty: 63 CAPSULE | Refills: 0 | Status: SHIPPED | OUTPATIENT
Start: 2025-06-27

## 2025-06-27 RX ORDER — LENALIDOMIDE 2.5 MG/1
2.5 CAPSULE ORAL DAILY
Qty: 63 CAPSULE | Refills: 0 | Status: SHIPPED | OUTPATIENT
Start: 2025-06-27 | End: 2025-06-27 | Stop reason: SDUPTHER

## 2025-06-27 NOTE — PROGRESS NOTES
Specialty Pharmacy Patient Management Program  Per Protocol Prescription Order or Refill     Patient will be filling or currently fills medications at [facility] and is enrolled in the Patient Management Program.    Requested Prescriptions     Signed Prescriptions Disp Refills    lenalidomide (REVLIMID) 2.5 MG capsule 63 capsule 0     Sig: Take 3 capsules by mouth Daily. Take for 21 days on, then 7 days off.  Indications: Multiple Myeloma     Prescription orders above were sent to the pharmacy per Collaborative Care Agreement Protocol.     Last Office Visit: 5/14/25  Next Office Visit: 8/6/25    Brittani Ellis Rph, BCOP  Clinical Specialty Pharmacist, Oncology  6/27/2025  11:16 EDT

## 2025-06-27 NOTE — PROGRESS NOTES
Specialty Pharmacy Patient Management Program  Per Protocol Prescription Order or Refill       Signed Prescriptions Disp Refills    lenalidomide (REVLIMID) 2.5 MG capsule 63 capsule 0       Sig: Take 3 capsules by mouth Daily. Take for 21 days on, then 7 days off.  Indications: Multiple Myeloma     Prescription orders above were sent to [facility] per Collaborative Care Agreement Protocol.     Completed independent double check on medication order/RX.    Felix Gilliam PharmD, BCOP  Clinical Specialty Pharmacist, Oncology  6/27/2025  13:09 EDT

## 2025-07-14 ENCOUNTER — INFUSION (OUTPATIENT)
Dept: ONCOLOGY | Facility: HOSPITAL | Age: 75
End: 2025-07-14
Payer: MEDICARE

## 2025-07-14 DIAGNOSIS — C90.00 NEUROPATHY ASSOCIATED WITH MULTIPLE MYELOMA: Primary | ICD-10-CM

## 2025-07-14 DIAGNOSIS — G63 NEUROPATHY ASSOCIATED WITH MULTIPLE MYELOMA: Primary | ICD-10-CM

## 2025-07-14 RX ORDER — CYANOCOBALAMIN 1000 UG/ML
1000 INJECTION, SOLUTION INTRAMUSCULAR; SUBCUTANEOUS ONCE
Status: DISCONTINUED | OUTPATIENT
Start: 2025-07-14 | End: 2025-07-16 | Stop reason: HOSPADM

## 2025-07-16 ENCOUNTER — INFUSION (OUTPATIENT)
Dept: ONCOLOGY | Facility: HOSPITAL | Age: 75
End: 2025-07-16
Payer: MEDICARE

## 2025-07-16 ENCOUNTER — TELEPHONE (OUTPATIENT)
Dept: ONCOLOGY | Facility: CLINIC | Age: 75
End: 2025-07-16
Payer: MEDICARE

## 2025-07-16 DIAGNOSIS — C90.00 NEUROPATHY ASSOCIATED WITH MULTIPLE MYELOMA: Primary | ICD-10-CM

## 2025-07-16 DIAGNOSIS — G63 NEUROPATHY ASSOCIATED WITH MULTIPLE MYELOMA: Primary | ICD-10-CM

## 2025-07-16 DIAGNOSIS — F41.9 ANXIETY: ICD-10-CM

## 2025-07-16 RX ORDER — CYANOCOBALAMIN 1000 UG/ML
1000 INJECTION, SOLUTION INTRAMUSCULAR; SUBCUTANEOUS ONCE
Status: DISCONTINUED | OUTPATIENT
Start: 2025-07-16 | End: 2025-07-16 | Stop reason: HOSPADM

## 2025-07-16 NOTE — TELEPHONE ENCOUNTER
Patient called to reschedule today's b12 injection due to illness. Rescheduled for 7/18 per patient request.

## 2025-07-17 ENCOUNTER — TELEPHONE (OUTPATIENT)
Dept: ONCOLOGY | Facility: CLINIC | Age: 75
End: 2025-07-17

## 2025-07-18 ENCOUNTER — INFUSION (OUTPATIENT)
Dept: ONCOLOGY | Facility: HOSPITAL | Age: 75
End: 2025-07-18
Payer: MEDICARE

## 2025-07-18 ENCOUNTER — LAB (OUTPATIENT)
Dept: LAB | Facility: HOSPITAL | Age: 75
End: 2025-07-18
Payer: MEDICARE

## 2025-07-18 DIAGNOSIS — E53.8 B12 DEFICIENCY: ICD-10-CM

## 2025-07-18 DIAGNOSIS — C50.411 MALIGNANT NEOPLASM OF UPPER-OUTER QUADRANT OF RIGHT BREAST IN FEMALE, ESTROGEN RECEPTOR POSITIVE: ICD-10-CM

## 2025-07-18 DIAGNOSIS — C90.00 NEUROPATHY ASSOCIATED WITH MULTIPLE MYELOMA: Primary | ICD-10-CM

## 2025-07-18 DIAGNOSIS — Z17.0 MALIGNANT NEOPLASM OF UPPER-OUTER QUADRANT OF RIGHT BREAST IN FEMALE, ESTROGEN RECEPTOR POSITIVE: ICD-10-CM

## 2025-07-18 DIAGNOSIS — C90.00 MULTIPLE MYELOMA NOT HAVING ACHIEVED REMISSION: ICD-10-CM

## 2025-07-18 DIAGNOSIS — G63 NEUROPATHY ASSOCIATED WITH MULTIPLE MYELOMA: Primary | ICD-10-CM

## 2025-07-18 DIAGNOSIS — C90.00 NEUROPATHY ASSOCIATED WITH MULTIPLE MYELOMA: ICD-10-CM

## 2025-07-18 DIAGNOSIS — G63 NEUROPATHY ASSOCIATED WITH MULTIPLE MYELOMA: ICD-10-CM

## 2025-07-18 LAB
ALBUMIN SERPL-MCNC: 4.2 G/DL (ref 3.5–5.2)
ALBUMIN/GLOB SERPL: 2.1 G/DL
ALP SERPL-CCNC: 54 U/L (ref 39–117)
ALT SERPL W P-5'-P-CCNC: 11 U/L (ref 1–33)
ANION GAP SERPL CALCULATED.3IONS-SCNC: 9.7 MMOL/L (ref 5–15)
AST SERPL-CCNC: 18 U/L (ref 1–32)
B2 MICROGLOB SERPL-MCNC: 2.2 MG/L (ref 0.8–2.2)
BASOPHILS # BLD AUTO: 0.06 10*3/MM3 (ref 0–0.2)
BASOPHILS NFR BLD AUTO: 1.4 % (ref 0–1.5)
BILIRUB SERPL-MCNC: 0.2 MG/DL (ref 0–1.2)
BUN SERPL-MCNC: 14.2 MG/DL (ref 8–23)
BUN/CREAT SERPL: 18 (ref 7–25)
CALCIUM SPEC-SCNC: 8.7 MG/DL (ref 8.6–10.5)
CHLORIDE SERPL-SCNC: 105 MMOL/L (ref 98–107)
CO2 SERPL-SCNC: 24.3 MMOL/L (ref 22–29)
CREAT SERPL-MCNC: 0.79 MG/DL (ref 0.57–1)
DEPRECATED RDW RBC AUTO: 48.6 FL (ref 37–54)
EGFRCR SERPLBLD CKD-EPI 2021: 77.6 ML/MIN/1.73
EOSINOPHIL # BLD AUTO: 0.14 10*3/MM3 (ref 0–0.4)
EOSINOPHIL NFR BLD AUTO: 3.2 % (ref 0.3–6.2)
ERYTHROCYTE [DISTWIDTH] IN BLOOD BY AUTOMATED COUNT: 13.5 % (ref 12.3–15.4)
GLOBULIN UR ELPH-MCNC: 2 GM/DL
GLUCOSE SERPL-MCNC: 94 MG/DL (ref 65–99)
HCT VFR BLD AUTO: 36.7 % (ref 34–46.6)
HGB BLD-MCNC: 11.8 G/DL (ref 12–15.9)
IMM GRANULOCYTES # BLD AUTO: 0.01 10*3/MM3 (ref 0–0.05)
IMM GRANULOCYTES NFR BLD AUTO: 0.2 % (ref 0–0.5)
LYMPHOCYTES # BLD AUTO: 1.99 10*3/MM3 (ref 0.7–3.1)
LYMPHOCYTES NFR BLD AUTO: 45.6 % (ref 19.6–45.3)
MCH RBC QN AUTO: 31.6 PG (ref 26.6–33)
MCHC RBC AUTO-ENTMCNC: 32.2 G/DL (ref 31.5–35.7)
MCV RBC AUTO: 98.1 FL (ref 79–97)
MONOCYTES # BLD AUTO: 0.61 10*3/MM3 (ref 0.1–0.9)
MONOCYTES NFR BLD AUTO: 14 % (ref 5–12)
NEUTROPHILS NFR BLD AUTO: 1.55 10*3/MM3 (ref 1.7–7)
NEUTROPHILS NFR BLD AUTO: 35.6 % (ref 42.7–76)
NRBC BLD AUTO-RTO: 0 /100 WBC (ref 0–0.2)
PLATELET # BLD AUTO: 201 10*3/MM3 (ref 140–450)
PMV BLD AUTO: 8.5 FL (ref 6–12)
POTASSIUM SERPL-SCNC: 3.9 MMOL/L (ref 3.5–5.2)
PROT SERPL-MCNC: 6.2 G/DL (ref 6–8.5)
RBC # BLD AUTO: 3.74 10*6/MM3 (ref 3.77–5.28)
SODIUM SERPL-SCNC: 139 MMOL/L (ref 136–145)
WBC NRBC COR # BLD AUTO: 4.36 10*3/MM3 (ref 3.4–10.8)

## 2025-07-18 PROCEDURE — 36415 COLL VENOUS BLD VENIPUNCTURE: CPT

## 2025-07-18 PROCEDURE — 82232 ASSAY OF BETA-2 PROTEIN: CPT | Performed by: INTERNAL MEDICINE

## 2025-07-18 PROCEDURE — 25010000002 CYANOCOBALAMIN PER 1000 MCG: Performed by: INTERNAL MEDICINE

## 2025-07-18 PROCEDURE — 85025 COMPLETE CBC W/AUTO DIFF WBC: CPT

## 2025-07-18 PROCEDURE — 96372 THER/PROPH/DIAG INJ SC/IM: CPT

## 2025-07-18 PROCEDURE — 80053 COMPREHEN METABOLIC PANEL: CPT

## 2025-07-18 RX ORDER — CYANOCOBALAMIN 1000 UG/ML
1000 INJECTION, SOLUTION INTRAMUSCULAR; SUBCUTANEOUS ONCE
Status: COMPLETED | OUTPATIENT
Start: 2025-07-18 | End: 2025-07-18

## 2025-07-18 RX ORDER — ALPRAZOLAM 0.25 MG
0.25 TABLET ORAL 2 TIMES DAILY PRN
Qty: 60 TABLET | Refills: 1 | Status: SHIPPED | OUTPATIENT
Start: 2025-07-18 | End: 2025-09-16

## 2025-07-18 RX ADMIN — CYANOCOBALAMIN 1000 MCG: 1000 INJECTION, SOLUTION INTRAMUSCULAR at 10:55

## 2025-07-18 NOTE — TELEPHONE ENCOUNTER
Rx Refill Note  Requested Prescriptions     Pending Prescriptions Disp Refills    ALPRAZolam (XANAX) 0.25 MG tablet [Pharmacy Med Name: ALPRAZOLAM 0.25 MG TABLET] 60 tablet 1     Sig: TAKE 1 TABLET BY MOUTH 2 (TWO) TIMES A DAY AS NEEDED FOR ANXIETY OR SLEEP FOR UP TO 60 DAYS.      Last office visit with prescribing clinician: 3/24/2025   Last telemedicine visit with prescribing clinician: Visit date not found   Next office visit with prescribing clinician: Visit date not found                         Would you like a call back once the refill request has been completed: [] Yes [] No    If the office needs to give you a call back, can they leave a voicemail: [] Yes [] No    Dutch Ramos MA  07/18/25, 08:26 EDT

## 2025-07-21 ENCOUNTER — SPECIALTY PHARMACY (OUTPATIENT)
Dept: PHARMACY | Facility: HOSPITAL | Age: 75
End: 2025-07-21
Payer: MEDICARE

## 2025-07-21 DIAGNOSIS — C90.00 MULTIPLE MYELOMA NOT HAVING ACHIEVED REMISSION: ICD-10-CM

## 2025-07-21 RX ORDER — LENALIDOMIDE 5 MG/1
5 CAPSULE ORAL DAILY
Qty: 21 CAPSULE | Refills: 0 | Status: SHIPPED | OUTPATIENT
Start: 2025-07-21

## 2025-07-21 RX ORDER — LENALIDOMIDE 2.5 MG/1
2.5 CAPSULE ORAL DAILY
Qty: 21 CAPSULE | Refills: 0 | Status: SHIPPED | OUTPATIENT
Start: 2025-07-21

## 2025-07-21 NOTE — PROGRESS NOTES
Specialty Pharmacy Patient Management Program  Per Protocol Prescription Order or Refill     Patient will be filling or currently fills medications at [facility] and is enrolled in the Patient Management Program.    Requested Prescriptions     Signed Prescriptions Disp Refills    lenalidomide (REVLIMID) 5 MG capsule 21 capsule 0     Sig: Take 1 capsule by mouth Daily. Take along with 2.5 mg strength for a total dose of 7.5 mg once daily for 21 days of a 28-day cycle.    lenalidomide (REVLIMID) 2.5 MG capsule 21 capsule 0     Sig: Take 1 capsule by mouth Daily. Take along with 5 mg strength for a total dose of 7.5 mg once daily for 21 days of a 28-day cycle.  Indications: Multiple Myeloma     Prescription orders above were sent to the pharmacy per Collaborative Care Agreement Protocol.     Sandra Hernandez, PharmD, [facility]  Clinical Specialty Pharmacist, Oncology  7/21/2025  07:45 EDT

## 2025-07-21 NOTE — PROGRESS NOTES
Specialty Pharmacy Patient Management Program       Staff message rec from [facility] Pharmacist-She has asked me to notify [employer] that two different strengths of Revlimid were sent.    I have notified Rocio at [employer] [facility].    Sandra Hernandez [facility] sent to Brandi Gilliam, [facility]; Sujey Randall, Pharmacy Technician  Double check revlimid please. Thank you!    Sujey- could you let [employer] know we sent in as two different strengths this time? Insurance prefers this. Thank you!    Sujey Randall, Pharmacy Technician  07/21/25  08:27 EDT

## 2025-07-21 NOTE — PROGRESS NOTES
Specialty Pharmacy Patient Management Program  Per Protocol Prescription Order or Refill       Requested Prescriptions     Signed Prescriptions Disp Refills    lenalidomide (REVLIMID) 5 MG capsule 21 capsule 0     Sig: Take 1 capsule by mouth Daily. Take along with 2.5 mg strength for a total dose of 7.5 mg once daily for 21 days of a 28-day cycle.    lenalidomide (REVLIMID) 2.5 MG capsule 21 capsule 0     Sig: Take 1 capsule by mouth Daily. Take along with 5 mg strength for a total dose of 7.5 mg once daily for 21 days of a 28-day cycle.  Indications: Multiple Myeloma     Prescription orders above were sent to [facility] per Collaborative Care Agreement Protocol.     Completed independent double check on medication order/RX.    Felix Gilliam, Angle, BCOP  Clinical Specialty Pharmacist, Oncology  7/21/2025  08:02 EDT

## 2025-07-22 ENCOUNTER — TELEPHONE (OUTPATIENT)
Dept: ONCOLOGY | Facility: CLINIC | Age: 75
End: 2025-07-22

## 2025-07-22 LAB
ALBUMIN SERPL ELPH-MCNC: 3.5 G/DL (ref 2.9–4.4)
ALBUMIN/GLOB SERPL: 1.4 {RATIO} (ref 0.7–1.7)
ALPHA1 GLOB SERPL ELPH-MCNC: 0.2 G/DL (ref 0–0.4)
ALPHA2 GLOB SERPL ELPH-MCNC: 0.7 G/DL (ref 0.4–1)
B-GLOBULIN SERPL ELPH-MCNC: 0.9 G/DL (ref 0.7–1.3)
GAMMA GLOB SERPL ELPH-MCNC: 0.8 G/DL (ref 0.4–1.8)
GLOBULIN SER-MCNC: 2.6 G/DL (ref 2.2–3.9)
IGA SERPL-MCNC: 89 MG/DL (ref 64–422)
IGG SERPL-MCNC: 816 MG/DL (ref 586–1602)
IGM SERPL-MCNC: 30 MG/DL (ref 26–217)
INTERPRETATION SERPL IEP-IMP: ABNORMAL
KAPPA LC FREE SER-MCNC: 20.2 MG/L (ref 3.3–19.4)
KAPPA LC FREE/LAMBDA FREE SER: 0.98 {RATIO} (ref 0.26–1.65)
LABORATORY COMMENT REPORT: ABNORMAL
LAMBDA LC FREE SERPL-MCNC: 20.7 MG/L (ref 5.7–26.3)
M PROTEIN SERPL ELPH-MCNC: ABNORMAL G/DL
PROT SERPL-MCNC: 6.1 G/DL (ref 6–8.5)

## 2025-07-30 ENCOUNTER — TELEPHONE (OUTPATIENT)
Dept: ONCOLOGY | Facility: CLINIC | Age: 75
End: 2025-07-30
Payer: MEDICARE

## 2025-07-30 NOTE — TELEPHONE ENCOUNTER
Caller: Jennifer Plascencia    Relationship to patient: Self    Best call back number: [phone]    Chief complaint: CONFLICT OF SCHEDULE     Type of visit: PORT FLUSH, FOLLOW UP 2 AND INFUSION    Requested date: NEXT AVAILABLE AFTER 08/06        If rescheduling, when is the original appointment: 08/06

## 2025-08-07 RX ORDER — ANASTROZOLE 1 MG/1
1 TABLET ORAL DAILY
Qty: 90 TABLET | Refills: 3 | Status: SHIPPED | OUTPATIENT
Start: 2025-08-07

## 2025-08-08 ENCOUNTER — SPECIALTY PHARMACY (OUTPATIENT)
Dept: PHARMACY | Facility: HOSPITAL | Age: 75
End: 2025-08-08
Payer: MEDICARE

## 2025-08-13 ENCOUNTER — OFFICE VISIT (OUTPATIENT)
Dept: ONCOLOGY | Facility: CLINIC | Age: 75
End: 2025-08-13
Payer: MEDICARE

## 2025-08-13 ENCOUNTER — INFUSION (OUTPATIENT)
Dept: ONCOLOGY | Facility: HOSPITAL | Age: 75
End: 2025-08-13
Payer: MEDICARE

## 2025-08-13 VITALS
WEIGHT: 142.2 LBS | HEART RATE: 85 BPM | HEIGHT: 67 IN | SYSTOLIC BLOOD PRESSURE: 129 MMHG | TEMPERATURE: 97.2 F | OXYGEN SATURATION: 98 % | DIASTOLIC BLOOD PRESSURE: 71 MMHG | BODY MASS INDEX: 22.32 KG/M2

## 2025-08-13 DIAGNOSIS — C90.00 NEUROPATHY ASSOCIATED WITH MULTIPLE MYELOMA: ICD-10-CM

## 2025-08-13 DIAGNOSIS — C90.00 NEUROPATHY ASSOCIATED WITH MULTIPLE MYELOMA: Primary | ICD-10-CM

## 2025-08-13 DIAGNOSIS — G63 NEUROPATHY ASSOCIATED WITH MULTIPLE MYELOMA: ICD-10-CM

## 2025-08-13 DIAGNOSIS — C90.00 MULTIPLE MYELOMA NOT HAVING ACHIEVED REMISSION: ICD-10-CM

## 2025-08-13 DIAGNOSIS — Z17.0 MALIGNANT NEOPLASM OF UPPER-OUTER QUADRANT OF RIGHT BREAST IN FEMALE, ESTROGEN RECEPTOR POSITIVE: ICD-10-CM

## 2025-08-13 DIAGNOSIS — C50.411 MALIGNANT NEOPLASM OF UPPER-OUTER QUADRANT OF RIGHT BREAST IN FEMALE, ESTROGEN RECEPTOR POSITIVE: ICD-10-CM

## 2025-08-13 DIAGNOSIS — E53.8 B12 DEFICIENCY: ICD-10-CM

## 2025-08-13 DIAGNOSIS — G63 NEUROPATHY ASSOCIATED WITH MULTIPLE MYELOMA: Primary | ICD-10-CM

## 2025-08-13 LAB
ALBUMIN SERPL-MCNC: 4 G/DL (ref 3.5–5.2)
ALBUMIN/GLOB SERPL: 1.7 G/DL
ALP SERPL-CCNC: 58 U/L (ref 39–117)
ALT SERPL W P-5'-P-CCNC: 10 U/L (ref 1–33)
ANION GAP SERPL CALCULATED.3IONS-SCNC: 12.8 MMOL/L (ref 5–15)
AST SERPL-CCNC: 13 U/L (ref 1–32)
B2 MICROGLOB SERPL-MCNC: 2.3 MG/L (ref 0.8–2.2)
BASOPHILS # BLD AUTO: 0.04 10*3/MM3 (ref 0–0.2)
BASOPHILS NFR BLD AUTO: 1.2 % (ref 0–1.5)
BILIRUB SERPL-MCNC: 0.4 MG/DL (ref 0–1.2)
BUN SERPL-MCNC: 6.2 MG/DL (ref 8–23)
BUN/CREAT SERPL: 7.4 (ref 7–25)
CALCIUM SPEC-SCNC: 8.6 MG/DL (ref 8.6–10.5)
CHLORIDE SERPL-SCNC: 102 MMOL/L (ref 98–107)
CO2 SERPL-SCNC: 24.2 MMOL/L (ref 22–29)
CREAT SERPL-MCNC: 0.84 MG/DL (ref 0.57–1)
DEPRECATED RDW RBC AUTO: 48.6 FL (ref 37–54)
EGFRCR SERPLBLD CKD-EPI 2021: 72.1 ML/MIN/1.73
EOSINOPHIL # BLD AUTO: 0.19 10*3/MM3 (ref 0–0.4)
EOSINOPHIL NFR BLD AUTO: 5.9 % (ref 0.3–6.2)
ERYTHROCYTE [DISTWIDTH] IN BLOOD BY AUTOMATED COUNT: 13.4 % (ref 12.3–15.4)
FERRITIN SERPL-MCNC: 122 NG/ML (ref 13–150)
GLOBULIN UR ELPH-MCNC: 2.4 GM/DL
GLUCOSE SERPL-MCNC: 106 MG/DL (ref 65–99)
HCT VFR BLD AUTO: 35 % (ref 34–46.6)
HGB BLD-MCNC: 11.3 G/DL (ref 12–15.9)
IMM GRANULOCYTES # BLD AUTO: 0.01 10*3/MM3 (ref 0–0.05)
IMM GRANULOCYTES NFR BLD AUTO: 0.3 % (ref 0–0.5)
IRON 24H UR-MRATE: 116 MCG/DL (ref 37–145)
IRON SATN MFR SERPL: 34 % (ref 20–50)
LYMPHOCYTES # BLD AUTO: 1.48 10*3/MM3 (ref 0.7–3.1)
LYMPHOCYTES NFR BLD AUTO: 46 % (ref 19.6–45.3)
MAGNESIUM SERPL-MCNC: 2.2 MG/DL (ref 1.6–2.4)
MCH RBC QN AUTO: 31.8 PG (ref 26.6–33)
MCHC RBC AUTO-ENTMCNC: 32.3 G/DL (ref 31.5–35.7)
MCV RBC AUTO: 98.6 FL (ref 79–97)
MONOCYTES # BLD AUTO: 0.35 10*3/MM3 (ref 0.1–0.9)
MONOCYTES NFR BLD AUTO: 10.9 % (ref 5–12)
NEUTROPHILS NFR BLD AUTO: 1.15 10*3/MM3 (ref 1.7–7)
NEUTROPHILS NFR BLD AUTO: 35.7 % (ref 42.7–76)
NRBC BLD AUTO-RTO: 0 /100 WBC (ref 0–0.2)
PHOSPHATE SERPL-MCNC: 3.2 MG/DL (ref 2.5–4.5)
PLATELET # BLD AUTO: 259 10*3/MM3 (ref 140–450)
PMV BLD AUTO: 8.5 FL (ref 6–12)
POTASSIUM SERPL-SCNC: 3.9 MMOL/L (ref 3.5–5.2)
PROT SERPL-MCNC: 6.4 G/DL (ref 6–8.5)
RBC # BLD AUTO: 3.55 10*6/MM3 (ref 3.77–5.28)
SODIUM SERPL-SCNC: 139 MMOL/L (ref 136–145)
TIBC SERPL-MCNC: 337 MCG/DL (ref 298–536)
TRANSFERRIN SERPL-MCNC: 226 MG/DL (ref 200–360)
WBC NRBC COR # BLD AUTO: 3.22 10*3/MM3 (ref 3.4–10.8)

## 2025-08-13 PROCEDURE — 83735 ASSAY OF MAGNESIUM: CPT

## 2025-08-13 PROCEDURE — 83540 ASSAY OF IRON: CPT

## 2025-08-13 PROCEDURE — 82232 ASSAY OF BETA-2 PROTEIN: CPT | Performed by: INTERNAL MEDICINE

## 2025-08-13 PROCEDURE — 96374 THER/PROPH/DIAG INJ IV PUSH: CPT

## 2025-08-13 PROCEDURE — 25010000002 ZOLEDRONIC ACID PER 1 MG: Performed by: INTERNAL MEDICINE

## 2025-08-13 PROCEDURE — 96372 THER/PROPH/DIAG INJ SC/IM: CPT

## 2025-08-13 PROCEDURE — 25010000002 CYANOCOBALAMIN PER 1000 MCG: Performed by: INTERNAL MEDICINE

## 2025-08-13 PROCEDURE — 85025 COMPLETE CBC W/AUTO DIFF WBC: CPT

## 2025-08-13 PROCEDURE — 84100 ASSAY OF PHOSPHORUS: CPT

## 2025-08-13 PROCEDURE — 82728 ASSAY OF FERRITIN: CPT

## 2025-08-13 PROCEDURE — 80053 COMPREHEN METABOLIC PANEL: CPT

## 2025-08-13 PROCEDURE — 84466 ASSAY OF TRANSFERRIN: CPT

## 2025-08-13 RX ORDER — SODIUM CHLORIDE 9 MG/ML
250 INJECTION, SOLUTION INTRAVENOUS ONCE
Status: DISCONTINUED | OUTPATIENT
Start: 2025-08-13 | End: 2025-08-13 | Stop reason: HOSPADM

## 2025-08-13 RX ORDER — CYANOCOBALAMIN 1000 UG/ML
1000 INJECTION, SOLUTION INTRAMUSCULAR; SUBCUTANEOUS ONCE
Status: COMPLETED | OUTPATIENT
Start: 2025-08-13 | End: 2025-08-13

## 2025-08-13 RX ORDER — SODIUM CHLORIDE 9 MG/ML
250 INJECTION, SOLUTION INTRAVENOUS ONCE
Status: CANCELLED | OUTPATIENT
Start: 2025-08-13

## 2025-08-13 RX ADMIN — CYANOCOBALAMIN 1000 MCG: 1000 INJECTION, SOLUTION INTRAMUSCULAR at 10:09

## 2025-08-13 RX ADMIN — ZOLEDRONIC ACID 3.5 MG: 4 INJECTION INTRAVENOUS at 09:51

## 2025-08-14 ENCOUNTER — SPECIALTY PHARMACY (OUTPATIENT)
Dept: PHARMACY | Facility: HOSPITAL | Age: 75
End: 2025-08-14
Payer: MEDICARE

## 2025-08-14 DIAGNOSIS — C90.00 MULTIPLE MYELOMA NOT HAVING ACHIEVED REMISSION: ICD-10-CM

## 2025-08-14 RX ORDER — LENALIDOMIDE 5 MG/1
5 CAPSULE ORAL DAILY
Qty: 21 CAPSULE | Refills: 0 | Status: SHIPPED | OUTPATIENT
Start: 2025-08-14

## 2025-08-14 RX ORDER — LENALIDOMIDE 2.5 MG/1
2.5 CAPSULE ORAL DAILY
Qty: 21 CAPSULE | Refills: 0 | Status: SHIPPED | OUTPATIENT
Start: 2025-08-14

## 2025-08-15 LAB
ALBUMIN SERPL ELPH-MCNC: 3.3 G/DL (ref 2.9–4.4)
ALBUMIN/GLOB SERPL: 1.2 {RATIO} (ref 0.7–1.7)
ALPHA1 GLOB SERPL ELPH-MCNC: 0.3 G/DL (ref 0–0.4)
ALPHA2 GLOB SERPL ELPH-MCNC: 0.9 G/DL (ref 0.4–1)
B-GLOBULIN SERPL ELPH-MCNC: 0.9 G/DL (ref 0.7–1.3)
GAMMA GLOB SERPL ELPH-MCNC: 0.7 G/DL (ref 0.4–1.8)
GLOBULIN SER-MCNC: 2.8 G/DL (ref 2.2–3.9)
IGA SERPL-MCNC: 88 MG/DL (ref 64–422)
IGG SERPL-MCNC: 800 MG/DL (ref 586–1602)
IGM SERPL-MCNC: 32 MG/DL (ref 26–217)
INTERPRETATION SERPL IEP-IMP: ABNORMAL
KAPPA LC FREE SER-MCNC: 22.9 MG/L (ref 3.3–19.4)
KAPPA LC FREE/LAMBDA FREE SER: 1.05 {RATIO} (ref 0.26–1.65)
LABORATORY COMMENT REPORT: ABNORMAL
LAMBDA LC FREE SERPL-MCNC: 21.9 MG/L (ref 5.7–26.3)
M PROTEIN SERPL ELPH-MCNC: ABNORMAL G/DL
PROT SERPL-MCNC: 6.1 G/DL (ref 6–8.5)

## (undated) DEVICE — DRSNG WND BORDR/ADHS NONADHR/GZ LF 4X4IN STRL

## (undated) DEVICE — NDL HYPO PRECISIONGLIDE REG 25G 1 1/2

## (undated) DEVICE — ANTIBACTERIAL UNDYED BRAIDED (POLYGLACTIN 910), SYNTHETIC ABSORBABLE SUTURE: Brand: COATED VICRYL

## (undated) DEVICE — STRIP,CLOSURE,WOUND,MEDI-STRIP,1/2X4: Brand: MEDLINE

## (undated) DEVICE — CONTAINER,SPECIMEN,OR STERILE,4OZ: Brand: MEDLINE

## (undated) DEVICE — CVR TRANSD CIV FLX TPR 11.9 TO 3.8X61CM

## (undated) DEVICE — CONN TBG Y 5 IN 1 LF STRL

## (undated) DEVICE — BNDG,ELSTC,MATRIX,STRL,6"X5YD,LF,HOOK&LP: Brand: MEDLINE

## (undated) DEVICE — SMOKE EVACUATION TUBING WITH 7/8 IN TO 1/4 IN REDUCER: Brand: BUFFALO FILTER

## (undated) DEVICE — SUT MNCRYL PLS ANTIB UD 4/0 PS2 18IN

## (undated) DEVICE — SHEATH GUIDE SCOUT SURG TPR 8.3TO3.2CM 264CM STRL

## (undated) DEVICE — GLV SURG BIOGEL LTX PF 7

## (undated) DEVICE — PK CHST BRST 40

## (undated) DEVICE — SKIN PREP TRAY W/CHG: Brand: MEDLINE INDUSTRIES, INC.

## (undated) DEVICE — GAUZE,SPONGE,FLUFF,6"X6.75",STRL,10/TRAY: Brand: MEDLINE